# Patient Record
Sex: FEMALE | Race: WHITE | HISPANIC OR LATINO | Employment: PART TIME | URBAN - METROPOLITAN AREA
[De-identification: names, ages, dates, MRNs, and addresses within clinical notes are randomized per-mention and may not be internally consistent; named-entity substitution may affect disease eponyms.]

---

## 2017-01-19 ENCOUNTER — ALLSCRIPTS OFFICE VISIT (OUTPATIENT)
Dept: OTHER | Facility: OTHER | Age: 42
End: 2017-01-19

## 2017-04-05 ENCOUNTER — ALLSCRIPTS OFFICE VISIT (OUTPATIENT)
Dept: OTHER | Facility: OTHER | Age: 42
End: 2017-04-05

## 2017-04-05 ENCOUNTER — APPOINTMENT (OUTPATIENT)
Dept: LAB | Facility: HOSPITAL | Age: 42
End: 2017-04-05
Payer: COMMERCIAL

## 2017-04-05 DIAGNOSIS — E66.01 MORBID (SEVERE) OBESITY DUE TO EXCESS CALORIES (HCC): ICD-10-CM

## 2017-04-05 LAB
25(OH)D3 SERPL-MCNC: 10.2 NG/ML (ref 30–100)
ALBUMIN SERPL BCP-MCNC: 3.6 G/DL (ref 3.5–5)
ALP SERPL-CCNC: 125 U/L (ref 46–116)
ALT SERPL W P-5'-P-CCNC: 26 U/L (ref 12–78)
ANION GAP SERPL CALCULATED.3IONS-SCNC: 11 MMOL/L (ref 4–13)
AST SERPL W P-5'-P-CCNC: 16 U/L (ref 5–45)
BASOPHILS # BLD AUTO: 0.05 THOUSANDS/ΜL (ref 0–0.1)
BASOPHILS NFR BLD AUTO: 1 % (ref 0–1)
BILIRUB SERPL-MCNC: 0.31 MG/DL (ref 0.2–1)
BUN SERPL-MCNC: 15 MG/DL (ref 5–25)
CALCIUM SERPL-MCNC: 8.7 MG/DL (ref 8.3–10.1)
CHLORIDE SERPL-SCNC: 105 MMOL/L (ref 100–108)
CHOLEST SERPL-MCNC: 164 MG/DL (ref 50–200)
CO2 SERPL-SCNC: 26 MMOL/L (ref 21–32)
CREAT SERPL-MCNC: 0.71 MG/DL (ref 0.6–1.3)
CREAT UR-MCNC: 184 MG/DL
EOSINOPHIL # BLD AUTO: 0.16 THOUSAND/ΜL (ref 0–0.61)
EOSINOPHIL NFR BLD AUTO: 2 % (ref 0–6)
ERYTHROCYTE [DISTWIDTH] IN BLOOD BY AUTOMATED COUNT: 13.9 % (ref 11.6–15.1)
GFR SERPL CREATININE-BSD FRML MDRD: >60 ML/MIN/1.73SQ M
GLUCOSE P FAST SERPL-MCNC: 102 MG/DL (ref 65–99)
HCT VFR BLD AUTO: 42 % (ref 34.8–46.1)
HDLC SERPL-MCNC: 51 MG/DL (ref 40–60)
HGB BLD-MCNC: 13.5 G/DL (ref 11.5–15.4)
LDLC SERPL CALC-MCNC: 101 MG/DL (ref 0–100)
LYMPHOCYTES # BLD AUTO: 3.13 THOUSANDS/ΜL (ref 0.6–4.47)
LYMPHOCYTES NFR BLD AUTO: 34 % (ref 14–44)
MAGNESIUM SERPL-MCNC: 2 MG/DL (ref 1.6–2.6)
MCH RBC QN AUTO: 26.7 PG (ref 26.8–34.3)
MCHC RBC AUTO-ENTMCNC: 32.1 G/DL (ref 31.4–37.4)
MCV RBC AUTO: 83 FL (ref 82–98)
MICROALBUMIN UR-MCNC: 7.7 MG/L (ref 0–20)
MICROALBUMIN/CREAT 24H UR: 4 MG/G CREATININE (ref 0–30)
MONOCYTES # BLD AUTO: 0.38 THOUSAND/ΜL (ref 0.17–1.22)
MONOCYTES NFR BLD AUTO: 4 % (ref 4–12)
NEUTROPHILS # BLD AUTO: 5.37 THOUSANDS/ΜL (ref 1.85–7.62)
NEUTS SEG NFR BLD AUTO: 59 % (ref 43–75)
NRBC BLD AUTO-RTO: 0 /100 WBCS
PLATELET # BLD AUTO: 331 THOUSANDS/UL (ref 149–390)
PMV BLD AUTO: 10.3 FL (ref 8.9–12.7)
POTASSIUM SERPL-SCNC: 4.2 MMOL/L (ref 3.5–5.3)
PROT SERPL-MCNC: 7.4 G/DL (ref 6.4–8.2)
RBC # BLD AUTO: 5.05 MILLION/UL (ref 3.81–5.12)
SODIUM SERPL-SCNC: 142 MMOL/L (ref 136–145)
T4 FREE SERPL-MCNC: 1.03 NG/DL (ref 0.76–1.46)
TRIGL SERPL-MCNC: 60 MG/DL
TSH SERPL DL<=0.05 MIU/L-ACNC: 1.02 UIU/ML (ref 0.36–3.74)
WBC # BLD AUTO: 9.09 THOUSAND/UL (ref 4.31–10.16)

## 2017-04-05 PROCEDURE — 82570 ASSAY OF URINE CREATININE: CPT

## 2017-04-05 PROCEDURE — 82043 UR ALBUMIN QUANTITATIVE: CPT

## 2017-04-05 PROCEDURE — 82306 VITAMIN D 25 HYDROXY: CPT

## 2017-04-05 PROCEDURE — 85025 COMPLETE CBC W/AUTO DIFF WBC: CPT

## 2017-04-05 PROCEDURE — 83735 ASSAY OF MAGNESIUM: CPT

## 2017-04-05 PROCEDURE — 84439 ASSAY OF FREE THYROXINE: CPT

## 2017-04-05 PROCEDURE — 80061 LIPID PANEL: CPT

## 2017-04-05 PROCEDURE — 83036 HEMOGLOBIN GLYCOSYLATED A1C: CPT

## 2017-04-05 PROCEDURE — 84443 ASSAY THYROID STIM HORMONE: CPT

## 2017-04-05 PROCEDURE — 80053 COMPREHEN METABOLIC PANEL: CPT

## 2017-04-05 PROCEDURE — 36415 COLL VENOUS BLD VENIPUNCTURE: CPT

## 2017-04-06 LAB
EST. AVERAGE GLUCOSE BLD GHB EST-MCNC: 123 MG/DL
HBA1C MFR BLD: 5.9 % (ref 4.2–6.3)

## 2017-04-13 ENCOUNTER — GENERIC CONVERSION - ENCOUNTER (OUTPATIENT)
Dept: OTHER | Facility: OTHER | Age: 42
End: 2017-04-13

## 2017-04-19 ENCOUNTER — GENERIC CONVERSION - ENCOUNTER (OUTPATIENT)
Dept: OTHER | Facility: OTHER | Age: 42
End: 2017-04-19

## 2017-05-10 ENCOUNTER — TRANSCRIBE ORDERS (OUTPATIENT)
Dept: SLEEP CENTER | Facility: CLINIC | Age: 42
End: 2017-05-10

## 2017-05-10 ENCOUNTER — ALLSCRIPTS OFFICE VISIT (OUTPATIENT)
Dept: OTHER | Facility: OTHER | Age: 42
End: 2017-05-10

## 2017-05-10 DIAGNOSIS — G47.33 OSA (OBSTRUCTIVE SLEEP APNEA): Primary | ICD-10-CM

## 2017-05-31 ENCOUNTER — ALLSCRIPTS OFFICE VISIT (OUTPATIENT)
Dept: OTHER | Facility: OTHER | Age: 42
End: 2017-05-31

## 2017-05-31 DIAGNOSIS — R07.9 CHEST PAIN: ICD-10-CM

## 2017-06-08 ENCOUNTER — HOSPITAL ENCOUNTER (OUTPATIENT)
Dept: NON INVASIVE DIAGNOSTICS | Facility: CLINIC | Age: 42
Discharge: HOME | End: 2017-06-08
Payer: COMMERCIAL

## 2017-06-08 ENCOUNTER — HOSPITAL ENCOUNTER (OUTPATIENT)
Dept: SLEEP CENTER | Facility: CLINIC | Age: 42
Discharge: HOME | End: 2017-06-08
Payer: COMMERCIAL

## 2017-06-08 ENCOUNTER — GENERIC CONVERSION - ENCOUNTER (OUTPATIENT)
Dept: OTHER | Facility: OTHER | Age: 42
End: 2017-06-08

## 2017-06-08 DIAGNOSIS — R07.9 CHEST PAIN: ICD-10-CM

## 2017-06-08 DIAGNOSIS — G47.33 OSA (OBSTRUCTIVE SLEEP APNEA): ICD-10-CM

## 2017-06-08 LAB
CHEST PAIN STATEMENT: NORMAL
MAX DIASTOLIC BP: 90 MMHG
MAX HEART RATE: 157 BPM
MAX PREDICTED HEART RATE: 179 BPM
MAX. SYSTOLIC BP: 160 MMHG
PROTOCOL NAME: NORMAL
REASON FOR TERMINATION: NORMAL
TARGET HR FORMULA: NORMAL
TEST INDICATION: NORMAL
TIME IN EXERCISE PHASE: 384 S

## 2017-06-08 PROCEDURE — 93017 CV STRESS TEST TRACING ONLY: CPT

## 2017-07-06 ENCOUNTER — ALLSCRIPTS OFFICE VISIT (OUTPATIENT)
Dept: OTHER | Facility: OTHER | Age: 42
End: 2017-07-06

## 2017-08-03 ENCOUNTER — ALLSCRIPTS OFFICE VISIT (OUTPATIENT)
Dept: OTHER | Facility: OTHER | Age: 42
End: 2017-08-03

## 2017-08-03 DIAGNOSIS — M62.838 OTHER MUSCLE SPASM: ICD-10-CM

## 2017-08-03 DIAGNOSIS — M54.2 CERVICALGIA: ICD-10-CM

## 2017-08-03 DIAGNOSIS — M54.12 RADICULOPATHY OF CERVICAL REGION: ICD-10-CM

## 2017-08-04 ENCOUNTER — ALLSCRIPTS OFFICE VISIT (OUTPATIENT)
Dept: OTHER | Facility: OTHER | Age: 42
End: 2017-08-04

## 2017-08-10 ENCOUNTER — GENERIC CONVERSION - ENCOUNTER (OUTPATIENT)
Dept: OTHER | Facility: OTHER | Age: 42
End: 2017-08-10

## 2017-08-10 ENCOUNTER — APPOINTMENT (OUTPATIENT)
Dept: PHYSICAL THERAPY | Facility: REHABILITATION | Age: 42
End: 2017-08-10
Payer: COMMERCIAL

## 2017-08-10 DIAGNOSIS — M54.12 RADICULOPATHY OF CERVICAL REGION: ICD-10-CM

## 2017-08-10 DIAGNOSIS — M54.2 CERVICALGIA: ICD-10-CM

## 2017-08-10 DIAGNOSIS — M62.838 OTHER MUSCLE SPASM: ICD-10-CM

## 2017-08-10 PROCEDURE — G8991 OTHER PT/OT GOAL STATUS: HCPCS | Performed by: PHYSICAL THERAPIST

## 2017-08-10 PROCEDURE — 97162 PT EVAL MOD COMPLEX 30 MIN: CPT

## 2017-08-10 PROCEDURE — 97140 MANUAL THERAPY 1/> REGIONS: CPT

## 2017-08-10 PROCEDURE — G8990 OTHER PT/OT CURRENT STATUS: HCPCS | Performed by: PHYSICAL THERAPIST

## 2017-08-10 PROCEDURE — 97110 THERAPEUTIC EXERCISES: CPT

## 2017-08-15 ENCOUNTER — APPOINTMENT (OUTPATIENT)
Dept: PHYSICAL THERAPY | Facility: REHABILITATION | Age: 42
End: 2017-08-15
Payer: COMMERCIAL

## 2017-08-18 ENCOUNTER — APPOINTMENT (OUTPATIENT)
Dept: PHYSICAL THERAPY | Facility: REHABILITATION | Age: 42
End: 2017-08-18
Payer: COMMERCIAL

## 2017-08-28 ENCOUNTER — APPOINTMENT (OUTPATIENT)
Dept: PHYSICAL THERAPY | Facility: REHABILITATION | Age: 42
End: 2017-08-28
Payer: COMMERCIAL

## 2017-08-30 ENCOUNTER — APPOINTMENT (OUTPATIENT)
Dept: PHYSICAL THERAPY | Facility: REHABILITATION | Age: 42
End: 2017-08-30
Payer: COMMERCIAL

## 2017-09-18 ENCOUNTER — ALLSCRIPTS OFFICE VISIT (OUTPATIENT)
Dept: OTHER | Facility: OTHER | Age: 42
End: 2017-09-18

## 2017-10-12 ENCOUNTER — ALLSCRIPTS OFFICE VISIT (OUTPATIENT)
Dept: OTHER | Facility: OTHER | Age: 42
End: 2017-10-12

## 2017-10-26 NOTE — PROGRESS NOTES
Assessment  1  Chronic migraine without aura (346 70) (G43 449)    Plan  Chronic migraine without aura    · Trokendi XR 25 MG Oral Capsule Extended Release 24 Hour; Take 1 cap HS WITH  100mg cap X 1 week, if no improvement increast to 2 caps HS WITH 100mg capsule   Rx By: Dylan Collins; Dispense: 30 Days ; #:60 Capsule Extended Release 24 Hour; Refill: 0;For: Chronic migraine without aura; SARINA = N; Verified Transmission to [address] [place] [address]; Msg to Pharmacy: Brand name medically necessary; Last Updated By: System [facility]; 09/18/2017 4:13:22 PM   · Ketorolac Tromethamine 30 MG/ML Injection Solution   Rx By: Dylan Collins; For: Chronic migraine without aura; Dose of 2 ML; Intramuscular; SARINA = N; Administered by: Jose Jarvis: 09/18/2017 3:40:00 PM; Last Updated By: Jose Jarvis; 09/18/2017 3:41:39 PM  Chronic migraine without aura, Common migraine without aura    · Trokendi  MG Oral Capsule Extended Release 24 Hour; 1 QHS   Rx By: Dylan Collins; Dispense: 30 Days ; #:30 Capsule Extended Release 24 Hour; Refill: 3;For: Chronic migraine without aura, Common migraine without aura; SARINA = N; Verified Transmission to [address] [place] [address]; Msg to Pharmacy: brand name medically necessary  thank you ; Last Updated By: System, [employer]; 09/18/2017 4:13:22 PM    Discussion/Summary  Discussion Summary:   Chronic migraine: Yashira Espino presents for follow-up evaluation with regard to her chronic migraine  She reports that she is doing a little bit better on the Trokendi in that she has fewer side effects and it is also helping the headaches to a degree but she still gets 3-4 significant migraines per week  She does find the oral Toradol to be somewhat helpful but has not yet used it along with the Maxalt  She finds the Compazine to be less helpful when taken alone  She had a significant migraine in the office today (8/10)   She received in intramuscular injection of Toradol 60 mg and is somewhat improved at 6/10 currently  migraine prevention we will increase the Trokendi XR to 125 mg at bedtime for 1 week, if she notes no significant improvement and no side effects he should increase to 150 mg do think she would also benefit from a combination migraine supplement which may include 1 or more of the following gradients (magnesium, riboflavin/vitamin B2, coenzyme Q10, feverfew, jeana)  She can use a standard supplement dose according to bottle instructions  abort a migraine I would suggest that she use either Maxalt plus Compazine, or Toradol plus Compazine for a typical migraine, for a relatively severe migraine she should use all 3 together  would like her to begin keeping track of her migraines using application on her phonedid receive a bill for her prior Botox injections, I am uncertain as to why insurance did not cover that in its entirety, but we will follow up with the billing department  will plan for her to return to the office in 3 months time but we would be happy to see her sooner if the need should arise  She is currently scheduled for her next Botox injection on October 10th  We will leave this appointment in place for the time being pending further guidance from the billing department, but if it appears that she will have recurrent bills in the amount that she was build for her 1st set of injections that would be cost prohibitive and we would not plan to continue her injections at that point  The    Counseling Documentation With Imm: total time of encounter was 20 minutes-- and-- 15 minutes was spent counseling  Chief Complaint  Chief Complaint Free Text Note Form: Patient present for follow-up appointment regarding migraine      History of Present Illness  HPI: The patient presents for follow-up with regard to her chronic migraines  the time of her last visit she was transitioned to Trokendi which is working better  Fewer SE's  Still getting 3-4/week  Presented today with migraine 8/10 --> 6/10   Overall the migraines are typical for her  She reports that oral Toradol is somewhat effective but less so than the intramuscular form  She has tried Compazine in isolation and not found that to be helpful  She has not tried any Toradol with Maxalt or add Compazine to either medication  Toradol in the oral form helped a little  Has the Maxalt but has not been using it  Compazine is not as helpful alone  Occasional ibuprofen as needed for body pain  reports that there is no change in her migraine character  Review of Systems  Neurological ROS:   Constitutional: fatigue  HEENT: sinus problems,-- feeling congested-- and-- tinnitus  Cardiovascular:  no chest pain or pressure, no palpitations present, the heart rate was not rapid or irregular, no swelling in the arms or legs, no poor circulation  Respiratory:  no unusual or persistant cough, no shortness of breath with or without exertion  Gastrointestinal: nausea-- and-- diarrhea  Musculoskeletal: arthralgias,-- head/neck/back pain-- and-- pain while walking  Integumentary  no masses, no rash, no skin lesions, no livedo reticularis  Psychiatric: anxiety-- and-- depression  Endocrine  no unusual weight loss or gain, no excessive urination, no excessive thirst, no hair loss or gain, no hot or cold intolerance, no menstrual period change or irregularity, no loss of sexual ability or drive, no erection difficulty, no nipple discharge  Hematologic/Lymphatic:  no unusual bleeding, no tendency for easy bruising, no clotting skin or lumps  Neurological General: headache,-- lightheadedness,-- trouble falling asleep-- and-- waking up at night  Neurological Mental Status:  no confusion, no mood swings, no alteration or loss of consciousness, no difficulty expressing/understanding speech, no memory problems  Neurological Cranial Nerves: vertigo or dizziness     Neurological Motor findings include:  no tremor, no twitching, no cramping(pre/post exercise), no atrophy  Neurological Coordination: balance difficulties  Neurological Sensory:  no numbness, no pain, no tingling, does not fall when eyes closed or taking a shower  Neurological Gait:  no difficulty walking, not falling to one side, no sensation of being pushed, has not had falls  ROS Reviewed:   ROS reviewed  Active Problems  1  Abdominal pain, epigastric (789 06) (R10 13)   2  Acute sinusitis (461 9) (J01 90)   3  Allergic rhinitis (477 9) (J30 9)   4  Apnea, sleep (780 57) (G47 30)   5  Arthralgia of multiple joints (719 49) (M25 50)   6  Back pain, sacroiliac (724 6) (M53 3)   7  Cervical cancer screening (V76 2) (Z12 4)   8  Cervical radiculopathy at C6 (723 4) (M54 12)   9  Cervicalgia (723 1) (M54 2)   10  Chest pain (786 50) (R07 9)   11  Chronic migraine without aura (346 70) (G43 709)   12  Colon, diverticulosis (562 10) (K57 30)   13  Common migraine without aura (346 10) (G43 009)   14  DDD (degenerative disc disease), cervical (722 4) (M50 30)   15  Depression screening (V79 0) (Z13 89)   16  Depression with anxiety (300 4) (F41 8)   17  Fibromyalgia (729 1) (M79 7)   18  Headache (784 0) (R51)   19  Headache, chronic daily (784 0) (R51)   20  Heart burn (787 1) (R12)   21  Hiatal hernia (553 3) (K44 9)   22  Increased frequency of headaches (784 0) (R51)   23  Intermittent chest pain (786 50) (R07 9)   24  Low back strain (847 2) (S39 012A)   25  Lumbago (724 2) (M54 5)   26  Lumbar radicular pain (724 4) (M54 16)   27  Need for influenza vaccination (V04 81) (Z23)   28  Obesity, morbid, BMI 40 0-49 9 (278 01) (E66 01)   29  Pap smear for cervical cancer screening (V76 2) (Z12 4)   30  Post-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)   31  Screening for depression (V79 0) (Z13 89)   32  Tinnitus (388 30) (H93 19)   33  Trapezius muscle spasm (728 85) (M62 838)   34  Vitamin D deficiency (268 9) (E55 9)    Past Medical History  1  History of Asthma (983 90) (J45 909)   2   History of Claustrophobia (300 29) (F40 240)   3  History of Cough (786 2) (R05)   4  History of Depression (311) (F32 9)   5  History of acute conjunctivitis (V12 49) (Z86 69)   6  History of influenza (V12 09) (Z87 09)   7  History of upper respiratory infection (V12 09) (Z87 09)   8  History of Lung nodule (793 11) (R91 1)   9  History of Sore throat (462) (J02 9)  Active Problems And Past Medical History Reviewed: The active problems and past medical history were reviewed and updated today  Surgical History  1  History of Back Surgery   2  History of Gallbladder Surgery   3  History of Neck Surgery   4  History of Neuroplasty Decompression Median Nerve At Carpal Tunnel  Surgical History Reviewed: The surgical history was reviewed and updated today  Family History  Mother    1  Family history of Cancer   2  Family history of Diabetes   3  Denied: Family history of Drug abuse   4  Family history of fibromyalgia (V17 89) (Z82 69)   5  Family history of systemic lupus erythematosus (V19 4) (Z82 69)   6  Family history of High blood pressure   7  No family history of mental disorder   8  Family history of Thyroid disease  Father    5  Denied: Family history of Drug abuse   10  No family history of mental disorder  Family History    11  Family history of Arthritis (716 90) (M19 90)   12  Family history of Cancer   13  Family history of systemic lupus erythematosus (V19 4) (Z82 69)  Family History Reviewed: The family history was reviewed and updated today  Social History   · Daily caffeinated cola consumption   · Never A Smoker   · No drug use   · No preference on [employer]ism beliefs   · Occasional alcohol use   · Social history reviewed, unchanged   · Tea  Social History Reviewed: The social history was reviewed and updated today  Current Meds   1  Botox 100 UNIT Injection Solution Reconstituted; INJECT 200  UNIT Other; Therapy: 02KSX4405 to (Evaluate:30Lws3610); Last Rx:[id number] Ordered   2  Cholestyramine 4 GM/DOSE Oral Powder; TAKE 1 SCOOP TWICE DAILY; Therapy: 38XGM5080 to [phone])  Requested for: 13JSM4313; Last   AZ:67PER8200 Ordered   3  Cyclobenzaprine HCl - 10 MG Oral Tablet; TAKE 1 TABLET 3 TIMES DAILY AS NEEDED; Therapy: 56GWZ7202 to (Evaluate:20Jun2017)  Requested for: 02DLS1595; Last   Rx:02Dfu8104 Ordered   4  Fluticasone Propionate 50 MCG/ACT Nasal Suspension; USE 2 SPRAYS EACH   NOSTRIL ONCE DAILY; Therapy: 09ZVX5151 to (Last Rx:05Jun2017)  Requested for: 10EQJ4910 Ordered   5  Ibuprofen 600 MG Oral Tablet; TAKE 1 TABLET 3 TIMES DAILY WITH FOOD AS NEEDED; Therapy: 39ISW9283 to (Evaluate:02Sep2017)  Requested for: 20Den2277; Last   Rx:90Hck0846 Ordered   6  Ketorolac Tromethamine 10 MG Oral Tablet; take 1-2 tablets as needed for headache, no   more than 2 tablets in 24hr and no more than 3 tablets in 1 week; Therapy: 78Ujp5065 to (Evaluate:54Ivl4585)  Requested for: 33Dde8414; Last   Rx:11Aug2017 Ordered   7  Ondansetron HCl - 4 MG Oral Tablet; TAKE 1 TABLET EVERY 8 HOURS AS NEEDED   FOR NAUSEA; Therapy: 46LMY5132 to (Evaluate:15May2017)  Requested for: 05Apr2017; Last   Rx:05Apr2017 Ordered   8  PARoxetine HCl - 10 MG Oral Tablet; Take 1/2 tablet once daily for one week, then   increase to one tablet once daily; Therapy: 27EHP2122 to [phone])  Requested for: 86LTE3482; Last   Rx:05Jun2017 Ordered   9  Prochlorperazine Maleate 10 MG Oral Tablet; TAKE 1 TABLET EVERY 8 HOURS AS   NEEDED nausea or migraine; Therapy: 58BKU7780 to (Evaluate:01Vcy2607)  Requested for: 59Bvc8002; Last   Rx:44Gld4707 Ordered   10  Rizatriptan Benzoate 10 MG Oral Tablet; TAKE 1 TABLET AT ONSET OF HEADACHE  MAY    REPEAT EVERY 2 HOURS AS NEEDED  MAXIMUM 3 TABLETS IN 24 HOURS; Therapy: 22VOX0901 to (Nick Huitron)  Requested for: 320 2035; Last    Rx:05Apr2017 Ordered   11  Trokendi  MG Oral Capsule Extended Release 24 Hour; 1 QHS;     Therapy: 76ASM1960 to (Brien Dent)  Requested for: 73Ebc7222; Last    Rx:79Nsp9960 Ordered   12  Vitamin D (Ergocalciferol) 58813 UNIT Oral Capsule; TAKE 1 CAPSULE WEEKLY; Therapy: 13Apr2017 to (Evaluate:80Dwg4204)  Requested for: 13Apr2017; Last    Rx:90Hmn8270 Ordered  Medication List Reviewed: The medication list was reviewed and updated today  Allergies  1  Codeine Sulfate TABS    Vitals  Signs   Recorded: 99Sek9892 03:40PM   Respiration: 16  Systolic: 088  Diastolic: 78  Height: 5 ft 3 in  Weight: 234 lb 5 oz  BMI Calculated: 41 51  BSA Calculated: 2 07    Physical Exam  Michela Ramsay was in mild-to-moderate distress with clear photophobia  There were no obvious focal cranial neuropathies  Her gait was narrow based and stable with normal castillo          Future Appointments    Date/Time Provider Specialty Site   12/28/2017 02:30 PM Merari Dixon MD Neurology [address]   10/12/2017 10:00 AM Jama Mckee [facility] Gastroenterology Adult [address]   10/10/2017 12:30 PM Gregg Roberts [facility] Neurology [address]   10/16/2017 03:00 PM Luly Kohler, DO Pain Management [address]     Signatures   Electronically signed by : Yohana Kelly MD; Sep 18 2017  5:59PM EST                       (Author)

## 2017-10-29 NOTE — PROGRESS NOTES
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)   2  GERD without esophagitis (530 81) (K21 9)    Plan  Abdominal pain, epigastric, Heart burn    · Omeprazole 20 MG Oral Capsule Delayed Release; TAKE 1 CAPSULE Daily   Rx By: Nena Leija; Dispense: 90 Days ; #:90 Capsule Delayed Release; Refill: 0;Abdominal pain, epigastric, Heart burn; SARINA = N; Verified Transmission to [facility]; Last Updated By: System, [employer]; 10/12/2017 10:26:34 AM  GERD without esophagitis    · Follow-up visit in 1 year Evaluation and Treatment  Follow-up  Status: Hold For -Scheduling  Requested for: 12Oct2017   Ordered; For: GERD without esophagitis; Ordered By: Nena Leija Performed:  Due: 87VBM0585  Post-cholecystectomy syndrome    · Cholestyramine 4 GM/DOSE Oral Powder; TAKE 1 SCOOP TWICE DAILY   Rx By: Nena Leija; Dispense: 30 Days ; #:1 X 378 GM Can; Refill: 5;Post-cholecystectomy syndrome; SARINA = N; Verified Transmission to [address] [place] [address]; Last Updated By: System, SureScript[employer]; 10/12/2017 10:26:33 AM    Discussion/Summary  Discussion Summary:   1  Post cholecystectomy syndrome -she notes good control of her symptoms when she takes the cholestyramine, which she has been taking for approximately 2 years  She does not take this every day because of the side effect of constipation  I recommend attempting half scoop cholestyramine daily instead of a full scoop to see if this lessens her constipation  She denies any new or worsening symptoms, she is requesting a refill of her cholestyramine today  Follow-up in 1 year or as needed  GERD -she reports longstanding reflux symptoms, she denies any recent change or worsening of her symptoms and denies any alarm symptoms such as difficulty swallowing, hematochezia, melena, change in appetite or unintended weight loss  She had an upper endoscopy about 2 years ago which showed gastritis   She was recommended to take omeprazole 20 mg daily however the time she did not have insurance and could not afford this  She is requesting a prescription for this today as she continues to have reflux symptoms  She notes that she has made lifestyle changes including changing her diet to avoid spicy and fatty foods and not lying down for 3 hours after eating  Follow up in 1 year or as needed  Counseling Documentation With Imm: The patient was counseled regarding diagnostic results,-- instructions for management,-- risk factor reductions,-- prognosis,-- patient and family education,-- impressions,-- risks and benefits of treatment options,-- importance of compliance with treatment  Goals and Barriers: The patient has the current Goals: To reduce diarrhea at work  The patent has the current Barriers: Side effect from medication - constipation  Patient's Capacity to Self-Care: Patient is able to Self-Care  Patient Education: Educational resources provided: Antireflux precautions  Medication SE Review and Pt Understands Tx: Possible side effects of new medications were reviewed with the patient/guardian today  The treatment plan was reviewed with the patient/guardian  The patient/guardian understands and agrees with the treatment plan   Self Referrals:   Self Referrals: Yes      Chief Complaint  Chief Complaint Free Text Note Form: Patient is here for abdominal pain, constipation and diarrhea  History of Present Illness  HPI: 80-year-old female with medical history of post cholecystectomy syndrome presents to the office for routine follow-up  She reports that her symptoms are generally under good control if she takes the cholestyramine  She does not take this every day as she does have constipation frequently after taking it  Her symptoms generally consist of diarrhea after eating  She also notes that she has acid reflux, she had an upper endoscopy about 2 years ago done by Dr Noel Doherty which showed some gastritis   She was recommended to take omeprazole however at that time she did not have insurance and could not afford this  She is requesting a prescription today as well as refills of the cholestyramine  She denies any alarm symptoms such as hematochezia, melena, unintended weight loss, recent change in appetite, or difficulty swallowing  History Reviewed: The history was obtained today from the patient and I agree with the documented history  Review of Systems  Complete-Female GI Adult:  Constitutional: No fever, no chills, feels well, no tiredness, no recent weight gain or weight loss  Eyes: No complaints of eye pain, no red eyes, no eyesight problems, no discharge, no dry eyes, no itching of eyes  ENT: no complaints of earache, no loss of hearing, no nose bleeds, no nasal discharge, no sore throat, no hoarseness  Cardiovascular: No complaints of slow heart rate, no fast heart rate, no chest pain, no palpitations, no leg claudication, no lower extremity edema  Respiratory: No complaints of shortness of breath, no wheezing, no cough, no SOB on exertion, no orthopnea, no PND  Gastrointestinal: abdominal pain,-- constipation-- and-- diarrhea  Genitourinary: No complaints of dysuria, no incontinence, no pelvic pain, no dysmenorrhea, no vaginal discharge or bleeding  Musculoskeletal: No complaints of arthralgias, no myalgias, no joint swelling or stiffness, no limb pain or swelling  Integumentary: No complaints of skin rash or lesions, no itching, no skin wounds, no breast pain or lump  Neurological: No complaints of headache, no confusion, no convulsions, no numbness, no dizziness or fainting, no tingling, no limb weakness, no difficulty walking  Psychiatric: Not suicidal, no sleep disturbance, no anxiety or depression, no change in personality, no emotional problems  Endocrine: No complaints of proptosis, no hot flashes, no muscle weakness, no deepening of the voice, no feelings of weakness    Hematologic/Lymphatic: No complaints of swollen glands, no swollen glands in the neck, does not bleed easily, does not bruise easily  ROS Reviewed:   ROS reviewed  Active Problems  1  Abdominal pain, epigastric (789 06) (R10 13)   2  Acute sinusitis (461 9) (J01 90)   3  Allergic rhinitis (477 9) (J30 9)   4  Apnea, sleep (780 57) (G47 30)   5  Arthralgia of multiple joints (719 49) (M25 50)   6  Back pain, sacroiliac (724 6) (M53 3)   7  Cervical cancer screening (V76 2) (Z12 4)   8  Cervical radiculopathy at C6 (723 4) (M54 12)   9  Cervicalgia (723 1) (M54 2)   10  Chest pain (786 50) (R07 9)   11  Chronic migraine without aura (346 70) (G43 709)   12  Colon, diverticulosis (562 10) (K57 30)   13  Common migraine without aura (346 10) (G43 009)   14  DDD (degenerative disc disease), cervical (722 4) (M50 30)   15  Depression screening (V79 0) (Z13 89)   16  Depression with anxiety (300 4) (F41 8)   17  Fibromyalgia (729 1) (M79 7)   18  Headache (784 0) (R51)   19  Headache, chronic daily (784 0) (R51)   20  Heart burn (787 1) (R12)   21  Hiatal hernia (553 3) (K44 9)   22  Increased frequency of headaches (784 0) (R51)   23  Intermittent chest pain (786 50) (R07 9)   24  Low back strain (847 2) (S39 012A)   25  Lumbago (724 2) (M54 5)   26  Lumbar radicular pain (724 4) (M54 16)   27  Need for influenza vaccination (V04 81) (Z23)   28  Obesity, morbid, BMI 40 0-49 9 (278 01) (E66 01)   29  Pap smear for cervical cancer screening (V76 2) (Z12 4)   30  Post-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)   31  Screening for depression (V79 0) (Z13 89)   32  Tinnitus (388 30) (H93 19)   33  Trapezius muscle spasm (728 85) (M62 838)   34  Vitamin D deficiency (268 9) (E55 9)    Past Medical History  1  History of Asthma (493 90) (J45 909)   2  History of Claustrophobia (300 29) (F40 240)   3  History of Cough (786 2) (R05)   4  History of Depression (311) (F32 9)   5  History of acute conjunctivitis (V12 49) (Z86 69)   6  History of influenza (V12 09) (Z87 09)   7   History of upper respiratory infection (V12 09) (Z87 09)   8  History of Lung nodule (793 11) (R91 1)   9  History of Sore throat (462) (J02 9)  Active Problems And Past Medical History Reviewed: The active problems and past medical history were reviewed and updated today  Surgical History  1  History of Back Surgery   2  History of Gallbladder Surgery   3  History of Neck Surgery   4  History of Neuroplasty Decompression Median Nerve At Carpal Tunnel  Surgical History Reviewed: The surgical history was reviewed and updated today  Family History  Mother    1  Family history of Cancer   2  Family history of Diabetes   3  Denied: Family history of Drug abuse   4  Family history of fibromyalgia (V17 89) (Z82 69)   5  Family history of systemic lupus erythematosus (V19 4) (Z82 69)   6  Family history of High blood pressure   7  No family history of mental disorder   8  Family history of Thyroid disease  Father    5  Denied: Family history of Drug abuse   10  No family history of mental disorder  Family History    11  Family history of Arthritis (716 90) (M19 90)   12  Family history of Cancer   13  Family history of systemic lupus erythematosus (V19 4) (Z82 69)  Family History Reviewed: The family history was reviewed and updated today  Social History     · Daily caffeinated cola consumption   · Never A Smoker   · No drug use   · No preference on [employer] beliefs   · Occasional alcohol use   · Social history reviewed, unchanged   · Tea  Social History Reviewed: The social history was reviewed and updated today  Current Meds   1  Botox 100 UNIT Injection Solution Reconstituted; INJECT 200  UNIT Other; Therapy: 91IDG1260 to (Evaluate:98Ncz6017); Last Rx:18Qld6042 Ordered   2  Cholestyramine 4 GM/DOSE Oral Powder; TAKE 1 SCOOP TWICE DAILY; Therapy: 14EBG4142 to (96 848561)  Requested for: 24OQK3356; Last AY:83OKW1706 Ordered   3   Cyclobenzaprine HCl - 10 MG Oral Tablet; TAKE 1 TABLET 3 TIMES DAILY AS NEEDED; Therapy: 97JIT5683 to (Evaluate:20Jun2017)  Requested for: 44JDH1784; Last Rx:73Ebe8283 Ordered   4  Fluticasone Propionate 50 MCG/ACT Nasal Suspension; USE 2 SPRAYS EACH NOSTRIL ONCE DAILY; Therapy: 16CJY4908 to (Last Rx:05Jun2017)  Requested for: 76RUT4616 Ordered   5  Ibuprofen 600 MG Oral Tablet; TAKE 1 TABLET 3 TIMES DAILY WITH FOOD AS NEEDED; Therapy: 21VPQ6961 to (Evaluate:02Sep2017)  Requested for: 17Lji3342; Last Rx:23Kmi3829 Ordered   6  Ketorolac Tromethamine 10 MG Oral Tablet; take 1-2 tablets as needed for headache, no more than 2 tablets in 24hr and no more than 3 tablets in 1 week; Therapy: 00Gof3867 to (Evaluate:10Sep2017)  Requested for: 11Aug2017; Last Rx:53Aua1670 Ordered   7  Ondansetron HCl - 4 MG Oral Tablet; TAKE 1 TABLET EVERY 8 HOURS AS NEEDED FOR NAUSEA; Therapy: 81VGD3165 to (Evaluate:15May2017)  Requested for: 05Apr2017; Last Rx:05Apr2017 Ordered   8  PARoxetine HCl - 10 MG Oral Tablet; Take 1/2 tablet once daily for one week, then increase to one tablet once daily; Therapy: 38IDC3702 to (Angely Soto)  Requested for: 37GDK0702; Last Rx:05Jun2017 Ordered   9  Prochlorperazine Maleate 10 MG Oral Tablet; TAKE 1 TABLET EVERY 8 HOURS AS NEEDED nausea or migraine; Therapy: 76EBZ6038 to (Evaluate:80Fof8142)  Requested for: 04Aug2017; Last Rx:59Jcs4161 Ordered   10  Rizatriptan Benzoate 10 MG Oral Tablet; TAKE 1 TABLET AT ONSET OF HEADACHE  MAY  REPEAT EVERY 2 HOURS AS NEEDED  MAXIMUM 3 TABLETS IN 24 HOURS; Therapy: 21ZQM8209 to (Jasson Radford)  Requested for: 320 2035; Last  Rx:85Wng4713 Ordered   11  Trokendi  MG Oral Capsule Extended Release 24 Hour; 1 QHS; Therapy: 22PIW3773 to (Evaluate:16Jan2018)  Requested for: 83Mrt0967; Last  Rx:60Qwj3370 Ordered   12  Trokendi XR 25 MG Oral Capsule Extended Release 24 Hour; Take 1 cap HS WITH  100mg cap X 1 week, if no improvement increast to 2 caps HS WITH 100mg capsule;   Therapy: 18Sep2017 to (Evaluate:18Oct2017) Requested for: 19Zle9005; Last  Rx:59Ipx1831 Ordered   13  Vitamin D (Ergocalciferol) 30969 UNIT Oral Capsule; TAKE 1 CAPSULE WEEKLY; Therapy: 02Hcg9522 to (Evaluate:94Ckg0244)  Requested for: 90Oyw3071; Last  Rx:17Jcl6741 Ordered  Medication List Reviewed: The medication list was reviewed and updated today  Allergies  1  Codeine Sulfate TABS    Vitals  Vital Signs    Recorded: 48OYK0312 10:04AM   Temperature 97 9 F, Tympanic   Heart Rate 79   Respiration 18   Systolic 292, LUE, Sitting   Diastolic 74, LUE, Sitting   Height 5 ft 3 in   Weight 233 lb    BMI Calculated 41 27   BSA Calculated 2 06       Physical Exam   Constitutional  General appearance: No acute distress, well appearing and well nourished  Eyes  Pupils and irises: Equal, round and reactive to light  Ears, Nose, Mouth, and Throat  Oropharynx: Normal with no erythema, edema, exudate or lesions  Pulmonary  Respiratory effort: No increased work of breathing or signs of respiratory distress  Auscultation of lungs: Clear to auscultation  Cardiovascular  Auscultation of heart: Normal rate and rhythm, normal S1 and S2, without murmurs  Abdomen  Abdomen: Non-tender, no masses  Liver and spleen: No hepatomegaly or splenomegaly  Lymphatic  Palpation of lymph nodes in neck: No lymphadenopathy  Musculoskeletal  Gait and station: Normal    Skin  Skin and subcutaneous tissue: Normal without rashes or lesions     Neurologic  Cranial nerves: Cranial nerves 2-12 intact  -- (grossly intact)  Psychiatric  Orientation to person, place, and time: Normal    Mood and affect: Normal          Future Appointments    Date/Time Provider Specialty Site   12/28/2017 02:30 PM Carmenza Jones MD Neurology [address] [facility]   10/24/2017 01:00 PM Coni Alvarez [facility] Neurology [address]   10/16/2017 03:00 PM Jessy Mahajan, DO Pain Management [address]       Signatures   Electronically signed by : Carlito Daniel ROMAINE, [facility]; Oct 12 2017 10:41AM EST                       (Author)    Electronically signed by : Agustin Cabrera DO; Oct 12 2017  3:23PM EST                       (Author)    Electronically signed by : Agustin Cabrera DO; Oct 12 2017  3:23PM EST                       (Author)

## 2017-11-16 ENCOUNTER — TRANSCRIBE ORDERS (OUTPATIENT)
Dept: ADMINISTRATIVE | Facility: HOSPITAL | Age: 42
End: 2017-11-16

## 2017-11-16 ENCOUNTER — HOSPITAL ENCOUNTER (OUTPATIENT)
Dept: RADIOLOGY | Facility: HOSPITAL | Age: 42
Discharge: HOME | End: 2017-11-16
Payer: COMMERCIAL

## 2017-11-16 DIAGNOSIS — M79.672 LEFT FOOT PAIN: ICD-10-CM

## 2017-11-16 DIAGNOSIS — M25.572 LEFT ANKLE PAIN, UNSPECIFIED CHRONICITY: Primary | ICD-10-CM

## 2017-11-16 DIAGNOSIS — M25.572 LEFT ANKLE PAIN, UNSPECIFIED CHRONICITY: ICD-10-CM

## 2017-11-16 PROCEDURE — 73630 X-RAY EXAM OF FOOT: CPT

## 2017-11-16 PROCEDURE — 73610 X-RAY EXAM OF ANKLE: CPT

## 2017-11-27 ENCOUNTER — ALLSCRIPTS OFFICE VISIT (OUTPATIENT)
Dept: OTHER | Facility: OTHER | Age: 42
End: 2017-11-27

## 2017-11-27 DIAGNOSIS — M54.12 RADICULOPATHY OF CERVICAL REGION: ICD-10-CM

## 2017-11-28 NOTE — CONSULTS
Assessment  Assessed    1  Cervical radiculopathy (723 4) (M54 12)   2  Cervicalgia (723 1) (M54 2)   3  Fibromyalgia (729 1) (M79 7)    Plan  Cervical radiculopathy    · Gabapentin 100 MG Oral Capsule; TAKE 1 CAPSULE BEDTIME MAY INCREASE TO3 CAPSULES AT BEDTIME AS TOLERATED   Rx By: Melani Kumari; Dispense: 30 Days ; #:90 Capsule; Refill: 1;Cervical radiculopathy; SARINA = N; Verified Transmission to [address] [place] [address]; Last Updated By: System, [employer]; 11/27/2017 11:11:21 AM   · *1 - [facility]  Consult: Evaluate and treatplease evaluate if the patient is a candidate for Phil based therapy  Status: Active Requested for: 27Nov2017   Ordered;Cervical radiculopathy; Ordered By: Melani Kumari Performed:  Due: 25ZNG9708  Care Summary provided  : Yes   51-year-old female with a history of previous C4-5 ACDF presenting for initial consultation regarding neck pain that radiates into the posterolateral aspect of her left upper extremity down to the elbow and into the left scapular and chest region for the past 6 months without any trauma or inciting event  This seems to be secondary to cervical radiculopathy as the patient does have a positive Spurling's to the left, however her most recent MRI did not show any significant pathology including central or foraminal stenosis  The patient was prescribed physical therapy, however she only went to 1 visit and she did not find much relief with this  The patient was last seen at an outside pain specialist for the symptoms which were similar and had received a cervical epidural steroid injection, however there was unfortunately a dural puncture leading to a PDPH and subsequent epidural blood patch  1  I will prescribe Phil based physical therapy for the patient's radicular symptoms  2  I will initiate gabapentin 100 mg q h s  and titrate up to 300 mg q h s     The patient was apprised of the most common side effects of gabapentin including dizziness, drowsiness, mental fogginess, and peripheral edema  The patient was provided a titration schedule at today's visit  3  if the patient fails conservative therapy we will consider an MRI of the patient's cervical spine 4  patient may continue with ibuprofen and she should not exceed more than 800 mg q 8 hours p r n   5  I will follow up the patient in 2 months   Discussion/Summary  The patient has the current Goals: Reduced pain and improved function  The patent has the current Barriers: Obesity and fibromyalgia  Patient is able to Self-Care  Possible side effects of new medications were reviewed with the patient/guardian today  The treatment plan was reviewed with the patient/guardian  The patient/guardian understands and agrees with the treatment plan   The patient was counseled regarding diagnostic results,-- instructions for management,-- risk factor reductions,-- prognosis,-- patient and family education,-- impressions,-- risks and benefits of treatment options-- and-- importance of compliance with treatment  total time of encounter was 30 minutes  Self Referrals: No      Chief Complaint  Chief Complaints    1  Neck Pain  Neck and left arm pain      History of Present Illness  44-year-old female with a history of previous C4-5 ACDF presenting for initial consultation regarding neck pain that radiates into the posterolateral aspect of her left upper extremity down to the elbow and into the left scapular and chest region for the past 6 months without any trauma or inciting event  The patient was prescribed physical therapy, however she only went to 1 visit and she did not find much relief with this  she denies any right upper extremity symptoms, numbness, paresthesias, or subjective weakness of the left upper upper extremity  She denies any bladder or bowel incontinence or balance issues  The last MRI of her lower cervical spine was in 2015 which was essentially normal with stable hardware at C4-5   The patient was last seen at an outside pain specialist for the symptoms which were similar and had received a cervical epidural steroid injection, however there was unfortunately a dural puncture leading to a PDPH and subsequent epidural blood patch  She does occasionally take cyclobenzaprine 10 mg p r n , however this does cause drowsiness  She also takes ibuprofen 600 mg q 8 hours p r n  with minimal to mild relief  She does have a history of migraines for which she follows with Neurology  patient rates her pain as 7 8/10 on the pain is constant  The pain is worse at night  The pain is described as shooting, sharp, dull, aching, and throbbing  The pain is increased with lying down, bending at her neck, exercise, and coughing /sneezing  I have personally reviewed and/or updated the patient's past medical history, past surgical history, family history, social history, allergies, and vital signs today  than as stated above, the patient denies any interval changes in medications, medical condition, mental condition, symptoms, or allergies since the last office visit  Referring physician is  Dr Trevor Lund  Primary Care physician is  Same [facility] presents with complaints of constant episodes of left posterior and left lateral neck pain, described as sharp, dull, aching and throbbing, radiating to the left trapezius and left arm  On a scale of 1 to 10, the patient rates the pain as 7  Review of Systems   Constitutional: no fever,-- no recent weight gain-- and-- no recent weight loss  Eyes: no double vision-- and-- no blurry vision  Cardiovascular: chest pain, but-- no palpitations-- and-- no lower extremity edema  Respiratory: no complaints of shortness of breath-- and-- no wheezing  Musculoskeletal: muscle weakness-- and-- joint stiffness, but-- no difficulty walking,-- no joint swelling,-- no limb swelling,-- no pain in extremity-- and-- no decreased range of motion    Neurological: dizziness, but-- no difficulty swallowing,-- no memory loss,-- no loss of consciousness-- and-- no seizures  Gastrointestinal: nausea-- and-- diarrhea, but-- no vomiting-- and-- no constipation  Genitourinary: no difficulty initiating urine stream,-- no genital pain-- and-- no frequent urination  Integumentary: no complaints of skin rash  Psychiatric: depression  Endocrine: no excessive thirst,-- no adrenal disease,-- no hypothyroidism-- and-- no hyperthyroidism  Hematologic/Lymphatic: no tendency for easy bruising-- and-- no tendency for easy bleeding  ROS reviewed  Active Problems  Problems    1  Abdominal pain, epigastric (789 06) (R10 13)   2  Acute sinusitis (461 9) (J01 90)   3  Allergic rhinitis (477 9) (J30 9)   4  Apnea, sleep (780 57) (G47 30)   5  Arthralgia of multiple joints (719 49) (M25 50)   6  Back pain, sacroiliac (724 6) (M53 3)   7  Cervical cancer screening (V76 2) (Z12 4)   8  Cervical radiculopathy at C6 (723 4) (M54 12)   9  Cervicalgia (723 1) (M54 2)   10  Chest pain (786 50) (R07 9)   11  Chronic migraine without aura (346 70) (G43 709)   12  Colon, diverticulosis (562 10) (K57 30)   13  Common migraine without aura (346 10) (G43 009)   14  DDD (degenerative disc disease), cervical (722 4) (M50 30)   15  Depression screening (V79 0) (Z13 89)   16  Depression with anxiety (300 4) (F41 8)   17  Fibromyalgia (729 1) (M79 7)   18  GERD without esophagitis (530 81) (K21 9)   19  Headache (784 0) (R51)   20  Headache, chronic daily (784 0) (R51)   21  Heart burn (787 1) (R12)   22  Hiatal hernia (553 3) (K44 9)   23  Increased frequency of headaches (784 0) (R51)   24  Intermittent chest pain (786 50) (R07 9)   25  Low back strain (847 2) (S39 012A)   26  Lumbago (724 2) (M54 5)   27  Lumbar radicular pain (724 4) (M54 16)   28  Need for influenza vaccination (V04 81) (Z23)   29  Obesity, morbid, BMI 40 0-49 9 (278 01) (E66 01)   30  Pap smear for cervical cancer screening (V76 2) (Z12 4)   31  Post-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)   32  Screening for depression (V79 0) (Z13 89)   33  Tinnitus (388 30) (H93 19)   34  Trapezius muscle spasm (728 85) (M62 838)   35  Vitamin D deficiency (268 9) (E55 9)    Past Medical History  Problems    1  History of Anxiety (300 00) (F41 9)   2  History of Asthma (493 90) (J45 909)   3  Chest pain (786 50) (R07 9)   4  History of Claustrophobia (300 29) (F40 240)   5  History of Cough (786 2) (R05)   6  History of Depression (311) (F32 9)   7  Fibromyalgia (729 1) (M79 7)   8  GERD without esophagitis (530 81) (K21 9)   9  History of acute conjunctivitis (V12 49) (Z86 69)   10  History of influenza (V12 09) (Z87 09)   11  History of upper respiratory infection (V12 09) (Z87 09)   12  History of Lung nodule (793 11) (R91 1)   13  History of Sore throat (462) (J02 9)    Surgical History  Problems    1  History of Back Surgery   2  History of Gallbladder Surgery   3  History of Neck Surgery   4  History of Neuroplasty Decompression Median Nerve At Carpal Tunnel    Family History  Mother    1  Family history of Cancer   2  Family history of Diabetes   3  Denied: Family history of Drug abuse   4  Family history of fibromyalgia (V17 89) (Z82 69)   5  Family history of malignant neoplasm of ovary (V16 41) (Z80 41)   6  Family history of systemic lupus erythematosus (V19 4) (Z82 69)   7  Family history of High blood pressure   8  No family history of mental disorder   9  Family history of Thyroid disease  Father    8  Denied: Family history of Drug abuse   11  No family history of mental disorder  Unknown    12  Family history of cardiac disorder (V17 49) (Z82 49)   13  Family history of neuropathy (V17 2) (Z82 0)  Family History    15  Family history of Arthritis (716 90) (M19 90)   15  Family history of Cancer   16   Family history of systemic lupus erythematosus (V19 4) (Z82 69)    Social History  Problems    · Daily caffeinated cola consumption   · Never A Smoker   · No drug use   · No preference on Spiritism beliefs   · Occasional alcohol use   · Social history reviewed, unchanged   · Tea    Current Meds   1  Botox 100 UNIT Injection Solution Reconstituted; INJECT 200  UNIT Other; Therapy: 57IAP7995 to (Evaluate:73Mcc7302); Last Rx:06Ocf3399 Ordered   2  Cholestyramine 4 GM/DOSE Oral Powder; TAKE 1 SCOOP TWICE DAILY; Therapy: 40KBO8992 to (Evaluate:69Rwv9611)  Requested for: 49IZY9382; Last Rx:12Oct2017 Ordered   3  Cyclobenzaprine HCl - 10 MG Oral Tablet; TAKE 1 TABLET 3 TIMES DAILY AS NEEDED; Therapy: 76RYB0382 to (Evaluate:20Jun2017)  Requested for: 04EDH0513; Last Rx:97Cgr7619 Ordered   4  Fluticasone Propionate 50 MCG/ACT Nasal Suspension; USE 2 SPRAYS EACH NOSTRIL ONCE DAILY; Therapy: 53OLW7000 to (Last Rx:05Jun2017)  Requested for: 69GRV0770 Ordered   5  Ibuprofen 600 MG Oral Tablet; TAKE 1 TABLET 3 TIMES DAILY WITH FOOD AS NEEDED; Therapy: 01REK4713 to (Evaluate:34Dqu3710)  Requested for: 20Kwh1539; Last Rx:21Yui8644 Ordered   6  Ketorolac Tromethamine 10 MG Oral Tablet; take 1-2 tablets as needed for headache, no more than 2 tablets in 24hr and no more than 3 tablets in 1 week; Therapy: 84Pan9983 to (Evaluate:97Dnu5221)  Requested for: 45Vtx3478; Last Rx:67Ufo6285 Ordered   7  Omeprazole 20 MG Oral Capsule Delayed Release; TAKE 1 CAPSULE Daily; Therapy: 45ACC3093 to (Evaluate:10Jan2018)  Requested for: 12Oct2017; Last Rx:12Oct2017 Ordered   8  Ondansetron HCl - 4 MG Oral Tablet; TAKE 1 TABLET EVERY 8 HOURS AS NEEDED FOR NAUSEA; Therapy: 92IUA8508 to (Evaluate:24Ddg4919)  Requested for: 05Apr2017; Last Rx:05Apr2017 Ordered   9  PARoxetine HCl - 10 MG Oral Tablet; Take 1/2 tablet once daily for one week, then increase to one tablet once daily; Therapy: 51CUD9110 to (03 17 74 30 53)  Requested for: 29QPM0069; Last Rx:05Jun2017 Ordered   10  Prochlorperazine Maleate 10 MG Oral Tablet; TAKE 1 TABLET EVERY 8 HOURS AS  NEEDED nausea or migraine;   Therapy: 20KWW5699 to (Evaluate:12Vht7173)  Requested for: 28Hon2910; Last  Rx:03Xik7192 Ordered   11  Rizatriptan Benzoate 10 MG Oral Tablet Disintegrating; DISSOLVE 1 TABLET AT ONSET  OF MIGRAINE MAY REPAT 2 HOURS LATER (MAX OF 3 IN 24 HOURS); Therapy: 37MNS1326 to (Evaluate:46Ocd2597)  Requested for: 83VHH0943; Last  Rx:97Yyj2508 Ordered   12  Trokendi  MG Oral Capsule Extended Release 24 Hour; 1 QHS; Therapy: 69VSA0323 to (Evaluate:16Jan2018)  Requested for: 54Vti9401; Last  Rx:78Xua1870 Ordered   13  Trokendi XR 25 MG Oral Capsule Extended Release 24 Hour; Take 1 cap HS WITH  100mg cap X 1 week, if no improvement increast to 2 caps HS WITH 100mg capsule; Therapy: 60Hqn5241 to (Evaluate:18Xmj0727)  Requested for: 81Mln7405; Last  Rx:67Cej1980 Ordered   14  Vitamin D (Ergocalciferol) 89646 UNIT Oral Capsule; TAKE 1 CAPSULE WEEKLY; Therapy: 11Lee2437 to (Evaluate:14Yhb6367)  Requested for: 38Yka9154; Last  Rx:23Fcw5250 Ordered    Allergies  Medication    1  Codeine Sulfate TABS    Vitals  Vital Signs    Recorded: 70RGB9714 10:14AM   Temperature 98 8 F   Heart Rate 95   Systolic 867   Diastolic 87   Height 5 ft 3 in   Weight 233 lb    BMI Calculated 41 27   BSA Calculated 2 06   Pain Scale 7       Physical Exam   Constitutional  General appearance: Well developed, well nourished, alert, in no distress, non-toxic and no overt pain behavior  Eyes  Sclera: anicteric  HEENT  Hearing grossly intact  Neck  Neck: Supple, symmetric, trachea midline, no masses  Pulmonary  Respiratory effort: Even and unlabored  Abdomen  Abdomen: Soft, non-tender, non-distended  Skin  Skin and subcutaneous tissue: Normal without rashes or lesions, well hydrated  Psychiatric  Mood and affect: Mood and affect appropriate     Neurologic  the muscle tone was normal  Musculoskeletal  Gait and station: Normal    Cervical Spine examination demonstrates Cervical Spine:  Appearance: Normal   Tenderness: right paraspinal tenderness,-- left paraspinal tenderness,-- right trapezius muscle-- and-- left trapezius muscle  Palpatory findings include bilateral muscle spasms   Cervical Sensory Exam:  intact to light touch and pinprick in the upper extremities  ROM: Full   hand strength was normal bilaterally  wrist strength was normal bilaterally  elbow strength was normal bilaterally  shoulder strength was normal bilaterally  Evaluation of Muscle Stretch Reflexes on the right side demonstrates negative Ward's Test right upper extremity-- and-- negative right ankle clonus--   muscle stretch reflexes equal and symmetric in the right upper and lower limbs  Evaluation of Muscle Stretch Reflexes on the left side demonstrates negative left ankle clonus, but-- muscle stretch reflexes equal and symmetric in the left upper and lower limbs-- and-- negative Ward's Test left upper extremity  Special Tests: positive Spurling's Maneuver to the left, but-- negative Spurling's Maneuver to the right  Results/Data    Results    I personally reviewed the films/images in the office today  MRI:  MRI Cervical spine dated 3/6/2015  Normal alignment of the cervical spine  No compressionNo subluxation  No scoliosis  SIGNAL- There is been interval anterior cervical discectomy andat F9-H7, metallic hardware and associated artifact slightlylocal evaluation  AND VISUALIZED UPPER THORACIC CORD- Normal signal within thecord  AND PARASPINAL SOFT TISSUES- Prevertebral and paraspinaltissues are unremarkable  POSTERIOR FOSSA- The visualized posterior fossano abnormal signal   DISC SPACES-  Normal   Normal   Normal   Normal   Normal   Normal   THORACIC DISC SPACES- Normal   IMAGING- Normal     is no focal disk herniation, central canal or neural foraminalanterior cervical discectomy and fusion C4-C5          Future Appointments    Date/Time Provider Specialty Site   12/28/2017 02:30 PM Drew Hanson MD Neurology [facility] [address] [facility]   01/22/2018 01:00 PM Sara Crane DO Pain Management [address] [facility] [address]       Signatures   Electronically signed by : Job Lambert DO; Nov 27 2017  5:22PM EST                       (Author)

## 2017-12-11 ENCOUNTER — APPOINTMENT (OUTPATIENT)
Dept: PHYSICAL THERAPY | Facility: REHABILITATION | Age: 42
End: 2017-12-11
Payer: COMMERCIAL

## 2017-12-11 DIAGNOSIS — M54.12 RADICULOPATHY OF CERVICAL REGION: ICD-10-CM

## 2017-12-11 PROCEDURE — 97162 PT EVAL MOD COMPLEX 30 MIN: CPT

## 2017-12-11 PROCEDURE — G8990 OTHER PT/OT CURRENT STATUS: HCPCS

## 2017-12-11 PROCEDURE — 97140 MANUAL THERAPY 1/> REGIONS: CPT

## 2017-12-11 PROCEDURE — G8991 OTHER PT/OT GOAL STATUS: HCPCS

## 2017-12-12 ENCOUNTER — GENERIC CONVERSION - ENCOUNTER (OUTPATIENT)
Dept: PAIN MEDICINE | Facility: CLINIC | Age: 42
End: 2017-12-12

## 2017-12-14 ENCOUNTER — APPOINTMENT (OUTPATIENT)
Dept: PHYSICAL THERAPY | Facility: REHABILITATION | Age: 42
End: 2017-12-14
Payer: COMMERCIAL

## 2017-12-14 PROCEDURE — 97110 THERAPEUTIC EXERCISES: CPT

## 2017-12-14 PROCEDURE — 97140 MANUAL THERAPY 1/> REGIONS: CPT

## 2017-12-14 PROCEDURE — 97010 HOT OR COLD PACKS THERAPY: CPT

## 2017-12-22 ENCOUNTER — APPOINTMENT (OUTPATIENT)
Dept: PHYSICAL THERAPY | Facility: REHABILITATION | Age: 42
End: 2017-12-22
Payer: COMMERCIAL

## 2017-12-26 ENCOUNTER — APPOINTMENT (OUTPATIENT)
Dept: PHYSICAL THERAPY | Facility: REHABILITATION | Age: 42
End: 2017-12-26
Payer: COMMERCIAL

## 2017-12-26 PROCEDURE — 97140 MANUAL THERAPY 1/> REGIONS: CPT

## 2017-12-26 PROCEDURE — 97010 HOT OR COLD PACKS THERAPY: CPT

## 2017-12-26 PROCEDURE — 97110 THERAPEUTIC EXERCISES: CPT

## 2017-12-28 ENCOUNTER — APPOINTMENT (OUTPATIENT)
Dept: PHYSICAL THERAPY | Facility: REHABILITATION | Age: 42
End: 2017-12-28
Payer: COMMERCIAL

## 2017-12-28 ENCOUNTER — ALLSCRIPTS OFFICE VISIT (OUTPATIENT)
Dept: OTHER | Facility: OTHER | Age: 42
End: 2017-12-28

## 2017-12-28 PROCEDURE — 97010 HOT OR COLD PACKS THERAPY: CPT

## 2017-12-28 PROCEDURE — 97110 THERAPEUTIC EXERCISES: CPT

## 2017-12-28 PROCEDURE — 97140 MANUAL THERAPY 1/> REGIONS: CPT

## 2017-12-29 NOTE — PROGRESS NOTES
Assessment   1  Chronic migraine without aura (346 70) (G43 009)    Plan   Chronic migraine without aura    · Venlafaxine HCl ER 75 MG Oral Tablet Extended Release 24 Hour; 1/2 tab AM X 2    weeks, if no better and no SE's increase to 1 tab AM X 2 week, then as directed to goal    of 2 tabs AM   Rx By: Bolivar Anderson; Dispense: 30 Days ; #:60 Tablet Extended Release 24 Hour; Refill: 3;For: Chronic migraine without aura; SARINA = N; Verified Transmission to [address] [place] [address]; Last Updated By: [employer]; 12/28/2017 3:22:28 PM   · Prochlorperazine Maleate 10 MG Oral Tablet; TAKE 1 TABLET EVERY 8 HOURS    AS NEEDED nausea or migraine   Rx By: Bolivar Anderson; Dispense: 30 Days ; #:20 Tablet; Refill: 3;For: Chronic migraine without aura; SARINA = N; Verified Transmission to [address] [place] [address]; Last Updated By: [employer]; 12/28/2017 3:22:27 PM  Common migraine without aura    · Rizatriptan Benzoate 10 MG Oral Tablet Disintegrating; DISSOLVE 1 TABLET AT    ONSET OF MIGRAINE MAY REPAT 2 HOURS LATER (MAX OF 3 IN 24 HOURS)   Rx By: Bolivar Anderson; Dispense: 30 Days ; #:12 Tablet Disintegrating; Refill: 5;For: Common migraine without aura; SARINA = N; Verified Transmission to [address] [place] [address]; Last Updated By: System, [employer]; 12/28/2017 3:22:28 PM    Discussion/Summary   Discussion Summary:    Migraine: The patient presents for follow-up with regard to her chronic migraines  Unfortunately the co-pay for her Botox therapy was prohibitive at 500 dollars, but we will work with the billing department to determine if this is a long-term co-pay or if this was a 1 time change  In the meantime she continues to experience approximately 3 or more migraines per week  She has had to miss work as result   She was started recently on some gabapentin for pain in the left anterior chest wall but she has had at least 1 day where she felt considerably confused on the combination of gabapentin and Trokendi    -Considering that she feels the medication has been helpful and has not described any side effects we will plan to continue to Trokendi at her current dose of 150 mg at bedtime  I do think that she would clearly benefit from a migraine prevention multivitamin  She should look for vitamin that include 1 or more of the following ingredients ( magnesium oxide 400 mg up to twice daily, riboflavin / vitamin B2 up to 400 mg total daily dose, feverfew, coenzyme Q10, jeana, butterbur (PA free formula ) )  She should use this vitamin according to package instructions  she reports that she has not taken Paxil for quite some time in terms of treating her mood symptoms  It would be reasonable in this setting to consider venlafaxine extended release as this may help with her mood symptoms as well as her headaches  We will begin at 1/2 tablet daily in the morning for 2 weeks  If she notes no benefit and no side effects we will increase to 1 tablet in the morning for 2 weeks, and then similarly up to a maximum initial dose of 2 tablets daily in the morning  to abort a migraine in progress she will continue to use her combination of Maxalt and Toradol, or Maxalt and Compazine, or all 3 together depending on what she needs to do for work that day and how significant the migraine is  she did begin tracking her migraines using application on her phone but unfortunately had to reset her phone yesterday, which caused her to lose the data  I would strongly encourage her to continue tracking her migraines using her phone  will plan for her to return to the office in 3 months time but she should call us in no more than 4 weeks to report on her progress  Chief Complaint   Chief Complaint Free Text Note Form: Patient present for follow-up appointment regarding chronic migraine      History of Present Illness   HPI: Luciano jennings presents for follow-up evaluation with regard to her diagnosis of chronic migraine   Unfortunately she had a significant over [place] charge during her initial administration of Botox and was not able to continue the injections  She is still interested in this as a treatment option  She reports that she continues to have solidly 3 migraine days per week  She does feel that she is somewhat better on the higher dose of extended release Topamax, however her breakthrough symptoms are still significant and require her to miss work at times  She felt that the combination of Maxalt and Toradol which she tried in the interval since her last visit to the office was somewhat helpful  She also reports that she was started on gabapentin for left anterior chest wall pain and that she was doing well on the combination until a few mornings ago when she woke up feeling quite confused and disoriented  She did begin to keep track of her migraines using application on her phone but unfortunately had to reset her phone yesterday and as result lost all of her data  Review of Systems   Neurological ROS:      Constitutional: fatigue  HEENT:  no sinus problems, not feeling congested, no blurred vision, no dryness of the eyes, no eye pain, no hearing loss, no tinnitus, no mouth sores, no sore throat, no hoarseness, no dysphagia, no masses, no bleeding  Cardiovascular:  no chest pain or pressure, no palpitations present, the heart rate was not rapid or irregular, no swelling in the arms or legs, no poor circulation  Respiratory:  no unusual or persistant cough, no shortness of breath with or without exertion  Gastrointestinal: nausea  Genitourinary:  no incontinence, no feelings of urinary urgency, no increase in frequency, no urinary hesitancy, no dysuria, no hematuria  Musculoskeletal: myalgias-- and-- head/neck/back pain  Integumentary  no masses, no rash, no skin lesions, no livedo reticularis  Psychiatric: anxiety-- and-- depression  Endocrine   no unusual weight loss or gain, no excessive urination, no excessive thirst, no hair loss or gain, no hot or cold intolerance, no menstrual period change or irregularity, no loss of sexual ability or drive, no erection difficulty, no nipple discharge  Hematologic/Lymphatic:  no unusual bleeding, no tendency for easy bruising, no clotting skin or lumps  Neurological General: headache,-- increased sleepiness,-- trouble falling asleep-- and-- waking up at night  Neurological Mental Status: confusion  Neurological Cranial Nerves: slurred speech  Neurological Motor findings include:  no tremor, no twitching, no cramping(pre/post exercise), no atrophy  Neurological Coordination:  no unsteadiness, no vertigo or dizziness, no clumsiness, no problems reaching for objects  Neurological Sensory: numbness  Neurological Gait:  no difficulty walking, not falling to one side, no sensation of being pushed, has not had falls  ROS Reviewed:    ROS reviewed  Active Problems   1  Abdominal pain, epigastric (789 06) (R10 13)   2  Acute sinusitis (461 9) (J01 90)   3  Allergic rhinitis (477 9) (J30 9)   4  Apnea, sleep (780 57) (G47 30)   5  Arthralgia of multiple joints (719 49) (M25 50)   6  Back pain, sacroiliac (724 6) (M53 3)   7  Cervical cancer screening (V76 2) (Z12 4)   8  Cervical radiculopathy (723 4) (M54 12)   9  Cervical radiculopathy at C6 (723 4) (M54 12)   10  Cervicalgia (723 1) (M54 2)   11  Chest pain (786 50) (R07 9)   12  Chronic migraine without aura (346 70) (G43 709)   13  Colon, diverticulosis (562 10) (K57 30)   14  Common migraine without aura (346 10) (G43 009)   15  DDD (degenerative disc disease), cervical (722 4) (M50 30)   16  Depression screening (V79 0) (Z13 89)   17  Depression with anxiety (300 4) (F41 8)   18  Fibromyalgia (729 1) (M79 7)   19  GERD without esophagitis (530 81) (K21 9)   20  Headache (784 0) (R51)   21  Headache, chronic daily (784 0) (R51)   22  Heart burn (787 1) (R12)   23   Hiatal hernia (553 3) (K44 9)   24  Increased frequency of headaches (784 0) (R51)   25  Intermittent chest pain (786 50) (R07 9)   26  Low back strain (847 2) (S39 012A)   27  Lumbago (724 2) (M54 5)   28  Lumbar radicular pain (724 4) (M54 16)   29  Need for influenza vaccination (V04 81) (Z23)   30  Obesity, morbid, BMI 40 0-49 9 (278 01) (E66 01)   31  Pap smear for cervical cancer screening (V76 2) (Z12 4)   32  Post-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)   33  Screening for depression (V79 0) (Z13 89)   34  Tinnitus (388 30) (H93 19)   35  Trapezius muscle spasm (728 85) (M62 838)   36  Vitamin D deficiency (268 9) (E55 9)    Past Medical History   1  History of Anxiety (300 00) (F41 9)   2  History of Asthma (493 90) (J45 909)   3  Chest pain (786 50) (R07 9)   4  History of Claustrophobia (300 29) (F40 240)   5  History of Cough (786 2) (R05)   6  History of Depression (311) (F32 9)   7  Fibromyalgia (729 1) (M79 7)   8  GERD without esophagitis (530 81) (K21 9)   9  History of acute conjunctivitis (V12 49) (Z86 69)   10  History of influenza (V12 09) (Z87 09)   11  History of upper respiratory infection (V12 09) (Z87 09)   12  History of Lung nodule (793 11) (R91 1)   13  History of Sore throat (462) (J02 9)  Active Problems And Past Medical History Reviewed: The active problems and past medical history were reviewed and updated today  Surgical History   1  History of Back Surgery   2  History of Gallbladder Surgery   3  History of Neck Surgery   4  History of Neuroplasty Decompression Median Nerve At Carpal Tunnel  Surgical History Reviewed: The surgical history was reviewed and updated today  Family History   Mother    1  Family history of Cancer   2  Family history of Diabetes   3  Denied: Family history of Drug abuse   4  Family history of fibromyalgia (V17 89) (Z82 69)   5  Family history of malignant neoplasm of ovary (V16 41) (Z80 41)   6   Family history of systemic lupus erythematosus (V19 4) (Z82 69)   7  Family history of High blood pressure   8  No family history of mental disorder   9  Family history of Thyroid disease  Father    8  Denied: Family history of Drug abuse   11  No family history of mental disorder  Unknown    12  Family history of cardiac disorder (V17 49) (Z82 49)   13  Family history of neuropathy (V17 2) (Z82 0)  Family History    15  Family history of Arthritis (716 90) (M19 90)   15  Family history of Cancer   16  Family history of systemic lupus erythematosus (V19 4) (Z82 69)  Family History Reviewed: The family history was reviewed and updated today  Social History    · Daily caffeinated cola consumption   · Never A Smoker   · No drug use   · No preference on Orthodoxy beliefs   · Occasional alcohol use   · Social history reviewed, unchanged   · Tea  Social History Reviewed: The social history was reviewed and updated today  Current Meds    1  Botox 100 UNIT Injection Solution Reconstituted; INJECT 200  UNIT Other; Therapy: 54RKN2340 to (Evaluate:08May2018); Last Rx:13May2017 Ordered   2  Cholestyramine 4 GM/DOSE Oral Powder; TAKE 1 SCOOP TWICE DAILY; Therapy: 52JXJ2765 to (Evaluate:58Lrt2932)  Requested for: 81KBR6027; Last     Rx:12Oct2017 Ordered   3  Cyclobenzaprine HCl - 10 MG Oral Tablet; TAKE 1 TABLET 3 TIMES DAILY AS NEEDED; Therapy: 59XET6083 to (Evaluate:20Jun2017)  Requested for: 59XST0064; Last     Rx:31May2017 Ordered   4  Fluticasone Propionate 50 MCG/ACT Nasal Suspension; USE 2 SPRAYS EACH     NOSTRIL ONCE DAILY; Therapy: 45HBH0396 to (Last Rx:05Jun2017)  Requested for: 23HWR7146 Ordered   5  Gabapentin 100 MG Oral Capsule; TAKE 1 CAPSULE BEDTIME MAY INCREASE TO 3     CAPSULES AT BEDTIME AS TOLERATED; Therapy: 66VXW0248 to )  Requested for: 83ZJX3982; Last     Rx:27Nov2017 Ordered   6  Ibuprofen 600 MG Oral Tablet; TAKE 1 TABLET 3 TIMES DAILY WITH FOOD AS NEEDED;      Therapy: 53ZCY5183 to (Marci Bar) Requested for: 93Pbf6561; Last     Rx:91Lag9400 Ordered   7  Ketorolac Tromethamine 10 MG Oral Tablet; take 1-2 tablets as needed for headache, no     more than 2 tablets in 24hr and no more than 3 tablets in 1 week; Therapy: 71Lzs6962 to (Evaluate:01Koz7650)  Requested for: 51Yvk7102; Last     Rx:61Paz2120 Ordered   8  Omeprazole 20 MG Oral Capsule Delayed Release; TAKE 1 CAPSULE Daily; Therapy: 74RCC7047 to (Evaluate:10Jan2018)  Requested for: 12Oct2017; Last     Rx:66Vzy0131 Ordered   9  Ondansetron HCl - 4 MG Oral Tablet; TAKE 1 TABLET EVERY 8 HOURS AS NEEDED     FOR NAUSEA; Therapy: 66JJT5364 to (Evaluate:98Rog3678)  Requested for: 05Apr2017; Last     Rx:80Biz9753 Ordered   10  Prochlorperazine Maleate 10 MG Oral Tablet; TAKE 1 TABLET EVERY 8 HOURS AS      NEEDED nausea or migraine; Therapy: 66SIS4283 to (Evaluate:20Czf5248)  Requested for: 80Zdu2359; Last      Rx:01Urm7774 Ordered   11  Rizatriptan Benzoate 10 MG Oral Tablet Disintegrating; DISSOLVE 1 TABLET AT ONSET      OF MIGRAINE MAY REPAT 2 HOURS LATER (MAX OF 3 IN 24 HOURS); Therapy: 36PVE4760 to (Evaluate:16May2018)  Requested for: 24CVO5709; Last      Rx:17Nov2017 Ordered   12  Trokendi  MG Oral Capsule Extended Release 24 Hour; 1 QHS; Therapy: 48AAI5736 to (Evaluate:16Jan2018)  Requested for: 63Caf6986; Last      Rx:24Jpo1385 Ordered   13  Trokendi XR 25 MG Oral Capsule Extended Release 24 Hour; Take 1 cap HS WITH      100mg cap X 1 week, if no improvement increast to 2 caps HS WITH 100mg capsule; Therapy: 21Qxf4678 to (Evaluate:48Fyx3891)  Requested for: 10Dhn0120; Last      Rx:28Kgd4308 Ordered   14  Vitamin D (Ergocalciferol) 83040 UNIT Oral Capsule; TAKE 1 CAPSULE WEEKLY; Therapy: 19Vlo8409 to (Evaluate:28Pue7266)  Requested for: 63Xal1896; Last      Rx:20Igm5163 Ordered  Medication List Reviewed: The medication list was reviewed and updated today  Allergies   1   Codeine Sulfate TABS    Vitals Signs   Recorded: 90Kyj6033 02:34PM   Heart Rate: 80  Respiration: 18  Systolic: 807  Diastolic: 60  Height: 5 ft 3 in  Weight: 235 lb 9 oz  BMI Calculated: 41 73  BSA Calculated: 2 07  O2 Saturation: 99    Physical Exam   At the time of my evaluation the patient was awake alert with intact and appropriate language in mental status function  There were no obvious cranial neuropathies or lateralizing weakness           Future Appointments      Date/Time Provider Specialty Site   01/22/2018 01:00 PM Mary Mckeon, DO Pain Management [address]     Signatures    Electronically signed by : Neema Buenrostro MD; Dec 28 2017  3:26PM EST                       (Author)

## 2018-01-02 ENCOUNTER — APPOINTMENT (OUTPATIENT)
Dept: PHYSICAL THERAPY | Facility: REHABILITATION | Age: 43
End: 2018-01-02
Payer: COMMERCIAL

## 2018-01-04 ENCOUNTER — APPOINTMENT (OUTPATIENT)
Dept: PHYSICAL THERAPY | Facility: REHABILITATION | Age: 43
End: 2018-01-04
Payer: COMMERCIAL

## 2018-01-04 PROCEDURE — 97110 THERAPEUTIC EXERCISES: CPT

## 2018-01-04 PROCEDURE — 97010 HOT OR COLD PACKS THERAPY: CPT

## 2018-01-04 PROCEDURE — 97140 MANUAL THERAPY 1/> REGIONS: CPT

## 2018-01-09 ENCOUNTER — APPOINTMENT (OUTPATIENT)
Dept: PHYSICAL THERAPY | Facility: REHABILITATION | Age: 43
End: 2018-01-09
Payer: COMMERCIAL

## 2018-01-09 PROCEDURE — 97140 MANUAL THERAPY 1/> REGIONS: CPT

## 2018-01-09 PROCEDURE — 97110 THERAPEUTIC EXERCISES: CPT

## 2018-01-09 PROCEDURE — 97010 HOT OR COLD PACKS THERAPY: CPT

## 2018-01-11 ENCOUNTER — APPOINTMENT (OUTPATIENT)
Dept: PHYSICAL THERAPY | Facility: REHABILITATION | Age: 43
End: 2018-01-11
Payer: COMMERCIAL

## 2018-01-11 PROCEDURE — 97110 THERAPEUTIC EXERCISES: CPT

## 2018-01-11 PROCEDURE — 97010 HOT OR COLD PACKS THERAPY: CPT

## 2018-01-11 PROCEDURE — 97140 MANUAL THERAPY 1/> REGIONS: CPT

## 2018-01-12 VITALS
OXYGEN SATURATION: 99 % | HEART RATE: 88 BPM | RESPIRATION RATE: 18 BRPM | BODY MASS INDEX: 42.91 KG/M2 | HEIGHT: 63 IN | DIASTOLIC BLOOD PRESSURE: 76 MMHG | WEIGHT: 242.19 LBS | SYSTOLIC BLOOD PRESSURE: 108 MMHG | TEMPERATURE: 97.4 F

## 2018-01-12 VITALS
RESPIRATION RATE: 18 BRPM | DIASTOLIC BLOOD PRESSURE: 74 MMHG | BODY MASS INDEX: 41.29 KG/M2 | SYSTOLIC BLOOD PRESSURE: 119 MMHG | HEART RATE: 79 BPM | WEIGHT: 233 LBS | TEMPERATURE: 97.9 F | HEIGHT: 63 IN

## 2018-01-12 NOTE — MISCELLANEOUS
Message  Return to work or school:   Tiom Lacey is under my professional care   She was seen in my office on 1/26/16   She is able to return to work on  1/28/16            Future Appointments    Signatures   Electronically signed by : Warren Brown, [facility]; Jan 26 2016  9:23AM EST                       (Author)

## 2018-01-12 NOTE — RESULT NOTES
Discussion/Summary   Stress test was negative  It was reported that shortness of breath was consistent with deconditioning or being out of shape  Recommend slowly starting exercise with walking and increasing amount several times per week  Verified Results  STRESS TEST ONLY, EXERCISE 88JWL1913 09:59AM Narendra Living Order Number: [id number]    - Patient Instructions: To schedule this appointment, please contact Central Scheduling at [phone]  Test Name Result Flag Reference   STRESS TEST ONLY, EXERCISE (Report)     [facility] [address]   [address], [address] [facility]   [phone]     Stress Electrocardiography during Exercise     Name: Rickie Gee   MR #: [id number]   Account #: [de-identified]   Study date: 2017   : 1975   Age: 39 years   Gender: Female   Height: 63 in   Weight: 224 lb   BSA: 2 03 m squared     Allergies: CODEINE     Diagnosis: R06 9 - Unspecified abnormalities of breathing     Primary Physician: Bry William   Referring Physician: CATE William   Group: [facility]   Cardiology Fellow: Ten Miller MD   Technician: Chelsea Olivas   Technician: Tracy Caren   Interpreting Physician: Aislinn Ritter MD     CLINICAL QUESTION: Detection of coronary artery disease  HISTORY: The patient is a 39year old  female  Chest pain status: chest pain  Coronary artery disease risk factors: family history of premature coronary artery disease and diabetes mellitus  Co-morbidity: obesity  REST ECG: Normal sinus rhythm  PROCEDURE: Treadmill exercise testing was performed, using the Stew protocol  Stress electrocardiographic evaluation was performed       STEW PROTOCOL:   HR bpm SBP mmHg DBP mmHg Symptoms   Baseline 74 120 80 chest discomfort   Stage 1 133 120 80 chest discomfort, fatigue   Stage 2 155 160 90 same as above, dizziness   Recovery 1 127 140 70 same as above   Recovery 2 105 120 78 none   Pre 02 Sat 99%and Peak 02 Sat  Peak  No medications or fluids given  STRESS SUMMARY: Duration of exercise was 6 min and 24 sec  The patient exercised to protocol stage 2  Maximal work rate was 7 METs  Functional capacity was slightly decreased (20% to 30%)  Maximal heart rate during stress was 157 bpm ( 88   % of maximal predicted heart rate)  The heart rate response to stress was exaggerated consistent with physical deconditioning  There was normal resting blood pressure with an appropriate response to stress  The rate-pressure product for   the peak heart rate and blood pressure was 05641  The patient experienced chest pain during stress; pain resolved spontaneously  The stress test was terminated due to chest discomfort, dyspnea, and fatigue  The stress ECG was negative for   ischemia  Arrhythmia during stress: isolated premature ventricular beats  SUMMARY:   - Stress results: Duration of exercise was 6 min and 24 sec  Functional capacity was slightly decreased (20% to 30%)  Target heart rate was achieved  The patient experienced chest pain during stress; pain resolved spontaneously  - ECG conclusions: The stress ECG was negative for ischemia  IMPRESSIONS: Normal study after maximal exercise  Patient did complain of chest pain at the end of exercise but she says its slightly different from the chest pain she had in the past  Stress ECG showed no evidence of ischemia       Prepared and signed by     Maria G Cazares MD   Signed 06/08/2017 12:43:52       Signatures   Electronically signed by : CATE Guy; Jun 8 2017  8:55PM EST                       (Author)

## 2018-01-13 VITALS
WEIGHT: 235 LBS | OXYGEN SATURATION: 99 % | BODY MASS INDEX: 41.64 KG/M2 | RESPIRATION RATE: 20 BRPM | DIASTOLIC BLOOD PRESSURE: 70 MMHG | HEART RATE: 88 BPM | TEMPERATURE: 98.1 F | SYSTOLIC BLOOD PRESSURE: 110 MMHG | HEIGHT: 63 IN

## 2018-01-13 VITALS
DIASTOLIC BLOOD PRESSURE: 78 MMHG | HEIGHT: 63 IN | SYSTOLIC BLOOD PRESSURE: 118 MMHG | RESPIRATION RATE: 16 BRPM | WEIGHT: 234.31 LBS | BODY MASS INDEX: 41.52 KG/M2

## 2018-01-14 VITALS
WEIGHT: 233 LBS | BODY MASS INDEX: 41.29 KG/M2 | TEMPERATURE: 98.8 F | HEART RATE: 95 BPM | HEIGHT: 63 IN | SYSTOLIC BLOOD PRESSURE: 126 MMHG | DIASTOLIC BLOOD PRESSURE: 87 MMHG

## 2018-01-14 VITALS
WEIGHT: 240.38 LBS | SYSTOLIC BLOOD PRESSURE: 114 MMHG | OXYGEN SATURATION: 99 % | HEART RATE: 80 BPM | TEMPERATURE: 97 F | HEIGHT: 63 IN | DIASTOLIC BLOOD PRESSURE: 78 MMHG | RESPIRATION RATE: 18 BRPM | BODY MASS INDEX: 42.59 KG/M2

## 2018-01-14 VITALS
HEIGHT: 63 IN | DIASTOLIC BLOOD PRESSURE: 76 MMHG | OXYGEN SATURATION: 99 % | BODY MASS INDEX: 42.4 KG/M2 | TEMPERATURE: 97.5 F | RESPIRATION RATE: 18 BRPM | HEART RATE: 89 BPM | SYSTOLIC BLOOD PRESSURE: 110 MMHG | WEIGHT: 239.31 LBS

## 2018-01-14 VITALS
DIASTOLIC BLOOD PRESSURE: 56 MMHG | RESPIRATION RATE: 16 BRPM | SYSTOLIC BLOOD PRESSURE: 124 MMHG | TEMPERATURE: 98.3 F | HEART RATE: 70 BPM

## 2018-01-15 VITALS
DIASTOLIC BLOOD PRESSURE: 70 MMHG | HEIGHT: 63 IN | HEART RATE: 76 BPM | WEIGHT: 240.5 LBS | RESPIRATION RATE: 18 BRPM | BODY MASS INDEX: 42.61 KG/M2 | SYSTOLIC BLOOD PRESSURE: 110 MMHG

## 2018-01-15 VITALS
HEIGHT: 63 IN | RESPIRATION RATE: 16 BRPM | OXYGEN SATURATION: 98 % | SYSTOLIC BLOOD PRESSURE: 102 MMHG | DIASTOLIC BLOOD PRESSURE: 62 MMHG | HEART RATE: 82 BPM | WEIGHT: 238.5 LBS | BODY MASS INDEX: 42.26 KG/M2

## 2018-01-16 ENCOUNTER — TRANSCRIBE ORDERS (OUTPATIENT)
Dept: LAB | Facility: HOSPITAL | Age: 43
End: 2018-01-16

## 2018-01-16 ENCOUNTER — APPOINTMENT (OUTPATIENT)
Dept: LAB | Facility: HOSPITAL | Age: 43
End: 2018-01-16
Payer: COMMERCIAL

## 2018-01-16 ENCOUNTER — APPOINTMENT (OUTPATIENT)
Dept: PHYSICAL THERAPY | Facility: REHABILITATION | Age: 43
End: 2018-01-16
Payer: COMMERCIAL

## 2018-01-16 DIAGNOSIS — Z79.899 NEED FOR PROPHYLACTIC CHEMOTHERAPY: ICD-10-CM

## 2018-01-16 DIAGNOSIS — M72.2 PLANTAR FASCIAL FIBROMATOSIS: ICD-10-CM

## 2018-01-16 DIAGNOSIS — M79.7 SCAPULOHUMERAL FIBROSITIS: ICD-10-CM

## 2018-01-16 DIAGNOSIS — E55.9 AVITAMINOSIS D: ICD-10-CM

## 2018-01-16 DIAGNOSIS — M47.812 CERVICAL SPONDYLOSIS WITHOUT MYELOPATHY: ICD-10-CM

## 2018-01-16 DIAGNOSIS — E55.9 VITAMIN D DEFICIENCY DISEASE: ICD-10-CM

## 2018-01-16 DIAGNOSIS — G89.4 CHRONIC PAIN SYNDROME: ICD-10-CM

## 2018-01-16 DIAGNOSIS — G43.909 MIGRAINE WITHOUT STATUS MIGRAINOSUS, NOT INTRACTABLE, UNSPECIFIED MIGRAINE TYPE: ICD-10-CM

## 2018-01-16 DIAGNOSIS — G89.4 CHRONIC PAIN SYNDROME: Primary | ICD-10-CM

## 2018-01-16 LAB
25(OH)D3 SERPL-MCNC: 13.3 NG/ML (ref 30–100)
ALBUMIN SERPL BCP-MCNC: 3.8 G/DL (ref 3.5–5)
ALP SERPL-CCNC: 126 U/L (ref 46–116)
ALT SERPL W P-5'-P-CCNC: 23 U/L (ref 12–78)
ANION GAP SERPL CALCULATED.3IONS-SCNC: 6 MMOL/L (ref 4–13)
AST SERPL W P-5'-P-CCNC: 15 U/L (ref 5–45)
BACTERIA UR QL AUTO: ABNORMAL /HPF
BASOPHILS # BLD AUTO: 0.05 THOUSANDS/ΜL (ref 0–0.1)
BASOPHILS NFR BLD AUTO: 1 % (ref 0–1)
BILIRUB SERPL-MCNC: 0.39 MG/DL (ref 0.2–1)
BILIRUB UR QL STRIP: NEGATIVE
BUN SERPL-MCNC: 13 MG/DL (ref 5–25)
CALCIUM SERPL-MCNC: 8.7 MG/DL (ref 8.3–10.1)
CAOX CRY URNS QL MICRO: ABNORMAL /HPF
CHLORIDE SERPL-SCNC: 110 MMOL/L (ref 100–108)
CK SERPL-CCNC: 65 U/L (ref 26–192)
CLARITY UR: ABNORMAL
CO2 SERPL-SCNC: 25 MMOL/L (ref 21–32)
COLOR UR: YELLOW
CREAT SERPL-MCNC: 0.59 MG/DL (ref 0.6–1.3)
EOSINOPHIL # BLD AUTO: 0.11 THOUSAND/ΜL (ref 0–0.61)
EOSINOPHIL NFR BLD AUTO: 1 % (ref 0–6)
ERYTHROCYTE [DISTWIDTH] IN BLOOD BY AUTOMATED COUNT: 14.1 % (ref 11.6–15.1)
ERYTHROCYTE [SEDIMENTATION RATE] IN BLOOD: 7 MM/HOUR (ref 0–20)
GFR SERPL CREATININE-BSD FRML MDRD: 113 ML/MIN/1.73SQ M
GLUCOSE SERPL-MCNC: 84 MG/DL (ref 65–140)
GLUCOSE UR STRIP-MCNC: NEGATIVE MG/DL
HCT VFR BLD AUTO: 40.9 % (ref 34.8–46.1)
HGB BLD-MCNC: 13.2 G/DL (ref 11.5–15.4)
HGB UR QL STRIP.AUTO: NEGATIVE
KETONES UR STRIP-MCNC: NEGATIVE MG/DL
LEUKOCYTE ESTERASE UR QL STRIP: ABNORMAL
LYMPHOCYTES # BLD AUTO: 3.21 THOUSANDS/ΜL (ref 0.6–4.47)
LYMPHOCYTES NFR BLD AUTO: 40 % (ref 14–44)
MCH RBC QN AUTO: 26.9 PG (ref 26.8–34.3)
MCHC RBC AUTO-ENTMCNC: 32.3 G/DL (ref 31.4–37.4)
MCV RBC AUTO: 84 FL (ref 82–98)
MONOCYTES # BLD AUTO: 0.35 THOUSAND/ΜL (ref 0.17–1.22)
MONOCYTES NFR BLD AUTO: 4 % (ref 4–12)
NEUTROPHILS # BLD AUTO: 4.26 THOUSANDS/ΜL (ref 1.85–7.62)
NEUTS SEG NFR BLD AUTO: 54 % (ref 43–75)
NITRITE UR QL STRIP: NEGATIVE
NON-SQ EPI CELLS URNS QL MICRO: ABNORMAL /HPF
NRBC BLD AUTO-RTO: 0 /100 WBCS
PH UR STRIP.AUTO: 6 [PH] (ref 4.5–8)
PLATELET # BLD AUTO: 279 THOUSANDS/UL (ref 149–390)
PMV BLD AUTO: 10.7 FL (ref 8.9–12.7)
POTASSIUM SERPL-SCNC: 3.4 MMOL/L (ref 3.5–5.3)
PROT SERPL-MCNC: 7.6 G/DL (ref 6.4–8.2)
PROT UR STRIP-MCNC: NEGATIVE MG/DL
RBC # BLD AUTO: 4.9 MILLION/UL (ref 3.81–5.12)
RBC #/AREA URNS AUTO: ABNORMAL /HPF
SODIUM SERPL-SCNC: 141 MMOL/L (ref 136–145)
SP GR UR STRIP.AUTO: 1.02 (ref 1–1.03)
TSH SERPL DL<=0.05 MIU/L-ACNC: 1.13 UIU/ML (ref 0.36–3.74)
UROBILINOGEN UR QL STRIP.AUTO: 1 E.U./DL
WBC # BLD AUTO: 8 THOUSAND/UL (ref 4.31–10.16)
WBC #/AREA URNS AUTO: ABNORMAL /HPF

## 2018-01-16 PROCEDURE — 85652 RBC SED RATE AUTOMATED: CPT

## 2018-01-16 PROCEDURE — 86200 CCP ANTIBODY: CPT

## 2018-01-16 PROCEDURE — 82550 ASSAY OF CK (CPK): CPT

## 2018-01-16 PROCEDURE — 84443 ASSAY THYROID STIM HORMONE: CPT

## 2018-01-16 PROCEDURE — 82306 VITAMIN D 25 HYDROXY: CPT

## 2018-01-16 PROCEDURE — 36415 COLL VENOUS BLD VENIPUNCTURE: CPT

## 2018-01-16 PROCEDURE — 85025 COMPLETE CBC W/AUTO DIFF WBC: CPT

## 2018-01-16 PROCEDURE — 86038 ANTINUCLEAR ANTIBODIES: CPT

## 2018-01-16 PROCEDURE — 80053 COMPREHEN METABOLIC PANEL: CPT

## 2018-01-16 PROCEDURE — 86430 RHEUMATOID FACTOR TEST QUAL: CPT

## 2018-01-16 PROCEDURE — 86618 LYME DISEASE ANTIBODY: CPT

## 2018-01-16 PROCEDURE — 81001 URINALYSIS AUTO W/SCOPE: CPT | Performed by: INTERNAL MEDICINE

## 2018-01-16 NOTE — RESULT NOTES
Discussion/Summary     Lab results show prediabetes with an elevated fasting blood sugar  Work on improving diet and exercise to prevent worsening and need for type 2 diabetes treatment  Vitamin D level is very low  Will change to once weekly vitamin D 38682 unit supplement, which has been prescribed to the [address] [place] [address]  Verified Results  (1) CBC/PLT/DIFF 05Apr2017 02:27PM Maria Ines Olivier Order Number: [id number]     Test Name Result Flag Reference   WBC COUNT 9 09 Thousand/uL  4 31-10 16   RBC COUNT 5 05 Million/uL  3 81-5 12   HEMOGLOBIN 13 5 g/dL  11 5-15 4   HEMATOCRIT 42 0 %  34 8-46  1   MCV 83 fL  82-98   MCH 26 7 pg L 26 8-34 3   MCHC 32 1 g/dL  31 4-37 4   RDW 13 9 %  11 6-15 1   MPV 10 3 fL  8 9-12 7   PLATELET COUNT 159 Thousands/uL  149-390   nRBC AUTOMATED 0 /100 WBCs     NEUTROPHILS RELATIVE PERCENT 59 %  43-75   LYMPHOCYTES RELATIVE PERCENT 34 %  14-44   MONOCYTES RELATIVE PERCENT 4 %  4-12   EOSINOPHILS RELATIVE PERCENT 2 %  0-6   BASOPHILS RELATIVE PERCENT 1 %  0-1   NEUTROPHILS ABSOLUTE COUNT 5 37 Thousands/? ??L  1 85-7 62   LYMPHOCYTES ABSOLUTE COUNT 3 13 Thousands/? ??L  0 60-4 47   MONOCYTES ABSOLUTE COUNT 0 38 Thousand/? ??L  0 17-1 22   EOSINOPHILS ABSOLUTE COUNT 0 16 Thousand/? ??L  0 00-0 61   BASOPHILS ABSOLUTE COUNT 0 05 Thousands/? ??L  0 00-0 10   - Patient Instructions: This bloodwork is non-fasting  Please drink two glasses of water morning of bloodwork  - Patient Instructions: This bloodwork is non-fasting  Please drink two glasses of water morning of bloodwork       (1) COMPREHENSIVE METABOLIC PANEL [id number] 99:36RC Maria Ines Olivier Order Number: [id number]     Test Name Result Flag Reference   SODIUM 142 mmol/L  136-145   POTASSIUM 4 2 mmol/L  3 5-5 3   CHLORIDE 105 mmol/L  100-108   CARBON DIOXIDE 26 mmol/L  21-32   ANION GAP (CALC) 11 mmol/L  4-13   BLOOD UREA NITROGEN 15 mg/dL  5-25   CREATININE 0 71 mg/dL  0 60-1 30   Standardized to [facility] reference method   CALCIUM 8 7 mg/dL  8 3-10 1   BILI, TOTAL 0 31 mg/dL  0 20-1 00   ALK PHOSPHATAS 125 U/L H    ALT (SGPT) 26 U/L  12-78   AST(SGOT) 16 U/L  5-45   ALBUMIN 3 6 g/dL  3 5-5 0   TOTAL PROTEIN 7 4 g/dL  6 4-8 2   eGFR Non-African American      >60 0 ml/min/1 73sq m   - Patient Instructions: This is a fasting blood test  Water, black tea or black coffee only after 9:00pm the night before test Drink 2 glasses of water the morning of test - Patient Instructions: This bloodwork is non-fasting  Please drink two glasses of   water morning of bloodwork  National Kidney Disease Education Program recommendations are as follows:  GFR calculation is accurate only with a steady state creatinine  Chronic Kidney disease less than 60 ml/min/1 73 sq  meters  Kidney failure less than 15 ml/min/1 73 sq  meters  GLUCOSE FASTING 102 mg/dL H 65-99     (1) HEMOGLOBIN A1C 05Apr2017 02:27PM Patrecia Bence Order Number: [id number]     Test Name Result Flag Reference   HEMOGLOBIN A1C 5 9 %  4 2-6 3   EST  AVG  GLUCOSE 123 mg/dl       (1) LIPID PANEL FASTING W DIRECT LDL REFLEX 05Apr2017 02:27PM Sandra TAMEZ Order Number: [id number]     Test Name Result Flag Reference   CHOLESTEROL 164 mg/dL     LDL CHOLESTEROL CALCULATED 101 mg/dL H 0-100   - Patient Instructions: This is a fasting blood test  Water, black tea or black coffee only after 9:00pm the night before test   Drink 2 glasses of water the morning of test     - Patient Instructions: This is a fasting blood test  Water, black tea or black coffee only after 9:00pm the night before test Drink 2 glasses of water the morning of test - Patient Instructions: This bloodwork is non-fasting  Please drink two glasses of   water morning of bloodwork    Triglyceride:         Normal              <150 mg/dl       Borderline High    150-199 mg/dl       High               200-499 mg/dl       Very High          >499 mg/dl  Cholesterol: Desirable        <200 mg/dl      Borderline High  200-239 mg/dl      High             >239 mg/dl  HDL Cholesterol:        High    >59 mg/dL      Low     <41 mg/dL  LDL Cholesterol:        Optimal          <100 mg/dl        Near Optimal     100-129 mg/dl        Above Optimal          Borderline High   130-159 mg/dl          High              160-189 mg/dl          Very High        >189 mg/dl  LDL CALCULATED:    This screening LDL is a calculated result  It does not have the accuracy of the Direct Measured LDL in the monitoring of patients with hyperlipidemia and/or statin therapy  Direct Measure LDL (IXE571) must be ordered separately in these patients  TRIGLYCERIDES 60 mg/dL  <=150   Specimen collection should occur prior to N-Acetylcysteine or Metamizole administration due to the potential for falsely depressed results  HDL,DIRECT 51 mg/dL  40-60   Specimen collection should occur prior to Metamizole administration due to the potential for falsely depressed results  (1) MICROALBUMIN CREATININE RATIO, RANDOM URINE 05Apr2017 02:27PM Sheysusannahbraulio Burton Order Number: [id number]     Test Name Result Flag Reference   MICROALBUMIN/ CREAT R 4 mg/g creatinine  0-30   MICROALBUMIN,URINE 7 7 mg/L  0 0-20 0   CREATININE URINE 184 0 mg/dL       (1) T4, FREE 05Apr2017 02:27PM Maguire Bruins   TW Order Number: [id number]     Test Name Result Flag Reference   T4,FREE 1 03 ng/dL  0 76-1 46     (1) TSH 03HWV1723 02:27PM Maguire Bruins   TW Order Number: [id number]     Test Name Result Flag Reference   TSH 1 022 uIU/mL  0 358-3 740   - Patient Instructions: This bloodwork is non-fasting  Please drink two glasses of water morning of bloodwork  - Patient Instructions: This is a fasting blood test  Water, black tea or black coffee only after 9:00pm the night before test Drink 2 glasses of water the morning of test - Patient Instructions: This bloodwork is non-fasting  Please drink two glasses of water morning of bloodwork  Patients undergoing fluorescein dye angiography may retain small amounts of fluorescein in the body for 48-72 hours post procedure  Samples containing fluorescein can produce falsely depressed TSH values  If the patient had this procedure,a specimen should be resubmitted post fluorescein clearance  The recommended reference ranges for TSH during pregnancy are as follows:  First trimester 0 1 to 2 5 uIU/mL  Second trimester  0 2 to 3 0 uIU/mL  Third trimester 0 3 to 3 0 uIU/m     (1) VITAMIN D 25-HYDROXY 05Apr2017 02:27PM Fransisco Delarosa    Order Number: [id number]     Test Name Result Flag Reference   VIT D 25-HYDROX 10 2 ng/mL L 30 0-100 0   This assay is a certified procedure of the CDC Vitamin D Standardization Certification Program (VDSCP)     Deficiency <20ng/ml   Insufficiency 20-30ng/ml   Sufficient  ng/ml     *Patients undergoing fluorescein dye angiography may retain small amounts of fluorescein in the body for 48-72 hours post procedure  Samples containing fluorescein can produce falsely elevated Vitamin D values  If the patient had this procedure, a specimen should be resubmitted post fluorescein clearance  (1) MAGNESIUM 05Apr2017 02:27PM [place] Breath Order Number: [id number]     Test Name Result Flag Reference   MAGNESIUM 2 0 mg/dL  1 6-2 6   - Patient Instructions: This is a fasting blood test  Water, black tea or black coffee only after 9:00pm the night before test Drink 2 glasses of water the morning of test - Patient Instructions: This bloodwork is non-fasting  Please drink two glasses of   water morning of bloodwork  Plan  Vitamin D deficiency    · Vitamin D3 2000 UNIT Oral Tablet   · Vitamin D (Ergocalciferol) 88942 UNIT Oral Capsule; TAKE 1 CAPSULE WEEKLY    Signatures   Electronically signed by : CATE Peck;  Apr 13 2017  7:14PM EST                       (Author)

## 2018-01-17 LAB
B BURGDOR IGG SER IA-ACNC: 0.57
B BURGDOR IGM SER IA-ACNC: 0.35
RHEUMATOID FACT SER QL LA: NEGATIVE
RYE IGE QN: NEGATIVE

## 2018-01-18 ENCOUNTER — APPOINTMENT (OUTPATIENT)
Dept: PHYSICAL THERAPY | Facility: REHABILITATION | Age: 43
End: 2018-01-18
Payer: COMMERCIAL

## 2018-01-18 ENCOUNTER — GENERIC CONVERSION - ENCOUNTER (OUTPATIENT)
Dept: OTHER | Facility: OTHER | Age: 43
End: 2018-01-18

## 2018-01-18 PROCEDURE — 97010 HOT OR COLD PACKS THERAPY: CPT

## 2018-01-18 PROCEDURE — 97110 THERAPEUTIC EXERCISES: CPT

## 2018-01-18 PROCEDURE — 97140 MANUAL THERAPY 1/> REGIONS: CPT

## 2018-01-18 PROCEDURE — G8991 OTHER PT/OT GOAL STATUS: HCPCS

## 2018-01-18 PROCEDURE — G8990 OTHER PT/OT CURRENT STATUS: HCPCS

## 2018-01-18 NOTE — MISCELLANEOUS
Provider Comments  Provider Comments:   No show      Signatures   Electronically signed by : CATE Cameron; Jul 20 2016  5:17PM EST                       (Author)    Electronically signed by : VANNESSA Riley ; Jul 21 2016  4:07PM EST

## 2018-01-18 NOTE — RESULT NOTES
Verified Results  (1) SED RATE 11Apr2016 07:27AM Rashid Olson Order Number: [id number]     Test Name Result Flag Reference   SED RATE 6 mm/hour  0-20     (1) TSH WITH FT4 REFLEX 11Apr2016 07:27AM Nacho Denton    Order Number: [id number]    Patients undergoing fluorescein dye angiography may retain small amounts of fluorescein in the body for 48-72 hours post procedure  Samples containing fluorescein can produce falsely depressed TSH values  If the patient had this procedure,a specimen should be resubmitted post fluorescein clearance  The recommended reference ranges for TSH during pregnancy are as follows:  First trimester 0 1 to 2 5 uIU/mL  Second trimester  0 2 to 3 0 uIU/mL  Third trimester 0 3 to 3 0 uIU/m     Test Name Result Flag Reference   TSH 1 670 uIU/mL  0 358-3 740     (1) LIPID PANEL FASTING W DIRECT LDL REFLEX 11Apr2016 07:27AM Nacho Jewkristian    Order Number: [id number]      Triglyceride:         Normal              <150 mg/dl       Borderline High    150-199 mg/dl       High               200-499 mg/dl       Very High          >499 mg/dl  Cholesterol:         Desirable        <200 mg/dl      Borderline High  200-239 mg/dl      High             >239 mg/dl  HDL Cholesterol:        High    >59 mg/dL      Low     <41 mg/dL  LDL Cholesterol:        Optimal          <100 mg/dl         Near Optimal     100-129 mg/dl        Above Optimal          Borderline High   130-159 mg/dl          High              160-189 mg/dl          Very High        >189 mg/dl  LDL CALCULATED:    This screening LDL is a calculated result  It does not have the accuracy of the Direct Measured LDL in the monitoring of patients with hyperlipidemia and/or statin therapy  Direct Measure LDL ([id number]) must be ordered separately in these patients       Test Name Result Flag Reference   CHOLESTEROL 162 mg/dL     LDL CHOLESTEROL CALCULATED 83 mg/dL  0-100   TRIGLYCERIDES 90 mg/dL  <=150   Specimen collection should occur prior to N-Acetylcysteine or Metamizole administration due to the potential for falsely depressed results  HDL,DIRECT 61 mg/dL H 40-60     (1) MAGNESIUM [id number] 07:27AM Madhu Cariasberg    Order Number: [id number]     Test Name Result Flag Reference   MAGNESIUM 2 2 mg/dL  1 6-2 6     (1) C-REACTIVE PROTEIN 11Apr2016 07:27AM Darrell Hawthorne Order Number: [id number]     Test Name Result Flag Reference   C-REACT PROTEIN <3 0 mg/L  <3 0     (1) VITAMIN D 25-HYDROXY 11Apr2016 07:27AM Madhu Cariasberg    Order Number: [id number]     Order Number: [id number]     Test Name Result Flag Reference   VIT D 25-HYDROX 13 8 ng/mL L 30 0-100 0       Plan  Vitamin D deficiency    · Vitamin D3 2000 UNIT Oral Tablet; Take 2 tablets daily for 8 weeks, then take one  tablet daily    Discussion/Summary   Vitamin D level is low  Needs to start vitamin D3 supplement which has been sent to the pharmacy for her  Signatures   Electronically signed by : Ambar Pierre, [address] ;  Apr 12 2016  8:26AM EST                       (Author)

## 2018-01-19 LAB — CCP IGA+IGG SERPL IA-ACNC: 8 UNITS (ref 0–19)

## 2018-01-22 ENCOUNTER — GENERIC CONVERSION - ENCOUNTER (OUTPATIENT)
Dept: OTHER | Facility: OTHER | Age: 43
End: 2018-01-22

## 2018-01-23 VITALS
DIASTOLIC BLOOD PRESSURE: 60 MMHG | OXYGEN SATURATION: 99 % | WEIGHT: 235.56 LBS | SYSTOLIC BLOOD PRESSURE: 102 MMHG | HEIGHT: 63 IN | RESPIRATION RATE: 18 BRPM | HEART RATE: 80 BPM | BODY MASS INDEX: 41.74 KG/M2

## 2018-01-24 NOTE — MISCELLANEOUS
Signatures   Electronically signed by : Sukumar Pratt, ; Jan 22 2018  1:21PM EST                       (Author)

## 2018-01-29 ENCOUNTER — APPOINTMENT (OUTPATIENT)
Dept: LAB | Facility: CLINIC | Age: 43
End: 2018-01-29
Payer: COMMERCIAL

## 2018-01-29 DIAGNOSIS — E87.5 HYPERPOTASSEMIA: Primary | ICD-10-CM

## 2018-01-29 LAB — POTASSIUM SERPL-SCNC: 3.6 MMOL/L (ref 3.5–5.3)

## 2018-01-29 PROCEDURE — 36415 COLL VENOUS BLD VENIPUNCTURE: CPT

## 2018-01-29 PROCEDURE — 84132 ASSAY OF SERUM POTASSIUM: CPT

## 2018-01-30 ENCOUNTER — APPOINTMENT (OUTPATIENT)
Dept: PHYSICAL THERAPY | Facility: REHABILITATION | Age: 43
End: 2018-01-30
Payer: COMMERCIAL

## 2018-01-30 DIAGNOSIS — G43.709 CHRONIC MIGRAINE WITHOUT AURA WITHOUT STATUS MIGRAINOSUS, NOT INTRACTABLE: Primary | ICD-10-CM

## 2018-01-30 RX ORDER — KETOROLAC TROMETHAMINE 10 MG/1
TABLET, FILM COATED ORAL
Qty: 10 TABLET | Refills: 0 | Status: SHIPPED | OUTPATIENT
Start: 2018-01-30 | End: 2018-04-23 | Stop reason: SDUPTHER

## 2018-02-06 ENCOUNTER — OFFICE VISIT (OUTPATIENT)
Dept: NEUROLOGY | Facility: CLINIC | Age: 43
End: 2018-02-06
Payer: COMMERCIAL

## 2018-02-06 VITALS
HEIGHT: 62 IN | SYSTOLIC BLOOD PRESSURE: 122 MMHG | WEIGHT: 238 LBS | DIASTOLIC BLOOD PRESSURE: 68 MMHG | BODY MASS INDEX: 43.79 KG/M2 | HEART RATE: 73 BPM

## 2018-02-06 DIAGNOSIS — H55.81 SACCADIC EYE MOVEMENTS: ICD-10-CM

## 2018-02-06 DIAGNOSIS — G44.85 STABBING HEADACHE: ICD-10-CM

## 2018-02-06 DIAGNOSIS — G43.709 CHRONIC MIGRAINE WITHOUT AURA WITHOUT STATUS MIGRAINOSUS, NOT INTRACTABLE: Primary | ICD-10-CM

## 2018-02-06 DIAGNOSIS — M54.2 CERVICALGIA: ICD-10-CM

## 2018-02-06 PROCEDURE — 99215 OFFICE O/P EST HI 40 MIN: CPT | Performed by: PHYSICIAN ASSISTANT

## 2018-02-06 PROCEDURE — 96372 THER/PROPH/DIAG INJ SC/IM: CPT | Performed by: PHYSICIAN ASSISTANT

## 2018-02-06 RX ORDER — IBUPROFEN 600 MG/1
1 TABLET ORAL EVERY 8 HOURS
COMMUNITY
Start: 2016-01-26 | End: 2018-05-09 | Stop reason: ALTCHOICE

## 2018-02-06 RX ORDER — ERGOCALCIFEROL 1.25 MG/1
CAPSULE ORAL
COMMUNITY
Start: 2018-01-17 | End: 2019-09-19

## 2018-02-06 RX ORDER — KETOROLAC TROMETHAMINE 30 MG/ML
60 INJECTION, SOLUTION INTRAMUSCULAR; INTRAVENOUS ONCE
Status: COMPLETED | OUTPATIENT
Start: 2018-02-06 | End: 2018-02-06

## 2018-02-06 RX ORDER — CYCLOBENZAPRINE HCL 10 MG
1 TABLET ORAL 3 TIMES DAILY PRN
COMMUNITY
Start: 2017-05-31 | End: 2020-09-14 | Stop reason: ALTCHOICE

## 2018-02-06 RX ORDER — VITAMIN B COMPLEX
100 TABLET ORAL DAILY
Qty: 30 CAPSULE | Refills: 2 | Status: SHIPPED | OUTPATIENT
Start: 2018-02-06 | End: 2018-05-09 | Stop reason: ALTCHOICE

## 2018-02-06 RX ORDER — RIZATRIPTAN BENZOATE 10 MG/1
TABLET, ORALLY DISINTEGRATING ORAL
COMMUNITY
Start: 2017-11-17 | End: 2018-05-09 | Stop reason: SDUPTHER

## 2018-02-06 RX ORDER — VERAPAMIL HYDROCHLORIDE 40 MG/1
40 TABLET ORAL 2 TIMES DAILY
Qty: 60 TABLET | Refills: 2 | Status: SHIPPED | OUTPATIENT
Start: 2018-02-06 | End: 2018-05-09 | Stop reason: DRUGHIGH

## 2018-02-06 RX ORDER — FLUTICASONE PROPIONATE 50 MCG
2 SPRAY, SUSPENSION (ML) NASAL DAILY PRN
COMMUNITY
Start: 2017-04-05 | End: 2022-08-01 | Stop reason: SDUPTHER

## 2018-02-06 RX ORDER — GABAPENTIN 100 MG/1
1 CAPSULE ORAL
COMMUNITY
Start: 2017-11-27 | End: 2018-05-09 | Stop reason: ALTCHOICE

## 2018-02-06 RX ORDER — ONDANSETRON 4 MG/1
1 TABLET, FILM COATED ORAL EVERY 8 HOURS PRN
COMMUNITY
Start: 2017-04-05

## 2018-02-06 RX ORDER — OMEPRAZOLE 20 MG/1
1 CAPSULE, DELAYED RELEASE ORAL DAILY
COMMUNITY
Start: 2015-02-18 | End: 2018-07-11 | Stop reason: SDUPTHER

## 2018-02-06 RX ORDER — VENLAFAXINE HYDROCHLORIDE 37.5 MG/1
TABLET, EXTENDED RELEASE ORAL
COMMUNITY
Start: 2017-12-28 | End: 2018-05-09 | Stop reason: ALTCHOICE

## 2018-02-06 RX ORDER — CHOLESTYRAMINE 4 G/9G
POWDER, FOR SUSPENSION ORAL 2 TIMES DAILY
COMMUNITY
Start: 2016-02-04 | End: 2021-07-01 | Stop reason: SDUPTHER

## 2018-02-06 RX ADMIN — KETOROLAC TROMETHAMINE 60 MG: 30 INJECTION, SOLUTION INTRAMUSCULAR; INTRAVENOUS at 14:00

## 2018-02-06 NOTE — LETTER
February 6, 2018     Patient: Tato Montgomery   YOB: 1975   Date of Visit: 2/6/2018       To Whom it May Concern:    Tato Montgomery is under my professional care  She was seen in my office on 2/6/2018  She may return to work on 2/14/18 , pending her improvement in her neurological condition  If you have any questions or concerns, please don't hesitate to call           Sincerely,          Aisha Starks PA-C        CC: No Recipients

## 2018-02-06 NOTE — PROGRESS NOTES
Patient ID: Tato Montgomery is a 43 y o  female  Assessment/Plan:    No problem-specific Assessment & Plan notes found for this encounter  Diagnoses and all orders for this visit:    Chronic migraine without aura without status migrainosus, not intractable  -     prochlorperazine edisylate (COMPAZINE) injection 10 mg; Inject 2 mL (10 mg total) into the shoulder, thigh, or buttocks once   -     ketorolac (TORADOL) 60 mg/2 mL IM injection 60 mg; Inject 2 mL (60 mg total) into the shoulder, thigh, or buttocks once   -     verapamil (CALAN) 40 mg tablet; Take 1 tablet (40 mg total) by mouth 2 (two) times a day  -     MRI brain without contrast; Future  -     Coenzyme Q10 (COQ10) 100 MG CAPS; Take 1 capsule (100 mg total) by mouth daily    Cervicalgia  -     MRI brain without contrast; Future  -     Coenzyme Q10 (COQ10) 100 MG CAPS; Take 1 capsule (100 mg total) by mouth daily    Stabbing headache  -     verapamil (CALAN) 40 mg tablet; Take 1 tablet (40 mg total) by mouth 2 (two) times a day  -     MRI brain without contrast; Future  -     Coenzyme Q10 (COQ10) 100 MG CAPS; Take 1 capsule (100 mg total) by mouth daily    Saccadic eye movements  -     verapamil (CALAN) 40 mg tablet; Take 1 tablet (40 mg total) by mouth 2 (two) times a day  -     MRI brain without contrast; Future  -     Coenzyme Q10 (COQ10) 100 MG CAPS; Take 1 capsule (100 mg total) by mouth daily    Other orders  -     cyclobenzaprine (FLEXERIL) 10 mg tablet; Take 1 tablet by mouth 3 (three) times a day as needed  -     ergocalciferol (VITAMIN D2) 50,000 units;   -     gabapentin (NEURONTIN) 100 mg capsule; Take 1 capsule by mouth  -     fluticasone (FLONASE) 50 mcg/act nasal spray; into each nostril  -     cholestyramine (QUESTRAN) 4 GM/DOSE powder; Take by mouth Twice daily  -     ibuprofen (MOTRIN) 600 mg tablet; Take 1 tablet by mouth every 8 (eight) hours  -     omeprazole (PriLOSEC) 20 mg delayed release capsule;  Take 1 capsule by mouth daily  -     ondansetron (ZOFRAN) 4 mg tablet; Take 1 tablet by mouth every 8 (eight) hours as needed  -     rizatriptan (MAXALT-MLT) 10 MG disintegrating tablet; Take by mouth  -     Topiramate ER (TROKENDI XR) 100 MG CP24; Take by mouth  -     Topiramate ER (TROKENDI XR) 25 MG CP24; Take by mouth  -     venlafaxine 37 5 mg 24 hr tablet; Take by mouth  -     cholecalciferol (VITAMIN D3) 1,000 units tablet; Take 1 capsule by mouth once a week       Try Verapamil for migraine prevention  In addition Co Q10 100 mg daily for ha prevention  At onset continue the Maxalt + Toradol, repeat maxalt in 2 hours if needed  If verapamil not helpful, will increase Trokendi to 200 mg qd  Will attempt Botox reimbursement program (she will check her eligibility online)  Brain MRI wo contrast   Continue vit D supplement  Subjective:    FANTASMA Partida is a 24-year-old female with a history of previous C4-5 ACDF presenting for neurological f/u for migraine headaches  Currently on short term disability  Since last seen she reports slight improvement in her migraine headaches with Trokendi XR, and denies side effects  She typically uses Maxalt and Toradol at the onset of headache, +/- Compazine  Failed Effexor with sedation and nausea/ stomach pains  Still doing PT for fibromyalgia and neck pain  Aura- flashes of light- sporadic, blurry vision, vertigo- lasting several minutes    Migraines are persistent despite the trokendi  She has been keeping a migraine journal in an kelsey on her phone and unable to come up with a clear pattern, although some triggers which are stress, neck pain and possibly weather changes  With a migraine she gets scalp tenderness, a stabbing sensation in right> left eye, sometimes tearing or twitching from the right eye, associated confusion/ disorientation, nausea, no vomiting, light and sound sensitivity and vertigo  She has had a migraine for 11 days straight today   Maxalt minimized it, but did not resolve it this time  She cannot tell me what set it off  She has about 15 days or more at a month the migraine, each lasting for greater hours  Thus far she has tried and failed Topamax, Trokendi XR, venlafaxine and gabapentin  Sleep is not good  Goes to bed at midnight latest; usually gets out of bed at 9/10 am, starts work at 1- 9:30 pm     The last MRI of her cervical spine was in 2015 which was essentially normal with stable hardware at C4-5  The patient was last seen at an outside pain specialist for the symptoms which were similar and had received a cervical epidural steroid injection, however there was unfortunately a dural puncture leading to a PDPH and subsequent epidural blood patch  She does occasionally take cyclobenzaprine 10 mg p r n , however this does cause drowsiness  She also takes ibuprofen 600 mg q 8 hours p r n  with minimal to mild relief  Ophtho eval in the recent past is unremarkable per pt hx   ---    The following portions of the patient's history were reviewed and updated as appropriate:   She  has a past medical history of Anxiety; GI problem; Joint pain; Migraine; and Muscle pain  She  does not have any pertinent problems on file  She  has a past surgical history that includes Carpal tunnel release (Bilateral); Ulnar nerve repair (Bilateral); Spinal fusion; and Cholecystectomy  Her family history is not on file  She  reports that she has never smoked  She has never used smokeless tobacco  She reports that she drinks alcohol  She reports that she does not use drugs    Current Outpatient Prescriptions   Medication Sig Dispense Refill    cholecalciferol (VITAMIN D3) 1,000 units tablet Take 1 capsule by mouth once a week      cholestyramine (QUESTRAN) 4 GM/DOSE powder Take by mouth Twice daily      cyclobenzaprine (FLEXERIL) 10 mg tablet Take 1 tablet by mouth 3 (three) times a day as needed      ergocalciferol (VITAMIN D2) 50,000 units       fluticasone (FLONASE) 50 mcg/act nasal spray into each nostril      gabapentin (NEURONTIN) 100 mg capsule Take 1 capsule by mouth      ibuprofen (MOTRIN) 600 mg tablet Take 1 tablet by mouth every 8 (eight) hours      ketorolac (TORADOL) 10 mg tablet TAKE 1 TO TWO tablets AS NEEDED for headache, no more than TWO tablets in 24 hours AND no more than THREE tabs in 1 week 10 tablet 0    metoclopramide (REGLAN) 10 mg tablet Take 1 tablet by mouth every 6 (six) hours as needed (nausea, migraine) 30 tablet 0    omeprazole (PriLOSEC) 20 mg delayed release capsule Take 1 capsule by mouth daily      ondansetron (ZOFRAN) 4 mg tablet Take 1 tablet by mouth every 8 (eight) hours as needed      rizatriptan (MAXALT-MLT) 10 MG disintegrating tablet Take by mouth      Topiramate ER (TROKENDI XR) 100 MG CP24 Take by mouth      Topiramate ER (TROKENDI XR) 25 MG CP24 Take by mouth      Coenzyme Q10 (COQ10) 100 MG CAPS Take 1 capsule (100 mg total) by mouth daily 30 capsule 2    venlafaxine 37 5 mg 24 hr tablet Take by mouth      verapamil (CALAN) 40 mg tablet Take 1 tablet (40 mg total) by mouth 2 (two) times a day 60 tablet 2     No current facility-administered medications for this visit  Current Outpatient Prescriptions on File Prior to Visit   Medication Sig    ketorolac (TORADOL) 10 mg tablet TAKE 1 TO TWO tablets AS NEEDED for headache, no more than TWO tablets in 24 hours AND no more than THREE tabs in 1 week    metoclopramide (REGLAN) 10 mg tablet Take 1 tablet by mouth every 6 (six) hours as needed (nausea, migraine)     No current facility-administered medications on file prior to visit  She is allergic to codeine and penicillins            Objective:    Blood pressure 122/68, pulse 73, height 5' 2" (1 575 m), weight 108 kg (238 lb)  Physical Exam   Constitutional: She appears well-developed and well-nourished  Appearing tired   Nursing note and vitals reviewed      Neurological Exam  On neurologic exam, the patient is alert and oriented to time and place  She is slightly photophobic today  Speech is fluent and articulate, and the patient follows commands appropriately  Judgment and affect appear normal  Pupils are equally round and reactive to light  EOMs are intact but there is slight b/l saccadic breakdown of normal pursuit  Optic discs are sharp and flat bilaterally  Face is symmetric, and tongue, uvula, and palate are midline  Facial sensation is normal and symmetric, in all 3 divisions of the trigeminal nerve  Hearing is intact  Motor examination reveals intact strength, tone, and bulk throughout  Reflexes are intact and symmetric throughout  Toes are down going b/l  Sensation is intact to light touch in all 4 extremities  Normal gait is steady  ROS:    Review of Systems   Constitutional: Negative  HENT: Negative  Eyes: Negative  Respiratory: Negative  Cardiovascular: Negative  Gastrointestinal: Negative  Endocrine: Negative  Genitourinary: Negative  Musculoskeletal: Negative  Skin: Negative  Allergic/Immunologic: Negative  Neurological: Positive for headaches  Hematological: Negative  Psychiatric/Behavioral: Negative  Review of systems, Past medical history, Surgical history, Family history, Social history and Medication history were reviewed and otherwise unremarkable from a neurological perspective

## 2018-02-06 NOTE — PATIENT INSTRUCTIONS
Try Verapamil for migraine prevention  At onset continue the Maxalt + Toradol, repeat maxalt in 2 hours if needed  If verapamil not helpful, will increase Trokendi to 200 mg qd  Will attempt Botox reimbursement program   Brain MRI wo contrast   Continue vit D supplement

## 2018-02-12 ENCOUNTER — TELEPHONE (OUTPATIENT)
Dept: NEUROLOGY | Facility: CLINIC | Age: 43
End: 2018-02-12

## 2018-02-12 DIAGNOSIS — G43.709 CHRONIC MIGRAINE WITHOUT AURA WITHOUT STATUS MIGRAINOSUS, NOT INTRACTABLE: Primary | ICD-10-CM

## 2018-02-12 NOTE — TELEPHONE ENCOUNTER
pt called and states that she is still having confusion and dizziness with migraine  she is supposed to go back to work on the 14th   she does not feel that she can go back to work on the 14   she does not feel that she is 100% yet  please advise  this migraine has been going on since the 1/29  it has colmed down since but is still ongoing  currently 5/10  trokendi xr 200mg hs   compazine and toradaol prn  states that compazine and toradol is effective when she has a headache spike, but it makes her very sleepy so then she won't be able to take at work  lately she has noticed that any type of lighting seems to trigger a migraine      [phone] or home [phone]

## 2018-02-12 NOTE — TELEPHONE ENCOUNTER
Tell her to check on the botox reimbursement program so that she can afford botox again as this helped her  Also is verapamil working? It takes a few days to kick in  Can try a medrol dose pack to break cycle if she would like

## 2018-02-13 RX ORDER — METHYLPREDNISOLONE 4 MG/1
TABLET ORAL
Qty: 21 TABLET | Refills: 0 | Status: SHIPPED | OUTPATIENT
Start: 2018-02-13 | End: 2018-05-09 | Stop reason: ALTCHOICE

## 2018-02-13 NOTE — TELEPHONE ENCOUNTER
Will send medrol dose pack  I would like her to try to go back to work if she can  Can try to sign whatever forms she has  Is stefco correct? Sorry I sent to wrong pharmacy  Thanks

## 2018-02-13 NOTE — TELEPHONE ENCOUNTER
Pt called back and states "I still do not feel 100% but I will go back to work tomorrow if I have to"  Pt questioning if you'd like her to return to work and states she will need a new letter for employer  Pt also states Kaden Ayanna will be faxing [employer] paperwork and asking we work on this ASAP  Pt was informed of 2 wk turn around  Please advise if you plan on keeping her out of work  Pt has not started verapamil as pt states it was sent to incorrect pharmacy and she just picked it up  Pt agreeable to medrol pack and request rx sent to [address] [place] [address]

## 2018-02-20 NOTE — TELEPHONE ENCOUNTER
Pt has already been in communication via email w/ [facility]  Pt states she came into office and was given letter by [facility] keeping her out work until 2/26  Pt will be checkng w/ [employer] company as we have not received forms and she is unsure if they are faxing to correct #  Will keep task open until forms are received

## 2018-02-24 ENCOUNTER — HOSPITAL ENCOUNTER (OUTPATIENT)
Dept: RADIOLOGY | Facility: HOSPITAL | Age: 43
Discharge: HOME | End: 2018-02-24
Payer: COMMERCIAL

## 2018-02-24 DIAGNOSIS — M54.2 CERVICALGIA: ICD-10-CM

## 2018-02-24 DIAGNOSIS — G43.709 CHRONIC MIGRAINE WITHOUT AURA WITHOUT STATUS MIGRAINOSUS, NOT INTRACTABLE: ICD-10-CM

## 2018-02-24 DIAGNOSIS — G44.85 STABBING HEADACHE: ICD-10-CM

## 2018-02-24 DIAGNOSIS — H55.81 SACCADIC EYE MOVEMENTS: ICD-10-CM

## 2018-02-24 PROCEDURE — 70551 MRI BRAIN STEM W/O DYE: CPT

## 2018-02-26 ENCOUNTER — TELEPHONE (OUTPATIENT)
Dept: NEUROLOGY | Facility: CLINIC | Age: 43
End: 2018-02-26

## 2018-02-26 NOTE — LETTER
February 26, 2018    Patient: Zeina Casarez   YOB: 1975   Date of Patient Call: 2/26/2018       To Whom It May Concern: It is my medical opinion that Zeina Casarez may return to full duty immediately with no restrictions on 2/26/18  If you have any questions or concerns, please don't hesitate to call           Sincerely,          Angelic Berry PA-C      CC: CATE Garnica

## 2018-02-26 NOTE — TELEPHONE ENCOUNTER
Pt  Called to request a return to work note that states she is clear to return to work on 2-26-18  She does start work at [employer] today  Please call patient at [phone] When ready for   Thank you

## 2018-02-27 NOTE — TELEPHONE ENCOUNTER
It has been 1 week and forms still not received  Called and lmom for pt to call the office back to advise forms still not received  If she still needs these completed she should f/u with her employer to get forms sent to our office or perhaps she can get a copy and bring to our office herself  Will close task at this time and await her call back

## 2018-03-15 NOTE — TELEPHONE ENCOUNTER
Pt called in, I made her aware that we still do not have forms  She plans to bring them into the office herself

## 2018-03-28 ENCOUNTER — TELEPHONE (OUTPATIENT)
Dept: NEUROLOGY | Facility: CLINIC | Age: 43
End: 2018-03-28

## 2018-03-28 NOTE — TELEPHONE ENCOUNTER
Pt called states the Alma Vasques from [facility] mailed a short term disability form to [facility] neurology office on 3/22/18  She states that, she doesn't know if it was sent to [place] or [place] office  She states that Levi Sacramento from [facility] spoke to a nurse name Jagruti Griggs who instructed her to mail the form  I told her that we do not have a nurse name merlin that works in our office  She provided me with toll free # ([phone])  I called shiva @ [phone], spoke to [facility] to instructed me to have pt call them directly as she couldn't give me additional info  Pt was made aware

## 2018-03-29 NOTE — TELEPHONE ENCOUNTER
Per RN Yan Guevara, pt called today and confirmed that form was sent to [place] office  No form found at this time  Called and Left a message on pt's answering machine for a call back

## 2018-03-30 NOTE — TELEPHONE ENCOUNTER
Letter from [employer] requesting additional documentation to process short term disability claim received on 3/29/18  Documents faxed as requested to [address] [place] [address]  (fax# [phone]) on 3/30/18  Pt was made aware      [address] [place] [address] [phone]  MCE#[phone]    [facility] from [place] [facility] [place] [facility] ([phone])

## 2018-04-05 NOTE — TELEPHONE ENCOUNTER
[employer] forms completed, placed in Dr Idalia Rousseau clinical team folder for sig  Office note attached

## 2018-04-23 DIAGNOSIS — G43.709 CHRONIC MIGRAINE WITHOUT AURA WITHOUT STATUS MIGRAINOSUS, NOT INTRACTABLE: ICD-10-CM

## 2018-04-24 RX ORDER — KETOROLAC TROMETHAMINE 10 MG/1
TABLET, FILM COATED ORAL
Qty: 10 TABLET | Refills: 0 | Status: SHIPPED | OUTPATIENT
Start: 2018-04-24 | End: 2018-07-25 | Stop reason: SDUPTHER

## 2018-05-09 ENCOUNTER — OFFICE VISIT (OUTPATIENT)
Dept: NEUROLOGY | Facility: CLINIC | Age: 43
End: 2018-05-09
Payer: COMMERCIAL

## 2018-05-09 VITALS
DIASTOLIC BLOOD PRESSURE: 64 MMHG | HEART RATE: 79 BPM | WEIGHT: 234 LBS | SYSTOLIC BLOOD PRESSURE: 102 MMHG | HEIGHT: 62 IN | BODY MASS INDEX: 43.06 KG/M2

## 2018-05-09 DIAGNOSIS — G43.709 CHRONIC MIGRAINE WITHOUT AURA WITHOUT STATUS MIGRAINOSUS, NOT INTRACTABLE: Primary | ICD-10-CM

## 2018-05-09 DIAGNOSIS — R42 VERTIGO: ICD-10-CM

## 2018-05-09 PROCEDURE — 99214 OFFICE O/P EST MOD 30 MIN: CPT | Performed by: PHYSICIAN ASSISTANT

## 2018-05-09 RX ORDER — VERAPAMIL HYDROCHLORIDE 100 MG/1
100 CAPSULE, EXTENDED RELEASE ORAL
Qty: 30 CAPSULE | Refills: 2 | Status: SHIPPED | OUTPATIENT
Start: 2018-05-09 | End: 2019-01-09 | Stop reason: ALTCHOICE

## 2018-05-09 RX ORDER — RIZATRIPTAN BENZOATE 10 MG/1
TABLET, ORALLY DISINTEGRATING ORAL
Qty: 9 TABLET | Refills: 2 | Status: SHIPPED | OUTPATIENT
Start: 2018-05-09 | End: 2019-08-06 | Stop reason: SDUPTHER

## 2018-05-09 RX ORDER — PROCHLORPERAZINE MALEATE 10 MG
TABLET ORAL
Qty: 30 TABLET | Refills: 0 | Status: SHIPPED | OUTPATIENT
Start: 2018-05-09 | End: 2019-08-06 | Stop reason: SDUPTHER

## 2018-05-09 RX ORDER — PROCHLORPERAZINE MALEATE 10 MG
TABLET ORAL
COMMUNITY
Start: 2018-05-07 | End: 2018-05-09 | Stop reason: SDUPTHER

## 2018-05-09 NOTE — PROGRESS NOTES
Patient ID: Rashad Marte is a 43 y o  female  Assessment/Plan:    No problem-specific Assessment & Plan notes found for this encounter  Diagnoses and all orders for this visit:    Chronic migraine without aura without status migrainosus, not intractable  -     rizatriptan (MAXALT-MLT) 10 MG disintegrating tablet; 1 tab at migraine onset, repeat after 2 hours if needed  -     prochlorperazine (COMPAZINE) 10 mg tablet; 1 tab q6h prn migraine onset  -     Hepatic function panel; Future  -     verapamil (VERELAN PM) 100 MG 24 hr capsule; Take 1 capsule (100 mg total) by mouth daily at bedtime  She actually stopped Trokendi XR on her own because she was unsure if it was helping  Increase verapamil to 100 mg ER qhs, and discussed common s/e such as constipation, dizziness  She was encouraged to call right away with any s/e  Will likely not increase the dose further considering BP today is 102/64  If verapamil ineffective, will restart Trokendi XR  At the onset of a migraine she should continue cocktail:  Maxalt plus Compazine, and if that fails had Toradol  We discussed limiting the abortive medications to no more than 1-2 per week to avoid medication overuse headache  She had 1 round of Botox in the past and felt that it helped, but is currently involved high co-pay  I did discuss with her the opportunity for patient reimbursement program through [employer]  Vertigo  -     Ambulatory referral to Physical Therapy; Future  -     verapamil (VERELAN PM) 100 MG 24 hr capsule; Take 1 capsule (100 mg total) by mouth daily at bedtime  It seems the vertigo is directly related to her migraines  She continues to be getting vertigo despite medications  Brain MRI without any abnormalities to explain the dizziness  She did not yet try vestibular therapy, so this was ordered today  If this is ineffective may recommend ENT referral or consult with attending as to what to do next      Other orders  - Discontinue: prochlorperazine (COMPAZINE) 10 mg tablet;          Subjective:    HPI     Chelsea Maki is a 59-year-old female with a history of previous C4-5 ACDF and fibromyalgia presenting for neurological f/u for migraine headaches  She is currently working  She asked me to fill out FMLA forms today which I did  Since last seen she reports having more vertigo, usually associated with a migraine headache  Since starting Trokendi XR she has found benefit, and the verapamil improved this slightly  She typically uses Maxalt and Compazine at the onset of headache, +/- Toradol  Failed Effexor with sedation and nausea/ stomach pains  Denies any side effects to these medications  She is scheduled to see a rheumatologist in July for fibromyalgia  Aura- flashes of light- sporadic, blurry vision, vertigo- lasting several minutes    She has been keeping a migraine journal in an kelsey on her phone and unable to come up with a clear pattern, although some triggers which are stress, neck pain and possibly weather changes  With a migraine she gets scalp tenderness, a stabbing sensation in right> left eye, sometimes tearing or twitching from the right eye, associated confusion/ disorientation, nausea, no vomiting, light and sound sensitivity and vertigo  She has about 15 days or more at a month the migraine, each lasting greater than 4 hours  Thus far she has tried and failed verapamil, Topamax, Trokendi XR, venlafaxine and gabapentin  She has also tried Lyrica and Cymbalta which caused side effects  She had 1 round of Botox in the past and felt that it helped, but is currently involved high co-pay  I did discuss with her the opportunity for patient reimbursement program through [employer]      The last MRI of her cervical spine was in 2015 which was essentially normal with stable hardware at C4-5   The patient was last seen at an outside pain specialist for the symptoms which were similar and had received a cervical epidural steroid injection, however there was unfortunately a dural puncture leading to a PDPH and subsequent epidural blood patch  She does occasionally take cyclobenzaprine 10 mg p r n , however this does cause drowsiness      Ophtho eval in the recent past is unremarkable per pt hx  Denies headaches worsen with postural changes such as lying down or standing up  Denies headaches worsen with coughing or Valsalva maneuvers  Brain MRI without contrast 2/24/2018 is unremarkable  ---    The following portions of the patient's history were reviewed and updated as appropriate:   She  has a past medical history of Anxiety; GI problem; Joint pain; Migraine; and Muscle pain  She   Patient Active Problem List    Diagnosis Date Noted    Vertigo 05/09/2018    Cervicalgia 02/06/2018    Chronic migraine without aura without status migrainosus, not intractable 02/06/2018    Saccadic eye movements 02/06/2018     She  has a past surgical history that includes Carpal tunnel release (Bilateral); Ulnar nerve repair (Bilateral); Spinal fusion; and Cholecystectomy  Her family history is not on file  She  reports that she has never smoked  She has never used smokeless tobacco  She reports that she drinks alcohol  She reports that she does not use drugs    Current Outpatient Prescriptions   Medication Sig Dispense Refill    cholecalciferol (VITAMIN D3) 1,000 units tablet Take 1 capsule by mouth once a week      cholestyramine (QUESTRAN) 4 GM/DOSE powder Take by mouth Twice daily      cyclobenzaprine (FLEXERIL) 10 mg tablet Take 1 tablet by mouth 3 (three) times a day as needed      ergocalciferol (VITAMIN D2) 50,000 units       fluticasone (FLONASE) 50 mcg/act nasal spray into each nostril      ketorolac (TORADOL) 10 mg tablet TAKE 1 TO TWO TABLETS AS NEEDED FOR HEADACHE, NO MORE THAN TWO TABLETS IN 24 HOURS AND NO MORE THAN THREE TABS IN 1 WEEK 10 tablet 0    meclizine (ANTIVERT) 12 5 MG tablet Take 1 tablet (12 5 mg total) by mouth 3 (three) times a day as needed for dizziness or nausea 30 tablet 0    omeprazole (PriLOSEC) 20 mg delayed release capsule Take 1 capsule by mouth daily      ondansetron (ZOFRAN) 4 mg tablet Take 1 tablet by mouth every 8 (eight) hours as needed      prochlorperazine (COMPAZINE) 10 mg tablet 1 tab q6h prn migraine onset  30 tablet 0    rizatriptan (MAXALT-MLT) 10 MG disintegrating tablet 1 tab at migraine onset, repeat after 2 hours if needed 9 tablet 2    Topiramate ER (TROKENDI XR) 100 MG CP24 Take by mouth      Topiramate ER (TROKENDI XR) 25 MG CP24 Take by mouth      verapamil (VERELAN PM) 100 MG 24 hr capsule Take 1 capsule (100 mg total) by mouth daily at bedtime 30 capsule 2     No current facility-administered medications for this visit  Current Outpatient Prescriptions on File Prior to Visit   Medication Sig    cholecalciferol (VITAMIN D3) 1,000 units tablet Take 1 capsule by mouth once a week    cholestyramine (QUESTRAN) 4 GM/DOSE powder Take by mouth Twice daily    cyclobenzaprine (FLEXERIL) 10 mg tablet Take 1 tablet by mouth 3 (three) times a day as needed    ergocalciferol (VITAMIN D2) 50,000 units     fluticasone (FLONASE) 50 mcg/act nasal spray into each nostril    ketorolac (TORADOL) 10 mg tablet TAKE 1 TO TWO TABLETS AS NEEDED FOR HEADACHE, NO MORE THAN TWO TABLETS IN 24 HOURS AND NO MORE THAN THREE TABS IN 1 WEEK    meclizine (ANTIVERT) 12 5 MG tablet Take 1 tablet (12 5 mg total) by mouth 3 (three) times a day as needed for dizziness or nausea    omeprazole (PriLOSEC) 20 mg delayed release capsule Take 1 capsule by mouth daily    ondansetron (ZOFRAN) 4 mg tablet Take 1 tablet by mouth every 8 (eight) hours as needed    Topiramate ER (TROKENDI XR) 100 MG CP24 Take by mouth    Topiramate ER (TROKENDI XR) 25 MG CP24 Take by mouth     No current facility-administered medications on file prior to visit        She is allergic to codeine and penicillins            Objective:    Blood pressure 102/64, pulse 79, height 5' 2" (1 575 m), weight 106 kg (234 lb)  Physical Exam   Constitutional: She is oriented to person, place, and time  She appears well-developed and well-nourished  HENT:   Head: Normocephalic and atraumatic  Eyes:   Fundus exam normal bilaterally  EOMs intact without nystagmus  Neck: Normal range of motion  Neck supple  Musculoskeletal: Normal range of motion  5/5 t/o  No PD  Neurological: She is alert and oriented to person, place, and time  No cranial nerve deficit  Coordination normal    Psychiatric: She has a normal mood and affect  Her behavior is normal    Nursing note and vitals reviewed  Neurological Exam      ROS:    Review of Systems   Constitutional: Negative  Negative for appetite change and fever  HENT: Negative  Negative for hearing loss, tinnitus, trouble swallowing and voice change  Eyes: Negative  Negative for photophobia and pain  Respiratory: Negative  Negative for shortness of breath  Cardiovascular: Negative  Negative for palpitations  Gastrointestinal: Negative  Negative for nausea and vomiting  Endocrine: Negative  Negative for cold intolerance and heat intolerance  Genitourinary: Negative  Negative for dysuria, frequency and urgency  Musculoskeletal: Negative  Negative for myalgias and neck pain  Skin: Negative  Negative for rash  Neurological: Positive for headaches  Negative for dizziness, tremors, seizures, syncope, facial asymmetry, speech difficulty, weakness, light-headedness and numbness  Hematological: Negative  Does not bruise/bleed easily  Psychiatric/Behavioral: Negative  Negative for confusion, hallucinations and sleep disturbance  Review of systems, Past medical history, Surgical history, Family history, Social history and Medication history were reviewed and otherwise unremarkable from a neurological perspective

## 2018-05-09 NOTE — PATIENT INSTRUCTIONS
Try new verapamil, after 1 week call for trokendi if verapamil is ineffective    Mag- 250 mg daily with food, B2- 100 mg daily  Probiotics  Exercise regular- 3-5 times weekly

## 2018-06-02 PROBLEM — K21.9 GERD WITHOUT ESOPHAGITIS: Status: ACTIVE | Noted: 2017-10-12

## 2018-06-07 ENCOUNTER — OFFICE VISIT (OUTPATIENT)
Dept: FAMILY MEDICINE CLINIC | Facility: CLINIC | Age: 43
End: 2018-06-07
Payer: COMMERCIAL

## 2018-06-07 VITALS
BODY MASS INDEX: 45.45 KG/M2 | OXYGEN SATURATION: 98 % | WEIGHT: 247 LBS | DIASTOLIC BLOOD PRESSURE: 68 MMHG | SYSTOLIC BLOOD PRESSURE: 116 MMHG | RESPIRATION RATE: 18 BRPM | HEIGHT: 62 IN | TEMPERATURE: 98.2 F | HEART RATE: 83 BPM

## 2018-06-07 DIAGNOSIS — M79.89 FOOT SWELLING: ICD-10-CM

## 2018-06-07 DIAGNOSIS — M25.571 RIGHT ANKLE PAIN, UNSPECIFIED CHRONICITY: Primary | ICD-10-CM

## 2018-06-07 PROCEDURE — 3008F BODY MASS INDEX DOCD: CPT | Performed by: PHYSICIAN ASSISTANT

## 2018-06-07 PROCEDURE — 99214 OFFICE O/P EST MOD 30 MIN: CPT | Performed by: PHYSICIAN ASSISTANT

## 2018-06-07 NOTE — PATIENT INSTRUCTIONS
Recommend reducing salt intake daily  Elevate legs as needed  Proceed with x-rays of the right ankle and bilateral feet  Phone number given for Podiatry with Dr Kera Reyna

## 2018-06-07 NOTE — PROGRESS NOTES
Assessment/Plan:    Patient Instructions   Recommend reducing salt intake daily  Elevate legs as needed  Proceed with x-rays of the right ankle and bilateral feet  Phone number given for Podiatry with Dr Jacqueline Talley  [employer] software was used to dictate this note  It may contain errors with dictating incorrect words/spelling  Please contact provider directly for any questions  Diagnoses and all orders for this visit:    Right ankle pain, unspecified chronicity  -     XR ankle 3+ vw right; Future  -     Ambulatory referral to Podiatry; Future    Foot swelling  -     XR foot 3+ vw left; Future  -     XR foot 3+ vw right; Future  -     Ambulatory referral to Podiatry; Future          Subjective:      Patient ID: Freedom Pisano is a 43 y o  female  Patient states she has been having bilateral foot swelling for quite a while  She states the left side has been longer  She states she did injure her left ankle in the past but overall that seems to have healed  She has noticed some right ankle discomfort and some swelling  She has never had x-rays of her feet or ankles  She denies any specific treatment she states she does have a tendency to eat a lot of high salt products  She denies any chest pain, shortness of breath  She does have irritable bowel syndrome and does have intermittent abdominal pain  She denies any varicosities of her lower extremities  She does not have as much swelling now as she does by the end of the day  She elevates her legs as needed to reduce the foot swelling  She states the only medication she takes right now on a regular basis is verapamil for migraine headaches  Trokendi as needed  The following portions of the patient's history were reviewed and updated as appropriate:   She  has a past medical history of Anxiety; Asthma; Claustrophobia; Depression; Fibromyalgia; GERD without esophagitis; GI problem; Joint pain; Lung nodule; Migraine; and Muscle pain    She   Patient Active Problem List    Diagnosis Date Noted    Right ankle pain 06/07/2018    Foot swelling 06/07/2018    Vertigo 05/09/2018    Cervicalgia 02/06/2018    Chronic migraine without aura without status migrainosus, not intractable 02/06/2018    Saccadic eye movements 02/06/2018    GERD without esophagitis 10/12/2017    Vitamin D deficiency 04/12/2016    Arthralgia of multiple joints 04/06/2016    Obesity, morbid, BMI 40 0-49 9 ([facility] [address] ) 04/06/2016    Allergic rhinitis 11/21/2013    Colon, diverticulosis 11/21/2013     She  has a past surgical history that includes Carpal tunnel release (Bilateral); Ulnar nerve repair (Bilateral); Spinal fusion; Cholecystectomy; Back surgery; Gallbladder surgery; and Neck surgery  Her family history includes Arthritis in her family; Cancer in her family and mother; Diabetes in her mother; Fibromyalgia in her mother; Heart disease in her family; Hypertension in her mother; Lupus in her family; Neuropathy in her family; Ovarian cancer in her mother; Thyroid disease in her mother  She  reports that she has never smoked  She has never used smokeless tobacco  She reports that she drinks alcohol  She reports that she does not use drugs    Current Outpatient Prescriptions   Medication Sig Dispense Refill    cholecalciferol (VITAMIN D3) 1,000 units tablet Take 1 capsule by mouth once a week      cholestyramine (QUESTRAN) 4 GM/DOSE powder Take by mouth Twice daily      cyclobenzaprine (FLEXERIL) 10 mg tablet Take 1 tablet by mouth 3 (three) times a day as needed      ergocalciferol (VITAMIN D2) 50,000 units       fluticasone (FLONASE) 50 mcg/act nasal spray into each nostril      ketorolac (TORADOL) 10 mg tablet TAKE 1 TO TWO TABLETS AS NEEDED FOR HEADACHE, NO MORE THAN TWO TABLETS IN 24 HOURS AND NO MORE THAN THREE TABS IN 1 WEEK 10 tablet 0    meclizine (ANTIVERT) 12 5 MG tablet Take 1 tablet (12 5 mg total) by mouth 3 (three) times a day as needed for dizziness or nausea 30 tablet 0    omeprazole (PriLOSEC) 20 mg delayed release capsule Take 1 capsule by mouth daily      ondansetron (ZOFRAN) 4 mg tablet Take 1 tablet by mouth every 8 (eight) hours as needed      prochlorperazine (COMPAZINE) 10 mg tablet 1 tab q6h prn migraine onset  30 tablet 0    rizatriptan (MAXALT-MLT) 10 MG disintegrating tablet 1 tab at migraine onset, repeat after 2 hours if needed 9 tablet 2    Topiramate ER (TROKENDI XR) 100 MG CP24 Take by mouth      Topiramate ER (TROKENDI XR) 25 MG CP24 Take by mouth      verapamil (VERELAN PM) 100 MG 24 hr capsule Take 1 capsule (100 mg total) by mouth daily at bedtime 30 capsule 2     No current facility-administered medications for this visit  Current Outpatient Prescriptions on File Prior to Visit   Medication Sig    cholecalciferol (VITAMIN D3) 1,000 units tablet Take 1 capsule by mouth once a week    cholestyramine (QUESTRAN) 4 GM/DOSE powder Take by mouth Twice daily    cyclobenzaprine (FLEXERIL) 10 mg tablet Take 1 tablet by mouth 3 (three) times a day as needed    ergocalciferol (VITAMIN D2) 50,000 units     fluticasone (FLONASE) 50 mcg/act nasal spray into each nostril    ketorolac (TORADOL) 10 mg tablet TAKE 1 TO TWO TABLETS AS NEEDED FOR HEADACHE, NO MORE THAN TWO TABLETS IN 24 HOURS AND NO MORE THAN THREE TABS IN 1 WEEK    meclizine (ANTIVERT) 12 5 MG tablet Take 1 tablet (12 5 mg total) by mouth 3 (three) times a day as needed for dizziness or nausea    omeprazole (PriLOSEC) 20 mg delayed release capsule Take 1 capsule by mouth daily    ondansetron (ZOFRAN) 4 mg tablet Take 1 tablet by mouth every 8 (eight) hours as needed    prochlorperazine (COMPAZINE) 10 mg tablet 1 tab q6h prn migraine onset      rizatriptan (MAXALT-MLT) 10 MG disintegrating tablet 1 tab at migraine onset, repeat after 2 hours if needed    Topiramate ER (TROKENDI XR) 100 MG CP24 Take by mouth    Topiramate ER (TROKENDI XR) 25 MG CP24 Take by mouth    verapamil (VERELAN PM) 100 MG 24 hr capsule Take 1 capsule (100 mg total) by mouth daily at bedtime     No current facility-administered medications on file prior to visit  She is allergic to codeine and penicillins       Review of Systems   Constitutional:        She has noticed some weight gain  Respiratory: Negative for shortness of breath  Cardiovascular: Negative for chest pain  Musculoskeletal:        As stated in HPI         Objective:      /68 (Cuff Size: Large)   Pulse 83   Temp 98 2 °F (36 8 °C)   Resp 18   Ht 5' 2" (1 575 m)   Wt 112 kg (247 lb)   SpO2 98%   BMI 45 18 kg/m²          Physical Exam   Constitutional: She appears well-developed and well-nourished  No distress  Cardiovascular: Normal rate, regular rhythm and normal heart sounds  No murmur heard  Pulmonary/Chest: Effort normal and breath sounds normal  No respiratory distress  She has no wheezes  She has no rales  Musculoskeletal: She exhibits no edema  Right ankle:  She does have some mild swelling laterally compared to left side  There is some mild tenderness  She does have good range of motion of both ankles  Bilateral feet:  No obvious swelling at this time  No tenderness with palpation    No edema of the lower extremities

## 2018-06-11 ENCOUNTER — TELEPHONE (OUTPATIENT)
Dept: FAMILY MEDICINE CLINIC | Facility: CLINIC | Age: 43
End: 2018-06-11

## 2018-06-11 NOTE — TELEPHONE ENCOUNTER
She needs to discuss this further with Podiatry  She does not have enough information with a specific diagnosis to complete the form

## 2018-07-06 ENCOUNTER — APPOINTMENT (OUTPATIENT)
Dept: LAB | Facility: HOSPITAL | Age: 43
End: 2018-07-06
Payer: COMMERCIAL

## 2018-07-09 ENCOUNTER — OFFICE VISIT (OUTPATIENT)
Dept: NEUROLOGY | Facility: CLINIC | Age: 43
End: 2018-07-09
Payer: COMMERCIAL

## 2018-07-09 ENCOUNTER — TELEPHONE (OUTPATIENT)
Dept: NEUROLOGY | Facility: CLINIC | Age: 43
End: 2018-07-09

## 2018-07-09 VITALS
WEIGHT: 240 LBS | DIASTOLIC BLOOD PRESSURE: 57 MMHG | HEART RATE: 76 BPM | BODY MASS INDEX: 43.9 KG/M2 | SYSTOLIC BLOOD PRESSURE: 110 MMHG

## 2018-07-09 DIAGNOSIS — R42 VERTIGO: ICD-10-CM

## 2018-07-09 DIAGNOSIS — G43.709 CHRONIC MIGRAINE WITHOUT AURA WITHOUT STATUS MIGRAINOSUS, NOT INTRACTABLE: Primary | ICD-10-CM

## 2018-07-09 PROCEDURE — 99214 OFFICE O/P EST MOD 30 MIN: CPT | Performed by: PHYSICIAN ASSISTANT

## 2018-07-09 RX ORDER — TOPIRAMATE 50 MG/1
CAPSULE, EXTENDED RELEASE ORAL
Qty: 30 CAPSULE | Refills: 0 | Status: SHIPPED | OUTPATIENT
Start: 2018-07-09 | End: 2018-08-06 | Stop reason: ALTCHOICE

## 2018-07-09 RX ORDER — TOPIRAMATE 25 MG/1
CAPSULE, EXTENDED RELEASE ORAL
Qty: 30 CAPSULE | Refills: 0 | Status: SHIPPED | OUTPATIENT
Start: 2018-07-09 | End: 2018-08-06 | Stop reason: ALTCHOICE

## 2018-07-09 RX ORDER — METHOCARBAMOL 500 MG/1
TABLET, FILM COATED ORAL
Qty: 120 TABLET | Refills: 0 | Status: SHIPPED | OUTPATIENT
Start: 2018-07-09 | End: 2019-01-09 | Stop reason: ALTCHOICE

## 2018-07-11 DIAGNOSIS — K21.9 GASTROESOPHAGEAL REFLUX DISEASE, ESOPHAGITIS PRESENCE NOT SPECIFIED: Primary | ICD-10-CM

## 2018-07-11 RX ORDER — OMEPRAZOLE 20 MG/1
CAPSULE, DELAYED RELEASE ORAL
Qty: 90 CAPSULE | Refills: 0 | Status: SHIPPED | OUTPATIENT
Start: 2018-07-11 | End: 2018-10-29 | Stop reason: SDUPTHER

## 2018-07-25 DIAGNOSIS — G43.709 CHRONIC MIGRAINE WITHOUT AURA WITHOUT STATUS MIGRAINOSUS, NOT INTRACTABLE: ICD-10-CM

## 2018-07-27 RX ORDER — KETOROLAC TROMETHAMINE 10 MG/1
TABLET, FILM COATED ORAL
Qty: 10 TABLET | Refills: 0 | Status: SHIPPED | OUTPATIENT
Start: 2018-07-27 | End: 2018-09-23 | Stop reason: SDUPTHER

## 2018-08-03 ENCOUNTER — TELEPHONE (OUTPATIENT)
Dept: NEUROLOGY | Facility: CLINIC | Age: 43
End: 2018-08-03

## 2018-08-03 DIAGNOSIS — G43.709 CHRONIC MIGRAINE WITHOUT AURA WITHOUT STATUS MIGRAINOSUS, NOT INTRACTABLE: Primary | ICD-10-CM

## 2018-08-03 NOTE — TELEPHONE ENCOUNTER
Received a call from the patient stating that she found out today that a couple of days got denied on her FMLA  She states when she called them today they told her that her FMLA is exhausted and she will not be able to renew it until November  Patient states her migraines are worse and she is not having good days  Patient is wondering if there is anything you can do to get her FMLA renewed before November      Please advise    Patient phone: [phone]      Thanks

## 2018-08-03 NOTE — TELEPHONE ENCOUNTER
[facility]  Phone call  I was unable to get [facility]  On the phone

## 2018-08-06 RX ORDER — LAMOTRIGINE 25 MG/1
TABLET ORAL
Qty: 120 TABLET | Refills: 2 | Status: SHIPPED | OUTPATIENT
Start: 2018-08-06 | End: 2018-10-29 | Stop reason: SDUPTHER

## 2018-08-06 NOTE — TELEPHONE ENCOUNTER
Patient call again and wanted to know if [facility] can extend her FMLA because she said that it doesn't renew until November and she is still feeling really really really bad, and would like for [facility] to call her back    Patient's phone number is [phone]

## 2018-08-06 NOTE — TELEPHONE ENCOUNTER
Spoke with pt  At her last visit, I already increased the amount of days for time off/FMLA to 2- 8 hr days per week  She will call her  and [employer] and try to figure out specifically what they are asking for  Trokendi XR is ineffective per her history  We will try Lamictal   I reviewed the common side effects   She is currently taking 25 mg Trokendi XR, so I told her to stop this at the same time she starts Lamictal

## 2018-08-09 ENCOUNTER — TELEPHONE (OUTPATIENT)
Dept: NEUROLOGY | Facility: CLINIC | Age: 43
End: 2018-08-09

## 2018-08-09 NOTE — TELEPHONE ENCOUNTER
lmom for pt to  paperwork, gave option of today until 4:30PM and notified her the papers will be left at the  when she is available

## 2018-08-09 NOTE — TELEPHONE ENCOUNTER
Patient called and wanted to give you the email as per your discussion with her  [email or web address]  [email or web address]  ATTN: Jo Mahmood

## 2018-08-16 NOTE — TELEPHONE ENCOUNTER
Called and spoke with the patient- she advised me that her daughter will be coming to  the paperwork within the next couple of minutes  Patient's daughter picked up paperwork on 8/16/18

## 2018-08-16 NOTE — TELEPHONE ENCOUNTER
Please have her  and/or ask her if there is a fax to send to because I do not know how to email to the address in this message

## 2018-08-28 ENCOUNTER — TELEPHONE (OUTPATIENT)
Dept: NEUROLOGY | Facility: CLINIC | Age: 43
End: 2018-08-28

## 2018-08-28 NOTE — LETTER
September 4, 2018     Patient: Delfino Opitz   YOB: 1975   Date of Visit: 8/28/2018       To Whom It May Concern: It is my medical opinion that Delfino Opitz was unable to work on Monday, 8/27/18 and Tuesday 8/28/18  If you have any questions or concerns, please don't hesitate to call           Sincerely,        Itzel Ling PA-C    CC: No Recipients

## 2018-08-28 NOTE — TELEPHONE ENCOUNTER
Pt called back to stating HR needs signed letter on letterhead with all accommodations: "days allowed off per week and anything else that would benefit me "    Fax  [phone] Attn: Livan Goddard

## 2018-08-28 NOTE — TELEPHONE ENCOUNTER
FYI  Pt called requesting letter/documentation/statement from provider to support her request for accomodations/time off  Pt very vague  States HR told her they need more info  Per 8/3 and 8/9 encounters, additional paperwork was sent to HR  Pt will be contacting [address] [place] [address] at  for clarification  HR may be calling our office to clarify what is needed from our office

## 2018-08-30 NOTE — TELEPHONE ENCOUNTER
Patient called again regarding work accommodation letter, she is requesting this letter be faxed asa, her work is giving her a hard time

## 2018-08-30 NOTE — TELEPHONE ENCOUNTER
How many days off does she need this week? Can write a letter on a case by case basis, ie  If she has a bad week, but not for routine days off  Thanks

## 2018-08-31 NOTE — TELEPHONE ENCOUNTER
Pt made aware and verbalized understanding that we will not be writing letter for anticipated # days off per wk, etc  Pt requesting a letter to cover her absence from work Mon 8/27 and Tues 8/28

## 2018-08-31 NOTE — TELEPHONE ENCOUNTER
No problem  I will personally write and send to her or or have her  Monday  If she calls back tonight we can tell her this

## 2018-09-04 NOTE — TELEPHONE ENCOUNTER
Rsoa Singh- can you please let the patient know this letter is ready for  or we can mail it to her  Thanks

## 2018-09-04 NOTE — TELEPHONE ENCOUNTER
Called pt and informed her that the letter is ready and can be picked up or mailed to her  She stated that she will pick the letter up at the  at [facility]

## 2018-09-10 ENCOUNTER — TELEPHONE (OUTPATIENT)
Dept: NEUROLOGY | Facility: CLINIC | Age: 43
End: 2018-09-10

## 2018-09-10 NOTE — TELEPHONE ENCOUNTER
See 8/28 encounter    Pt called stating employer requesting another letter with pt's diagnosis only-no other info needed  Must be on letterhead and signed  Can be faxed to [phone] Attn: Charli Brooks

## 2018-09-10 NOTE — LETTER
September 10, 2018     Patient: Sherry Rivera   YOB: 1975   Date of Visit: 9/10/2018       To Whom It May Concern: It is my medical opinion that Sherry Rivera has a diagnosis of:  1  Chronic migraine without aura without status migrainosus  2  Vertigo  3  Vertiginous migraine      If you have any questions or concerns, please don't hesitate to call           Sincerely,        Aj Oh PA-C    CC: No Recipients

## 2018-09-23 DIAGNOSIS — G43.709 CHRONIC MIGRAINE WITHOUT AURA WITHOUT STATUS MIGRAINOSUS, NOT INTRACTABLE: ICD-10-CM

## 2018-09-24 RX ORDER — KETOROLAC TROMETHAMINE 10 MG/1
TABLET, FILM COATED ORAL
Qty: 10 TABLET | Refills: 0 | Status: SHIPPED | OUTPATIENT
Start: 2018-09-24 | End: 2018-12-20 | Stop reason: SDUPTHER

## 2018-10-29 DIAGNOSIS — K21.9 GASTROESOPHAGEAL REFLUX DISEASE, ESOPHAGITIS PRESENCE NOT SPECIFIED: ICD-10-CM

## 2018-10-29 DIAGNOSIS — G43.709 CHRONIC MIGRAINE WITHOUT AURA WITHOUT STATUS MIGRAINOSUS, NOT INTRACTABLE: ICD-10-CM

## 2018-10-29 RX ORDER — OMEPRAZOLE 20 MG/1
CAPSULE, DELAYED RELEASE ORAL
Qty: 30 CAPSULE | Refills: 0 | Status: ON HOLD | OUTPATIENT
Start: 2018-10-29 | End: 2019-04-03

## 2018-10-29 RX ORDER — LAMOTRIGINE 25 MG/1
100 TABLET ORAL
Qty: 120 TABLET | Refills: 2 | Status: SHIPPED | OUTPATIENT
Start: 2018-10-29 | End: 2019-01-21 | Stop reason: ALTCHOICE

## 2018-10-29 NOTE — TELEPHONE ENCOUNTER
Can you please call her for a follow up? Requesting refills and has not been seen in a year  Sent one month supply

## 2018-11-12 ENCOUNTER — TELEPHONE (OUTPATIENT)
Dept: NEUROLOGY | Facility: CLINIC | Age: 43
End: 2018-11-12

## 2018-11-12 NOTE — TELEPHONE ENCOUNTER
FMLA forms completed and emailed to you  Once signed please scan into pt's chart and send encounter back to clinical team as we have charge sheet

## 2018-11-19 ENCOUNTER — TELEPHONE (OUTPATIENT)
Dept: NEUROLOGY | Facility: CLINIC | Age: 43
End: 2018-11-19

## 2018-11-19 NOTE — TELEPHONE ENCOUNTER
Spoke to patient to coordinate form  as well as payment due at   Patient states she has to work today and has a  tomorrow so will try to come in 18 to pay and  forms  Patient also requested the forms be faxed  Was advised that the forms can be faxed upon receiving payment  Patient expressed understanding  Forms are at the  for

## 2018-11-21 ENCOUNTER — TELEPHONE (OUTPATIENT)
Dept: NEUROLOGY | Facility: CLINIC | Age: 43
End: 2018-11-21

## 2018-11-27 NOTE — TELEPHONE ENCOUNTER
Pt called to clarify dates on FMLA form question #2  It was circled as monthly instead of weekly  Pt states that she had discussed it w/ you during her office visit in July that it should be 2x weekly  If ok to change it to weekly, clinical team can update the form                   ID# [id number]  [phone]

## 2018-11-28 NOTE — TELEPHONE ENCOUNTER
Faxed [phone]  Faxes not going through    I did call Margarito Carlos @[phone], I was unable to speak w/anyone, I was on hold for a significant amount of time and transferred, attempted to get alternate phone #

## 2018-11-30 ENCOUNTER — TELEPHONE (OUTPATIENT)
Dept: NEUROLOGY | Facility: CLINIC | Age: 43
End: 2018-11-30

## 2018-11-30 NOTE — TELEPHONE ENCOUNTER
Patient states she received an email from [facility], and it was not updated  Informed her we did update it and re faxed it on 11/28  She will look into this, and make sure they have received the updated form that was faxed by Elana

## 2018-11-30 NOTE — TELEPHONE ENCOUNTER
Patient states she is having FMLA resend a new form to us, she states the form is filled out all wrong  She states she needs at least 2 or 3 days per week off (not month), and 8 hours per day (not 3 hours), and she wants to have off 16 hours per week

## 2018-12-20 DIAGNOSIS — G43.709 CHRONIC MIGRAINE WITHOUT AURA WITHOUT STATUS MIGRAINOSUS, NOT INTRACTABLE: ICD-10-CM

## 2018-12-21 RX ORDER — KETOROLAC TROMETHAMINE 10 MG/1
TABLET, FILM COATED ORAL
Qty: 10 TABLET | Refills: 0 | Status: SHIPPED | OUTPATIENT
Start: 2018-12-21 | End: 2019-03-25 | Stop reason: SDUPTHER

## 2019-01-08 RX ORDER — RIZATRIPTAN BENZOATE 10 MG/1
TABLET, ORALLY DISINTEGRATING ORAL
COMMUNITY
Start: 2016-08-24 | End: 2019-04-02 | Stop reason: SDUPTHER

## 2019-01-09 ENCOUNTER — OFFICE VISIT (OUTPATIENT)
Dept: NEUROLOGY | Facility: CLINIC | Age: 44
End: 2019-01-09
Payer: COMMERCIAL

## 2019-01-09 VITALS
HEART RATE: 94 BPM | BODY MASS INDEX: 44.16 KG/M2 | RESPIRATION RATE: 14 BRPM | HEIGHT: 62 IN | SYSTOLIC BLOOD PRESSURE: 120 MMHG | WEIGHT: 240 LBS | DIASTOLIC BLOOD PRESSURE: 80 MMHG

## 2019-01-09 DIAGNOSIS — R40.4 TRANSIENT ALTERATION OF AWARENESS: ICD-10-CM

## 2019-01-09 DIAGNOSIS — F07.81 POST CONCUSSIVE SYNDROME: ICD-10-CM

## 2019-01-09 DIAGNOSIS — R42 VERTIGO: Primary | ICD-10-CM

## 2019-01-09 DIAGNOSIS — G43.711 INTRACTABLE CHRONIC MIGRAINE WITHOUT AURA AND WITH STATUS MIGRAINOSUS: ICD-10-CM

## 2019-01-09 DIAGNOSIS — M54.2 CERVICALGIA: ICD-10-CM

## 2019-01-09 PROCEDURE — 96372 THER/PROPH/DIAG INJ SC/IM: CPT | Performed by: PSYCHIATRY & NEUROLOGY

## 2019-01-09 PROCEDURE — 99215 OFFICE O/P EST HI 40 MIN: CPT | Performed by: PSYCHIATRY & NEUROLOGY

## 2019-01-09 RX ORDER — LAMOTRIGINE 25 MG/1
TABLET ORAL
Qty: 120 TABLET | Refills: 0 | Status: ON HOLD | OUTPATIENT
Start: 2019-01-09 | End: 2019-04-03

## 2019-01-09 RX ORDER — KETOROLAC TROMETHAMINE 30 MG/ML
60 INJECTION, SOLUTION INTRAMUSCULAR; INTRAVENOUS ONCE
Status: COMPLETED | OUTPATIENT
Start: 2019-01-09 | End: 2019-01-09

## 2019-01-09 RX ORDER — OLANZAPINE 2.5 MG/1
TABLET ORAL
Qty: 10 TABLET | Refills: 0 | Status: SHIPPED | OUTPATIENT
Start: 2019-01-09 | End: 2019-03-25 | Stop reason: SDUPTHER

## 2019-01-09 RX ADMIN — KETOROLAC TROMETHAMINE 60 MG: 30 INJECTION, SOLUTION INTRAMUSCULAR; INTRAVENOUS at 14:54
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
## 2019-01-09 NOTE — LETTER
January 9, 2019     Patient: Verenice Moeller   YOB: 1975   Date of Visit: 1/9/2019       To Whom it May Concern:    Verenice Moeller is under my professional care  She was seen in my office on 1/9/2019  She may return to work on 1/10/2018 and recommend staying off work due to drowsiness from Toradol injection given in our office today  If you have any questions or concerns, please don't hesitate to call           Sincerely,          Radha Castano DO        CC: No Recipients

## 2019-01-09 NOTE — PATIENT INSTRUCTIONS
Neurology    Stay hydrated  Do not skip meals  Call us after you receive Emgality which needs to be refrigerated and call us- we will schedule you a nursing visit to teach you how to do the injection  Bring the [facility] with you  The first time it is two injections, then once monthly injections    If no improvement with emgality, zyprexa, increased lamictal in 1-2 months or severe migraines despite all these, call us we can try do inpatient admit to help break your headache cycle

## 2019-01-10 ENCOUNTER — TELEPHONE (OUTPATIENT)
Dept: NEUROLOGY | Facility: CLINIC | Age: 44
End: 2019-01-10

## 2019-01-10 ENCOUNTER — OFFICE VISIT (OUTPATIENT)
Dept: INTERNAL MEDICINE CLINIC | Facility: CLINIC | Age: 44
End: 2019-01-10

## 2019-01-10 VITALS
HEART RATE: 100 BPM | HEIGHT: 63 IN | WEIGHT: 259.7 LBS | DIASTOLIC BLOOD PRESSURE: 80 MMHG | BODY MASS INDEX: 46.02 KG/M2 | SYSTOLIC BLOOD PRESSURE: 110 MMHG | TEMPERATURE: 98.1 F

## 2019-01-10 DIAGNOSIS — F32.A DEPRESSION, UNSPECIFIED DEPRESSION TYPE: ICD-10-CM

## 2019-01-10 DIAGNOSIS — K58.2 IRRITABLE BOWEL SYNDROME WITH BOTH CONSTIPATION AND DIARRHEA: Primary | ICD-10-CM

## 2019-01-10 DIAGNOSIS — M25.50 ARTHRALGIA OF MULTIPLE JOINTS: ICD-10-CM

## 2019-01-10 DIAGNOSIS — G43.709 CHRONIC MIGRAINE WITHOUT AURA WITHOUT STATUS MIGRAINOSUS, NOT INTRACTABLE: ICD-10-CM

## 2019-01-10 DIAGNOSIS — K21.9 GERD WITHOUT ESOPHAGITIS: ICD-10-CM

## 2019-01-10 DIAGNOSIS — Z23 NEED FOR INFLUENZA VACCINATION: ICD-10-CM

## 2019-01-10 DIAGNOSIS — Z87.898 HISTORY OF ANGIOEDEMA: ICD-10-CM

## 2019-01-10 DIAGNOSIS — E78.5 BORDERLINE HYPERLIPIDEMIA: ICD-10-CM

## 2019-01-10 PROBLEM — T78.3XXA ANGIOEDEMA: Status: ACTIVE | Noted: 2019-01-10

## 2019-01-10 PROBLEM — M79.7 FIBROMYALGIA: Status: ACTIVE | Noted: 2019-01-10

## 2019-01-10 PROCEDURE — 90471 IMMUNIZATION ADMIN: CPT | Performed by: PHYSICIAN ASSISTANT

## 2019-01-10 PROCEDURE — 99204 OFFICE O/P NEW MOD 45 MIN: CPT | Performed by: PHYSICIAN ASSISTANT

## 2019-01-10 PROCEDURE — 90686 IIV4 VACC NO PRSV 0.5 ML IM: CPT | Performed by: PHYSICIAN ASSISTANT

## 2019-01-10 RX ORDER — METHOCARBAMOL 500 MG/1
500 TABLET, FILM COATED ORAL AS NEEDED
COMMUNITY
End: 2019-04-02 | Stop reason: SDUPTHER

## 2019-01-10 RX ORDER — DICYCLOMINE HCL 20 MG
20 TABLET ORAL EVERY 6 HOURS
Qty: 120 TABLET | Refills: 0 | Status: ON HOLD | OUTPATIENT
Start: 2019-01-10 | End: 2019-04-04

## 2019-01-10 NOTE — TELEPHONE ENCOUNTER
Patient states she discussed with Dr Feliciano Montelongo at office visit yesterday, the possibility of getting outpatient headache infusions, because her headaches just won't go away  She would like to proceed with this if this is an option for her

## 2019-01-10 NOTE — PROGRESS NOTES
Diagnosis:  Chronic Migraine with out aura     INFUSION ORDER FORM  1  Admit patient to [facility]  2  Check Vital signs (blood sugar if diabetic)  If systolic BP over 468 (blood sugar over 200) notify physician  3  Start IV NS or D5W at [facility] rate  4  BENADRYL 50MG IV/PO in am prior to starting Infusion  5  No robaxin  6  PEPCID 20mg in IV; give 30 minutes prior to SOLUMEDROL  7  SOLUMEDROL 1000mg in 250ml/NS IV infuse over 30 minutes X one dose  8   ZOFRAN 4mg IV (20 min prior to starting DHE)  9  DHE 0 5 mg in 50ml/NS IV infuse over 30 min   10  DEPACON 1000mg IV over 20 minutes  11  MAG SULFATE 2000mg in 100ml NS IV over 2 hours X one dose  NOTIFY PHYSICIAN IF PATIENT COMPLAINS OF CHEST PAIN, LEG CRAMPS, SHORTNESS OF BREATH OR PERSISTENT NAUSE AND VOMITING  NOTE: May repeat Zofran and 1mg of DHE as above after 4 hours if headache persists  PRN MEDICATIONS:  ZOFRAN 4 mg IV infuse over 15 minutes 4 hours after 1st dose  BENADRYL 25mg in 50 ml/NS IV infuse over 20 minutes for restlessness every 6 hours prn  PEPCID 20 MG IN 50 ml/NS IV infuse over 20 minutes every 3 hours prn  TYELNOL 650mg PO/VA every 6 hours as needed for fever or headache  TORADOL 30mg in 50ml/NS IV infuse over 20 minutes  (Give if patient is experience moderate to severe headache, if no relief in 20min may give STADOL  STADOL 1mg in 50ml/NS infuse over 20minutes every 6 hours prn (for moderate to severe headache)  ATIVAN 1mg IVP over 2-5 minutes prn anxiety every 6 hours prn        Will schedule at earliest convenience of patient and infusion center

## 2019-01-10 NOTE — TELEPHONE ENCOUNTER
Okay to schedule for infusion at [place] or [place] per her convenience, we will then fax over orders to the infusion center    Thanks

## 2019-01-10 NOTE — PATIENT INSTRUCTIONS
Please continue follow-up in appointments in medications as indicated with your neurologist as well as your rheumatologist   We discussed today you are agreeable to a trial of a medication to help you with your abdominal pain and spasms to allow you to eat regularly and more comfortably  Please get the labs completed as discussed you did have a slightly elevated cholesterol on previous tests  Please schedule with our  for assistance in finding a mental health therapist   As we reviewed it does not necessarily mean that you need medications but therapy in understanding the connection between depression and your chronic conditions will help you deal better  Irritable Bowel Syndrome   AMBULATORY CARE:   Irritable bowel syndrome (IBS)  is a condition that prevents food from moving through your intestines normally  The food may move through too slowly or too quickly  This causes bloating, increased gas, constipation, or diarrhea  Signs and symptoms of IBS may come and go  Symptoms can occur a few times a week to once a month  IBS can go away for years and suddenly return  Your symptoms may worsen after you eat a big meal or if you do not eat enough healthy foods  Common symptoms include the following:   · Abdominal pain that disappears after you have a bowel movement    · Abdominal cramps that are worse after you eat    · Gas    · Bloated abdomen    · Diarrhea, constipation, or both     · Feeling like you need to have a bowel movement after you just had one    · Mucus in your bowel movement    · Feeling that you have not completely emptied your bowels after a bowel movement  Seek care immediately if:   · You have severe abdominal pain  · Your bowel movements are dark or have blood in them  Contact your healthcare provider if:   · You have a fever  · You have pain in your rectum  · Your abdominal pain does not go away, even after treatment      · You have questions or concerns about your condition or care  Treatment for IBS  may include medicine to decrease diarrhea or soften your bowel movements  You may also need medicine to treat constipation or decrease abdominal pain and muscle spasms  Manage your symptoms:   · Eat a variety of healthy foods  Healthy foods include fruits, vegetables, whole-grain breads, low-fat dairy products, beans, lean meats, and fish  You may need to avoid certain foods to decrease your symptoms  · Drink liquids as directed  Ask how much liquid to drink each day and which liquids are best for you  For most people, good liquids to drink are water, juice, and milk  · Exercise regularly  Ask about the best exercise plan for you  Exercise can decrease your blood pressure and improve your health  · Manage stress  Stress may slow healing and cause illness  Learn new ways to relax, such as deep breathing  · Keep a record  of everything you eat and drink, and your symptoms, for 3 weeks  Bring this record with you to your follow-up visits  Follow up with your healthcare provider as directed:  Write down your questions so you remember to ask them during your visits  © 2017 [facility] Information is for End User's use only and may not be sold, redistributed or otherwise used for commercial purposes  All illustrations and images included in CareNotes® are the copyrighted property of A D A M , Inc  or Derick Rios  The above information is an  only  It is not intended as medical advice for individual conditions or treatments  Talk to your doctor, nurse or pharmacist before following any medical regimen to see if it is safe and effective for you  Fibromyalgia   WHAT YOU NEED TO KNOW:   What is fibromyalgia? Fibromyalgia is a long-term condition that causes pain and tender points throughout your body  Fibromyalgia can start at any age and is more common in women than in men  What causes fibromyalgia?   Healthcare providers do not know exactly what causes fibromyalgia  Problems with chemicals that send pain messages to and from the brain are thought to cause fibromyalgia  It may also be caused or triggered by any of the following:  · Hormone changes    · Physical injury    · Intense emotional trauma from sexual, physical, or emotional abuse  What are the signs and symptoms of fibromyalgia? The most common symptom of fibromyalgia is widespread pain for at least 3 months  You may also have tender spots  Tender spots are specific areas or points on both sides of your body that are painful when pressed  You may have tender spots in your neck, upper chest, shoulders, or shoulder blades  Other common areas are the elbows, lower back, sides of the thighs, and knees  You may also have any of the following:  · Fatigue and difficulty sleeping    · Diarrhea, constipation, pain, or bloating    · Headaches, memory problems, difficulty concentrating, or anxiety    · Numbness, muscle stiffness, or swelling of the hands and feet    · Pounding, racing heartbeats or chest pain  How is fibromyalgia diagnosed? Your healthcare provider will examine you and ask about your symptoms and other health conditions  He will do a manual tender point exam and press on specific sites or points in your body  Increased pain in most of these spots means a positive tender point exam  There are no specific lab tests to diagnose fibromyalgia  Blood and urine tests, a spinal tap, or sleep studies may be done to rule out other causes of pain  How is fibromyalgia treated? Fibromyalgia can be treated but not cured  The following can help you manage your pain and other symptoms:  · Acetaminophen and ibuprofen: These medicines decrease pain  They are available without a doctor's order  Ask your healthcare provider which medicine is right for you  Ask how much to take and how often to take it  Follow directions   These medicines can cause stomach bleeding if not taken correctly  Ibuprofen can cause kidney damage  Acetaminophen can cause liver damage  · Pain medicine: You may be given a prescription medicine to decrease pain  Do not wait until the pain is severe before you take this medicine  · Muscle relaxers  help decrease pain and muscle spasms  · Antidepressants: These help decrease depression, pain, and fatigue  · Antiseizure medicine: This is used to reduce fibromyalgia pain  What are the risks of fibromyalgia? If untreated, your symptoms may get worse  Pain may make it difficult to do daily activities  Your risk for fatigue, headaches, and depression may increase  How can I manage my symptoms? · Keep a pain diary:  Record your symptoms and what activity caused them  This may also help you track pain cycles and show a pattern to your symptoms  · Exercise:  Ask your healthcare provider about the best exercise plan for you  Exercise and other strength-training activities may decrease pain and sleep problems  · Set good sleep habits:  Do not nap during the day  Go to bed at the same time each night  Make sure your bedroom is dark, quiet, and comfortable  Do not stay in bed if you cannot sleep  Get up and do something relaxing until you are sleepy  Do not drink caffeine or alcohol right before you go to bed  These can make it difficult for you to sleep  Limit other liquids to help decrease your need to urinate in the night  Where can I find support and more information? · National Chronic Fatigue Syndrome and Fibromyalgia Association  [address] [facility] , [address] [place] [address] Street  Phone: [phone]  Web Address: [employer] ee  org  When should I contact my healthcare provider? · You pain increases, even after you take your pain medicine  · You have difficulty sleeping  · You have questions or concerns about your condition or care  When should I seek immediate care or call 911?    · You are depressed and feel you cannot cope with your condition  CARE AGREEMENT:   You have the right to help plan your care  Learn about your health condition and how it may be treated  Discuss treatment options with your caregivers to decide what care you want to receive  You always have the right to refuse treatment  The above information is an  only  It is not intended as medical advice for individual conditions or treatments  Talk to your doctor, nurse or pharmacist before following any medical regimen to see if it is safe and effective for you  © 2017 [address] [facility] [employer] Information is for End User's use only and may not be sold, redistributed or otherwise used for commercial purposes  All illustrations and images included in CareNotes® are the copyrighted property of A D A M , Inc  or Derick Rios  Depression, Ambulatory Care   GENERAL INFORMATION:   Depression  is a medical condition that causes feelings of sadness or hopelessness that do not go away  Depression may cause you to lose interest in things you used to enjoy  These feelings may interfere with your daily life  Common symptoms include the following:   · Appetite changes, or weight gain or loss    · Trouble going to sleep or staying asleep, or sleeping too much    · Fatigue or lack of energy    · Feeling restless, irritable, or withdrawn    · Feeling worthless, hopeless, discouraged, or very guilty    · Trouble concentrating, remembering things, doing daily tasks, or making decisions    · Thoughts about hurting or killing yourself  Seek immediate care for the following symptoms:   · You think about harming yourself or someone else  Treatment for depression  may include medicine to improve or balance your mood  Therapy may also be used to treat your depression  A therapist will help you learn to cope with your thoughts and feelings  Therapy can be done alone or in a group  It may also be done with family members or a significant other     Manage depression:   · Get regular physical activity  Try to exercise for 30 minutes, 3 to 5 days a week  Work with your healthcare provider to develop an exercise plan that you enjoy  · Get enough sleep  Create a routine to help you relax before bed  Try to go to bed and wake up at the same time every day  Sleep is important for emotional health  · Eat a variety of healthy foods  Healthy foods include fruits, vegetables, whole-grain breads, low-fat dairy products, beans, lean meats, and fish  A healthy meal plan is low in fat, salt, and added sugar  · Avoid or limit alcohol  Ask your healthcare provider how much alcohol is safe for you to drink  A drink of alcohol is 12 ounces of beer, 5 ounces of wine, or 1½ ounces of liquor  Follow up with your healthcare provider as directed: You will need to return so your healthcare provider can monitor your progress  Write down your questions so you remember to ask them during your visits  CARE AGREEMENT:   You have the right to help plan your care  Learn about your health condition and how it may be treated  Discuss treatment options with your caregivers to decide what care you want to receive  You always have the right to refuse treatment  The above information is an  only  It is not intended as medical advice for individual conditions or treatments  Talk to your doctor, nurse or pharmacist before following any medical regimen to see if it is safe and effective for you  © 2014 [address] is for End User's use only and may not be sold, redistributed or otherwise used for commercial purposes  All illustrations and images included in CareNotes® are the copyrighted property of [employer]  or Derick Rios

## 2019-01-10 NOTE — PROGRESS NOTES
Assessment/Plan:    No problem-specific Assessment & Plan notes found for this encounter  Diagnoses and all orders for this visit:    Irritable bowel syndrome with both constipation and diarrhea  -     Ambulatory referral to Gastroenterology; Future  -     dicyclomine (BENTYL) 20 mg tablet; Take 1 tablet (20 mg total) by mouth every 6 (six) hours for 30 days    GERD without esophagitis  -     Ambulatory referral to Gastroenterology; Future    Chronic migraine without aura without status migrainosus, not intractable  -     TSH, 3rd generation with Free T4 reflex    Arthralgia of multiple joints    Depression, unspecified depression type  -     TSH, 3rd generation with Free T4 reflex  -     Ambulatory referral to social work care management program; Future    Need for influenza vaccination  -     Cancel: SYRINGE/SINGLE-DOSE VIAL: influenza vaccine, 0159-1600, quadrivalent, 0 5 mL, preservative-free, for patients 3+ yr (FLUZONE)  -     SYRINGE/SINGLE-DOSE VIAL: influenza vaccine, 8456-6856, quadrivalent, 0 5 mL, preservative-free, for patients 3+ yr (FLUZONE)    Borderline hyperlipidemia  -     Comprehensive metabolic panel  -     Lipid Panel with Direct LDL reflex    History of angioedema  -     C4 complement; Future    Other orders  -     rizatriptan (MAXALT-MLT) 10 MG disintegrating tablet; 1 tablet alanna 2 hrs for headaches as needed  -     methocarbamol (ROBAXIN) 500 mg tablet; Take 500 mg by mouth 4 (four) times a day          Subjective:      Patient ID: Melani Ervin is a 37 y o  female  Pt here as new patient  Patient has a rather long and complicated past medical history    Currently followed by Neurologist for chronic migraines and still not well controlled so is being evaluated for Botox as did have a positive response with that in past   Patient has been followed by [facility] and record review shows patient has been trialed on a significant number of medications unfortunately she has not reached good control of her migraines  States went to [facility] 3 months ago for episode of angioedema , pt states she was told this diagnosis states did have 1 episode of same about 3 weeks earlier  Patient reports the swelling occurred to her lips  Patient reports she did not have any difficulty swallowing or breathing  Unfortunately at this time patient does not really remember what medications she might of been taking at that time  Did review patient does take naproxen and sometimes ibuprofen p r n  For fibromyalgia pain  Patient however reports she has taken both of these in the past 3 months and has not had a repeat episode of the swelling  No known family history of same  States was diagnosed with IBS at age 25 states had been having problems since age 24 s/p gallbladder removal and was put on cholestyramine but when takes 3X a day was getting constipated  States usually diarrhea but sometimes constipated  Patient reports she has significantly changed her eating patterns and will often skip meals secondary to not wanting to have to have an urgent bowel movement while she is working  Patient reports this has led her to eating larger quantities of food later in the evening before bedtime  Patient states she is aware this has contributed to her weight which she has also struggled with for some time  Also on meds for stomach acid last EGD was 3-4 years ago told bacteria and hiatal hernia  Also followed by Rheumatologist for fibromyalgia    Last GYN 4 years ago  Has IUD merina    We also discussed patient's positive depression screening  We discussed that patient may have some underlying depression secondary to her chronic medical conditions and chronic pain  We reviewed that she is not just a list of diagnoses but the whole person and having chronic pain can lead to depression  We also reviewed that chronic depression untreated can also cause worsening of her pain    We reviewed the link between depression and triggers for migraines as well as irritable bowel syndrome  Also fibromyalgia  Patient reports she never realized there could be a correlation with all of these things together and was amenable to a referral for  to assist with mental health  May have been a previous PCP but on her medication was Zyprexa and Tegretol as mood stabilizers however patient repeat ports she was never followed by a mental health provider nor diagnosed with depression or bipolar  The following portions of the patient's history were reviewed and updated as appropriate: allergies, current medications, past family history, past medical history, past social history, past surgical history and problem list     Review of Systems   Constitutional: Negative for chills and fever  Respiratory: Negative  Negative for cough, chest tightness, shortness of breath and wheezing  Cardiovascular: Negative  Negative for chest pain, palpitations and leg swelling  Gastrointestinal: Positive for abdominal pain, constipation, diarrhea and nausea (With migraines  )  Negative for vomiting  Endocrine: Negative for cold intolerance, heat intolerance, polydipsia, polyphagia and polyuria  Genitourinary: Negative  Negative for menstrual problem  Patient reports she has not had a  Since having the [place] IUD    Musculoskeletal: Positive for arthralgias and myalgias  Patient does report on and off diffuse myalgias and arthralgias  As noted in HPI patient is followed by a rheumatologist    Skin: Negative for rash  Neurological: Positive for dizziness, light-headedness and headaches  Negative for syncope, facial asymmetry and speech difficulty  Psychiatric/Behavioral: Positive for decreased concentration, dysphoric mood and sleep disturbance  Negative for self-injury and suicidal ideas  The patient is not nervous/anxious            Objective:      /80 (BP Location: Left arm, Patient Position: Sitting, Cuff Size: Large)   Pulse 100   Temp 98 1 °F (36 7 °C) (Oral)   Ht 5' 2 75" (1 594 m)   Wt 118 kg (259 lb 11 2 oz)   BMI 46 37 kg/m²          Physical Exam   Constitutional: She is oriented to person, place, and time  She appears well-developed and well-nourished  HENT:   Head: Normocephalic and atraumatic  Right Ear: External ear normal    Left Ear: External ear normal    Neck: Neck supple  No thyromegaly present  Cardiovascular: Regular rhythm and normal heart sounds  Tachycardia present  Pulmonary/Chest: Effort normal and breath sounds normal  She has no wheezes  Abdominal: Soft  Bowel sounds are normal  There is generalized tenderness  Patient has very mild diffuse discomfort with palpation  Neurological: She is alert and oriented to person, place, and time  Skin: No rash noted  Psychiatric: She has a normal mood and affect   Her behavior is normal  Judgment and thought content normal

## 2019-01-10 NOTE — TELEPHONE ENCOUNTER
Called and spoke with the patient- she states she is off on thursdays and fridays- any time is okay  Patient works in the afternoon she starts at 12:30   Will call tomorrow to set up the appointment

## 2019-01-11 NOTE — TELEPHONE ENCOUNTER
Pt aware/agreeable  Pt requesting us to update FMLA  3   Incapacity- 2days/episode & 5days/month    Please advise  I have forms on my desk

## 2019-01-11 NOTE — TELEPHONE ENCOUNTER
Patient called in stating that she will be getting her Emgality injection today- patient states she is unsure if she should start this medication right away or if she should hold off  Patient also questioning if she can take this injection with her other medications  I called the [facility]- they are unable to get the patient in until February  [facility] soonest availability was 1/21/19, 1/22/19 and 1/23/19 at 8 am at the [facility]  I did schedule the patient for those appointments- will need to make sure she is okay to wait until then  Called and spoke with the patient- she is okay with doing the above listed dates and times at the [facility]  Patient will make sure she can get off of work those three days  She will call back if she has any problems

## 2019-01-11 NOTE — TELEPHONE ENCOUNTER
Thanks Reed Seymour! Those dates are okay  Clinical team: As discussed with patient last visit okay to try emgality right away- the first time a reminder would be one injection in each thigh for a total of two injections and then once monthly injection    Also does not really interact with any medication and 25% of patients have benefit as soon as a week so okay to try right away    Thanks

## 2019-01-14 ENCOUNTER — TELEPHONE (OUTPATIENT)
Dept: NEUROLOGY | Facility: CLINIC | Age: 44
End: 2019-01-14

## 2019-01-14 NOTE — TELEPHONE ENCOUNTER
Patient states she just applied for STD and should be receiving forms from [employer]  She is aware of the fees involved and that fees are required to be paid before the forms will be complete, and also that we require a 2 week turnround  Patient verbalized understanding

## 2019-01-14 NOTE — TELEPHONE ENCOUNTER
Yes she should give herself the second injection if she did not give receive any product from the initial second injection due to "messing it up"    Thanks

## 2019-01-14 NOTE — TELEPHONE ENCOUNTER
Patient made aware   She reports she would need an rx sent in for a replacement syringe as she does not have anymore,

## 2019-01-14 NOTE — TELEPHONE ENCOUNTER
Yes, 5days/month   Pt feels that she used to get more days off for migraines  I did explain that FMLA may not always read the same over and over  Our hopes are that the pt will improve w/medications, treatment, etc & that they may need less time  I did discuss 5days/month w/pt    Please advise

## 2019-01-14 NOTE — TELEPHONE ENCOUNTER
Patient states that she only received one injection of emgality because she "messed up" the other one  She wants to know if she needs to give herself another injection

## 2019-01-16 NOTE — TELEPHONE ENCOUNTER
Berkley Ku,    The FMLA forms are scanned into [employer]  Do you mind printing and updating and having [address] initial and fax  Please scan in updated copy into chart  Since this is a correction, no charge

## 2019-01-16 NOTE — TELEPHONE ENCOUNTER
Noted  I have no problem sending another script for monthly injections  I called and left her a voice mail she will be calling the nursing line and letting you guys know if she actually gave her self two injections despite "messing one up" as I sent three pens to her pharmacy       Thanks

## 2019-01-16 NOTE — TELEPHONE ENCOUNTER
Patient states she only received 2 pens when she received her emgality  Called [employer], they confirmed patient only received 2 pens for insurance reasonn  The patient only received a total of one injection  Pharm requested we send a new rx for one pen and put in the rx that the patient only received one injection due to an injection error, and they they will contact the insurance to get an override

## 2019-01-18 ENCOUNTER — PATIENT OUTREACH (OUTPATIENT)
Dept: INTERNAL MEDICINE CLINIC | Facility: CLINIC | Age: 44
End: 2019-01-18

## 2019-01-18 DIAGNOSIS — F32.A DEPRESSION, UNSPECIFIED DEPRESSION TYPE: Primary | ICD-10-CM

## 2019-01-18 RX ORDER — SODIUM CHLORIDE 9 MG/ML
20 INJECTION, SOLUTION INTRAVENOUS CONTINUOUS
Status: DISCONTINUED | OUTPATIENT
Start: 2019-01-21 | End: 2019-01-24 | Stop reason: HOSPADM

## 2019-01-18 RX ORDER — DIPHENHYDRAMINE HCL 25 MG
50 TABLET ORAL ONCE
Status: COMPLETED | OUTPATIENT
Start: 2019-01-21 | End: 2019-01-21

## 2019-01-18 NOTE — TELEPHONE ENCOUNTER
Forms have been updated and signed  lmom awaiting a call back- when pt calls back please let her know her forms are all ready, if they need to be faxed please obtain a fax number- if pt would like to  the forms please let her know she can  the forms on Monday         Thanks

## 2019-01-18 NOTE — PROGRESS NOTES
YANI has spoken with pt this date via the phone  Pt has had some symptoms of depression  No S/I or H/I noted  Pt receptive to [address] referral   YANI has provided serveral resources and has placed a referral for [facility] as per pt request   Pt to f/u with YANI as needed

## 2019-01-21 ENCOUNTER — TELEPHONE (OUTPATIENT)
Dept: NEUROLOGY | Facility: CLINIC | Age: 44
End: 2019-01-21

## 2019-01-21 ENCOUNTER — HOSPITAL ENCOUNTER (OUTPATIENT)
Dept: INFUSION CENTER | Facility: CLINIC | Age: 44
Discharge: HOME | End: 2019-01-21
Payer: COMMERCIAL

## 2019-01-21 VITALS
SYSTOLIC BLOOD PRESSURE: 102 MMHG | HEART RATE: 88 BPM | TEMPERATURE: 98.5 F | RESPIRATION RATE: 16 BRPM | DIASTOLIC BLOOD PRESSURE: 60 MMHG

## 2019-01-21 PROCEDURE — 96375 TX/PRO/DX INJ NEW DRUG ADDON: CPT

## 2019-01-21 PROCEDURE — 96366 THER/PROPH/DIAG IV INF ADDON: CPT

## 2019-01-21 PROCEDURE — 96367 TX/PROPH/DG ADDL SEQ IV INF: CPT

## 2019-01-21 PROCEDURE — 96365 THER/PROPH/DIAG IV INF INIT: CPT

## 2019-01-21 RX ADMIN — DIPHENHYDRAMINE HCL 50 MG: 25 TABLET ORAL at 11:07

## 2019-01-21 RX ADMIN — ONDANSETRON 4 MG: 2 INJECTION INTRAMUSCULAR; INTRAVENOUS at 12:27

## 2019-01-21 RX ADMIN — DIHYDROERGOTAMINE MESYLATE: 1 INJECTION, SOLUTION INTRAMUSCULAR; INTRAVENOUS; SUBCUTANEOUS at 13:08

## 2019-01-21 RX ADMIN — SODIUM CHLORIDE 20 ML/HR: 0.9 INJECTION, SOLUTION INTRAVENOUS at 10:50

## 2019-01-21 RX ADMIN — MAGNESIUM SULFATE HEPTAHYDRATE: 500 INJECTION, SOLUTION INTRAMUSCULAR; INTRAVENOUS at 14:23

## 2019-01-21 RX ADMIN — FAMOTIDINE 20 MG: 10 INJECTION INTRAVENOUS at 11:10

## 2019-01-21 RX ADMIN — SODIUM CHLORIDE 1000 MG: 0.9 INJECTION, SOLUTION INTRAVENOUS at 11:42

## 2019-01-21 RX ADMIN — VALPROATE SODIUM 1000 MG: 100 INJECTION, SOLUTION INTRAVENOUS at 13:55

## 2019-01-21 NOTE — PROGRESS NOTES
Patient arrived on unit for Day #1 of 3 for headache infusion  Patient stated she thought infusion was 1 hour each day  Patient also asked if infusion can be inpatient as it had been offered to her when she saw Dr Becca Rae  Called neurology and spoke with Denise HUMPHREY who made Dr Becca Rae aware of above  As per Dr Drew Edgar, patient scheduled to have headache infusion as ordered today  Also, at this time, [facility] stated that they may not get approval from insurance company for inpatient treatment  As per Dr Becca Rae, patient can opt not to return on Day 2 and 3 as previously scheduled if she feels better  Patient is to f/u with physician's office if any concerns/issues after today's treatment  Patient made aware of above  Patient agreed to stay for treatment today  Upon questioning, patient denied taking any triptans in the last 24 hours  Headache pain is at a "5" in frontal area of head and patient stated she hs some nausea   Treatment initiated as ordered

## 2019-01-21 NOTE — PROGRESS NOTES
Patient tolerated infusion  Patient stated at the end that headache pain was at a "2"  No nausea  Encouraged patient to notify Dr Christiano Freedman office if any issues or concerns after leaving or if she decides not to return for Day 2 and 3 of infusion  Patient verbalized understanding  Left unit with children  Daughter driving home   AVS given to patient

## 2019-01-21 NOTE — TELEPHONE ENCOUNTER
Call received from [facility] infusion requesting infusion orders  I contacted Piper Nava who states she did fax but got a notice that it did not go through  She will refax at this time

## 2019-01-21 NOTE — TELEPHONE ENCOUNTER
Call from cancer center, pt there for HA infusion [phone]  Orders state 4hours, pt requesting we get clarification prior to starting inf, she states she was told per Dr Perfecto Olguin that inf would only be 1hour  Also pt took lamictal last night, Marbella Padilla asking if that is ok w/DHE    I did speak w/Amita ALVAREZ  Orders clarified per Dr NICOLE Khalil ok    Ezequiel Fischer requesting inf orders for next day  States these are typically sent daily  Orders in [facility] are only day 1    Pt is scheduled next 2days  Fax [phone]

## 2019-01-21 NOTE — TELEPHONE ENCOUNTER
Infusion orders did not go through to the [facility] infusion center- memory was busy   I just re-faxed  Infusion orders at 2:00 pm

## 2019-01-21 NOTE — TELEPHONE ENCOUNTER
Callback from Britt duran @infusion center [phone] #2  Pt now requesting to go inpatient for infusions as previously discussed  Pt does not want to have to drive back & forth each day & be there all day    I did discuss w/Dr RIVERA, pt is recomended to complete day 1 of outpatient infusion, if she is feeling better she does not need to continue  Pt is to increase lamictal as instructed to 200mg as needed    Britt duran aware, pt agreeable

## 2019-01-21 NOTE — TELEPHONE ENCOUNTER
Received this via my chart patient messages:      Snow Ramachandran To  Neurology Yudith  1/18/2019  4:25 PM   I'm writing to provide you with Maribeth's fax number which is [phone]      Thank you!

## 2019-01-21 NOTE — PROGRESS NOTES
After DHE patient stated, headache pain increased back to a "5"  Infusion continued as ordered  Prior to start of Mag Sulfate, patient stated headache had decreased to a "4"  Will continue to monitor   Patient sleeping on and off

## 2019-01-22 NOTE — TELEPHONE ENCOUNTER
JUAN Doherty from infusion center to report that pt did not come to today's HA infusion and is still scheduled for tomorrow  No call to infusion center per Fidel Doherty  Pt did call to our office at 11:08am today and spoke with Seth regarding forms only  No mention of HA infusion

## 2019-01-22 NOTE — TELEPHONE ENCOUNTER
Received call from clinical team to drop a charge for paperwork  Dropped charge and received payment for forms  Will mail patient a receipt for the payment

## 2019-01-22 NOTE — TELEPHONE ENCOUNTER
Infusion orders for day 3 infusion were signed by Dr Gloria Sharif and faxed to the [facility] infusion center on 1/22/19 at 11:49 am

## 2019-01-22 NOTE — TELEPHONE ENCOUNTER
pt called back to make sure that we got [employer] message with fax number for cigvalery  [phone]  Forms received, printed and placed in clinical bin  I then transferred pt to [place] office to make payment

## 2019-01-23 ENCOUNTER — TELEPHONE (OUTPATIENT)
Dept: NEUROLOGY | Facility: CLINIC | Age: 44
End: 2019-01-23

## 2019-01-23 ENCOUNTER — HOSPITAL ENCOUNTER (OUTPATIENT)
Dept: INFUSION CENTER | Facility: CLINIC | Age: 44
Discharge: HOME | End: 2019-01-23
Payer: COMMERCIAL

## 2019-01-23 VITALS
SYSTOLIC BLOOD PRESSURE: 136 MMHG | HEART RATE: 76 BPM | RESPIRATION RATE: 18 BRPM | DIASTOLIC BLOOD PRESSURE: 78 MMHG | TEMPERATURE: 98.8 F

## 2019-01-23 PROCEDURE — 96366 THER/PROPH/DIAG IV INF ADDON: CPT

## 2019-01-23 PROCEDURE — 96365 THER/PROPH/DIAG IV INF INIT: CPT

## 2019-01-23 PROCEDURE — 96367 TX/PROPH/DG ADDL SEQ IV INF: CPT

## 2019-01-23 RX ORDER — SODIUM CHLORIDE 9 MG/ML
20 INJECTION, SOLUTION INTRAVENOUS CONTINUOUS
Status: DISCONTINUED | OUTPATIENT
Start: 2019-01-23 | End: 2019-01-26 | Stop reason: HOSPADM

## 2019-01-23 RX ORDER — DIPHENHYDRAMINE HCL 25 MG
50 TABLET ORAL ONCE
Status: COMPLETED | OUTPATIENT
Start: 2019-01-23 | End: 2019-01-23

## 2019-01-23 RX ORDER — MAGNESIUM SULFATE HEPTAHYDRATE 40 MG/ML
4 INJECTION, SOLUTION INTRAVENOUS ONCE
Status: COMPLETED | OUTPATIENT
Start: 2019-01-23 | End: 2019-01-23

## 2019-01-23 RX ADMIN — DIHYDROERGOTAMINE MESYLATE: 1 INJECTION, SOLUTION INTRAMUSCULAR; INTRAVENOUS; SUBCUTANEOUS at 10:19

## 2019-01-23 RX ADMIN — MAGNESIUM SULFATE HEPTAHYDRATE 4 G: 40 INJECTION, SOLUTION INTRAVENOUS at 10:58

## 2019-01-23 RX ADMIN — SODIUM CHLORIDE 20 ML/HR: 0.9 INJECTION, SOLUTION INTRAVENOUS at 08:31

## 2019-01-23 RX ADMIN — SODIUM CHLORIDE 1000 MG: 0.9 INJECTION, SOLUTION INTRAVENOUS at 09:00

## 2019-01-23 RX ADMIN — FAMOTIDINE 20 MG: 10 INJECTION INTRAVENOUS at 08:33

## 2019-01-23 RX ADMIN — DIPHENHYDRAMINE HCL 50 MG: 25 TABLET, FILM COATED ORAL at 08:32

## 2019-01-23 RX ADMIN — ONDANSETRON 4 MG: 2 INJECTION INTRAMUSCULAR; INTRAVENOUS at 09:49

## 2019-01-23 NOTE — PLAN OF CARE
Problem: Potential for Falls  Goal: Patient will remain free of falls  INTERVENTIONS:  - Assess patient frequently for physical needs  -  Identify cognitive and physical deficits and behaviors that affect risk of falls    -  [place] fall precautions as indicated by assessment   - Educate patient/family on patient safety including physical limitations  - Instruct patient to call for assistance with activity based on assessment  - Modify environment to reduce risk of injury  - Consider OT/PT consult to assist with strengthening/mobility   Outcome: Progressing

## 2019-01-23 NOTE — PROGRESS NOTES
Pt states at end of treatment her headache is a 3/10  Pt states she is feeling better than this morning  Pt states she will not be returning tomorrow for treatment as she has another appt elsewhere  Pt states she will call her neurologists office to find out if she needs another day of treatment and then will notify infusion center for possible appt

## 2019-01-23 NOTE — TELEPHONE ENCOUNTER
Pt calls to schedule another migraine infusion, she missed yesterday's infusion, did go Monday & today    Felt horrible last night but today feeling a little better  Pt unable to go tomorrow, has an EEG    advised pt to see how she feels over the next few weeks and keep us posted  Please advise

## 2019-01-23 NOTE — PROGRESS NOTES
Pt states she did not come in yesterday for headache infusion because she felt very nauseous  Today she states her headache is a 10/10 which started last night and has persisted into today  Pt currently resting comfortably in bed

## 2019-01-24 ENCOUNTER — HOSPITAL ENCOUNTER (OUTPATIENT)
Dept: NEUROLOGY | Facility: AMBULATORY SURGERY CENTER | Age: 44
Discharge: HOME | End: 2019-01-24
Payer: COMMERCIAL

## 2019-01-24 ENCOUNTER — TELEPHONE (OUTPATIENT)
Dept: PSYCHIATRY | Facility: CLINIC | Age: 44
End: 2019-01-24

## 2019-01-24 DIAGNOSIS — R42 VERTIGO: ICD-10-CM

## 2019-01-24 DIAGNOSIS — R40.4 TRANSIENT ALTERATION OF AWARENESS: ICD-10-CM

## 2019-01-24 PROCEDURE — 95816 EEG AWAKE AND DROWSY: CPT

## 2019-01-24 PROCEDURE — 95813 EEG EXTND MNTR 61-119 MIN: CPT | Performed by: PSYCHIATRY & NEUROLOGY

## 2019-01-24 NOTE — TELEPHONE ENCOUNTER
Behavorial Health Outpatient Intake Questions    Referred by: [facility]    Check with provider before scheduling    Are there any developmental disabilities? No    Does the patient have hearing impairment? No    Does the patient have ICM or CTT? No    Taking injectable psychiatric medications? NoIf yes, patient can not be seen here  Has the patient ever seen or currently see a psychiatrist? No If yes who/when? Has the patient ever seen or currently see a therapist? No If yes who/when? How many visits did the pt have for previous psychiatric treatment?  History    Has the patient served in the [place] [address]? No    If yes, have you had combat services? No    Was the patient activated into federal active duty as a member of the national guard or reserve? No    Minor Child    Who has custody of the child? Is there a custody agreement? If there is a custody agreement remind parent that they must bring a copy to the first appt or they will not be seen  Behavorial Health Outpatient Intake History     Presenting Problem (in patient's words) DEPRESSION,ANXIETY    Substance Abuse:No concerns of substance abuse are reported  Has the patient been seen here previously, either inpatient or outpatient? No outpatient    If seen as outpatient, what provider(s) did the patient see? N/A    A member of the patient's family has been in therapy here with NO    ACCEPTED as a patient Yes Appointment Date: 130/19 @ 10:00AM  [address]?  No    Primary Care Physician: Fish Schwartz DO    PCP telephone number: [phone]    SUB: DONNIE  INS: Andrés  CROSS/BLUE SHIELD  EMPLOYER: [employer]  ID: [id number]    GRP: [id number]  TEL:  [phone]

## 2019-01-24 NOTE — TELEPHONE ENCOUNTER
If she has not tried zyprexa- olanzapine I gave her last visit at night time would re commend doing so to see if that can help further with headache    Thanks

## 2019-01-24 NOTE — TELEPHONE ENCOUNTER
Pt did try the olanzapine x1, was not sure how to take it  Will try again, I did advise re sig    Pt c/o CP, started 1/22 in between infusions  Constant all day, located @center of chest, mainly when she breaths in or coughs  Has radiated to L/shoulder when she takes a deep breath  A little SOB, Hx of asthma  Not dizzy  Described as dull achiness only w/inspiration  Feels like it will go away just wanted to let us know    Please advise

## 2019-01-24 NOTE — TELEPHONE ENCOUNTER
Noted, she will not be doing DHE infusions going forward  Also advise taking protonix or prilosec OTC daily to see if this helps for at least 1-2 weeks as she may have had stomach irritation with some of these medications      If symptoms persist despite this, would let PCP know  Thanks

## 2019-01-25 DIAGNOSIS — G43.719 INTRACTABLE CHRONIC MIGRAINE WITHOUT AURA AND WITHOUT STATUS MIGRAINOSUS: Primary | ICD-10-CM

## 2019-01-28 NOTE — TELEPHONE ENCOUNTER
Forms signed & faxed to Sobia Luther at [phone] on 1/28/19  Called and spoke with the patient- she is aware her forms were faxed

## 2019-01-29 ENCOUNTER — TELEPHONE (OUTPATIENT)
Dept: NEUROLOGY | Facility: CLINIC | Age: 44
End: 2019-01-29

## 2019-01-29 NOTE — TELEPHONE ENCOUNTER
Received fax from [employer]D  I did fax office notes, infusion notes, & EEG to [phone]  Pt aware

## 2019-01-29 NOTE — LETTER
2/8/19    To Whom it May Concern    Bessie Kent [birth year] is under my professional care    Marleemirtha Ruperto may return to work without restrictions    If there are any questions please do not hesitate to call me      Thank You        Nikki Byers DO

## 2019-01-31 NOTE — TELEPHONE ENCOUNTER
Nery from [employer] called stating they received STD paper work titled Disability Management [address] Request Form signed 1/28  [place] states pt claims she cannot return to work until seen in our office in Feb  Per pt, our paperwork is incorrect as she should have continuous leave until 2/22 appt  No mention in office note nor encounters of pt being taken out of work until 2/22 appt  We did also complete FMLA paperwork dated 1/18 (under media 1/22) giving pt 5 days off per mo/8 hours per day  Nery states that employees can request intermittent FMLA separate from STD (if pt is kept out from work 7 days or greater)  States STD form will need to be updated if we intent to keep pt out  I spoke with [address] Britt Galvin to verify however she does not recall ever having signed paperwork taking pt out until next neuro appt  I advised Nery that we would clarify w/ Dr Andrea Sosa on Monday before contacting pt  [facility] will not be calling pt until they hear from us  Nery direct # [phone] Tues-Fri   Ok to leave detailed message

## 2019-02-06 NOTE — TELEPHONE ENCOUNTER
Pt called in stated that she thought she was on leave, now she will not get paid for the time that she was out  States that she would not have opened a claim if you did not agree to this, states that she doesn't expect you to remember every patient but that you did agree to this

## 2019-02-06 NOTE — TELEPHONE ENCOUNTER
Nery aware, shiva   She will contact pt w/update    Nery requesting continuous leave be addressed @next office visit 2/22 for clarification and new forms would need completion if this is requested    Carolesa Obrien [phone]

## 2019-02-07 NOTE — TELEPHONE ENCOUNTER
Patient called again, she is requesting a work letter  She states that since the STD was denied that she now needs to go back to work and work is requiring a letter that she is able to return to work without restrictions  Patient stated she would be able to  the letter tomorrow at the [facility] office

## 2019-02-08 NOTE — TELEPHONE ENCOUNTER
I did speak w/Dr RIVERA re work note, agreeable to note, pt will  @[address] today  Letter completed, placed @Dr RIVERA's desk for sign   Please have Shanika Bravo place @ for     Pt states migraines are just as severe but less frequent  Pt again states that she feels Dr RIVERA was agreeable to STD  I did review FMLA, pt has a total of 1week off of work per month, more than what is typical for migraines  Pt does have f/u w/Dr RIVERA 2/22, pt will be re-evaluated @that time    Pt aware of above

## 2019-03-05 ENCOUNTER — APPOINTMENT (OUTPATIENT)
Dept: RADIOLOGY | Age: 44
End: 2019-03-05
Attending: FAMILY MEDICINE
Payer: COMMERCIAL

## 2019-03-05 ENCOUNTER — OFFICE VISIT (OUTPATIENT)
Dept: URGENT CARE | Age: 44
End: 2019-03-05
Payer: COMMERCIAL

## 2019-03-05 VITALS
HEIGHT: 62 IN | WEIGHT: 263 LBS | OXYGEN SATURATION: 98 % | SYSTOLIC BLOOD PRESSURE: 116 MMHG | TEMPERATURE: 98 F | BODY MASS INDEX: 48.4 KG/M2 | DIASTOLIC BLOOD PRESSURE: 66 MMHG | HEART RATE: 90 BPM | RESPIRATION RATE: 18 BRPM

## 2019-03-05 DIAGNOSIS — S89.92XA LEFT KNEE INJURY, INITIAL ENCOUNTER: ICD-10-CM

## 2019-03-05 DIAGNOSIS — S99.922A INJURY OF LEFT FOOT, INITIAL ENCOUNTER: ICD-10-CM

## 2019-03-05 DIAGNOSIS — M25.561 ACUTE PAIN OF RIGHT KNEE: ICD-10-CM

## 2019-03-05 DIAGNOSIS — S80.212A ABRASION OF LEFT KNEE, INITIAL ENCOUNTER: ICD-10-CM

## 2019-03-05 DIAGNOSIS — S89.91XA RIGHT KNEE INJURY, INITIAL ENCOUNTER: Primary | ICD-10-CM

## 2019-03-05 PROCEDURE — 73564 X-RAY EXAM KNEE 4 OR MORE: CPT

## 2019-03-05 PROCEDURE — 73630 X-RAY EXAM OF FOOT: CPT

## 2019-03-05 PROCEDURE — 99213 OFFICE O/P EST LOW 20 MIN: CPT | Performed by: FAMILY MEDICINE

## 2019-03-05 NOTE — PROGRESS NOTES
[facility]        NAME: Meeta Anderson is a 37 y o  female  : 1975    MRN: [id number]  DATE: 2019  TIME: 2:10 PM    Assessment and Plan   Right knee injury, initial encounter [S89 [id number]]  1  Right knee injury, initial encounter  Compression bandages    Crutches   2  Acute pain of right knee  XR knee 4+ vw right injury   3  Injury of left foot, initial encounter  XR foot 3+ vw left    Crutches   4  Left knee injury, initial encounter  Crutches    CANCELED: XR knee 4+ vw left injury   5  Abrasion of left knee, initial encounter           Patient Instructions     Patient Instructions   Rest, limit activity  Ice to injured areas 2 to 3 times a day for 15-20 minutes  Antibiotic ointment to abrasions  Tylenol, or ibuprofen (Advil/Motrin), or try Aleve (please take with food) as needed  Comfortable footwear  Use Ace wrap while awake  Use crutches or cane to assist ambulation  Recheck/follow-up with family physician/Orthopedics  Hugo Au  [phone]    Please go to the hospital emergency department if needed  Follow up with PCP in 3-5 days  Proceed to  ER if symptoms worsen      Chief Complaint     Chief Complaint   Patient presents with    Knee Pain     b/l knee pain , fell this AM on ice , fell on knees , and back , denies LOC    Toe Injury     left big toe injury         History of Present Illness       Patient states she fell on the ice earlier today injuring her left great toe, left knee, right knee, and lower back areas; pain of the right knee and left great toe areas; abrasions present anterior aspect of the left knee; patient states she has a cane in her car      Review of Systems   Review of Systems   Musculoskeletal:        Pain of the right knee, and the left great toe areas; generalized discomfort of the lower back   Skin:        Abrasions present anterior aspect of the left knee         Current Medications       Current Outpatient Medications:     cholecalciferol (VITAMIN D3) 1,000 units tablet, Take 1 capsule by mouth once a week 3000 units daily , Disp: , Rfl:     cholestyramine (QUESTRAN) 4 GM/DOSE powder, Take by mouth Twice daily, Disp: , Rfl:     CHOLESTYRAMINE PO, , Disp: , Rfl:     cyclobenzaprine (FLEXERIL) 10 mg tablet, Take 1 tablet by mouth 3 (three) times a day as needed, Disp: , Rfl:     dicyclomine (BENTYL) 20 mg tablet, Take 1 tablet (20 mg total) by mouth every 6 (six) hours for 30 days (Patient taking differently: Take 20 mg by mouth as needed  ), Disp: 120 tablet, Rfl: 0    ergocalciferol (VITAMIN D2) 50,000 units, , Disp: , Rfl:     fluticasone (FLONASE) 50 mcg/act nasal spray, into each nostril, Disp: , Rfl:     gabapentin (NEURONTIN) 300 mg capsule, Take 300 mg by mouth daily at bedtime  , Disp: , Rfl:     Galcanezumab-gnlm (EMGALITY) 120 MG/ML SOAJ, Two injections first time SC- inject in each thigh or in stomach  Afterward, one injection monthly (Patient not taking: Reported on 1/10/2019 ), Disp: 3 pen, Rfl: 0    Galcanezumab-gnlm (EMGALITY) 120 MG/ML SOAJ, Inject one pen SC into the thigh or stomach once monthly (patient error with one injection), Disp: 1 pen, Rfl: 0    ketorolac (TORADOL) 10 mg tablet, 1 tab q6 hours prn migraine   No more than 2-3 per week , Disp: 10 tablet, Rfl: 0    lamoTRIgine (LaMICtal) 25 mg tablet, Take 1 tab nightly x 1 wk, then 2 tabs nightly x 1 wk, then 3 tabs nightly x 1 wk, then 4 tabs nightly (along with her current 100 mg tab), Disp: 120 tablet, Rfl: 0    meclizine (ANTIVERT) 12 5 MG tablet, Take 1 tablet (12 5 mg total) by mouth 3 (three) times a day as needed for dizziness or nausea, Disp: 30 tablet, Rfl: 0    methocarbamol (ROBAXIN) 500 mg tablet, Take 500 mg by mouth as needed  , Disp: , Rfl:     OLANZapine (ZyPREXA) 2 5 mg tablet, Take 1-2 tabs as needed nightly for severe migraine/insomnia (Patient not taking: Reported on 1/10/2019 ), Disp: 10 tablet, Rfl: 0    omeprazole (PriLOSEC) 20 mg delayed release capsule, TAKE ONE CAPSULE BY MOUTH DAILY, Disp: 30 capsule, Rfl: 0    ondansetron (ZOFRAN) 4 mg tablet, Take 1 tablet by mouth every 8 (eight) hours as needed, Disp: , Rfl:     predniSONE 20 mg tablet, , Disp: , Rfl:     prochlorperazine (COMPAZINE) 10 mg tablet, 1 tab q6h prn migraine onset , Disp: 30 tablet, Rfl: 0    rizatriptan (MAXALT-MLT) 10 MG disintegrating tablet, 1 tab at migraine onset, repeat after 2 hours if needed, Disp: 9 tablet, Rfl: 2    rizatriptan (MAXALT-MLT) 10 MG disintegrating tablet, 1 tablet alanna 2 hrs for headaches as needed, Disp: , Rfl:     Current Allergies     Allergies as of 03/05/2019 - Reviewed 03/05/2019   Allergen Reaction Noted    Hydrocodone-acetaminophen Anaphylaxis 02/17/2013    Codeine GI Intolerance 10/05/2016    Other  08/24/2016    Oxycodone-acetaminophen GI Intolerance 02/17/2013    Penicillins GI Intolerance 07/03/2013            The following portions of the patient's history were reviewed and updated as appropriate: allergies, current medications, past family history, past medical history, past social history, past surgical history and problem list      Past Medical History:   Diagnosis Date    Anxiety     Asthma     Claustrophobia     Depression     Fibromyalgia     last assessed 11/27/17    GERD without esophagitis     last assessed 10/12/17    GI problem     Joint pain     Lung nodule     last assessed 10/9/15    Migraine     Muscle pain        Past Surgical History:   Procedure Laterality Date    BACK SURGERY      CARPAL TUNNEL RELEASE Bilateral     CHOLECYSTECTOMY      GALLBLADDER SURGERY      NECK SURGERY      SPINAL FUSION      C4 and C5    ULNAR NERVE REPAIR Bilateral        Family History   Problem Relation Age of Onset    Cancer Mother     Diabetes Mother     Fibromyalgia Mother     Ovarian cancer Mother     Hypertension Mother     Thyroid disease Mother     Arthritis Family     Cancer Family     Heart disease Family         cardiac disorder    Neuropathy Family     Lupus Family         systemic erythematosus         Medications have been verified  Objective   /66   Pulse 90   Temp 98 °F (36 7 °C) (Temporal)   Resp 18   Ht 5' 2" (1 575 m)   Wt 119 kg (263 lb)   SpO2 98%   BMI 48 10 kg/m²        Physical Exam     Physical Exam   Constitutional: She is oriented to person, place, and time  She appears well-developed and well-nourished  Musculoskeletal:   Pain and swelling of the right knee; patient able to flex and extend right knee; pain of the left great toe; generalized discomfort of the lower back area   Neurological: She is alert and oriented to person, place, and time  Skin:   Abrasions present anterior aspect of the left knee   Psychiatric: She has a normal mood and affect  Her behavior is normal    Nursing note and vitals reviewed        Right knee x-rays - no fracture noted    Left foot x-rays - no fracture noted    Ace wrap applied to the right knee by nurse    Crutches and crutch training given by nurse

## 2019-03-11 ENCOUNTER — OFFICE VISIT (OUTPATIENT)
Dept: OBGYN CLINIC | Facility: OTHER | Age: 44
End: 2019-03-11
Payer: COMMERCIAL

## 2019-03-11 VITALS
BODY MASS INDEX: 48.28 KG/M2 | HEART RATE: 81 BPM | WEIGHT: 262.35 LBS | HEIGHT: 62 IN | SYSTOLIC BLOOD PRESSURE: 120 MMHG | DIASTOLIC BLOOD PRESSURE: 78 MMHG

## 2019-03-11 DIAGNOSIS — S80.01XA CONTUSION OF RIGHT KNEE, INITIAL ENCOUNTER: Primary | ICD-10-CM

## 2019-03-11 DIAGNOSIS — S80.02XA CONTUSION OF LEFT KNEE, INITIAL ENCOUNTER: ICD-10-CM

## 2019-03-11 PROCEDURE — 99213 OFFICE O/P EST LOW 20 MIN: CPT | Performed by: ORTHOPAEDIC SURGERY

## 2019-03-11 NOTE — PROGRESS NOTES
Assessment/Plan:    Diagnoses and all orders for this visit:    Contusion of right knee, initial encounter    Contusion of left knee, initial encounter      · X-rays reviewed and did not show any acute osseous abnormality  · Josh Cazares experienced a right and left knee contusion  · At this time, we recommended continued conservative management with rest, ice, NSAIDs PRN  · She may participate in activity as tolerated  · Recommended follow up in 3 weeks for re-evaluation  · Josh Cazares acknowledged understanding and agreement with the plan    Chief Complaint:  Right Knee Pain    Subjective:     HPI    Josh Cazares is a 37year old female that comes to the office for evaluation of right knee pain  She initially experienced an injury on 3/5/19 when she slipped on ice and injured her right knee  Mechanism involved landing on the left knee first, then secondarily the right knee  It was associated with swelling  She went to an urgent care center same day for evaluation  X-rays were obtained and did not show any acute osseous abnormality  Reports two prior "knee" fractures  Today, she reports that she has had 50% improvement in her pain concerns since her injury  Currently, the pain is located on the postero-lateral aspect, intermittent, sharp in quality, 6/10 intensity, non-radiating  Pain is aggravated with bending her knee and walking  Alleviated with rest  She has been taking Ibuprofen, which helps  Also reports significantly improved swelling  She reports history of numbness and tingling due to fibromyalgia and no worsening numbness or tingling or subjective weakness  Review of Systems   Constitutional: Negative for chills and fever  HENT: Negative for congestion, rhinorrhea and sore throat  Eyes: Negative for visual disturbance  Respiratory: Negative for shortness of breath  Cardiovascular: Negative for chest pain  Gastrointestinal: Negative for abdominal pain     Musculoskeletal: Positive for arthralgias (As per HPI)    Skin: Negative for rash  Objective:    Vitals:    03/11/19 0945   BP: 120/78   Pulse: 81       Physical Exam   Constitutional: She is oriented to person, place, and time  She appears well-developed and well-nourished  No distress  HENT:   Head: Normocephalic and atraumatic  Right Ear: External ear normal    Left Ear: External ear normal    Nose: Nose normal    Eyes: EOM are normal  No scleral icterus  Neck: Neck supple  Pulmonary/Chest: Effort normal  No respiratory distress  Abdominal: Soft  Neurological: She is alert and oriented to person, place, and time  Skin: Skin is warm  She is not diaphoretic  Psychiatric: She has a normal mood and affect  Right Knee Exam     Tenderness   The patient is experiencing tenderness in the lateral joint line  Range of Motion   Extension: normal   Flexion: normal     Tests   Shweta:  Medial - negative Lateral - negative  Varus: negative Valgus: negative  Lachman:  Anterior - negative      Drawer:  Anterior - negative    Posterior - negative    Other   Erythema: absent  Sensation: normal  Pulse: present  Swelling: mild      Left Knee Exam     Range of Motion   Extension: normal   Flexion: normal     Tests   Shweta:  Medial - negative Lateral - negative  Varus: negative Valgus: negative  Lachman:  Anterior - negative      Drawer:  Anterior - negative     Posterior - negative    Other   Scars: present (supreficial abrasions present on anterior aspect of knee, healing well, no drainage or pus)  Sensation: normal  Pulse: present  Swelling: mild          I have personally reviewed pertinent films in PACS      XR of the right knee from 3/5/19 shows no acute osseous abnormality

## 2019-03-18 ENCOUNTER — PATIENT MESSAGE (OUTPATIENT)
Dept: NEUROLOGY | Facility: CLINIC | Age: 44
End: 2019-03-18

## 2019-03-25 DIAGNOSIS — G43.711 INTRACTABLE CHRONIC MIGRAINE WITHOUT AURA AND WITH STATUS MIGRAINOSUS: ICD-10-CM

## 2019-03-25 DIAGNOSIS — G43.709 CHRONIC MIGRAINE WITHOUT AURA WITHOUT STATUS MIGRAINOSUS, NOT INTRACTABLE: ICD-10-CM

## 2019-03-25 DIAGNOSIS — F07.81 POST CONCUSSIVE SYNDROME: ICD-10-CM

## 2019-03-25 RX ORDER — KETOROLAC TROMETHAMINE 10 MG/1
TABLET, FILM COATED ORAL
Qty: 10 TABLET | Refills: 0 | Status: ON HOLD | OUTPATIENT
Start: 2019-03-25 | End: 2019-04-03

## 2019-03-25 NOTE — TELEPHONE ENCOUNTER
[facility] pharmacy called and states that pt is requesting zyprexa refill  Rx entered  Pls sign off

## 2019-03-26 ENCOUNTER — TELEPHONE (OUTPATIENT)
Dept: NEUROLOGY | Facility: CLINIC | Age: 44
End: 2019-03-26

## 2019-03-26 DIAGNOSIS — G43.711 INTRACTABLE CHRONIC MIGRAINE WITHOUT AURA AND WITH STATUS MIGRAINOSUS: Primary | ICD-10-CM

## 2019-03-26 RX ORDER — METHYLPREDNISOLONE 4 MG/1
TABLET ORAL
Qty: 1 EACH | Refills: 0 | Status: ON HOLD | OUTPATIENT
Start: 2019-03-26 | End: 2019-04-03

## 2019-03-28 RX ORDER — OLANZAPINE 2.5 MG/1
TABLET ORAL
Qty: 10 TABLET | Refills: 0 | Status: ON HOLD | OUTPATIENT
Start: 2019-03-28 | End: 2019-04-03

## 2019-03-28 NOTE — TELEPHONE ENCOUNTER
16 hr per week as a lot of time  I know she does not get paid for this, but why not enroll in short-term disability if she needs that much time? Should I discuss this with her? I haven't heard from her so assume headaches are better    Courtney Sky  : 1948  [place]  [place] [address] [facility] [address] [place] [address]  [phone]   [phone]        OUTPATIENT PHYSICAL THERAPY:Daily Note 3/28/2019   ICD-10: Treatment Diagnosis: Cervicalgia [M54.2]; Headache [R51]  Precautions/Allergies:   Patient has no known allergies. MD Orders: Evaluate and Treat MEDICAL/REFERRING DIAGNOSIS:  Neck pain [M54.2]   DATE OF ONSET: 2018  REFERRING PHYSICIAN: Rogelio Basurto MD  RETURN PHYSICIAN APPOINTMENT: TBD     INITIAL ASSESSMENT:  Mr. Lay Florence presents with signs of R sided neck pain. Mobility restrictions are present in the upper cervical spine bilaterally with pain present during right sided cervical rotation. Pt. Demonstrates moderate myofascial pain additionally in the R splenius capitis, R sternocleidomastoid, and R scalene musculature. Pt. Will benefit from skilled PT including manual therapy, range of motion, and mobility exercises to address impairments identified. 18 Progress Report: Pt. Attends 10 physical therapy visits. Pt. Demonstrates improved cervical ROM in all directions. Despite improved cervical ROM, pain remains in the R posterior cervical region and R Sternocleidomastoid with cervical rotations movements bilaterally. Upper cervical joint mobility has improved but patient remains positive with the upper cervical flexion rotation test.  Pt. Will benefit from continued manual therapy and deep neck flexor strengthening to address remaining pain. 19 Progress Report: Improvements in cervical rotation and extension remain, but patient continues to experience pain during cervical rotation. PT suspects mild cervical dystonia symptoms resulting in antagonistic muscle contraction with cervical rotation.   Joint limitations remain in the upper cervical spine resulting in compensated lateral flexion during R sided cervical rotation. Pt. Adilene Punchlauren to benefit from physical therapy but may benefit from further alternative treatment additionally. 3/26/19 Progress Report: Re-assessment today is performed during an acute flare up of symptoms following a trip to [place]. Pt. Was making good progress toward pain free cervical ROM and advancement of cervical and scapular strengthening exercises. Due to flare up, subjective pain levels and functional outcome measures are worse at this time. Pt. Overall has improved since starting PT but again is encouraged to f/u with referring M.D. For further alternative treatment. Pt. Will benefit from continued PT to address pain and ADL deficits. PROBLEM LIST (Impacting functional limitations):  1. Decreased Strength  2. Increased Pain  3. Decreased Activity Tolerance  4. Decreased Flexibility/Joint Mobility  5. Decreased [place] with Home Exercise Program INTERVENTIONS PLANNED:  1. Home Exercise Program (HEP)  2. Manual Therapy  3. Range of Motion (ROM)  4. Therapeutic Exercise/Strengthening   TREATMENT PLAN:  Effective Dates: 02/07/2019 TO 04/09/2019  (60 days). Frequency/Duration: 2 times a week for 60 Day(s)  GOALS: (Goals have been discussed and agreed upon with patient.)  Discharge Goals: Time Frame: 8 weeks  1. Pt. Will demonstrate 60 degrees of cervical rotation bilaterally to improve driving safety. MET  2. Pt. Will demonstrate <3/10 R sided neck pain with 8 hours of sleep to improve pain. NOT MET  3. Pt. Will score < 12% on the NDI to demonstrate improved pain and function. NOT MET  Rehabilitation Potential For Stated Goals: Good              The information in this section was collected on 11/9/18 (except where otherwise noted). HISTORY:   History of Present Injury/Illness (Reason for Referral):  Pt. Attends PT c/o R sided neck pain with gradual insidious onset about a year ago.   The pain is located along the R side of the neck and aggravated with turning the head, sleeping at night, and performing daily activities. Pt. Denies injury or numbness tingling, difficulty speaking, tinnitus, or difficulty swallowing. Pt. Notes only treatment so far was massage that helped for a few hours. Pt. Would like to address pain and return to previous level of function. Past Medical History/Comorbidities:   Mr. Karthik England  has a past medical history of Bradycardia, HLD (hyperlipidemia), Osteoarthritis, and Personal history of colonic polyps (2014). He also has no past medical history of Aneurysm ([address] Coagulation disorder ([address] Difficult intubation, Heart failure ([address] Ill-defined condition, Malignant hyperthermia due to anesthesia, Morbid obesity ([address] Nausea & vomiting, Pseudocholinesterase deficiency, Psychiatric disorder, Unspecified adverse effect of anesthesia, or Unspecified sleep apnea. Mr. Karthik England  has a past surgical history that includes hx gi (2007); hx colonoscopy (9/10/14); and pr abdomen surgery proc unlisted. Social History/Living Environment:     Lives with family in 2 story home  Prior Level of Function/Work/Activity:  Functioned independently without limitations. Ambulatory/Rehab Services H2 Model Falls Risk Assessment    Risk Factors:       (1)  Gender [Male] Ability to Rise from Chair:       (0)  Ability to rise in a single movement    Falls Prevention Plan:       No modifications necessary   Total: (5 or greater = High Risk): 1    ©2010 [facility] of [employer]. All Rights Reserved. [facility] States Patent #6,[id number]. Federal Law prohibits the replication, distribution or use without written permission from [facility] of [address] [place] [address]     Current Medications:       Current Outpatient Medications:     rosuvastatin (CRESTOR) 10 mg tablet, TAKE 1 TABLET BY MOUTH DAILY, Disp: 90 Tab, Rfl: 1    multivitamin (ONE A DAY) tablet, Take 1 tablet by mouth daily. , Disp: , Rfl:     ibuprofen (MOTRIN) 200 mg tablet, Take 200 mg by mouth every six (6) hours as needed for Pain. Hold till after surgery   Indications: PAIN, Disp: , Rfl:     cyanocobalamin (VITAMIN B12) 500 mcg tablet, Take 500 mcg by mouth daily. , Disp: , Rfl:    Date Last Reviewed:  3/28/2019   Number of Personal Factors/Comorbidities that affect the Plan of Care: 0: LOW COMPLEXITY   EXAMINATION:   Observation:       Palpation:  Upper Trapezius:-- Levator Scapulae:-- Posterior Cervical: tender on R side   Sternocleidomastoid: Tender on R side Scalenes: Tender on R side Other:       Flexibility: Limited suboccipitals, SCM, scalenes bilaterally      Range of Motion: Cervical in degrees  Flexion: 50                                       Extesnion: 41                       Right                     Left   Rotation 61 pain 62    Side Bend 20 16       Strength: Manual Muscle Test: upper quarter screen 5/5 MMT bilaterally. Cervical Flexion Endurance Test: --    Special Testing:  Spurlings Right: Left:   Upper limb Tension Test (ULTT1) -- --   Cervical Distraction -- --   Cervical flexion rotation test (+) (+)   Other:       Neuro: reflexes 2+ bilateral UE   Body Structures Involved:  1. Joints  2. Muscles Body Functions Affected:  1. Neuromusculoskeletal  2. Movement Related Activities and Participation Affected:  1. Mobility  2. Self Care   Number of elements (examined above) that affect the Plan of Care: 1-2: LOW COMPLEXITY   CLINICAL PRESENTATION:   Presentation: Stable and uncomplicated: LOW COMPLEXITY   CLINICAL DECISION MAKING:   Outcome Measure: Tool Used: Neck Disability Index (NDI)  Score:  Initial: 11/50  Most Recent: 23/50 (Date: 03/26/19 )   Interpretation of Score: The Neck Disability Index is a revised form of the Oswestry Low Back Pain Index and is designed to measure the activities of daily living in person's with neck pain. Each section is scored on a 0-5 scale, 5 representing the greatest disability. The scores of each section are added together for a total score of 50. Medical Necessity:   · Patient is expected to demonstrate progress in strength and range of motion to increase independence with sleeping, driving, and ADL's. Reason for Services/Other Comments:  · Patient will benefit from skilled PT to address impairments identified through evaluation and return to previous ADL and recreational capacity. Use of outcome tool(s) and clinical judgement create a POC that gives a: Clear prediction of patient's progress: LOW COMPLEXITY            TREATMENT:   (In addition to Assessment/Re-Assessment sessions the following treatments were rendered)  Pre-treatment Symptoms/Complaints:  Pt. Reports R sided neck pain remains elevated this week. Pt. Reports ROM continues to improve with movement. Pain: Initial:   Pain Intensity 1: 5 /10 Post Session:  5/10     Therapeutic Exercise: (25 Minutes):  Exercises per grid below to improve mobility and strength. Required moderate verbal and manual cues to promote proper body alignment and promote proper body posture. Progressed range as indicated. Date:  3/28/2019   Activity/Exercise Parameters   Cervical rotation 2 minutes   Chin tucks (central and in slight rotation bilaterally) --   Upper trapezius stretch/scalene 3 minutes   Rows (green band) --   Foam roll (thoracic extension, chest stretch) 5 minutes   Isometric cervical extensions (green band) --   Bird dog 3 x 10   Cervical rotation SNAGS --   Curl ups with chin tuck on swiss ball 3 x 10   T's on swiss ball with chin tuck 3 x 10   Chin tuck head lift 3 x 10   Cervical Proprioception (laser) --   Modified push up with chin tuck --   Thoracic rotation with cervical rotation in sidelying --   PNF D2 in half kneeling --   Chest Press on Therapy ball with chin tuck (10#) 3 x 10       Manual Therapy (    Soft Tissue Mobilization Duration  Duration: 20 Minutes): Manual techniques to facilitate improved motion and decreased pain.  (Used abbreviations: MET - muscle energy technique; PNF - proprioceptive neuromuscular facilitation; NMR - neuromuscular re-education; a/p - anterior to posterior; p/a - posterior to anterior)   · Soft tissue mobilization: R splenius capitis, sternocleidomastoid, scalenes  · Joint mobilization: C1/2 in prone unilateral p/a on the R side. Grade III-IV; C0/C1 flexion grade III  · Joint mobilization: Grade V C1/2 rotation in supine to the R side. (NOT PERFORMED)  · Manual cervical traction  · Joint mobilization: grade V C7 manipulation in prone (NOT PERFORMED)  · Soft tissue mobilization: instrument assisted: B cervical multifidi C2-5, R splenius capitis. MedBridge Portal  Treatment/Session Assessment:    · Response to Treatment: R splenius capitis, scalenes, and SCM remain tender to palpation today with multiple myofascial trigger points. Pt. Is advised to f/u with referring MD for alternative treatment to address continued pain. Pt. Does benefit from manual therapy and progressive cervical strengthening, but pain persists. · Compliance with Program/Exercises: Compliant all of the time. · Recommendations/Intent for next treatment session: \"Next visit will focus on manual therapy and strengthening exercises. \".   Total Treatment Duration: 45 minutes total time  PT Patient Time In/Time Out  Time In: 1530  Time Out: [address]

## 2019-04-02 ENCOUNTER — OFFICE VISIT (OUTPATIENT)
Dept: NEUROLOGY | Facility: CLINIC | Age: 44
End: 2019-04-02
Payer: COMMERCIAL

## 2019-04-02 ENCOUNTER — HOSPITAL ENCOUNTER (INPATIENT)
Facility: HOSPITAL | Age: 44
LOS: 2 days | Discharge: HOME | DRG: 103 | End: 2019-04-04
Attending: HOSPITALIST | Admitting: INTERNAL MEDICINE
Payer: COMMERCIAL

## 2019-04-02 VITALS
BODY MASS INDEX: 47.59 KG/M2 | SYSTOLIC BLOOD PRESSURE: 120 MMHG | HEART RATE: 80 BPM | DIASTOLIC BLOOD PRESSURE: 66 MMHG | HEIGHT: 62 IN | WEIGHT: 258.6 LBS

## 2019-04-02 DIAGNOSIS — F32.A DEPRESSION, UNSPECIFIED DEPRESSION TYPE: ICD-10-CM

## 2019-04-02 DIAGNOSIS — K58.2 IRRITABLE BOWEL SYNDROME WITH BOTH CONSTIPATION AND DIARRHEA: ICD-10-CM

## 2019-04-02 DIAGNOSIS — M54.2 CERVICALGIA: ICD-10-CM

## 2019-04-02 DIAGNOSIS — G89.28 OTHER CHRONIC POSTPROCEDURAL PAIN: ICD-10-CM

## 2019-04-02 DIAGNOSIS — G47.30 SLEEP-DISORDERED BREATHING: ICD-10-CM

## 2019-04-02 DIAGNOSIS — G47.61 PLMD (PERIODIC LIMB MOVEMENT DISORDER): ICD-10-CM

## 2019-04-02 DIAGNOSIS — G43.709 CHRONIC MIGRAINE WITHOUT AURA WITHOUT STATUS MIGRAINOSUS, NOT INTRACTABLE: Primary | ICD-10-CM

## 2019-04-02 DIAGNOSIS — R42 VERTIGO: ICD-10-CM

## 2019-04-02 DIAGNOSIS — G43.711 INTRACTABLE CHRONIC MIGRAINE WITHOUT AURA AND WITH STATUS MIGRAINOSUS: Primary | ICD-10-CM

## 2019-04-02 LAB
ALBUMIN SERPL BCP-MCNC: 3.4 G/DL (ref 3.5–5)
ALP SERPL-CCNC: 142 U/L (ref 46–116)
ALT SERPL W P-5'-P-CCNC: 33 U/L (ref 12–78)
ANION GAP SERPL CALCULATED.3IONS-SCNC: 6 MMOL/L (ref 4–13)
AST SERPL W P-5'-P-CCNC: 27 U/L (ref 5–45)
BASOPHILS # BLD AUTO: 0.04 THOUSANDS/ΜL (ref 0–0.1)
BASOPHILS NFR BLD AUTO: 1 % (ref 0–1)
BILIRUB SERPL-MCNC: 0.47 MG/DL (ref 0.2–1)
BUN SERPL-MCNC: 13 MG/DL (ref 5–25)
CALCIUM SERPL-MCNC: 8.2 MG/DL (ref 8.3–10.1)
CHLORIDE SERPL-SCNC: 107 MMOL/L (ref 100–108)
CO2 SERPL-SCNC: 20 MMOL/L (ref 21–32)
CREAT SERPL-MCNC: 0.79 MG/DL (ref 0.6–1.3)
EOSINOPHIL # BLD AUTO: 0.18 THOUSAND/ΜL (ref 0–0.61)
EOSINOPHIL NFR BLD AUTO: 2 % (ref 0–6)
ERYTHROCYTE [DISTWIDTH] IN BLOOD BY AUTOMATED COUNT: 14.4 % (ref 11.6–15.1)
GFR SERPL CREATININE-BSD FRML MDRD: 92 ML/MIN/1.73SQ M
GLUCOSE SERPL-MCNC: 115 MG/DL (ref 65–140)
HCT VFR BLD AUTO: 45.9 % (ref 34.8–46.1)
HGB BLD-MCNC: 14.3 G/DL (ref 11.5–15.4)
IMM GRANULOCYTES # BLD AUTO: 0.04 THOUSAND/UL (ref 0–0.2)
IMM GRANULOCYTES NFR BLD AUTO: 1 % (ref 0–2)
LYMPHOCYTES # BLD AUTO: 3.19 THOUSANDS/ΜL (ref 0.6–4.47)
LYMPHOCYTES NFR BLD AUTO: 36 % (ref 14–44)
MCH RBC QN AUTO: 25.9 PG (ref 26.8–34.3)
MCHC RBC AUTO-ENTMCNC: 31.2 G/DL (ref 31.4–37.4)
MCV RBC AUTO: 83 FL (ref 82–98)
MONOCYTES # BLD AUTO: 0.56 THOUSAND/ΜL (ref 0.17–1.22)
MONOCYTES NFR BLD AUTO: 6 % (ref 4–12)
NEUTROPHILS # BLD AUTO: 4.87 THOUSANDS/ΜL (ref 1.85–7.62)
NEUTS SEG NFR BLD AUTO: 54 % (ref 43–75)
NRBC BLD AUTO-RTO: 0 /100 WBCS
PLATELET # BLD AUTO: 287 THOUSANDS/UL (ref 149–390)
PMV BLD AUTO: 9.9 FL (ref 8.9–12.7)
POTASSIUM SERPL-SCNC: 3.7 MMOL/L (ref 3.5–5.3)
PROT SERPL-MCNC: 6.9 G/DL (ref 6.4–8.2)
RBC # BLD AUTO: 5.53 MILLION/UL (ref 3.81–5.12)
SODIUM SERPL-SCNC: 133 MMOL/L (ref 136–145)
WBC # BLD AUTO: 8.88 THOUSAND/UL (ref 4.31–10.16)

## 2019-04-02 PROCEDURE — 93005 ELECTROCARDIOGRAM TRACING: CPT

## 2019-04-02 PROCEDURE — 1111F DSCHRG MED/CURRENT MED MERGE: CPT | Performed by: PSYCHIATRY & NEUROLOGY

## 2019-04-02 PROCEDURE — C9113 INJ PANTOPRAZOLE SODIUM, VIA: HCPCS | Performed by: INTERNAL MEDICINE

## 2019-04-02 PROCEDURE — 80053 COMPREHEN METABOLIC PANEL: CPT | Performed by: INTERNAL MEDICINE

## 2019-04-02 PROCEDURE — 99215 OFFICE O/P EST HI 40 MIN: CPT | Performed by: PSYCHIATRY & NEUROLOGY

## 2019-04-02 PROCEDURE — 85025 COMPLETE CBC W/AUTO DIFF WBC: CPT | Performed by: INTERNAL MEDICINE

## 2019-04-02 PROCEDURE — 96372 THER/PROPH/DIAG INJ SC/IM: CPT | Performed by: PSYCHIATRY & NEUROLOGY

## 2019-04-02 RX ORDER — METHOCARBAMOL 500 MG/1
TABLET, FILM COATED ORAL
Qty: 30 TABLET | Refills: 1 | Status: SHIPPED | OUTPATIENT
Start: 2019-04-02 | End: 2019-09-19

## 2019-04-02 RX ORDER — METOCLOPRAMIDE HYDROCHLORIDE 5 MG/ML
10 INJECTION INTRAMUSCULAR; INTRAVENOUS EVERY 8 HOURS SCHEDULED
Status: COMPLETED | OUTPATIENT
Start: 2019-04-02 | End: 2019-04-03

## 2019-04-02 RX ORDER — LAMOTRIGINE 100 MG/1
100 TABLET ORAL
Status: DISCONTINUED | OUTPATIENT
Start: 2019-04-02 | End: 2019-04-04 | Stop reason: HOSPADM

## 2019-04-02 RX ORDER — KETOROLAC TROMETHAMINE 30 MG/ML
60 INJECTION, SOLUTION INTRAMUSCULAR; INTRAVENOUS ONCE
Status: DISCONTINUED | OUTPATIENT
Start: 2019-04-02 | End: 2019-04-02 | Stop reason: HOSPADM

## 2019-04-02 RX ORDER — DIPHENHYDRAMINE HYDROCHLORIDE 50 MG/ML
25 INJECTION INTRAMUSCULAR; INTRAVENOUS EVERY 8 HOURS PRN
Status: ACTIVE | OUTPATIENT
Start: 2019-04-02 | End: 2019-04-03

## 2019-04-02 RX ORDER — PANTOPRAZOLE SODIUM 40 MG/1
40 INJECTION, POWDER, FOR SOLUTION INTRAVENOUS
Status: DISCONTINUED | OUTPATIENT
Start: 2019-04-02 | End: 2019-04-04

## 2019-04-02 RX ORDER — KETOROLAC TROMETHAMINE 30 MG/ML
15 INJECTION, SOLUTION INTRAMUSCULAR; INTRAVENOUS EVERY 6 HOURS SCHEDULED
Status: COMPLETED | OUTPATIENT
Start: 2019-04-03 | End: 2019-04-03

## 2019-04-02 RX ORDER — KETOROLAC TROMETHAMINE 30 MG/ML
15 INJECTION, SOLUTION INTRAMUSCULAR; INTRAVENOUS EVERY 12 HOURS SCHEDULED
Status: DISCONTINUED | OUTPATIENT
Start: 2019-04-02 | End: 2019-04-02

## 2019-04-02 RX ORDER — MECLIZINE HYDROCHLORIDE 25 MG/1
25 TABLET ORAL EVERY 8 HOURS SCHEDULED
Status: DISCONTINUED | OUTPATIENT
Start: 2019-04-02 | End: 2019-04-04 | Stop reason: HOSPADM

## 2019-04-02 RX ORDER — LAMOTRIGINE 25 MG/1
25 TABLET ORAL EVERY MORNING
Status: DISCONTINUED | OUTPATIENT
Start: 2019-04-03 | End: 2019-04-04 | Stop reason: HOSPADM

## 2019-04-02 RX ORDER — KETOROLAC TROMETHAMINE 30 MG/ML
60 INJECTION, SOLUTION INTRAMUSCULAR; INTRAVENOUS ONCE
Status: DISCONTINUED | OUTPATIENT
Start: 2019-04-02 | End: 2019-04-02

## 2019-04-02 RX ORDER — SODIUM CHLORIDE, SODIUM GLUCONATE, SODIUM ACETATE, POTASSIUM CHLORIDE, MAGNESIUM CHLORIDE, SODIUM PHOSPHATE, DIBASIC, AND POTASSIUM PHOSPHATE .53; .5; .37; .037; .03; .012; .00082 G/100ML; G/100ML; G/100ML; G/100ML; G/100ML; G/100ML; G/100ML
125 INJECTION, SOLUTION INTRAVENOUS CONTINUOUS
Status: DISCONTINUED | OUTPATIENT
Start: 2019-04-02 | End: 2019-04-03

## 2019-04-02 RX ORDER — MAGNESIUM SULFATE HEPTAHYDRATE 40 MG/ML
2 INJECTION, SOLUTION INTRAVENOUS ONCE
Status: COMPLETED | OUTPATIENT
Start: 2019-04-02 | End: 2019-04-03

## 2019-04-02 RX ORDER — METHOCARBAMOL 500 MG/1
500 TABLET, FILM COATED ORAL EVERY 8 HOURS SCHEDULED
Status: DISCONTINUED | OUTPATIENT
Start: 2019-04-02 | End: 2019-04-04 | Stop reason: HOSPADM

## 2019-04-02 RX ORDER — KETOROLAC TROMETHAMINE 30 MG/ML
15 INJECTION, SOLUTION INTRAMUSCULAR; INTRAVENOUS EVERY 6 HOURS PRN
Status: DISCONTINUED | OUTPATIENT
Start: 2019-04-02 | End: 2019-04-02

## 2019-04-02 RX ADMIN — PANTOPRAZOLE SODIUM 40 MG: 40 INJECTION, POWDER, FOR SOLUTION INTRAVENOUS at 22:31

## 2019-04-02 RX ADMIN — METHOCARBAMOL 500 MG: 500 TABLET, FILM COATED ORAL at 22:25

## 2019-04-02 RX ADMIN — KETOROLAC TROMETHAMINE 60 MG: 30 INJECTION, SOLUTION INTRAMUSCULAR; INTRAVENOUS at 17:30

## 2019-04-02 RX ADMIN — MECLIZINE HYDROCHLORIDE 25 MG: 25 TABLET ORAL at 22:25

## 2019-04-02 RX ADMIN — LAMOTRIGINE 100 MG: 100 TABLET ORAL at 22:25

## 2019-04-02 RX ADMIN — METOCLOPRAMIDE 10 MG: 5 INJECTION, SOLUTION INTRAMUSCULAR; INTRAVENOUS at 22:31

## 2019-04-02 RX ADMIN — MAGNESIUM SULFATE HEPTAHYDRATE 2 G: 40 INJECTION, SOLUTION INTRAVENOUS at 22:26

## 2019-04-03 LAB
25(OH)D3 SERPL-MCNC: 34.7 NG/ML (ref 30–100)
AMMONIA PLAS-SCNC: 43 UMOL/L (ref 11–35)
ANION GAP SERPL CALCULATED.3IONS-SCNC: 7 MMOL/L (ref 4–13)
ATRIAL RATE: 75 BPM
ATRIAL RATE: 77 BPM
ATRIAL RATE: 77 BPM
BUN SERPL-MCNC: 13 MG/DL (ref 5–25)
C3 SERPL-MCNC: 120 MG/DL (ref 90–180)
C4 SERPL-MCNC: 28 MG/DL (ref 10–40)
CALCIUM SERPL-MCNC: 7.2 MG/DL (ref 8.3–10.1)
CHLORIDE SERPL-SCNC: 108 MMOL/L (ref 100–108)
CHOLEST SERPL-MCNC: 122 MG/DL (ref 50–200)
CO2 SERPL-SCNC: 22 MMOL/L (ref 21–32)
CREAT SERPL-MCNC: 0.69 MG/DL (ref 0.6–1.3)
EST. AVERAGE GLUCOSE BLD GHB EST-MCNC: 117 MG/DL
FOLATE SERPL-MCNC: 12.1 NG/ML (ref 3.1–17.5)
GFR SERPL CREATININE-BSD FRML MDRD: 107 ML/MIN/1.73SQ M
GGT SERPL-CCNC: 47 U/L (ref 5–85)
GLUCOSE SERPL-MCNC: 91 MG/DL (ref 65–140)
HBA1C MFR BLD: 5.7 % (ref 4.2–6.3)
HDLC SERPL-MCNC: 34 MG/DL (ref 40–60)
LDLC SERPL CALC-MCNC: 72 MG/DL (ref 0–100)
MAGNESIUM SERPL-MCNC: 2.9 MG/DL (ref 1.6–2.6)
P AXIS: 28 DEGREES
P AXIS: 29 DEGREES
P AXIS: 29 DEGREES
POTASSIUM SERPL-SCNC: 3.8 MMOL/L (ref 3.5–5.3)
PR INTERVAL: 160 MS
PR INTERVAL: 160 MS
PR INTERVAL: 174 MS
QRS AXIS: 52 DEGREES
QRS AXIS: 52 DEGREES
QRS AXIS: 60 DEGREES
QRSD INTERVAL: 82 MS
QRSD INTERVAL: 82 MS
QRSD INTERVAL: 84 MS
QT INTERVAL: 378 MS
QT INTERVAL: 378 MS
QT INTERVAL: 398 MS
QTC INTERVAL: 427 MS
QTC INTERVAL: 427 MS
QTC INTERVAL: 444 MS
SODIUM SERPL-SCNC: 137 MMOL/L (ref 136–145)
T WAVE AXIS: 12 DEGREES
T WAVE AXIS: 7 DEGREES
T WAVE AXIS: 7 DEGREES
TRIGL SERPL-MCNC: 79 MG/DL
TSH SERPL DL<=0.05 MIU/L-ACNC: 2.57 UIU/ML (ref 0.36–3.74)
VENTRICULAR RATE: 75 BPM
VENTRICULAR RATE: 77 BPM
VENTRICULAR RATE: 77 BPM
VIT B12 SERPL-MCNC: 136 PG/ML (ref 100–900)

## 2019-04-03 PROCEDURE — 86160 COMPLEMENT ANTIGEN: CPT | Performed by: INTERNAL MEDICINE

## 2019-04-03 PROCEDURE — 83735 ASSAY OF MAGNESIUM: CPT | Performed by: INTERNAL MEDICINE

## 2019-04-03 PROCEDURE — 93010 ELECTROCARDIOGRAM REPORT: CPT | Performed by: INTERNAL MEDICINE

## 2019-04-03 PROCEDURE — 93005 ELECTROCARDIOGRAM TRACING: CPT

## 2019-04-03 PROCEDURE — 82746 ASSAY OF FOLIC ACID SERUM: CPT | Performed by: INTERNAL MEDICINE

## 2019-04-03 PROCEDURE — 80048 BASIC METABOLIC PNL TOTAL CA: CPT | Performed by: INTERNAL MEDICINE

## 2019-04-03 PROCEDURE — 82306 VITAMIN D 25 HYDROXY: CPT | Performed by: INTERNAL MEDICINE

## 2019-04-03 PROCEDURE — 99254 IP/OBS CNSLTJ NEW/EST MOD 60: CPT | Performed by: PSYCHIATRY & NEUROLOGY

## 2019-04-03 PROCEDURE — 80061 LIPID PANEL: CPT | Performed by: INTERNAL MEDICINE

## 2019-04-03 PROCEDURE — G8979 MOBILITY GOAL STATUS: HCPCS

## 2019-04-03 PROCEDURE — 82140 ASSAY OF AMMONIA: CPT | Performed by: INTERNAL MEDICINE

## 2019-04-03 PROCEDURE — 84443 ASSAY THYROID STIM HORMONE: CPT | Performed by: INTERNAL MEDICINE

## 2019-04-03 PROCEDURE — G8980 MOBILITY D/C STATUS: HCPCS

## 2019-04-03 PROCEDURE — 83036 HEMOGLOBIN GLYCOSYLATED A1C: CPT | Performed by: INTERNAL MEDICINE

## 2019-04-03 PROCEDURE — 82977 ASSAY OF GGT: CPT | Performed by: INTERNAL MEDICINE

## 2019-04-03 PROCEDURE — 97162 PT EVAL MOD COMPLEX 30 MIN: CPT

## 2019-04-03 PROCEDURE — 82607 VITAMIN B-12: CPT | Performed by: INTERNAL MEDICINE

## 2019-04-03 PROCEDURE — C9113 INJ PANTOPRAZOLE SODIUM, VIA: HCPCS | Performed by: INTERNAL MEDICINE

## 2019-04-03 PROCEDURE — G8978 MOBILITY CURRENT STATUS: HCPCS

## 2019-04-03 RX ORDER — DICYCLOMINE HCL 20 MG
20 TABLET ORAL 3 TIMES DAILY PRN
Status: DISCONTINUED | OUTPATIENT
Start: 2019-04-03 | End: 2019-04-04 | Stop reason: HOSPADM

## 2019-04-03 RX ORDER — HEPARIN SODIUM 5000 [USP'U]/ML
5000 INJECTION, SOLUTION INTRAVENOUS; SUBCUTANEOUS EVERY 8 HOURS SCHEDULED
Status: DISCONTINUED | OUTPATIENT
Start: 2019-04-04 | End: 2019-04-04 | Stop reason: HOSPADM

## 2019-04-03 RX ORDER — MAGNESIUM SULFATE HEPTAHYDRATE 40 MG/ML
2 INJECTION, SOLUTION INTRAVENOUS DAILY
Status: DISCONTINUED | OUTPATIENT
Start: 2019-04-03 | End: 2019-04-04 | Stop reason: HOSPADM

## 2019-04-03 RX ADMIN — MECLIZINE HYDROCHLORIDE 25 MG: 25 TABLET ORAL at 05:58

## 2019-04-03 RX ADMIN — LAMOTRIGINE 100 MG: 100 TABLET ORAL at 21:10

## 2019-04-03 RX ADMIN — VALPROATE SODIUM 500 MG: 100 INJECTION, SOLUTION INTRAVENOUS at 21:11

## 2019-04-03 RX ADMIN — VALPROATE SODIUM 500 MG: 100 INJECTION, SOLUTION INTRAVENOUS at 14:16

## 2019-04-03 RX ADMIN — KETOROLAC TROMETHAMINE 15 MG: 30 INJECTION, SOLUTION INTRAMUSCULAR at 17:33

## 2019-04-03 RX ADMIN — METHOCARBAMOL 500 MG: 500 TABLET, FILM COATED ORAL at 21:10

## 2019-04-03 RX ADMIN — METHOCARBAMOL 500 MG: 500 TABLET, FILM COATED ORAL at 14:17

## 2019-04-03 RX ADMIN — METOCLOPRAMIDE 10 MG: 5 INJECTION, SOLUTION INTRAMUSCULAR; INTRAVENOUS at 05:57

## 2019-04-03 RX ADMIN — KETOROLAC TROMETHAMINE 15 MG: 30 INJECTION, SOLUTION INTRAMUSCULAR at 05:58

## 2019-04-03 RX ADMIN — MECLIZINE HYDROCHLORIDE 25 MG: 25 TABLET ORAL at 14:17

## 2019-04-03 RX ADMIN — MAGNESIUM SULFATE HEPTAHYDRATE 2 G: 40 INJECTION, SOLUTION INTRAVENOUS at 16:00

## 2019-04-03 RX ADMIN — PANTOPRAZOLE SODIUM 40 MG: 40 INJECTION, POWDER, FOR SOLUTION INTRAVENOUS at 08:48

## 2019-04-03 RX ADMIN — SODIUM CHLORIDE, SODIUM GLUCONATE, SODIUM ACETATE, POTASSIUM CHLORIDE, MAGNESIUM CHLORIDE, SODIUM PHOSPHATE, DIBASIC, AND POTASSIUM PHOSPHATE 125 ML/HR: .53; .5; .37; .037; .03; .012; .00082 INJECTION, SOLUTION INTRAVENOUS at 01:56

## 2019-04-03 RX ADMIN — VALPROATE SODIUM 500 MG: 100 INJECTION, SOLUTION INTRAVENOUS at 00:57

## 2019-04-03 RX ADMIN — MECLIZINE HYDROCHLORIDE 25 MG: 25 TABLET ORAL at 21:10

## 2019-04-03 RX ADMIN — VALPROATE SODIUM 500 MG: 100 INJECTION, SOLUTION INTRAVENOUS at 05:57

## 2019-04-03 RX ADMIN — METOCLOPRAMIDE 10 MG: 5 INJECTION, SOLUTION INTRAMUSCULAR; INTRAVENOUS at 14:17

## 2019-04-03 RX ADMIN — METHOCARBAMOL 500 MG: 500 TABLET, FILM COATED ORAL at 05:58

## 2019-04-03 RX ADMIN — KETOROLAC TROMETHAMINE 15 MG: 30 INJECTION, SOLUTION INTRAMUSCULAR at 00:40

## 2019-04-03 RX ADMIN — LAMOTRIGINE 25 MG: 25 TABLET ORAL at 08:48

## 2019-04-03 RX ADMIN — KETOROLAC TROMETHAMINE 15 MG: 30 INJECTION, SOLUTION INTRAMUSCULAR at 11:56

## 2019-04-04 ENCOUNTER — TRANSITIONAL CARE MANAGEMENT (OUTPATIENT)
Dept: INTERNAL MEDICINE CLINIC | Facility: CLINIC | Age: 44
End: 2019-04-04

## 2019-04-04 ENCOUNTER — TELEPHONE (OUTPATIENT)
Dept: NEUROLOGY | Facility: CLINIC | Age: 44
End: 2019-04-04

## 2019-04-04 VITALS
WEIGHT: 258.4 LBS | TEMPERATURE: 97.8 F | HEIGHT: 62 IN | DIASTOLIC BLOOD PRESSURE: 61 MMHG | HEART RATE: 85 BPM | RESPIRATION RATE: 18 BRPM | OXYGEN SATURATION: 96 % | BODY MASS INDEX: 47.55 KG/M2 | SYSTOLIC BLOOD PRESSURE: 116 MMHG

## 2019-04-04 LAB
ANION GAP SERPL CALCULATED.3IONS-SCNC: 6 MMOL/L (ref 4–13)
ATRIAL RATE: 78 BPM
BUN SERPL-MCNC: 11 MG/DL (ref 5–25)
CALCIUM SERPL-MCNC: 7.7 MG/DL (ref 8.3–10.1)
CHLORIDE SERPL-SCNC: 111 MMOL/L (ref 100–108)
CO2 SERPL-SCNC: 23 MMOL/L (ref 21–32)
CREAT SERPL-MCNC: 0.71 MG/DL (ref 0.6–1.3)
ERYTHROCYTE [DISTWIDTH] IN BLOOD BY AUTOMATED COUNT: 14.7 % (ref 11.6–15.1)
GFR SERPL CREATININE-BSD FRML MDRD: 105 ML/MIN/1.73SQ M
GLUCOSE SERPL-MCNC: 86 MG/DL (ref 65–140)
HCT VFR BLD AUTO: 40.4 % (ref 34.8–46.1)
HGB BLD-MCNC: 12.4 G/DL (ref 11.5–15.4)
MCH RBC QN AUTO: 26.1 PG (ref 26.8–34.3)
MCHC RBC AUTO-ENTMCNC: 30.7 G/DL (ref 31.4–37.4)
MCV RBC AUTO: 85 FL (ref 82–98)
P AXIS: 32 DEGREES
PLATELET # BLD AUTO: 250 THOUSANDS/UL (ref 149–390)
PMV BLD AUTO: 10.1 FL (ref 8.9–12.7)
POTASSIUM SERPL-SCNC: 4 MMOL/L (ref 3.5–5.3)
PR INTERVAL: 180 MS
QRS AXIS: 64 DEGREES
QRSD INTERVAL: 80 MS
QT INTERVAL: 386 MS
QTC INTERVAL: 440 MS
RBC # BLD AUTO: 4.76 MILLION/UL (ref 3.81–5.12)
SODIUM SERPL-SCNC: 140 MMOL/L (ref 136–145)
T WAVE AXIS: 26 DEGREES
VENTRICULAR RATE: 78 BPM
WBC # BLD AUTO: 8.52 THOUSAND/UL (ref 4.31–10.16)

## 2019-04-04 PROCEDURE — 80048 BASIC METABOLIC PNL TOTAL CA: CPT | Performed by: STUDENT IN AN ORGANIZED HEALTH CARE EDUCATION/TRAINING PROGRAM

## 2019-04-04 PROCEDURE — 93005 ELECTROCARDIOGRAM TRACING: CPT

## 2019-04-04 PROCEDURE — 85027 COMPLETE CBC AUTOMATED: CPT | Performed by: STUDENT IN AN ORGANIZED HEALTH CARE EDUCATION/TRAINING PROGRAM

## 2019-04-04 PROCEDURE — 93010 ELECTROCARDIOGRAM REPORT: CPT | Performed by: INTERNAL MEDICINE

## 2019-04-04 RX ORDER — LAMOTRIGINE 100 MG/1
100 TABLET ORAL
Qty: 14 TABLET | Refills: 0 | Status: SHIPPED | OUTPATIENT
Start: 2019-04-04 | End: 2019-04-23 | Stop reason: SDUPTHER

## 2019-04-04 RX ORDER — LAMOTRIGINE 25 MG/1
25 TABLET ORAL EVERY MORNING
Qty: 14 TABLET | Refills: 0 | Status: SHIPPED | OUTPATIENT
Start: 2019-04-04 | End: 2019-04-23 | Stop reason: SDUPTHER

## 2019-04-04 RX ORDER — PANTOPRAZOLE SODIUM 40 MG/1
40 TABLET, DELAYED RELEASE ORAL
Status: DISCONTINUED | OUTPATIENT
Start: 2019-04-04 | End: 2019-04-04 | Stop reason: HOSPADM

## 2019-04-04 RX ORDER — DICYCLOMINE HCL 20 MG
20 TABLET ORAL 3 TIMES DAILY PRN
Qty: 60 TABLET | Refills: 0 | Status: SHIPPED | OUTPATIENT
Start: 2019-04-04 | End: 2021-07-01 | Stop reason: SDUPTHER

## 2019-04-04 RX ORDER — KETOROLAC TROMETHAMINE 10 MG/1
10 TABLET, FILM COATED ORAL EVERY 6 HOURS PRN
COMMUNITY
End: 2019-04-23 | Stop reason: SDUPTHER

## 2019-04-04 RX ADMIN — MECLIZINE HYDROCHLORIDE 25 MG: 25 TABLET ORAL at 05:52

## 2019-04-04 RX ADMIN — PANTOPRAZOLE SODIUM 40 MG: 40 TABLET, DELAYED RELEASE ORAL at 09:23

## 2019-04-04 RX ADMIN — VALPROATE SODIUM 500 MG: 100 INJECTION, SOLUTION INTRAVENOUS at 05:51

## 2019-04-04 RX ADMIN — METHOCARBAMOL 500 MG: 500 TABLET, FILM COATED ORAL at 13:37

## 2019-04-04 RX ADMIN — METHOCARBAMOL 500 MG: 500 TABLET, FILM COATED ORAL at 05:52

## 2019-04-04 RX ADMIN — LAMOTRIGINE 25 MG: 25 TABLET ORAL at 09:23

## 2019-04-04 RX ADMIN — MAGNESIUM SULFATE HEPTAHYDRATE 2 G: 40 INJECTION, SOLUTION INTRAVENOUS at 09:24

## 2019-04-09 ENCOUNTER — OFFICE VISIT (OUTPATIENT)
Dept: INTERNAL MEDICINE CLINIC | Facility: CLINIC | Age: 44
End: 2019-04-09

## 2019-04-09 VITALS
SYSTOLIC BLOOD PRESSURE: 100 MMHG | BODY MASS INDEX: 47.71 KG/M2 | WEIGHT: 259.26 LBS | DIASTOLIC BLOOD PRESSURE: 64 MMHG | HEIGHT: 62 IN | HEART RATE: 100 BPM | TEMPERATURE: 98.4 F

## 2019-04-09 DIAGNOSIS — Z23 ENCOUNTER FOR IMMUNIZATION: ICD-10-CM

## 2019-04-09 DIAGNOSIS — G43.709 CHRONIC MIGRAINE WITHOUT AURA WITHOUT STATUS MIGRAINOSUS, NOT INTRACTABLE: Primary | ICD-10-CM

## 2019-04-09 PROCEDURE — 90471 IMMUNIZATION ADMIN: CPT | Performed by: INTERNAL MEDICINE

## 2019-04-09 PROCEDURE — 99496 TRANSJ CARE MGMT HIGH F2F 7D: CPT | Performed by: INTERNAL MEDICINE

## 2019-04-09 PROCEDURE — 90715 TDAP VACCINE 7 YRS/> IM: CPT | Performed by: INTERNAL MEDICINE

## 2019-04-23 ENCOUNTER — OFFICE VISIT (OUTPATIENT)
Dept: NEUROLOGY | Facility: CLINIC | Age: 44
End: 2019-04-23
Payer: COMMERCIAL

## 2019-04-23 VITALS
DIASTOLIC BLOOD PRESSURE: 82 MMHG | WEIGHT: 259 LBS | SYSTOLIC BLOOD PRESSURE: 120 MMHG | HEART RATE: 86 BPM | BODY MASS INDEX: 47.37 KG/M2

## 2019-04-23 DIAGNOSIS — G43.709 CHRONIC MIGRAINE WITHOUT AURA WITHOUT STATUS MIGRAINOSUS, NOT INTRACTABLE: Primary | ICD-10-CM

## 2019-04-23 DIAGNOSIS — G43.711 INTRACTABLE CHRONIC MIGRAINE WITHOUT AURA AND WITH STATUS MIGRAINOSUS: ICD-10-CM

## 2019-04-23 PROCEDURE — 99215 OFFICE O/P EST HI 40 MIN: CPT | Performed by: PHYSICIAN ASSISTANT

## 2019-04-23 RX ORDER — KETOROLAC TROMETHAMINE 10 MG/1
10 TABLET, FILM COATED ORAL EVERY 6 HOURS PRN
Qty: 10 TABLET | Refills: 2 | Status: SHIPPED | OUTPATIENT
Start: 2019-04-23 | End: 2019-08-06 | Stop reason: SDUPTHER

## 2019-04-23 RX ORDER — LAMOTRIGINE 25 MG/1
25 TABLET ORAL EVERY MORNING
Qty: 90 TABLET | Refills: 1 | Status: SHIPPED | OUTPATIENT
Start: 2019-04-23 | End: 2019-09-03

## 2019-04-23 RX ORDER — DEXAMETHASONE 2 MG/1
TABLET ORAL
Qty: 5 TABLET | Refills: 0 | Status: SHIPPED | OUTPATIENT
Start: 2019-04-23 | End: 2020-01-15 | Stop reason: SDUPTHER

## 2019-04-23 RX ORDER — LAMOTRIGINE 100 MG/1
100 TABLET ORAL
Qty: 90 TABLET | Refills: 1 | Status: SHIPPED | OUTPATIENT
Start: 2019-04-23 | End: 2019-09-03

## 2019-04-23 RX ORDER — OLANZAPINE 10 MG/1
10 TABLET ORAL
COMMUNITY
End: 2019-09-03 | Stop reason: SDUPTHER

## 2019-04-23 RX ORDER — OMEPRAZOLE 10 MG/1
10 CAPSULE, DELAYED RELEASE ORAL DAILY
COMMUNITY
End: 2019-05-09 | Stop reason: DRUGHIGH

## 2019-04-29 ENCOUNTER — TELEPHONE (OUTPATIENT)
Dept: NEUROLOGY | Facility: CLINIC | Age: 44
End: 2019-04-29

## 2019-04-29 DIAGNOSIS — G43.709 CHRONIC MIGRAINE WITHOUT AURA WITHOUT STATUS MIGRAINOSUS, NOT INTRACTABLE: Primary | ICD-10-CM

## 2019-05-07 ENCOUNTER — TELEPHONE (OUTPATIENT)
Dept: SLEEP CENTER | Facility: CLINIC | Age: 44
End: 2019-05-07

## 2019-05-09 ENCOUNTER — TELEPHONE (OUTPATIENT)
Dept: NEUROLOGY | Facility: CLINIC | Age: 44
End: 2019-05-09

## 2019-05-09 ENCOUNTER — OFFICE VISIT (OUTPATIENT)
Dept: GASTROENTEROLOGY | Facility: CLINIC | Age: 44
End: 2019-05-09
Payer: COMMERCIAL

## 2019-05-09 VITALS
HEART RATE: 83 BPM | TEMPERATURE: 98 F | SYSTOLIC BLOOD PRESSURE: 121 MMHG | DIASTOLIC BLOOD PRESSURE: 84 MMHG | WEIGHT: 263.7 LBS | HEIGHT: 62 IN | BODY MASS INDEX: 48.53 KG/M2

## 2019-05-09 DIAGNOSIS — K91.5 POST-CHOLECYSTECTOMY SYNDROME: ICD-10-CM

## 2019-05-09 DIAGNOSIS — K21.9 GERD WITHOUT ESOPHAGITIS: Primary | ICD-10-CM

## 2019-05-09 DIAGNOSIS — K58.2 IRRITABLE BOWEL SYNDROME WITH BOTH CONSTIPATION AND DIARRHEA: ICD-10-CM

## 2019-05-09 PROCEDURE — 99214 OFFICE O/P EST MOD 30 MIN: CPT | Performed by: PHYSICIAN ASSISTANT

## 2019-05-09 RX ORDER — CHOLESTYRAMINE
POWDER (GRAM) MISCELLANEOUS
COMMUNITY
End: 2019-09-19 | Stop reason: SDUPTHER

## 2019-05-09 RX ORDER — OMEPRAZOLE 40 MG/1
40 CAPSULE, DELAYED RELEASE ORAL DAILY
Qty: 30 CAPSULE | Refills: 2 | Status: SHIPPED | OUTPATIENT
Start: 2019-05-09 | End: 2019-09-23 | Stop reason: SDUPTHER

## 2019-05-21 ENCOUNTER — TELEPHONE (OUTPATIENT)
Dept: NEUROLOGY | Facility: CLINIC | Age: 44
End: 2019-05-21

## 2019-06-11 ENCOUNTER — OFFICE VISIT (OUTPATIENT)
Dept: NEUROLOGY | Facility: CLINIC | Age: 44
End: 2019-06-11
Payer: COMMERCIAL

## 2019-06-11 ENCOUNTER — TELEPHONE (OUTPATIENT)
Dept: NEUROLOGY | Facility: CLINIC | Age: 44
End: 2019-06-11

## 2019-06-11 VITALS
DIASTOLIC BLOOD PRESSURE: 82 MMHG | SYSTOLIC BLOOD PRESSURE: 128 MMHG | BODY MASS INDEX: 47.19 KG/M2 | HEART RATE: 90 BPM | WEIGHT: 258 LBS

## 2019-06-11 DIAGNOSIS — M79.7 FIBROMYALGIA: ICD-10-CM

## 2019-06-11 DIAGNOSIS — G43.711 INTRACTABLE CHRONIC MIGRAINE WITHOUT AURA AND WITH STATUS MIGRAINOSUS: ICD-10-CM

## 2019-06-11 DIAGNOSIS — G43.709 CHRONIC MIGRAINE WITHOUT AURA WITHOUT STATUS MIGRAINOSUS, NOT INTRACTABLE: Primary | ICD-10-CM

## 2019-06-11 PROCEDURE — 99214 OFFICE O/P EST MOD 30 MIN: CPT | Performed by: PHYSICIAN ASSISTANT

## 2019-07-05 ENCOUNTER — TELEPHONE (OUTPATIENT)
Dept: NEUROLOGY | Facility: CLINIC | Age: 44
End: 2019-07-05

## 2019-07-05 DIAGNOSIS — G43.709 CHRONIC MIGRAINE WITHOUT AURA WITHOUT STATUS MIGRAINOSUS, NOT INTRACTABLE: Primary | ICD-10-CM

## 2019-07-05 NOTE — TELEPHONE ENCOUNTER
I thought she gets the entire year with the copay card for aimovig? Ok to switch to ajovy if we have to    Thanks

## 2019-07-05 NOTE — TELEPHONE ENCOUNTER
Please see 6/11 encounter  Pt has been taking Aimovig 70 mg since med was denied  Pt using co-pay card but has now maxed out her allowance  Pt has noticed some improvement in HA frequency  Now having 3 per wk  Pt was going to complete [employer] application however questioning if she should just move on to [address] per insurance  Please advise  Pt [phone]  Ok to leave detailed message

## 2019-07-15 ENCOUNTER — TELEPHONE (OUTPATIENT)
Dept: INTERNAL MEDICINE CLINIC | Facility: CLINIC | Age: 44
End: 2019-07-15

## 2019-07-15 NOTE — TELEPHONE ENCOUNTER
MOLLY from [facility] [place] law offices was sent to [facility] SURGICAL SPECIAL[facility] 7/15/19

## 2019-07-19 NOTE — TELEPHONE ENCOUNTER
I do not think this was ever sent to me  I found it because the patient emailed me  I just signed off on Ajovy and sent her a [employer] message letting her know

## 2019-08-06 ENCOUNTER — OFFICE VISIT (OUTPATIENT)
Dept: NEUROLOGY | Facility: CLINIC | Age: 44
End: 2019-08-06
Payer: COMMERCIAL

## 2019-08-06 VITALS
DIASTOLIC BLOOD PRESSURE: 76 MMHG | HEART RATE: 76 BPM | SYSTOLIC BLOOD PRESSURE: 128 MMHG | WEIGHT: 250 LBS | BODY MASS INDEX: 45.73 KG/M2

## 2019-08-06 DIAGNOSIS — G43.709 CHRONIC MIGRAINE WITHOUT AURA WITHOUT STATUS MIGRAINOSUS, NOT INTRACTABLE: Primary | ICD-10-CM

## 2019-08-06 PROCEDURE — 99214 OFFICE O/P EST MOD 30 MIN: CPT | Performed by: PHYSICIAN ASSISTANT

## 2019-08-06 RX ORDER — KETOROLAC TROMETHAMINE 10 MG/1
10 TABLET, FILM COATED ORAL EVERY 6 HOURS PRN
Qty: 10 TABLET | Refills: 2 | Status: SHIPPED | OUTPATIENT
Start: 2019-08-06 | End: 2020-08-05 | Stop reason: SDUPTHER

## 2019-08-06 RX ORDER — TOPIRAMATE 25 MG/1
TABLET ORAL
Qty: 120 TABLET | Refills: 2 | Status: SHIPPED | OUTPATIENT
Start: 2019-08-06 | End: 2019-12-11 | Stop reason: ALTCHOICE

## 2019-08-06 RX ORDER — PROCHLORPERAZINE MALEATE 10 MG
TABLET ORAL
Qty: 30 TABLET | Refills: 0 | Status: SHIPPED | OUTPATIENT
Start: 2019-08-06 | End: 2020-09-14

## 2019-08-06 RX ORDER — RIZATRIPTAN BENZOATE 10 MG/1
TABLET, ORALLY DISINTEGRATING ORAL
Qty: 9 TABLET | Refills: 2 | Status: SHIPPED | OUTPATIENT
Start: 2019-08-06 | End: 2020-06-01
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
## 2019-08-07 ENCOUNTER — TELEPHONE (OUTPATIENT)
Dept: NEUROLOGY | Facility: CLINIC | Age: 44
End: 2019-08-07

## 2019-08-07 NOTE — TELEPHONE ENCOUNTER
Received call from Sofia Caceres at [employer] stating pt has been out of work since 3/30/19 questioning when she can return to work and what her restrictions are? If she can not return to work, why can't she? I also see that we received paperwork from [employer], I asked that if we were calling her with this information if she still needed the forms filled out, she stated the forms would not be needed then  I did fax over her last office visit to [employer] as requested  Please advise about her work status?     P [phone]

## 2019-08-07 NOTE — TELEPHONE ENCOUNTER
Disability forms were faxed- [facility]- did Denver Colander get these? There is one more month of disability

## 2019-08-08 NOTE — TELEPHONE ENCOUNTER
kareem please call and check if Javier Tavares got these forms (her number is listed below) for [address] thnx

## 2019-08-08 NOTE — TELEPHONE ENCOUNTER
Stephan Vazquez - I spoke to Douglas  She confirmed that she received pt's forms that I faxed yesterday morning, had no further questions at that this time

## 2019-08-28 ENCOUNTER — SOCIAL WORK (OUTPATIENT)
Dept: BEHAVIORAL/MENTAL HEALTH CLINIC | Facility: CLINIC | Age: 44
End: 2019-08-28
Payer: COMMERCIAL

## 2019-08-28 DIAGNOSIS — F43.10 POST TRAUMATIC STRESS DISORDER (PTSD): Primary | ICD-10-CM

## 2019-08-28 PROCEDURE — 90791 PSYCH DIAGNOSTIC EVALUATION: CPT | Performed by: SOCIAL WORKER

## 2019-08-28 NOTE — PSYCH
Ghazala Bar  1975       Date of Initial Treatment Plan: 8/28/19   Date of Current Treatment Plan: 08/28/19    Treatment Plan Number 1     Strengths/Personal Resources for Self Care: loving others, funny, nurturing  Diagnosis:   No diagnosis found  Area of Needs: self-confidence       Long Term Goal 1: AImprove overall health    Target Date: 11/30/19  Completion Date:          Short Term Objectives for Goal 1: AI will be able to identify how my life has changed and 3 ways to initiate change currently  and BI will be able to initiate at least 3 new changes in my life, in order to improve my overall health  Long Term Goal 2: Reduce symptoms    Target Date: 11/30/19  Completion Date:      Short Term Objectives for Goal 2: AI will be able to learn to communiate my needs to others at least once per day  and BI will demonstrate a 50% reduction in anxiety through the introduction of 5 new coping strategies  GOAL 1: Modality: Individual 2x per month   Completion Date ongoing    GOAL 2: Modality: Individual 2x per month   Completion Date ongoing     [address]: Diagnosis and Treatment Plan explained to Bessie Nino relates understanding diagnosis and is agreeable to Treatment Plan         Client Comments : Please share your thoughts, feelings, need and/or experiences regarding your treatment plan: na
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
## 2019-09-03 ENCOUNTER — TELEPHONE (OUTPATIENT)
Dept: NEUROLOGY | Facility: CLINIC | Age: 44
End: 2019-09-03

## 2019-09-03 ENCOUNTER — OFFICE VISIT (OUTPATIENT)
Dept: NEUROLOGY | Facility: CLINIC | Age: 44
End: 2019-09-03
Payer: COMMERCIAL

## 2019-09-03 VITALS
DIASTOLIC BLOOD PRESSURE: 82 MMHG | HEART RATE: 75 BPM | SYSTOLIC BLOOD PRESSURE: 119 MMHG | BODY MASS INDEX: 45.73 KG/M2 | WEIGHT: 250 LBS

## 2019-09-03 DIAGNOSIS — G43.711 INTRACTABLE CHRONIC MIGRAINE WITHOUT AURA AND WITH STATUS MIGRAINOSUS: ICD-10-CM

## 2019-09-03 DIAGNOSIS — G43.709 CHRONIC MIGRAINE WITHOUT AURA WITHOUT STATUS MIGRAINOSUS, NOT INTRACTABLE: Primary | ICD-10-CM

## 2019-09-03 DIAGNOSIS — E53.8 B12 DEFICIENCY: ICD-10-CM

## 2019-09-03 DIAGNOSIS — G47.30 SLEEP-DISORDERED BREATHING: ICD-10-CM

## 2019-09-03 PROCEDURE — 99215 OFFICE O/P EST HI 40 MIN: CPT | Performed by: PHYSICIAN ASSISTANT

## 2019-09-03 PROCEDURE — 96372 THER/PROPH/DIAG INJ SC/IM: CPT | Performed by: PHYSICIAN ASSISTANT

## 2019-09-03 RX ORDER — PROCHLORPERAZINE EDISYLATE 5 MG/ML
10 INJECTION INTRAMUSCULAR; INTRAVENOUS ONCE
Status: COMPLETED | OUTPATIENT
Start: 2019-09-03 | End: 2019-09-03

## 2019-09-03 RX ORDER — KETOROLAC TROMETHAMINE 30 MG/ML
60 INJECTION, SOLUTION INTRAMUSCULAR; INTRAVENOUS ONCE
Status: COMPLETED | OUTPATIENT
Start: 2019-09-03 | End: 2019-09-03

## 2019-09-03 RX ORDER — OLANZAPINE 2.5 MG/1
2.5 TABLET ORAL
Qty: 30 TABLET | Refills: 0 | Status: SHIPPED | OUTPATIENT
Start: 2019-09-03 | End: 2020-02-06 | Stop reason: SDUPTHER

## 2019-09-03 RX ADMIN — KETOROLAC TROMETHAMINE 60 MG: 30 INJECTION, SOLUTION INTRAMUSCULAR; INTRAVENOUS at 12:50

## 2019-09-03 RX ADMIN — PROCHLORPERAZINE EDISYLATE 10 MG: 5 INJECTION INTRAMUSCULAR; INTRAVENOUS at 12:50
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
## 2019-09-03 NOTE — PATIENT INSTRUCTIONS
Topamax continue 100 mg at night for 2 weeks, then can increase to 150 mg at night if needed  Start olanzapine 2 5 mg at bed x 5 days, increase to 5 mg at bed if needed  Call/ email me if needed  Hold lamictal  Aimovig PA, ? botox  One more shot of Ajovy, if no difference in headaches please stop this    Will ask Dr Michael Hart about migraine infusion

## 2019-09-03 NOTE — TELEPHONE ENCOUNTER
Patient presents to office visit today with a secondary fax number to [employer]  Patient states "they never received the form from you [Neurology]"   Printed completed form that was previously scanned into patient chart and faxed on 9/3/19 at [facility] [facility]

## 2019-09-05 ENCOUNTER — TELEPHONE (OUTPATIENT)
Dept: NEUROLOGY | Facility: CLINIC | Age: 44
End: 2019-09-05

## 2019-09-12 ENCOUNTER — TELEPHONE (OUTPATIENT)
Dept: NEUROLOGY | Facility: CLINIC | Age: 44
End: 2019-09-12

## 2019-09-12 DIAGNOSIS — E53.8 VITAMIN B12 DEFICIENCY: Primary | ICD-10-CM

## 2019-09-12 RX ORDER — SYRINGE W-NEEDLE,DISPOSAB,3 ML 25GX5/8"
SYRINGE, EMPTY DISPOSABLE MISCELLANEOUS
Qty: 7 EACH | Refills: 0 | Status: SHIPPED | OUTPATIENT
Start: 2019-09-12 | End: 2019-12-23 | Stop reason: SDUPTHER

## 2019-09-12 NOTE — TELEPHONE ENCOUNTER
pt called and states that you put on forms that she was returning to work on oct 7 but she thought it was dec 3 when her next appt is    per encounter from 8/7 where you stated one more month of disability  please advise on return to work date

## 2019-09-12 NOTE — TELEPHONE ENCOUNTER
Patient asking if [employer] forms have been faxed  Made her aware this was sent on 9/5  She will check with [employer] and call back if anything else is needed  Patient is also states she was told to start vitamin B12 injections at home  She is okay to inject herself  She is asking for this to be sent to [address] [place] [address]  [phone]  Okay to leave detailed message

## 2019-09-13 NOTE — TELEPHONE ENCOUNTER
Patient is aware and will call or bring in forms closer to 10/7/19  She is fine with doing everything on a month to month basis

## 2019-09-13 NOTE — TELEPHONE ENCOUNTER
[facility] pharmacy calling regarding B12 injections  They will dispense 1mL vials with syringes instead of the kits  She states there will be no difference in what the patient is receiving       [address]- just an [place]

## 2019-09-16 ENCOUNTER — TELEPHONE (OUTPATIENT)
Dept: NEUROLOGY | Facility: CLINIC | Age: 44
End: 2019-09-16

## 2019-09-16 PROBLEM — G47.30 SLEEP-DISORDERED BREATHING: Status: ACTIVE | Noted: 2019-09-16

## 2019-09-16 PROBLEM — E53.8 B12 DEFICIENCY: Status: ACTIVE | Noted: 2019-09-16

## 2019-09-16 NOTE — TELEPHONE ENCOUNTER
----- Message from Sofia Moore PA-C sent at 9/16/2019  7:45 AM EDT -----  Robert Moreno- the pt states botox was too costly  Does she have a high deductible? Otherwise I wanted to restart botox  Thanks

## 2019-09-16 NOTE — TELEPHONE ENCOUNTER
Patient would have to call her insurance and find out what's her benefits like: co-insurance, max out of pocket and deductible for the year and co-pay  Also in some cases the pharmacy is able to assist patients with applying for co-pay assistant programs    I will call the pt with this information as well    Thank you!     Nini

## 2019-09-16 NOTE — TELEPHONE ENCOUNTER
Called and spoke to pt - she will call her insurance and the pharmacy to figure out her options as far as cost and payment options  Pt will call back if botox cost will be manageable for her    Thank you!     Nini

## 2019-09-17 NOTE — TELEPHONE ENCOUNTER
Pt called and states that Wanda Thoankita is requesting her most recent office notes (9/3/19) be faxed to [address] at  "[phone]"  States that in order for Maribeth to process her claim, most recent office notes is needed  Dr Elizabeth Arango, office notes 9/3/19-cosign needed       Amita: once signed, pls fax office notes 9/3/19 to [address] at [phone] ext [phone] attn Rory Nuno

## 2019-09-18 ENCOUNTER — SOCIAL WORK (OUTPATIENT)
Dept: BEHAVIORAL/MENTAL HEALTH CLINIC | Facility: CLINIC | Age: 44
End: 2019-09-18
Payer: COMMERCIAL

## 2019-09-18 DIAGNOSIS — F43.10 POST TRAUMATIC STRESS DISORDER (PTSD): Primary | ICD-10-CM

## 2019-09-18 PROCEDURE — 90834 PSYTX W PT 45 MINUTES: CPT | Performed by: SOCIAL WORKER

## 2019-09-18 NOTE — PSYCH
Psychotherapy Provided: Individual Psychotherapy 40 minutes     Length of time in session: 2:10 pm to 2:50 pm, follow up in 2 week    Goals addressed in session: Goal 1 and Goal 2     Pain:      mild    6    Current suicide risk : Low     Therapist met with the client  Client and therapist discussed the clients feelings related to her current housing situation  Client shared that she was struggling with the amount of people within her home and how to combine finances in order to pay for the bills and the rent  Client shared difficulty with the rent and how it is paid  Client was able to engage in open dialogue related to her depression and how her cousin, who recently moved into the home, has been able to assist with developing coping skills and engaging in communicating activities  Client will continue to establish therapeutic rapport during the next few sessions  Behavioral Health Treatment Plan [facility]: Diagnosis and Treatment Plan explained to Chaitanya Cabrera relates understanding diagnosis and is agreeable to Treatment Plan   Yes

## 2019-09-19 ENCOUNTER — ANNUAL EXAM (OUTPATIENT)
Dept: OBGYN CLINIC | Facility: CLINIC | Age: 44
End: 2019-09-19
Payer: COMMERCIAL

## 2019-09-19 VITALS
DIASTOLIC BLOOD PRESSURE: 70 MMHG | HEIGHT: 63 IN | WEIGHT: 263 LBS | BODY MASS INDEX: 46.6 KG/M2 | SYSTOLIC BLOOD PRESSURE: 116 MMHG

## 2019-09-19 DIAGNOSIS — Z01.419 WOMEN'S ANNUAL ROUTINE GYNECOLOGICAL EXAMINATION: Primary | ICD-10-CM

## 2019-09-19 DIAGNOSIS — Z11.3 SCREENING EXAMINATION FOR STD (SEXUALLY TRANSMITTED DISEASE): ICD-10-CM

## 2019-09-19 DIAGNOSIS — Z12.39 ENCOUNTER FOR SCREENING BREAST EXAMINATION: ICD-10-CM

## 2019-09-19 DIAGNOSIS — E66.01 OBESITY, MORBID, BMI 40.0-49.9 (HCC): ICD-10-CM

## 2019-09-19 PROBLEM — N81.9 GENITAL PROLAPSE: Status: ACTIVE | Noted: 2019-09-19

## 2019-09-19 PROCEDURE — S0610 ANNUAL GYNECOLOGICAL EXAMINA: HCPCS | Performed by: NURSE PRACTITIONER

## 2019-09-19 PROCEDURE — G0145 SCR C/V CYTO,THINLAYER,RESCR: HCPCS | Performed by: NURSE PRACTITIONER

## 2019-09-19 PROCEDURE — 87491 CHLMYD TRACH DNA AMP PROBE: CPT | Performed by: NURSE PRACTITIONER

## 2019-09-19 PROCEDURE — 87624 HPV HI-RISK TYP POOLED RSLT: CPT | Performed by: NURSE PRACTITIONER

## 2019-09-19 PROCEDURE — 87591 N.GONORRHOEAE DNA AMP PROB: CPT | Performed by: NURSE PRACTITIONER

## 2019-09-19 RX ORDER — CYANOCOBALAMIN 1000 UG/ML
INJECTION INTRAMUSCULAR; SUBCUTANEOUS
COMMUNITY
Start: 2019-09-13 | End: 2019-09-19 | Stop reason: SDUPTHER

## 2019-09-19 RX ORDER — PEN NEEDLE, DIABETIC 29 G X1/2"
NEEDLE, DISPOSABLE MISCELLANEOUS
COMMUNITY
Start: 2019-09-13 | End: 2019-12-24 | Stop reason: SDUPTHER

## 2019-09-19 NOTE — TELEPHONE ENCOUNTER
Patient states she will not get paid until last office note is received  Dr Ivy Jones- Please co sign note if appropriate  Mylinerika Close- please fax when signed  Thank you!

## 2019-09-19 NOTE — PROGRESS NOTES
Subjective    HPI:     Natalia Parker is a 40 y o  female  She is a  2 Para 2, with 2 prior vaginal deliveries  Her menstrual cycles are absent  Her current method of contraception includes Mirena IUD, which due for replacement next month  She denies issues with intimacy  She denies GUand Gyn complaints  She has IBS fluctuates between constipation and diarrhea  She denies depression/anxiety  Started therapy, has had 2 sessions  Medical, surgical and family history reviewed  Her dental care is not done - 2 years ago  She is concerned about her weight  States this is the heaviest she has been  Gynecologic History    No LMP recorded  Patient has had an implant  Last Pap: years ago  No history of abnormal pap smears per patient  Last mammogram: has not had baseline      Obstetric History    OB History    Para Term  AB Living   2 2       2   SAB TAB Ectopic Multiple Live Births           2      # Outcome Date GA Lbr Oleg/2nd Weight Sex Delivery Anes PTL Lv   2 Para            1 Para                The following portions of the patient's history were reviewed and updated as appropriate: allergies, current medications, past family history, past medical history, past social history, past surgical history and problem list     Review of Systems    Pertinent items are noted in HPI  Objective    Physical Exam   Constitutional: Vital signs are normal  She appears well-developed and well-nourished  Genitourinary: Vagina normal and uterus normal  Pelvic exam was performed with patient supine  There is no rash, tenderness, lesion or Bartholin's cyst on the right labia  There is no rash, tenderness, lesion or Bartholin's cyst on the left labia  Right adnexum does not display mass, does not display tenderness and does not display fullness  Left adnexum does not display mass, does not display tenderness and does not display fullness  Cervix is parous  Cervix exhibits visible IUD strings  Cervix does not exhibit motion tenderness, lesion, discharge, friability, polyp or nabothian cyst      Uterus is anteverted  HENT:   Head: Normocephalic and atraumatic  Neck: Neck supple  No thyromegaly present  Cardiovascular: Normal rate, regular rhythm, S1 normal, S2 normal and normal heart sounds  Pulmonary/Chest: Effort normal and breath sounds normal  Right breast exhibits no inverted nipple, no mass, no nipple discharge, no skin change and no tenderness  Left breast exhibits no inverted nipple, no mass, no nipple discharge, no skin change and no tenderness  Abdominal: Soft  Bowel sounds are normal  She exhibits no distension and no mass  There is no tenderness  There is no guarding  Lymphadenopathy:     She has no cervical adenopathy  She has no axillary adenopathy  Neurological: She is alert  Skin: Skin is warm  Psychiatric: She has a normal mood and affect  Nursing note and vitals reviewed  Assessment and Plan    Faustino Hood was seen today for gynecologic exam     Diagnoses and all orders for this visit:    Women's annual routine gynecological examination  -     Liquid-based pap, screening    Encounter for screening breast examination  -     Mammo screening bilateral w 3d & cad; Future    Obesity, morbid, BMI 40 0-49 9 ([facility] [address] )  -     Ambulatory referral to Weight Management; Future    Screening examination for STD (sexually transmitted disease)  -     Chlamydia/GC amplified DNA by PCR      Patient informed of a Stable GYN exam  A pap smear was performed  I have discussed the importance of exercise and healthy diet as well as adequate intake of calcium and vitamin D  A referral for weight management provided, she will make arrangements for a consult regarding her options for weight loss assistance  The current ASCCP guidelines were reviewed   The low risk patient will receive pap smear screening every 3 years until the age of 34 and then every 3 to 5 years with HPV co-testing from the ages of 33-67  I emphasized the importance of an annual pelvic and breast exam      The need for a baseline mammogram was discussed  Patient will make arrangements  I discussed the importance of Dental health as it relates to heart health  All questions have been answered to her satisfaction  Contraception: IUD  Mammogram ordered    Follow up in: 1 month for IUD removal and insertion

## 2019-09-20 NOTE — TELEPHONE ENCOUNTER
Note signed and faxed to [phone]  Tried to contact Lucianne Kocher however "the extension is invalid"  Called patient to make her aware that her note was faxed  Patient states she will reach out to Lucianne Kocher to confirm that she received our fax

## 2019-09-23 DIAGNOSIS — K21.9 GERD WITHOUT ESOPHAGITIS: ICD-10-CM

## 2019-09-23 LAB
C TRACH DNA SPEC QL NAA+PROBE: NEGATIVE
HPV HR 12 DNA CVX QL NAA+PROBE: POSITIVE
HPV16 DNA CVX QL NAA+PROBE: NEGATIVE
HPV18 DNA CVX QL NAA+PROBE: NEGATIVE
N GONORRHOEA DNA SPEC QL NAA+PROBE: NEGATIVE

## 2019-09-23 RX ORDER — OMEPRAZOLE 40 MG/1
CAPSULE, DELAYED RELEASE ORAL
Qty: 30 CAPSULE | Refills: 4 | Status: SHIPPED | OUTPATIENT
Start: 2019-09-23 | End: 2020-07-29

## 2019-09-24 LAB
LAB AP GYN PRIMARY INTERPRETATION: NORMAL
Lab: NORMAL
PATH INTERP SPEC-IMP: NORMAL

## 2019-09-25 ENCOUNTER — PATIENT MESSAGE (OUTPATIENT)
Dept: NEUROLOGY | Facility: CLINIC | Age: 44
End: 2019-09-25

## 2019-09-25 ENCOUNTER — TELEPHONE (OUTPATIENT)
Dept: OBGYN CLINIC | Facility: CLINIC | Age: 44
End: 2019-09-25

## 2019-09-25 NOTE — TELEPHONE ENCOUNTER
Patient informed of pap smear results which show +HPV  HPV reviewed with patient and the need for further evaluation with a colposcopy discussed  She was also informed that her GC/Chlamydia was negative  Patient transferred to front office staff for scheduling  All questions and concerns addressed and patient verbalized understanding

## 2019-09-27 ENCOUNTER — TELEPHONE (OUTPATIENT)
Dept: NEUROLOGY | Facility: CLINIC | Age: 44
End: 2019-09-27

## 2019-09-27 NOTE — TELEPHONE ENCOUNTER
Patient questioning if [employer] form received and completed  M K  Do you have the form? It's not in her chart  Was the patient charged? Mata Khoury [phone]    Case # [id number]  : Dorys Pena [id number]  Fax: [phone]    Patient requesting this be completed as soon as possible, she's not getting paid

## 2019-09-30 NOTE — TELEPHONE ENCOUNTER
Late entry: This with the patient on Friday evening, 9/27/2019  I called in Depakote taper to her pharmacy to break current cycle  We spoke regarding outpatient infusion which Dr Avinash Knox is willing to sign off on  She states that referral to [facility] would require first payment of "$4-600" and she cannot afford that

## 2019-09-30 NOTE — TELEPHONE ENCOUNTER
JUAN-Spoke with Provider's KIM Levi  Regarding Patients [employer] forms that are scanned in there will be no charge [address] does these monthly for the patient  I also spoke with patient to discard my message the is no fee

## 2019-09-30 NOTE — TELEPHONE ENCOUNTER
STD form received and being scanned into chart  M K  Do you have a copy of this form? Per Carlos Manuel Mckeon, the patient is being contacted for payment, are you filling this out monthly for the patient?

## 2019-10-01 ENCOUNTER — TELEPHONE (OUTPATIENT)
Dept: NEUROLOGY | Facility: CLINIC | Age: 44
End: 2019-10-01

## 2019-10-01 NOTE — TELEPHONE ENCOUNTER
Patient called to check on status of headache infusion  Please have MA reach out to patient  Patient also asking if you are agreeable to headache infusion can you extend STD  Patient was supposed to return to work October 7th  If proceeding with headache infusion, can this be extended?

## 2019-10-03 NOTE — TELEPHONE ENCOUNTER
Patient called to check if forms were completed for STD  Informed patient forms were completed and faxed 9/30/19  Patient requesting forms be re-faxed as well as a copy mailed to home  Printed, faxed and mailed as requested

## 2019-10-04 NOTE — TELEPHONE ENCOUNTER
Patient stated they didn't receive the first page of the STD forms  I reviewed the completed forms and the first page at the bottom is blank (page 5)  Please complete and fax to [facility]

## 2019-10-04 NOTE — TELEPHONE ENCOUNTER
Orders need to be placed in [place]  I will forward you the tip sheet via email on how to do so  Unfortunately, the nurses cannot enter for you unless you can tell us exactly what you would like to administer (med name, dose, push vs  IVPB, over how long) for each day of the infusion  Then we can forward to you to sign and then you will need to forward for a co-signature

## 2019-10-04 NOTE — TELEPHONE ENCOUNTER
Can clinical team assist with headache infusion orders? Do I place orders in chart or beacon? Thanks

## 2019-10-04 NOTE — TELEPHONE ENCOUNTER
Dr Aniya Thorne- to confirm, are you okay with outpatient headache infusion? Kareem Prudent- do you know how to save a chair at the infusion center for this? Thanks

## 2019-10-08 ENCOUNTER — PREP FOR PROCEDURE (OUTPATIENT)
Dept: NEUROLOGY | Facility: CLINIC | Age: 44
End: 2019-10-08

## 2019-10-08 RX ORDER — BUTORPHANOL TARTRATE 1 MG/ML
1 INJECTION, SOLUTION INTRAMUSCULAR; INTRAVENOUS ONCE AS NEEDED
Status: CANCELLED | OUTPATIENT
Start: 2019-11-04

## 2019-10-08 RX ORDER — SODIUM CHLORIDE 9 MG/ML
20 INJECTION, SOLUTION INTRAVENOUS ONCE
Status: CANCELLED | OUTPATIENT
Start: 2019-11-04

## 2019-10-08 RX ORDER — ACETAMINOPHEN 325 MG/1
650 TABLET ORAL ONCE AS NEEDED
Status: CANCELLED | OUTPATIENT
Start: 2019-11-04

## 2019-10-08 RX ORDER — LORAZEPAM 2 MG/ML
1 INJECTION INTRAMUSCULAR ONCE AS NEEDED
Status: CANCELLED | OUTPATIENT
Start: 2019-11-04

## 2019-10-08 RX ORDER — KETOROLAC TROMETHAMINE 30 MG/ML
30 INJECTION, SOLUTION INTRAMUSCULAR; INTRAVENOUS ONCE AS NEEDED
Status: CANCELLED | OUTPATIENT
Start: 2019-11-04

## 2019-10-08 RX ORDER — MAGNESIUM SULFATE HEPTAHYDRATE 40 MG/ML
2 INJECTION, SOLUTION INTRAVENOUS ONCE
Status: CANCELLED | OUTPATIENT
Start: 2019-11-04

## 2019-10-08 RX ORDER — METHOCARBAMOL 500 MG/1
1000 TABLET, FILM COATED ORAL ONCE
Status: CANCELLED | OUTPATIENT
Start: 2019-11-04

## 2019-10-08 RX ORDER — DIPHENHYDRAMINE HCL 25 MG
50 TABLET ORAL ONCE
Status: CANCELLED | OUTPATIENT
Start: 2019-11-04

## 2019-10-08 NOTE — TELEPHONE ENCOUNTER
Spoke to Lyn from [facility], I was able to schedule the patient for first available on 11/4/19-11/8/19 @ 7:30 am  They stated the order must be updated in beacon starting 11/4/19 instead of Oct  Patient made aware of the time and location  Will work on the prior [address] [place] [address]

## 2019-10-08 NOTE — TELEPHONE ENCOUNTER
I placed orders  Dr Aniya Thorne- please review and sign  I placed therapy plan orders under last neurology appt in September so may need to resign that note as well  Thank you

## 2019-10-09 ENCOUNTER — TELEPHONE (OUTPATIENT)
Dept: NEUROLOGY | Facility: CLINIC | Age: 44
End: 2019-10-09

## 2019-10-09 ENCOUNTER — PROCEDURE VISIT (OUTPATIENT)
Dept: OBGYN CLINIC | Facility: CLINIC | Age: 44
End: 2019-10-09
Payer: COMMERCIAL

## 2019-10-09 VITALS
BODY MASS INDEX: 51.5 KG/M2 | WEIGHT: 262.3 LBS | SYSTOLIC BLOOD PRESSURE: 116 MMHG | HEIGHT: 60 IN | DIASTOLIC BLOOD PRESSURE: 72 MMHG

## 2019-10-09 DIAGNOSIS — R87.610 ATYPICAL SQUAMOUS CELLS OF UNDETERMINED SIGNIFICANCE ON CYTOLOGIC SMEAR OF CERVIX (ASC-US): Primary | ICD-10-CM

## 2019-10-09 PROCEDURE — 88305 TISSUE EXAM BY PATHOLOGIST: CPT | Performed by: PATHOLOGY

## 2019-10-09 PROCEDURE — 57454 BX/CURETT OF CERVIX W/SCOPE: CPT | Performed by: OBSTETRICS & GYNECOLOGY

## 2019-10-09 RX ORDER — FREMANEZUMAB-VFRM 225 MG/1.5ML
INJECTION SUBCUTANEOUS
COMMUNITY
Start: 2019-09-23 | End: 2020-01-23

## 2019-10-09 RX ORDER — DIVALPROEX SODIUM 250 MG/1
TABLET, DELAYED RELEASE ORAL AS NEEDED
COMMUNITY
Start: 2019-09-27 | End: 2020-11-04 | Stop reason: SDUPTHER

## 2019-10-09 RX ORDER — PREGABALIN 75 MG/1
CAPSULE ORAL
COMMUNITY
Start: 2019-09-25 | End: 2020-05-28

## 2019-10-09 NOTE — TELEPHONE ENCOUNTER
Patient stated her car insurance has a form that needs to be completed and she won't have to continue making payments on her car insurance premium since if she's been out of work for at least 6 months  She is questioning if VANNESSA BEARD agreeable and what would VANNESSA BEARD charge her to complete the form? It's 2 pages  Okay to leave detailed message

## 2019-10-09 NOTE — PROGRESS NOTES
Colposcopy  Date/Time: 10/9/2019 12:26 PM  Performed by: Blanca Banuelos MD  Authorized by: Blanca Banuelos MD     Consent:     Consent obtained:  Verbal and written    Consent given by:  Patient    Patient questions answered: yes      Patient agrees, verbalizes understanding, and wants to proceed: yes      Educational handouts given: no      Instructions and paperwork completed: yes    Pre-procedure:     Pre-procedure timeout performed: yes      Prepped with: acetic acid    Indication:     Indication:  ASC-US  Procedure:     Procedure: Colposcopy w/ cervical biopsy and ECC      [place] speculum was placed in the vagina: yes      Under colposcopic examination the transition zone was seen in entirety: yes      Intracervical block was performed: no      Endocervix was curetted using a Kevorkian curette: yes      Cervical biopsy performed with a cervical biopsy punch: yes      Tampon inserted: no      Monsel's solution was applied: yes      Biopsy(s): yes      Location:  9,12,2    Specimen to pathology: yes    Post-procedure:     Findings: White epithelium      Impression: Low grade cervical dysplasia    Comments: This is a 42-year-old female with a history of positive HPV on was recent Pap smear  Consent was given for colposcopy  Three biopsies were performed along with an ECC positions where the 9:00 a m  12:00 p m  And 2 o'clock position  Bleeding was controlled Monsel  Impression DOMINIQUE 1  Patient not procedure well return to office in 7-10 days for discussion of pathology results  She refrain from sexual activity 12 discharge stops

## 2019-10-09 NOTE — PATIENT INSTRUCTIONS
Colposcopy performed for positive HPV on Pap smear  The patient tolerated well  Return to office in 7-10 days for discussion of pathology    The IUD string was seen and not removed

## 2019-10-09 NOTE — LETTER
October 11, 2019     Patient: Ezequiel Ballard   YOB: 1975   Date of Visit: 10/9/2019       To Whom It May Concern: It is my medical opinion that Ezequiel Ballard can return to work after headache infusion which is scheduled from 11/4/2019 - 11/8/2019  If you have any questions or concerns, please don't hesitate to call  Sincerely,        Chad Ward PA-C    CC: No Recipients

## 2019-10-10 NOTE — TELEPHONE ENCOUNTER
Patient returned call and stated she doesn't need the car insurance form completed now because her employer is forcing her to return to work to or she will be terminated  She stated if VANNESSA BEARD  doesn't believe she is able to return to work, she can state that in the work letter but she needs to give her employer a letter regardless by the end of the week  She would like to   letter at the [place] office if agreeable

## 2019-10-10 NOTE — TELEPHONE ENCOUNTER
Called patient to ask for a copy of the form as betty Clement would like to see this form prior to agreeing to sign  Left VM on machine for a return call

## 2019-10-11 NOTE — TELEPHONE ENCOUNTER
Patient stated she can't get to the office today to  the work note  Requested it be faxed    Faxed work note to: [phone]  Attn: Gordo Edwards

## 2019-10-16 ENCOUNTER — OFFICE VISIT (OUTPATIENT)
Dept: OBGYN CLINIC | Facility: CLINIC | Age: 44
End: 2019-10-16
Payer: COMMERCIAL

## 2019-10-16 VITALS
DIASTOLIC BLOOD PRESSURE: 76 MMHG | HEIGHT: 62 IN | WEIGHT: 262.4 LBS | BODY MASS INDEX: 48.29 KG/M2 | SYSTOLIC BLOOD PRESSURE: 116 MMHG

## 2019-10-16 DIAGNOSIS — R87.610 ATYPICAL SQUAMOUS CELLS OF UNDETERMINED SIGNIFICANCE ON CYTOLOGIC SMEAR OF CERVIX (ASC-US): Primary | ICD-10-CM

## 2019-10-16 PROBLEM — R87.619 ABNORMAL PAP SMEAR OF CERVIX: Status: ACTIVE | Noted: 2019-10-16

## 2019-10-16 PROCEDURE — 99213 OFFICE O/P EST LOW 20 MIN: CPT | Performed by: OBSTETRICS & GYNECOLOGY

## 2019-10-16 NOTE — PROGRESS NOTES
This is a 42-year-old female whose Pap smear came back positive for HPV  Biopsies were taken were benign there is no evidence of dysplasia  She was reassured  She should continue yearly Pap smears for the next 2 years

## 2019-10-16 NOTE — PATIENT INSTRUCTIONS
The patient was informed benign biopsies of the cervix no further therapy repeat Pap smears on a yearly basis

## 2019-10-17 ENCOUNTER — TELEPHONE (OUTPATIENT)
Dept: NEUROLOGY | Facility: CLINIC | Age: 44
End: 2019-10-17

## 2019-10-17 NOTE — TELEPHONE ENCOUNTER
Patient calling regarding MyChart message sent yesterday  She is asking for another letter stating she has been under 's care since March 30th and will continue under our care through the dates of her infusions (11/4-11/8)  This would need to be faxed to Nirmala Maciel at [phone]  Okay to generate? Patient also asking what the charge would be for a 4 page form  Made her aware a 4 page form is $40, however, staff will need to see this form before determining the charge  She verbalizes understanding, she will bring this form to [facility] office  She reports the form is to have her auto insurance covered until she goes back to work  Awaiting form      [phone]  Okay to leave detailed message

## 2019-10-17 NOTE — LETTER
October 18, 2019     Patient: Preston Younger   YOB: 1975   Date of Visit:        To Whom It May Concern:    Ms Preston Younger has been under my care since 3/30/2019 and she will continue seeing our office through her infusion dates scheduled 11/4/2019 - 11/8/19  Sincerely,        Omar Ward PA-C    CC: No Recipients

## 2019-10-18 ENCOUNTER — TELEPHONE (OUTPATIENT)
Dept: NEUROLOGY | Facility: CLINIC | Age: 44
End: 2019-10-18

## 2019-10-18 NOTE — TELEPHONE ENCOUNTER
Patient was in and dropped off [address] form to be completed  Reviewed with [employer] and she said that we can complete it  Dropped charge for $40 and patient paid  Scanned paperwork form in  Patient asked to have form faxed to Soraida Escobar at [facility] [phone] and to place a call to patient when they are done too   Placed in bin for clinical

## 2019-10-21 ENCOUNTER — PROCEDURE VISIT (OUTPATIENT)
Dept: OBGYN CLINIC | Facility: CLINIC | Age: 44
End: 2019-10-21
Payer: COMMERCIAL

## 2019-10-21 VITALS — DIASTOLIC BLOOD PRESSURE: 66 MMHG | SYSTOLIC BLOOD PRESSURE: 112 MMHG | BODY MASS INDEX: 48.07 KG/M2 | WEIGHT: 262.8 LBS

## 2019-10-21 DIAGNOSIS — Z30.433 ENCOUNTER FOR REMOVAL AND REINSERTION OF IUD: Primary | ICD-10-CM

## 2019-10-21 DIAGNOSIS — L02.211 ABSCESS OF SKIN OF ABDOMEN: ICD-10-CM

## 2019-10-21 PROCEDURE — 58301 REMOVE INTRAUTERINE DEVICE: CPT | Performed by: NURSE PRACTITIONER

## 2019-10-21 PROCEDURE — 58300 INSERT INTRAUTERINE DEVICE: CPT | Performed by: NURSE PRACTITIONER

## 2019-10-21 RX ORDER — SULFAMETHOXAZOLE AND TRIMETHOPRIM 800; 160 MG/1; MG/1
1 TABLET ORAL EVERY 12 HOURS SCHEDULED
Qty: 14 TABLET | Refills: 0 | Status: SHIPPED | OUTPATIENT
Start: 2019-10-21 | End: 2019-10-28

## 2019-10-21 NOTE — TELEPHONE ENCOUNTER
[address] I see in your note it is mentioned pt is on SS disability  Are you agreeable to fill out these forms, if, so please advise if she is totally disabled? If no, when can she return to work?  If yes, is pt suitable for a rehab program?

## 2019-10-21 NOTE — PROGRESS NOTES
Merrill Viveros 68-year-old  here for removal and insertion of Mirena IUD, since it has been 5 years since insertion of her last IUD  Iud insertions  Date/Time: 10/21/2019 10:40 AM  Performed by: CATE Espinoza  Authorized by: CATE Espinoza     Consent:     Consent obtained:  Verbal and written    Consent given by:  Patient    Procedure risks and benefits discussed: yes      Patient questions answered: yes      Patient agrees, verbalizes understanding, and wants to proceed: yes      Educational handouts given: yes      Instructions and paperwork completed: yes    Procedure:     Pelvic exam performed: yes      Negative GC/chlamydia test: low risk  Cervix cleaned and prepped: yes      Speculum placed in vagina: yes      Tenaculum applied to cervix: yes      Uterus sounded: yes      Uterus sound depth (cm):  9    IUD inserted with no complications: yes      IUD type:  Mirena    Strings trimmed: yes    Post-procedure:     Patient tolerated procedure well: yes      Patient will follow up after next period: yes    Comments:      IUD inserted without difficulty  Transabdominal US shows proper placement and no evidence of perforation  Patient given instruction booklet  She is to return in 5 weeks for follow up  Iud removal  Date/Time: 10/21/2019 10:35 AM  Performed by: CATE Espinoza  Authorized by: CATE Espinoza     Consent:     Consent obtained:  Verbal and written    Consent given by:  Patient    Procedure risks and benefits discussed: yes      Patient questions answered: yes      Patient agrees, verbalizes understanding, and wants to proceed: yes      Educational handouts given: yes      Instructions and paperwork completed: yes    Procedure:     Removed with no complications: yes      Other reason for removal:  Due for removal   Comments:      IUD grasped with forceps, removed and shown to patient         Physical Exam   Constitutional: She appears well-developed and well-nourished  Neck: Neck supple  Skin:        Nursing note and vitals reviewed  Aurora Sexton was seen today for procedure  Diagnoses and all orders for this visit:    Encounter for removal and reinsertion of IUD  -     Iud insertions  -     levonorgestrel (MIRENA) IUD 20 mcg/day  -     Iud removal    Abscess of skin of abdomen  -     sulfamethoxazole-trimethoprim (BACTRIM DS) 800-160 mg per tablet; Take 1 tablet by mouth every 12 (twelve) hours for 7 days      On exam patient was noted to have an abscess on her abdomen  Will treat with abx  She is to keep watch of the site and inform if worsens  She will return in 5 weeks for IUD check

## 2019-10-22 NOTE — TELEPHONE ENCOUNTER
Just to clarify, are you agreeable to us completing form to reflect this plan and estimated RTW date? Form is in media tab under 10/18/19 date if you'd like to view it

## 2019-10-22 NOTE — TELEPHONE ENCOUNTER
No rehab  Plan is to RTW if headaches improve after headache infusion in November  I think she is currently on ST dis

## 2019-10-22 NOTE — TELEPHONE ENCOUNTER
Called patient insurance regarding prior auth and spoke with [employer]   They stated no auth needed Ref # [id number]

## 2019-10-25 NOTE — TELEPHONE ENCOUNTER
Completed and emailed to [facility] and [employer]  Please advise on section 1 C and sections 7  [employer], once completed and signed by [facility] please reach out to pt as she will have to sign page 1, the authorization page prior to us faxing  Thanks!

## 2019-10-25 NOTE — TELEPHONE ENCOUNTER
Called patient  Left detailed message making patient aware that we've received and completed her [facility] form and that her signature is required on page one  Advised that she can go to any our office locations to sign   Form scanned into chart

## 2019-10-30 NOTE — TELEPHONE ENCOUNTER
Left detailed message for pt again stating that we would need first page signed before we can send to [facility]

## 2019-10-30 NOTE — TELEPHONE ENCOUNTER
Pt came to office and signed front page  Scanned into chart and faxed to Javon Elizabeth at [phone] as requested

## 2019-11-04 ENCOUNTER — HOSPITAL ENCOUNTER (OUTPATIENT)
Dept: INFUSION CENTER | Facility: HOSPITAL | Age: 44
Discharge: HOME | End: 2019-11-04
Attending: PSYCHIATRY & NEUROLOGY
Payer: COMMERCIAL

## 2019-11-04 VITALS
TEMPERATURE: 97.2 F | HEART RATE: 72 BPM | RESPIRATION RATE: 18 BRPM | SYSTOLIC BLOOD PRESSURE: 120 MMHG | DIASTOLIC BLOOD PRESSURE: 87 MMHG

## 2019-11-04 DIAGNOSIS — G43.711 INTRACTABLE CHRONIC MIGRAINE WITHOUT AURA AND WITH STATUS MIGRAINOSUS: Primary | ICD-10-CM

## 2019-11-04 PROCEDURE — 96367 TX/PROPH/DG ADDL SEQ IV INF: CPT

## 2019-11-04 PROCEDURE — 96366 THER/PROPH/DIAG IV INF ADDON: CPT

## 2019-11-04 PROCEDURE — 96365 THER/PROPH/DIAG IV INF INIT: CPT

## 2019-11-04 RX ORDER — MAGNESIUM SULFATE HEPTAHYDRATE 40 MG/ML
2 INJECTION, SOLUTION INTRAVENOUS ONCE
Status: CANCELLED | OUTPATIENT
Start: 2019-11-05

## 2019-11-04 RX ORDER — MAGNESIUM SULFATE HEPTAHYDRATE 40 MG/ML
2 INJECTION, SOLUTION INTRAVENOUS ONCE
Status: COMPLETED | OUTPATIENT
Start: 2019-11-04 | End: 2019-11-04

## 2019-11-04 RX ORDER — DIPHENHYDRAMINE HCL 25 MG
50 TABLET ORAL ONCE
Status: COMPLETED | OUTPATIENT
Start: 2019-11-04 | End: 2019-11-04

## 2019-11-04 RX ORDER — METHOCARBAMOL 500 MG/1
1000 TABLET, FILM COATED ORAL ONCE
Status: COMPLETED | OUTPATIENT
Start: 2019-11-04 | End: 2019-11-04

## 2019-11-04 RX ORDER — DIPHENHYDRAMINE HCL 25 MG
50 TABLET ORAL ONCE
Status: CANCELLED | OUTPATIENT
Start: 2019-11-05

## 2019-11-04 RX ORDER — KETOROLAC TROMETHAMINE 30 MG/ML
30 INJECTION, SOLUTION INTRAMUSCULAR; INTRAVENOUS ONCE AS NEEDED
Status: CANCELLED | OUTPATIENT
Start: 2019-11-05

## 2019-11-04 RX ORDER — BUTORPHANOL TARTRATE 1 MG/ML
1 INJECTION, SOLUTION INTRAMUSCULAR; INTRAVENOUS ONCE AS NEEDED
Status: CANCELLED | OUTPATIENT
Start: 2019-11-05

## 2019-11-04 RX ORDER — ACETAMINOPHEN 325 MG/1
650 TABLET ORAL ONCE AS NEEDED
Status: CANCELLED | OUTPATIENT
Start: 2019-11-05

## 2019-11-04 RX ORDER — SODIUM CHLORIDE 9 MG/ML
20 INJECTION, SOLUTION INTRAVENOUS ONCE
Status: CANCELLED | OUTPATIENT
Start: 2019-11-05

## 2019-11-04 RX ORDER — METHOCARBAMOL 500 MG/1
1000 TABLET, FILM COATED ORAL ONCE
Status: CANCELLED | OUTPATIENT
Start: 2019-11-05

## 2019-11-04 RX ORDER — LORAZEPAM 2 MG/ML
1 INJECTION INTRAMUSCULAR ONCE AS NEEDED
Status: CANCELLED | OUTPATIENT
Start: 2019-11-05

## 2019-11-04 RX ORDER — SODIUM CHLORIDE 9 MG/ML
20 INJECTION, SOLUTION INTRAVENOUS ONCE
Status: COMPLETED | OUTPATIENT
Start: 2019-11-04 | End: 2019-11-04

## 2019-11-04 RX ADMIN — MAGNESIUM SULFATE HEPTAHYDRATE 2 G: 40 INJECTION, SOLUTION INTRAVENOUS at 10:52

## 2019-11-04 RX ADMIN — SODIUM CHLORIDE 20 ML/HR: 0.9 INJECTION, SOLUTION INTRAVENOUS at 08:03

## 2019-11-04 RX ADMIN — VALPROATE SODIUM 1000 MG: 100 INJECTION, SOLUTION INTRAVENOUS at 10:21

## 2019-11-04 RX ADMIN — SODIUM CHLORIDE 1000 MG: 0.9 INJECTION, SOLUTION INTRAVENOUS at 09:01

## 2019-11-04 RX ADMIN — ONDANSETRON 4 MG: 2 INJECTION INTRAMUSCULAR; INTRAVENOUS at 08:37

## 2019-11-04 RX ADMIN — DIPHENHYDRAMINE HCL 50 MG: 25 TABLET, COATED ORAL at 08:15

## 2019-11-04 RX ADMIN — FAMOTIDINE 20 MG: 10 INJECTION INTRAVENOUS at 08:17

## 2019-11-04 RX ADMIN — DIHYDROERGOTAMINE MESYLATE 1 MG: 1 INJECTION, SOLUTION INTRAMUSCULAR; INTRAVENOUS; SUBCUTANEOUS at 09:40

## 2019-11-04 RX ADMIN — METHOCARBAMOL TABLETS 1000 MG: 500 TABLET, COATED ORAL at 08:15

## 2019-11-04 NOTE — PROGRESS NOTES
Pt  Received Day #1 headache protocol today without incident, she did feel alittle nausea @ one point however wanted to hold off on the prn meds   Stated at the end of treatment that it had passed and she felt better  Pt  Left IV site in and coming back tomorrow

## 2019-11-04 NOTE — PROGRESS NOTES
Pt  Here for headache protocol   Checklist completed, pt  States her headache this am is 3, however last night it was a 10

## 2019-11-04 NOTE — PLAN OF CARE
Problem: Potential for Falls  Goal: Patient will remain free of falls  Description  INTERVENTIONS:  - Assess patient frequently for physical needs  -  Identify cognitive and physical deficits and behaviors that affect risk of falls    -  [place] fall precautions as indicated by assessment   - Educate patient/family on patient safety including physical limitations  - Instruct patient to call for assistance with activity based on assessment  - Modify environment to reduce risk of injury  - Consider OT/PT consult to assist with strengthening/mobility  Outcome: Progressing

## 2019-11-05 ENCOUNTER — HOSPITAL ENCOUNTER (OUTPATIENT)
Dept: INFUSION CENTER | Facility: HOSPITAL | Age: 44
Discharge: HOME | End: 2019-11-05
Attending: PSYCHIATRY & NEUROLOGY
Payer: COMMERCIAL

## 2019-11-05 VITALS
HEART RATE: 77 BPM | SYSTOLIC BLOOD PRESSURE: 135 MMHG | DIASTOLIC BLOOD PRESSURE: 64 MMHG | RESPIRATION RATE: 16 BRPM | TEMPERATURE: 98.3 F

## 2019-11-05 DIAGNOSIS — G43.711 INTRACTABLE CHRONIC MIGRAINE WITHOUT AURA AND WITH STATUS MIGRAINOSUS: Primary | ICD-10-CM

## 2019-11-05 PROCEDURE — 96367 TX/PROPH/DG ADDL SEQ IV INF: CPT

## 2019-11-05 PROCEDURE — 96375 TX/PRO/DX INJ NEW DRUG ADDON: CPT

## 2019-11-05 PROCEDURE — 96365 THER/PROPH/DIAG IV INF INIT: CPT

## 2019-11-05 PROCEDURE — 96366 THER/PROPH/DIAG IV INF ADDON: CPT

## 2019-11-05 RX ORDER — METHOCARBAMOL 500 MG/1
1000 TABLET, FILM COATED ORAL ONCE
Status: COMPLETED | OUTPATIENT
Start: 2019-11-05 | End: 2019-11-05

## 2019-11-05 RX ORDER — DIPHENHYDRAMINE HCL 25 MG
50 TABLET ORAL ONCE
Status: CANCELLED | OUTPATIENT
Start: 2019-11-06

## 2019-11-05 RX ORDER — DIPHENHYDRAMINE HCL 25 MG
50 TABLET ORAL ONCE
Status: COMPLETED | OUTPATIENT
Start: 2019-11-05 | End: 2019-11-05

## 2019-11-05 RX ORDER — METHOCARBAMOL 500 MG/1
1000 TABLET, FILM COATED ORAL ONCE
Status: CANCELLED | OUTPATIENT
Start: 2019-11-06

## 2019-11-05 RX ORDER — MAGNESIUM SULFATE HEPTAHYDRATE 40 MG/ML
2 INJECTION, SOLUTION INTRAVENOUS ONCE
Status: CANCELLED | OUTPATIENT
Start: 2019-11-06

## 2019-11-05 RX ORDER — BUTORPHANOL TARTRATE 1 MG/ML
1 INJECTION, SOLUTION INTRAMUSCULAR; INTRAVENOUS ONCE AS NEEDED
Status: CANCELLED | OUTPATIENT
Start: 2019-11-06

## 2019-11-05 RX ORDER — ACETAMINOPHEN 325 MG/1
650 TABLET ORAL ONCE AS NEEDED
Status: CANCELLED | OUTPATIENT
Start: 2019-11-06

## 2019-11-05 RX ORDER — SODIUM CHLORIDE 9 MG/ML
20 INJECTION, SOLUTION INTRAVENOUS ONCE
Status: COMPLETED | OUTPATIENT
Start: 2019-11-05 | End: 2019-11-05

## 2019-11-05 RX ORDER — LORAZEPAM 2 MG/ML
1 INJECTION INTRAMUSCULAR ONCE AS NEEDED
Status: CANCELLED | OUTPATIENT
Start: 2019-11-06

## 2019-11-05 RX ORDER — MAGNESIUM SULFATE HEPTAHYDRATE 40 MG/ML
2 INJECTION, SOLUTION INTRAVENOUS ONCE
Status: COMPLETED | OUTPATIENT
Start: 2019-11-05 | End: 2019-11-05

## 2019-11-05 RX ORDER — KETOROLAC TROMETHAMINE 30 MG/ML
30 INJECTION, SOLUTION INTRAMUSCULAR; INTRAVENOUS ONCE AS NEEDED
Status: CANCELLED | OUTPATIENT
Start: 2019-11-06

## 2019-11-05 RX ORDER — KETOROLAC TROMETHAMINE 30 MG/ML
30 INJECTION, SOLUTION INTRAMUSCULAR; INTRAVENOUS ONCE AS NEEDED
Status: DISCONTINUED | OUTPATIENT
Start: 2019-11-05 | End: 2019-11-06 | Stop reason: HOSPADM

## 2019-11-05 RX ORDER — SODIUM CHLORIDE 9 MG/ML
20 INJECTION, SOLUTION INTRAVENOUS ONCE
Status: CANCELLED | OUTPATIENT
Start: 2019-11-06

## 2019-11-05 RX ADMIN — METHOCARBAMOL TABLETS 1000 MG: 500 TABLET, COATED ORAL at 09:00

## 2019-11-05 RX ADMIN — KETOROLAC TROMETHAMINE 30 MG: 30 INJECTION, SOLUTION INTRAMUSCULAR at 11:28

## 2019-11-05 RX ADMIN — VALPROATE SODIUM 500 MG: 100 INJECTION, SOLUTION INTRAVENOUS at 12:17

## 2019-11-05 RX ADMIN — DIHYDROERGOTAMINE MESYLATE 1 MG: 1 INJECTION, SOLUTION INTRAMUSCULAR; INTRAVENOUS; SUBCUTANEOUS at 11:08

## 2019-11-05 RX ADMIN — SODIUM CHLORIDE 500 MG: 0.9 INJECTION, SOLUTION INTRAVENOUS at 10:27

## 2019-11-05 RX ADMIN — FAMOTIDINE 20 MG: 10 INJECTION INTRAVENOUS at 09:26

## 2019-11-05 RX ADMIN — SODIUM CHLORIDE 20 ML/HR: 0.9 INJECTION, SOLUTION INTRAVENOUS at 08:25

## 2019-11-05 RX ADMIN — DIPHENHYDRAMINE HCL 50 MG: 25 TABLET, COATED ORAL at 09:00

## 2019-11-05 RX ADMIN — MAGNESIUM SULFATE HEPTAHYDRATE 2 G: 40 INJECTION, SOLUTION INTRAVENOUS at 12:47

## 2019-11-05 RX ADMIN — ONDANSETRON 4 MG: 2 INJECTION INTRAMUSCULAR; INTRAVENOUS at 09:50

## 2019-11-05 NOTE — PLAN OF CARE
Problem: Potential for Falls  Goal: Patient will remain free of falls  Description  INTERVENTIONS:  - Assess patient frequently for physical needs  -  Identify cognitive and physical deficits and behaviors that affect risk of falls    -  [employer] fall precautions as indicated by assessment   - Educate patient/family on patient safety including physical limitations  - Instruct patient to call for assistance with activity based on assessment  - Modify environment to reduce risk of injury  - Consider OT/PT consult to assist with strengthening/mobility  Outcome: Progressing

## 2019-11-05 NOTE — PROGRESS NOTES
Infusion center visit complete  Pt received prn Tordal x1 at 1130 for pain score 4    Pt declined AVS

## 2019-11-06 ENCOUNTER — TELEPHONE (OUTPATIENT)
Dept: NEUROLOGY | Facility: CLINIC | Age: 44
End: 2019-11-06

## 2019-11-06 ENCOUNTER — HOSPITAL ENCOUNTER (OUTPATIENT)
Dept: INFUSION CENTER | Facility: HOSPITAL | Age: 44
Discharge: HOME | End: 2019-11-06
Attending: PSYCHIATRY & NEUROLOGY
Payer: COMMERCIAL

## 2019-11-06 VITALS
RESPIRATION RATE: 18 BRPM | TEMPERATURE: 97.8 F | DIASTOLIC BLOOD PRESSURE: 70 MMHG | SYSTOLIC BLOOD PRESSURE: 128 MMHG | HEART RATE: 64 BPM

## 2019-11-06 DIAGNOSIS — G43.711 INTRACTABLE CHRONIC MIGRAINE WITHOUT AURA AND WITH STATUS MIGRAINOSUS: Primary | ICD-10-CM

## 2019-11-06 PROCEDURE — 96375 TX/PRO/DX INJ NEW DRUG ADDON: CPT

## 2019-11-06 PROCEDURE — 96367 TX/PROPH/DG ADDL SEQ IV INF: CPT

## 2019-11-06 PROCEDURE — 96365 THER/PROPH/DIAG IV INF INIT: CPT

## 2019-11-06 RX ORDER — BUTORPHANOL TARTRATE 1 MG/ML
1 INJECTION, SOLUTION INTRAMUSCULAR; INTRAVENOUS ONCE AS NEEDED
Status: CANCELLED | OUTPATIENT
Start: 2019-11-07

## 2019-11-06 RX ORDER — KETOROLAC TROMETHAMINE 30 MG/ML
30 INJECTION, SOLUTION INTRAMUSCULAR; INTRAVENOUS ONCE AS NEEDED
Status: CANCELLED | OUTPATIENT
Start: 2019-11-07

## 2019-11-06 RX ORDER — SODIUM CHLORIDE 9 MG/ML
20 INJECTION, SOLUTION INTRAVENOUS ONCE
Status: COMPLETED | OUTPATIENT
Start: 2019-11-06 | End: 2019-11-06

## 2019-11-06 RX ORDER — DIPHENHYDRAMINE HCL 25 MG
50 TABLET ORAL ONCE
Status: CANCELLED | OUTPATIENT
Start: 2019-11-07

## 2019-11-06 RX ORDER — METHOCARBAMOL 500 MG/1
1000 TABLET, FILM COATED ORAL ONCE
Status: COMPLETED | OUTPATIENT
Start: 2019-11-06 | End: 2019-11-06

## 2019-11-06 RX ORDER — METHOCARBAMOL 500 MG/1
1000 TABLET, FILM COATED ORAL ONCE
Status: CANCELLED | OUTPATIENT
Start: 2019-11-07

## 2019-11-06 RX ORDER — MAGNESIUM SULFATE HEPTAHYDRATE 40 MG/ML
2 INJECTION, SOLUTION INTRAVENOUS ONCE
Status: CANCELLED | OUTPATIENT
Start: 2019-11-07

## 2019-11-06 RX ORDER — SODIUM CHLORIDE 9 MG/ML
20 INJECTION, SOLUTION INTRAVENOUS ONCE
Status: CANCELLED | OUTPATIENT
Start: 2019-11-07

## 2019-11-06 RX ORDER — LORAZEPAM 2 MG/ML
1 INJECTION INTRAMUSCULAR ONCE AS NEEDED
Status: CANCELLED | OUTPATIENT
Start: 2019-11-07

## 2019-11-06 RX ORDER — MAGNESIUM SULFATE HEPTAHYDRATE 40 MG/ML
2 INJECTION, SOLUTION INTRAVENOUS ONCE
Status: DISCONTINUED | OUTPATIENT
Start: 2019-11-06 | End: 2019-11-09 | Stop reason: HOSPADM

## 2019-11-06 RX ORDER — DIPHENHYDRAMINE HCL 25 MG
50 TABLET ORAL ONCE
Status: COMPLETED | OUTPATIENT
Start: 2019-11-06 | End: 2019-11-06

## 2019-11-06 RX ORDER — LORAZEPAM 2 MG/ML
1 INJECTION INTRAMUSCULAR ONCE AS NEEDED
Status: DISCONTINUED | OUTPATIENT
Start: 2019-11-06 | End: 2019-11-09 | Stop reason: HOSPADM

## 2019-11-06 RX ORDER — ACETAMINOPHEN 325 MG/1
650 TABLET ORAL ONCE AS NEEDED
Status: CANCELLED | OUTPATIENT
Start: 2019-11-07

## 2019-11-06 RX ADMIN — FAMOTIDINE 20 MG: 10 INJECTION, SOLUTION INTRAVENOUS at 08:35

## 2019-11-06 RX ADMIN — DIPHENHYDRAMINE HCL 50 MG: 25 TABLET, COATED ORAL at 08:34

## 2019-11-06 RX ADMIN — LORAZEPAM 1 MG: 2 INJECTION INTRAMUSCULAR; INTRAVENOUS at 11:25

## 2019-11-06 RX ADMIN — VALPROATE SODIUM 500 MG: 100 INJECTION, SOLUTION INTRAVENOUS at 11:05

## 2019-11-06 RX ADMIN — SODIUM CHLORIDE 500 MG: 0.9 INJECTION, SOLUTION INTRAVENOUS at 09:27

## 2019-11-06 RX ADMIN — DIHYDROERGOTAMINE MESYLATE 1 MG: 1 INJECTION, SOLUTION INTRAMUSCULAR; INTRAVENOUS; SUBCUTANEOUS at 10:05

## 2019-11-06 RX ADMIN — METHOCARBAMOL TABLETS 1000 MG: 500 TABLET, COATED ORAL at 08:34

## 2019-11-06 RX ADMIN — SODIUM CHLORIDE 20 ML/HR: 0.9 INJECTION, SOLUTION INTRAVENOUS at 08:30

## 2019-11-06 RX ADMIN — ONDANSETRON 4 MG: 2 INJECTION INTRAMUSCULAR; INTRAVENOUS at 09:00

## 2019-11-06 NOTE — TELEPHONE ENCOUNTER
Received call from infusion center, stated pt is getting her headache infusion now, the nurse called over and stated she has been c/o chest pain since she received the mag yesterday  4/10 vitals WNL  Questioning recommendations  Per [address]- give ativan now, do not give the mag  She stated she can go down and see the patient as well  Nurse made aware  Infusion center asking for the therapy plan to be updated to hold the mag

## 2019-11-06 NOTE — PLAN OF CARE
Problem: Potential for Falls  Goal: Patient will remain free of falls  Description  INTERVENTIONS:  - Assess patient frequently for physical needs  -  Identify cognitive and physical deficits and behaviors that affect risk of falls    -  [place] fall precautions as indicated by assessment   - Educate patient/family on patient safety including physical limitations  - Instruct patient to call for assistance with activity based on assessment  - Modify environment to reduce risk of injury  - Consider OT/PT consult to assist with strengthening/mobility  Outcome: Progressing

## 2019-11-06 NOTE — TELEPHONE ENCOUNTER
So I thought she was in [facility]  I cannot go there bc she is in [place] and I am in [place]  Please have them hold mag for the rest of the week  Thanks

## 2019-11-06 NOTE — PROGRESS NOTES
Pt no co chest pain 4/10  States it began yesterday during her magnesium infusion  Pt is just now mentioning this for the first time  She reports the pain is sternal and is worse with a deep breath  Vss  Tc to neuro office spoke with Jaimie Gunter given for chest pain and will hold magnesium

## 2019-11-07 ENCOUNTER — TELEPHONE (OUTPATIENT)
Dept: NEUROLOGY | Facility: CLINIC | Age: 44
End: 2019-11-07

## 2019-11-07 ENCOUNTER — HOSPITAL ENCOUNTER (OUTPATIENT)
Dept: INFUSION CENTER | Facility: HOSPITAL | Age: 44
Discharge: HOME | End: 2019-11-07
Attending: PSYCHIATRY & NEUROLOGY
Payer: COMMERCIAL

## 2019-11-07 VITALS
RESPIRATION RATE: 18 BRPM | SYSTOLIC BLOOD PRESSURE: 140 MMHG | DIASTOLIC BLOOD PRESSURE: 80 MMHG | HEART RATE: 56 BPM | TEMPERATURE: 98.3 F

## 2019-11-07 DIAGNOSIS — G43.711 INTRACTABLE CHRONIC MIGRAINE WITHOUT AURA AND WITH STATUS MIGRAINOSUS: Primary | ICD-10-CM

## 2019-11-07 DIAGNOSIS — K21.9 GERD WITHOUT ESOPHAGITIS: ICD-10-CM

## 2019-11-07 PROCEDURE — 96365 THER/PROPH/DIAG IV INF INIT: CPT

## 2019-11-07 PROCEDURE — 96375 TX/PRO/DX INJ NEW DRUG ADDON: CPT

## 2019-11-07 PROCEDURE — 96367 TX/PROPH/DG ADDL SEQ IV INF: CPT

## 2019-11-07 RX ORDER — SODIUM CHLORIDE 9 MG/ML
20 INJECTION, SOLUTION INTRAVENOUS ONCE
Status: COMPLETED | OUTPATIENT
Start: 2019-11-07 | End: 2019-11-07

## 2019-11-07 RX ORDER — METHOCARBAMOL 500 MG/1
1000 TABLET, FILM COATED ORAL ONCE
Status: CANCELLED | OUTPATIENT
Start: 2019-11-08

## 2019-11-07 RX ORDER — DIPHENHYDRAMINE HCL 25 MG
50 TABLET ORAL ONCE
Status: CANCELLED | OUTPATIENT
Start: 2019-11-08

## 2019-11-07 RX ORDER — KETOROLAC TROMETHAMINE 30 MG/ML
30 INJECTION, SOLUTION INTRAMUSCULAR; INTRAVENOUS ONCE AS NEEDED
Status: CANCELLED | OUTPATIENT
Start: 2019-11-08

## 2019-11-07 RX ORDER — ACETAMINOPHEN 325 MG/1
650 TABLET ORAL ONCE AS NEEDED
Status: CANCELLED | OUTPATIENT
Start: 2019-11-08

## 2019-11-07 RX ORDER — SODIUM CHLORIDE 9 MG/ML
20 INJECTION, SOLUTION INTRAVENOUS ONCE
Status: CANCELLED | OUTPATIENT
Start: 2019-11-08

## 2019-11-07 RX ORDER — DIPHENHYDRAMINE HCL 25 MG
50 TABLET ORAL ONCE
Status: COMPLETED | OUTPATIENT
Start: 2019-11-07 | End: 2019-11-07

## 2019-11-07 RX ORDER — LORAZEPAM 2 MG/ML
1 INJECTION INTRAMUSCULAR ONCE AS NEEDED
Status: CANCELLED | OUTPATIENT
Start: 2019-11-08

## 2019-11-07 RX ORDER — METHOCARBAMOL 500 MG/1
1000 TABLET, FILM COATED ORAL ONCE
Status: COMPLETED | OUTPATIENT
Start: 2019-11-07 | End: 2019-11-07

## 2019-11-07 RX ORDER — LORAZEPAM 2 MG/ML
1 INJECTION INTRAMUSCULAR ONCE AS NEEDED
Status: DISCONTINUED | OUTPATIENT
Start: 2019-11-07 | End: 2019-11-10 | Stop reason: HOSPADM

## 2019-11-07 RX ORDER — MAGNESIUM SULFATE HEPTAHYDRATE 40 MG/ML
2 INJECTION, SOLUTION INTRAVENOUS ONCE
Status: CANCELLED | OUTPATIENT
Start: 2019-11-08

## 2019-11-07 RX ORDER — BUTORPHANOL TARTRATE 1 MG/ML
1 INJECTION, SOLUTION INTRAMUSCULAR; INTRAVENOUS ONCE AS NEEDED
Status: CANCELLED | OUTPATIENT
Start: 2019-11-08

## 2019-11-07 RX ADMIN — SODIUM CHLORIDE 20 ML/HR: 0.9 INJECTION, SOLUTION INTRAVENOUS at 08:22

## 2019-11-07 RX ADMIN — VALPROATE SODIUM 250 MG: 100 INJECTION, SOLUTION INTRAVENOUS at 11:21

## 2019-11-07 RX ADMIN — LORAZEPAM 1 MG: 2 INJECTION INTRAMUSCULAR; INTRAVENOUS at 12:23

## 2019-11-07 RX ADMIN — ONDANSETRON 4 MG: 2 INJECTION INTRAMUSCULAR; INTRAVENOUS at 08:49

## 2019-11-07 RX ADMIN — DIHYDROERGOTAMINE MESYLATE 1 MG: 1 INJECTION, SOLUTION INTRAMUSCULAR; INTRAVENOUS; SUBCUTANEOUS at 10:16

## 2019-11-07 RX ADMIN — SODIUM CHLORIDE 250 MG: 0.9 INJECTION, SOLUTION INTRAVENOUS at 09:26

## 2019-11-07 RX ADMIN — DIPHENHYDRAMINE HCL 50 MG: 25 TABLET, COATED ORAL at 08:22

## 2019-11-07 RX ADMIN — METHOCARBAMOL TABLETS 1000 MG: 500 TABLET, COATED ORAL at 08:22

## 2019-11-07 RX ADMIN — FAMOTIDINE 20 MG: 10 INJECTION INTRAVENOUS at 08:22

## 2019-11-07 NOTE — PLAN OF CARE
Problem: Potential for Falls  Goal: Patient will remain free of falls  Description  INTERVENTIONS:  - Assess patient frequently for physical needs  -  Identify cognitive and physical deficits and behaviors that affect risk of falls    -  [place] fall precautions as indicated by assessment   - Educate patient/family on patient safety including physical limitations  - Instruct patient to call for assistance with activity based on assessment  - Modify environment to reduce risk of injury  - Consider OT/PT consult to assist with strengthening/mobility  Outcome: Progressing

## 2019-11-07 NOTE — TELEPHONE ENCOUNTER
Patient called to request extension on RTW  Patient was supposed to return to work on Saturday - last infusion was Friday  She is requesting a letter to return to work on Monday November 11, 2019  Are you agreeable to this? She is requesting we contact employer if agreeable  Kash Rizo - [phone]      To Whom It May Concern:    Ulysses Mares is under my professional care for her neurological condition  She received infusions this past week - her last infusion being Friday November 8, 2019  Please allow her to return to work on Monday November 11, 2019  If you have any further questions or concerns, please feel free to reach out to our office at [phone]

## 2019-11-07 NOTE — LETTER
November 12, 2019     Patient: Sergio Coulter   YOB: 1975           To Whom it May Concern:    Sergio Coulter is under my professional care  She may return to work on 11/16/2019  If you have any questions or concerns, please don't hesitate to call           Sincerely,          Latonia Dodge PA-C

## 2019-11-07 NOTE — TELEPHONE ENCOUNTER
[facility]  She stated they don't know if the DHE is causing patient's sxs  They stated they don't feel comfortable sending the patient home with CP with deep breaths  I attempted to contact VANNESSA BEARD and the MA, however just kept getting voicemail  They want to speak to VANNESSA BEARD before sending her home    [facility] ext [phone]

## 2019-11-07 NOTE — PROGRESS NOTES
Infusion visit complete  Mag held today as ordered for chest pain yesterday  Pt continues to have chest pain of 3 with deep inspiration after PRN ativan administration  Called office and spoke to [facility] AUTHORITY, TERRY at [phone] re: concerns about discharging pt  New vitals obtained per [facility] request and reported to her  Per [facility], she did not feel that symptoms/VS warranted ER visit but was going to reach out to PCP, Dr Ronit Daly  No further communication from [facility], therefore, spoke to pt and daughter at length about going to ER if symptoms worsen  Pt stated she may go to ER for evaluation and peace of mind after leaving infusion center, relayed information to [facility]  Discharged from Infusion center at 1400  Aware of next apt   Declined Avs

## 2019-11-07 NOTE — PROGRESS NOTES
Spoke to MILAGRO Villar in Neurology office to advise of pt c/o chest pain  She said she would reach out to the prescribing doctor to address our concerns   Awaiting response

## 2019-11-07 NOTE — TELEPHONE ENCOUNTER
Infusion center RN Carolann Demarco states pt still c/o chest pain when she breathes in  Was last given Mag on Tuesday  Pt not in distess  Per today's progress note:    "Pt here for headache protocol, upon assessing for pain, patient stated that she is still having pain in her chest and is having trouble taking a deep breath  Pt states that the pain is not worse than yesterday but has not improved  Per notes, we are not giving Magnesium today  Call to be placed to office"    Per RN, pt has not seen PCP to r/o anything else  Please advise clinical team on how you would like to proceed       [facility] center [phone]

## 2019-11-07 NOTE — PROGRESS NOTES
Pt here for headache protocol, upon assessing for pain, patient stated that she is still having pain in her chest and is having trouble taking a deep breath  Pt states that the pain is not worse than yesterday but has not improved  Per notes, we are not giving Magnesium today    Call to be placed to office

## 2019-11-07 NOTE — LETTER
November 12, 2019     Patient: Leobardo Chandler   YOB: 1975           To Whom it May Concern:    Leobardo Chandler is under my professional care  She is under my profession care and has been unable to work since 9/4/2019-11/16/2019  If you have any questions or concerns, please don't hesitate to call           Sincerely,          Mirella Messina PA-C

## 2019-11-08 ENCOUNTER — HOSPITAL ENCOUNTER (OUTPATIENT)
Dept: INFUSION CENTER | Facility: HOSPITAL | Age: 44
Discharge: HOME | End: 2019-11-08
Attending: PSYCHIATRY & NEUROLOGY
Payer: COMMERCIAL

## 2019-11-08 VITALS
SYSTOLIC BLOOD PRESSURE: 167 MMHG | TEMPERATURE: 97.1 F | OXYGEN SATURATION: 94 % | RESPIRATION RATE: 18 BRPM | HEART RATE: 46 BPM | DIASTOLIC BLOOD PRESSURE: 81 MMHG

## 2019-11-08 DIAGNOSIS — G43.711 INTRACTABLE CHRONIC MIGRAINE WITHOUT AURA AND WITH STATUS MIGRAINOSUS: Primary | ICD-10-CM

## 2019-11-08 PROCEDURE — 96367 TX/PROPH/DG ADDL SEQ IV INF: CPT

## 2019-11-08 PROCEDURE — 96365 THER/PROPH/DIAG IV INF INIT: CPT

## 2019-11-08 PROCEDURE — 96375 TX/PRO/DX INJ NEW DRUG ADDON: CPT

## 2019-11-08 RX ORDER — METHOCARBAMOL 500 MG/1
1000 TABLET, FILM COATED ORAL ONCE
Status: CANCELLED | OUTPATIENT
Start: 2019-11-09

## 2019-11-08 RX ORDER — LORAZEPAM 2 MG/ML
1 INJECTION INTRAMUSCULAR ONCE AS NEEDED
Status: CANCELLED | OUTPATIENT
Start: 2019-11-09

## 2019-11-08 RX ORDER — BUTORPHANOL TARTRATE 1 MG/ML
1 INJECTION, SOLUTION INTRAMUSCULAR; INTRAVENOUS ONCE AS NEEDED
Status: CANCELLED | OUTPATIENT
Start: 2019-11-09

## 2019-11-08 RX ORDER — MAGNESIUM SULFATE HEPTAHYDRATE 40 MG/ML
2 INJECTION, SOLUTION INTRAVENOUS ONCE
Status: CANCELLED | OUTPATIENT
Start: 2019-11-09

## 2019-11-08 RX ORDER — SODIUM CHLORIDE 9 MG/ML
20 INJECTION, SOLUTION INTRAVENOUS ONCE
Status: CANCELLED | OUTPATIENT
Start: 2019-11-09

## 2019-11-08 RX ORDER — METHOCARBAMOL 500 MG/1
1000 TABLET, FILM COATED ORAL ONCE
Status: COMPLETED | OUTPATIENT
Start: 2019-11-08 | End: 2019-11-08

## 2019-11-08 RX ORDER — ACETAMINOPHEN 325 MG/1
650 TABLET ORAL ONCE AS NEEDED
Status: CANCELLED | OUTPATIENT
Start: 2019-11-09

## 2019-11-08 RX ORDER — KETOROLAC TROMETHAMINE 30 MG/ML
30 INJECTION, SOLUTION INTRAMUSCULAR; INTRAVENOUS ONCE AS NEEDED
Status: CANCELLED | OUTPATIENT
Start: 2019-11-09

## 2019-11-08 RX ORDER — DIPHENHYDRAMINE HCL 25 MG
50 TABLET ORAL ONCE
Status: COMPLETED | OUTPATIENT
Start: 2019-11-08 | End: 2019-11-08

## 2019-11-08 RX ORDER — SODIUM CHLORIDE 9 MG/ML
20 INJECTION, SOLUTION INTRAVENOUS ONCE
Status: COMPLETED | OUTPATIENT
Start: 2019-11-08 | End: 2019-11-08

## 2019-11-08 RX ORDER — DIPHENHYDRAMINE HCL 25 MG
50 TABLET ORAL ONCE
Status: CANCELLED | OUTPATIENT
Start: 2019-11-09

## 2019-11-08 RX ADMIN — SODIUM CHLORIDE 20 ML/HR: 0.9 INJECTION, SOLUTION INTRAVENOUS at 08:54

## 2019-11-08 RX ADMIN — SODIUM CHLORIDE 250 MG: 0.9 INJECTION, SOLUTION INTRAVENOUS at 09:49

## 2019-11-08 RX ADMIN — DIHYDROERGOTAMINE MESYLATE 1 MG: 1 INJECTION, SOLUTION INTRAMUSCULAR; INTRAVENOUS; SUBCUTANEOUS at 10:31

## 2019-11-08 RX ADMIN — METHOCARBAMOL TABLETS 1000 MG: 500 TABLET, COATED ORAL at 08:18

## 2019-11-08 RX ADMIN — FAMOTIDINE 20 MG: 10 INJECTION, SOLUTION INTRAVENOUS at 08:21

## 2019-11-08 RX ADMIN — VALPROATE SODIUM 250 MG: 100 INJECTION, SOLUTION INTRAVENOUS at 11:27

## 2019-11-08 RX ADMIN — DIPHENHYDRAMINE HCL 50 MG: 25 TABLET, COATED ORAL at 08:18

## 2019-11-08 RX ADMIN — ONDANSETRON 4 MG: 2 INJECTION INTRAMUSCULAR; INTRAVENOUS at 08:54

## 2019-11-08 NOTE — ADDENDUM NOTE
Encounter addended by: Steven Gar RN on: 11/8/2019 11:29 AM   Actions taken: MAR administration accepted

## 2019-11-08 NOTE — PLAN OF CARE
Problem: Potential for Falls  Goal: Patient will remain free of falls  Description  INTERVENTIONS:  - Assess patient frequently for physical needs  -  Identify cognitive and physical deficits and behaviors that affect risk of falls    -  [place] fall precautions as indicated by assessment   - Educate patient/family on patient safety including physical limitations  - Instruct patient to call for assistance with activity based on assessment  - Modify environment to reduce risk of injury  - Consider OT/PT consult to assist with strengthening/mobility  Outcome: Progressing

## 2019-11-08 NOTE — ADDENDUM NOTE
Encounter addended by: Lore Quintero RN on: 11/8/2019 1:21 PM   Actions taken: LDA properties accepted, Flowsheet accepted, Sign clinical note, Check Out activity completed

## 2019-11-08 NOTE — ADDENDUM NOTE
Encounter addended by: Gopal Kinsey RN on: 11/8/2019 1:06 PM   Actions taken: MAR administration accepted, LDA properties accepted, Flowsheet accepted

## 2019-11-08 NOTE — ADDENDUM NOTE
Encounter addended by: Marisa Rodríguez RN on: 11/8/2019 9:35 AM   Actions taken: Sign clinical note

## 2019-11-08 NOTE — TELEPHONE ENCOUNTER
Letter generated  Contacted Lesa Colunga and informed her patient may return to work on Nov 11     Faxed the letter to her @ [phone]

## 2019-11-08 NOTE — TELEPHONE ENCOUNTER
[facility] called asking for clarification if they are to hold the magnesium again today  Please clarify and update order

## 2019-11-08 NOTE — PROGRESS NOTES
Called office  Spoke to [place] asked for clarification as to the Mg order for treatment today  [place] to check  With  Beltran Bravo  Awaiting response and orders

## 2019-11-08 NOTE — PROGRESS NOTES
Spoke to [facility] from Neuro office  Confirmed that Mg will be held today as per VANNESSA Ward via [facility]   Updated orders pending

## 2019-11-08 NOTE — ADDENDUM NOTE
Encounter addended by: Tsering Rojo RN on: 11/8/2019 10:33 AM   Actions taken: MAR administration accepted

## 2019-11-08 NOTE — PROGRESS NOTES
Pt here today for Headache protocal   Reports fatigue, but chest pain mild today  Pt reports she did  not feel need to go to ER after apt yesterday due to rest helping the pain  Reports Headache pain level 3

## 2019-11-08 NOTE — PROGRESS NOTES
Pt tolerated treatment today without incident  No PRN medications or Magnesium given  AVS not given as computer system down    Pt does not have future appts

## 2019-11-11 ENCOUNTER — PATIENT MESSAGE (OUTPATIENT)
Dept: NEUROLOGY | Facility: CLINIC | Age: 44
End: 2019-11-11

## 2019-11-11 DIAGNOSIS — G43.711 INTRACTABLE CHRONIC MIGRAINE WITHOUT AURA AND WITH STATUS MIGRAINOSUS: Primary | ICD-10-CM

## 2019-11-11 NOTE — TELEPHONE ENCOUNTER
Patient calling back about below  She reports she spoke with HR and they would require 2 notes if [address] is agreeable to her staying out until 11/16  They are asking for note stating the date she can return to work as well as a separate note stating the patient has been under our care and unable to work from 9/4 until 11/16  Please advise on below and if it is okay to generate these letters  Thank you!

## 2019-11-11 NOTE — TELEPHONE ENCOUNTER
Pt called in to see if we had sent her HR a note that she was able to go back to work today? Per review, note was faxed to [facility] department) that pt was able to return back to work 11/11/19  Pt states she was unaware that we sent the note and thought we would contact her first  She did not go to work today  Please see patient message from this morning  She states that even if she was able to go back to work today, she would not have been able to d/t the side effects she is having from her headache infusions  Pt c/o cold sweats, stomach cramping,  diarrhea (which is getting better with pepto)  She is able to keep down water and ginger ale now, ate some pancakes that stayed down today  Asking if this is normal for her to experience? As she almost went to the Er this weekend  Also asking if you could write a note for her to be out of work today, Tuesday and Wednesday and she can return on 11/16/19? Please advise  Call back number is [phone] okay to leave a detailed message

## 2019-11-12 RX ORDER — SUCRALFATE 1 G/1
TABLET ORAL
Qty: 20 TABLET | Refills: 0 | Status: SHIPPED | OUTPATIENT
Start: 2019-11-12 | End: 2020-05-28

## 2019-11-12 NOTE — TELEPHONE ENCOUNTER
Will send carafate  Can also try Pepcid or omeprazole otc  Ok to rtw this Saturday 11/16/19 but I cannot provide any more days off after that

## 2019-11-12 NOTE — TELEPHONE ENCOUNTER
pt called regarding call from yesterday  made her aware that we were still awaiting a response  she is also asking   how long do side effects last from headache infusion (DHE)  states that her insides feel like they are burning, tried pepto and still does not feel any better    please advise

## 2019-11-15 NOTE — TELEPHONE ENCOUNTER
Patient questioned if the work note was faxed to Nahomi Rivera  Informed her per John Espino, the note was faxed on 11/12  Advised she call [employer]ah[employer] Rivera to confirm it was received

## 2019-12-03 ENCOUNTER — TELEPHONE (OUTPATIENT)
Dept: NEUROLOGY | Facility: CLINIC | Age: 44
End: 2019-12-03

## 2019-12-03 NOTE — LETTER
December 5, 2019       Patient: Leobardo Chandler   YOB: 1975       To whom it may concern,    Leobardo Chandler is currently under my care for care of her migraine headaches  Jenny's condition is chronic in nature, and associated with symptoms of nausea, vomiting, sensitivity to light and sound, vertigo, and neck pain  Please allow the patient the following accommodations to be available to her during work: use of ice pack as needed during migraine event, ability to carry abortive medications for use, avoid heavy lifting if possible to avoid further neck pain, and removal of fluorescent lighting in workspace if possible to avoid triggering of migraines  Please feel free to call the office if you have any questions/concerns at [phone]  Sincerely,    Cole Alicea PA-C

## 2019-12-03 NOTE — TELEPHONE ENCOUNTER
Pt called in to state that she needs a form filled out for work done ASAP  Pt will come in tomorrow and drop of at [place] office  Made aware of fee and turn around time  She states the form is not FMLA but something she needs done for work

## 2019-12-04 ENCOUNTER — TELEPHONE (OUTPATIENT)
Dept: NEUROLOGY | Facility: CLINIC | Age: 44
End: 2019-12-04

## 2019-12-04 NOTE — TELEPHONE ENCOUNTER
Patient came in to drop off form to be filled out and signed by Shantelle Brito, before filling out please reach out to patient at [phone]  Patient works from 12-8:30pm and will be off Thursday and Friday  Payment received, form scanned to chart

## 2019-12-05 NOTE — TELEPHONE ENCOUNTER
Letter created and sent to [facility] via [address]  Printed and placed up front for patient with her forms for patient   Patient called and notified

## 2019-12-05 NOTE — TELEPHONE ENCOUNTER
MK-Patient is requesting form to be filled out, however the form doesn't require our signature  Called patient back and she is asking that we draft a letter based on information we have already provided to [facility] in previous forms that patient can    1  Use ice packs as needed for headaches  2 be able to carry her abortive medications for use as needed  3  Avoid heavy lifting to prevent further neck pain  4  Have fluorescent light removed from desk area to prevent triggering of migraine headaches  (she states that she already has this removed, but would like it on her proposed accommodations in case she is moved to another desk space)  If you are agreeable, I can draft a letter for patient to       TY

## 2019-12-11 ENCOUNTER — TELEPHONE (OUTPATIENT)
Dept: NEUROLOGY | Facility: CLINIC | Age: 44
End: 2019-12-11

## 2019-12-11 RX ORDER — ZONISAMIDE 25 MG/1
CAPSULE ORAL
Qty: 120 CAPSULE | Refills: 2 | Status: SHIPPED | OUTPATIENT
Start: 2019-12-11 | End: 2020-04-10 | Stop reason: DRUGHIGH

## 2019-12-11 NOTE — TELEPHONE ENCOUNTER
Patient requesting the zonisamide be sent to pharmacy  Requesting a call back once it's been sent  While on the phone with patient, it appears VANNESSA BEARD  Sent rx  Called patient back and informed her rx was sent, reviewed dosing instructions with her

## 2019-12-23 DIAGNOSIS — E53.8 VITAMIN B12 DEFICIENCY: ICD-10-CM

## 2019-12-24 RX ORDER — PEN NEEDLE, DIABETIC 29 G X1/2"
NEEDLE, DISPOSABLE MISCELLANEOUS
Qty: 3 EACH | Refills: 0 | Status: SHIPPED | OUTPATIENT
Start: 2019-12-24 | End: 2020-09-10 | Stop reason: ALTCHOICE

## 2020-01-15 DIAGNOSIS — G43.709 CHRONIC MIGRAINE WITHOUT AURA WITHOUT STATUS MIGRAINOSUS, NOT INTRACTABLE: ICD-10-CM

## 2020-01-15 RX ORDER — DEXAMETHASONE 2 MG/1
TABLET ORAL
Qty: 5 TABLET | Refills: 0 | Status: SHIPPED | OUTPATIENT
Start: 2020-01-15 | End: 2020-05-28

## 2020-01-15 NOTE — TELEPHONE ENCOUNTER
Pt called stated that she has had a bad migraine for over 1 week now, she has taken her toradol and compazine and it has not helped  States that a steroid usually helps calm it down a little bit  States that she has not been to work this week  She is taking the zonisamide 100 mg at bedtime and she has not seen too much of a change  She has a f/u appt with you on 1 23  Pt asking for decadron refill  Clinical team if no further recommendations and decadron called in, we do not need to call pt back

## 2020-01-21 ENCOUNTER — TELEPHONE (OUTPATIENT)
Dept: NEUROLOGY | Facility: CLINIC | Age: 45
End: 2020-01-21

## 2020-01-23 ENCOUNTER — OFFICE VISIT (OUTPATIENT)
Dept: NEUROLOGY | Facility: CLINIC | Age: 45
End: 2020-01-23
Payer: COMMERCIAL

## 2020-01-23 VITALS
WEIGHT: 253 LBS | SYSTOLIC BLOOD PRESSURE: 132 MMHG | DIASTOLIC BLOOD PRESSURE: 82 MMHG | BODY MASS INDEX: 46.27 KG/M2 | HEART RATE: 85 BPM

## 2020-01-23 DIAGNOSIS — G47.30 SLEEP APNEA IN ADULT: ICD-10-CM

## 2020-01-23 DIAGNOSIS — E53.8 B12 DEFICIENCY: ICD-10-CM

## 2020-01-23 DIAGNOSIS — G43.709 CHRONIC MIGRAINE WITHOUT AURA WITHOUT STATUS MIGRAINOSUS, NOT INTRACTABLE: Primary | ICD-10-CM

## 2020-01-23 DIAGNOSIS — M79.7 FIBROMYALGIA: ICD-10-CM

## 2020-01-23 DIAGNOSIS — E55.9 VITAMIN D DEFICIENCY: ICD-10-CM

## 2020-01-23 DIAGNOSIS — E53.8 VITAMIN B12 DEFICIENCY: ICD-10-CM

## 2020-01-23 PROBLEM — G47.33 OBSTRUCTIVE SLEEP APNEA (ADULT) (PEDIATRIC): Status: ACTIVE | Noted: 2020-01-23

## 2020-01-23 PROBLEM — G47.33 OBSTRUCTIVE SLEEP APNEA (ADULT) (PEDIATRIC): Status: RESOLVED | Noted: 2020-01-23 | Resolved: 2020-01-23

## 2020-01-23 PROCEDURE — 96372 THER/PROPH/DIAG INJ SC/IM: CPT | Performed by: PHYSICIAN ASSISTANT

## 2020-01-23 PROCEDURE — 99215 OFFICE O/P EST HI 40 MIN: CPT | Performed by: PHYSICIAN ASSISTANT

## 2020-01-23 RX ORDER — ZONISAMIDE 100 MG/1
CAPSULE ORAL
Qty: 90 CAPSULE | Refills: 1 | Status: SHIPPED | OUTPATIENT
Start: 2020-01-23 | End: 2020-04-10 | Stop reason: SDUPTHER

## 2020-01-23 RX ORDER — FREMANEZUMAB-VFRM 225 MG/1.5ML
INJECTION SUBCUTANEOUS
Qty: 1.5 ML | Refills: 5 | Status: SHIPPED | OUTPATIENT
Start: 2020-01-23 | End: 2020-02-26

## 2020-01-23 RX ORDER — KETOROLAC TROMETHAMINE 30 MG/ML
60 INJECTION, SOLUTION INTRAMUSCULAR; INTRAVENOUS ONCE
Status: COMPLETED | OUTPATIENT
Start: 2020-01-23 | End: 2020-01-23

## 2020-01-23 RX ADMIN — KETOROLAC TROMETHAMINE 60 MG: 30 INJECTION, SOLUTION INTRAMUSCULAR; INTRAVENOUS at 10:09

## 2020-01-23 NOTE — PATIENT INSTRUCTIONS
Continue the 100 mg zonegran, add 25 mg qhs x 1 week, then 50 mg qhs x 1 week, then 75 mg qhs x 1 week, then 100 mg qhs = total goal dose of 200 mg qhs  Numbness/ tingling?- increase dietary intake of potassium  Weight loss  ? memory loss  Stay hydrated    Vit D- 2000 units per day
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
## 2020-01-23 NOTE — LETTER
January 23, 2020     Patient: Garret Chambers   YOB: 1975   Date of Visit: 1/23/2020       To Whom it May Concern:    Garret Chambers is under my professional care  She was seen in my office on 1/23/2020  Please excuse the patient from work today  If you have any questions or concerns, please don't hesitate to call           Sincerely,          Mine Madison PA-C        CC: No Recipients

## 2020-02-04 ENCOUNTER — PATIENT MESSAGE (OUTPATIENT)
Dept: NEUROLOGY | Facility: CLINIC | Age: 45
End: 2020-02-04

## 2020-02-05 NOTE — TELEPHONE ENCOUNTER
Patient calling back, she scheduled appt for Friday 2/7 at 12:30 in [place] for a Toradol injection  She states she cannot come any sooner due to her job  Confirms she has [employer], no changes to insurance  She would also like to sign [employer] safety net form when she comes to the office  Dr Magdalene Sotomayor, please place order for toradol injection  Thanks! Left message on [facility] making her aware  Message sent to [facility] [place] as well

## 2020-02-05 NOTE — TELEPHONE ENCOUNTER
Patient calling in stating she is agreeable to Toradol injection  She has olanzapine 2 5mg already at home  She does not have any Tramadol  She would like to be scheduled for a Toradol shot in [facility] office today  She will also need a work excuse for today  Sudha Weathers, please assist with scheduling injection         [phone]  ok to leave a voicemail

## 2020-02-06 DIAGNOSIS — G43.919 REFRACTORY MIGRAINE: Primary | ICD-10-CM

## 2020-02-06 DIAGNOSIS — G43.709 CHRONIC MIGRAINE WITHOUT AURA WITHOUT STATUS MIGRAINOSUS, NOT INTRACTABLE: ICD-10-CM

## 2020-02-06 RX ORDER — TRAMADOL HYDROCHLORIDE 50 MG/1
TABLET ORAL
Qty: 10 TABLET | Refills: 1 | Status: SHIPPED | OUTPATIENT
Start: 2020-02-06 | End: 2020-11-19

## 2020-02-06 RX ORDER — OLANZAPINE 2.5 MG/1
TABLET ORAL
Qty: 5 TABLET | Refills: 0 | Status: SHIPPED | OUTPATIENT
Start: 2020-02-06 | End: 2020-09-14

## 2020-02-07 ENCOUNTER — TELEPHONE (OUTPATIENT)
Dept: NEUROLOGY | Facility: CLINIC | Age: 45
End: 2020-02-07

## 2020-02-07 ENCOUNTER — CLINICAL SUPPORT (OUTPATIENT)
Dept: NEUROLOGY | Facility: CLINIC | Age: 45
End: 2020-02-07
Payer: COMMERCIAL

## 2020-02-07 DIAGNOSIS — G43.709 CHRONIC MIGRAINE WITHOUT AURA WITHOUT STATUS MIGRAINOSUS, NOT INTRACTABLE: Primary | ICD-10-CM

## 2020-02-07 PROCEDURE — 96372 THER/PROPH/DIAG INJ SC/IM: CPT | Performed by: PSYCHIATRY & NEUROLOGY

## 2020-02-07 RX ORDER — KETOROLAC TROMETHAMINE 30 MG/ML
60 INJECTION, SOLUTION INTRAMUSCULAR; INTRAVENOUS ONCE
Status: COMPLETED | OUTPATIENT
Start: 2020-02-07 | End: 2020-02-07

## 2020-02-07 RX ADMIN — KETOROLAC TROMETHAMINE 60 MG: 30 INJECTION, SOLUTION INTRAMUSCULAR; INTRAVENOUS at 12:52

## 2020-02-07 NOTE — PROGRESS NOTES
Patient arrived in the [place] location today to receive a Toradol Injection  Patient understood she would be receiving a Toradol injection  Patient received injection in Right butt cheek  Patient tolerated injection well  Patient thanked us for seeing her today       Toradol 60 mg  Right butt cheek   NDC: 54704-494-77  LOT: [id number]  EXP: 06/20

## 2020-02-07 NOTE — TELEPHONE ENCOUNTER
PT came in [place] office on 2/7/2020 to sign [employer] Safety Net Form  Faxed completed form to [phone]  Fax Report attached  Scanned document into chart

## 2020-02-07 NOTE — TELEPHONE ENCOUNTER
Left detailed message for patient making her aware  Patient is scheduled for a toradol injection today at 12:30pm in the [place] office  Please enter order for toradol injection  Thanks!

## 2020-02-26 DIAGNOSIS — G43.709 CHRONIC MIGRAINE WITHOUT AURA WITHOUT STATUS MIGRAINOSUS, NOT INTRACTABLE: ICD-10-CM

## 2020-02-26 RX ORDER — FREMANEZUMAB-VFRM 225 MG/1.5ML
INJECTION SUBCUTANEOUS
Qty: 1.5 ML | Refills: 4 | Status: SHIPPED | OUTPATIENT
Start: 2020-02-26 | End: 2020-09-10 | Stop reason: ALTCHOICE

## 2020-02-27 ENCOUNTER — DOCUMENTATION (OUTPATIENT)
Dept: BEHAVIORAL/MENTAL HEALTH CLINIC | Facility: CLINIC | Age: 45
End: 2020-02-27

## 2020-02-27 DIAGNOSIS — F43.10 POST TRAUMATIC STRESS DISORDER (PTSD): Primary | ICD-10-CM

## 2020-02-27 NOTE — PROGRESS NOTES
Assessment/Plan: Individual Therapy     Diagnoses and all orders for this visit:    Post traumatic stress disorder (PTSD)          Subjective: Sushma Plata identified difficulty with anxiety and trauma     Patient ID: Sahra Zacarias is a 40 y o  female  Outpatient Discharge Summary:   Admission Date: 8/28/19  Sushma Plata was referred by self  Discharge Date: 2/27/20    Discharge Diagnosis:    1  Post traumatic stress disorder (PTSD)         Treating Physician: Ivan Stern Complications: attendance  Presenting Problem: Anxiety related to trauma history  Course of treatment includes:    individual therapy   Treatment Progress: poor  Criteria for Discharge: two or more unexcused absences for services  Aftercare recommendations include Return to therapy as it remains necessary for stability  Comply with all physician recommendations  Discharge Medications include:  Current Outpatient Medications:     AJOVY 225 MG/1 5ML SOSY, INJECT 225 MG UNDER THE SKIN EVERY 30 DAYS, Disp: 1 5 mL, Rfl: 4    BD INSULIN SYRINGE U/F 31G X 5/16" 1 ML MISC, USE FOR INJECTIONS AS DIRECTED, Disp: 3 each, Rfl: 0    cholecalciferol (VITAMIN D3) 1,000 units tablet, Take 1 capsule by mouth once a week 3000 units daily , Disp: , Rfl:     cholestyramine (QUESTRAN) 4 GM/DOSE powder, Take by mouth Twice daily, Disp: , Rfl:     Cyanocobalamin (B-12) 1000 MCG/ML KIT, 1 IM injection every month, Disp: 3 mL, Rfl: 3    cyclobenzaprine (FLEXERIL) 10 mg tablet, Take 1 tablet by mouth 3 (three) times a day as needed, Disp: , Rfl:     dexamethasone (DECADRON) 2 mg tablet, 1 tab qam with food prn migraine  , Disp: 5 tablet, Rfl: 0    dicyclomine (BENTYL) 20 mg tablet, Take 1 tablet (20 mg total) by mouth 3 (three) times a day as needed (migraine/cramps) for up to 30 days, Disp: 60 tablet, Rfl: 0    divalproex sodium (DEPAKOTE) 250 mg EC tablet, , Disp: , Rfl:     fluticasone (FLONASE) 50 mcg/act nasal spray, 2 sprays into each nostril daily as needed , Disp: , Rfl:     ketorolac (TORADOL) 10 mg tablet, Take 1 tablet (10 mg total) by mouth every 6 (six) hours as needed (migraine), Disp: 10 tablet, Rfl: 2    meclizine (ANTIVERT) 12 5 MG tablet, Take 1 tablet (12 5 mg total) by mouth 3 (three) times a day as needed for dizziness or nausea, Disp: 30 tablet, Rfl: 0    OLANZapine (ZyPREXA) 2 5 mg tablet, Take half tab as needed at night time for severe migraine/secondary insomnia  Caution sedation  Do not combine with tramadol, Disp: 5 tablet, Rfl: 0    omeprazole (PriLOSEC) 40 MG capsule, TAKE ONE CAPSULE BY MOUTH DAILY, Disp: 30 capsule, Rfl: 4    ondansetron (ZOFRAN) 4 mg tablet, Take 1 tablet by mouth every 8 (eight) hours as needed, Disp: , Rfl:     pregabalin (LYRICA) 75 mg capsule, , Disp: , Rfl:     prochlorperazine (COMPAZINE) 10 mg tablet, 1 tab q6h prn migraine onset , Disp: 30 tablet, Rfl: 0    rizatriptan (MAXALT-MLT) 10 MG disintegrating tablet, 1 tab at migraine onset, repeat after 2 hours if needed, Disp: 9 tablet, Rfl: 2    sucralfate (CARAFATE) 1 g tablet, 1 tab BID prn stomach pain , Disp: 20 tablet, Rfl: 0    traMADol (ULTRAM) 50 mg tablet, Take 1 tab at onset of moderate to severe headache, can repeat once in 8 hours   Max 2 days a week, Disp: 10 tablet, Rfl: 1    zonisamide (ZONEGRAN) 100 mg capsule, 1 tab qhs , Disp: 90 capsule, Rfl: 1    zonisamide (ZONEGRAN) 25 mg capsule, One tab q h s  x1 week, then 2 tabs q h s  x1 week, then 3 tabs q h s  x1 week, then 4 tabs q h s , Disp: 120 capsule, Rfl: 2    Prognosis: poor

## 2020-03-03 ENCOUNTER — OFFICE VISIT (OUTPATIENT)
Dept: NEUROLOGY | Facility: CLINIC | Age: 45
End: 2020-03-03
Payer: COMMERCIAL

## 2020-03-03 VITALS
HEART RATE: 79 BPM | SYSTOLIC BLOOD PRESSURE: 128 MMHG | BODY MASS INDEX: 44.3 KG/M2 | WEIGHT: 250 LBS | DIASTOLIC BLOOD PRESSURE: 84 MMHG | HEIGHT: 63 IN

## 2020-03-03 DIAGNOSIS — G43.709 CHRONIC MIGRAINE WITHOUT AURA WITHOUT STATUS MIGRAINOSUS, NOT INTRACTABLE: ICD-10-CM

## 2020-03-03 DIAGNOSIS — G43.919 REFRACTORY MIGRAINE: ICD-10-CM

## 2020-03-03 DIAGNOSIS — E53.8 B12 DEFICIENCY: ICD-10-CM

## 2020-03-03 DIAGNOSIS — G43.711 INTRACTABLE CHRONIC MIGRAINE WITHOUT AURA AND WITH STATUS MIGRAINOSUS: Primary | ICD-10-CM

## 2020-03-03 PROCEDURE — 3008F BODY MASS INDEX DOCD: CPT

## 2020-03-03 PROCEDURE — 99214 OFFICE O/P EST MOD 30 MIN: CPT

## 2020-03-03 PROCEDURE — 96372 THER/PROPH/DIAG INJ SC/IM: CPT

## 2020-03-03 PROCEDURE — 1036F TOBACCO NON-USER: CPT

## 2020-03-03 RX ORDER — KETOROLAC TROMETHAMINE 30 MG/ML
30 INJECTION, SOLUTION INTRAMUSCULAR; INTRAVENOUS ONCE
Status: COMPLETED | OUTPATIENT
Start: 2020-03-03 | End: 2020-03-03

## 2020-03-03 RX ORDER — KETOROLAC TROMETHAMINE 30 MG/ML
30 INJECTION, SOLUTION INTRAMUSCULAR; INTRAVENOUS ONCE
Status: DISCONTINUED | OUTPATIENT
Start: 2020-03-03 | End: 2020-03-03

## 2020-03-03 RX ADMIN — KETOROLAC TROMETHAMINE 30 MG: 30 INJECTION, SOLUTION INTRAMUSCULAR; INTRAVENOUS at 12:58

## 2020-03-03 NOTE — PROGRESS NOTES
Patient ID: Isai Lomax is a 40 y o  female  Assessment/Plan:    No problem-specific Assessment & Plan notes found for this encounter  Diagnoses and all orders for this visit:    Intractable chronic migraine without aura and with status migrainosus  -   Abortive:  Ubrogepant (Ubrelvy) 50 MG TABS; Take 1 tab at migraine onset, can repeat in 2 hours if needed, max 4 tabs in 24 hours  Max 2 days a week  - Continue with zonisamide nightly- no s/e, somewhat reduced migraine severity  - Aimovig- will go to pharmacy will recently received copay card from their assistance program which she applied to  - Maxat+/- toradol +/- compazine  - Okay to take olanzapine nightly half of 2 5 mg tab- higher doses give her prolongd daytime sedation  - D/w patient if using Erlene Else take this only at onset of migraine and if no abortive benefit in 1-2 hours then take her typical migraine abortive medications  - she asked me about CBD, this was recommended for her refer refractory fibromyalgia pain also by her rheumatologist for her  I am okay with the patient trying CBD as long as she goes through [facility] [place]  Discussed with the patient the [facility] was at work does not provide CBD and this may or may not help with headaches  Potential adverse effects include dizziness, drowsiness, cognitive slowing  She was advised to not take this daytime prior to going to work or driving or operating heavy machinery or climbing heights, as still considered illegal while driving  - discussed with patient again seeing eye doctor soon    - discussed with patient not skipping meals    Refractory migraine  -     ketorolac (TORADOL) injection 30 mg  -     ketorolac (TORADOL) injection 30 mg    B12 deficiency  -     Vitamin B12; Future    Chronic migraine without aura without status migrainosus, not intractable      follow up with [facility] scheduled appointment      Subjective:    FANTASMA Alex with a history of previous C4-5 ACDF and fibromyalgia, chronic diffuse pain presenting for neurological f/u for chronic refractory migraine headaches  She tells me she is having more severe migraines Friday Saturday and Sunday for unclear reasons  States she does not do anything different on these days  Tells me stays hydrated, stopped soda  States these are the days after she is done with her work  She denies sleeping anymore  She does have chronic insomnia that has been refractory to multiple medications  She denies any different diet on these days  She does state that she sometimes goes 10 hours or longer without eating because of her IBS and diarrhea  States recently started probiotics and is hoping this helps  We discussed that with refractory migraines she does not want to go 6-8 hours a without eating  She is currently on a 3 day migraine stretch: States did not take anything yesterday or today as she did not want s/e to interfere with work  She states she has not yet tried to olanzapine by itself for migraine abortive treatment/insomnia but does state that she is able to tolerate half a tab of 2 5 mg nightly with no significant sedation lasting into the next day  Higher doses did causes  States based on how quickly she can catch her migraine Maxalt plus or minus Toradol plus or minus Compazine can help  States sometimes this is a hit or miss  B12 deficiency-- on supplementation- repeat blood work pending  States migraine infusions with no benefit and had horrible GI side effects but no chest pain    Repeat eye exam pending  She denies any new changes in the nature of her head migraine  States she is having some weight loss with the zonisamide, which she is happy about  Denies other s/e    Of Note,  the only treatment that has thus far significantly helped reduce her migraine frequency by 75% has been Aimov    She did apply to the patient assistance program   She just received an e-mail with the co-pay card for 5 dollars a month  Discussed with the patient falling this as soon as able for better migraine control        Prior relevant hx:  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  She tried Decadron this month, and this did help calm down the migraine      She had an infusion since last seen but this made her very sick; she states that something in the infusion caused her to have nausea and vomiting for several days- did not help headaches     Since last seen the patients Aimovig was denied by her insurance and now we are trying Ananth Ayala first injection was on 7/24/2019 and she noticed an improvement in her migraines for a few days, and then the migraines worsened      Amirah Richard was very effective   Eliminated 75% or more of her migraines   Unfortunately was not covered by her insurance as noted above and we placed a the request for a safety net patient assistance program through [address] [place] [address] Road are still awaiting their decision to provide drug      Failed botox, OP infusions- GI s/e flu like symptoms and multiple other preventative medications, see prior notes    The following portions of the patient's history were reviewed and updated as appropriate: allergies, current medications, past family history, past medical history, past social history, past surgical history and problem list and ROS  Objective:    Blood pressure 128/84, pulse 79, height 5' 2 5" (1 588 m), weight 113 kg (250 lb)  Physical Exam   Constitutional: She is oriented to person, place, and time  She appears well-developed and well-nourished  HENT:   Head: Normocephalic and atraumatic  Eyes: Pupils are equal, round, and reactive to light  Neck: Normal range of motion  Cardiovascular: Normal rate and regular rhythm  Pulmonary/Chest: Effort normal    Abdominal: Soft  Musculoskeletal: Normal range of motion  She exhibits tenderness     Neurological: She is alert and oriented to person, place, and time  She has normal strength  Reflex Scores:       Tricep reflexes are 1+ on the right side and 1+ on the left side  Bicep reflexes are 1+ on the right side and 1+ on the left side  Patellar reflexes are 1+ on the right side and 1+ on the left side  Nursing note and vitals reviewed  Neurological Exam  Mental Status  Alert  Oriented to person, place, time and situation  no dysarthria present  Language is fluent with no aphasia  Attention and concentration are normal     Cranial Nerves  CN II: Visual fields full to confrontation  Right funduscopic exam: disc intact  Left funduscopic exam: disc intact  CN III, IV, VI: Extraocular movements intact bilaterally  Pupils equal round and reactive to light bilaterally  CN V: Facial sensation is normal   CN VII: Full and symmetric facial movement  CN VIII: Hearing is normal   CN IX, X: Palate elevates symmetrically  Normal gag reflex  CN XI: Shoulder shrug strength is normal   CN XII: Tongue midline without atrophy or fasciculations  Motor   Normal muscle tone  Strength is 5/5 throughout all four extremities  Sensory  Light touch is normal in upper and lower extremities  Vibration is normal in upper and lower extremities  No right-sided hemispatial neglect  No left-sided hemispatial neglect  Reflexes                                           Right                      Left  Biceps                                 1+                         1+  Triceps                                1+                         1+  Patellar                                1+                         1+    Coordination  Right: Finger-to-nose normal   Left: Finger-to-nose normal     Gait  Casual gait is normal including stance, stride, and arm swing  Romberg is absent  ROS:    Review of Systems   Constitutional: Negative  Negative for appetite change and fever  HENT: Positive for ear pain (ringing in ears)   Negative for hearing loss, tinnitus, trouble swallowing and voice change  Eyes: Negative  Negative for photophobia and pain  Respiratory: Positive for apnea (no c pap)  Negative for shortness of breath  Cardiovascular: Negative  Negative for palpitations  Gastrointestinal: Negative  Negative for nausea and vomiting  Endocrine: Negative  Negative for cold intolerance and heat intolerance  Genitourinary: Negative  Negative for dysuria, frequency and urgency  Musculoskeletal: Negative  Negative for myalgias and neck pain  Skin: Negative  Negative for rash  Neurological: Negative  Negative for dizziness, tremors, seizures, syncope, facial asymmetry, speech difficulty, weakness, light-headedness, numbness and headaches (3days with headache)  Hematological: Negative  Does not bruise/bleed easily  Psychiatric/Behavioral: Negative  Negative for confusion, hallucinations and sleep disturbance (not much)

## 2020-03-03 NOTE — PATIENT INSTRUCTIONS
Take olanzapine half a tab nightly as needed for migraine/insomnia-- Thursday and Friday night especially to help prevent migraines  Ubrelvy at migraine onset- this is the new medication, the first time you take it do not combine it with any other medication   After two hours if migraine persistent you can take your other rescue medication  Continue with your zonisamide  Talk to your pharmacy about aimovig

## 2020-03-03 NOTE — PROGRESS NOTES
Procedures  Patient received Toradol inj in the Right gluteus yumiko and patient tolerated the injection well

## 2020-03-13 ENCOUNTER — TELEPHONE (OUTPATIENT)
Dept: NEUROLOGY | Facility: CLINIC | Age: 45
End: 2020-03-13

## 2020-03-13 NOTE — TELEPHONE ENCOUNTER
Patient stated the Angie Days was supposed to be sent to pharmacy and it was never sent  The rx was entered and it printed  Questioned patient if she had the paper rx  Patient stated she completely forgot that she did have the rx  She will take this to the pharmacy  Advised patient to contact our office if she would need any further assistance

## 2020-03-19 NOTE — TELEPHONE ENCOUNTER
Approved from 3/18/20-3/18/21  Pharm made aware    States that pt already pick ed this up the other day using coupon card

## 2020-03-23 DIAGNOSIS — G43.711 INTRACTABLE CHRONIC MIGRAINE WITHOUT AURA AND WITH STATUS MIGRAINOSUS: Primary | ICD-10-CM

## 2020-03-23 NOTE — TELEPHONE ENCOUNTER
Okay  She can then start aimovig 30 days after ajovy injection should be on either or not both    Thanks

## 2020-03-23 NOTE — TELEPHONE ENCOUNTER
Pt called and states that aimovig was suppose to be sent to [facility]  She is calling to f/u  States that pharmacy just filled her ajovy  She will be using this until she gets aimovig  Aimovig rx entered   Pls sign off if agreeable          [phone] ok to leave detailed message

## 2020-03-24 NOTE — TELEPHONE ENCOUNTER
Called and advised pt of the below    Aware that rx was sent to [address] [place] [address] as request  She verbalized clear understanding of all instructions

## 2020-04-10 DIAGNOSIS — G43.711 INTRACTABLE CHRONIC MIGRAINE WITHOUT AURA AND WITH STATUS MIGRAINOSUS: ICD-10-CM

## 2020-04-10 DIAGNOSIS — G43.709 CHRONIC MIGRAINE WITHOUT AURA WITHOUT STATUS MIGRAINOSUS, NOT INTRACTABLE: ICD-10-CM

## 2020-04-10 RX ORDER — ZONISAMIDE 100 MG/1
200 CAPSULE ORAL
Qty: 60 CAPSULE | Refills: 5 | Status: SHIPPED | OUTPATIENT
Start: 2020-04-10 | End: 2020-06-30

## 2020-05-24 ENCOUNTER — HOSPITAL ENCOUNTER (EMERGENCY)
Facility: HOSPITAL | Age: 45
Discharge: HOME | End: 2020-05-24
Attending: EMERGENCY MEDICINE | Admitting: EMERGENCY MEDICINE
Payer: COMMERCIAL

## 2020-05-24 ENCOUNTER — APPOINTMENT (EMERGENCY)
Dept: CT IMAGING | Facility: HOSPITAL | Age: 45
End: 2020-05-24
Payer: COMMERCIAL

## 2020-05-24 VITALS
HEART RATE: 65 BPM | HEIGHT: 62 IN | OXYGEN SATURATION: 98 % | SYSTOLIC BLOOD PRESSURE: 101 MMHG | RESPIRATION RATE: 16 BRPM | TEMPERATURE: 98.3 F | DIASTOLIC BLOOD PRESSURE: 62 MMHG | BODY MASS INDEX: 45.73 KG/M2

## 2020-05-24 DIAGNOSIS — R10.9 LEFT FLANK PAIN: Primary | ICD-10-CM

## 2020-05-24 LAB
ALBUMIN SERPL BCP-MCNC: 3.9 G/DL (ref 3.5–5)
ALP SERPL-CCNC: 143 U/L (ref 46–116)
ALT SERPL W P-5'-P-CCNC: 36 U/L (ref 12–78)
ANION GAP SERPL CALCULATED.3IONS-SCNC: 11 MMOL/L (ref 4–13)
AST SERPL W P-5'-P-CCNC: 21 U/L (ref 5–45)
BACTERIA UR QL AUTO: ABNORMAL /HPF
BASOPHILS # BLD AUTO: 0.05 THOUSANDS/ΜL (ref 0–0.1)
BASOPHILS NFR BLD AUTO: 0 % (ref 0–1)
BILIRUB SERPL-MCNC: 0.34 MG/DL (ref 0.2–1)
BILIRUB UR QL STRIP: NEGATIVE
BUN SERPL-MCNC: 13 MG/DL (ref 5–25)
CALCIUM SERPL-MCNC: 9 MG/DL (ref 8.3–10.1)
CHLORIDE SERPL-SCNC: 105 MMOL/L (ref 100–108)
CLARITY UR: CLEAR
CO2 SERPL-SCNC: 23 MMOL/L (ref 21–32)
COLOR UR: YELLOW
CREAT SERPL-MCNC: 0.89 MG/DL (ref 0.6–1.3)
EOSINOPHIL # BLD AUTO: 0.03 THOUSAND/ΜL (ref 0–0.61)
EOSINOPHIL NFR BLD AUTO: 0 % (ref 0–6)
ERYTHROCYTE [DISTWIDTH] IN BLOOD BY AUTOMATED COUNT: 14.2 % (ref 11.6–15.1)
EXT PREG TEST URINE: NEGATIVE
EXT. CONTROL ED NAV: NEGATIVE
GFR SERPL CREATININE-BSD FRML MDRD: 79 ML/MIN/1.73SQ M
GLUCOSE SERPL-MCNC: 118 MG/DL (ref 65–140)
GLUCOSE UR STRIP-MCNC: NEGATIVE MG/DL
HCT VFR BLD AUTO: 44.5 % (ref 34.8–46.1)
HGB BLD-MCNC: 14 G/DL (ref 11.5–15.4)
HGB UR QL STRIP.AUTO: ABNORMAL
IMM GRANULOCYTES # BLD AUTO: 0.04 THOUSAND/UL (ref 0–0.2)
IMM GRANULOCYTES NFR BLD AUTO: 0 % (ref 0–2)
KETONES UR STRIP-MCNC: NEGATIVE MG/DL
LEUKOCYTE ESTERASE UR QL STRIP: NEGATIVE
LYMPHOCYTES # BLD AUTO: 1.68 THOUSANDS/ΜL (ref 0.6–4.47)
LYMPHOCYTES NFR BLD AUTO: 14 % (ref 14–44)
MCH RBC QN AUTO: 26.8 PG (ref 26.8–34.3)
MCHC RBC AUTO-ENTMCNC: 31.5 G/DL (ref 31.4–37.4)
MCV RBC AUTO: 85 FL (ref 82–98)
MONOCYTES # BLD AUTO: 0.39 THOUSAND/ΜL (ref 0.17–1.22)
MONOCYTES NFR BLD AUTO: 3 % (ref 4–12)
NEUTROPHILS # BLD AUTO: 9.99 THOUSANDS/ΜL (ref 1.85–7.62)
NEUTS SEG NFR BLD AUTO: 83 % (ref 43–75)
NITRITE UR QL STRIP: NEGATIVE
NON-SQ EPI CELLS URNS QL MICRO: ABNORMAL /HPF
NRBC BLD AUTO-RTO: 0 /100 WBCS
PH UR STRIP.AUTO: 7 [PH] (ref 4.5–8)
PLATELET # BLD AUTO: 314 THOUSANDS/UL (ref 149–390)
PMV BLD AUTO: 10.2 FL (ref 8.9–12.7)
POTASSIUM SERPL-SCNC: 3.9 MMOL/L (ref 3.5–5.3)
PROT SERPL-MCNC: 7.6 G/DL (ref 6.4–8.2)
PROT UR STRIP-MCNC: NEGATIVE MG/DL
RBC # BLD AUTO: 5.23 MILLION/UL (ref 3.81–5.12)
RBC #/AREA URNS AUTO: ABNORMAL /HPF
SODIUM SERPL-SCNC: 139 MMOL/L (ref 136–145)
SP GR UR STRIP.AUTO: 1.02 (ref 1–1.03)
UROBILINOGEN UR QL STRIP.AUTO: 1 E.U./DL
WBC # BLD AUTO: 12.18 THOUSAND/UL (ref 4.31–10.16)
WBC #/AREA URNS AUTO: ABNORMAL /HPF

## 2020-05-24 PROCEDURE — 36415 COLL VENOUS BLD VENIPUNCTURE: CPT | Performed by: EMERGENCY MEDICINE

## 2020-05-24 PROCEDURE — 85025 COMPLETE CBC W/AUTO DIFF WBC: CPT | Performed by: EMERGENCY MEDICINE

## 2020-05-24 PROCEDURE — 99284 EMERGENCY DEPT VISIT MOD MDM: CPT

## 2020-05-24 PROCEDURE — 81025 URINE PREGNANCY TEST: CPT | Performed by: EMERGENCY MEDICINE

## 2020-05-24 PROCEDURE — 80053 COMPREHEN METABOLIC PANEL: CPT | Performed by: EMERGENCY MEDICINE

## 2020-05-24 PROCEDURE — 81001 URINALYSIS AUTO W/SCOPE: CPT

## 2020-05-24 PROCEDURE — 99284 EMERGENCY DEPT VISIT MOD MDM: CPT | Performed by: EMERGENCY MEDICINE

## 2020-05-24 PROCEDURE — 74176 CT ABD & PELVIS W/O CONTRAST: CPT

## 2020-05-24 RX ORDER — CEPHALEXIN 500 MG/1
500 CAPSULE ORAL EVERY 12 HOURS SCHEDULED
Qty: 14 CAPSULE | Refills: 0 | Status: SHIPPED | OUTPATIENT
Start: 2020-05-24 | End: 2020-05-31

## 2020-05-24 RX ORDER — CEPHALEXIN 500 MG/1
500 CAPSULE ORAL EVERY 12 HOURS SCHEDULED
Qty: 14 CAPSULE | Refills: 0 | Status: SHIPPED | OUTPATIENT
Start: 2020-05-24 | End: 2020-05-24 | Stop reason: SDUPTHER

## 2020-05-24 RX ORDER — CEPHALEXIN 250 MG/1
500 CAPSULE ORAL ONCE
Status: COMPLETED | OUTPATIENT
Start: 2020-05-24 | End: 2020-05-24

## 2020-05-24 RX ADMIN — CEPHALEXIN 500 MG: 250 CAPSULE ORAL at 19:20

## 2020-05-28 ENCOUNTER — OFFICE VISIT (OUTPATIENT)
Dept: INTERNAL MEDICINE CLINIC | Facility: CLINIC | Age: 45
End: 2020-05-28

## 2020-05-28 ENCOUNTER — TELEPHONE (OUTPATIENT)
Dept: INTERNAL MEDICINE CLINIC | Facility: CLINIC | Age: 45
End: 2020-05-28

## 2020-05-28 VITALS
WEIGHT: 250.44 LBS | TEMPERATURE: 97.6 F | DIASTOLIC BLOOD PRESSURE: 70 MMHG | SYSTOLIC BLOOD PRESSURE: 118 MMHG | BODY MASS INDEX: 45.81 KG/M2 | HEART RATE: 91 BPM

## 2020-05-28 DIAGNOSIS — Z12.31 BREAST CANCER SCREENING BY MAMMOGRAM: ICD-10-CM

## 2020-05-28 DIAGNOSIS — Z23 NEED FOR PNEUMOCOCCAL VACCINATION: ICD-10-CM

## 2020-05-28 DIAGNOSIS — R10.9 LEFT FLANK PAIN: ICD-10-CM

## 2020-05-28 DIAGNOSIS — J45.20 MILD INTERMITTENT ASTHMA, UNSPECIFIED WHETHER COMPLICATED: Primary | ICD-10-CM

## 2020-05-28 PROCEDURE — 90471 IMMUNIZATION ADMIN: CPT

## 2020-05-28 PROCEDURE — 99213 OFFICE O/P EST LOW 20 MIN: CPT | Performed by: INTERNAL MEDICINE

## 2020-05-28 PROCEDURE — 1036F TOBACCO NON-USER: CPT | Performed by: INTERNAL MEDICINE

## 2020-05-28 PROCEDURE — 90732 PPSV23 VACC 2 YRS+ SUBQ/IM: CPT

## 2020-05-28 RX ORDER — ALBUTEROL SULFATE 90 UG/1
2 AEROSOL, METERED RESPIRATORY (INHALATION) EVERY 6 HOURS PRN
Qty: 1 INHALER | Refills: 5 | Status: SHIPPED | OUTPATIENT
Start: 2020-05-28

## 2020-05-29 ENCOUNTER — TELEPHONE (OUTPATIENT)
Dept: NEUROLOGY | Facility: CLINIC | Age: 45
End: 2020-05-29

## 2020-06-01 ENCOUNTER — OFFICE VISIT (OUTPATIENT)
Dept: NEUROLOGY | Facility: CLINIC | Age: 45
End: 2020-06-01
Payer: COMMERCIAL

## 2020-06-01 VITALS
WEIGHT: 250 LBS | TEMPERATURE: 98.6 F | SYSTOLIC BLOOD PRESSURE: 113 MMHG | HEART RATE: 77 BPM | BODY MASS INDEX: 46.01 KG/M2 | HEIGHT: 62 IN | DIASTOLIC BLOOD PRESSURE: 65 MMHG

## 2020-06-01 DIAGNOSIS — G47.30 SLEEP APNEA IN ADULT: ICD-10-CM

## 2020-06-01 DIAGNOSIS — G43.719 INTRACTABLE CHRONIC MIGRAINE WITHOUT AURA AND WITHOUT STATUS MIGRAINOSUS: Primary | ICD-10-CM

## 2020-06-01 PROCEDURE — 1036F TOBACCO NON-USER: CPT | Performed by: PHYSICIAN ASSISTANT

## 2020-06-01 PROCEDURE — 3008F BODY MASS INDEX DOCD: CPT | Performed by: PHYSICIAN ASSISTANT

## 2020-06-01 PROCEDURE — 99214 OFFICE O/P EST MOD 30 MIN: CPT | Performed by: PHYSICIAN ASSISTANT

## 2020-06-02 ENCOUNTER — TELEMEDICINE (OUTPATIENT)
Dept: SLEEP CENTER | Facility: HOSPITAL | Age: 45
End: 2020-06-02
Payer: COMMERCIAL

## 2020-06-02 DIAGNOSIS — F32.A ANXIETY AND DEPRESSION: ICD-10-CM

## 2020-06-02 DIAGNOSIS — J45.20 MILD INTERMITTENT ASTHMA WITHOUT COMPLICATION: ICD-10-CM

## 2020-06-02 DIAGNOSIS — M79.7 FIBROMYALGIA: ICD-10-CM

## 2020-06-02 DIAGNOSIS — G47.33 OBSTRUCTIVE SLEEP APNEA: Primary | ICD-10-CM

## 2020-06-02 DIAGNOSIS — F41.9 ANXIETY AND DEPRESSION: ICD-10-CM

## 2020-06-02 DIAGNOSIS — Z79.899 MEDICAL MARIJUANA USE: ICD-10-CM

## 2020-06-02 DIAGNOSIS — G47.09 OTHER INSOMNIA: ICD-10-CM

## 2020-06-02 DIAGNOSIS — R51.9 HEADACHE UPON AWAKENING: ICD-10-CM

## 2020-06-02 DIAGNOSIS — G43.719 INTRACTABLE CHRONIC MIGRAINE WITHOUT AURA AND WITHOUT STATUS MIGRAINOSUS: ICD-10-CM

## 2020-06-02 DIAGNOSIS — J30.9 ALLERGIC RHINITIS, UNSPECIFIED SEASONALITY, UNSPECIFIED TRIGGER: ICD-10-CM

## 2020-06-02 DIAGNOSIS — K21.9 GERD WITHOUT ESOPHAGITIS: ICD-10-CM

## 2020-06-02 PROCEDURE — 99243 OFF/OP CNSLTJ NEW/EST LOW 30: CPT | Performed by: INTERNAL MEDICINE

## 2020-06-05 ENCOUNTER — TELEPHONE (OUTPATIENT)
Dept: SLEEP CENTER | Facility: CLINIC | Age: 45
End: 2020-06-05

## 2020-06-25 DIAGNOSIS — G43.709 CHRONIC MIGRAINE WITHOUT AURA WITHOUT STATUS MIGRAINOSUS, NOT INTRACTABLE: ICD-10-CM

## 2020-06-25 DIAGNOSIS — G43.711 INTRACTABLE CHRONIC MIGRAINE WITHOUT AURA AND WITH STATUS MIGRAINOSUS: ICD-10-CM

## 2020-06-25 NOTE — PLAN OF CARE
Continue Toujeo 60 units at bedtime    Contineu novolog 5 with meals  Plus ss     Stop the glipizide    Stop Tradjenta---if you find you are needing a lot more sliding scale novolog--we might restart tradjenta rather than keep increasing insulin.    Problem: Knowledge Deficit  Goal: Patient/family/caregiver demonstrates understanding of disease process, treatment plan, medications, and discharge instructions  Complete learning assessment and assess knowledge base    Interventions:  - Provide teaching at level of understanding  - Provide teaching via preferred learning methods  Outcome: Progressing

## 2020-06-30 RX ORDER — ZONISAMIDE 100 MG/1
CAPSULE ORAL
Qty: 60 CAPSULE | Refills: 4 | Status: SHIPPED | OUTPATIENT
Start: 2020-06-30 | End: 2020-12-28 | Stop reason: SINTOL

## 2020-06-30 RX ORDER — ERENUMAB-AOOE 140 MG/ML
INJECTION, SOLUTION SUBCUTANEOUS
Qty: 1 ML | Refills: 10 | Status: SHIPPED | OUTPATIENT
Start: 2020-06-30 | End: 2021-07-16

## 2020-07-28 ENCOUNTER — HOSPITAL ENCOUNTER (EMERGENCY)
Facility: HOSPITAL | Age: 45
Discharge: HOME | End: 2020-07-29
Attending: EMERGENCY MEDICINE | Admitting: EMERGENCY MEDICINE
Payer: COMMERCIAL

## 2020-07-28 ENCOUNTER — APPOINTMENT (EMERGENCY)
Dept: RADIOLOGY | Facility: HOSPITAL | Age: 45
End: 2020-07-28
Payer: COMMERCIAL

## 2020-07-28 DIAGNOSIS — M25.512 LEFT SHOULDER PAIN: Primary | ICD-10-CM

## 2020-07-28 DIAGNOSIS — S43.409A SHOULDER SPRAIN: ICD-10-CM

## 2020-07-28 DIAGNOSIS — S46.812A TRAPEZIUS STRAIN, LEFT, INITIAL ENCOUNTER: ICD-10-CM

## 2020-07-28 PROCEDURE — 99283 EMERGENCY DEPT VISIT LOW MDM: CPT

## 2020-07-28 PROCEDURE — 99284 EMERGENCY DEPT VISIT MOD MDM: CPT | Performed by: EMERGENCY MEDICINE

## 2020-07-28 PROCEDURE — 73030 X-RAY EXAM OF SHOULDER: CPT

## 2020-07-28 RX ORDER — OXYCODONE HYDROCHLORIDE AND ACETAMINOPHEN 5; 325 MG/1; MG/1
1 TABLET ORAL EVERY 4 HOURS PRN
Qty: 15 TABLET | Refills: 0 | Status: SHIPPED | OUTPATIENT
Start: 2020-07-28 | End: 2020-08-07

## 2020-07-28 RX ORDER — IBUPROFEN 600 MG/1
600 TABLET ORAL ONCE
Status: COMPLETED | OUTPATIENT
Start: 2020-07-29 | End: 2020-07-29

## 2020-07-29 VITALS
TEMPERATURE: 98 F | HEART RATE: 74 BPM | RESPIRATION RATE: 18 BRPM | SYSTOLIC BLOOD PRESSURE: 140 MMHG | BODY MASS INDEX: 45.6 KG/M2 | OXYGEN SATURATION: 98 % | DIASTOLIC BLOOD PRESSURE: 70 MMHG | HEIGHT: 62 IN | WEIGHT: 247.8 LBS

## 2020-07-29 DIAGNOSIS — K21.9 GERD WITHOUT ESOPHAGITIS: ICD-10-CM

## 2020-07-29 RX ORDER — OMEPRAZOLE 40 MG/1
CAPSULE, DELAYED RELEASE ORAL
Qty: 30 CAPSULE | Refills: 3 | Status: SHIPPED | OUTPATIENT
Start: 2020-07-29 | End: 2021-05-19

## 2020-07-29 RX ADMIN — IBUPROFEN 600 MG: 600 TABLET, FILM COATED ORAL at 00:04

## 2020-07-29 NOTE — ED PROVIDER NOTES
History  Chief Complaint   Patient presents with    Shoulder Pain     Pt reports L shoulder injury approx 1 month ago  Pain worsening, radiating from L neck into LUE  States she thinks she must have pulled something       Patient presents to the emergency department for evaluation of left shoulder and trapezius discomfort  She states 1 month ago she thinks she hyperextended her shoulder opening a door  She thought the pain would go away and it is not  She denies head or neck pain  Denies numbness tingling or weakness in the extremity  Abduction increases the discomfort  She has no other orthopedic complaints at this time  She denies headache or visual complaints  Denies chest pain or sob  Denies leg or calf pain or history of DVT or pulmonary embolism  Prior to Admission Medications   Prescriptions Last Dose Informant Patient Reported? Taking? AIMOVIG 140 MG/ML SOAJ   No No   Sig: INJECT 1 PEN MONTHLY   AJOVY 225 MG/1 5ML SOSY   No No   Sig: INJECT 225 MG UNDER THE SKIN EVERY 30 DAYS   Patient not taking: Reported on 2020   BD INSULIN SYRINGE U/F 31G X " 1 ML MISC   No No   Sig: USE FOR INJECTIONS AS DIRECTED   Cyanocobalamin (B-12) 1000 MCG/ML KIT   No No   Si IM injection every month   NON FORMULARY   Yes No   Sig: Medical marijuana   OLANZapine (ZyPREXA) 2 5 mg tablet   No No   Sig: Take half tab as needed at night time for severe migraine/secondary insomnia  Caution sedation  Do not combine with tramadol   Rimegepant Sulfate (NURTEC) 75 MG TBDP   No No   Si tab at migraine onset  No more than one dose per 24 hours  Hold triptan  Hold Ubrelvy     albuterol (PROVENTIL HFA,VENTOLIN HFA) 90 mcg/act inhaler   No No   Sig: Inhale 2 puffs every 6 (six) hours as needed for wheezing or shortness of breath   cholecalciferol (VITAMIN D3) 1,000 units tablet  Self Yes No   Sig: Take 1 capsule by mouth once a week 3000 units daily    cholestyramine (QUESTRAN) 4 GM/DOSE powder  Self Yes No Sig: Take by mouth Twice daily   cyclobenzaprine (FLEXERIL) 10 mg tablet  Self Yes No   Sig: Take 1 tablet by mouth 3 (three) times a day as needed   dicyclomine (BENTYL) 20 mg tablet  Self No No   Sig: Take 1 tablet (20 mg total) by mouth 3 (three) times a day as needed (migraine/cramps) for up to 30 days   divalproex sodium (DEPAKOTE) 250 mg EC tablet   Yes No   Sig: as needed    fluticasone (FLONASE) 50 mcg/act nasal spray  Self Yes No   Si sprays into each nostril daily as needed    ketorolac (TORADOL) 10 mg tablet   No No   Sig: Take 1 tablet (10 mg total) by mouth every 6 (six) hours as needed (migraine)   meclizine (ANTIVERT) 12 5 MG tablet  Self No No   Sig: Take 1 tablet (12 5 mg total) by mouth 3 (three) times a day as needed for dizziness or nausea   omeprazole (PriLOSEC) 40 MG capsule   No No   Sig: TAKE ONE CAPSULE BY MOUTH DAILY   ondansetron (ZOFRAN) 4 mg tablet  Self Yes No   Sig: Take 1 tablet by mouth every 8 (eight) hours as needed   prochlorperazine (COMPAZINE) 10 mg tablet   No No   Si tab q6h prn migraine onset  traMADol (ULTRAM) 50 mg tablet   No No   Sig: Take 1 tab at onset of moderate to severe headache, can repeat once in 8 hours   Max 2 days a week   zonisamide (ZONEGRAN) 100 mg capsule   No No   Sig: TAKE TWO CAPSULES (200 MG TOTAL) BY MOUTH DAILY AT BEDTIME      Facility-Administered Medications: None       Past Medical History:   Diagnosis Date    Anxiety     Asthma     Claustrophobia     Cluster headache     CTS (carpal tunnel syndrome)     Depression     Fibromyalgia     last assessed 17    Fibromyalgia, primary     GERD without esophagitis     last assessed 10/12/17    GI problem     Headache, tension-type     Joint pain     Lung nodule     last assessed 10/9/15    Migraine     Muscle pain     Peripheral neuropathy     Sleep apnea        Past Surgical History:   Procedure Laterality Date    BACK SURGERY      CARPAL TUNNEL RELEASE Bilateral     CHOLECYSTECTOMY      GALLBLADDER SURGERY      NECK SURGERY      SPINAL FUSION      C4 and C5    ULNAR NERVE REPAIR Bilateral     UPPER GASTROINTESTINAL ENDOSCOPY         Family History   Problem Relation Age of Onset    Diabetes Mother    [facility] Fibromyalgia Mother     Ovarian cancer Mother     Hypertension Mother     Thyroid disease Mother    [facility] Migraines Mother     Neuropathy Mother     Arthritis Family     Cancer Family     Heart disease Family         cardiac disorder    Neuropathy Family     Lupus Family         systemic erythematosus    Asthma Brother     Asthma Brother     Breast cancer Maternal Aunt      I have reviewed and agree with the history as documented  E-Cigarette/Vaping     E-Cigarette/Vaping Substances     Social History     Tobacco Use    Smoking status: Never Smoker    Smokeless tobacco: Never Used   Substance Use Topics    Alcohol use: Yes     Alcohol/week: 2 0 standard drinks     Types: 1 Glasses of wine, 1 Cans of beer per week     Binge frequency: Monthly     Comment: occasional    Drug use: Yes     Types: Marijuana     Comment: medicinal       Review of Systems   Constitutional: Negative  Negative for activity change, appetite change, chills, diaphoresis, fatigue and fever  HENT: Negative  Negative for congestion, sore throat, trouble swallowing and voice change  Eyes: Negative  Negative for photophobia and visual disturbance  Respiratory: Negative  Negative for cough, chest tightness, shortness of breath, wheezing and stridor  Cardiovascular: Negative  Negative for chest pain, palpitations and leg swelling  Gastrointestinal: Negative  Negative for abdominal distention, abdominal pain, blood in stool, constipation, diarrhea, nausea and vomiting  Endocrine: Negative  Genitourinary: Negative  Negative for difficulty urinating, dysuria, frequency, hematuria, pelvic pain and urgency  Musculoskeletal: Positive for arthralgias and myalgias   Negative for neck pain and neck stiffness  Skin: Negative  Negative for rash and wound  Allergic/Immunologic: Negative  Neurological: Negative  Negative for dizziness, tremors, seizures, syncope, facial asymmetry, speech difficulty, weakness, light-headedness, numbness and headaches  Hematological: Negative  Does not bruise/bleed easily  Psychiatric/Behavioral: Negative  Negative for confusion  Physical Exam  Physical Exam   Constitutional: She is oriented to person, place, and time  She appears well-developed and well-nourished  No distress  Nontoxic appearance  No respiratory distress  Patient looks comfortable sitting upright on the stretcher  Neck: Normal range of motion  Neck supple  Musculoskeletal: She exhibits tenderness  She exhibits no edema or deformity  Grossly normal appearing shoulder area  Nontender clavicle and AC joint  Tender over the rotator cuff and trapezius to palpation  Limited abduction secondary to pain  No cervical spine tenderness  Normal elbow wrist and hand exam   Normal neurovascular status of the extremity  Neurological: She is alert and oriented to person, place, and time  She displays normal reflexes  No cranial nerve deficit or sensory deficit  She exhibits normal muscle tone  Coordination normal    Skin: Skin is warm and dry  Capillary refill takes less than 2 seconds  No rash noted  She is not diaphoretic  Psychiatric: She has a normal mood and affect  Her behavior is normal  Judgment and thought content normal    Nursing note and vitals reviewed        Vital Signs  ED Triage Vitals [07/28/20 2251]   Temperature Pulse Respirations Blood Pressure SpO2   98 °F (36 7 °C) 79 17 143/71 98 %      Temp Source Heart Rate Source Patient Position - Orthostatic VS BP Location FiO2 (%)   Oral Monitor Sitting Right arm --      Pain Score       8           Vitals:    07/28/20 2251 07/29/20 0000   BP: 143/71 140/70   Pulse: 79 74   Patient Position - Orthostatic VS: Sitting Sitting         Visual Acuity      ED Medications  Medications   ibuprofen (MOTRIN) tablet 600 mg (600 mg Oral Given 7/29/20 0004)       Diagnostic Studies  Results Reviewed     None                 XR shoulder 2+ views LEFT   ED Interpretation by Zhen Hall MD (07/28 2345)   No acute disease  No fracture dislocation  Procedures  Procedures         ED Course  ED Course as of Jul 29 0005   Tue Jul 28, 2020   2351 Patient is stable for discharge  Suspect shoulder and trapezius strain  Motrin given  Will apply sling  Will refer to orthopedics  Called in short course of Percocet  US AUDIT      Most Recent Value   Initial Alcohol Screen: US AUDIT-C    1  How often do you have a drink containing alcohol?  0 Filed at: 07/28/2020 2250   2  How many drinks containing alcohol do you have on a typical day you are drinking? 0 Filed at: 07/28/2020 2250   3a  Male UNDER 65: How often do you have five or more drinks on one occasion? 0 Filed at: 07/28/2020 2250   3b  FEMALE Any Age, or MALE 65+: How often do you have 4 or more drinks on one occassion? 0 Filed at: 07/28/2020 2250   Audit-C Score  0 Filed at: 07/28/2020 2250                  TAVARES/DAST-10      Most Recent Value   How many times in the past year have you    Used an illegal drug or used a prescription medication for non-medical reasons?   Never Filed at: 07/28/2020 2250                                MDM      Disposition  Final diagnoses:   Left shoulder pain   Shoulder sprain   Trapezius strain, left, initial encounter     Time reflects when diagnosis was documented in both MDM as applicable and the Disposition within this note     Time User Action Codes Description Comment    7/28/2020 11:52 PM Mary Lux Add [M25 512] Left shoulder pain     7/28/2020 11:52 PM Mary Lux Add [V23 258S] Shoulder sprain     7/28/2020 11:52 PM Bailey Rivas Add [M32 189R] Trapezius strain, left, initial encounter       ED Disposition     ED Disposition Condition Date/Time Comment    Discharge Stable Tue Jul 28, 2020 11:52 PM Swapnil [place] discharge to home/self care  Follow-up Information     Follow up With Specialties Details Why [address], DO Orthopedic Surgery Schedule an appointment as soon as possible for a visit  Make follow-up appointment with orthopedist [address]  [phone]            Patient's Medications   Discharge Prescriptions    OXYCODONE-ACETAMINOPHEN (PERCOCET) 5-325 MG PER TABLET    Take 1 tablet by mouth every 4 (four) hours as needed for moderate pain for up to 10 daysMax Daily Amount: 6 tablets       Start Date: 7/28/2020 End Date: 8/7/2020       Order Dose: 1 tablet       Quantity: 15 tablet    Refills: 0    OXYCODONE-ACETAMINOPHEN (PERCOCET) 5-325 MG PER TABLET    Take 1 tablet by mouth every 4 (four) hours as needed for moderate pain for up to 10 daysMax Daily Amount: 6 tablets       Start Date: 7/28/2020 End Date: 8/7/2020       Order Dose: 1 tablet       Quantity: 15 tablet    Refills: 0     No discharge procedures on file      PDMP Review     None          ED Provider  Electronically Signed by           Karina Arzate MD  07/29/20 0005

## 2020-07-30 ENCOUNTER — OFFICE VISIT (OUTPATIENT)
Dept: OBGYN CLINIC | Facility: OTHER | Age: 45
End: 2020-07-30
Payer: COMMERCIAL

## 2020-07-30 VITALS
SYSTOLIC BLOOD PRESSURE: 120 MMHG | HEART RATE: 80 BPM | HEIGHT: 62 IN | WEIGHT: 246 LBS | DIASTOLIC BLOOD PRESSURE: 79 MMHG | BODY MASS INDEX: 45.27 KG/M2

## 2020-07-30 DIAGNOSIS — M54.12 RADICULOPATHY, CERVICAL REGION: Primary | ICD-10-CM

## 2020-07-30 DIAGNOSIS — M62.838 TRAPEZIUS MUSCLE SPASM: ICD-10-CM

## 2020-07-30 DIAGNOSIS — M75.42 IMPINGEMENT SYNDROME OF LEFT SHOULDER: ICD-10-CM

## 2020-07-30 PROCEDURE — 99243 OFF/OP CNSLTJ NEW/EST LOW 30: CPT | Performed by: ORTHOPAEDIC SURGERY

## 2020-07-30 PROCEDURE — 3008F BODY MASS INDEX DOCD: CPT | Performed by: ORTHOPAEDIC SURGERY

## 2020-07-30 PROCEDURE — 3008F BODY MASS INDEX DOCD: CPT | Performed by: INTERNAL MEDICINE

## 2020-07-30 PROCEDURE — 1036F TOBACCO NON-USER: CPT | Performed by: ORTHOPAEDIC SURGERY
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
## 2020-07-31 ENCOUNTER — TELEPHONE (OUTPATIENT)
Dept: PHYSICAL THERAPY | Facility: OTHER | Age: 45
End: 2020-07-31

## 2020-07-31 NOTE — TELEPHONE ENCOUNTER
Voice mail/message left for patient to return call to [place] [id number] program including our hours of business and phone number  Reminded CB will come from a non- number as the nurses are working remotely/off-site      Referral deferred for f/u per protocol

## 2020-08-04 ENCOUNTER — TELEPHONE (OUTPATIENT)
Dept: NEUROLOGY | Facility: CLINIC | Age: 45
End: 2020-08-04

## 2020-08-05 DIAGNOSIS — G43.719 INTRACTABLE CHRONIC MIGRAINE WITHOUT AURA AND WITHOUT STATUS MIGRAINOSUS: ICD-10-CM

## 2020-08-05 DIAGNOSIS — G43.709 CHRONIC MIGRAINE WITHOUT AURA WITHOUT STATUS MIGRAINOSUS, NOT INTRACTABLE: ICD-10-CM

## 2020-08-05 RX ORDER — KETOROLAC TROMETHAMINE 10 MG/1
10 TABLET, FILM COATED ORAL EVERY 6 HOURS PRN
Qty: 10 TABLET | Refills: 2 | Status: SHIPPED | OUTPATIENT
Start: 2020-08-05 | End: 2020-09-14 | Stop reason: SDUPTHER

## 2020-08-05 NOTE — TELEPHONE ENCOUNTER
Patient calling in  She did send [employer] message on 7/20 requesting refills of below medications  Nurtec, Torodal, & Maxalt    I have pended below, please sign off if agreeable  I do not see maxalt in medication list, please send   Thx!

## 2020-08-07 ENCOUNTER — TELEPHONE (OUTPATIENT)
Dept: PHYSICAL THERAPY | Facility: OTHER | Age: 45
End: 2020-08-07

## 2020-08-07 NOTE — TELEPHONE ENCOUNTER
Voice mail/message left for patient to return call to [place] [id number] program including our hours of business and phone number  Reminded CB will come from a non-[facility] number as the nurses are working remotely/off-site      Referral closed per protocol    Note: Ortho recommended physical therapy

## 2020-08-10 NOTE — TELEPHONE ENCOUNTER
Called louie khan at [phone] and spoke to jesus  She does not see my PA on file  She states, no PA required  She gets a paid claim for 8/17 and last paid claim was 7/23 with a    $40 copay  Called [employer] and spoke to mekhi  With out a denial letter, pt does not qualify for program because med is covered      Pt made aware
Marco A Cameron from [facility] [address] [facility] called asking for [employer] PA to be faxed to them  i only see a PA completed june 2019 which was denied  she states that since that PA denial is over a year old that they would need a new PA completed  they will need approval/denial letter faxed to them once we receive a determination      fax [phone]    PA completed on [facility]
No determination received yet
No determination yet
[TextBox_4] : Orlando is here for an initial consultation.  He is an autistic and nonverbal child who  presented to the ED on 2/20/20 for a swollen and painful left testicle. Mom states while changing the diaper on 2/19/20 she noted redness and swelling of the scrotum. Associated with increased crying with diaper changes and decreased activity.  A scrotal ultrasound was performed and demonstrated findings consistent with left epididymoorchitis.  Small left hydrocele with no complex elements seen.  He was discharged with a course of Keflex. He voids regularly without straining but not yet toilet trained.  No UTI or unexplained fevers

## 2020-08-14 ENCOUNTER — TELEPHONE (OUTPATIENT)
Dept: PHYSICAL THERAPY | Facility: OTHER | Age: 45
End: 2020-08-14

## 2020-08-14 NOTE — TELEPHONE ENCOUNTER
Called patient per  left today  Voice message left for patient to call back  Phone number and hours of business provided  Patient informed that we are working remotely and that calls from us will be showing up as non [facility] phone numbers

## 2020-08-31 ENCOUNTER — NURSE TRIAGE (OUTPATIENT)
Dept: PHYSICAL THERAPY | Facility: OTHER | Age: 45
End: 2020-08-31

## 2020-08-31 ENCOUNTER — TELEPHONE (OUTPATIENT)
Dept: PHYSICAL THERAPY | Facility: OTHER | Age: 45
End: 2020-08-31

## 2020-08-31 DIAGNOSIS — M54.2 ACUTE NECK PAIN: Primary | ICD-10-CM

## 2020-08-31 NOTE — TELEPHONE ENCOUNTER
Additional Information   Negative: Is this related to a work injury?  Negative: Is this related to an MVA?  Negative: Are you currently recieving homecare services?  Negative: Has the patient had unexplained weight loss?  Negative: Does the patient have a fever?  Negative: Is the patient experiencing blood in sputum?  Negative: Is the patient experiencing urine retention?  Negative: Has the patient experienced major trauma? (fall from height, high speed collision, direct blow to spine) and is also experiencing nausea, light-headedness, or loss of consciousness?  Negative: Is the patient experiencing acute drop foot or paralysis?  Negative: Is this a chronic condition? Background - Initial Assessment  Clinical complaint: posterior neck pain that radiates to left shoulder and down arm  Date of onset: 6/22/20 after holding door open/ may have aggravated  Frequency of pain: connstant  Quality of pain: dull ache, shooting, sharp, burning, depends on movement    Protocols used: [facility] COMPREHENSIVE SPINE PROGRAM PROTOCOL    Nurse reached out as pt LM for CSP earlier today  Nurse reviewed program and evaluation process with advanced spine physical therapist  Patient stated she has not had "good results" with PT in the past  Nurse confirmed she has established care with [facility] for her neck, shoulder, and arm pain  Patient agreed to triage for evaluation to assist in moving forward  Referral for preferred [place] site entered and contact information given as well  Nurse also informed her that she will be receiving a call from the behavioral office d/t score  Agreed and understood  Nurse wished her well and referral is closed

## 2020-09-01 ENCOUNTER — EVALUATION (OUTPATIENT)
Dept: PHYSICAL THERAPY | Facility: REHABILITATION | Age: 45
End: 2020-09-01
Payer: COMMERCIAL

## 2020-09-01 DIAGNOSIS — M54.2 ACUTE NECK PAIN: ICD-10-CM

## 2020-09-01 PROCEDURE — 97162 PT EVAL MOD COMPLEX 30 MIN: CPT | Performed by: PHYSICAL THERAPIST

## 2020-09-01 PROCEDURE — 97110 THERAPEUTIC EXERCISES: CPT | Performed by: PHYSICAL THERAPIST

## 2020-09-01 PROCEDURE — 97140 MANUAL THERAPY 1/> REGIONS: CPT | Performed by: PHYSICAL THERAPIST

## 2020-09-01 NOTE — PROGRESS NOTES
PT Evaluation     Today's date: 2020  Patient name: Blanquita Diggs  : 1975  MRN: [id number]  Referring provider: Toni Gunter PT  Dx:   Encounter Diagnosis     ICD-10-CM    1  Acute neck pain  M54 2 Ambulatory referral to PT spine       Start Time: 830  Stop Time: 925  Total time in clinic (min): 55 minutes     Assessment  Impairments: abnormal muscle tone, abnormal or restricted ROM, activity intolerance, impaired physical strength, lacks appropriate home exercise program, pain with function and poor posture     Symptom irritability: highBarriers to therapy: 38 y/o right hand dominant female with c/o's left shoulder and left-sided neck pain  On 20, she was holding a door open with her left arm 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  She then started to turn her body towards the right  She felt an immediate pain in her left anterior shoulder and chest region  Recently, due to persistent sx's, she went to the ER due to left-sided chest/shoulder pain  She was then seen by an ortho MD and referred to PT     Understanding of Dx/Px/POC: good   Prognosis: good    Goals  ST - I with HEP  2 - sit with good posture ~ 50% of the time while at work  LT - FOTO > 59  2 - left shoulder ROM = WFL  3 - (-) ANTT in left UE with OH reaching  4 - hook bra without limitation  5 - open a door with her left UE without limitation    Plan  Patient would benefit from: skilled physical therapy  Planned therapy interventions: activity modification, manual therapy, neuromuscular re-education, patient education, postural training, strengthening, stretching, home exercise program, therapeutic activities, therapeutic exercise and therapeutic training  Frequency: 2x week  Duration in weeks: 6  Treatment plan discussed with: patient        Subjective Evaluation    History of Present Illness  Date of onset: 2020  Mechanism of injury: Holding a door open with her left arm and turning her body to the right          Not a recurrent problem   Quality of life: fair    Pain  Current pain ratin  At best pain ratin  At worst pain rating: 10  Quality: tight, sharp, dull ache, burning, pulling and discomfort  Relieving factors: change in position, rest and medications  Aggravating factors: overhead activity and lifting    Social Support  Lives in: multiple-level home  Lives with: young children (brother)    Hand dominance: right      Diagnostic Tests  X-ray: normal  Treatments  No previous or current treatments  Patient Goals  Patient goals for therapy: decreased pain, increased motion, increased strength, independence with ADLs/IADLs, return to sport/leisure activities and return to work          Objective     Concurrent Complaints  Positive for headaches and visual change  Negative for dizziness, faints, nausea/motion sickness, trouble swallowing, difficulty breathing and shortness of breath    Additional Special Questions  Daily migraines (same frequency as prior to the injury, but the intensity is worse)    Palpation   Left   Hypertonic in the upper trapezius  Tenderness of the upper trapezius  Right   Hypertonic in the upper trapezius  Tenderness of the upper trapezius  Neurological Testing     Sensation   Cervical/Thoracic   Left   Intact: light touch    Right   Intact: light touch    Joint Play     Comments: Mid-thoracic mobility = hypomobile    General Comments:      Cervical/Thoracic Comments  FOTO (neck) = 51    Supine ULTT:    Median:  Left = (+) - pain and limited ROM    Neuro Exam:     Headaches   Patient reports headaches: Yes         Flowsheet Rows      Most Recent Value   PT/OT G-Codes   Current Score  51   Projected Score  59             Precautions: fibromyalgia, migraines      Manuals             Supine left shoulder PROM LMR            Supine L median nerve glides No sx's - LMR                                      Neuro Re-Ed  Ther Ex 09/01            HEP LMR            Seated - median nerve glides 2x15 -no sx's            Seated scap retractions 5"x5                                                                             Ther Activity                                       Gait Training                                       Modalities

## 2020-09-02 DIAGNOSIS — G43.719 INTRACTABLE CHRONIC MIGRAINE WITHOUT AURA AND WITHOUT STATUS MIGRAINOSUS: Primary | ICD-10-CM

## 2020-09-02 NOTE — TELEPHONE ENCOUNTER
Patient calling to say that she has had a migraine for a couple weeks and would like to come in for a toradol injection  Please advise  Patient would prefer to come into [place] office  When did migraine start? 1 month  Location/Description: throbbing pain, sharp pain, squeezing pain, bilateral in the frontal area  Pain scale: 8  Associated symptoms:nausea, photophobia  Precipitating factors: no particular thing    What medications have you tried for this migraine headache? prescription medications: Nurtec, Ubrelvy, Toradol, and zonisamide         Current migraine medications are confirmed as: Toradol, Nurtec, Aimovig, zonisamide, and Ubrelvy    Medications tried in the past?   Patient has tried decadron, zyprexa, depakote  Said that they help over time but they do help her sleep  Does say that the toradol does help ease the pain  [phone] Grand to leave a detailed message

## 2020-09-03 ENCOUNTER — CLINICAL SUPPORT (OUTPATIENT)
Dept: NEUROLOGY | Facility: CLINIC | Age: 45
End: 2020-09-03
Payer: COMMERCIAL

## 2020-09-03 DIAGNOSIS — G43.719 INTRACTABLE CHRONIC MIGRAINE WITHOUT AURA AND WITHOUT STATUS MIGRAINOSUS: Primary | ICD-10-CM

## 2020-09-03 DIAGNOSIS — G43.709 CHRONIC MIGRAINE WITHOUT AURA WITHOUT STATUS MIGRAINOSUS, NOT INTRACTABLE: ICD-10-CM

## 2020-09-03 PROCEDURE — 96372 THER/PROPH/DIAG INJ SC/IM: CPT | Performed by: PSYCHIATRY & NEUROLOGY

## 2020-09-03 RX ORDER — KETOROLAC TROMETHAMINE 30 MG/ML
60 INJECTION, SOLUTION INTRAMUSCULAR; INTRAVENOUS ONCE
Status: COMPLETED | OUTPATIENT
Start: 2020-09-03 | End: 2020-09-03

## 2020-09-03 RX ADMIN — KETOROLAC TROMETHAMINE 60 MG: 30 INJECTION, SOLUTION INTRAMUSCULAR; INTRAVENOUS at 12:16

## 2020-09-03 NOTE — TELEPHONE ENCOUNTER
Called and patient is going over to the [facility] office for 11:45  No COVID sx  Notified the Terezar at the [facility] office that patient is coming

## 2020-09-04 RX ORDER — INDOMETHACIN 25 MG/1
CAPSULE ORAL
Qty: 15 CAPSULE | Refills: 0 | Status: SHIPPED | OUTPATIENT
Start: 2020-09-04 | End: 2020-09-10

## 2020-09-04 NOTE — TELEPHONE ENCOUNTER
Did nikolas or Lorelei Valeds can covered? I can send again  Indomethacin next best option  Will send

## 2020-09-04 NOTE — TELEPHONE ENCOUNTER
Patient states she had toradol shot yesterday  Pain is still rated at 8-9/10  She states she had relief for only a few hours but came back  She states she has been only to work 1 day d/t migraines for the past month  Pain is at front of forehead, b/l eyes  Pain feels strong and can hardly concentrate  Patient states she hayley be agreeable to a medication and if [address] is agreeable, have med go to [address] [place] [address]  She notes she has also thought about going to ED, but is going to wait to see how she feels      [phone]   Ok to leave a detailed message  [phone] - Ever Martin (daughter)  Danielle Simmons to leave a detailed message

## 2020-09-04 NOTE — TELEPHONE ENCOUNTER
I called and spoke to [facility] [place] Brianna Knott  She has tried both Nurtec and Ubrelvy but doesn't take pain away all the way   She will try indocin

## 2020-09-05 ENCOUNTER — HOSPITAL ENCOUNTER (EMERGENCY)
Facility: HOSPITAL | Age: 45
Discharge: HOME | End: 2020-09-05
Attending: EMERGENCY MEDICINE
Payer: COMMERCIAL

## 2020-09-05 VITALS
OXYGEN SATURATION: 99 % | DIASTOLIC BLOOD PRESSURE: 65 MMHG | SYSTOLIC BLOOD PRESSURE: 118 MMHG | WEIGHT: 247 LBS | RESPIRATION RATE: 16 BRPM | BODY MASS INDEX: 45.18 KG/M2 | TEMPERATURE: 98.3 F | HEART RATE: 72 BPM

## 2020-09-05 DIAGNOSIS — G43.909 MIGRAINE WITHOUT STATUS MIGRAINOSUS, NOT INTRACTABLE, UNSPECIFIED MIGRAINE TYPE: Primary | ICD-10-CM

## 2020-09-05 PROCEDURE — 99283 EMERGENCY DEPT VISIT LOW MDM: CPT

## 2020-09-05 PROCEDURE — 99284 EMERGENCY DEPT VISIT MOD MDM: CPT | Performed by: PHYSICIAN ASSISTANT

## 2020-09-05 PROCEDURE — 96374 THER/PROPH/DIAG INJ IV PUSH: CPT

## 2020-09-05 PROCEDURE — 96361 HYDRATE IV INFUSION ADD-ON: CPT

## 2020-09-05 PROCEDURE — 96375 TX/PRO/DX INJ NEW DRUG ADDON: CPT

## 2020-09-05 RX ORDER — KETOROLAC TROMETHAMINE 30 MG/ML
30 INJECTION, SOLUTION INTRAMUSCULAR; INTRAVENOUS ONCE
Status: COMPLETED | OUTPATIENT
Start: 2020-09-05 | End: 2020-09-05

## 2020-09-05 RX ORDER — MAGNESIUM SULFATE HEPTAHYDRATE 40 MG/ML
2 INJECTION, SOLUTION INTRAVENOUS ONCE
Status: DISCONTINUED | OUTPATIENT
Start: 2020-09-05 | End: 2020-09-05 | Stop reason: HOSPADM

## 2020-09-05 RX ORDER — METOCLOPRAMIDE HYDROCHLORIDE 5 MG/ML
10 INJECTION INTRAMUSCULAR; INTRAVENOUS ONCE
Status: COMPLETED | OUTPATIENT
Start: 2020-09-05 | End: 2020-09-05

## 2020-09-05 RX ORDER — DIPHENHYDRAMINE HYDROCHLORIDE 50 MG/ML
25 INJECTION INTRAMUSCULAR; INTRAVENOUS ONCE
Status: COMPLETED | OUTPATIENT
Start: 2020-09-05 | End: 2020-09-05

## 2020-09-05 RX ADMIN — DIPHENHYDRAMINE HYDROCHLORIDE 25 MG: 50 INJECTION, SOLUTION INTRAMUSCULAR; INTRAVENOUS at 15:53

## 2020-09-05 RX ADMIN — KETOROLAC TROMETHAMINE 30 MG: 30 INJECTION, SOLUTION INTRAMUSCULAR at 15:46

## 2020-09-05 RX ADMIN — SODIUM CHLORIDE 1000 ML: 0.9 INJECTION, SOLUTION INTRAVENOUS at 15:56

## 2020-09-05 RX ADMIN — METOCLOPRAMIDE HYDROCHLORIDE 10 MG: 5 INJECTION INTRAMUSCULAR; INTRAVENOUS at 15:55

## 2020-09-05 NOTE — ED PROVIDER NOTES
History  Chief Complaint   Patient presents with    Headache - Recurrent or Known Dx Migraines     per pt "she has a Hx of migraines and has been having some maigraine headache which started a couple of days with some sensitivity to light, N but no vomiting "     79-year-old female presents to the emergency department with complaints of a migraine  States she has had this present migraine over the past 4 weeks  Recently had a medication change by her neurologist does not feel that the medication is working at  She states that initially the headache started in the front of her head behind her eyes and now travels toward back of the head  Also with associated sensitivity to light and sound as well as nausea and vomiting  She denies any new head injuries  No fevers        History provided by:  Patient   used: No    Headache - Recurrent or Known Dx Migraines   Pain location:  Frontal  Quality:  Unable to specify  Onset quality:  Gradual  Duration:  4 weeks  Timing:  Constant  Progression:  Waxing and waning  Chronicity:  New  Similar to prior headaches: yes    Context: not activity, not exposure to bright light, not caffeine, not coughing, not defecating, not eating, not stress, not exposure to cold air, not intercourse, not loud noise and not straining    Relieved by:  Nothing  Ineffective treatments:  Prescription medications  Associated symptoms: nausea and photophobia    Associated symptoms: no abdominal pain, no back pain, no blurred vision, no congestion, no cough, no diarrhea, no dizziness, no drainage, no ear pain, no eye pain, no facial pain, no fatigue, no fever, no focal weakness, no hearing loss, no loss of balance, no myalgias, no near-syncope, no neck pain, no neck stiffness, no numbness, no paresthesias, no seizures, no sinus pressure, no sore throat, no swollen glands, no syncope, no tingling, no URI, no visual change, no vomiting and no weakness        Prior to Admission Medications   Prescriptions Last Dose Informant Patient Reported? Taking? AIMOVIG 140 MG/ML SOAJ   No No   Sig: INJECT 1 PEN MONTHLY   AJOVY 225 MG/1 5ML SOSY   No No   Sig: INJECT 225 MG UNDER THE SKIN EVERY 30 DAYS   BD INSULIN SYRINGE U/F 31G X 16" 1 ML MISC   No No   Sig: USE FOR INJECTIONS AS DIRECTED   Cyanocobalamin (B-12) 1000 MCG/ML KIT   No No   Si IM injection every month   NON FORMULARY   Yes No   Sig: Medical marijuana   OLANZapine (ZyPREXA) 2 5 mg tablet   No No   Sig: Take half tab as needed at night time for severe migraine/secondary insomnia  Caution sedation  Do not combine with tramadol   Rimegepant Sulfate (NURTEC) 75 MG TBDP   No No   Si tab at migraine onset  No more than one dose per 24 hours  Hold triptan  Hold Ubrelvy  albuterol (PROVENTIL HFA,VENTOLIN HFA) 90 mcg/act inhaler   No No   Sig: Inhale 2 puffs every 6 (six) hours as needed for wheezing or shortness of breath   cholecalciferol (VITAMIN D3) 1,000 units tablet  Self Yes No   Sig: Take 1 capsule by mouth once a week 3000 units daily    cholestyramine (QUESTRAN) 4 GM/DOSE powder  Self Yes No   Sig: Take by mouth Twice daily   cyclobenzaprine (FLEXERIL) 10 mg tablet  Self Yes No   Sig: Take 1 tablet by mouth 3 (three) times a day as needed   dicyclomine (BENTYL) 20 mg tablet  Self No No   Sig: Take 1 tablet (20 mg total) by mouth 3 (three) times a day as needed (migraine/cramps) for up to 30 days   divalproex sodium (DEPAKOTE) 250 mg EC tablet   Yes No   Sig: as needed    fluticasone (FLONASE) 50 mcg/act nasal spray  Self Yes No   Si sprays into each nostril daily as needed    indomethacin (INDOCIN) 25 mg capsule   No No   Si-2 tabs q 8 hours p r n  Migraine with FOOD/ MEAL   Hold toradol    ketorolac (TORADOL) 10 mg tablet   No No   Sig: Take 1 tablet (10 mg total) by mouth every 6 (six) hours as needed (migraine)   meclizine (ANTIVERT) 12 5 MG tablet  Self No No   Sig: Take 1 tablet (12 5 mg total) by mouth 3 (three) times a day as needed for dizziness or nausea   omeprazole (PriLOSEC) 40 MG capsule   No No   Sig: TAKE ONE CAPSULE BY MOUTH DAILY   ondansetron (ZOFRAN) 4 mg tablet  Self Yes No   Sig: Take 1 tablet by mouth every 8 (eight) hours as needed   prochlorperazine (COMPAZINE) 10 mg tablet   No No   Si tab q6h prn migraine onset  traMADol (ULTRAM) 50 mg tablet   No No   Sig: Take 1 tab at onset of moderate to severe headache, can repeat once in 8 hours  Max 2 days a week   zonisamide (ZONEGRAN) 100 mg capsule   No No   Sig: TAKE TWO CAPSULES (200 MG TOTAL) BY MOUTH DAILY AT BEDTIME      Facility-Administered Medications: None       Past Medical History:   Diagnosis Date    Anxiety     Asthma     Claustrophobia     Cluster headache     CTS (carpal tunnel syndrome)     Depression     Fibromyalgia     last assessed 17    Fibromyalgia, primary     GERD without esophagitis     last assessed 10/12/17    GI problem     Headache, tension-type     Joint pain     Lung nodule     last assessed 10/9/15    Migraine     Muscle pain     Peripheral neuropathy     Sleep apnea        Past Surgical History:   Procedure Laterality Date    BACK SURGERY      CARPAL TUNNEL RELEASE Bilateral     CHOLECYSTECTOMY      GALLBLADDER SURGERY      NECK SURGERY      SPINAL FUSION      C4 and C5    ULNAR NERVE REPAIR Bilateral     UPPER GASTROINTESTINAL ENDOSCOPY         Family History   Problem Relation Age of Onset    Diabetes Mother     Fibromyalgia Mother     Ovarian cancer Mother     Hypertension Mother     Thyroid disease Mother     Migraines Mother     Neuropathy Mother     Arthritis Family     Cancer Family     Heart disease Family         cardiac disorder    Neuropathy Family     Lupus Family         systemic erythematosus    Asthma Brother     Asthma Brother     Breast cancer Maternal Aunt      I have reviewed and agree with the history as documented      E-Cigarette/Vaping E-Cigarette/Vaping Substances     Social History     Tobacco Use    Smoking status: Never Smoker    Smokeless tobacco: Never Used   Substance Use Topics    Alcohol use: Yes     Alcohol/week: 2 0 standard drinks     Types: 1 Glasses of wine, 1 Cans of beer per week     Binge frequency: Monthly     Comment: occasional    Drug use: Yes     Types: Marijuana     Comment: medicinal       Review of Systems   Constitutional: Negative for activity change, appetite change, chills, fatigue and fever  HENT: Negative for congestion, dental problem, drooling, ear discharge, ear pain, hearing loss, mouth sores, nosebleeds, postnasal drip, rhinorrhea, sinus pressure, sore throat and trouble swallowing  Eyes: Positive for photophobia  Negative for blurred vision, pain, discharge and itching  Respiratory: Negative for cough, chest tightness, shortness of breath and wheezing  Cardiovascular: Negative for chest pain, palpitations, syncope and near-syncope  Gastrointestinal: Positive for nausea  Negative for abdominal pain, blood in stool, constipation, diarrhea and vomiting  Endocrine: Negative for cold intolerance and heat intolerance  Genitourinary: Negative for difficulty urinating, dysuria, flank pain, frequency and urgency  Musculoskeletal: Negative for back pain, myalgias, neck pain and neck stiffness  Skin: Negative for rash and wound  Allergic/Immunologic: Negative for food allergies and immunocompromised state  Neurological: Positive for headaches  Negative for dizziness, focal weakness, seizures, syncope, weakness, numbness, paresthesias and loss of balance  Psychiatric/Behavioral: Negative for agitation, behavioral problems and confusion  Physical Exam  Physical Exam  Vitals signs and nursing note reviewed  Constitutional:       General: She is not in acute distress  Appearance: She is not diaphoretic  HENT:      Head: Normocephalic and atraumatic        Right Ear: External ear normal       Left Ear: External ear normal       Mouth/Throat:      Pharynx: No oropharyngeal exudate  Eyes:      General: Lids are normal       Extraocular Movements: Extraocular movements intact  Conjunctiva/sclera: Conjunctivae normal       Pupils: Pupils are equal, round, and reactive to light  Neck:      Vascular: No JVD  Trachea: No tracheal deviation  Cardiovascular:      Rate and Rhythm: Normal rate and regular rhythm  Heart sounds: Normal heart sounds  No murmur  No friction rub  No gallop  Pulmonary:      Effort: Pulmonary effort is normal  No respiratory distress  Breath sounds: Normal breath sounds  No wheezing or rales  Chest:      Chest wall: No tenderness  Musculoskeletal: Normal range of motion  General: No tenderness or deformity  Lymphadenopathy:      Cervical: No cervical adenopathy  Skin:     General: Skin is warm and dry  Findings: No erythema or rash  Neurological:      Mental Status: She is alert and oriented to person, place, and time     Psychiatric:         Mood and Affect: Mood normal          Behavior: Behavior normal          Vital Signs  ED Triage Vitals   Temperature Pulse Respirations Blood Pressure SpO2   09/05/20 1519 09/05/20 1519 09/05/20 1519 09/05/20 1615 09/05/20 1519   98 3 °F (36 8 °C) 68 20 127/69 100 %      Temp Source Heart Rate Source Patient Position - Orthostatic VS BP Location FiO2 (%)   09/05/20 1519 09/05/20 1519 09/05/20 1519 09/05/20 1519 --   Oral Monitor Sitting Left arm       Pain Score       09/05/20 1519       8           Vitals:    09/05/20 1519 09/05/20 1615 09/05/20 1630   BP:  127/69 118/65   Pulse: 68 64 72   Patient Position - Orthostatic VS: Sitting           Visual Acuity  Visual Acuity      Most Recent Value   L Pupil Size (mm)  2   R Pupil Size (mm)  2          ED Medications  Medications   magnesium sulfate 2 g/50 mL IVPB (premix) 2 g (2 g Intravenous Not Given 9/5/20 1557)   diphenhydrAMINE (BENADRYL) injection 25 mg (25 mg Intravenous Given 9/5/20 1553)   metoclopramide (REGLAN) injection 10 mg (10 mg Intravenous Given 9/5/20 1555)   ketorolac (TORADOL) injection 30 mg (30 mg Intravenous Given 9/5/20 1546)   sodium chloride 0 9 % bolus 1,000 mL (0 mL Intravenous Stopped 9/5/20 1719)       Diagnostic Studies  Results Reviewed     None                 No orders to display              Procedures  Procedures         ED Course                   TAVARES/DAST-10      Most Recent Value   How many times in the past year have you    Used an illegal drug or used a prescription medication for non-medical reasons? Never Filed at: 09/05/2020 1535                                [facility]  Number of Diagnoses or Management Options  Diagnosis management comments: Differential diagnosis includes but not limited to:  Migraine headache          Disposition  Final diagnoses:   Migraine without status migrainosus, not intractable, unspecified migraine type     Time reflects when diagnosis was documented in both MDM as applicable and the Disposition within this note     Time User Action Codes Description Comment    9/5/2020  5:12 PM Min Scanlon Add [G43 909] Migraine without status migrainosus, not intractable, unspecified migraine type       ED Disposition     ED Disposition Condition Date/Time Comment    Discharge Stable Sat Sep 5, 2020  5:12 PM Verenice Moeller discharge to home/self care              Follow-up Information    None         Discharge Medication List as of 9/5/2020  5:12 PM      CONTINUE these medications which have NOT CHANGED    Details   AIMOVIG 140 MG/ML SOAJ INJECT 1 PEN MONTHLY, Normal      AJOVY 225 MG/1 5ML SOSY INJECT 225 MG UNDER THE SKIN EVERY 30 DAYS, Normal      albuterol (PROVENTIL HFA,VENTOLIN HFA) 90 mcg/act inhaler Inhale 2 puffs every 6 (six) hours as needed for wheezing or shortness of breath, Starting Thu 5/28/2020, Normal      BD INSULIN SYRINGE U/F 31G X 5/16" 1 ML MISC USE FOR INJECTIONS AS DIRECTED, Normal      cholecalciferol (VITAMIN D3) 1,000 units tablet Take 1 capsule by mouth once a week 3000 units daily , Starting Thu 4/13/2017, Historical Med      cholestyramine (QUESTRAN) 4 GM/DOSE powder Take by mouth Twice daily, Starting Thu 2/4/2016, Historical Med      Cyanocobalamin (B-12) 1000 MCG/ML KIT 1 IM injection every month, Normal      cyclobenzaprine (FLEXERIL) 10 mg tablet Take 1 tablet by mouth 3 (three) times a day as needed, Starting Wed 5/31/2017, Historical Med      dicyclomine (BENTYL) 20 mg tablet Take 1 tablet (20 mg total) by mouth 3 (three) times a day as needed (migraine/cramps) for up to 30 days, Starting Thu 4/4/2019, Until Thu 9/19/2019, Print      divalproex sodium (DEPAKOTE) 250 mg EC tablet as needed , Starting Fri 9/27/2019, Historical Med      fluticasone (FLONASE) 50 mcg/act nasal spray 2 sprays into each nostril daily as needed , Starting Wed 4/5/2017, Historical Med      indomethacin (INDOCIN) 25 mg capsule 1-2 tabs q 8 hours p r n  Migraine with FOOD/ MEAL  Hold toradol , Normal      ketorolac (TORADOL) 10 mg tablet Take 1 tablet (10 mg total) by mouth every 6 (six) hours as needed (migraine), Starting Wed 8/5/2020, Normal      meclizine (ANTIVERT) 12 5 MG tablet Take 1 tablet (12 5 mg total) by mouth 3 (three) times a day as needed for dizziness or nausea, Starting Thu 2/22/2018, Normal      NON FORMULARY Medical marijuana, Historical Med      OLANZapine (ZyPREXA) 2 5 mg tablet Take half tab as needed at night time for severe migraine/secondary insomnia  Caution sedation   Do not combine with tramadol, Normal      omeprazole (PriLOSEC) 40 MG capsule TAKE ONE CAPSULE BY MOUTH DAILY, Normal      ondansetron (ZOFRAN) 4 mg tablet Take 1 tablet by mouth every 8 (eight) hours as needed, Starting Wed 4/5/2017, Historical Med      prochlorperazine (COMPAZINE) 10 mg tablet 1 tab q6h prn migraine onset , Normal      Rimegepant Sulfate (NURTEC) 75 MG TBDP 1 tab at migraine onset  No more than one dose per 24 hours  Hold triptan  Hold Lm Pandya , Normal      traMADol (ULTRAM) 50 mg tablet Take 1 tab at onset of moderate to severe headache, can repeat once in 8 hours  Max 2 days a week, Normal      zonisamide (ZONEGRAN) 100 mg capsule TAKE TWO CAPSULES (200 MG TOTAL) BY MOUTH DAILY AT BEDTIME, Normal           No discharge procedures on file      PDMP Review     None          ED Provider  Electronically Signed by           Marta Cisneros PA-C  09/05/20 8756

## 2020-09-08 ENCOUNTER — PATIENT MESSAGE (OUTPATIENT)
Dept: NEUROLOGY | Facility: CLINIC | Age: 45
End: 2020-09-08

## 2020-09-08 NOTE — TELEPHONE ENCOUNTER
Patient calling to say that she did try the indocin and the medication is able to bring the pain down temporarily  States that she went to the ED on 9/5 and received the migraine cocktail  Said they were able to get the pain down to a 5 but unable to break the migraine  Said that she was able to break the migraine yesterday but had it return this morning  Pain is still a 8/10 and located mainly in the front  Patient has tried decadron, depakote, and olanzapine  Please advise    Patient also requesting a refill of ubrelvy  Patient previously on 50 mg  Loaded 100 mg tabs  Please review and send if agreeable      [phone] [place] to leave a detailed message

## 2020-09-09 DIAGNOSIS — G43.719 INTRACTABLE CHRONIC MIGRAINE WITHOUT AURA AND WITHOUT STATUS MIGRAINOSUS: ICD-10-CM

## 2020-09-09 NOTE — TELEPHONE ENCOUNTER
Would say to take indocin 50 mg TID with FOOD/ MEAL x 3 days straight to break cycle  Please call if pt experiences GI s/e  Thanks

## 2020-09-10 ENCOUNTER — APPOINTMENT (OUTPATIENT)
Dept: PHYSICAL THERAPY | Facility: REHABILITATION | Age: 45
End: 2020-09-10
Payer: COMMERCIAL

## 2020-09-10 ENCOUNTER — ANNUAL EXAM (OUTPATIENT)
Dept: OBGYN CLINIC | Facility: CLINIC | Age: 45
End: 2020-09-10
Payer: COMMERCIAL

## 2020-09-10 VITALS
DIASTOLIC BLOOD PRESSURE: 88 MMHG | SYSTOLIC BLOOD PRESSURE: 136 MMHG | WEIGHT: 247 LBS | HEIGHT: 62 IN | TEMPERATURE: 98.4 F | BODY MASS INDEX: 45.45 KG/M2

## 2020-09-10 DIAGNOSIS — N81.2 SECOND DEGREE UTERINE PROLAPSE: ICD-10-CM

## 2020-09-10 DIAGNOSIS — Z01.419 WOMEN'S ANNUAL ROUTINE GYNECOLOGICAL EXAMINATION: Primary | ICD-10-CM

## 2020-09-10 PROCEDURE — G0145 SCR C/V CYTO,THINLAYER,RESCR: HCPCS | Performed by: NURSE PRACTITIONER

## 2020-09-10 PROCEDURE — S0612 ANNUAL GYNECOLOGICAL EXAMINA: HCPCS | Performed by: NURSE PRACTITIONER

## 2020-09-10 PROCEDURE — 87624 HPV HI-RISK TYP POOLED RSLT: CPT | Performed by: NURSE PRACTITIONER

## 2020-09-10 RX ORDER — INDOMETHACIN 25 MG/1
CAPSULE ORAL
Qty: 15 CAPSULE | Refills: 0 | Status: SHIPPED | OUTPATIENT
Start: 2020-09-10 | End: 2020-09-14 | Stop reason: SINTOL

## 2020-09-10 NOTE — PROGRESS NOTES
Subjective    HPI:     Melani Ervin is a 39 y o  female  She is a [place] 2 Para 2, with  x 2  Her menstrual cycles are absent  Her current method of contraception includes Mirena  She denies issues with intimacy  She denies /GI and Gyn complaints  She does have a history of IBS which fluctuates between diarrhea and constipation  She feels safe at home  She states her depression/anxiety is stable  Medical, surgical and family history reviewed  Her dental care is not done - years ago  She not eating a healthy diet and not exercises regularly  She is not happy with her weight  Gynecologic History    No LMP recorded  Patient has had an implant  Last Pap: 19  Results were: negative cells with pos HPV  Last mammogram: has not baseline      Obstetric History    OB History    Para Term  AB Living   2 2       2   SAB TAB Ectopic Multiple Live Births           2      # Outcome Date GA Lbr Oleg/2nd Weight Sex Delivery Anes PTL Lv   2 Para            1 Para                The following portions of the patient's history were reviewed and updated as appropriate: allergies, current medications, past family history, past medical history, past social history, past surgical history and problem list     Review of Systems    Pertinent items are noted in HPI  Objective    Physical Exam  Constitutional:       Appearance: She is well-developed  Genitourinary:      Pelvic exam was performed with patient supine  Vulva, inguinal canal, urethra, bladder, vagina, uterus, right adnexa and left adnexa normal       No posterior fourchette tenderness, injury, rash or lesion present  Cervix is not parous  No cervical motion tenderness, discharge, friability, lesion, polyp or nabothian cyst       IUD strings visualized  Uterus is anteverted  No right or left adnexal mass present  Right adnexa not tender or full  Left adnexa not tender or full        Genitourinary Comments: Stage 2 uterine prolapse noted on exam   HENT:      Head: Normocephalic and atraumatic  Neck:      Musculoskeletal: Neck supple  Thyroid: No thyromegaly  Cardiovascular:      Rate and Rhythm: Normal rate and regular rhythm  Heart sounds: Normal heart sounds, S1 normal and S2 normal    Pulmonary:      Effort: Pulmonary effort is normal       Breath sounds: Normal breath sounds  Chest:      Breasts: Breasts are symmetrical          Right: Normal  No inverted nipple, mass, nipple discharge, skin change or tenderness  Left: Normal  No inverted nipple, mass, nipple discharge, skin change or tenderness  Abdominal:      General: Bowel sounds are normal  There is no distension  Palpations: Abdomen is soft  There is no mass  Tenderness: There is no abdominal tenderness  There is no guarding  Lymphadenopathy:      Cervical: No cervical adenopathy  Upper Body:      Right upper body: No supraclavicular or axillary adenopathy  Left upper body: No supraclavicular or axillary adenopathy  Neurological:      Mental Status: She is alert  Skin:     General: Skin is warm and dry  Findings: No rash  Psychiatric:         Attention and Perception: Attention and perception normal          Mood and Affect: Mood and affect normal          Speech: Speech normal          Behavior: Behavior is cooperative  Thought Content: Thought content normal          Cognition and Memory: Cognition and memory normal          Judgment: Judgment normal    Vitals signs and nursing note reviewed  Assessment and Plan    Mounika Carmona was seen today for gynecologic exam     Diagnoses and all orders for this visit:    Women's annual routine gynecological examination  -     Liquid-based pap, screening    Second degree uterine prolapse      Patient informed of a Stable GYN exam  A pap smear was performed       I have discussed the importance of exercise and healthy diet as well as adequate intake of calcium and vitamin D  The current ASCCP guidelines were reviewed  The low risk patient will receive pap smear screening every 3 years until the age of 34 and then every 3 to 5 years with HPV co-testing from the ages of 33-67  I emphasized the importance of an annual pelvic and breast exam  A yearly mammogram is recommended for breast cancer screening starting at age 36  She has not had her baseline mammogram  Patient has been provided with multiple scripts for mammogram screening  She was encouraged to schedule her baseline screening  Importance of routine dental cleanings reviewed  All questions have been answered to her satisfaction  Mammogram ordered  Follow up in: 1 year

## 2020-09-11 ENCOUNTER — TELEPHONE (OUTPATIENT)
Dept: NEUROLOGY | Facility: CLINIC | Age: 45
End: 2020-09-11

## 2020-09-11 NOTE — TELEPHONE ENCOUNTER
Regarding: Non-Urgent Medical Question  ----- Message from PARISA LARSON [facility] sent at 9/11/2020  2:37 PM EDT -----       ----- Message from Juanita Be to Pavel Pacheco PA-C sent at 9/8/2020  1:44 PM -----   I'm attaching my FMLA paperwork in hopes that it can be filled out and submitted asap  If you can do it some time this week that would be great, if not I understand as you are very busy  It's just these migraines are coming on extremely strong and I'm still having to call out 2-3 times a week and I need this to help me w/my time  If there is a fee for the paperwork please let me know  I'm in desperate need at this point  I do thank you for all you have done and all you continue to do  I'll see you soon!   Josh Cazares  [phone]

## 2020-09-11 NOTE — TELEPHONE ENCOUNTER
FMLA completed however, pt has to sign a release of health information so that we are authorized to send her medical information back to [facility]  Made pt aware she will sign this Monday at appt  I have emailed completed form to [facility] and sheela (schedued to run [facility] on Monday)  [facility] please sign Monday  Jeri Camejo, please have pt sign the release paper and then fax to [facility] and scan to chart

## 2020-09-11 NOTE — TELEPHONE ENCOUNTER
I printed and placed in clinical folder  [address] - agreeable to filling out for patient?  We will then collect payment for [facility]

## 2020-09-11 NOTE — TELEPHONE ENCOUNTER
Received transferred call from Clinical to process charges and collect payment      $15 charge dropped and patient paid for FMLA forms ($15)  Echo on EF 60%. No diastolic HF. Echo finding of asymmetric septal wall hypertrophy. Cardiology consulted and dx HCOM but low suspicion for causing resp failure and also low likelihood of arrhythmogenic.   - Patient with 2 sinus pauses overnight yesterday and cardiology was consulted and they believe that was due to patient's central apnea and no further intervention needed at this time.  - C/w Metoprolol 12.5 BID  - Considering patient's heart murmur and heave, there's suspicion for pulmonary hypertension. Echo ordered, f/u results. Echo on EF 60%. No diastolic HF. Echo finding of asymmetric septal wall hypertrophy. Cardiology consulted and dx HCOM but low suspicion for causing resp failure and also low likelihood of arrhythmogenicity.   - Patient with 2 sinus pauses overnight yesterday and cardiology was consulted and they believe that was due to patient's central apnea and no further intervention needed at this time.  - C/w Metoprolol 12.5 BID  - Considering patient's heart murmur and heave, there's suspicion for pulmonary hypertension. Echo ordered, f/u results.

## 2020-09-11 NOTE — TELEPHONE ENCOUNTER
Pt called in asking form to be done ASAP  Transferred pt to Sonam Juares and she collected payment  Pt is asking for 2 days of intermittent leave per week  Are you agreeable to this amount? She asks if this can't be done today she would like it done in the office when she sees [address] on Monday 9/14/2020

## 2020-09-12 LAB
HPV HR 12 DNA CVX QL NAA+PROBE: NEGATIVE
HPV16 DNA CVX QL NAA+PROBE: NEGATIVE
HPV18 DNA CVX QL NAA+PROBE: NEGATIVE

## 2020-09-14 ENCOUNTER — OFFICE VISIT (OUTPATIENT)
Dept: NEUROLOGY | Facility: CLINIC | Age: 45
End: 2020-09-14
Payer: COMMERCIAL

## 2020-09-14 ENCOUNTER — TELEPHONE (OUTPATIENT)
Dept: NEUROLOGY | Facility: CLINIC | Age: 45
End: 2020-09-14

## 2020-09-14 VITALS
WEIGHT: 242.8 LBS | SYSTOLIC BLOOD PRESSURE: 120 MMHG | HEIGHT: 63 IN | TEMPERATURE: 97.3 F | BODY MASS INDEX: 43.02 KG/M2 | DIASTOLIC BLOOD PRESSURE: 72 MMHG | HEART RATE: 88 BPM

## 2020-09-14 DIAGNOSIS — G47.30 SLEEP APNEA IN ADULT: ICD-10-CM

## 2020-09-14 DIAGNOSIS — G43.709 CHRONIC MIGRAINE WITHOUT AURA WITHOUT STATUS MIGRAINOSUS, NOT INTRACTABLE: Primary | ICD-10-CM

## 2020-09-14 DIAGNOSIS — M54.2 CERVICALGIA: ICD-10-CM

## 2020-09-14 DIAGNOSIS — G43.719 INTRACTABLE CHRONIC MIGRAINE WITHOUT AURA AND WITHOUT STATUS MIGRAINOSUS: ICD-10-CM

## 2020-09-14 PROCEDURE — 96372 THER/PROPH/DIAG INJ SC/IM: CPT

## 2020-09-14 PROCEDURE — 99214 OFFICE O/P EST MOD 30 MIN: CPT

## 2020-09-14 PROCEDURE — 1036F TOBACCO NON-USER: CPT

## 2020-09-14 RX ORDER — BACLOFEN 10 MG/1
TABLET ORAL
Qty: 30 TABLET | Refills: 0 | Status: SHIPPED | OUTPATIENT
Start: 2020-09-14 | End: 2021-09-21 | Stop reason: ALTCHOICE

## 2020-09-14 RX ORDER — METOCLOPRAMIDE 10 MG/1
TABLET ORAL
Qty: 20 TABLET | Refills: 0 | Status: SHIPPED | OUTPATIENT
Start: 2020-09-14

## 2020-09-14 RX ORDER — KETOROLAC TROMETHAMINE 10 MG/1
TABLET, FILM COATED ORAL
Qty: 9 TABLET | Refills: 0 | Status: SHIPPED | OUTPATIENT
Start: 2020-09-14 | End: 2021-10-26 | Stop reason: SDUPTHER

## 2020-09-14 RX ORDER — KETOROLAC TROMETHAMINE 30 MG/ML
60 INJECTION, SOLUTION INTRAMUSCULAR; INTRAVENOUS ONCE
Status: COMPLETED | OUTPATIENT
Start: 2020-09-14 | End: 2020-09-14

## 2020-09-14 RX ADMIN — KETOROLAC TROMETHAMINE 60 MG: 30 INJECTION, SOLUTION INTRAMUSCULAR; INTRAVENOUS at 15:21

## 2020-09-14 NOTE — PATIENT INSTRUCTIONS
Can try Baclofen  Toradol q8 hours x3 days to break headache cycle  Hold indomethacin    Hold magnesium  Can add benadryl 25-50 mg at night x 3 days with the toradol    Hold olanzapine/ zyprex (cannot take with compazine or reglan)

## 2020-09-14 NOTE — PROGRESS NOTES
Patient ID: Tato Montgomery is a 39 y o  female  Assessment/Plan:     Diagnoses and all orders for this visit:    Chronic migraine without aura without status migrainosus, not intractable  -     baclofen 10 mg tablet; 1 tab qhs prn neck pain and headache  -     Chemodenervation; Future  -     ketorolac (TORADOL) 10 mg tablet; 1 tab q8 hours x 3 days to break migraine cycle  -     metoclopramide (REGLAN) 10 mg tablet; 1 tab q6 hours prn migraine (with toradol and benadryl)  -     ketorolac (TORADOL) 60 mg/2 mL IM injection 60 mg    Intractable chronic migraine without aura and without status migrainosus  -     metoclopramide (REGLAN) 10 mg tablet; 1 tab q6 hours prn migraine (with toradol and benadryl)    Sleep apnea in adult    Cervicalgia        She reports worsening migraines  Migraines are >15 days per month, lasting >4 hours long  She would be willing to hold aimovig if needed, however Kendall Arteaga does help  Botox reduced migraines more than Aimovig, when she did botox in the past  S/e reviewed  Plan:  Try Baclofen, hold other muscle relaxants  Toradol q8 hours x3 days to break headache cycle  Add reglan to make a cocktail  Can add benadryl 25-50 mg at night x 3 days with the toradol+reglan cocktail  Hold indomethacin  Hold magnesium- s/e  Hold olanzapine/ zyprex (cannot take with compazine or reglan)  The patient should not hesitate to call me prior to her follow up with any questions or concerns  The patient was instructed to urgently call 911 or present to the nearest emergency room with any new or worsening neurological deficits  I have spent 30 minutes with Patient and family (daughter present) today in which greater than 50% of this time was spent in counseling/coordination of care regarding impressions, plan and med s/e          Subjective:    HPI    Ms Jenny Olvera is a pleasant 45 yo female with a history of previous C4-5 ACDF and fibromyalgia, chronic diffuse pain presenting for neurological f/u for chronic refractory migraine headaches       Since last seen the patient reports worsening migraine headaches for the past month  She is unaware of any obvious trigger  She denies focal or lateralizing deficits with the migraines  She called in and we did a Toradol injection in the office but this did not help  I prescribed indomethacin, Musa Sander but these are not helpful  Nurtec was tried in the recent past but did not help  She continues Aimovig injection with partial benefit     She continues zonisamide 200 mg q h s     The dose was not increased in the past due to 1 recent kidney stone  She is drinking as much water as she can  Location: Bifrontal, behind eyes; Crosses over from left or right or R to L; Mid frontal and/or between the eyes  A/w: Nausea, Land S sensitivity  Seem to be stronger lately  Piercing indescription  For the last 3 weeks she states she was only able to go to work one day    States she hurt her left arm/ shoulder 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; states she overextended her arm; now doing PT for this  Robaxin and Flexeril- partial relief of muscle pain; has not tried baclofen    Sleep study- finally scheduled for 9/30/20    Prior documentation:  Since last seen the patient has been taking Aimovig x2 consecutive injections so far  She finds it to reduced migraines by 5-10 percent  She continues to have frequent migraine headaches  She is not currently seeing her chiropractor  She plans to return back to him when possible  Due to the pandemic she did not feel comfortable going to her chiropractor  She made a sleep medicine office appointment but canceled it due to fear over the pandemic      Since last seen she is now using medical marijuana   She started this May 6th  She prefers not to use it during the day because it tends to caused grogginess    She does use it at night and she feels she sleeps better      She continues to have frequent headaches , most likely migrainous, which reside in the frontal region , mostly on the right side and sometimes moved to the left   Sometimes reside around the right eye   She had a very severe headache on Memorial Day   She was trying to have a cookout with friends and family  She could not be with her friends and family due to the headache and had to go to her room and lie down  She forgot to take her medication Toradol, and she also had [place] and [place]  She took it that night and states it helped a little  She admits that she should of taking it earlier  These were only partially beneficial when she took them      She had left flank pain recently , and states she passed a kidney stone at home, then reported to the ED to make sure everything was okay  Her last kidney stone was in 2009 on the left as well      Prior documentation:  She states she has not yet tried to olanzapine by itself for migraine abortive treatment/insomnia but does state that she is able to tolerate half a tab of 2 5 mg nightly with no significant sedation lasting into the next day      States based on how quickly she can catch her migraine Maxalt plus or minus Toradol plus or minus Compazine can help   States sometimes this is a hit or miss      B12 deficiency-- on supplementation- repeat blood work pending  States migraine infusions with no benefit and had horrible GI side effects but no chest pain     Repeat eye exam pending   She denies any new changes in the nature of her head migraine   States she is having some weight loss with the zonisamide, which she is happy about   Denies other s/e     Of Note,  the only treatment that has thus far significantly helped reduce her migraine frequency by 75% has been Aimovig   She did apply to the patient assistance program   She just received an e-mail with the co-pay card for 5 dollars a month   Discussed with the patient falling this as soon as able for better migraine control      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  Ramonita Palacios tried Decadron this month, and this did help calm down the migraine      She had an infusion since last seen but this made her very sick; she states that something in the infusion caused her to have nausea and vomiting for several days- did not help headaches     Tried/ failed Ajovy      She tried Botox in the past and it was very helpful although currently not covered by her insurance and has a high co-pay  The following portions of the patient's history were reviewed and updated as appropriate: She  has a past medical history of Anxiety, Asthma, Claustrophobia, Cluster headache, CTS (carpal tunnel syndrome), Depression, Fibromyalgia, Fibromyalgia, primary, GERD without esophagitis, GI problem, Headache, tension-type, Joint pain, Lung nodule, Migraine, Muscle pain, Peripheral neuropathy, and Sleep apnea    She   Patient Active Problem List    Diagnosis Date Noted    Trapezius muscle spasm 07/30/2020    Radiculopathy, cervical region 07/30/2020    Impingement syndrome of left shoulder 07/30/2020    Intractable chronic migraine without aura and without status migrainosus 06/01/2020    Left flank pain 05/28/2020    BMI 45 0-49 9, adult ([facility] [address] ) 05/28/2020    Mild intermittent asthma 05/28/2020    Breast cancer screening by mammogram 05/28/2020    Sleep apnea in adult 01/23/2020    Vitamin B12 deficiency 01/23/2020    Abnormal Pap smear of cervix 10/16/2019    Genital prolapse 09/19/2019    Sleep-disordered breathing 09/16/2019    B12 deficiency 09/16/2019    Post traumatic stress disorder (PTSD) 08/28/2019    Intractable chronic migraine without aura and with status migrainosus 04/02/2019    Irritable bowel syndrome with both constipation and diarrhea 01/10/2019    Fibromyalgia 01/10/2019    Depressed 01/10/2019    Borderline hyperlipidemia 01/10/2019    History of angioedema 01/10/2019    Right ankle pain 06/07/2018    Foot swelling 06/07/2018  Vertigo 05/09/2018    Cervicalgia 02/06/2018    Chronic migraine without aura without status migrainosus, not intractable 02/06/2018    Saccadic eye movements 02/06/2018    GERD without esophagitis 10/12/2017    Vitamin D deficiency 04/12/2016    Arthralgia of multiple joints 04/06/2016    Obesity, morbid, BMI 40 0-49 9 ([facility] [address] ) 04/06/2016    Allergic rhinitis 11/21/2013    Colon, diverticulosis 11/21/2013     She  has a past surgical history that includes Carpal tunnel release (Bilateral); Ulnar nerve repair (Bilateral); Spinal fusion; Cholecystectomy; Back surgery; Gallbladder surgery; Neck surgery; and Upper gastrointestinal endoscopy  Her family history includes Arthritis in her family; Asthma in her brother and brother; Breast cancer in her maternal aunt; Cancer in her family; Diabetes in her mother; Fibromyalgia in her mother; Heart disease in her family; Hypertension in her mother; Lupus in her family; Migraines in her mother; Neuropathy in her family and mother; Ovarian cancer in her mother; Thyroid disease in her mother  She  reports that she has never smoked  She has never used smokeless tobacco  She reports current alcohol use of about 2 0 standard drinks of alcohol per week  She reports current drug use  Drug: Marijuana    Current Outpatient Medications   Medication Sig Dispense Refill    AIMOVIG 140 MG/ML SOAJ INJECT 1 PEN MONTHLY 1 mL 10    fluticasone (FLONASE) 50 mcg/act nasal spray 2 sprays into each nostril daily as needed       ketorolac (TORADOL) 10 mg tablet 1 tab q8 hours x 3 days to break migraine cycle 9 tablet 0    levonorgestrel (MIRENA) 20 MCG/24HR IUD 1 each by Intrauterine route once      meclizine (ANTIVERT) 12 5 MG tablet Take 1 tablet (12 5 mg total) by mouth 3 (three) times a day as needed for dizziness or nausea 30 tablet 0    NON FORMULARY Medical marijuana      omeprazole (PriLOSEC) 40 MG capsule TAKE ONE CAPSULE BY MOUTH DAILY 30 capsule 3    ondansetron (ZOFRAN) 4 mg tablet Take 1 tablet by mouth every 8 (eight) hours as needed      traMADol (ULTRAM) 50 mg tablet Take 1 tab at onset of moderate to severe headache, can repeat once in 8 hours  Max 2 days a week 10 tablet 1    Ubrogepant (UBRELVY) 100 MG tablet 1 tab at migraine onset  Repeat after 2 hours if needed  Max 2 doses per day  Hold nurtec  10 tablet 0    zonisamide (ZONEGRAN) 100 mg capsule TAKE TWO CAPSULES (200 MG TOTAL) BY MOUTH DAILY AT BEDTIME 60 capsule 4    albuterol (PROVENTIL HFA,VENTOLIN HFA) 90 mcg/act inhaler Inhale 2 puffs every 6 (six) hours as needed for wheezing or shortness of breath 1 Inhaler 5    baclofen 10 mg tablet 1 tab qhs prn neck pain and headache 30 tablet 0    cholecalciferol (VITAMIN D3) 1,000 units tablet Take 1 capsule by mouth once a week 3000 units daily       cholestyramine (QUESTRAN) 4 GM/DOSE powder Take by mouth Twice daily      dicyclomine (BENTYL) 20 mg tablet Take 1 tablet (20 mg total) by mouth 3 (three) times a day as needed (migraine/cramps) for up to 30 days 60 tablet 0    divalproex sodium (DEPAKOTE) 250 mg EC tablet as needed       metoclopramide (REGLAN) 10 mg tablet 1 tab q6 hours prn migraine (with toradol and benadryl) 20 tablet 0     No current facility-administered medications for this visit  She is allergic to hydrocodone-acetaminophen; codeine; oxycodone-acetaminophen; and penicillins            Objective:    Blood pressure 120/72, pulse 88, temperature (!) 97 3 °F (36 3 °C), height 5' 2 5" (1 588 m), weight 110 kg (242 lb 12 8 oz)  Body mass index is 43 7 kg/m²  Physical Exam    Neurological Exam  On neurologic exam, the patient is alert and oriented to time and place  Speech is fluent and articulate, and the patient follows commands appropriately  Judgment and affect appear normal  Pupils are equally round and reactive to light and extraocular muscles are intact without nystagmus   Face is symmetric, and tongue, uvula, and palate are midline  Facial sensation is normal and symmetric, in all 3 divisions of the trigeminal nerve  Hearing is intact  Motor examination reveals intact strength throughout  Normal gait is steady  ROS:    Review of Systems   Constitutional: Negative  Negative for appetite change and fever  HENT: Negative  Negative for hearing loss, tinnitus, trouble swallowing and voice change  Eyes: Positive for photophobia and pain  Respiratory: Negative  Negative for shortness of breath  Cardiovascular: Negative  Negative for palpitations  Gastrointestinal: Positive for nausea  Negative for vomiting  Endocrine: Negative  Negative for cold intolerance  Genitourinary: Negative  Negative for dysuria, frequency and urgency  Musculoskeletal: Positive for myalgias and neck pain  Skin: Negative  Negative for rash  Neurological: Positive for dizziness, speech difficulty (sometimes) and headaches  Negative for tremors, seizures, syncope, facial asymmetry, weakness, light-headedness and numbness  Hematological: Bruises/bleeds easily (Bruises easily)  Psychiatric/Behavioral: Positive for confusion and sleep disturbance  Negative for hallucinations  The following portions of the patient's history were reviewed and updated as appropriate: allergies, current medications/ medication history, past family history, past medical history, past social history, past surgical history and problem list     Review of systems was reviewed and otherwise unremarkable from a neurological perspective

## 2020-09-14 NOTE — TELEPHONE ENCOUNTER
----- Message from Jose Jameson PA-C sent at 9/14/2020  3:53 PM EDT -----  Regarding: botox RESTART  I ordered botox for her migraines  She had this before, therefore I think I will be able to inject this if approved  Please advise  Thanks

## 2020-09-15 NOTE — TELEPHONE ENCOUNTER
I had the patient signed and the release paper and fax to [facility] and scanned into the patient chart, and [address] signed the forms

## 2020-09-16 ENCOUNTER — TELEPHONE (OUTPATIENT)
Dept: NEUROLOGY | Facility: CLINIC | Age: 45
End: 2020-09-16

## 2020-09-16 LAB
LAB AP GYN PRIMARY INTERPRETATION: NORMAL
Lab: NORMAL

## 2020-09-16 NOTE — TELEPHONE ENCOUNTER
Yes agreeable to being out of work for 3-6 months depending on how her improvement is with botox, which will be started  Thanks

## 2020-09-16 NOTE — TELEPHONE ENCOUNTER
Good morning! Please let me know once your noted is completed so I can submit for prior auth  Thank you!     Nini

## 2020-09-16 NOTE — TELEPHONE ENCOUNTER
You will need to be more specific  Out for 3 months? And then you will re-evaluate? Or out for 6 months and then you will return her sooner if she is better?

## 2020-09-16 NOTE — TELEPHONE ENCOUNTER
pt called and states that you agreed to take her out of work until her next appt  Kathia Dillon will be faxing forms to be completed  she states that she forgot that she worked 9/7 so her first day out of work would be 9/8 and that is the date that should be listed on [employer] forms  made her aware of fee and turn around time  [phone] to leave detailed message    I don't see above noted in her office note  Are you taking pt out of work until next appt?   Are you agreeable to completing forms once received

## 2020-09-17 ENCOUNTER — OFFICE VISIT (OUTPATIENT)
Dept: PHYSICAL THERAPY | Facility: REHABILITATION | Age: 45
End: 2020-09-17
Payer: COMMERCIAL

## 2020-09-17 DIAGNOSIS — M54.2 ACUTE NECK PAIN: Primary | ICD-10-CM

## 2020-09-17 PROCEDURE — 97032 APPL MODALITY 1+ESTIM EA 15: CPT | Performed by: PHYSICAL THERAPIST

## 2020-09-17 PROCEDURE — 97140 MANUAL THERAPY 1/> REGIONS: CPT | Performed by: PHYSICAL THERAPIST

## 2020-09-17 NOTE — TELEPHONE ENCOUNTER
Unable to submit prior auth request via [address] [place] [address] [employer] and spoke to BRYAN  - JORGITO enquired if prior Giselle Vu is needed for Botox  and [id number]  She informed me that no Giselle Vu is needed for either code and pt's preferred specialty pharmacy is [facility]  Call ref# I- [id number]

## 2020-09-17 NOTE — TELEPHONE ENCOUNTER
Form received, placed at  to be scanned into chart    Please enter cahrge and contact pt to collect payment, $15 1 page form

## 2020-09-17 NOTE — PROGRESS NOTES
Daily Note     Today's date: 2020  Patient name: Remy Bailey  : 1975  MRN: [id number]  Referring provider: Carlyle Carver PT  Dx:   Encounter Diagnosis     ICD-10-CM    1  Acute neck pain  M54 2        Start Time: 1445  Stop Time: 1540  Total time in clinic (min): 55 minutes    Subjective:   Pt was seen by a rheumatologist earlier today  She will be OOW until 20 due to her migraines  Objective: See treatment diary below  Pt would benefit from a home e-stim unit  Assessment: Tolerated treatment well  Patient would benefit from continued PT    Plan: Continue per plan of care        Precautions: fibromyalgia, migraines      Manuals            Supine left shoulder PROM LMR LMR           Supine L median nerve glides No sx's - LMR LMR           Seated pec stretch  LMR           Gr 1 GH oscillations  LMR                                                                            Neuro Re-Ed                                                                                                        Ther Ex            HEP LMR LMR           Seated - median nerve glides 2x15 -no sx's            Seated scap retractions 5"x5                                                                             Ther Activity                                       Gait Training                                       Modalities             IFC (post)  15'           MHP (pre)  13'

## 2020-09-17 NOTE — TELEPHONE ENCOUNTER
Please schedule new start Botox at least 3 weeks into October as this will be specialty pharmacy  Please let me know once the apt is scheduled so I can attach the referral     Thank you!     Nini

## 2020-09-18 ENCOUNTER — TELEPHONE (OUTPATIENT)
Dept: NEUROLOGY | Facility: CLINIC | Age: 45
End: 2020-09-18

## 2020-09-18 ENCOUNTER — APPOINTMENT (OUTPATIENT)
Dept: PHYSICAL THERAPY | Facility: REHABILITATION | Age: 45
End: 2020-09-18
Payer: COMMERCIAL

## 2020-09-18 NOTE — TELEPHONE ENCOUNTER
General  09/18/2020 12:07 PM  Nini Soria MA  CARE COORDINATION  -    Note     NEW START BOTOX 200 UNITS - NO AUTH NEEDED    [facility]

## 2020-09-18 NOTE — TELEPHONE ENCOUNTER
Called [facility] and spoke to Gamaliel Mon - I informed him that I was calling to provide a new verbal prescription for a new patient  He informed me that pt already has a profile  Pt's demographic information was confirmed and I provided him with pt's [employer]  Pt's profile was updated  I was transferred to the pharmacy where I spoke to [facility] - provided him with verbal order for Botox 200 units vial Qty of 1 with 3 refills

## 2020-09-22 NOTE — TELEPHONE ENCOUNTER
Zoltan Villasenor with [facility] calling in asking about status of records request  I advised that we faxed this on 9/17   She will contact O for status of records

## 2020-09-22 NOTE — TELEPHONE ENCOUNTER
Completed form emailed to Han Graham for Varun Villasenor  Please scan into chart and contact patient  Thanks

## 2020-09-23 NOTE — TELEPHONE ENCOUNTER
Received a call from Pardeep with [facility]- he states he is calling because the patient's Botox order requires prior authorization  I advised him that we called [employer] and they confirmed no authorization is required  I did provide him with that information and the call reference number  He states that's all the information he needed and he would start working on the order

## 2020-09-23 NOTE — TELEPHONE ENCOUNTER
[facility] and spoke to Saleem - she informed me that prior auth on file for pt has   I informed her that this is the first time that the patient is getting Botox  She stated that pt has gotten Botox previously with [facility]  She provided me with ph# [phone] to call and obtain prior auth

## 2020-09-23 NOTE — TELEPHONE ENCOUNTER
Called [facility] at [phone] as instructed per pharmacy rep-  Spoke to Mingo Lucero, she once again confirmed that no Garrel League is needed for either codes  nor [id number]   Call ref# [id number]

## 2020-09-24 ENCOUNTER — APPOINTMENT (OUTPATIENT)
Dept: PHYSICAL THERAPY | Facility: REHABILITATION | Age: 45
End: 2020-09-24
Payer: COMMERCIAL

## 2020-09-25 ENCOUNTER — APPOINTMENT (OUTPATIENT)
Dept: PHYSICAL THERAPY | Facility: REHABILITATION | Age: 45
End: 2020-09-25
Payer: COMMERCIAL

## 2020-09-25 NOTE — TELEPHONE ENCOUNTER
Called [facility] and spoke to Cor[address] - she informed me that pt's Botox order is on hold due to prior balance from 6/2017    Called and spoke to patient - she informed me that she is aware of the Balance with [facility]  Per pt, she is currently working with [facility] on taking care of the balance  Pt stated that she will call me back with an update

## 2020-09-29 NOTE — TELEPHONE ENCOUNTER
Just got the form, one page signed by [facility] and I just faxed them over to [phone]  Form scanned to this encounter

## 2020-09-29 NOTE — TELEPHONE ENCOUNTER
Called and spoke to patient - she informed me that she was not feeling well and has not had a chance to call the pharmacy  Per pt, she will call the pharmacy today

## 2020-09-30 ENCOUNTER — HOSPITAL ENCOUNTER (OUTPATIENT)
Dept: SLEEP CENTER | Facility: CLINIC | Age: 45
Discharge: HOME | End: 2020-09-30
Attending: INTERNAL MEDICINE
Payer: COMMERCIAL

## 2020-09-30 DIAGNOSIS — G43.719 INTRACTABLE CHRONIC MIGRAINE WITHOUT AURA AND WITHOUT STATUS MIGRAINOSUS: ICD-10-CM

## 2020-09-30 DIAGNOSIS — G47.33 OBSTRUCTIVE SLEEP APNEA: ICD-10-CM

## 2020-09-30 DIAGNOSIS — R51.9 HEADACHE UPON AWAKENING: ICD-10-CM

## 2020-09-30 PROCEDURE — 95810 POLYSOM 6/> YRS 4/> PARAM: CPT

## 2020-10-01 PROBLEM — G47.33 OBSTRUCTIVE SLEEP APNEA: Status: ACTIVE | Noted: 2020-01-23

## 2020-10-01 NOTE — TELEPHONE ENCOUNTER
I received a call from Susan Hartman with [facility]  - I enquired about the status of this pt's order as far as her previous balance  She placed me on hold while she reached out to the billing dept, when she came back on the line, she stated that she was not able to reach anyone in the billing dept  She informed her that pt's prior balance has been transferred to a third party company and the patient must take care of the balance with them first

## 2020-10-01 NOTE — TELEPHONE ENCOUNTER
Called and spoke to patient - I instructed her to call [place] to get the information on the third party company that her balance was transferred to so she can start making arrangements for payments  Pt stated that she will call [place] to obtain the information

## 2020-10-06 ENCOUNTER — TELEPHONE (OUTPATIENT)
Dept: SLEEP CENTER | Facility: CLINIC | Age: 45
End: 2020-10-06

## 2020-10-07 ENCOUNTER — TELEPHONE (OUTPATIENT)
Dept: NEUROLOGY | Facility: CLINIC | Age: 45
End: 2020-10-07

## 2020-10-08 NOTE — TELEPHONE ENCOUNTER
Called [facility] and spoke to Loletta Aase - she informed me that the patient has not called the pharmacy as of yet

## 2020-10-13 NOTE — TELEPHONE ENCOUNTER
Called [facility] and spoke to Reno Starks - she informed me that the patient called the pharmacy today and is currently working with the billing dept on her prior balance

## 2020-10-15 NOTE — TELEPHONE ENCOUNTER
Called and spoke to patient - she stated that she will need some time to pay off previous balance  Pt's Botox apt has been rescheduled to 11/27/20  Je Cook

## 2020-10-15 NOTE — TELEPHONE ENCOUNTER
Received a voicemail from the patient returning your call   Please give her a call back at [phone]

## 2020-10-19 ENCOUNTER — TELEPHONE (OUTPATIENT)
Dept: NEUROLOGY | Facility: CLINIC | Age: 45
End: 2020-10-19

## 2020-11-02 NOTE — TELEPHONE ENCOUNTER
Called and spoke to pt - she informed me that she is still trying to find a way to pay her previous balance with the pharmacy  Pt requested to give her a couple of weeks to get everything taken care of

## 2020-11-04 ENCOUNTER — TELEPHONE (OUTPATIENT)
Dept: NEUROLOGY | Facility: CLINIC | Age: 45
End: 2020-11-04

## 2020-11-04 DIAGNOSIS — G43.709 CHRONIC MIGRAINE WITHOUT AURA WITHOUT STATUS MIGRAINOSUS, NOT INTRACTABLE: Primary | ICD-10-CM

## 2020-11-04 RX ORDER — DIVALPROEX SODIUM 250 MG/1
TABLET, DELAYED RELEASE ORAL
Qty: 16 TABLET | Refills: 0 | Status: SHIPPED | OUTPATIENT
Start: 2020-11-04 | End: 2021-03-18 | Stop reason: SDUPTHER

## 2020-11-11 NOTE — TELEPHONE ENCOUNTER
Called and spoke to patient - she informed me that in a couple of days she will know if she will get the money to pay her balance with [facility]    per pt, she will call our office with an update as soon as she has it

## 2020-11-13 ENCOUNTER — TELEPHONE (OUTPATIENT)
Dept: NEUROLOGY | Facility: CLINIC | Age: 45
End: 2020-11-13

## 2020-11-17 ENCOUNTER — PATIENT MESSAGE (OUTPATIENT)
Dept: NEUROLOGY | Facility: CLINIC | Age: 45
End: 2020-11-17

## 2020-11-17 NOTE — TELEPHONE ENCOUNTER
Called patient to see if she was able to make payment towards her balance with [facility]  Patient stated she was denied short term disability and has not had any income since September  Patient wants to cancel appt on 11/27/2020 with Dr Ivan Bridges,    Patient is asking to r/s to Jan 2021 as she will be able to make payment to [facility] once she is back to work which should be soon  Please advise, thank you

## 2020-11-19 ENCOUNTER — TELEPHONE (OUTPATIENT)
Dept: NEUROLOGY | Facility: CLINIC | Age: 45
End: 2020-11-19

## 2020-11-19 DIAGNOSIS — G43.919 REFRACTORY MIGRAINE: ICD-10-CM

## 2020-11-19 RX ORDER — TRAMADOL HYDROCHLORIDE 50 MG/1
TABLET ORAL
Qty: 10 TABLET | Refills: 0 | Status: SHIPPED | OUTPATIENT
Start: 2020-11-19

## 2020-11-19 NOTE — TELEPHONE ENCOUNTER
Will look our for cancellation in the month of Jan 2021,   As of right now Dr Zaira Wang does not have any available

## 2020-11-24 NOTE — TELEPHONE ENCOUNTER
Spoke with patient and offered [facility] appointment patient states will still need some time to save up money for the injections  Patient was recently scheduled for march as [facility] made patient aware appointment for March is now switched to [facility]   Patient will call for further updates if can do injections sooner

## 2020-11-30 ENCOUNTER — OFFICE VISIT (OUTPATIENT)
Dept: SLEEP CENTER | Facility: CLINIC | Age: 45
End: 2020-11-30
Payer: COMMERCIAL

## 2020-11-30 VITALS
BODY MASS INDEX: 43.34 KG/M2 | SYSTOLIC BLOOD PRESSURE: 96 MMHG | DIASTOLIC BLOOD PRESSURE: 66 MMHG | HEIGHT: 63 IN | HEART RATE: 76 BPM | OXYGEN SATURATION: 98 % | WEIGHT: 244.6 LBS

## 2020-11-30 DIAGNOSIS — J45.20 MILD INTERMITTENT ASTHMA WITHOUT COMPLICATION: ICD-10-CM

## 2020-11-30 DIAGNOSIS — G43.719 INTRACTABLE CHRONIC MIGRAINE WITHOUT AURA AND WITHOUT STATUS MIGRAINOSUS: ICD-10-CM

## 2020-11-30 DIAGNOSIS — Z79.899 MEDICAL MARIJUANA USE: ICD-10-CM

## 2020-11-30 DIAGNOSIS — J30.9 ALLERGIC RHINITIS, UNSPECIFIED SEASONALITY, UNSPECIFIED TRIGGER: ICD-10-CM

## 2020-11-30 DIAGNOSIS — F41.9 ANXIETY AND DEPRESSION: ICD-10-CM

## 2020-11-30 DIAGNOSIS — M79.7 FIBROMYALGIA: ICD-10-CM

## 2020-11-30 DIAGNOSIS — K21.9 GERD WITHOUT ESOPHAGITIS: ICD-10-CM

## 2020-11-30 DIAGNOSIS — F32.A ANXIETY AND DEPRESSION: ICD-10-CM

## 2020-11-30 DIAGNOSIS — G47.09 OTHER INSOMNIA: Primary | ICD-10-CM

## 2020-11-30 DIAGNOSIS — G47.30 SLEEP-DISORDERED BREATHING: ICD-10-CM

## 2020-11-30 PROBLEM — G47.33 OBSTRUCTIVE SLEEP APNEA: Status: RESOLVED | Noted: 2020-01-23 | Resolved: 2020-11-30

## 2020-11-30 PROCEDURE — 3008F BODY MASS INDEX DOCD: CPT | Performed by: INTERNAL MEDICINE

## 2020-11-30 PROCEDURE — 99214 OFFICE O/P EST MOD 30 MIN: CPT | Performed by: INTERNAL MEDICINE

## 2020-11-30 PROCEDURE — 1036F TOBACCO NON-USER: CPT | Performed by: INTERNAL MEDICINE

## 2020-12-02 ENCOUNTER — TELEPHONE (OUTPATIENT)
Dept: OBGYN CLINIC | Facility: HOSPITAL | Age: 45
End: 2020-12-02

## 2020-12-02 DIAGNOSIS — M75.42 IMPINGEMENT SYNDROME OF LEFT SHOULDER: Primary | ICD-10-CM

## 2020-12-08 ENCOUNTER — OFFICE VISIT (OUTPATIENT)
Dept: OBGYN CLINIC | Facility: HOSPITAL | Age: 45
End: 2020-12-08
Payer: COMMERCIAL

## 2020-12-08 VITALS
BODY MASS INDEX: 42.7 KG/M2 | SYSTOLIC BLOOD PRESSURE: 116 MMHG | HEIGHT: 63 IN | DIASTOLIC BLOOD PRESSURE: 77 MMHG | WEIGHT: 241 LBS | HEART RATE: 78 BPM

## 2020-12-08 DIAGNOSIS — M62.838 TRAPEZIUS MUSCLE SPASM: ICD-10-CM

## 2020-12-08 DIAGNOSIS — M75.42 IMPINGEMENT SYNDROME OF LEFT SHOULDER: Primary | ICD-10-CM

## 2020-12-08 DIAGNOSIS — M54.12 RADICULOPATHY, CERVICAL REGION: ICD-10-CM

## 2020-12-08 DIAGNOSIS — M24.812 INTERNAL DERANGEMENT OF LEFT SHOULDER: ICD-10-CM

## 2020-12-08 PROCEDURE — 99213 OFFICE O/P EST LOW 20 MIN: CPT | Performed by: ORTHOPAEDIC SURGERY

## 2020-12-08 PROCEDURE — 3008F BODY MASS INDEX DOCD: CPT | Performed by: ORTHOPAEDIC SURGERY

## 2020-12-08 PROCEDURE — 1036F TOBACCO NON-USER: CPT | Performed by: ORTHOPAEDIC SURGERY

## 2020-12-10 ENCOUNTER — EVALUATION (OUTPATIENT)
Dept: PHYSICAL THERAPY | Facility: REHABILITATION | Age: 45
End: 2020-12-10
Payer: COMMERCIAL

## 2020-12-10 DIAGNOSIS — M75.42 IMPINGEMENT SYNDROME OF LEFT SHOULDER: ICD-10-CM

## 2020-12-10 PROCEDURE — 97161 PT EVAL LOW COMPLEX 20 MIN: CPT | Performed by: PHYSICAL THERAPIST

## 2020-12-10 PROCEDURE — 97014 ELECTRIC STIMULATION THERAPY: CPT | Performed by: PHYSICAL THERAPIST

## 2020-12-10 PROCEDURE — G0283 ELEC STIM OTHER THAN WOUND: HCPCS | Performed by: PHYSICAL THERAPIST

## 2020-12-10 PROCEDURE — 97140 MANUAL THERAPY 1/> REGIONS: CPT | Performed by: PHYSICAL THERAPIST

## 2020-12-10 PROCEDURE — 97110 THERAPEUTIC EXERCISES: CPT | Performed by: PHYSICAL THERAPIST

## 2020-12-14 ENCOUNTER — TELEPHONE (OUTPATIENT)
Dept: NEUROLOGY | Facility: CLINIC | Age: 45
End: 2020-12-14

## 2020-12-15 ENCOUNTER — APPOINTMENT (OUTPATIENT)
Dept: PHYSICAL THERAPY | Facility: REHABILITATION | Age: 45
End: 2020-12-15
Payer: COMMERCIAL

## 2020-12-17 ENCOUNTER — APPOINTMENT (OUTPATIENT)
Dept: PHYSICAL THERAPY | Facility: REHABILITATION | Age: 45
End: 2020-12-17
Payer: COMMERCIAL

## 2020-12-22 ENCOUNTER — APPOINTMENT (OUTPATIENT)
Dept: PHYSICAL THERAPY | Facility: REHABILITATION | Age: 45
End: 2020-12-22
Payer: COMMERCIAL

## 2020-12-23 ENCOUNTER — TELEPHONE (OUTPATIENT)
Dept: NEUROLOGY | Facility: CLINIC | Age: 45
End: 2020-12-23

## 2020-12-24 ENCOUNTER — TELEPHONE (OUTPATIENT)
Dept: NEUROLOGY | Facility: CLINIC | Age: 45
End: 2020-12-24

## 2020-12-24 ENCOUNTER — APPOINTMENT (OUTPATIENT)
Dept: PHYSICAL THERAPY | Facility: REHABILITATION | Age: 45
End: 2020-12-24
Payer: COMMERCIAL

## 2020-12-28 ENCOUNTER — TELEMEDICINE (OUTPATIENT)
Dept: NEUROLOGY | Facility: CLINIC | Age: 45
End: 2020-12-28
Payer: COMMERCIAL

## 2020-12-28 DIAGNOSIS — E53.8 VITAMIN B12 DEFICIENCY: ICD-10-CM

## 2020-12-28 DIAGNOSIS — M54.12 RADICULOPATHY, CERVICAL REGION: ICD-10-CM

## 2020-12-28 DIAGNOSIS — N20.0 NEPHROLITHIASIS: ICD-10-CM

## 2020-12-28 DIAGNOSIS — M54.2 CERVICALGIA: ICD-10-CM

## 2020-12-28 DIAGNOSIS — G47.30 SLEEP-DISORDERED BREATHING: ICD-10-CM

## 2020-12-28 DIAGNOSIS — G43.709 CHRONIC MIGRAINE WITHOUT AURA WITHOUT STATUS MIGRAINOSUS, NOT INTRACTABLE: Primary | ICD-10-CM

## 2020-12-28 DIAGNOSIS — E55.9 VITAMIN D DEFICIENCY: ICD-10-CM

## 2020-12-28 PROCEDURE — 99214 OFFICE O/P EST MOD 30 MIN: CPT | Performed by: PHYSICIAN ASSISTANT

## 2020-12-28 PROCEDURE — 1036F TOBACCO NON-USER: CPT | Performed by: PHYSICIAN ASSISTANT

## 2020-12-28 RX ORDER — AMITRIPTYLINE HYDROCHLORIDE 10 MG/1
TABLET, FILM COATED ORAL
Qty: 60 TABLET | Refills: 2 | Status: SHIPPED | OUTPATIENT
Start: 2020-12-28 | End: 2021-09-21 | Stop reason: ALTCHOICE

## 2021-01-21 ENCOUNTER — TELEPHONE (OUTPATIENT)
Dept: OTHER | Facility: OTHER | Age: 46
End: 2021-01-21

## 2021-02-23 ENCOUNTER — TELEPHONE (OUTPATIENT)
Dept: NEUROLOGY | Facility: CLINIC | Age: 46
End: 2021-02-23

## 2021-02-23 NOTE — TELEPHONE ENCOUNTER
Botox authorization submitted to [facility] via [facility]  Will await an approval/denial letter      Pending authorization #: [id number]

## 2021-02-25 ENCOUNTER — TELEPHONE (OUTPATIENT)
Dept: NEUROLOGY | Facility: CLINIC | Age: 46
End: 2021-02-25

## 2021-02-25 NOTE — TELEPHONE ENCOUNTER
Type Date User Summary Attachment   General 02/24/2021 10:33 AM Dario Costa care coordination  -   Note    Botox- no authorization needed   Please use [facility]      Thank you,     Shanae May

## 2021-02-25 NOTE — TELEPHONE ENCOUNTER
Per [employer] no prior authorization is required per the patient's plan  Authorization has been canceled via [address] [place] [address]  A copy of this information has been printed and scanned into the patient's chart for future reference

## 2021-02-26 NOTE — TELEPHONE ENCOUNTER
Called [facility], spoke with Love Griggs, advised I was calling to initiate a refill for patient's botox medication  Love Billing states that current script on file has , she transferred me to a pharmacist to provide a new verbal prescription  Spoke with pharmacist Jarod Cerda, advised I was calling in a new verbal Rx for Botox 200 units QTY 1 with 3 refills for Dx [id number] under [facility]    Patient's profile has been sent to insurance verification, will call for follow up in 2 days

## 2021-03-02 NOTE — TELEPHONE ENCOUNTER
[id number] , spoke with Tristan, advised I was calling to check the status of patient's botox order  Tristan states that patient's profile is currently insurance verification  Will call for a status check in 2 days

## 2021-03-04 NOTE — TELEPHONE ENCOUNTER
Called [facility], spoke with Maryan Burkitt, advised I was calling to check the status of patient's botox order  Maryan Burkitt states that patient's botox order is ready to be scheduled for delivery however patient's consent for shipment is needed for the new year  Attempted to contact patient with [facility] on the line but was unsuccessful, left message  Advised patient that her botox medication is ready to be scheduled for delivery from [address]  [facility]  If patient calls back please advised she needs to call [facility] @ [phone] and provide her consent for shipment    Will attempt to contact patient again tomorrow

## 2021-03-05 NOTE — TELEPHONE ENCOUNTER
Called, spoke with patient advised that her botox medication is ready to be scheduled for delivery from [facility]  Patient states she is aware however she has an outstanding balance with [facility] that she is unable to pay at this time  Patient asked if she could reschedule her botox for a couple of months down the road so she has time to pay off the [facility]  Patient has been rescheduled to 5/20/2021, patient states she will attempt to settle balance with [facility] before May appointment

## 2021-03-08 ENCOUNTER — CONSULT (OUTPATIENT)
Dept: PAIN MEDICINE | Facility: CLINIC | Age: 46
End: 2021-03-08
Payer: COMMERCIAL

## 2021-03-08 VITALS
HEIGHT: 62 IN | TEMPERATURE: 98.1 F | SYSTOLIC BLOOD PRESSURE: 114 MMHG | BODY MASS INDEX: 46.01 KG/M2 | DIASTOLIC BLOOD PRESSURE: 79 MMHG | HEART RATE: 90 BPM | WEIGHT: 250 LBS

## 2021-03-08 DIAGNOSIS — M24.812 INTERNAL DERANGEMENT OF LEFT SHOULDER: ICD-10-CM

## 2021-03-08 DIAGNOSIS — M62.838 TRAPEZIUS MUSCLE SPASM: ICD-10-CM

## 2021-03-08 DIAGNOSIS — M54.12 RADICULOPATHY, CERVICAL REGION: ICD-10-CM

## 2021-03-08 DIAGNOSIS — M54.2 NECK PAIN: Primary | ICD-10-CM

## 2021-03-08 DIAGNOSIS — M75.42 IMPINGEMENT SYNDROME OF LEFT SHOULDER: ICD-10-CM

## 2021-03-08 PROCEDURE — 1036F TOBACCO NON-USER: CPT | Performed by: ANESTHESIOLOGY

## 2021-03-08 PROCEDURE — 3008F BODY MASS INDEX DOCD: CPT | Performed by: ANESTHESIOLOGY

## 2021-03-08 PROCEDURE — 99204 OFFICE O/P NEW MOD 45 MIN: CPT | Performed by: ANESTHESIOLOGY

## 2021-03-08 RX ORDER — TIZANIDINE 2 MG/1
2 TABLET ORAL EVERY 8 HOURS PRN
Qty: 90 TABLET | Refills: 1 | Status: SHIPPED | OUTPATIENT
Start: 2021-03-08

## 2021-03-08 NOTE — PATIENT INSTRUCTIONS
Tizanidine (By mouth)   Tizanidine ([id number])  Treats muscle spasticity  Brand Name(s): Zanaflex, Zanaflex Capsule   There may be other brand names for this medicine  When This Medicine Should Not Be Used: This medicine is not right for everyone  Do not use if you had an allergic reaction to tizanidine  How to Use This Medicine:   Capsule, Tablet  · Take your medicine as directed  Your dose may need to be changed several times to find what works best for you  · You may take this medicine with or without food, but always take it the same way every time  Tizanidine works differently depending on whether you take it on an empty stomach or a full stomach  Talk to your doctor if you have any questions about this  · Missed dose: Take a dose as soon as you remember  If it is almost time for your next dose, wait until then and take a regular dose  Do not take extra medicine to make up for a missed dose  · Store the medicine in a closed container at room temperature, away from heat, moisture, and direct light  Drugs and Foods to Avoid:   Ask your doctor or pharmacist before using any other medicine, including over-the-counter medicines, vitamins, and herbal products  · Do not use this medicine together with ciprofloxacin or fluvoxamine  · Some foods and medicines can affect how tizanidine works  Tell your doctor if you are using any of the following:  ? Acyclovir, baclofen, cimetidine, famotidine, ticlopidine, verapamil, zileuton  ? Birth control pills, blood pressure medicine, medicine for heart rhythm problems (such as amiodarone, mexiletine, propafenone), or medicine to treat an infection (such as levofloxacin, moxifloxacin)  · Do not drink alcohol while you are using this medicine  · Tell your doctor if you use anything else that makes you sleepy  Some examples are allergy medicine, narcotic pain medicine, and alcohol    Warnings While Using This Medicine:   · Tell your doctor if you are pregnant or breastfeeding, or if you have kidney disease or liver disease  · This medicine may cause the following problems:  ? Low blood pressure  ? Liver damage  · This medicine may make you dizzy or drowsy  Do not drive or do anything else that could be dangerous until you know how this medicine affects you  Stand or sit up slowly if you are dizzy  · Do not stop using this medicine suddenly  Your doctor will need to slowly decrease your dose before you stop it completely  · Your doctor will do lab tests at regular visits to check on the effects of this medicine  Keep all appointments  · Keep all medicine out of the reach of children  Never share your medicine with anyone  Possible Side Effects While Using This Medicine:   Call your doctor right away if you notice any of these side effects:  · Allergic reaction: Itching or hives, swelling in your face or hands, swelling or tingling in your mouth or throat, chest tightness, trouble breathing  · Dark urine or pale stools, nausea, vomiting, loss of appetite, stomach pain, yellow skin or eyes  · Lightheadedness, dizziness, or fainting  · Seeing or hearing things that are not really there  · Slow heartbeat  If you notice these less serious side effects, talk with your doctor:   · Dry mouth  · Drowsiness or sleepiness  · Weakness  If you notice other side effects that you think are caused by this medicine, tell your doctor  Call your doctor for medical advice about side effects  You may report side effects to FDA at 0-025-FDA-3197  © Copyright [address] Information is for End User's use only and may not be sold, redistributed or otherwise used for commercial purposes  The above information is an  only  It is not intended as medical advice for individual conditions or treatments  Talk to your doctor, nurse or pharmacist before following any medical regimen to see if it is safe and effective for you
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
## 2021-03-16 ENCOUNTER — TELEPHONE (OUTPATIENT)
Dept: NEUROLOGY | Facility: CLINIC | Age: 46
End: 2021-03-16

## 2021-03-16 DIAGNOSIS — G43.709 CHRONIC MIGRAINE WITHOUT AURA WITHOUT STATUS MIGRAINOSUS, NOT INTRACTABLE: ICD-10-CM

## 2021-03-16 NOTE — TELEPHONE ENCOUNTER
Patient calling in with complaint of migraine for a about 1-1 5 weeks  Describes it as  pressure, feels it is being squeezed along with sharp pains in the eye  Pain currently a 9  Patient feels squeezing all over head and can extend to lower neck  Complains of occasional nausea, has had occasional blurred/double vision, sensitivity to light/sound  Patient states she found Depakote tabs that were previously prescribed, should she try this? Any other recommendations? Has tried steroids in the past and says that it was a little helpful  Patient also states [facility] was working on appeal for her Short Term Disability and wanted to know the outcome of this/ update  Please advise       Tried Medications:   tylenol  toradol  compazine  reglan  baclofen  tizanidine - just made her sleepy    #: [phone], [phone] okay to leave detailed message

## 2021-03-16 NOTE — TELEPHONE ENCOUNTER
Called patient  She states she will start out by doing the Depakote taper that she as at home first  She says the amitriptyline makes her sleepy so she does not want to increase that just yet  Will return call to office if Depakote is not effective

## 2021-03-16 NOTE — TELEPHONE ENCOUNTER
If she thinks amitriptyline helps, can increase to 30 mg  Depakote taper would be fine, or Decadron  The appeal paperwork was worked on in Nov- I thought this was sent in? I can look at this later on

## 2021-03-18 ENCOUNTER — EVALUATION (OUTPATIENT)
Dept: PHYSICAL THERAPY | Facility: REHABILITATION | Age: 46
End: 2021-03-18
Payer: COMMERCIAL

## 2021-03-18 DIAGNOSIS — M75.42 IMPINGEMENT SYNDROME OF LEFT SHOULDER: ICD-10-CM

## 2021-03-18 DIAGNOSIS — M54.2 NECK PAIN: ICD-10-CM

## 2021-03-18 DIAGNOSIS — M54.12 RADICULOPATHY, CERVICAL REGION: Primary | ICD-10-CM

## 2021-03-18 PROCEDURE — 97110 THERAPEUTIC EXERCISES: CPT | Performed by: PHYSICAL THERAPIST

## 2021-03-18 PROCEDURE — 97162 PT EVAL MOD COMPLEX 30 MIN: CPT | Performed by: PHYSICAL THERAPIST

## 2021-03-18 PROCEDURE — 97140 MANUAL THERAPY 1/> REGIONS: CPT | Performed by: PHYSICAL THERAPIST

## 2021-03-18 RX ORDER — DIVALPROEX SODIUM 250 MG/1
TABLET, DELAYED RELEASE ORAL
Qty: 16 TABLET | Refills: 0 | Status: SHIPPED | OUTPATIENT
Start: 2021-03-18 | End: 2022-01-31 | Stop reason: SDUPTHER

## 2021-03-18 NOTE — PROGRESS NOTES
PT Evaluation     Today's date: 3/18/2021  Patient name: Roanna Harada  : 1975  MRN: [id number]  Referring provider: Fredy Boss DO  Dx:   Encounter Diagnosis     ICD-10-CM    1  Radiculopathy, cervical region  M54 12 Ambulatory referral to Physical Therapy   2  Neck pain  M54 2 Ambulatory referral to Physical Therapy   3  Impingement syndrome of left shoulder  M75 42 Ambulatory referral to Physical Therapy                  Assessment  Assessment details: Bere Mcclelland is a 39 y o  female presenting to PT w/ a 10 month history of neck pain, associated headaches, and LUE pain which began when her arm hyperextended after she held a door for somebody and the door went backwards  Signs and symptoms and clinical exam findings most significant for increased neural tension median and radial nerve  Pt would benefit from skilled PT services to address the below listed impairments and functional limitations to encourage return to PLOF  Impairments: abnormal or restricted ROM, activity intolerance, impaired physical strength, lacks appropriate home exercise program and pain with function  Functional limitations: Difficulty turning head, looking upward, lifting/carrying/pushing/pulling objectsUnderstanding of Dx/Px/POC: good   Prognosis: poor    Goals  STG: Cervical spine ROM improved by 25% in all deficient planes in 4 weeks  STG: L shoulder ROM improved by 25% in all deficient planes in 4 weeks  STG: Subjectively reports pain at worst decreased by 2 points in 4 weeks  STG: I w/ HEPs 1 week after prescription  LTG: Showers w/ <2/10 L shoulder pain by d/c  LTG: FOTO >= to goal by d/c  LTG: I w/ comprehensive HEP by d/c       Plan  Patient would benefit from: PT eval and skilled physical therapy  Planned modality interventions: TENS, thermotherapy: hydrocollator packs and cryotherapy  Planned therapy interventions: joint mobilization, manual therapy, massage, strengthening, stretching, therapeutic activities, therapeutic exercise, therapeutic training, flexibility, functional ROM exercises, graded activity, graded exercise, graded motor, home exercise program, patient education and neuromuscular re-education  Frequency: 1-2x/week  Duration in visits: 16  Duration in weeks: 12  Plan of Care beginning date: 3/18/2021  Plan of Care expiration date: 6/10/2021  Treatment plan discussed with: patient        Subjective Evaluation    History of Present Illness  Mechanism of injury: Reports 10 month history of L sided arm and shoulder pain which began after her arm extended when she was holding a door for somebody  States that she noticed immediate shoulder and arm shooting type pains  Since this initial incident she states that she has had constant shoulder and arm pain which is made worse with movement  States that she noticed that the pain has reduced a little bit but when she feels the pain is the same  Denies numbness or tingling, nausea, visual changes, or hearing changes  Her goals are to be able to use her arm for everyday tasks such as showering and cleaning without less pain     Pain  Current pain rating: 3  At best pain ratin  At worst pain ratin  Quality: throbbing, tight, radiating, sharp, dull ache and cramping  Aggravating factors: lifting    Patient Goals  Patient goals for therapy: decreased pain, increased motion, increased strength and return to sport/leisure activities          Objective     Neurological Testing     Sensation     Shoulder   Left Shoulder   Intact: light touch    Right Shoulder   Intact: light touch    Active Range of Motion   Left Shoulder   Flexion: 130 degrees with pain  Abduction: 100 degrees with pain  External rotation 0°: 50 degrees with pain  External rotation BTH: C5 with pain  Internal rotation 0°: [facility]  Internal rotation BTB: L4 with pain    Right Shoulder   Normal active range of motion    Strength/Myotome Testing     Left Shoulder     Planes of Motion   Flexion: 4 Abduction: 4   External rotation at 0°: 4   Internal rotation at 0°: 5     Right Shoulder   Normal muscle strength    Tests     Left Shoulder   Negative drop arm, empty can and full can       Additional Tests Details  (+) L ULTT Radial and median, (-) ulnar       Flowsheet Rows      Most Recent Value   PT/OT G-Codes   Current Score  52   Projected Score  56             Precautions: Chronic pain syndrome, depression      Manuals 3/18            Central glides C4/5 MM 2x10                                                   Neuro Re-Ed                                                                                                        Ther Ex             Cervical snags ext/rot HEP                                                                                                       Ther Activity                                       Gait Training                                       Modalities

## 2021-03-25 ENCOUNTER — OFFICE VISIT (OUTPATIENT)
Dept: PHYSICAL THERAPY | Facility: REHABILITATION | Age: 46
End: 2021-03-25
Payer: COMMERCIAL

## 2021-03-25 DIAGNOSIS — M54.12 RADICULOPATHY, CERVICAL REGION: Primary | ICD-10-CM

## 2021-03-25 DIAGNOSIS — M54.2 NECK PAIN: ICD-10-CM

## 2021-03-25 DIAGNOSIS — M75.42 IMPINGEMENT SYNDROME OF LEFT SHOULDER: ICD-10-CM

## 2021-03-25 PROCEDURE — 97140 MANUAL THERAPY 1/> REGIONS: CPT | Performed by: PHYSICAL THERAPIST

## 2021-03-25 PROCEDURE — 97110 THERAPEUTIC EXERCISES: CPT | Performed by: PHYSICAL THERAPIST

## 2021-03-25 NOTE — PROGRESS NOTES
Daily Note     Today's date: 3/25/2021  Patient name: Zeynep Herbert  : 1975  MRN: [id number]  Referring provider: Mallorie Brown DO  Dx:   Encounter Diagnosis     ICD-10-CM    1  Radiculopathy, cervical region  M54 12    2  Neck pain  M54 2    3  Impingement syndrome of left shoulder  M75 42                   Subjective: States that she has her usual symptoms  Objective: See treatment diary below    Assessment: Completed exercise with correct technique and without reports of pain  Demonstrated moderate fatigue after exercise  Pt would benefit from continued PT services to reduce pain and increase level of function  Plan: Continue per plan of care        Precautions: Chronic pain syndrome, depression      Manuals 3/18 3/25           Central glides C4/5 MM 2x10 MM 3x12           Median glides  @45, wrist flex w/ elbow ext                                      Neuro Re-Ed                                                                                                        Ther Ex             Cervical snags ext/rot HEP Reviewed           Median glides  At side, wrist ext w/ elbow flex 3x6 - HEP                                                                                         Ther Activity                                       Gait Training                                       Modalities

## 2021-03-29 ENCOUNTER — APPOINTMENT (OUTPATIENT)
Dept: PHYSICAL THERAPY | Facility: REHABILITATION | Age: 46
End: 2021-03-29
Payer: COMMERCIAL

## 2021-03-30 ENCOUNTER — PATIENT MESSAGE (OUTPATIENT)
Dept: NEUROLOGY | Facility: CLINIC | Age: 46
End: 2021-03-30

## 2021-03-30 DIAGNOSIS — Z23 ENCOUNTER FOR IMMUNIZATION: ICD-10-CM

## 2021-04-01 ENCOUNTER — TELEPHONE (OUTPATIENT)
Dept: NEUROLOGY | Facility: CLINIC | Age: 46
End: 2021-04-01

## 2021-04-01 ENCOUNTER — OFFICE VISIT (OUTPATIENT)
Dept: PHYSICAL THERAPY | Facility: REHABILITATION | Age: 46
End: 2021-04-01
Payer: COMMERCIAL

## 2021-04-01 DIAGNOSIS — M54.2 NECK PAIN: ICD-10-CM

## 2021-04-01 DIAGNOSIS — M54.12 RADICULOPATHY, CERVICAL REGION: Primary | ICD-10-CM

## 2021-04-01 DIAGNOSIS — M75.42 IMPINGEMENT SYNDROME OF LEFT SHOULDER: ICD-10-CM

## 2021-04-01 PROCEDURE — 97140 MANUAL THERAPY 1/> REGIONS: CPT | Performed by: PHYSICAL THERAPIST

## 2021-04-01 PROCEDURE — 97110 THERAPEUTIC EXERCISES: CPT | Performed by: PHYSICAL THERAPIST

## 2021-04-01 NOTE — TELEPHONE ENCOUNTER
Regarding: Prescription Question  ----- Message from Rosalba Florez sent at 3/31/2021  8:24 AM EDT -----       ----- Message from Alexandria Guerra to Sunil Goins PA-C sent at 3/30/2021 11:36 PM -----   Would like to know if I can use VICI Patches (oh mi-graine)  It has Riboflavin (vitamin b2) 200mg  Vitamin B6 (pyridoxine hci) 25mg  Folate as (L-methylfolate) 800mg  Vitamin B12 400 MCG   MAGNESIUM BISGLYCINATE CHELATE 250MG  VITAMIN D as cholecalciferol  2000 IU    You can apply it for 8hrs  I tried to send you a picture, but the site wouldn't allow it

## 2021-04-01 NOTE — PROGRESS NOTES
Daily Note     Today's date: 2021  Patient name: Tiara Purcell  : 1975  MRN: [id number]  Referring provider: Edna Gooden DO  Dx:   Encounter Diagnosis     ICD-10-CM    1  Radiculopathy, cervical region  M54 12    2  Neck pain  M54 2    3  Impingement syndrome of left shoulder  M75 42                   Subjective: States usual symptoms remain  Objective: See treatment diary below    Assessment: Subjectively reports relief of symptoms after manual techniques  She would benefit from continued PT services to reduce pain and increase level of function  Plan: Continue per plan of care        Precautions: Chronic pain syndrome, depression      Manuals 3/18 3/25 4/1          Central glides C4/5 MM 2x10 MM 3x12 MM 3x30          Median glides  @45, wrist flex w/ elbow ext  MM 3x10          GHJ distraction   MM                       Neuro Re-Ed                                                                                                        Ther Ex             Cervical snags ext/rot HEP Reviewed           Median glides  At side, wrist ext w/ elbow flex 3x6 - HEP Into abd w/ wrist ext 2x8 - HEP                                                                                        Ther Activity                                       Gait Training                                       Modalities

## 2021-04-02 NOTE — TELEPHONE ENCOUNTER
I think I have had pts use similar products in the past  I have no problem with it, no interaction with other meds  Give it a few weeks and if not helpful stop  Thanks

## 2021-04-05 ENCOUNTER — APPOINTMENT (OUTPATIENT)
Dept: PHYSICAL THERAPY | Facility: REHABILITATION | Age: 46
End: 2021-04-05
Payer: COMMERCIAL

## 2021-04-07 ENCOUNTER — IMMUNIZATIONS (OUTPATIENT)
Dept: FAMILY MEDICINE CLINIC | Facility: HOSPITAL | Age: 46
End: 2021-04-07

## 2021-04-07 DIAGNOSIS — Z23 ENCOUNTER FOR IMMUNIZATION: Primary | ICD-10-CM

## 2021-04-07 PROCEDURE — 0001A SARS-COV-2 / COVID-19 MRNA VACCINE (PFIZER-BIONTECH) 30 MCG: CPT

## 2021-04-07 PROCEDURE — 91300 SARS-COV-2 / COVID-19 MRNA VACCINE (PFIZER-BIONTECH) 30 MCG: CPT

## 2021-04-08 ENCOUNTER — OFFICE VISIT (OUTPATIENT)
Dept: PHYSICAL THERAPY | Facility: REHABILITATION | Age: 46
End: 2021-04-08
Payer: COMMERCIAL

## 2021-04-08 DIAGNOSIS — M54.12 RADICULOPATHY, CERVICAL REGION: Primary | ICD-10-CM

## 2021-04-08 DIAGNOSIS — M75.42 IMPINGEMENT SYNDROME OF LEFT SHOULDER: ICD-10-CM

## 2021-04-08 DIAGNOSIS — M54.2 NECK PAIN: ICD-10-CM

## 2021-04-08 PROCEDURE — 97112 NEUROMUSCULAR REEDUCATION: CPT | Performed by: PHYSICAL THERAPIST

## 2021-04-08 PROCEDURE — 97140 MANUAL THERAPY 1/> REGIONS: CPT | Performed by: PHYSICAL THERAPIST

## 2021-04-08 PROCEDURE — 97110 THERAPEUTIC EXERCISES: CPT | Performed by: PHYSICAL THERAPIST

## 2021-04-08 NOTE — PROGRESS NOTES
Daily Note     Today's date: 2021  Patient name: Valentino Senna  : 1975  MRN: [id number]  Referring provider: Nina Sinclair DO  Dx:   Encounter Diagnosis     ICD-10-CM    1  Radiculopathy, cervical region  M54 12    2  Neck pain  M54 2    3  Impingement syndrome of left shoulder  M75 42                   Subjective: States that she does notice more motion in her shoulder  Objective: See treatment diary below    Assessment: Improved quality of movement and ROM this session  Plan: Continue per plan of care        Precautions: Chronic pain syndrome, depression      Manuals 3/18 3/25 4/1 4/8         Central glides C4/5 MM 2x10 MM 3x12 MM 3x30 MM 2x30         Median glides  @45, wrist flex w/ elbow ext  MM 3x10 MM 3x10         GHJ distraction   MM MM                      Neuro Re-Ed             TB row    GTB 3x9x3"                                                                                       Ther Ex             Cervical snags ext/rot HEP Reviewed           Median glides  At side, wrist ext w/ elbow flex 3x6 - HEP Into abd w/ wrist ext 2x8 - HEP          Wall slide flex    10x - HEP                                                                          Ther Activity                                       Gait Training                                       Modalities

## 2021-04-09 ENCOUNTER — PATIENT MESSAGE (OUTPATIENT)
Dept: NEUROLOGY | Facility: CLINIC | Age: 46
End: 2021-04-09

## 2021-04-09 DIAGNOSIS — G43.709 CHRONIC MIGRAINE WITHOUT AURA WITHOUT STATUS MIGRAINOSUS, NOT INTRACTABLE: Primary | ICD-10-CM

## 2021-04-12 ENCOUNTER — OFFICE VISIT (OUTPATIENT)
Dept: PHYSICAL THERAPY | Facility: REHABILITATION | Age: 46
End: 2021-04-12
Payer: COMMERCIAL

## 2021-04-12 DIAGNOSIS — M75.42 IMPINGEMENT SYNDROME OF LEFT SHOULDER: ICD-10-CM

## 2021-04-12 DIAGNOSIS — M54.12 RADICULOPATHY, CERVICAL REGION: Primary | ICD-10-CM

## 2021-04-12 DIAGNOSIS — M54.2 NECK PAIN: ICD-10-CM

## 2021-04-12 PROCEDURE — 97140 MANUAL THERAPY 1/> REGIONS: CPT | Performed by: PHYSICAL THERAPIST

## 2021-04-12 PROCEDURE — 97112 NEUROMUSCULAR REEDUCATION: CPT | Performed by: PHYSICAL THERAPIST

## 2021-04-12 NOTE — PROGRESS NOTES
Daily Note     Today's date: 2021  Patient name: Arnoldo Berry  : 1975  MRN: [id number]  Referring provider: Pino Beltran DO  Dx:   Encounter Diagnosis     ICD-10-CM    1  Radiculopathy, cervical region  M54 12    2  Neck pain  M54 2    3  Impingement syndrome of left shoulder  M75 42                   Subjective: States that she went to the gym a few days ago and is sore from that  Objective: See treatment diary below    Assessment: Improved quality of movement and ROM this session  Strengthening progressed according to tolerance  Plan: Continue per plan of care        Precautions: Chronic pain syndrome, depression      Manuals 3/18 3/25 4/1 4/8 4/12        Central glides C4/5 MM 2x10 MM 3x12 MM 3x30 MM 2x30 MM 2x25        Median glides  @45, wrist flex w/ elbow ext  MM 3x10 MM 3x10 MM 2x10        GHJ distraction   MM MM                      Neuro Re-Ed             TB row    GTB 3x9x3"         Judith row walkback     16# x20        XS pull down     7# 3x7        Inverse KB walk     3# 3 laps                                               Ther Ex             Cervical snags ext/rot HEP Reviewed           Median glides  At side, wrist ext w/ elbow flex 3x6 - HEP Into abd w/ wrist ext 2x8 - HEP          Wall slide flex    10x - HEP                                                                          Ther Activity                                       Gait Training                                       Modalities

## 2021-04-15 ENCOUNTER — OFFICE VISIT (OUTPATIENT)
Dept: PHYSICAL THERAPY | Facility: REHABILITATION | Age: 46
End: 2021-04-15
Payer: COMMERCIAL

## 2021-04-15 DIAGNOSIS — M54.12 RADICULOPATHY, CERVICAL REGION: Primary | ICD-10-CM

## 2021-04-15 DIAGNOSIS — M54.2 NECK PAIN: ICD-10-CM

## 2021-04-15 DIAGNOSIS — M75.42 IMPINGEMENT SYNDROME OF LEFT SHOULDER: ICD-10-CM

## 2021-04-15 PROCEDURE — 97112 NEUROMUSCULAR REEDUCATION: CPT | Performed by: PHYSICAL THERAPIST

## 2021-04-15 PROCEDURE — 97110 THERAPEUTIC EXERCISES: CPT | Performed by: PHYSICAL THERAPIST

## 2021-04-15 PROCEDURE — 97140 MANUAL THERAPY 1/> REGIONS: CPT | Performed by: PHYSICAL THERAPIST

## 2021-04-15 NOTE — PROGRESS NOTES
Daily Note     Today's date: 4/15/2021  Patient name: Shirley Cherry  : 1975  MRN: [id number]  Referring provider: Destin Mendez DO  Dx:   Encounter Diagnosis     ICD-10-CM    1  Radiculopathy, cervical region  M54 12    2  Neck pain  M54 2    3  Impingement syndrome of left shoulder  M75 42                   Subjective: Reports continued improvement in shoulder function, it was tight trying to get her arm behind the squat bar at the gym  Objective: See treatment diary below    Assessment: Improved shoulder ROM this session, tolerates increased activity this session  Plan: Continue per plan of care        Precautions: Chronic pain syndrome, depression      Manuals 4/15 3/25 4/1 4/8 4/12        Central glides C4/5 MM 20x MM 3x12 MM 3x30 MM 2x30 MM 2x25        Median glides MM 10x @45, wrist flex w/ elbow ext  MM 3x10 MM 3x10 MM 2x10        GHJ distraction MM and ER stretching  MM MM                      Neuro Re-Ed             TB row    GTB 3x9x3"         [place] row walkback 17# x20    16# x20        XS pull down 7# 3x9    7# 3x7        Inverse KB walk 3# 3 laps     3# 3 laps                                  Instruction on front squat 5'            Ther Ex             Cervical snags ext/rot  Reviewed           Median glides  At side, wrist ext w/ elbow flex 3x6 - HEP Into abd w/ wrist ext 2x8 - HEP          Wall slide flex    10x - HEP         Wall ER stretch 2x6x3" - HEP                                                                Ther Activity                                       Gait Training                                       Modalities

## 2021-04-16 ENCOUNTER — CLINICAL SUPPORT (OUTPATIENT)
Dept: NEUROLOGY | Facility: CLINIC | Age: 46
End: 2021-04-16
Payer: COMMERCIAL

## 2021-04-16 ENCOUNTER — TELEPHONE (OUTPATIENT)
Dept: NEUROLOGY | Facility: CLINIC | Age: 46
End: 2021-04-16

## 2021-04-16 DIAGNOSIS — G43.709 CHRONIC MIGRAINE WITHOUT AURA WITHOUT STATUS MIGRAINOSUS, NOT INTRACTABLE: Primary | ICD-10-CM

## 2021-04-16 PROCEDURE — 96372 THER/PROPH/DIAG INJ SC/IM: CPT | Performed by: PSYCHIATRY & NEUROLOGY

## 2021-04-16 RX ORDER — KETOROLAC TROMETHAMINE 30 MG/ML
60 INJECTION, SOLUTION INTRAMUSCULAR; INTRAVENOUS ONCE
Status: CANCELLED | OUTPATIENT
Start: 2021-04-16 | End: 2021-04-16

## 2021-04-16 RX ORDER — KETOROLAC TROMETHAMINE 30 MG/ML
60 INJECTION, SOLUTION INTRAMUSCULAR; INTRAVENOUS ONCE
Status: COMPLETED | OUTPATIENT
Start: 2021-04-16 | End: 2021-04-16

## 2021-04-16 RX ADMIN — KETOROLAC TROMETHAMINE 60 MG: 30 INJECTION, SOLUTION INTRAMUSCULAR; INTRAVENOUS at 13:34

## 2021-04-16 NOTE — TELEPHONE ENCOUNTER
Patient has migraine that is worse today than other days  She has daily migraines  Patient is asking if she can come in for a toradol shot  She would like to come to [facility]  Please advise if you would be agreeable to this  Patient has used depakote 500mg, toradol 10mg at 1am today without relief  Message sent to [facility] with request  John Rey to place appointment for patient

## 2021-04-19 ENCOUNTER — OFFICE VISIT (OUTPATIENT)
Dept: PHYSICAL THERAPY | Facility: REHABILITATION | Age: 46
End: 2021-04-19
Payer: COMMERCIAL

## 2021-04-19 ENCOUNTER — OFFICE VISIT (OUTPATIENT)
Dept: PAIN MEDICINE | Facility: CLINIC | Age: 46
End: 2021-04-19
Payer: COMMERCIAL

## 2021-04-19 VITALS
WEIGHT: 250 LBS | SYSTOLIC BLOOD PRESSURE: 130 MMHG | DIASTOLIC BLOOD PRESSURE: 77 MMHG | HEART RATE: 72 BPM | HEIGHT: 62 IN | TEMPERATURE: 98.5 F | BODY MASS INDEX: 46.01 KG/M2

## 2021-04-19 DIAGNOSIS — M54.12 CERVICAL RADICULOPATHY: Primary | ICD-10-CM

## 2021-04-19 DIAGNOSIS — M75.42 IMPINGEMENT SYNDROME OF LEFT SHOULDER: ICD-10-CM

## 2021-04-19 DIAGNOSIS — M54.2 NECK PAIN: ICD-10-CM

## 2021-04-19 DIAGNOSIS — M54.12 RADICULOPATHY, CERVICAL REGION: Primary | ICD-10-CM

## 2021-04-19 PROCEDURE — 99214 OFFICE O/P EST MOD 30 MIN: CPT | Performed by: NURSE PRACTITIONER

## 2021-04-19 PROCEDURE — 1036F TOBACCO NON-USER: CPT | Performed by: NURSE PRACTITIONER

## 2021-04-19 PROCEDURE — 97112 NEUROMUSCULAR REEDUCATION: CPT

## 2021-04-19 PROCEDURE — 3008F BODY MASS INDEX DOCD: CPT | Performed by: NURSE PRACTITIONER

## 2021-04-19 PROCEDURE — 97140 MANUAL THERAPY 1/> REGIONS: CPT

## 2021-04-19 NOTE — PROGRESS NOTES
Daily Note     Today's date: 2021  Patient name: Gaby Walton  : 1975  MRN: [id number]  Referring provider: Stephani Son DO  Dx:   Encounter Diagnosis     ICD-10-CM    1  Radiculopathy, cervical region  M54 12    2  Neck pain  M54 2    3  Impingement syndrome of left shoulder  M75 42        Start Time:   Stop Time:   Total time in clinic (min): 39 minutes    Subjective: Presents to her OP PT session with reports of improvements in C/S and L shoulder symptoms; no new c/o fatigue, discomfort, or pain today  Objective: See treatment diary below      Assessment: Continues to tolerate progressions in intervention intensities with no exacerbations in C/S and L shoulder symptoms  Noted improved overhead and ER PROM with manual interventions today  Most fatigued with inverted KB carry  Would continue to benefit from skilled PT care to address ongoing C/S and L shoulder deficits to maximize mobility and quality of life  Plan: Continue per plan of care  Progress treatment as tolerated         Precautions: Chronic pain syndrome, depression      Manuals 4/15 3/25 4/1 4/8 4/12 4/19       Central glides C4/5 MM 20x MM 3x12 MM 3x30 MM 2x30 MM 2x25 NV       Median glides MM 10x @45, wrist flex w/ elbow ext  MM 3x10 MM 3x10 MM 2x10 GF 2x10       GHJ distraction MM and ER stretching  MM MM  GF       PROM      GF  Overhead w/open palm       Neuro Re-Ed             TB row    GTB 3x9x3"         [place] row walkback 17# x20    16# x20 16#  x20       XS pull down 7# 3x9    7# 3x7 7#  3x10       Inverse KB walk 3# 3 laps     3# 3 laps 3# 3 laps       Judith Row      20# x20       Judith Shld Ext      12# x20       KB Suitcase Carry      15/5# x2 laps ea       Instruction on front squat 5'            Ther Ex             Cervical snags ext/rot  Reviewed           Median glides  At side, wrist ext w/ elbow flex 3x6 - HEP Into abd w/ wrist ext 2x8 - HEP          Wall slide flex    10x - HEP         Wall ER stretch 2x6x3" - HEP                                                                Ther Activity                                       Gait Training                                       Modalities

## 2021-04-19 NOTE — PROGRESS NOTES
Assessment:  1  Cervical radiculopathy        Plan:  1  I will order an MRI of the cervical spine with and without contrast and call her with the results  2  May consider an EMG of the LUE if MRI is unremarkable  3  The patient may continue amitriptyline and tramadol as prescribed by Neurology  4  The patient may continue tizanidine 2 mg p r n  myofascial pain  She does not require refilled today   5  The patient will continue with physical therapy as scheduled  6  The patient will follow-up in 4 weeks or sooner if needed     M*Modal software was used to dictate this note  It may contain errors with dictating incorrect words or incorrect spelling  Please contact the provider directly with any questions  History of Present Illness: The patient is a 39 y o  female a history of a C4-5 ACDF and chronic headaches last seen on 3/8/21 who presents for a follow up office visit in regards to chronic neck pain that radiates into the anterolateral aspect of the left upper extremity to the elbow with associated subjective weakness  She denies right upper extremity symptoms, bowel or bladder incontinence or balance issues  X-ray of the left shoulder was unremarkable  She has been participating in physical therapy with some transient improvement of her pain  She has tried Tylenol, and NSAIDs with minimally  She takes tramadol sparingly for headaches which she has not taken in quite some time  She also takes amitriptyline for headache control  She was prescribed tizanidine 2 mg last office visit, however states she forgot to take the medication  The patient rates her pain a 4/10 on the numeric pain rating scale  She states her pain is intermittent in nature and bothersome at night  She characterizes the pain as dull aching, sharp, throbbing and shooting      I have personally reviewed and/or updated the patient's past medical history, past surgical history, family history, social history, current medications, allergies, and vital signs today  Review of Systems:    Review of Systems   Respiratory: Negative for shortness of breath  Cardiovascular: Negative for chest pain  Gastrointestinal: Negative for constipation, diarrhea, nausea and vomiting  Musculoskeletal: Negative for arthralgias, gait problem, joint swelling and myalgias  Skin: Negative for rash  Neurological: Negative for dizziness, seizures and weakness  All other systems reviewed and are negative  Past Medical History:   Diagnosis Date    Anxiety     Asthma     Claustrophobia     Cluster headache     CTS (carpal tunnel syndrome)     Depression     Fibromyalgia     last assessed 11/27/17    Fibromyalgia, primary     GERD without esophagitis     last assessed 10/12/17    GI problem     Headache, tension-type     Joint pain     Lung nodule     last assessed 10/9/15    Migraine     Muscle pain     Peripheral neuropathy     Sleep apnea        Past Surgical History:   Procedure Laterality Date    BACK SURGERY      CARPAL TUNNEL RELEASE Bilateral     CHOLECYSTECTOMY      GALLBLADDER SURGERY      NECK SURGERY      SPINAL FUSION      C4 and C5    ULNAR NERVE REPAIR Bilateral     UPPER GASTROINTESTINAL ENDOSCOPY         Family History   Problem Relation Age of Onset    Diabetes Mother    Angel Santos Fibromyalgia Mother     Ovarian cancer Mother     Hypertension Mother     Thyroid disease Mother     Migraines Mother     Neuropathy Mother     Arthritis Family     Heart disease Family         cardiac disorder    Neuropathy Family     Lupus Family         systemic erythematosus    Asthma Brother     Throat cancer Maternal Grandmother     Asthma Brother     Breast cancer Maternal Aunt     Colon cancer Cousin     Lupus Cousin        Social History     Occupational History    Not on file   Tobacco Use    Smoking status: Never Smoker    Smokeless tobacco: Never Used   Substance and Sexual Activity    Alcohol use:  Yes Alcohol/week: 2 0 standard drinks     Types: 1 Glasses of wine, 1 Cans of beer per week     Binge frequency: Monthly     Comment: occasional    Drug use: Yes     Types: Marijuana     Comment: medicinal    Sexual activity: Yes     Partners: Male     Birth control/protection: I U D  Current Outpatient Medications:     AIMOVIG 140 MG/ML SOAJ, INJECT 1 PEN MONTHLY, Disp: 1 mL, Rfl: 10    albuterol (PROVENTIL HFA,VENTOLIN HFA) 90 mcg/act inhaler, Inhale 2 puffs every 6 (six) hours as needed for wheezing or shortness of breath, Disp: 1 Inhaler, Rfl: 5    amitriptyline (ELAVIL) 10 mg tablet, 1 tab qhs x 1 week, then Increase to 20 mg qhs if tolerated  , Disp: 60 tablet, Rfl: 2    baclofen 10 mg tablet, 1 tab qhs prn neck pain and headache, Disp: 30 tablet, Rfl: 0    cholecalciferol (VITAMIN D3) 1,000 units tablet, Take 1 capsule by mouth once a week 3000 units daily , Disp: , Rfl:     cholestyramine (QUESTRAN) 4 GM/DOSE powder, Take by mouth Twice daily, Disp: , Rfl:     dicyclomine (BENTYL) 20 mg tablet, Take 1 tablet (20 mg total) by mouth 3 (three) times a day as needed (migraine/cramps) for up to 30 days, Disp: 60 tablet, Rfl: 0    divalproex sodium (DEPAKOTE) 250 mg EC tablet, 2 tabs q12 hours x 2 days, then stop  Repeat if needed  , Disp: 16 tablet, Rfl: 0    fluticasone (FLONASE) 50 mcg/act nasal spray, 2 sprays into each nostril daily as needed , Disp: , Rfl:     ketorolac (TORADOL) 10 mg tablet, 1 tab q8 hours x 3 days to break migraine cycle, Disp: 9 tablet, Rfl: 0    levonorgestrel (MIRENA) 20 MCG/24HR IUD, 1 each by Intrauterine route once, Disp: , Rfl:     meclizine (ANTIVERT) 12 5 MG tablet, Take 1 tablet (12 5 mg total) by mouth 3 (three) times a day as needed for dizziness or nausea, Disp: 30 tablet, Rfl: 0    metoclopramide (REGLAN) 10 mg tablet, 1 tab q6 hours prn migraine (with toradol and benadryl), Disp: 20 tablet, Rfl: 0    NON FORMULARY, Medical marijuana, Disp: , Rfl:    omeprazole (PriLOSEC) 40 MG capsule, TAKE ONE CAPSULE BY MOUTH DAILY, Disp: 30 capsule, Rfl: 3    ondansetron (ZOFRAN) 4 mg tablet, Take 1 tablet by mouth every 8 (eight) hours as needed, Disp: , Rfl:     tiZANidine (ZANAFLEX) 2 mg tablet, Take 1 tablet (2 mg total) by mouth every 8 (eight) hours as needed for muscle spasms, Disp: 90 tablet, Rfl: 1    traMADol (ULTRAM) 50 mg tablet, take one tablet at onset of moderate to severe headach, can repeat once in 8 hours  MAX 2 DAYS PER WEEK, Disp: 10 tablet, Rfl: 0    Ubrogepant (UBRELVY) 100 MG tablet, 1 tab at migraine onset  Repeat after 2 hours if needed  Max 2 doses per day  Hold nurtec , Disp: 10 tablet, Rfl: 0    Allergies   Allergen Reactions    Hydrocodone-Acetaminophen Anaphylaxis     gi upset -pt okay if takes  zofran    Codeine GI Intolerance    Oxycodone-Acetaminophen GI Intolerance    Penicillins GI Intolerance       Physical Exam:    /77   Pulse 72   Temp 98 5 °F (36 9 °C)   Ht 5' 2" (1 575 m)   Wt 113 kg (250 lb)   BMI 45 73 kg/m²     Constitutional:normal, well developed, well nourished, alert, in no distress and non-toxic and no overt pain behavior  Eyes:anicteric  HEENT:grossly intact  Neck:supple, symmetric, trachea midline and no masses   Pulmonary:even and unlabored  Cardiovascular:No edema or pitting edema present  Skin:Normal without rashes or lesions and well hydrated  Psychiatric:Mood and affect appropriate  Neurologic:Cranial Nerves II-XII grossly intact  Musculoskeletal:normal  Gait  Negative Spurling's bilaterally    Negative Ward's bilaterally      Imaging  MRI cervical spine w wo contrast    (Results Pending)         Orders Placed This Encounter   Procedures    MRI cervical spine w wo contrast

## 2021-04-22 ENCOUNTER — APPOINTMENT (OUTPATIENT)
Dept: PHYSICAL THERAPY | Facility: REHABILITATION | Age: 46
End: 2021-04-22
Payer: COMMERCIAL

## 2021-04-22 NOTE — PROGRESS NOTES
Daily Note     Today's date: 2021  Patient name: Earnest Gomez  : 1975  MRN: [id number]  Referring provider: Jessica Hampton DO  Dx:   Encounter Diagnosis     ICD-10-CM    1  Radiculopathy, cervical region  M54 12    2  Neck pain  M54 2    3  Impingement syndrome of left shoulder  M75 42                   Subjective: ***      Objective: See treatment diary below      Assessment: Tolerated treatment {Tolerated treatment :2713307303}   Patient {assessment:2012207046}      Plan: {PLAN:5269731348}     Precautions: Chronic pain syndrome, depression      Manuals 4/15 3/25 4/1 4/8 4/12 4/19       Central glides C4/5 MM 20x MM 3x12 MM 3x30 MM 2x30 MM 2x25 NV       Median glides MM 10x @45, wrist flex w/ elbow ext  MM 3x10 MM 3x10 MM 2x10 GF 2x10       GHJ distraction MM and ER stretching  MM MM  GF       PROM      GF  Overhead w/open palm       Neuro Re-Ed             TB row    GTB 3x9x3"         [place] row walkback 17# x20    16# x20 16#  x20       XS pull down 7# 3x9    7# 3x7 7#  3x10       Inverse KB walk 3# 3 laps     3# 3 laps 3# 3 laps       [place] Row      20# x20       [place] Shld Ext      12# x20       KB Suitcase Carry      15/5# x2 laps ea       Instruction on front squat 5'            Ther Ex             Cervical snags ext/rot  Reviewed           Median glides  At side, wrist ext w/ elbow flex 3x6 - HEP Into abd w/ wrist ext 2x8 - HEP          Wall slide flex    10x - HEP         Wall ER stretch 2x6x3" - HEP                                                                Ther Activity                                       Gait Training                                       Modalities

## 2021-04-26 ENCOUNTER — OFFICE VISIT (OUTPATIENT)
Dept: PHYSICAL THERAPY | Facility: REHABILITATION | Age: 46
End: 2021-04-26
Payer: COMMERCIAL

## 2021-04-26 DIAGNOSIS — M54.2 NECK PAIN: ICD-10-CM

## 2021-04-26 DIAGNOSIS — M75.42 IMPINGEMENT SYNDROME OF LEFT SHOULDER: ICD-10-CM

## 2021-04-26 DIAGNOSIS — M54.12 RADICULOPATHY, CERVICAL REGION: Primary | ICD-10-CM

## 2021-04-26 PROCEDURE — 97112 NEUROMUSCULAR REEDUCATION: CPT | Performed by: PHYSICAL THERAPIST

## 2021-04-26 PROCEDURE — 97530 THERAPEUTIC ACTIVITIES: CPT | Performed by: PHYSICAL THERAPIST

## 2021-04-26 PROCEDURE — 97140 MANUAL THERAPY 1/> REGIONS: CPT | Performed by: PHYSICAL THERAPIST

## 2021-04-26 NOTE — PROGRESS NOTES
Daily Note     Today's date: 2021  Patient name: Remy Bailey  : 1975  MRN: [id number]  Referring provider: Allen Hciks DO  Dx:   Encounter Diagnosis     ICD-10-CM    1  Radiculopathy, cervical region  M54 12    2  Neck pain  M54 2    3  Impingement syndrome of left shoulder  M75 42                   Subjective: Reports improvement in shoulder function  Objective: See treatment diary below    Assessment: Completed exercise with correct technique and without reports of pain  Demonstrated moderate fatigue after exercise  Pt would benefit from continued PT services to reduce pain and increase level of function  Plan: Continue per plan of care  Progress treatment as tolerated         Precautions: Chronic pain syndrome, depression      Manuals 4/26 3/25 4/1 4/8 4/12 4/19       Central glides C4/5 MM MM 3x12 MM 3x30 MM 2x30 MM 2x25 NV       Median glides MM @45, wrist flex w/ elbow ext  MM 3x10 MM 3x10 MM 2x10 GF 2x10       GHJ distraction MM  MM MM  GF       PROM      GF  Overhead w/open palm       Neuro Re-Ed             TB row    GTB 3x9x3"         [place] row walkback 16# 2x12    16# x20 16#  x20       XS pull down 7# 3x10    7# 3x7 7#  3x10       Inverse KB walk 4# 3 laps    3# 3 laps 3# 3 laps       [place] Row      20# x20       Judith Shld Ext 14# 2x11     12# x20       KB Suitcase Carry 20# 3 laps L     15/5# x2 laps ea       Instruction on front squat             Ther Ex             Cervical snags ext/rot  Reviewed           Median glides  At side, wrist ext w/ elbow flex 3x6 - HEP Into abd w/ wrist ext 2x8 - HEP          Wall slide flex    10x - HEP         Wall ER stretch             Lateral raise 3# 2x6                                                   Ther Activity                                       Gait Training                                       Modalities

## 2021-04-29 ENCOUNTER — APPOINTMENT (OUTPATIENT)
Dept: PHYSICAL THERAPY | Facility: REHABILITATION | Age: 46
End: 2021-04-29
Payer: COMMERCIAL

## 2021-04-30 ENCOUNTER — OFFICE VISIT (OUTPATIENT)
Dept: PHYSICAL THERAPY | Facility: REHABILITATION | Age: 46
End: 2021-04-30
Payer: COMMERCIAL

## 2021-04-30 DIAGNOSIS — M75.42 IMPINGEMENT SYNDROME OF LEFT SHOULDER: ICD-10-CM

## 2021-04-30 DIAGNOSIS — M54.12 RADICULOPATHY, CERVICAL REGION: Primary | ICD-10-CM

## 2021-04-30 DIAGNOSIS — M54.2 NECK PAIN: ICD-10-CM

## 2021-04-30 PROCEDURE — 97112 NEUROMUSCULAR REEDUCATION: CPT | Performed by: PHYSICAL THERAPIST

## 2021-04-30 PROCEDURE — 97110 THERAPEUTIC EXERCISES: CPT | Performed by: PHYSICAL THERAPIST

## 2021-04-30 PROCEDURE — 97140 MANUAL THERAPY 1/> REGIONS: CPT | Performed by: PHYSICAL THERAPIST

## 2021-04-30 NOTE — PROGRESS NOTES
Daily Note     Today's date: 2021  Patient name: Izabel Justice  : 1975  MRN: [id number]  Referring provider: Mychal Clark DO  Dx:   Encounter Diagnosis     ICD-10-CM    1  Radiculopathy, cervical region  M54 12    2  Neck pain  M54 2    3  Impingement syndrome of left shoulder  M75 42                   Subjective: Reports improvement in shoulder function  Objective: See treatment diary below    Assessment: Improved performance w/ strengthening exercises this session  Improved R shoulder motion, soreness towards end range  She would benefit from continued PT services to reduce pain and increase level of function  Plan: Continue per plan of care  Progress treatment as tolerated         Precautions: Chronic pain syndrome, depression      Manuals        Central glides C4/5 MM  MM 3x30 MM 2x30 MM 2x25 NV       Median glides MM  MM 3x10 MM 3x10 MM 2x10 GF 2x10       GHJ distraction MM MM MM MM  GF       PROM      GF  Overhead w/open palm       Neuro Re-Ed             TB row    GTB 3x9x3"         [place] row walkback 16# 2x12 17# 2x12   16# x20 16#  x20       XS pull down 7# 3x10 10# 3x7   7# 3x7 7#  3x10       Inverse KB walk 4# 3 laps 3# 2x3 laps   3# 3 laps 3# 3 laps       [place] Row      20# x20       Judith Shld Ext 14# 2x11     12# x20       KB Suitcase Carry 20# 3 laps L 20# 3 laps L    15/5# x2 laps ea       Instruction on front squat             Ther Ex             Cervical snags ext/rot             Median glides   Into abd w/ wrist ext 2x8 - HEP          Wall slide flex    10x - HEP         Wall ER stretch             Lateral raise 3# 2x6 3# 3x6                                                  Ther Activity                                       Gait Training                                       Modalities

## 2021-05-01 ENCOUNTER — IMMUNIZATIONS (OUTPATIENT)
Dept: FAMILY MEDICINE CLINIC | Facility: HOSPITAL | Age: 46
End: 2021-05-01

## 2021-05-01 DIAGNOSIS — Z23 ENCOUNTER FOR IMMUNIZATION: Primary | ICD-10-CM

## 2021-05-01 PROCEDURE — 91300 SARS-COV-2 / COVID-19 MRNA VACCINE (PFIZER-BIONTECH) 30 MCG: CPT

## 2021-05-01 PROCEDURE — 0002A SARS-COV-2 / COVID-19 MRNA VACCINE (PFIZER-BIONTECH) 30 MCG: CPT

## 2021-05-04 ENCOUNTER — TELEPHONE (OUTPATIENT)
Dept: NEUROLOGY | Facility: CLINIC | Age: 46
End: 2021-05-04

## 2021-05-06 ENCOUNTER — OFFICE VISIT (OUTPATIENT)
Dept: PHYSICAL THERAPY | Facility: REHABILITATION | Age: 46
End: 2021-05-06
Payer: COMMERCIAL

## 2021-05-06 DIAGNOSIS — M54.12 RADICULOPATHY, CERVICAL REGION: Primary | ICD-10-CM

## 2021-05-06 DIAGNOSIS — M75.42 IMPINGEMENT SYNDROME OF LEFT SHOULDER: ICD-10-CM

## 2021-05-06 DIAGNOSIS — M54.2 NECK PAIN: ICD-10-CM

## 2021-05-06 PROCEDURE — 97140 MANUAL THERAPY 1/> REGIONS: CPT | Performed by: PHYSICAL THERAPIST

## 2021-05-06 PROCEDURE — 97530 THERAPEUTIC ACTIVITIES: CPT | Performed by: PHYSICAL THERAPIST

## 2021-05-06 PROCEDURE — 97112 NEUROMUSCULAR REEDUCATION: CPT | Performed by: PHYSICAL THERAPIST

## 2021-05-06 NOTE — PROGRESS NOTES
Daily Note     Today's date: 2021  Patient name: Jessica Garsia  : 1975  MRN: [id number]  Referring provider: Hiral Mohamud DO  Dx:   Encounter Diagnosis     ICD-10-CM    1  Radiculopathy, cervical region  M54 12    2  Neck pain  M54 2    3  Impingement syndrome of left shoulder  M75 42                   Subjective: Reports slightly increased shoulder pain today  Objective: See treatment diary below    Assessment: Strengthening progressed this session despite increase in shoulder soreness  Maintains ROM despite soreness  She would benefit from continued PT services to reduce pain and increase level of function  Plan: Continue per plan of care  Progress treatment as tolerated         Precautions: Chronic pain syndrome, depression      Manuals        Central glides C4/5 MM   MM 2x30 MM 2x25 NV       Median glides MM   MM 3x10 MM 2x10 GF 2x10       GHJ distraction MM MM  MM  GF       PROM      GF  Overhead w/open palm       Neuro Re-Ed             TB row    GTB 3x9x3"         [place] row walkback 16# 2x12 17# 2x12 19# 2x12  16# x20 16#  x20       XS pull down 7# 3x10 10# 3x7 10# 3x7  7# 3x7 7#  3x10       Inverse KB walk 4# 3 laps 3# 2x3 laps 3# 2x3 laps  3# 3 laps 3# 3 laps       Judith Row      20# x20       [place] Shld Ext 14# 2x11     12# x20       KB Suitcase Carry 20# 3 laps L 20# 3 laps L 20# 3 laps L   15/5# x2 laps ea       Instruction on front squat             Ther Ex             Cervical snags ext/rot             Median glides             Wall slide flex    10x - HEP         Wall ER stretch             Lateral raise 3# 2x6 3# 3x6 3# 3x6                                                 Ther Activity                                       Gait Training                                       Modalities

## 2021-05-13 ENCOUNTER — TRANSCRIBE ORDERS (OUTPATIENT)
Dept: ADMINISTRATIVE | Facility: HOSPITAL | Age: 46
End: 2021-05-13

## 2021-05-13 ENCOUNTER — HOSPITAL ENCOUNTER (OUTPATIENT)
Dept: RADIOLOGY | Facility: HOSPITAL | Age: 46
Discharge: HOME | End: 2021-05-13
Payer: COMMERCIAL

## 2021-05-13 ENCOUNTER — APPOINTMENT (OUTPATIENT)
Dept: PHYSICAL THERAPY | Facility: REHABILITATION | Age: 46
End: 2021-05-13
Payer: COMMERCIAL

## 2021-05-13 DIAGNOSIS — M54.12 CERVICAL RADICULOPATHY: ICD-10-CM

## 2021-05-13 PROCEDURE — G1004 CDSM NDSC: HCPCS

## 2021-05-13 PROCEDURE — 72156 MRI NECK SPINE W/O & W/DYE: CPT

## 2021-05-13 PROCEDURE — A9585 GADOBUTROL INJECTION: HCPCS | Performed by: NURSE PRACTITIONER

## 2021-05-13 RX ADMIN — GADOBUTROL 11 ML: 604.72 INJECTION INTRAVENOUS at 18:56

## 2021-05-13 NOTE — TELEPHONE ENCOUNTER
Received call back from patient, she states she needs to cancel her 5/20 [place] [facility] as she is currently in the process of switching insurances  Cancelled appointment as requested, advised patient once she receives new insurance information to call our office with new information so we can apply for authorization if needed  Patient verbalized understanding, will call back with new insurance info once active

## 2021-05-19 DIAGNOSIS — G43.719 INTRACTABLE CHRONIC MIGRAINE WITHOUT AURA AND WITHOUT STATUS MIGRAINOSUS: ICD-10-CM

## 2021-05-19 DIAGNOSIS — K21.9 GERD WITHOUT ESOPHAGITIS: ICD-10-CM

## 2021-05-19 DIAGNOSIS — G43.709 CHRONIC MIGRAINE WITHOUT AURA WITHOUT STATUS MIGRAINOSUS, NOT INTRACTABLE: ICD-10-CM

## 2021-05-19 RX ORDER — OMEPRAZOLE 40 MG/1
CAPSULE, DELAYED RELEASE ORAL
Qty: 30 CAPSULE | Refills: 2 | Status: SHIPPED | OUTPATIENT
Start: 2021-05-19 | End: 2021-05-31

## 2021-05-20 ENCOUNTER — OFFICE VISIT (OUTPATIENT)
Dept: PHYSICAL THERAPY | Facility: REHABILITATION | Age: 46
End: 2021-05-20
Payer: COMMERCIAL

## 2021-05-20 DIAGNOSIS — M54.2 NECK PAIN: ICD-10-CM

## 2021-05-20 DIAGNOSIS — M75.42 IMPINGEMENT SYNDROME OF LEFT SHOULDER: ICD-10-CM

## 2021-05-20 DIAGNOSIS — M54.12 RADICULOPATHY, CERVICAL REGION: Primary | ICD-10-CM

## 2021-05-20 PROCEDURE — 97140 MANUAL THERAPY 1/> REGIONS: CPT | Performed by: PHYSICAL THERAPIST

## 2021-05-20 PROCEDURE — 97112 NEUROMUSCULAR REEDUCATION: CPT | Performed by: PHYSICAL THERAPIST

## 2021-05-24 ENCOUNTER — TELEPHONE (OUTPATIENT)
Dept: PAIN MEDICINE | Facility: MEDICAL CENTER | Age: 46
End: 2021-05-24

## 2021-05-24 NOTE — TELEPHONE ENCOUNTER
Pt called stating that she has a procedure done this morning and is not able to drive to appt    Pt rescheduled

## 2021-05-25 RX ORDER — PROCHLORPERAZINE MALEATE 10 MG
TABLET ORAL
Qty: 30 TABLET | Refills: 0 | OUTPATIENT
Start: 2021-05-25

## 2021-05-25 RX ORDER — RIMEGEPANT SULFATE 75 MG/75MG
TABLET, ORALLY DISINTEGRATING ORAL
Qty: 10 TABLET | Refills: 0 | OUTPATIENT
Start: 2021-05-25

## 2021-05-25 NOTE — TELEPHONE ENCOUNTER
Patient returning call to office  She confirmed she has not taken Nurtec in a few months  She has been using the [facility]  She is okay without refilling at this time

## 2021-05-27 ENCOUNTER — OFFICE VISIT (OUTPATIENT)
Dept: PHYSICAL THERAPY | Facility: REHABILITATION | Age: 46
End: 2021-05-27
Payer: COMMERCIAL

## 2021-05-27 DIAGNOSIS — M75.42 IMPINGEMENT SYNDROME OF LEFT SHOULDER: ICD-10-CM

## 2021-05-27 DIAGNOSIS — M54.12 RADICULOPATHY, CERVICAL REGION: Primary | ICD-10-CM

## 2021-05-27 DIAGNOSIS — M54.2 NECK PAIN: ICD-10-CM

## 2021-05-27 PROCEDURE — 97140 MANUAL THERAPY 1/> REGIONS: CPT | Performed by: PHYSICAL THERAPIST

## 2021-05-27 PROCEDURE — 97110 THERAPEUTIC EXERCISES: CPT | Performed by: PHYSICAL THERAPIST

## 2021-05-27 NOTE — PROGRESS NOTES
PT Discharge    Today's date: 2021  Patient name: Amarjit Reyna  : 1975  MRN: [id number]  Referring provider: Verna Borja DO  Dx:   Encounter Diagnosis     ICD-10-CM    1  Radiculopathy, cervical region  M54 12    2  Neck pain  M54 2    3  Impingement syndrome of left shoulder  M75 42                   Subjective: Reports overall improvements in symptoms  Current pain: -3/10    Objective: See treatment diary below    Assessment: D/c this session due to meeting all goals set at IE      STG: Cervical spine ROM improved by 25% in all deficient planes in 4 weeks  - MET  STG: L shoulder ROM improved by 25% in all deficient planes in 4 weeks  - MET  STG: Subjectively reports pain at worst decreased by 2 points in 4 weeks  - MET  STG: I w/ HEPs 1 week after prescription  - MET  LTG: Showers w/ <10 L shoulder pain by d/c  - MET  LTG: FOTO >= to goal by d/c  - MET  LTG: I w/ comprehensive HEP by d/c  - MET     Plan: D/c this visit        Precautions: Chronic pain syndrome, depression      Manuals        Central glides C4/5 MM     NV       Median glides MM     GF 2x10       GHJ distraction MM MM  MM MM GF       PROM      GF  Overhead w/open palm       Neuro Re-Ed             TB row             [place] row walkback 16# 2x12 17# 2x12 19# 2x12 19# 2x12    16#  x20       XS pull down 7# 3x10 10# 3x7 10# 3x7 10# 3x8  7#  3x10       Inverse KB walk 4# 3 laps 3# 2x3 laps 3# 2x3 laps 3# 2x3 laps     3# 3 laps       [place] Row      20# x20       Judith Shld Ext 14# 2x11     12# x20       KB Suitcase Carry 20# 3 laps L 20# 3 laps L 20# 3 laps L 20# 3 laps L  15/5# x2 laps ea                    Ther Ex             Cervical snags ext/rot             Median glides             Wall slide flex             Wall ER stretch             Lateral raise 3# 2x6 3# 3x6 3# 3x6          HEP education     27' Rows, TB ER, TB scaption                                  Ther Activity Gait Training                                       Modalities

## 2021-05-31 DIAGNOSIS — K21.9 GERD WITHOUT ESOPHAGITIS: ICD-10-CM

## 2021-05-31 RX ORDER — OMEPRAZOLE 40 MG/1
CAPSULE, DELAYED RELEASE ORAL
Qty: 30 CAPSULE | Refills: 2 | Status: SHIPPED | OUTPATIENT
Start: 2021-05-31 | End: 2021-07-01

## 2021-06-01 ENCOUNTER — OFFICE VISIT (OUTPATIENT)
Dept: PAIN MEDICINE | Facility: CLINIC | Age: 46
End: 2021-06-01
Payer: COMMERCIAL

## 2021-06-01 VITALS
WEIGHT: 256 LBS | DIASTOLIC BLOOD PRESSURE: 79 MMHG | SYSTOLIC BLOOD PRESSURE: 130 MMHG | HEART RATE: 83 BPM | HEIGHT: 62 IN | BODY MASS INDEX: 47.11 KG/M2 | TEMPERATURE: 98.5 F

## 2021-06-01 DIAGNOSIS — M75.42 IMPINGEMENT SYNDROME OF LEFT SHOULDER: Primary | ICD-10-CM

## 2021-06-01 PROCEDURE — 99213 OFFICE O/P EST LOW 20 MIN: CPT | Performed by: NURSE PRACTITIONER

## 2021-06-01 PROCEDURE — 3008F BODY MASS INDEX DOCD: CPT | Performed by: NURSE PRACTITIONER

## 2021-06-01 NOTE — PROGRESS NOTES
Assessment:  1  Impingement syndrome of left shoulder        Plan:  1  Patient will continue with physical therapy as she is finding some improvement with this  2  The patient may continue tizanidine 2 mg q 8 hours p r n  myofascial pain as prescribed  She does not require refills today  3  The patient will continue to follow with Neurology for migraines as scheduled  She may continue amitriptyline, Toradol, and tramadol as prescribed by them  4  Patient was encouraged to follow-up with orthopedics if she feels like her pain continues to be severe enough to warrant further treatment options  5  The patient will follow up on a p r n  basis at this time as requested     M*Modal software was used to dictate this note  It may contain errors with dictating incorrect words or incorrect spelling  Please contact the provider directly with any questions  History of Present Illness: The patient is a 39 y o  female with a history of C4-5 ACDF and chronic headaches last seen on 4/19/21 who presents for a follow up office visit in regards to chronic left shoulder pain will radiate toward the left biceps  The patient denies any right-sided symptoms, bowel or bladder incontinence or balance issues  X-ray of the left shoulder is unremarkable  MRI of the cervical spine reveals questionable mild right foraminal stenosis at C3-4, otherwise unremarkable  The patient is participating in physical therapy with some relief  She has been evaluated by Orthopedics in the past who according to the patient felt like there may be some rotator cuff pathology  She has not followed up with them since  She does continue to also follow with Neurology for migraines  She takes Toradol p r n , tramadol p r n , and amitriptyline for migraine management  She does find 50% relief with tizanidine 2 mg q 8 hours p r n  myofascial pain  the patient rates her pain a 5/10 on the numeric pain rating scale    She states her pain is intermittent in nature bothersome at night  She characterizes the pain as dull aching, throbbing and shooting    I have personally reviewed and/or updated the patient's past medical history, past surgical history, family history, social history, current medications, allergies, and vital signs today  Review of Systems:    Review of Systems   Respiratory: Negative for shortness of breath  Cardiovascular: Negative for chest pain  Gastrointestinal: Negative for constipation, diarrhea, nausea and vomiting  Musculoskeletal: Negative for arthralgias, joint swelling and myalgias  Skin: Negative for rash  Neurological: Negative for dizziness, seizures and weakness  All other systems reviewed and are negative          Past Medical History:   Diagnosis Date    Anxiety     Asthma     Claustrophobia     Cluster headache     CTS (carpal tunnel syndrome)     Depression     Fibromyalgia     last assessed 11/27/17    Fibromyalgia, primary     GERD without esophagitis     last assessed 10/12/17    GI problem     Headache, tension-type     Joint pain     Lung nodule     last assessed 10/9/15    Migraine     Muscle pain     Peripheral neuropathy     Sleep apnea        Past Surgical History:   Procedure Laterality Date    BACK SURGERY      CARPAL TUNNEL RELEASE Bilateral     CHOLECYSTECTOMY      GALLBLADDER SURGERY      NECK SURGERY      SPINAL FUSION      C4 and C5    ULNAR NERVE REPAIR Bilateral     UPPER GASTROINTESTINAL ENDOSCOPY         Family History   Problem Relation Age of Onset    Diabetes Mother    Foina Berkowitz Fibromyalgia Mother     Ovarian cancer Mother     Hypertension Mother     Thyroid disease Mother     Migraines Mother     Neuropathy Mother     Arthritis Family     Heart disease Family         cardiac disorder    Neuropathy Family     Lupus Family         systemic erythematosus    Asthma Brother     Throat cancer Maternal Grandmother     Asthma Brother     Breast cancer Maternal Aunt     Colon cancer Cousin     Lupus Cousin        Social History     Occupational History    Not on file   Tobacco Use    Smoking status: Never Smoker    Smokeless tobacco: Never Used   Substance and Sexual Activity    Alcohol use: Yes     Alcohol/week: 2 0 standard drinks     Types: 1 Glasses of wine, 1 Cans of beer per week     Binge frequency: Monthly     Comment: occasional    Drug use: Yes     Types: Marijuana     Comment: medicinal    Sexual activity: Yes     Partners: Male     Birth control/protection: I U D  Current Outpatient Medications:     AIMOVIG 140 MG/ML SOAJ, INJECT 1 PEN MONTHLY, Disp: 1 mL, Rfl: 10    albuterol (PROVENTIL HFA,VENTOLIN HFA) 90 mcg/act inhaler, Inhale 2 puffs every 6 (six) hours as needed for wheezing or shortness of breath, Disp: 1 Inhaler, Rfl: 5    amitriptyline (ELAVIL) 10 mg tablet, 1 tab qhs x 1 week, then Increase to 20 mg qhs if tolerated  , Disp: 60 tablet, Rfl: 2    baclofen 10 mg tablet, 1 tab qhs prn neck pain and headache, Disp: 30 tablet, Rfl: 0    cholestyramine (QUESTRAN) 4 GM/DOSE powder, Take by mouth Twice daily, Disp: , Rfl:     dicyclomine (BENTYL) 20 mg tablet, Take 1 tablet (20 mg total) by mouth 3 (three) times a day as needed (migraine/cramps) for up to 30 days, Disp: 60 tablet, Rfl: 0    divalproex sodium (DEPAKOTE) 250 mg EC tablet, 2 tabs q12 hours x 2 days, then stop  Repeat if needed  , Disp: 16 tablet, Rfl: 0    fluticasone (FLONASE) 50 mcg/act nasal spray, 2 sprays into each nostril daily as needed , Disp: , Rfl:     ketorolac (TORADOL) 10 mg tablet, 1 tab q8 hours x 3 days to break migraine cycle, Disp: 9 tablet, Rfl: 0    Lasmiditan Succinate (REYVOW) 50 MG tablet, Take 1 tablet (50 mg) one time as needed for migraine  Do not use more than one dose per day, or more than four doses per month  Caution sedation  , Disp: 4 tablet, Rfl: 1    levonorgestrel (MIRENA) 20 MCG/24HR IUD, 1 each by Intrauterine route once, Disp: , Rfl:     meclizine (ANTIVERT) 12 5 MG tablet, Take 1 tablet (12 5 mg total) by mouth 3 (three) times a day as needed for dizziness or nausea, Disp: 30 tablet, Rfl: 0    metoclopramide (REGLAN) 10 mg tablet, 1 tab q6 hours prn migraine (with toradol and benadryl), Disp: 20 tablet, Rfl: 0    NON FORMULARY, Medical marijuana, Disp: , Rfl:     omeprazole (PriLOSEC) 40 MG capsule, TAKE ONE CAPSULE BY MOUTH DAILY, Disp: 30 capsule, Rfl: 2    ondansetron (ZOFRAN) 4 mg tablet, Take 1 tablet by mouth every 8 (eight) hours as needed, Disp: , Rfl:     tiZANidine (ZANAFLEX) 2 mg tablet, Take 1 tablet (2 mg total) by mouth every 8 (eight) hours as needed for muscle spasms, Disp: 90 tablet, Rfl: 1    traMADol (ULTRAM) 50 mg tablet, take one tablet at onset of moderate to severe headach, can repeat once in 8 hours  MAX 2 DAYS PER WEEK, Disp: 10 tablet, Rfl: 0    Ubrogepant (UBRELVY) 100 MG tablet, 1 tab at migraine onset  Repeat after 2 hours if needed  Max 2 doses per day  Hold nurtec , Disp: 10 tablet, Rfl: 0    cholecalciferol (VITAMIN D3) 1,000 units tablet, Take 1 capsule by mouth once a week 3000 units daily , Disp: , Rfl:     Allergies   Allergen Reactions    Hydrocodone-Acetaminophen Anaphylaxis     gi upset -pt okay if takes  zofran    Codeine GI Intolerance    Oxycodone-Acetaminophen GI Intolerance    Penicillins GI Intolerance       Physical Exam:    /79   Pulse 83   Temp 98 5 °F (36 9 °C)   Ht 5' 2" (1 575 m)   Wt 116 kg (256 lb)   BMI 46 82 kg/m²     Constitutional:normal, well developed, well nourished, alert, in no distress and non-toxic and no overt pain behavior    Eyes:anicteric  HEENT:grossly intact  Neck:supple, symmetric, trachea midline and no masses   Pulmonary:even and unlabored  Cardiovascular:No edema or pitting edema present  Skin:Normal without rashes or lesions and well hydrated  Psychiatric:Mood and affect appropriate  Neurologic:Cranial Nerves II-XII grossly intact  Musculoskeletal:normal gait  Full range of motion all planes of the cervical spine  Bilateral upper extremity strength 5/5 in all muscle groups  Sensation intact to light touch in C5 through T1 dermatomes bilaterally  Negative Spurling's bilaterally  Negative Ward's reflex bilaterally  Positive Neer, Medel and empty can test on the left and negative on the right      Imaging  No orders to display    imaging reviewed      No orders of the defined types were placed in this encounter

## 2021-07-01 ENCOUNTER — TELEMEDICINE (OUTPATIENT)
Dept: GASTROENTEROLOGY | Facility: CLINIC | Age: 46
End: 2021-07-01
Payer: COMMERCIAL

## 2021-07-01 DIAGNOSIS — K21.9 GERD WITHOUT ESOPHAGITIS: ICD-10-CM

## 2021-07-01 DIAGNOSIS — Z90.49 STATUS POST CHOLECYSTECTOMY: ICD-10-CM

## 2021-07-01 DIAGNOSIS — K58.2 IRRITABLE BOWEL SYNDROME WITH BOTH CONSTIPATION AND DIARRHEA: ICD-10-CM

## 2021-07-01 DIAGNOSIS — K58.0 IRRITABLE BOWEL SYNDROME WITH DIARRHEA: Primary | ICD-10-CM

## 2021-07-01 PROCEDURE — 99214 OFFICE O/P EST MOD 30 MIN: CPT | Performed by: PHYSICIAN ASSISTANT

## 2021-07-01 RX ORDER — POLYETHYLENE GLYCOL 3350 17 G/17G
17 POWDER, FOR SOLUTION ORAL DAILY PRN
Qty: 289 G | Refills: 0 | Status: SHIPPED | OUTPATIENT
Start: 2021-07-01

## 2021-07-01 RX ORDER — CHOLESTYRAMINE 4 G/9G
4 POWDER, FOR SUSPENSION ORAL 2 TIMES DAILY WITH MEALS
Qty: 378 G | Refills: 2 | Status: SHIPPED | OUTPATIENT
Start: 2021-07-01

## 2021-07-01 RX ORDER — DICYCLOMINE HCL 20 MG
20 TABLET ORAL 3 TIMES DAILY PRN
Qty: 60 TABLET | Refills: 2 | Status: SHIPPED | OUTPATIENT
Start: 2021-07-01 | End: 2022-06-18 | Stop reason: SDUPTHER

## 2021-07-01 RX ORDER — OMEPRAZOLE 40 MG/1
40 CAPSULE, DELAYED RELEASE ORAL 2 TIMES DAILY
Qty: 60 CAPSULE | Refills: 3 | Status: SHIPPED | OUTPATIENT
Start: 2021-07-01 | End: 2022-06-18

## 2021-07-01 NOTE — PROGRESS NOTES
Virtual Brief Visit    Assessment/Plan:    1  GERD without esophagitis  She is currently on omeprazole 40 mg once daily and she still has breakthrough acid reflux approximately 3-4 times per week  She notes that she will have severe substernal chest burning  She denies any dysphagia, odynophagia, or melena  - increase omeprazole 40mg once daily to twice daily  - diet and lifestyle modification     2  Irritable bowel syndrome with both constipation and diarrhea  3  Post-cholecystectomy syndrome  The patient continues with with alternating bowel habits  Has diarrhea only after eating, no matter what she eats  Was previously on cholestyramine but stopped this  She believes it was sometimes effective  Now she reports constipation > diarrhea but when she has diarrhea it is if affecting the quality of her life    - discussed a trial of xifaxan, patient agreeable   - after zifaxan if she is still having diarrhea resume cholestyramine  - use as needed miralax  - continue bentyl as needed    FU 3-4 months       Problem List Items Addressed This Visit        Digestive    GERD without esophagitis    Relevant Medications    omeprazole (PriLOSEC) 40 MG capsule    dicyclomine (BENTYL) 20 mg tablet    Irritable bowel syndrome with both constipation and diarrhea    Relevant Medications    dicyclomine (BENTYL) 20 mg tablet    polyethylene glycol (GLYCOLAX) 17 GM/SCOOP powder      Other Visit Diagnoses     Irritable bowel syndrome with diarrhea    -  Primary    Relevant Medications    rifaximin (XIFAXAN) 550 mg tablet    Status post cholecystectomy        Relevant Medications    cholestyramine (QUESTRAN) 4 GM/DOSE powder            Reason for visit is   Chief Complaint   Patient presents with    Virtual Brief Visit        Encounter provider Vida Fernandez PA-C    Provider located at [address] [place] [id number] [phone]    Recent Visits  No visits were found meeting these conditions  Showing recent visits within past 7 days and meeting all other requirements  Today's Visits  Date Type Provider Dept   07/01/21 Telemedicine 310 Juventino Winslow PA-C [facility]   Showing today's visits and meeting all other requirements  Future Appointments  No visits were found meeting these conditions  Showing future appointments within next 150 days and meeting all other requirements       After connecting through telephone and patient was informed that this is not a secure, HIPAA-compliant platform  She agrees to proceed  , the patient was identified by name and date of birth  Rashad Marte was informed that this is a telemedicine visit and that the visit is being conducted through telephone and patient was informed that this is not a secure, HIPAA-compliant platform  She agrees to proceed     My office door was closed  No one else was in the room  She acknowledged consent and understanding of privacy and security of the platform  The patient has agreed to participate and understands she can discontinue the visit at any time  Patient is aware this is a billable service  Subjective    Rashad Marte is a 55 y o  female  Here for follow-up evaluation of acid reflux and IBS mixed  The patient states that she is currently on omeprazole 40 mg once daily and she still has breakthrough acid reflux approximately 3-4 times per week  She notes that she will have severe substernal chest burning  She denies any dysphagia, odynophagia, or melena  She does continue to have alternating bowel habits  She states that she previously took cholestyramine for her diarrhea however she has discontinued this  She notices the diarrhea only occurs with eating and does not matter which food she eats  Recently she admits to the constipation being more prevalent in the diarrhea  She does continue on Bentyl as needed for abdominal pain        Past Medical History:   Diagnosis Date    Anxiety     Asthma     Claustrophobia     Cluster headache     CTS (carpal tunnel syndrome)     Depression     Fibromyalgia     last assessed 11/27/17    Fibromyalgia, primary     GERD without esophagitis     last assessed 10/12/17    GI problem     Headache, tension-type     Joint pain     Lung nodule     last assessed 10/9/15    Migraine     Muscle pain     Peripheral neuropathy     Sleep apnea        Past Surgical History:   Procedure Laterality Date    BACK SURGERY      CARPAL TUNNEL RELEASE Bilateral     CHOLECYSTECTOMY      GALLBLADDER SURGERY      NECK SURGERY      SPINAL FUSION      C4 and C5    ULNAR NERVE REPAIR Bilateral     UPPER GASTROINTESTINAL ENDOSCOPY         Current Outpatient Medications   Medication Sig Dispense Refill    AIMOVIG 140 MG/ML SOAJ INJECT 1 PEN MONTHLY 1 mL 10    albuterol (PROVENTIL HFA,VENTOLIN HFA) 90 mcg/act inhaler Inhale 2 puffs every 6 (six) hours as needed for wheezing or shortness of breath 1 Inhaler 5    amitriptyline (ELAVIL) 10 mg tablet 1 tab qhs x 1 week, then Increase to 20 mg qhs if tolerated  60 tablet 2    baclofen 10 mg tablet 1 tab qhs prn neck pain and headache 30 tablet 0    cholecalciferol (VITAMIN D3) 1,000 units tablet Take 1 capsule by mouth once a week 3000 units daily       cholestyramine (QUESTRAN) 4 GM/DOSE powder Take 1 packet (4 g total) by mouth 2 (two) times a day with meals 378 g 2    dicyclomine (BENTYL) 20 mg tablet Take 1 tablet (20 mg total) by mouth 3 (three) times a day as needed (migraine/cramps) 60 tablet 2    divalproex sodium (DEPAKOTE) 250 mg EC tablet 2 tabs q12 hours x 2 days, then stop  Repeat if needed   16 tablet 0    fluticasone (FLONASE) 50 mcg/act nasal spray 2 sprays into each nostril daily as needed       ketorolac (TORADOL) 10 mg tablet 1 tab q8 hours x 3 days to break migraine cycle 9 tablet 0    Lasmiditan Succinate (REYVOW) 50 MG tablet Take 1 tablet (50 mg) one time as needed for migraine  Do not use more than one dose per day, or more than four doses per month  Caution sedation  4 tablet 1    levonorgestrel (MIRENA) 20 MCG/24HR IUD 1 each by Intrauterine route once      meclizine (ANTIVERT) 12 5 MG tablet Take 1 tablet (12 5 mg total) by mouth 3 (three) times a day as needed for dizziness or nausea 30 tablet 0    metoclopramide (REGLAN) 10 mg tablet 1 tab q6 hours prn migraine (with toradol and benadryl) 20 tablet 0    NON FORMULARY Medical marijuana      omeprazole (PriLOSEC) 40 MG capsule Take 1 capsule (40 mg total) by mouth 2 (two) times a day 60 capsule 3    ondansetron (ZOFRAN) 4 mg tablet Take 1 tablet by mouth every 8 (eight) hours as needed      polyethylene glycol (GLYCOLAX) 17 GM/SCOOP powder Take 17 g by mouth daily as needed (constipation) 289 g 0    rifaximin (XIFAXAN) 550 mg tablet Take 1 tablet (550 mg total) by mouth every 8 (eight) hours for 14 days 42 tablet 1    tiZANidine (ZANAFLEX) 2 mg tablet Take 1 tablet (2 mg total) by mouth every 8 (eight) hours as needed for muscle spasms 90 tablet 1    traMADol (ULTRAM) 50 mg tablet take one tablet at onset of moderate to severe headach, can repeat once in 8 hours  MAX 2 DAYS PER WEEK 10 tablet 0    Ubrogepant (UBRELVY) 100 MG tablet 1 tab at migraine onset  Repeat after 2 hours if needed  Max 2 doses per day  Hold nurtec  10 tablet 0     No current facility-administered medications for this visit  Allergies   Allergen Reactions    Hydrocodone-Acetaminophen Anaphylaxis     gi upset -pt okay if takes  zofran    Codeine GI Intolerance    Oxycodone-Acetaminophen GI Intolerance    Penicillins GI Intolerance       Review of Systems   Constitutional: Negative for fever  Gastrointestinal: Positive for abdominal pain, constipation, diarrhea and nausea  Negative for vomiting  Genitourinary: Negative for dysuria, frequency and hematuria  Musculoskeletal: Negative for arthralgias and myalgias  Neurological: Positive for headaches  There were no vitals filed for this visit  I spent 25 minutes with patient today in which greater than 50% of the time was spent in counseling/coordination of care regarding IBS, GERD    VIRTUAL VISIT Victor Hugo Caraballo acknowledges that she has consented to an online visit or consultation  She understands that the online visit is based solely on information provided by her, and that, in the absence of a face-to-face physical evaluation by the physician, the diagnosis she receives is both limited and provisional in terms of accuracy and completeness  This is not intended to replace a full medical face-to-face evaluation by the physician  Emeli Mcintosh understands and accepts these terms

## 2021-07-15 DIAGNOSIS — G43.711 INTRACTABLE CHRONIC MIGRAINE WITHOUT AURA AND WITH STATUS MIGRAINOSUS: ICD-10-CM

## 2021-07-16 RX ORDER — ERENUMAB-AOOE 140 MG/ML
INJECTION, SOLUTION SUBCUTANEOUS
Qty: 1 ML | Refills: 9 | Status: SHIPPED | OUTPATIENT
Start: 2021-07-16 | End: 2021-09-27

## 2021-08-24 ENCOUNTER — HOSPITAL ENCOUNTER (OUTPATIENT)
Dept: RADIOLOGY | Facility: IMAGING CENTER | Age: 46
Discharge: HOME | End: 2021-08-24
Payer: COMMERCIAL

## 2021-08-24 VITALS — WEIGHT: 263 LBS | BODY MASS INDEX: 48.4 KG/M2 | HEIGHT: 62 IN

## 2021-08-24 DIAGNOSIS — Z12.31 ENCOUNTER FOR SCREENING MAMMOGRAM FOR MALIGNANT NEOPLASM OF BREAST: ICD-10-CM

## 2021-08-24 DIAGNOSIS — Z12.31 BREAST CANCER SCREENING BY MAMMOGRAM: ICD-10-CM

## 2021-08-24 DIAGNOSIS — Z12.31 ENCOUNTER FOR SCREENING MAMMOGRAM FOR MALIGNANT NEOPLASM OF BREAST: Primary | ICD-10-CM

## 2021-08-24 PROCEDURE — 77067 SCR MAMMO BI INCL CAD: CPT

## 2021-08-24 PROCEDURE — 77063 BREAST TOMOSYNTHESIS BI: CPT

## 2021-08-25 ENCOUNTER — PATIENT MESSAGE (OUTPATIENT)
Dept: NEUROLOGY | Facility: CLINIC | Age: 46
End: 2021-08-25

## 2021-08-25 DIAGNOSIS — G43.709 CHRONIC MIGRAINE WITHOUT AURA WITHOUT STATUS MIGRAINOSUS, NOT INTRACTABLE: Primary | ICD-10-CM

## 2021-08-26 RX ORDER — FROVATRIPTAN SUCCINATE 2.5 MG/1
2.5 TABLET, FILM COATED ORAL ONCE
Qty: 9 TABLET | Refills: 0 | Status: SHIPPED | OUTPATIENT
Start: 2021-08-26 | End: 2021-09-21 | Stop reason: SDUPTHER

## 2021-08-26 RX ORDER — IBUPROFEN 400 MG/1
TABLET ORAL
Qty: 20 TABLET | Refills: 0 | Status: SHIPPED | OUTPATIENT
Start: 2021-08-26

## 2021-09-03 ENCOUNTER — TELEPHONE (OUTPATIENT)
Dept: NEUROLOGY | Facility: CLINIC | Age: 46
End: 2021-09-03

## 2021-09-03 NOTE — TELEPHONE ENCOUNTER
Left message for patient to schedule sooner appt with [facility] in [place] - availability is 9/7 @ 1:30p - please assist if still available

## 2021-09-08 ENCOUNTER — OFFICE VISIT (OUTPATIENT)
Dept: INTERNAL MEDICINE CLINIC | Facility: CLINIC | Age: 46
End: 2021-09-08

## 2021-09-08 VITALS
TEMPERATURE: 98 F | OXYGEN SATURATION: 98 % | SYSTOLIC BLOOD PRESSURE: 130 MMHG | HEART RATE: 94 BPM | DIASTOLIC BLOOD PRESSURE: 81 MMHG | BODY MASS INDEX: 49.13 KG/M2 | HEIGHT: 62 IN | WEIGHT: 267 LBS

## 2021-09-08 DIAGNOSIS — M79.7 FIBROMYALGIA: ICD-10-CM

## 2021-09-08 DIAGNOSIS — M54.41 CHRONIC BILATERAL LOW BACK PAIN WITH BILATERAL SCIATICA: Primary | ICD-10-CM

## 2021-09-08 DIAGNOSIS — E66.01 OBESITY, MORBID, BMI 40.0-49.9 (HCC): ICD-10-CM

## 2021-09-08 DIAGNOSIS — M54.42 CHRONIC BILATERAL LOW BACK PAIN WITH BILATERAL SCIATICA: Primary | ICD-10-CM

## 2021-09-08 DIAGNOSIS — F41.8 DEPRESSION WITH ANXIETY: ICD-10-CM

## 2021-09-08 DIAGNOSIS — Z98.890 HISTORY OF LUMBAR SURGERY: ICD-10-CM

## 2021-09-08 DIAGNOSIS — G89.29 CHRONIC BILATERAL LOW BACK PAIN WITH BILATERAL SCIATICA: Primary | ICD-10-CM

## 2021-09-08 PROBLEM — Z12.31 BREAST CANCER SCREENING BY MAMMOGRAM: Status: RESOLVED | Noted: 2020-05-28 | Resolved: 2021-09-08

## 2021-09-08 PROBLEM — R10.9 LEFT FLANK PAIN: Status: RESOLVED | Noted: 2020-05-28 | Resolved: 2021-09-08

## 2021-09-08 PROBLEM — M25.571 RIGHT ANKLE PAIN: Status: RESOLVED | Noted: 2018-06-07 | Resolved: 2021-09-08

## 2021-09-08 PROBLEM — E53.8 B12 DEFICIENCY: Status: RESOLVED | Noted: 2019-09-16 | Resolved: 2021-09-08

## 2021-09-08 PROCEDURE — 99214 OFFICE O/P EST MOD 30 MIN: CPT | Performed by: PHYSICIAN ASSISTANT

## 2021-09-08 RX ORDER — DULOXETIN HYDROCHLORIDE 20 MG/1
20 CAPSULE, DELAYED RELEASE ORAL
Qty: 30 CAPSULE | Refills: 3 | Status: SHIPPED | OUTPATIENT
Start: 2021-09-08 | End: 2021-10-29

## 2021-09-08 NOTE — PROGRESS NOTES
Assessment/Plan:      Diagnoses and all orders for this visit:    Chronic bilateral low back pain with bilateral sciatica  -     XR spine lumbar minimum 4 views non injury; Future  -     Ambulatory referral to Pain Management; Future  -     DULoxetine (CYMBALTA) 20 mg capsule; Take 1 capsule (20 mg total) by mouth daily at bedtime    History of lumbar surgery  -     XR spine lumbar minimum 4 views non injury; Future  -     Ambulatory referral to Pain Management; Future    Depression with anxiety  -     DULoxetine (CYMBALTA) 20 mg capsule; Take 1 capsule (20 mg total) by mouth daily at bedtime  -     Ambulatory referral to [facility]; Future    Fibromyalgia  -     DULoxetine (CYMBALTA) 20 mg capsule; Take 1 capsule (20 mg total) by mouth daily at bedtime    Obesity, morbid, BMI 40 0-49 9 ([facility] [address] )  -     Ambulatory referral to Weight Management; Future  -     Ambulatory referral to [facility]; Future      patient is a 51-year-old female presenting today to discuss concerns of worsening depression as well as ongoing issues with chronic low back pain with history of lumbar surgery over 10 years ago as well as ongoing struggle with her morbid obesity  This is a difficult situation where patient is already established with different specialists for her pre-existing conditions including rheumatology for fibromyalgia, pain management for neck and shoulder issues as well as Neurology for poorly controlled migraines  I did review neurology's past office notes and appears as though she has failed multiple different medications to treat her migraines which would also be beneficial in treating her chronic pain, fibromyalgia and her migraines  This list included Effexor as well as Cymbalta, Lyrica and gabapentin as well as Topamax, verapamil and nortriptyline, as well as Flexeril and Robaxin  Most of her complaints seem to be either that they did not work or they caused drowsy side effects      For patient's low back pain I do feel it would be best for her to get an updated lumbar x-ray, as the last 1 I see in there is from 2015 as an MRI  She is agreeable to doing this  Since she has already had lumbar surgery and I am struggling to find appropriate medication management would recommend returning to Pain Management to review this with them and see if she will qualify for injections  She may also benefit from other modalities like physical therapy, especially aqua therapy, considering her fibromyalgia as well as other conservative measures like acupuncture or massage  We will consider discussion in the future based on recommendations from pain management  Regarding patient's depression and anxiety, I do feel lot of this is situational   PHQ is positive at 18 today  I discussed treatment recommendations which I would recommend including medication management in combination with cognitive behavioral therapy  She is open to seeing a therapist and I provided her with a handout and list of local mental health offices were she can establish  For antidepressant choice, I feel patient would most benefit from Cymbalta considering her fibromyalgia, depression and her chronic back pain  Patient states that she has not had Cymbalta in many years and thinks it caused her some drowsiness but is open to trying it again  I advised patient start the lowest dose at 20 mg and take before bed once a day  We will see how patient tolerates this medication and at next follow-up will see if we can increase to 30 mg  When patient establishes with mental health office I will ask for psychiatrist input as well  I did briefly discussed patient's BMI today of 48 83  She recognizes her weight as a major issue and I also feel is contributing to her chronic pain as well as her depression    At this point I strongly believe patient would benefit from weight management referral however I am concerned as her mental health is currently unstable  She recognizes the importance in improving her mental health 1st to allow for better compliance and commitment to her weight loss  I did provide patient with weight management referral if and when she is ready and I did explain to her the most likely will also discuss option for bariatric surgery as well  I did emphasized to patient the importance of eating healthy, small, frequent meals throughout the day that are rich in nutrients with fruits and vegetables as well as lean protein and healthy fats and avoiding large portion meals, excessive carbohydrates and foods that are high in fat  Also encouraged her to avoid any type of sugary drinks and encouraged her to drink more water  I also encouraged her to try different kinds of exercise such as yoga that may be beneficial for her fibromyalgia but also may help her back as well  Patient is agreeable to following up in for short weeks for close follow-up to her back pain, mental health and her weight  She is agreeable to calling the office sooner if there are any concerns regarding treatment  Patient advised to at least have reached out scheduled appointments with specialists as referred as well as getting her x-ray done prior to the visit so we can review the results together  Chief Complaint   Patient presents with    Follow-up     pain in lower back, depression and anxiety is very bad as well lately  Subjective:     Patient ID: Randal Briggs is a 55 y o  female     45y/o female here today for multiple MH concerns and back pain  Pt last seen 5/2020, resident pt  States was treated in past with paxil but stopped it  States depression anxiety sxs are getting worse but past 2-3 days  States has been feeling very sad lately, tearful  Also takes care of her mother who also has [address] and "has a lot going on - struggles with bipolar"  She states she has been putting herself behind to care for her mom   States "puts on a front" but when she goes home gets upset and very tearful  States feels like everyone expects her to be strong  States as 2 children in their 19's, good relationship with them, good support  She recently broke up with her partner 3 months ago  States partner was very controlling, causing her more stress  However, pt states she does go on facebook and appears he is already in another relationship      states trying to loose weight and having difficulty  She has not been exercising because of her chronic pain, fibromyalgia  She has tried detox teas and weight loss medication and not helping  States many years ago was on adipex and acupuncture in [place]  Pt follows with rheumatologist Dr Beatris [place], appt scheduled 9/21  Managing her fibromyalgia  She is concerned about chronic LBP  2012 had SI joint fusion surgery, states cannot sit of stand for long periods, cannot lay for long periods  Standing and walking causing the most pain  States when standing she feels like she will fall, like her body cannot hold herself up anymore  She has been using ibuprofen prn  Pain across entire low back, shoots up into her mid back  states worse on left then right  Pain radiates into her posterior legs to mid-thigh  States just pain, no N/T  States pain over time is more severe and more frequent  She already sees pain management for her shoulder and just completed PT for shoulder  States she plans on scheduling f/u for that w/ PM soon  She does use medical marijuana occasionally, but trying to figure out what works, doesn't find it significantly helpful  She is under treatment for migraines with monthly amovig injections - managed by [facility]  She states she hasnt taken amitriptyline - this mediation makes her sleepy  For rescue medication for her migraines - frova, tramadol, tizanidine, balcofen and toradol  She still gets migraines every day  Review of Systems   Constitutional: Negative  Respiratory: Negative  Cardiovascular: Negative  Musculoskeletal:        As in HPI   Neurological:        As in HPI   Psychiatric/Behavioral:        As in HPI         The following portions of the patient's history were reviewed and updated as appropriate: allergies, current medications, past family history, past medical history, past social history, past surgical history and problem list       Objective:     Physical Exam  Vitals reviewed  Constitutional:       General: She is not in acute distress  Appearance: She is obese  She is not ill-appearing or toxic-appearing  HENT:      Head: Normocephalic and atraumatic  Eyes:      Extraocular Movements: Extraocular movements intact  Conjunctiva/sclera: Conjunctivae normal       Pupils: Pupils are equal, round, and reactive to light  Neck:      Vascular: Normal carotid pulses  No carotid bruit  Trachea: Phonation normal    Cardiovascular:      Rate and Rhythm: Normal rate and regular rhythm  Pulses: Normal pulses  Heart sounds: Normal heart sounds  Comments: General 1+ B/L foot swelling, no pitting  Pulmonary:      Effort: Pulmonary effort is normal       Breath sounds: Normal breath sounds  Musculoskeletal:      Cervical back: Neck supple  No pain with movement  Normal range of motion  Thoracic back: Tenderness (B/L paraspinals) present  No bony tenderness  Decreased range of motion  Lumbar back: Tenderness (B/l paraspinals) present  No deformity or bony tenderness  Decreased range of motion  Right hip: No tenderness or bony tenderness  Normal strength  Left hip: No tenderness or bony tenderness  Normal strength  Right upper leg: No swelling, tenderness or bony tenderness  Left upper leg: No tenderness or bony tenderness  Lymphadenopathy:      Head:      Right side of head: No submandibular or tonsillar adenopathy  Left side of head: No submandibular or tonsillar adenopathy  Neurological:      Mental Status: She is alert and oriented to person, place, and time  Motor: Motor function is intact  Coordination: Coordination is intact  Gait: Gait is intact  Psychiatric:         Mood and Affect: Mood is depressed  Affect is tearful  Speech: Speech normal          Behavior: Behavior is cooperative  Cognition and Memory: Cognition normal          Vitals:    21 1413   BP: 130/81   BP Location: Left arm   Patient Position: Sitting   Cuff Size: Large   Pulse: 94   Temp: 98 °F (36 7 °C)   TempSrc: Temporal   SpO2: 98%   Weight: 121 kg (267 lb)   Height: 5' 2" (1 575 m)     PHQ-9 Depression Screening    PHQ-9:   Frequency of the following problems over the past two weeks:      Little interest or pleasure in doing things: 3 - nearly every day  Feeling down, depressed, or hopeless: 3 - nearly every day  Trouble falling or staying asleep, or sleeping too much: 3 - nearly every day  Feeling tired or having little energy: 3 - nearly every day  Poor appetite or overeatin - several days  Feeling bad about yourself - or that you are a failure or have let yourself or your family down: 2 - more than half the days  Trouble concentrating on things, such as reading the newspaper or watching television: 1 - several days  Moving or speaking so slowly that other people could have noticed  Or the opposite - being so fidgety or restless that you have been moving around a lot more than usual: 1 - several days  Thoughts that you would be better off dead, or of hurting yourself in some way: 1 - several days  PHQ-2 Score: 6  PHQ-9 Score: 18       BMI Counseling: Body mass index is 48 83 kg/m²   The BMI is above normal  Nutrition recommendations include reducing portion sizes, decreasing overall calorie intake, 3-5 servings of fruits/vegetables daily, reducing fast food intake, consuming healthier snacks, decreasing soda and/or juice intake, moderation in carbohydrate intake, increasing intake of lean protein, reducing intake of saturated fat and trans fat and reducing intake of cholesterol  Exercise recommendations include exercising 3-5 times per week

## 2021-09-21 ENCOUNTER — TELEMEDICINE (OUTPATIENT)
Dept: NEUROLOGY | Facility: CLINIC | Age: 46
End: 2021-09-21
Payer: COMMERCIAL

## 2021-09-21 ENCOUNTER — TELEPHONE (OUTPATIENT)
Dept: NEUROLOGY | Facility: CLINIC | Age: 46
End: 2021-09-21

## 2021-09-21 VITALS — BODY MASS INDEX: 46.93 KG/M2 | HEIGHT: 62 IN | WEIGHT: 255 LBS

## 2021-09-21 DIAGNOSIS — G43.709 CHRONIC MIGRAINE WITHOUT AURA WITHOUT STATUS MIGRAINOSUS, NOT INTRACTABLE: Primary | ICD-10-CM

## 2021-09-21 DIAGNOSIS — G47.30 SLEEP-DISORDERED BREATHING: ICD-10-CM

## 2021-09-21 DIAGNOSIS — N20.0 NEPHROLITHIASIS: ICD-10-CM

## 2021-09-21 DIAGNOSIS — G43.719 INTRACTABLE CHRONIC MIGRAINE WITHOUT AURA AND WITHOUT STATUS MIGRAINOSUS: ICD-10-CM

## 2021-09-21 PROCEDURE — 99214 OFFICE O/P EST MOD 30 MIN: CPT | Performed by: PHYSICIAN ASSISTANT

## 2021-09-21 RX ORDER — FROVATRIPTAN SUCCINATE 2.5 MG/1
2.5 TABLET, FILM COATED ORAL ONCE
Qty: 9 TABLET | Refills: 2 | Status: SHIPPED | OUTPATIENT
Start: 2021-09-21 | End: 2021-10-27 | Stop reason: SDUPTHER

## 2021-09-21 RX ORDER — BUTALBITAL, ACETAMINOPHEN AND CAFFEINE 50; 325; 40 MG/1; MG/1; MG/1
TABLET ORAL
Qty: 10 TABLET | Refills: 0 | Status: SHIPPED | OUTPATIENT
Start: 2021-09-21 | End: 2021-11-29

## 2021-09-21 NOTE — TELEPHONE ENCOUNTER
Left voicemail for pt  To call the office back and speak with me so we can go over her chart before her virtual 3pm apt  Today  I left my name and number

## 2021-09-22 ENCOUNTER — TELEPHONE (OUTPATIENT)
Dept: NEUROLOGY | Facility: CLINIC | Age: 46
End: 2021-09-22

## 2021-09-22 NOTE — TELEPHONE ENCOUNTER
----- Message from Turner Judd PA-C sent at 9/21/2021  3:58 PM EDT -----  Regarding: f/u, botox auth  F/u in 3-4 mo for OVL  Botox team- ordered botox for pt  She was approved in 2017/2018 and done by Dr Dory Brown  Pt has new insurance and would like to restart botox  She prefers [facility]  or [facility]  Okay to schedule with Dr Alcides Phoenix or me, unless it has to be with Dr Alcides Phoenix per insurance  Thanks

## 2021-09-24 DIAGNOSIS — G43.711 INTRACTABLE CHRONIC MIGRAINE WITHOUT AURA AND WITH STATUS MIGRAINOSUS: ICD-10-CM

## 2021-09-27 RX ORDER — ERENUMAB-AOOE 140 MG/ML
INJECTION, SOLUTION SUBCUTANEOUS
Qty: 1 ML | Refills: 8 | Status: SHIPPED | OUTPATIENT
Start: 2021-09-27 | End: 2021-12-08 | Stop reason: SDUPTHER

## 2021-10-04 NOTE — TELEPHONE ENCOUNTER
Dr Luma García,    Can you please look over and sign MK's note form 09/21/2021 for this patient so I can do the Authorization for Botox      Thanks in Advance

## 2021-10-05 NOTE — TELEPHONE ENCOUNTER
Johan Monte [address] can you please sign your note so I can attest it as this is needed for botox to be authorized thanks

## 2021-10-06 ENCOUNTER — OFFICE VISIT (OUTPATIENT)
Dept: INTERNAL MEDICINE CLINIC | Facility: CLINIC | Age: 46
End: 2021-10-06

## 2021-10-06 VITALS
SYSTOLIC BLOOD PRESSURE: 137 MMHG | OXYGEN SATURATION: 98 % | WEIGHT: 267 LBS | HEIGHT: 62 IN | HEART RATE: 81 BPM | BODY MASS INDEX: 49.13 KG/M2 | TEMPERATURE: 98.2 F | DIASTOLIC BLOOD PRESSURE: 93 MMHG

## 2021-10-06 DIAGNOSIS — M54.41 CHRONIC BILATERAL LOW BACK PAIN WITH BILATERAL SCIATICA: ICD-10-CM

## 2021-10-06 DIAGNOSIS — E66.01 OBESITY, MORBID, BMI 40.0-49.9 (HCC): ICD-10-CM

## 2021-10-06 DIAGNOSIS — M54.42 CHRONIC BILATERAL LOW BACK PAIN WITH BILATERAL SCIATICA: ICD-10-CM

## 2021-10-06 DIAGNOSIS — G89.29 CHRONIC BILATERAL LOW BACK PAIN WITH BILATERAL SCIATICA: ICD-10-CM

## 2021-10-06 DIAGNOSIS — M79.7 FIBROMYALGIA: ICD-10-CM

## 2021-10-06 DIAGNOSIS — F41.8 DEPRESSION WITH ANXIETY: Primary | ICD-10-CM

## 2021-10-06 DIAGNOSIS — Z23 NEED FOR INFLUENZA VACCINATION: ICD-10-CM

## 2021-10-06 PROCEDURE — 90682 RIV4 VACC RECOMBINANT DNA IM: CPT | Performed by: PHYSICIAN ASSISTANT

## 2021-10-06 PROCEDURE — 90471 IMMUNIZATION ADMIN: CPT | Performed by: PHYSICIAN ASSISTANT

## 2021-10-06 PROCEDURE — 99213 OFFICE O/P EST LOW 20 MIN: CPT | Performed by: PHYSICIAN ASSISTANT

## 2021-10-07 NOTE — TELEPHONE ENCOUNTER
Waiting on Anum Najera to sign her notes, once the patient note is sign, we can start authorization process for Botox

## 2021-10-07 NOTE — TELEPHONE ENCOUNTER
Jose D Mckeon, Can you please sign the above patient note form 9/21/2021, I'm unable to start her authorization for Botox      Thanks  Sommer Logan

## 2021-10-08 PROBLEM — F41.8 DEPRESSION WITH ANXIETY: Status: ACTIVE | Noted: 2019-01-10

## 2021-10-13 ENCOUNTER — LAB (OUTPATIENT)
Dept: LAB | Facility: MEDICAL CENTER | Age: 46
End: 2021-10-13
Payer: COMMERCIAL

## 2021-10-13 ENCOUNTER — CONSULT (OUTPATIENT)
Dept: BARIATRICS | Facility: CLINIC | Age: 46
End: 2021-10-13
Payer: COMMERCIAL

## 2021-10-13 VITALS
HEIGHT: 63 IN | SYSTOLIC BLOOD PRESSURE: 120 MMHG | RESPIRATION RATE: 14 BRPM | DIASTOLIC BLOOD PRESSURE: 76 MMHG | TEMPERATURE: 99 F | BODY MASS INDEX: 46.71 KG/M2 | HEART RATE: 84 BPM | WEIGHT: 263.6 LBS

## 2021-10-13 DIAGNOSIS — G43.709 CHRONIC MIGRAINE WITHOUT AURA WITHOUT STATUS MIGRAINOSUS, NOT INTRACTABLE: ICD-10-CM

## 2021-10-13 DIAGNOSIS — K21.9 GERD WITHOUT ESOPHAGITIS: ICD-10-CM

## 2021-10-13 DIAGNOSIS — E66.01 OBESITY, MORBID, BMI 40.0-49.9 (HCC): Primary | ICD-10-CM

## 2021-10-13 DIAGNOSIS — F41.8 DEPRESSION WITH ANXIETY: ICD-10-CM

## 2021-10-13 DIAGNOSIS — E66.01 OBESITY, MORBID, BMI 40.0-49.9 (HCC): ICD-10-CM

## 2021-10-13 LAB
ALBUMIN SERPL BCP-MCNC: 3.4 G/DL (ref 3.5–5)
ALP SERPL-CCNC: 118 U/L (ref 46–116)
ALT SERPL W P-5'-P-CCNC: 29 U/L (ref 12–78)
ANION GAP SERPL CALCULATED.3IONS-SCNC: 4 MMOL/L (ref 4–13)
AST SERPL W P-5'-P-CCNC: 15 U/L (ref 5–45)
BILIRUB SERPL-MCNC: 0.38 MG/DL (ref 0.2–1)
BUN SERPL-MCNC: 11 MG/DL (ref 5–25)
CALCIUM ALBUM COR SERPL-MCNC: 9.3 MG/DL (ref 8.3–10.1)
CALCIUM SERPL-MCNC: 8.8 MG/DL (ref 8.3–10.1)
CHLORIDE SERPL-SCNC: 107 MMOL/L (ref 100–108)
CHOLEST SERPL-MCNC: 182 MG/DL (ref 50–200)
CO2 SERPL-SCNC: 25 MMOL/L (ref 21–32)
CREAT SERPL-MCNC: 0.74 MG/DL (ref 0.6–1.3)
EST. AVERAGE GLUCOSE BLD GHB EST-MCNC: 111 MG/DL
GFR SERPL CREATININE-BSD FRML MDRD: 97 ML/MIN/1.73SQ M
GLUCOSE P FAST SERPL-MCNC: 101 MG/DL (ref 65–99)
HBA1C MFR BLD: 5.5 %
HDLC SERPL-MCNC: 56 MG/DL
INSULIN SERPL-ACNC: 26.2 MU/L (ref 3–25)
LDLC SERPL CALC-MCNC: 108 MG/DL (ref 0–100)
NONHDLC SERPL-MCNC: 126 MG/DL
POTASSIUM SERPL-SCNC: 3.8 MMOL/L (ref 3.5–5.3)
PROT SERPL-MCNC: 7.4 G/DL (ref 6.4–8.2)
SODIUM SERPL-SCNC: 136 MMOL/L (ref 136–145)
TRIGL SERPL-MCNC: 91 MG/DL
TSH SERPL DL<=0.05 MIU/L-ACNC: 1.92 UIU/ML (ref 0.36–3.74)

## 2021-10-13 PROCEDURE — 83525 ASSAY OF INSULIN: CPT

## 2021-10-13 PROCEDURE — 84443 ASSAY THYROID STIM HORMONE: CPT

## 2021-10-13 PROCEDURE — 99244 OFF/OP CNSLTJ NEW/EST MOD 40: CPT | Performed by: PHYSICIAN ASSISTANT

## 2021-10-13 PROCEDURE — 83036 HEMOGLOBIN GLYCOSYLATED A1C: CPT

## 2021-10-13 PROCEDURE — 80053 COMPREHEN METABOLIC PANEL: CPT

## 2021-10-13 PROCEDURE — 36415 COLL VENOUS BLD VENIPUNCTURE: CPT

## 2021-10-13 PROCEDURE — 80061 LIPID PANEL: CPT

## 2021-10-19 ENCOUNTER — TELEPHONE (OUTPATIENT)
Dept: INTERNAL MEDICINE CLINIC | Facility: CLINIC | Age: 46
End: 2021-10-19

## 2021-10-20 ENCOUNTER — TELEPHONE (OUTPATIENT)
Dept: NEUROLOGY | Facility: CLINIC | Age: 46
End: 2021-10-20

## 2021-10-20 NOTE — TELEPHONE ENCOUNTER
Please call patient and offer her a schedule  Please use our stock for her visit      Thanks  Earle Pearson

## 2021-10-20 NOTE — TELEPHONE ENCOUNTER
Botox: Approve  200 Units  Auth # [id number]  Eff: 10/18/2021- 01/17/2022    Please use our Stock    Thanks  Earle LARSON Chief Complaint   Patient presents with   • Well Child     1YR WCE..[id number]       No current outpatient prescriptions on file.     No current facility-administered medications for this visit.      ALLERGIES:  No Known Allergies  No past medical history on file.  Patient Active Problem List   Diagnosis   • Need for observation and evaluation of  for sepsis   •  infant of 40 completed weeks of gestation   • LGA (large for gestational age) infant   • Hypoglycemia     Wt Readings from Last 2 Encounters:   18 10.4 kg (71 %, Z= 0.56)*   18 9.1 kg (50 %, Z= 0.00)*     * Growth percentiles are based on WHO (Boys, 0-2 years) data.     Ht Readings from Last 2 Encounters:   18 31.5\" (80 cm) (92 %, Z= 1.41)*   18 29.25\" (74.3 cm) (82 %, Z= 0.90)*     * Growth percentiles are based on WHO (Boys, 0-2 years) data.     HC Readings from Last 2 Encounters:   18 48 cm (18.9\") (91 %, Z= 1.34)*   18 46.7 cm (18.39\") (90 %, Z= 1.28)*     * Growth percentiles are based on WHO (Boys, 0-2 years) data.       Visit Vitals  Pulse 138   Temp 99.2 °F (37.3 °C) (Temporal Artery)   Resp 30   Ht 31.5\" (80 cm)   Wt 10.4 kg   HC 48 cm (18.9\")   BMI 16.30 kg/m²       Concerns:  none    Child accompanied by father    Diet:  y  Fruit/Veggie:  y  Amount of juice per day (ounces):  y  Water source:  n  Is the patient using a bottle? y  Is the patient using a sippy cup? y  Bowel/bladder:  nml  Discussed changing to Whole Milk y    Sleep:  y  Bedtime:  8  Morning wake time:  7  Number of times waking at night:  0    Development:   Patient is able to do the following:  y   pull to standing   walk with assistance    feed self with spoon   use pincer grasp   say mama, vic   hold cup to drink   clap hands   points    Social History:  Age of home (in years):  new  Smoke exposure:  NO  :  NO  Household members:  mom and dad    PHYSICAL EXAM:    GENERAL:  Well appearing 12 month old male, nontoxic, no acute  distress.  Alert and interactive.  SKIN:  Warm, normal turgor.  No cyanosis.  No bruises or lesions.  HEAD:  Normocephalic, atraumatic.    EYES:  Conjunctivae appear normal, non-injected, non-icteric.  Red reflex positive bilaterally, symmetric light reflex.  NOSE:  Appears normal, no flaring.  EARS:  Normal pinnae, normal canals, TMs (tympanic membranes) are transparent with good landmarks.  THROAT:  Oropharynx with moist mucous membranes and no lesions. Tonsils 1+ without erythema or exudate.  NECK:  Supple, no lymphadenopathy or masses.  HEART:  Regular rate and rhythm.  Normal S1, S2.  No murmurs, rubs, or gallops.  Pulses 2+ bilaterally.  LUNGS:  Clear to auscultation bilaterally.  No wheezes, rales, rhonchi.  Non-labored breathing.  ABDOMEN:  Soft, non-tender, non-distended.  No organomegaly or masses.  GENITOURINARY:  testes descended bilaterally  MUSCULOSKELETAL:  FROM (Full range of motion), no deformities, strength 5/5 bilaterally.  EXTREMITIES:  Warm, dry, without abnormalities.  NEUROLOGIC:  Alert, active, DTR's (deep tendon reflexes) 2+ bilaterally.    Assessment and Plan:    12 month old male here for a well-child exam.     1. Growth and development:  good  2. Discussed with parent:  · Car seat rear-facing until 2 years or the height/weight limits of the car seat.  · Avoid foods that are a choking hazard.   · Sunscreen use.   · Oral hygiene-start brushing with small amount of fluoride kids toothpaste.  · Multivitamin with Iron -  1/2 Flintstones Vitamin.  3. Vaccinations:  Varicella, Prevnar, hib.  4. Labs:  Sent to Lab [facility] for Lead   5. Healthy habits handout-discussed handout together   Variety of foods   Juice   Sleep   Screen habits- limiting screen time and keeping tv out of bedrooms   Parents as role models       6. Next Appointment:  15 month physical.

## 2021-10-26 ENCOUNTER — PROCEDURE VISIT (OUTPATIENT)
Dept: NEUROLOGY | Facility: CLINIC | Age: 46
End: 2021-10-26
Payer: COMMERCIAL

## 2021-10-26 VITALS — DIASTOLIC BLOOD PRESSURE: 79 MMHG | TEMPERATURE: 97.3 F | SYSTOLIC BLOOD PRESSURE: 132 MMHG

## 2021-10-26 DIAGNOSIS — G43.709 CHRONIC MIGRAINE WITHOUT AURA WITHOUT STATUS MIGRAINOSUS, NOT INTRACTABLE: ICD-10-CM

## 2021-10-26 PROCEDURE — 64615 CHEMODENERV MUSC MIGRAINE: CPT | Performed by: PHYSICIAN ASSISTANT

## 2021-10-26 RX ORDER — KETOROLAC TROMETHAMINE 10 MG/1
TABLET, FILM COATED ORAL
Qty: 9 TABLET | Refills: 2 | Status: SHIPPED | OUTPATIENT
Start: 2021-10-26 | End: 2022-06-18 | Stop reason: SDUPTHER

## 2021-10-27 RX ORDER — FROVATRIPTAN SUCCINATE 2.5 MG/1
2.5 TABLET, FILM COATED ORAL ONCE
Qty: 9 TABLET | Refills: 2 | Status: SHIPPED | OUTPATIENT
Start: 2021-10-27 | End: 2021-11-29

## 2021-10-29 ENCOUNTER — TELEPHONE (OUTPATIENT)
Dept: INTERNAL MEDICINE CLINIC | Facility: CLINIC | Age: 46
End: 2021-10-29

## 2021-10-29 ENCOUNTER — TELEPHONE (OUTPATIENT)
Dept: NEUROLOGY | Facility: CLINIC | Age: 46
End: 2021-10-29

## 2021-10-29 DIAGNOSIS — G43.709 CHRONIC MIGRAINE WITHOUT AURA WITHOUT STATUS MIGRAINOSUS, NOT INTRACTABLE: Primary | ICD-10-CM

## 2021-11-15 ENCOUNTER — TELEPHONE (OUTPATIENT)
Dept: NEUROLOGY | Facility: CLINIC | Age: 46
End: 2021-11-15

## 2021-11-16 ENCOUNTER — OFFICE VISIT (OUTPATIENT)
Dept: BARIATRICS | Facility: CLINIC | Age: 46
End: 2021-11-16

## 2021-11-16 ENCOUNTER — APPOINTMENT (OUTPATIENT)
Dept: LAB | Facility: MEDICAL CENTER | Age: 46
End: 2021-11-16
Attending: SURGERY
Payer: COMMERCIAL

## 2021-11-16 VITALS
HEIGHT: 63 IN | TEMPERATURE: 98.5 F | SYSTOLIC BLOOD PRESSURE: 122 MMHG | BODY MASS INDEX: 46.42 KG/M2 | HEART RATE: 88 BPM | DIASTOLIC BLOOD PRESSURE: 76 MMHG | WEIGHT: 262 LBS

## 2021-11-16 DIAGNOSIS — E66.01 OBESITY, MORBID, BMI 40.0-49.9 (HCC): ICD-10-CM

## 2021-11-16 DIAGNOSIS — Z01.818 PRE-OPERATIVE CLEARANCE: ICD-10-CM

## 2021-11-16 DIAGNOSIS — E66.01 OBESITY, CLASS III, BMI 40-49.9 (MORBID OBESITY) (HCC): ICD-10-CM

## 2021-11-16 DIAGNOSIS — E53.8 VITAMIN B12 DEFICIENCY: ICD-10-CM

## 2021-11-16 DIAGNOSIS — E66.01 OBESITY, CLASS III, BMI 40-49.9 (MORBID OBESITY) (HCC): Primary | ICD-10-CM

## 2021-11-16 DIAGNOSIS — Z01.818 PRE-OPERATIVE CLEARANCE: Primary | ICD-10-CM

## 2021-11-16 LAB
ERYTHROCYTE [DISTWIDTH] IN BLOOD BY AUTOMATED COUNT: 14 % (ref 11.6–15.1)
HCT VFR BLD AUTO: 43.5 % (ref 34.8–46.1)
HGB BLD-MCNC: 13.6 G/DL (ref 11.5–15.4)
MCH RBC QN AUTO: 25.9 PG (ref 26.8–34.3)
MCHC RBC AUTO-ENTMCNC: 31.3 G/DL (ref 31.4–37.4)
MCV RBC AUTO: 83 FL (ref 82–98)
PLATELET # BLD AUTO: 316 THOUSANDS/UL (ref 149–390)
PMV BLD AUTO: 10 FL (ref 8.9–12.7)
RBC # BLD AUTO: 5.25 MILLION/UL (ref 3.81–5.12)
WBC # BLD AUTO: 12.27 THOUSAND/UL (ref 4.31–10.16)

## 2021-11-16 PROCEDURE — 36415 COLL VENOUS BLD VENIPUNCTURE: CPT

## 2021-11-16 PROCEDURE — 85027 COMPLETE CBC AUTOMATED: CPT

## 2021-11-16 PROCEDURE — RECHECK: Performed by: DIETITIAN, REGISTERED

## 2021-11-17 ENCOUNTER — TELEPHONE (OUTPATIENT)
Dept: OTHER | Facility: OTHER | Age: 46
End: 2021-11-17

## 2021-11-18 RX ORDER — ZOLMITRIPTAN 5 MG/1
5 TABLET, ORALLY DISINTEGRATING ORAL ONCE AS NEEDED
Qty: 6 TABLET | Refills: 0 | Status: SHIPPED | OUTPATIENT
Start: 2021-11-18 | End: 2022-01-31 | Stop reason: SDUPTHER

## 2021-11-23 ENCOUNTER — TELEPHONE (OUTPATIENT)
Dept: GASTROENTEROLOGY | Facility: CLINIC | Age: 46
End: 2021-11-23

## 2021-11-26 DIAGNOSIS — F48.9 MENTAL HEALTH PROBLEM: Primary | ICD-10-CM

## 2021-11-29 ENCOUNTER — OFFICE VISIT (OUTPATIENT)
Dept: INTERNAL MEDICINE CLINIC | Facility: CLINIC | Age: 46
End: 2021-11-29

## 2021-11-29 VITALS
HEIGHT: 62 IN | TEMPERATURE: 98.4 F | BODY MASS INDEX: 48.76 KG/M2 | SYSTOLIC BLOOD PRESSURE: 120 MMHG | WEIGHT: 265 LBS | DIASTOLIC BLOOD PRESSURE: 80 MMHG | OXYGEN SATURATION: 98 % | HEART RATE: 86 BPM

## 2021-11-29 DIAGNOSIS — M79.644 CHRONIC PAIN OF RIGHT THUMB: ICD-10-CM

## 2021-11-29 DIAGNOSIS — G89.29 CHRONIC PAIN OF RIGHT THUMB: ICD-10-CM

## 2021-11-29 DIAGNOSIS — E66.01 OBESITY, MORBID, BMI 40.0-49.9 (HCC): ICD-10-CM

## 2021-11-29 DIAGNOSIS — F41.8 DEPRESSION WITH ANXIETY: Primary | ICD-10-CM

## 2021-11-29 DIAGNOSIS — G43.719 INTRACTABLE CHRONIC MIGRAINE WITHOUT AURA AND WITHOUT STATUS MIGRAINOSUS: ICD-10-CM

## 2021-11-29 PROCEDURE — 99213 OFFICE O/P EST LOW 20 MIN: CPT | Performed by: PHYSICIAN ASSISTANT

## 2021-11-30 ENCOUNTER — PATIENT OUTREACH (OUTPATIENT)
Dept: FAMILY MEDICINE CLINIC | Facility: CLINIC | Age: 46
End: 2021-11-30

## 2021-12-01 ENCOUNTER — OFFICE VISIT (OUTPATIENT)
Dept: OBGYN CLINIC | Facility: HOSPITAL | Age: 46
End: 2021-12-01
Payer: COMMERCIAL

## 2021-12-01 ENCOUNTER — HOSPITAL ENCOUNTER (OUTPATIENT)
Dept: RADIOLOGY | Facility: HOSPITAL | Age: 46
Discharge: HOME | End: 2021-12-01
Payer: COMMERCIAL

## 2021-12-01 VITALS
SYSTOLIC BLOOD PRESSURE: 122 MMHG | WEIGHT: 232 LBS | DIASTOLIC BLOOD PRESSURE: 83 MMHG | HEART RATE: 78 BPM | BODY MASS INDEX: 42.69 KG/M2 | HEIGHT: 62 IN

## 2021-12-01 DIAGNOSIS — G89.29 CHRONIC PAIN OF RIGHT THUMB: Primary | ICD-10-CM

## 2021-12-01 DIAGNOSIS — M18.11 OSTEOARTHRITIS OF CARPOMETACARPAL (CMC) JOINT OF RIGHT THUMB, UNSPECIFIED OSTEOARTHRITIS TYPE: ICD-10-CM

## 2021-12-01 DIAGNOSIS — G89.29 CHRONIC PAIN OF RIGHT THUMB: ICD-10-CM

## 2021-12-01 DIAGNOSIS — S63.601A SPRAIN OF RIGHT THUMB, UNSPECIFIED SITE OF DIGIT, INITIAL ENCOUNTER: ICD-10-CM

## 2021-12-01 DIAGNOSIS — M79.644 CHRONIC PAIN OF RIGHT THUMB: ICD-10-CM

## 2021-12-01 DIAGNOSIS — M79.644 CHRONIC PAIN OF RIGHT THUMB: Primary | ICD-10-CM

## 2021-12-01 PROCEDURE — 99243 OFF/OP CNSLTJ NEW/EST LOW 30: CPT | Performed by: PHYSICIAN ASSISTANT

## 2021-12-01 PROCEDURE — 73130 X-RAY EXAM OF HAND: CPT

## 2021-12-08 DIAGNOSIS — G43.711 INTRACTABLE CHRONIC MIGRAINE WITHOUT AURA AND WITH STATUS MIGRAINOSUS: ICD-10-CM

## 2021-12-08 RX ORDER — ERENUMAB-AOOE 140 MG/ML
INJECTION, SOLUTION SUBCUTANEOUS
Qty: 1 ML | Refills: 11 | Status: SHIPPED | OUTPATIENT
Start: 2021-12-08 | End: 2022-07-07 | Stop reason: CLARIF

## 2021-12-08 NOTE — TELEPHONE ENCOUNTER
[facility] calling in for new prescription for Aimovig     Order pended below, please sign if agreeable

## 2022-01-03 ENCOUNTER — TELEPHONE (OUTPATIENT)
Dept: NEUROLOGY | Facility: CLINIC | Age: 47
End: 2022-01-03

## 2022-01-03 NOTE — TELEPHONE ENCOUNTER
Fax authorization request to [facility] pending approval/denail for Botox

## 2022-01-04 NOTE — TELEPHONE ENCOUNTER
Botox: Approve  200 units  Auth# [id number]  Eff: 01/03/2022 until 01/03/2023    Please  [facility]        Call [facility] spoke to Tulio Kaminski the the pharmacist, give a verbal Rx for the 200 units Botox, as per Tulio Kaminski they will move forward with the processing and one of their staff will call us for scheduling of delivery

## 2022-01-05 ENCOUNTER — TELEPHONE (OUTPATIENT)
Dept: OBGYN CLINIC | Facility: OTHER | Age: 47
End: 2022-01-05

## 2022-01-05 ENCOUNTER — TELEPHONE (OUTPATIENT)
Dept: OBGYN CLINIC | Facility: MEDICAL CENTER | Age: 47
End: 2022-01-05

## 2022-01-05 NOTE — TELEPHONE ENCOUNTER
Patient calling back to reschedule at a later time on 1/13/2022, dr will not be in in the morning  Transferred to office

## 2022-01-06 ENCOUNTER — TELEPHONE (OUTPATIENT)
Dept: NEUROLOGY | Facility: CLINIC | Age: 47
End: 2022-01-06

## 2022-01-06 NOTE — TELEPHONE ENCOUNTER
Julia Ramos from [facility] left a message today at 11:36 requesting botox script  Call back # [phone]

## 2022-01-12 NOTE — PROGRESS NOTES
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  IMPRESSION:  Right thumb injur    DDX  Trigger finger  ucl tear      Repeat X-ray next visit: None    Return for F/U after MRI is completed for review  Patient Instructions   Explained the patient that she does have evidence of inflammation at 1st MTP with a possible trigger finger however she also has some tenderness along her ulnar collateral ligament indicative of a possible thumb sprain  As such recommended initiating daily use of her thumb spica brace and to use a bag over her brace even during showering  I have ordered MRI for further evaluation  See below for further notes regarding possible UCL tear  Generic information about our possible diagnosis:  You have a sprain of your thumb which is a tear of the ligaments (stitches that hold bones together at joints)  Your xrays and examination so far show no fracture or laxity of your joint that requires surgical intervention  As such, I recommend a trial of non-operative treatment  Start thumb spica brace for immobilization of your thumb to prevent movement while the ligaments heal  Any extra stress on the thumb or movement can prevent healing of the torn ligaments  You may remove for hygiene only and should wear at bedtime to prevent your thumb bending in awkward positions overnight  After 3 weeks of wearing thumb spica brace, you should be checked for any significant laxity and referred for possible surgery if does not appear to be healing   If your thumb is stable at that time, then you may then remove splint throughout the day to perform range of motion exercises as well as gentle strengthening exercises including pinching and handgrip  At the 6 week elizabeth, you will continue strengthening exercises and avoid any heavy gripping or lifting with the affected hand until you regain full strength and have no pain  Sometimes even if progressing well, patients will have persistent pain or develop instability and may still require surgery even after 6 weeks  (alantodustin Partida 2017)  Athletes and patients typically return to sport 6-8 weeks after injury once have regained full strength and have no pain  ([facility] 3rd 2018)  CHIEF COMPLAINT:  Right hand pain    HPI:  Conchis Whitley is a 55 y o  female  who presents for right hand pain      Visit 1/13/2022 :  Patient presents with injury to her left thumb about 3 months ago while she was picking up a 24 pack can of soda and sprained her thumb  She reports at that time she had sudden pain or restriction in ranges of motion, bruising or swelling  Since then she has had ongoing intermittent pain to the base of her thumb  She was seen by Brendan Bright on 12/01/2021 and placed in a thumb spica brace for possible right thumb CMC arthritis and advised to follow-up with sports medicine  Today, points to volar aspect of her 1st MCP joint as source of significant pain but also describes diffuse pain all the from about the 1st MCP  Constant throbbing stabbing burning and sometimes shooting  She has been wearing her thumb spica brace intermittently during high risk activity  Review of Systems   Constitutional: Negative for chills and fever  HENT: Negative for ear pain and sore throat  Eyes: Negative for pain and visual disturbance  Respiratory: Negative for cough and shortness of breath  Cardiovascular: Negative for chest pain and palpitations  Gastrointestinal: Negative for abdominal pain and vomiting  Genitourinary: Negative for dysuria and hematuria  Musculoskeletal: Negative for arthralgias and back pain     Skin: Negative for color change and rash  Neurological: Negative for seizures and syncope  All other systems reviewed and are negative          Following history reviewed and update:    Past Medical History:   Diagnosis Date    Anxiety     Asthma     Claustrophobia     Cluster headache     CTS (carpal tunnel syndrome)     Depression     Fibromyalgia     last assessed 11/27/17    Fibromyalgia, primary     GERD without esophagitis     last assessed 10/12/17    GI problem     Headache, tension-type     Joint pain     Lung nodule     last assessed 10/9/15    Migraine     Muscle pain     Peripheral neuropathy     Sleep apnea      Past Surgical History:   Procedure Laterality Date    BACK SURGERY      CARPAL TUNNEL RELEASE Bilateral     CHOLECYSTECTOMY      GALLBLADDER SURGERY      NECK SURGERY      SPINAL FUSION      C4 and C5    ULNAR NERVE REPAIR Bilateral     UPPER GASTROINTESTINAL ENDOSCOPY       Social History   Social History     Substance and Sexual Activity   Alcohol Use Yes    Alcohol/week: 2 0 standard drinks    Types: 1 Glasses of wine, 1 Cans of beer per week    Comment: occasional     Social History     Substance and Sexual Activity   Drug Use Yes    Types: Marijuana    Comment: medicinal     Social History     Tobacco Use   Smoking Status Never Smoker   Smokeless Tobacco Never Used     Family History   Problem Relation Age of Onset    Diabetes Mother     Fibromyalgia Mother     Ovarian cancer Mother 35    Hypertension Mother     Thyroid disease Mother     Migraines Mother     Neuropathy Mother     No Known Problems Father     Throat cancer Maternal Grandmother     No Known Problems Maternal Grandfather     No Known Problems Paternal Grandmother     No Known Problems Paternal Grandfather     No Known Problems Daughter     Breast cancer Maternal Aunt     No Known Problems Maternal Aunt     No Known Problems Maternal Aunt     No Known Problems Paternal Aunt     Colon cancer Cousin     Heart disease Cousin     Lupus Cousin     Arthritis Family     Heart disease Family         cardiac disorder    Neuropathy Family     Lupus Family         systemic erythematosus    No Known Problems Half-Sister     No Known Problems Half-Brother     No Known Problems Half-Brother     No Known Problems Half-Brother     Stroke Neg Hx      Allergies   Allergen Reactions    Hydrocodone-Acetaminophen GI Intolerance     gi upset -pt okay if takes  zofran    Codeine GI Intolerance    Oxycodone-Acetaminophen GI Intolerance    Penicillins GI Intolerance          Physical Exam  /82 (BP Location: Right arm, Patient Position: Sitting, Cuff Size: Adult)   Pulse 85   Ht 5' 2" (1 575 m)   Wt 118 kg (260 lb)   BMI 47 55 kg/m²     Constitutional:  see vital signs  Gen: well-developed, normocephalic/atraumatic, well-groomed  Eyes: No inflammation or discharge of conjunctiva or lids; sclera clear   Pharynx: no inflammation, lesion, or mass of lips  Neck: supple, no masses, non-distended  MSK: no inflammation, lesion, mass, or clubbing of nails and digits except for other than mentioned below  SKIN: no visible rashes or skin lesions  Pulmonary/Chest: Effort normal  No respiratory distress     NEURO: cranial nerves grossly intact  PSYCH:  Alert and oriented to person, place, and time; recent and remote memory intact; mood normal, no depression, anxiety, or agitation, judgment and insight good and intact     Ortho Exam  RIGHT THUMB:  Erythema: no  Swelling: no  Increased Warmth: no  Tenderness: volar mcp and ucl  ROM: intact flexion, extension  Malrotation: none; thumb perpendicular to remainig phalanges on adduction  Varus stress: no laxity + pain  Valgus stress: no laxity + pain   Distal Phalanx Strength: 5/5 flexion, extension  Proximal Phalanx Strength: 5/5 flexion, extension  Thumb Abduction: 5/5  Thumb Adduction: 5/5  OK sign: normal  Froment Sign: negative  Finklestein's:  1st CMC Axial Load: negative    Procedures

## 2022-01-13 ENCOUNTER — OFFICE VISIT (OUTPATIENT)
Dept: OBGYN CLINIC | Facility: OTHER | Age: 47
End: 2022-01-13
Payer: COMMERCIAL

## 2022-01-13 VITALS
SYSTOLIC BLOOD PRESSURE: 121 MMHG | BODY MASS INDEX: 47.84 KG/M2 | HEIGHT: 62 IN | DIASTOLIC BLOOD PRESSURE: 82 MMHG | HEART RATE: 85 BPM | WEIGHT: 260 LBS

## 2022-01-13 DIAGNOSIS — S69.91XA INJURY OF RIGHT THUMB, INITIAL ENCOUNTER: Primary | ICD-10-CM

## 2022-01-13 PROCEDURE — 99214 OFFICE O/P EST MOD 30 MIN: CPT | Performed by: FAMILY MEDICINE

## 2022-01-13 NOTE — PATIENT INSTRUCTIONS
Explained the patient that she does have evidence of inflammation at 1st MTP with a possible trigger finger however she also has some tenderness along her ulnar collateral ligament indicative of a possible thumb sprain  As such recommended initiating daily use of her thumb spica brace and to use a bag over her brace even during showering  I have ordered MRI for further evaluation  See below for further notes regarding possible UCL tear  Generic information about our possible diagnosis:  You have a sprain of your thumb which is a tear of the ligaments (stitches that hold bones together at joints)  Your xrays and examination so far show no fracture or laxity of your joint that requires surgical intervention  As such, I recommend a trial of non-operative treatment  Start thumb spica brace for immobilization of your thumb to prevent movement while the ligaments heal  Any extra stress on the thumb or movement can prevent healing of the torn ligaments  You may remove for hygiene only and should wear at bedtime to prevent your thumb bending in awkward positions overnight  After 3 weeks of wearing thumb spica brace, you should be checked for any significant laxity and referred for possible surgery if does not appear to be healing  If your thumb is stable at that time, then you may then remove splint throughout the day to perform range of motion exercises as well as gentle strengthening exercises including pinching and handgrip  At the 6 week elizabeth, you will continue strengthening exercises and avoid any heavy gripping or lifting with the affected hand until you regain full strength and have no pain  Sometimes even if progressing well, patients will have persistent pain or develop instability and may still require surgery even after 6 weeks  (penny Partida 2017)  Athletes and patients typically return to sport 6-8 weeks after injury once have regained full strength and have no pain   ([facility] 3rd 2018)

## 2022-01-21 ENCOUNTER — TELEPHONE (OUTPATIENT)
Dept: NEUROLOGY | Facility: CLINIC | Age: 47
End: 2022-01-21

## 2022-01-25 ENCOUNTER — HOSPITAL ENCOUNTER (OUTPATIENT)
Dept: RADIOLOGY | Age: 47
Discharge: HOME | End: 2022-01-25
Payer: COMMERCIAL

## 2022-01-25 ENCOUNTER — TELEPHONE (OUTPATIENT)
Dept: INTERNAL MEDICINE CLINIC | Facility: CLINIC | Age: 47
End: 2022-01-25

## 2022-01-25 DIAGNOSIS — S69.91XA INJURY OF RIGHT THUMB, INITIAL ENCOUNTER: ICD-10-CM

## 2022-01-25 PROCEDURE — 73218 MRI UPPER EXTREMITY W/O DYE: CPT

## 2022-01-25 PROCEDURE — G1004 CDSM NDSC: HCPCS

## 2022-01-25 NOTE — TELEPHONE ENCOUNTER
----- Message from Rowan Matt, [address] sent at 2022 10:39 AM EST -----  Regardin-B Jake Galvin   Please place an Ambulatory Referral to [facility]) Neurology in [facility] for pt       The appt  Date is : 2022    Dx   [phone]    Thank you

## 2022-01-26 ENCOUNTER — TELEPHONE (OUTPATIENT)
Dept: NEUROLOGY | Facility: CLINIC | Age: 47
End: 2022-01-26

## 2022-01-26 NOTE — TELEPHONE ENCOUNTER
[facility] - Patient calling in  She is scheduled for a Botox appointment today with [facility] but she states the company reached out to her and informed her that they have tried calling our office several times regarding delivery of her Botox  Sent Jonel Logan a Teams message regarding this  It appears Sunny Clark had called their pharmacy and left message with her direct number  She had not heard back from them so delivery was not scheduled  Rescheduled patient's Botox for Monday 1/31 @ 11 AM in [facility]   Sunny Clark is aware of this and will call to set up Botox delivery    Cb#: [phone], okay to leave detailed message

## 2022-01-26 NOTE — TELEPHONE ENCOUNTER
Botox:  200 units is schedule for delivery on 1/27/2022  Via: [employer]     Please inform of delivery

## 2022-01-27 ENCOUNTER — OFFICE VISIT (OUTPATIENT)
Dept: OBGYN CLINIC | Facility: OTHER | Age: 47
End: 2022-01-27
Payer: COMMERCIAL

## 2022-01-27 VITALS
DIASTOLIC BLOOD PRESSURE: 91 MMHG | SYSTOLIC BLOOD PRESSURE: 123 MMHG | HEART RATE: 82 BPM | WEIGHT: 263 LBS | BODY MASS INDEX: 48.4 KG/M2 | HEIGHT: 62 IN

## 2022-01-27 DIAGNOSIS — M75.42 IMPINGEMENT SYNDROME OF LEFT SHOULDER: Primary | ICD-10-CM

## 2022-01-27 DIAGNOSIS — M79.644 PAIN OF RIGHT THUMB: ICD-10-CM

## 2022-01-27 PROCEDURE — 20610 DRAIN/INJ JOINT/BURSA W/O US: CPT | Performed by: FAMILY MEDICINE

## 2022-01-27 PROCEDURE — 99214 OFFICE O/P EST MOD 30 MIN: CPT | Performed by: FAMILY MEDICINE

## 2022-01-27 RX ORDER — ONABOTULINUMTOXINA 200 [USP'U]/1
INJECTION, POWDER, LYOPHILIZED, FOR SOLUTION INTRADERMAL; INTRAMUSCULAR
COMMUNITY
Start: 2022-01-07

## 2022-01-27 RX ADMIN — LIDOCAINE HYDROCHLORIDE 4 ML: 10 INJECTION, SOLUTION INFILTRATION; PERINEURAL at 12:09

## 2022-01-27 RX ADMIN — TRIAMCINOLONE ACETONIDE 40 MG: 40 INJECTION, SUSPENSION INTRA-ARTICULAR; INTRAMUSCULAR at 12:09

## 2022-01-27 NOTE — PATIENT INSTRUCTIONS
Explained the patient that on her right thumb she has arthritis at site of pain at the 1st MCP  She also has evidence of a grade 1 UCL tear on examination as well as MRI  Explained her pain at this site could be due to unhealed UCL tear from her injury 5 months ago with flare of osteoarthritis  We will allow for more healing of the tear with thumb spica brace to wear daily even at bedtime and at 6 weeks from her injury we will consider corticosteroid injection into the joint if still having any arthritis pain  For patient's left shoulder pain explained that much of this could be from her neck  I recommend she follow-up spine and pain management for her neck pain  Today we proceed with trial of palpation guided corticosteroid injection to the subacromial region in case of any rotator cuff pathology she does have some evidence of impingement on her examination  Explained that the she has no relief with shoulder injection and that this will help us to further narrow down our diagnosis to pinched nerve pain or referred pain from the neck  Rotator Cuff Injury Exercises  AMBULATORY CARE:   What you need to know about rotator cuff injury exercises:  Exercises help improve shoulder movement and strength, and decrease pain  Your physical therapist or healthcare provider will tell you when to start doing the exercises  He or she will tell you how often to do them  You will need to start slowly to prevent more injury  You will move through several levels over time as you get stronger and more flexible  Safety guidelines:   · Always warm up before you do the exercises  Walk or ride a stationary bike for 5 to 10 minutes to help you warm up  · Do not put your arm over your head until directed  You will need to wait until your injury has healed  The movement of some exercises could continue until your arm is over your head  You will need to stop the movement where directed  · Stop if you feel pain    You may feel some tight or stiff areas when you start  This should get better as you continue the exercises  You should not feel pain  Pain means you are not ready to do the exercise yet  Stop and call your physical therapist or healthcare provider right away  · Always work both rotator cuffs  Even if your injury is only on 1 side, it is important to do each exercise on both sides  This helps prevent injury and maintain balance in your shoulders and back  · Posture is important  Your physical therapist or healthcare provider will show you the proper posture for each exercise  You will be shown how to pull your shoulders back and down to engage the correct muscles  Remember not to let your shoulders shrug during an exercise unless it is part of the movement  How to do stretching exercises: You will not feel every exercise in your shoulder area  You may feel some of the stretches in your back, side, or upper arm muscles  You need to work muscles in and around your rotator cuffs and down your arms  This helps stabilize your shoulders  Your physical therapist or healthcare provider will tell you how long to hold each stretch  He or she will also tell you how many times to repeat each stretch during an exercise session  You may be told to do only certain exercises, or to do them in a specific order  The following are general directions to help you remember the exercises you are taught:  · Pendulum swings:  Lean forward and rest your arm on a table  Do not round your back or lock your knees during the exercise  Let your other arm hang freely by your side  Gently swing your free arm forward and backward, side to side, and in circles  · Crossover arm stretch:  Relax your shoulders  Hold your upper arm with the opposite hand  Pull your arm across your chest until you feel a stretch  Hold the stretch  Return to the starting position  · Sleeper stretch:  Lie on your side on a firm, flat surface   [facility] the elbow of your top arm 90°  Use your other hand to slowly push your arm down  Stop when you feel a stretch at the back of your shoulder  Hold the stretch  Slowly return to the starting position  · Shoulder movement, up and down: This exercise may also be called shoulder extension  Sit in a chair that has a back but no armrests  Raise your arm like you are reaching for the wall in front of you  Continue to raise the arm until it is over your head, or as high as directed  Bring your arm back down to your side  Bring it back as far as possible behind your body  Return your arm to the starting position  · Shoulder movement, side to side: These movements may be called flexion, internal rotation, and external rotation  Sit in a chair that has a back but no armrests  Raise your arm to the side and then up over your head as far as directed  Return your arm to your side  Bring your arm across the front of your body and reach for the opposite shoulder  Return your arm to the starting position  · Shoulder rolls:  Stand and raise both shoulders toward your ears  Lower them to the starting position  Relax your shoulders  Pull your shoulders back  Then relax them again  Roll your shoulders in a smooth [place]  Then roll your shoulders in a smooth [place] in the other direction  · Wall reach exercise: This may also be called a flexion stretch  Stand facing a wall  Slowly walk your fingers up the wall until you feel a stretch  Hold the stretch  Return to the starting position  · Arm reach exercise:  Lie on your back with your legs straight  Reach your arms like you are trying to touch the ceiling  Reach as high as you can so you feel a stretch in the back of your arms  Hold the stretch  Then lower your arms to your sides  · Elbow bends:  Stand with your arms down to your sides  Keep your palm facing forward  Bend your elbow and try to touch your shoulder with your fingertips  Return your arm to the starting position  · Up the back stretch:  Stand and put both arms behind your back  Put one hand under the other  Move the bottom hand and slowly push the upper hand up toward your head  You should feel a stretch in the front of your arm and shoulder  Be careful not to push too hard  Hold the stretch  Then return to the starting position  · Triceps stretch:  Stand and drop your forearm down your back so your hand is pointing to the ground behind you  Your elbow should be pointing at the ceiling  Take your other hand and place it on your elbow  Gently and slowly push on the elbow until you feel a stretch in the back of your arm  Hold the stretch  Let go of your elbow and relax your arm  You may be shown how to do this stretch with a towel  The towel can be pulled gently with a hand behind your back at waist level  How to do strengthening exercises:  Strengthening exercises may include handheld weights or resistance bands  Your physical therapist or healthcare provider will tell you how much weight or resistance to use  The general guide is to use light weights or low resistance and to do a high number of repetitions  You may be told to do only certain exercises, or to do them in a specific order  The following are general directions to help you remember the exercises you are taught:  · Scapular squeeze:  Stand with your arms at your sides  Squeeze your shoulder blades together and hold this position  Then relax the muscles  Keep your shoulder back during the entire exercise  · Wall pushups: This exercise is similar to a pushup done on the ground  The goal is to use your back and shoulder muscles to bring your upper body toward and away from the wall  Stand facing a wall  Put your hands on the wall  Bend your elbows to bring your upper body toward the wall  Straighten your arms to return to the starting position   Keep your feet close enough to the wall that you do not take a step when you bend your elbows  · Standing row with exercise band:  Wrap the exercise band around a heavy, stable object at waist level  Make loop in the end of the band to create a handle, if needed  Hold the handle or loop and pull the band straight back toward your hip  Keep your shoulder down  Squeeze your shoulder blade  Hold this position  Then slowly return to the starting position  · External rotation with arm abducted 90 degrees:  Wrap the exercise band around a heavy, stable object at waist level  Make loop in the end of the band to create a handle, if needed  Stand and hold the handle or loop  Bend your elbow and raise your arm to shoulder height  Keep your arm in this position  Raise your hand like you are pointing at the ceiling  Slowly return to the starting position  You may also be shown how to do this exercise lying down and with a weight  · Shoulder abduction with weight:  Stand and hold a weight in your hand with your palm facing your body  Slowly raise your arm to the side with your thumb pointing up  Then raise your arm as far as you can without pain  Hold this position  Then return to the starting position  · Shoulder abduction with exercise band:  Wrap the exercise band around a heavy, stable object near your foot  Grab the band  Keep your arm straight  Slowly raise your arm to the side with your thumb pointing up  Then, slowly pull the band as far as you can without pain  Slowly return to the starting position

## 2022-01-27 NOTE — PROGRESS NOTES
1  Impingement syndrome of left shoulder  Large joint arthrocentesis: L subacromial bursa   2  Pain of right thumb       Orders Placed This Encounter   Procedures    Large joint arthrocentesis: L subacromial bursa        IMAGING STUDIES: (I personally reviewed images in PACS and report):          PAST REPORTS:  MRI cervical vertebra 05/13/2021:  No cord signal abnormality  No pathologic enhancement      Stable postoperative changes of anterior cervical discectomy and fusion at C4-C5      Stable shallow central left paramedian protrusion at C3-C4 without spinal stenosis      Right sided endplate osteophytic ridging and facet arthropathy with suggestion of mild right foraminal stenosis        X-ray left shoulder 07/28/2020:  No acute abnormality      ASSESSMENT/PLAN:  Right thumb impingement UCL tear, 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 arthritis  Date of initial injury:  5 months prior  Date of immobilization:  01/13/2022  Follow-up from immobilization:  2 weeks    Left shoulder impingement versus cervical radiculopathy    Repeat X-ray next visit: None    Return in about 4 weeks (around 2/24/2022) for Follow-up with specialist     Patient Instructions     Explained the patient that on her right thumb she has arthritis at site of pain at the 1st MCP  She also has evidence of a grade 1 UCL tear on examination as well as MRI  Explained her pain at this site could be due to unhealed UCL tear from her injury 5 months ago with flare of osteoarthritis  We will allow for more healing of the tear with thumb spica brace to wear daily even at bedtime and at 6 weeks from her injury we will consider corticosteroid injection into the joint if still having any arthritis pain  For patient's left shoulder pain explained that much of this could be from her neck  I recommend she follow-up spine and pain management for her neck pain    Today we proceed with trial of palpation guided corticosteroid injection to the subacromial region in case of any rotator cuff pathology she does have some evidence of impingement on her examination  Explained that the she has no relief with shoulder injection and that this will help us to further narrow down our diagnosis to pinched nerve pain or referred pain from the neck  Rotator Cuff Injury Exercises  AMBULATORY CARE:   What you need to know about rotator cuff injury exercises:  Exercises help improve shoulder movement and strength, and decrease pain  Your physical therapist or healthcare provider will tell you when to start doing the exercises  He or she will tell you how often to do them  You will need to start slowly to prevent more injury  You will move through several levels over time as you get stronger and more flexible  Safety guidelines:   · Always warm up before you do the exercises  Walk or ride a stationary bike for 5 to 10 minutes to help you warm up  · Do not put your arm over your head until directed  You will need to wait until your injury has healed  The movement of some exercises could continue until your arm is over your head  You will need to stop the movement where directed  · Stop if you feel pain  You may feel some tight or stiff areas when you start  This should get better as you continue the exercises  You should not feel pain  Pain means you are not ready to do the exercise yet  Stop and call your physical therapist or healthcare provider right away  · Always work both rotator cuffs  Even if your injury is only on 1 side, it is important to do each exercise on both sides  This helps prevent injury and maintain balance in your shoulders and back  · Posture is important  Your physical therapist or healthcare provider will show you the proper posture for each exercise  You will be shown how to pull your shoulders back and down to engage the correct muscles  Remember not to let your shoulders shrug during an exercise unless it is part of the movement      How to do stretching exercises: You will not feel every exercise in your shoulder area  You may feel some of the stretches in your back, side, or upper arm muscles  You need to work muscles in and around your rotator cuffs and down your arms  This helps stabilize your shoulders  Your physical therapist or healthcare provider will tell you how long to hold each stretch  He or she will also tell you how many times to repeat each stretch during an exercise session  You may be told to do only certain exercises, or to do them in a specific order  The following are general directions to help you remember the exercises you are taught:  · Pendulum swings:  Lean forward and rest your arm on a table  Do not round your back or lock your knees during the exercise  Let your other arm hang freely by your side  Gently swing your free arm forward and backward, side to side, and in circles  · Crossover arm stretch:  Relax your shoulders  Hold your upper arm with the opposite hand  Pull your arm across your chest until you feel a stretch  Hold the stretch  Return to the starting position  · Sleeper stretch:  Lie on your side on a firm, flat surface  Bend the elbow of your top arm 90°  Use your other hand to slowly push your arm down  Stop when you feel a stretch at the back of your shoulder  Hold the stretch  Slowly return to the starting position  · Shoulder movement, up and down: This exercise may also be called shoulder extension  Sit in a chair that has a back but no armrests  Raise your arm like you are reaching for the wall in front of you  Continue to raise the arm until it is over your head, or as high as directed  Bring your arm back down to your side  Bring it back as far as possible behind your body  Return your arm to the starting position  · Shoulder movement, side to side: These movements may be called flexion, internal rotation, and external rotation  Sit in a chair that has a back but no armrests   Raise your arm to the side and then up over your head as far as directed  Return your arm to your side  Bring your arm across the front of your body and reach for the opposite shoulder  Return your arm to the starting position  · Shoulder rolls:  Stand and raise both shoulders toward your ears  Lower them to the starting position  Relax your shoulders  Pull your shoulders back  Then relax them again  Roll your shoulders in a smooth [place]  Then roll your shoulders in a smooth [place] in the other direction  · Wall reach exercise: This may also be called a flexion stretch  Stand facing a wall  Slowly walk your fingers up the wall until you feel a stretch  Hold the stretch  Return to the starting position  · Arm reach exercise:  Lie on your back with your legs straight  Reach your arms like you are trying to touch the ceiling  Reach as high as you can so you feel a stretch in the back of your arms  Hold the stretch  Then lower your arms to your sides  · Elbow bends:  Stand with your arms down to your sides  Keep your palm facing forward  Bend your elbow and try to touch your shoulder with your fingertips  Return your arm to the starting position  · Up the back stretch:  Stand and put both arms behind your back  Put one hand under the other  Move the bottom hand and slowly push the upper hand up toward your head  You should feel a stretch in the front of your arm and shoulder  Be careful not to push too hard  Hold the stretch  Then return to the starting position  · Triceps stretch:  Stand and drop your forearm down your back so your hand is pointing to the ground behind you  Your elbow should be pointing at the ceiling  Take your other hand and place it on your elbow  Gently and slowly push on the elbow until you feel a stretch in the back of your arm  Hold the stretch  Let go of your elbow and relax your arm  You may be shown how to do this stretch with a towel   The towel can be pulled gently with a hand behind your back at waist level  How to do strengthening exercises:  Strengthening exercises may include handheld weights or resistance bands  Your physical therapist or healthcare provider will tell you how much weight or resistance to use  The general guide is to use light weights or low resistance and to do a high number of repetitions  You may be told to do only certain exercises, or to do them in a specific order  The following are general directions to help you remember the exercises you are taught:  · Scapular squeeze:  Stand with your arms at your sides  Squeeze your shoulder blades together and hold this position  Then relax the muscles  Keep your shoulder back during the entire exercise  · Wall pushups: This exercise is similar to a pushup done on the ground  The goal is to use your back and shoulder muscles to bring your upper body toward and away from the wall  Stand facing a wall  Put your hands on the wall  Bend your elbows to bring your upper body toward the wall  Straighten your arms to return to the starting position  Keep your feet close enough to the wall that you do not take a step when you bend your elbows  · Standing row with exercise band:  Wrap the exercise band around a heavy, stable object at waist level  Make loop in the end of the band to create a handle, if needed  Hold the handle or loop and pull the band straight back toward your hip  Keep your shoulder down  Squeeze your shoulder blade  Hold this position  Then slowly return to the starting position  · External rotation with arm abducted 90 degrees:  Wrap the exercise band around a heavy, stable object at waist level  Make loop in the end of the band to create a handle, if needed  Stand and hold the handle or loop  Bend your elbow and raise your arm to shoulder height  Keep your arm in this position  Raise your hand like you are pointing at the ceiling   Slowly return to the starting position  You may also be shown how to do this exercise lying down and with a weight  · Shoulder abduction with weight:  Stand and hold a weight in your hand with your palm facing your body  Slowly raise your arm to the side with your thumb pointing up  Then raise your arm as far as you can without pain  Hold this position  Then return to the starting position  · Shoulder abduction with exercise band:  Wrap the exercise band around a heavy, stable object near your foot  Grab the band  Keep your arm straight  Slowly raise your arm to the side with your thumb pointing up  Then, slowly pull the band as far as you can without pain  Slowly return to the starting position  __________________________________________________________________________    CHIEF COMPLAINT:  Follow-up right thumb injury, and complains of left shoulder pain    HPI:  Dimple Tellez is a 55 y o  female  who presents for       Visit 01/27/2022: Follow-up right thumb injury:  Uncontrolled  No improvement  Patient tells me has been approximately 5 months since her thumb pain began  This all began after picking up a case of soda  Last visit she was placed in a thumb spica brace  Pain comes and goes not related to movement  Burning shooting stabbing pain  No pain above the elbow  For patient's left shoulder she points to lateral shoulder as well as trapezius source of her pain  Denies any pain radiating below the elbow on the hand  She had surgery on her neck  No surgeon the shoulder  She strained her shoulder in 2020 holding a door open for someone and since then has had persistent pain  Patient was evaluated by orthopedic surgeon on 07/30/2020 for her left shoulder  At that time she is found to have left upper trapezius spasm as well as some cervical radiculopathy  Examination did illustrate some findings of impingement syndrome    She was recommended for physical therapy trial as well as referral to spine pain  Patient tells me she did follow-up with fine in pain and was given muscle relaxer  She denies ever having any injections  Last time seeing spine pain approximately 1 year ago  She has been able to follow-up with spine pain due to issues with her insurance  She does see Neurology routinely for migraine headaches  Review of Systems   Constitutional: Negative for chills and fever  Neurological: Negative for weakness and numbness           Following history reviewed and update:    Past Medical History:   Diagnosis Date    Anxiety     Asthma     Claustrophobia     Cluster headache     CTS (carpal tunnel syndrome)     Depression     Fibromyalgia     last assessed 11/27/17    Fibromyalgia, primary     GERD without esophagitis     last assessed 10/12/17    GI problem     Headache, tension-type     Joint pain     Lung nodule     last assessed 10/9/15    Migraine     Muscle pain     Peripheral neuropathy     Sleep apnea      Past Surgical History:   Procedure Laterality Date    BACK SURGERY      CARPAL TUNNEL RELEASE Bilateral     CHOLECYSTECTOMY      GALLBLADDER SURGERY      NECK SURGERY      SPINAL FUSION      C4 and C5    ULNAR NERVE REPAIR Bilateral     UPPER GASTROINTESTINAL ENDOSCOPY       Social History   Social History     Substance and Sexual Activity   Alcohol Use Yes    Alcohol/week: 2 0 standard drinks    Types: 1 Glasses of wine, 1 Cans of beer per week    Comment: occasional     Social History     Substance and Sexual Activity   Drug Use Yes    Types: Marijuana    Comment: medicinal     Social History     Tobacco Use   Smoking Status Never Smoker   Smokeless Tobacco Never Used     Family History   Problem Relation Age of Onset    Diabetes Mother     Fibromyalgia Mother     Ovarian cancer Mother 35    Hypertension Mother     Thyroid disease Mother    Mavis Cousin Migraines Mother     Neuropathy Mother     No Known Problems Father     Throat cancer Maternal Grandmother     No Known Problems Maternal Grandfather     No Known Problems Paternal Grandmother     No Known Problems Paternal Grandfather     No Known Problems Daughter     Breast cancer Maternal Aunt     No Known Problems Maternal Aunt     No Known Problems Maternal Aunt     No Known Problems Paternal Aunt     Colon cancer Cousin     Heart disease Cousin     Lupus Cousin     Arthritis Family     Heart disease Family         cardiac disorder    Neuropathy Family     Lupus Family         systemic erythematosus    No Known Problems Half-Sister     No Known Problems Half-Brother     No Known Problems Half-Brother     No Known Problems Half-Brother     Stroke Neg Hx      Allergies   Allergen Reactions    Hydrocodone-Acetaminophen GI Intolerance     gi upset -pt okay if takes  zofran    Codeine GI Intolerance    Oxycodone-Acetaminophen GI Intolerance    Penicillins GI Intolerance          Physical Exam  /91 (BP Location: Right arm, Patient Position: Sitting, Cuff Size: Adult)   Pulse 82   Ht 5' 2" (1 575 m)   Wt 119 kg (263 lb)   BMI 48 10 kg/m²     Constitutional:  see vital signs  Gen: well-developed, normocephalic/atraumatic, well-groomed  Eyes: No inflammation or discharge of conjunctiva or lids; sclera clear   Pharynx: no inflammation, lesion, or mass of lips  Neck: supple, no masses, non-distended  MSK: no inflammation, lesion, mass, or clubbing of nails and digits except for other than mentioned below  SKIN: no visible rashes or skin lesions  Pulmonary/Chest: Effort normal  No respiratory distress     NEURO: cranial nerves grossly intact  PSYCH:  Alert and oriented to person, place, and time; recent and remote memory intact; mood normal, no depression, anxiety, or agitation, judgment and insight good and intact     Ortho Exam  Cervical  ROM: intact  Midline spinous process tenderness: None  Muscular Tenderness: +  Sensation UE Bilateral:  C5: normal  C6: normal  C7: normal  C8: normal  T1: normal  Strength UE: 5/5 elbow, wrist, fingers bilateral  Reflexes:   Spurlings:          __________________________________________________________________________  Large joint arthrocentesis: L subacromial bursa  Universal Protocol:  Consent: Verbal consent obtained  Risks and benefits: risks, benefits and alternatives were discussed  Consent given by: patient  Time out: Immediately prior to procedure a "time out" was called to verify the correct patient, procedure, equipment, support staff and site/side marked as required    Patient understanding: patient states understanding of the procedure being performed  Site marked: the operative site was marked  Patient identity confirmed: verbally with patient    Supporting Documentation  Indications: pain and diagnostic evaluation   Procedure Details  Location: shoulder - L subacromial bursa  Preparation: Patient was prepped and draped in the usual sterile fashion  Needle size: 22 G  Ultrasound guidance: no  Approach: posterolateral  Medications administered: 4 mL lidocaine 1 %; 40 mg triamcinolone acetonide 40 mg/mL    Patient tolerance: patient tolerated the procedure well with no immediate complications  Dressing:  Sterile dressing applied

## 2022-01-27 NOTE — TELEPHONE ENCOUNTER
Botox number of units: 200  Botox quantity: 11  Arrived at what location: [facility]  Botox at Correct Administering Location: yes  NDC number: 9504-8635-91  Lot number: [id number]  Expiration Date: 10/2024  Appt notes indicate correct medication: yes    Botox received, documented and stored in the fridge  Botox receipt emailed to Botox Coordinators and scanned into pt's chart

## 2022-01-31 ENCOUNTER — PROCEDURE VISIT (OUTPATIENT)
Dept: NEUROLOGY | Facility: CLINIC | Age: 47
End: 2022-01-31
Payer: COMMERCIAL

## 2022-01-31 VITALS
HEART RATE: 71 BPM | HEIGHT: 62 IN | BODY MASS INDEX: 48.4 KG/M2 | SYSTOLIC BLOOD PRESSURE: 133 MMHG | TEMPERATURE: 97 F | WEIGHT: 263 LBS | DIASTOLIC BLOOD PRESSURE: 75 MMHG

## 2022-01-31 DIAGNOSIS — G43.709 CHRONIC MIGRAINE WITHOUT AURA WITHOUT STATUS MIGRAINOSUS, NOT INTRACTABLE: Primary | ICD-10-CM

## 2022-01-31 PROCEDURE — 64615 CHEMODENERV MUSC MIGRAINE: CPT | Performed by: PHYSICIAN ASSISTANT

## 2022-01-31 RX ORDER — DIVALPROEX SODIUM 250 MG/1
TABLET, DELAYED RELEASE ORAL
Qty: 16 TABLET | Refills: 0 | Status: SHIPPED | OUTPATIENT
Start: 2022-01-31

## 2022-01-31 RX ORDER — ZOLMITRIPTAN 5 MG/1
5 TABLET, ORALLY DISINTEGRATING ORAL ONCE AS NEEDED
Qty: 6 TABLET | Refills: 0 | Status: SHIPPED | OUTPATIENT
Start: 2022-01-31 | End: 2022-06-18 | Stop reason: SDUPTHER

## 2022-01-31 RX ORDER — BUTALBITAL, ACETAMINOPHEN AND CAFFEINE 50; 325; 40 MG/1; MG/1; MG/1
TABLET ORAL
Qty: 10 TABLET | Refills: 0 | Status: SHIPPED | OUTPATIENT
Start: 2022-01-31 | End: 2022-06-18 | Stop reason: SDUPTHER

## 2022-01-31 NOTE — PROGRESS NOTES
Universal Protocol   Consent: Verbal consent obtained  Written consent obtained    Risks and benefits: risks, benefits and alternatives were discussed  Consent given by: patient  Patient understanding: patient states understanding of the procedure being performed  Patient consent: the patient's understanding of the procedure matches consent given  Procedure consent: procedure consent matches procedure scheduled        Chemodenervation     Date/Time 1/31/2022 11:34 AM     Performed by  Khushi Dowell PA-C     Authorized by Khushi Dowell PA-C        Pre-procedure details      Prepped With: Alcohol     Procedure details     Position:  Upright   Botox     Botox Type:  Type A    Brand:  Botox    mL's of Botulinum Toxin:  185    Final Concentration per CC:  100 units    Needle Gauge:  30 G 2 5 inch   Procedures     Botox Procedures: chronic headache      Indications: migraines     Injection Location      Head / Face:  L superior trapezius, R superior trapezius, L superior cervical paraspinal, R superior cervical paraspinal, L , R , procerus, L temporalis, R temporalis, R frontalis, L frontalis, R medial occipitalis and L medial occipitalis    L  injection amount:  5 unit(s)    R  injection amount:  5 unit(s)    L lateral frontalis:  5 unit(s)    R lateral frontalis:  5 unit(s)    L medial frontalis:  5 unit(s)    R medial frontalis:  5 unit(s)    L temporalis injection amount:  20 unit(s)    R temporalis injection amount:  20 unit(s)    Procerus injection amount:  5 unit(s)    L medial occipitalis injection amount:  15 unit(s)    R medial occipitalis injection amount:  15 unit(s)    L superior cervical paraspinal injection amount:  10 unit(s)    R superior cervical paraspinal injection amount:  10 unit(s)    L superior trapezius injection amount:  15 unit(s)    R superior trapezius injection amount:  15 unit(s)   Total Units     Total units used:  185    Total units discarded: 15   Post-procedure details      Chemodenervation:  Chronic migraine    Facial Nerve Location[de-identified]  Bilateral facial nerve    Patient tolerance of procedure: Tolerated well, no immediate complications   Comments      Extra units medically necessary:  - 10 R occipitalis  - 10 L occipitalis  - 10 between R and L cervical paraspinals       Blood pressure 133/75, pulse 71, temperature (!) 97 °F (36 1 °C), temperature source Temporal, height 5' 2" (1 575 m), weight 119 kg (263 lb)  Continue botox + Aimovig, this combination working well  Will continue Botox for at least 3 consecutive injections, this is the second  If no significant reduction of migraine headaches after the third consecutive injection, will DC Botox  She will contact me in about 4-6 weeks after today and will try Depakote as a preventative if needed  Continue to use Fioricet or Zomig p r n  Migraine onset, reyvow last resort

## 2022-02-09 RX ORDER — LIDOCAINE HYDROCHLORIDE 10 MG/ML
4 INJECTION, SOLUTION INFILTRATION; PERINEURAL
Status: COMPLETED | OUTPATIENT
Start: 2022-01-27 | End: 2022-01-27

## 2022-02-09 RX ORDER — TRIAMCINOLONE ACETONIDE 40 MG/ML
40 INJECTION, SUSPENSION INTRA-ARTICULAR; INTRAMUSCULAR
Status: COMPLETED | OUTPATIENT
Start: 2022-01-27 | End: 2022-01-27

## 2022-02-22 ENCOUNTER — TELEPHONE (OUTPATIENT)
Dept: OBGYN CLINIC | Facility: OTHER | Age: 47
End: 2022-02-22

## 2022-02-28 NOTE — TELEPHONE ENCOUNTER
Patient has 200 Units of Botox in CV fridge  For some reason, this Botox was not used for her 1/31/22 BINJ apt with [address]  Please hold on ordering Botox for her 5/2 BINJ apt  Thank you!     Nini

## 2022-03-03 ENCOUNTER — OFFICE VISIT (OUTPATIENT)
Dept: OBGYN CLINIC | Facility: OTHER | Age: 47
End: 2022-03-03
Payer: COMMERCIAL

## 2022-03-03 VITALS
DIASTOLIC BLOOD PRESSURE: 78 MMHG | HEART RATE: 74 BPM | WEIGHT: 260 LBS | BODY MASS INDEX: 47.84 KG/M2 | HEIGHT: 62 IN | SYSTOLIC BLOOD PRESSURE: 121 MMHG

## 2022-03-03 DIAGNOSIS — S69.91XA INJURY OF RIGHT THUMB, INITIAL ENCOUNTER: Primary | ICD-10-CM

## 2022-03-03 DIAGNOSIS — S63.601A SPRAIN OF RIGHT THUMB, UNSPECIFIED SITE OF DIGIT, INITIAL ENCOUNTER: ICD-10-CM

## 2022-03-03 DIAGNOSIS — M19.041 OSTEOARTHRITIS OF METACARPOPHALANGEAL (MCP) JOINT OF RIGHT THUMB: ICD-10-CM

## 2022-03-03 PROCEDURE — 99214 OFFICE O/P EST MOD 30 MIN: CPT | Performed by: FAMILY MEDICINE

## 2022-03-03 NOTE — PROGRESS NOTES
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       No orders of the defined types were placed in this encounter  IMAGING STUDIES: (I personally reviewed images in PACS and report):         PAST REPORTS:  MRI right thumb 1/25/22:  1  Grade 1 UCL sprain  2  Moderate to severe 1st MCP chondrosis      ASSESSMENT/PLAN:  Right thumb UCL tear, 1st MCP arthritis  Left shoulder impingement  Date of initial injury:  5 months prior  Date of immobilization:  01/13/2022  Follow-up from immobilization: 7 weeks     Repeat X-ray next visit: None    Return for Follow-up for Ultrasound Guided Injection  Patient Instructions   Start physical therapy for UCL tear after she has completed 7 weeks of her brace  She may cease wearing brace today  Patient also has 1st MCP severe chondrosis and I recommend corticosteroid injection which perform next visit        __________________________________________________________________________    CHIEF COMPLAINT:  Right thumb injury and pain chronic    HPI:  Sirisha Charles is a 55 y o  female  who presents for       Visit 03/03/2022  Patient initially to for 6 months of thumb pain after injury event  At the time she was wearing brace intermittently  Did see me in January of 2020 boot I recommended bracing continuously for 6 weeks were UCL tear which was confirmed as grade 1 on MRI  Also was noted to have 1st MCP arthritis  75% improved  Overall she also does have significant improvement compared to previous            Review of Systems      Following history reviewed and update:    Past Medical History:   Diagnosis Date    Anxiety     Asthma     Claustrophobia     Cluster headache     CTS (carpal tunnel syndrome)     Depression     Fibromyalgia     last assessed 11/27/17    Fibromyalgia, primary     GERD without esophagitis last assessed 10/12/17    GI problem     Headache, tension-type     Joint pain     Lung nodule     last assessed 10/9/15    Migraine     Muscle pain     Peripheral neuropathy     Sleep apnea      Past Surgical History:   Procedure Laterality Date    BACK SURGERY      CARPAL TUNNEL RELEASE Bilateral     CHOLECYSTECTOMY      GALLBLADDER SURGERY      NECK SURGERY      SPINAL FUSION      C4 and C5    ULNAR NERVE REPAIR Bilateral     UPPER GASTROINTESTINAL ENDOSCOPY       Social History   Social History     Substance and Sexual Activity   Alcohol Use Yes    Alcohol/week: 2 0 standard drinks    Types: 1 Glasses of wine, 1 Cans of beer per week    Comment: occasional     Social History     Substance and Sexual Activity   Drug Use Yes    Types: Marijuana    Comment: medicinal     Social History     Tobacco Use   Smoking Status Never Smoker   Smokeless Tobacco Never Used     Family History   Problem Relation Age of Onset    Diabetes Mother     Fibromyalgia Mother     Ovarian cancer Mother 35    Hypertension Mother     Thyroid disease Mother     Migraines Mother     Neuropathy Mother     No Known Problems Father     Throat cancer Maternal Grandmother     No Known Problems Maternal Grandfather     No Known Problems Paternal Grandmother     No Known Problems Paternal Grandfather     No Known Problems Daughter     Breast cancer Maternal Aunt     No Known Problems Maternal Aunt     No Known Problems Maternal Aunt     No Known Problems Paternal Aunt     Colon cancer Cousin     Heart disease Cousin     Lupus Cousin     Arthritis Family     Heart disease Family         cardiac disorder    Neuropathy Family     Lupus Family         systemic erythematosus    No Known Problems Half-Sister     No Known Problems Half-Brother     No Known Problems Half-Brother     No Known Problems Half-Brother     Stroke Neg Hx      Allergies   Allergen Reactions    Hydrocodone-Acetaminophen GI Intolerance gi upset -pt okay if takes  zofran    Codeine GI Intolerance    Oxycodone-Acetaminophen GI Intolerance    Penicillins GI Intolerance          Physical Exam  /78 (BP Location: Right arm, Patient Position: Sitting, Cuff Size: Adult)   Pulse 74   Ht 5' 2" (1 575 m)   Wt 118 kg (260 lb)   BMI 47 55 kg/m²     Constitutional:  see vital signs  Gen: well-developed, normocephalic/atraumatic, well-groomed  Eyes: No inflammation or discharge of conjunctiva or lids; sclera clear   Pharynx: no inflammation, lesion, or mass of lips  Neck: supple, no masses, non-distended  MSK: no inflammation, lesion, mass, or clubbing of nails and digits except for other than mentioned below  SKIN: no visible rashes or skin lesions  Pulmonary/Chest: Effort normal  No respiratory distress     NEURO: cranial nerves grossly intact  PSYCH:  Alert and oriented to person, place, and time; recent and remote memory intact; mood normal, no depression, anxiety, or agitation, judgment and insight good and intact     Ortho Exam  RIGHT THUMB:  Erythema: no  Swelling: no  Increased Warmth: no  Tenderness: none  ROM: intact flexion, extension  Malrotation: none; thumb perpendicular to remainig phalanges on adduction  Varus stress: no laxity or pain  Valgus stress: no laxity or pain   Distal Phalanx Strength: 5/5 flexion, extension  Proximal Phalanx Strength: 5/5 flexion, extension  Thumb Abduction: 5/5  Thumb Adduction: 5/5  OK sign: normal  Froment Sign: negative  Finklestein's:  1st CMC Axial Load:  negative  __________________________________________________________________________  Procedures

## 2022-03-03 NOTE — PATIENT INSTRUCTIONS
Start physical therapy for UCL tear after she has completed 7 weeks of her brace  She may cease wearing brace today  Patient also has 1st MCP severe chondrosis and I recommend corticosteroid injection which perform next visit

## 2022-03-08 ENCOUNTER — EVALUATION (OUTPATIENT)
Dept: PHYSICAL THERAPY | Facility: REHABILITATION | Age: 47
End: 2022-03-08
Payer: COMMERCIAL

## 2022-03-08 DIAGNOSIS — S69.91XA INJURY OF RIGHT THUMB, INITIAL ENCOUNTER: ICD-10-CM

## 2022-03-08 DIAGNOSIS — M19.041 OSTEOARTHRITIS OF METACARPOPHALANGEAL (MCP) JOINT OF RIGHT THUMB: ICD-10-CM

## 2022-03-08 DIAGNOSIS — M79.644 THUMB PAIN, RIGHT: Primary | ICD-10-CM

## 2022-03-08 DIAGNOSIS — S63.601A SPRAIN OF RIGHT THUMB, UNSPECIFIED SITE OF DIGIT, INITIAL ENCOUNTER: ICD-10-CM

## 2022-03-08 PROCEDURE — 97110 THERAPEUTIC EXERCISES: CPT | Performed by: PHYSICAL THERAPIST

## 2022-03-08 PROCEDURE — 97161 PT EVAL LOW COMPLEX 20 MIN: CPT | Performed by: PHYSICAL THERAPIST

## 2022-03-08 NOTE — PROGRESS NOTES
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  M19 041    4  Sprain of right thumb, unspecified site of digit, initial encounter  S63 601A        Start Time: 0700  Stop Time: 745  Total time in clinic (min): 45 minutes    Assessment  Assessment details: Bhuimka Anderson is a 55 y o  female presenting s/p immobilzation secondary to R MCP UCL sprain    Primary impairments include decreased ROM, weakness, and pain  Will benefit from skilled PT interventions for improved IADLs and PLOF  Impairments: abnormal coordination, abnormal muscle firing, abnormal or restricted ROM, abnormal movement, activity intolerance, impaired balance, impaired physical strength, lacks appropriate home exercise program, pain with function, poor posture  and poor body mechanics  Functional limitations: gripping, carrying, pulling, IADLs  Symptom irritability: moderateUnderstanding of Dx/Px/POC: good   Prognosis: good    Goals    Short Term Goals: In 3 weeks, the patient will:  1  Full pain free wrist and thumb ROM  2  Wrist mmt to 5/5  3  Supervision with HEP for self care    Long Term Goals: In 6 weeks, the patient will:  1   on R to >40#s  2  FOTO to greater than predicted value  3  Independent with HEP for selfcare      Plan  Plan details: 1-2/wk for 6 weeks     Patient would benefit from: skilled physical therapy  Planned therapy interventions: joint mobilization, manual therapy, massage, Page taping, neuromuscular re-education, patient education, postural training, self care, strengthening, stretching, therapeutic activities, therapeutic exercise, therapeutic training, graded exercise, graded activity, home exercise program, flexibility, functional ROM exercises, balance and activity modification  Treatment plan discussed with: patient        Subjective  Pain Location: R thumb, hx of UCL sprain  Pain Intensity: at rest 0/10, 4/10   AMBREEN: lifting up large case of soda  DOI: 9/22  Provoked by: pressing, carrying, pulling, opening jars  Eased Positions: rest, splint   Constitutional S/S: denies   Dominant Hand: Right  Goals: return to PLOF    Objective    Strength and ROM evaluated B from a regional biomechanical perspective and values relevant to this episode recorded in table below    ROM: Goniometric measurement revealed the following findings  ROM Right   Wrist Flexion 40*       Thumb Opposition 75%     Strength: MMT revealed the following findings    Joint Motion Right Left   Wrist Flexion 4-*/5 5/5   Ext 5/5 5/5   RD 5/5 5/5   UD 5/5 5/5   Sup 5/5 5/5   Pron 5/5 5/5        Thumb Opposition 4-/5 5/5   Flexion 4-/5 5/5   Abduction 4-/5 5/5   Adduction 4-/5 5/5   1 dorsal interossei 4-/5 5/5    Dyno 30# 40#          Additional Assessments:  Palpation: TTP medial MCP  Joint mobility: WFL    Special Tests:   Thumb PIP Valgus: Positive       Precautions: none    Pertinent Findings:                                                                                                                          Test / Measure  3/8/2022   FOTO 53     30# R   Gross R hand/thumb mmt 4-/5       Manuals 3/8                   Neuro Re-Ed                                Ther Ex    Gripper Green x30   1st Dorsal interosei  opening Otb x20   Wrist 4 way banded 3x10 ea                       Ther Activity            Gait Training            Modalities

## 2022-03-15 ENCOUNTER — OFFICE VISIT (OUTPATIENT)
Dept: PHYSICAL THERAPY | Facility: REHABILITATION | Age: 47
End: 2022-03-15
Payer: COMMERCIAL

## 2022-03-15 DIAGNOSIS — M79.644 THUMB PAIN, RIGHT: Primary | ICD-10-CM

## 2022-03-15 DIAGNOSIS — M19.041 OSTEOARTHRITIS OF METACARPOPHALANGEAL (MCP) JOINT OF RIGHT THUMB: ICD-10-CM

## 2022-03-15 DIAGNOSIS — S63.601A SPRAIN OF RIGHT THUMB, UNSPECIFIED SITE OF DIGIT, INITIAL ENCOUNTER: ICD-10-CM

## 2022-03-15 DIAGNOSIS — S69.91XA INJURY OF RIGHT THUMB, INITIAL ENCOUNTER: ICD-10-CM

## 2022-03-15 PROCEDURE — 97110 THERAPEUTIC EXERCISES: CPT | Performed by: PHYSICAL THERAPIST

## 2022-03-15 NOTE — PROGRESS NOTES
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  M19 041    4  Sprain of right thumb, unspecified site of digit, initial encounter  S63 601A                   Subjective: Feeling like she in improving  Objective: See treatment diary below      Assessment: Tolerated treatment well  Patient would benefit from continued PT 1:1 with Llewellyn Rubinstein, DPT for 30mins of tx  Plan: Continue per plan of care        Precautions: none    Pertinent Findings:                                                                                                                          Test / Measure  3/8/2022   FOTO 53     30# R   Gross R hand/thumb mmt 4-/5       Manuals 3/8 3/15                       Neuro Re-Ed                                        Ther Ex     Gripper Green x30 G 2x30   1st Dorsal interosei  opening Otb x20 otb x30   Wrist 4 way banded 3x10 ea    therabar break  2x30 R   Wrist ext  5# db 3x10   Wrist flexion  5# db 3x10   UBE  3'3        Ther Activity     Pinch farmers carry  3x1' 4# cuff        Gait Training               Modalities

## 2022-03-22 ENCOUNTER — OFFICE VISIT (OUTPATIENT)
Dept: OBGYN CLINIC | Facility: OTHER | Age: 47
End: 2022-03-22
Payer: COMMERCIAL

## 2022-03-22 ENCOUNTER — OFFICE VISIT (OUTPATIENT)
Dept: PHYSICAL THERAPY | Facility: REHABILITATION | Age: 47
End: 2022-03-22
Payer: COMMERCIAL

## 2022-03-22 VITALS — WEIGHT: 257 LBS | BODY MASS INDEX: 47.01 KG/M2

## 2022-03-22 DIAGNOSIS — M19.041 OSTEOARTHRITIS OF METACARPOPHALANGEAL (MCP) JOINT OF RIGHT THUMB: Primary | ICD-10-CM

## 2022-03-22 DIAGNOSIS — S69.91XA INJURY OF RIGHT THUMB, INITIAL ENCOUNTER: ICD-10-CM

## 2022-03-22 DIAGNOSIS — S63.601A SPRAIN OF RIGHT THUMB, UNSPECIFIED SITE OF DIGIT, INITIAL ENCOUNTER: ICD-10-CM

## 2022-03-22 DIAGNOSIS — M79.644 THUMB PAIN, RIGHT: Primary | ICD-10-CM

## 2022-03-22 DIAGNOSIS — M19.041 OSTEOARTHRITIS OF METACARPOPHALANGEAL (MCP) JOINT OF RIGHT THUMB: ICD-10-CM

## 2022-03-22 PROCEDURE — 20604 DRAIN/INJ JOINT/BURSA W/US: CPT | Performed by: FAMILY MEDICINE

## 2022-03-22 PROCEDURE — 99214 OFFICE O/P EST MOD 30 MIN: CPT | Performed by: FAMILY MEDICINE

## 2022-03-22 PROCEDURE — 97110 THERAPEUTIC EXERCISES: CPT

## 2022-03-22 RX ORDER — LIDOCAINE HYDROCHLORIDE 10 MG/ML
1 INJECTION, SOLUTION INFILTRATION; PERINEURAL
Status: COMPLETED | OUTPATIENT
Start: 2022-03-22 | End: 2022-03-22

## 2022-03-22 RX ORDER — LIDOCAINE HYDROCHLORIDE 10 MG/ML
0.5 INJECTION, SOLUTION INFILTRATION; PERINEURAL
Status: COMPLETED | OUTPATIENT
Start: 2022-03-22 | End: 2022-03-22

## 2022-03-22 RX ORDER — TRIAMCINOLONE ACETONIDE 40 MG/ML
20 INJECTION, SUSPENSION INTRA-ARTICULAR; INTRAMUSCULAR
Status: COMPLETED | OUTPATIENT
Start: 2022-03-22 | End: 2022-03-22

## 2022-03-22 RX ADMIN — TRIAMCINOLONE ACETONIDE 20 MG: 40 INJECTION, SUSPENSION INTRA-ARTICULAR; INTRAMUSCULAR at 12:38

## 2022-03-22 RX ADMIN — LIDOCAINE HYDROCHLORIDE 0.5 ML: 10 INJECTION, SOLUTION INFILTRATION; PERINEURAL at 12:38

## 2022-03-22 RX ADMIN — LIDOCAINE HYDROCHLORIDE 1 ML: 10 INJECTION, SOLUTION INFILTRATION; PERINEURAL at 12:38

## 2022-03-22 NOTE — PROGRESS NOTES
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  Small joint arthrocentesis: R thumb MCP     Orders Placed This Encounter   Procedures    Small joint arthrocentesis: R thumb MCP        Imaging Studies (I personally reviewed images in PACS and report):  PAST REPORTS:  MRI right thumb 1/25/22:  1   Grade 1 UCL sprain  2   Moderate to severe 1st MCP chondrosis    IMPRESSION:  Right thumb UCL tear, 1st MCP arthritis  Ultrasound guided first MCP corticosteroid injection completed today  Left shoulder impingement  Date of initial injury:  5 months prior  Date of immobilization:  01/13/2022      Repeat X-ray next visit: None    Return in about 6 weeks (around 5/3/2022) for Recheck  Patient Instructions   Patient with Right thumb UCL tear, 1st MCP arthritis  Ultrasound guided first MCP corticosteroid injection completed today  Patient tolerated procedure well without complications  Pain improved after procedure  Encouraged to continue to follow up with physical therapy at this time  Will follow up in six weeks for reevaluation  She is agreeable with the plan  Questions and concerns addressed  red flags and risks of injection include but are not limited to infection <0 072% as referenced in some sources, nerve or artery penetration, and if steroids are used-skin dimpling <1%, hypo-pigmentation <1%  Recommended no submerging underwater in a tub, pool, ocean, lake, jacuzzi, hot tub, or any other body of water for 1 week until needle wound closes due to risk of infection  May take showers  Clean needle site with soap and water and keep covered at all times with sterile bandage such as a band-aid until fully healed  Educated if any symptoms including fevers, chills, swelling, or worsening symptoms occur then to call office or go to hospital for immediate care if physician unavailable due to possible infection or other complication which is a serious medical problem   Patient expressed understanding and agreed to proceed with procedure  CHIEF COMPLAINT:  Right thumb pain     HPI:  Earnest Gomez is a 55 y o  female  who presents for follow up right thumb pain  Patient last seen 3/3/2022 for UCL sprain and MCP osteoarthritis  Right wrist brace discontinued and patient referred to physical therapy  She has attended physical therapy twice with good relief of symptoms  Today she reports mild to moderate right thumb pain pointing to the MCP  Aggravated with movement and relieved with rest  Presents for right thumb MCP ultrasound guided corticosteroid injection  Review of Systems   Constitutional: Negative for chills and fever  HENT: Negative for ear pain and sore throat  Eyes: Negative for pain and visual disturbance  Respiratory: Negative for cough and shortness of breath  Cardiovascular: Negative for chest pain and palpitations  Gastrointestinal: Negative for abdominal pain and vomiting  Genitourinary: Negative for dysuria and hematuria  Musculoskeletal: Positive for arthralgias  Negative for back pain  Skin: Negative for color change and rash  Neurological: Negative for seizures and syncope  All other systems reviewed and are negative          Following history reviewed and update:    Past Medical History:   Diagnosis Date    Anxiety     Asthma     Claustrophobia     Cluster headache     CTS (carpal tunnel syndrome)     Depression     Fibromyalgia     last assessed 11/27/17    Fibromyalgia, primary     GERD without esophagitis     last assessed 10/12/17    GI problem     Headache, tension-type     Joint pain     Lung nodule     last assessed 10/9/15    Migraine     Muscle pain     Peripheral neuropathy     Sleep apnea      Past Surgical History:   Procedure Laterality Date    BACK SURGERY      CARPAL TUNNEL RELEASE Bilateral     CHOLECYSTECTOMY      GALLBLADDER SURGERY      NECK SURGERY      SPINAL FUSION      C4 and C5    ULNAR NERVE REPAIR Bilateral  UPPER GASTROINTESTINAL ENDOSCOPY       Social History   Social History     Substance and Sexual Activity   Alcohol Use Yes    Alcohol/week: 2 0 standard drinks    Types: 1 Glasses of wine, 1 Cans of beer per week    Comment: occasional     Social History     Substance and Sexual Activity   Drug Use Yes    Types: Marijuana    Comment: medicinal     Social History     Tobacco Use   Smoking Status Never Smoker   Smokeless Tobacco Never Used     Family History   Problem Relation Age of Onset    Diabetes Mother     Fibromyalgia Mother     Ovarian cancer Mother 35    Hypertension Mother     Thyroid disease Mother    [facility] Migraines Mother     Neuropathy Mother     No Known Problems Father     Throat cancer Maternal Grandmother     No Known Problems Maternal Grandfather     No Known Problems Paternal Grandmother     No Known Problems Paternal Grandfather     No Known Problems Daughter     Breast cancer Maternal Aunt     No Known Problems Maternal Aunt     No Known Problems Maternal Aunt     No Known Problems Paternal Aunt     Colon cancer Cousin     Heart disease Cousin     Lupus Cousin     Arthritis Family     Heart disease Family         cardiac disorder    Neuropathy Family     Lupus Family         systemic erythematosus    No Known Problems Half-Sister     No Known Problems Half-Brother     No Known Problems Half-Brother     No Known Problems Half-Brother     Stroke Neg Hx      Allergies   Allergen Reactions    Hydrocodone-Acetaminophen GI Intolerance     gi upset -pt okay if takes  zofran    Codeine GI Intolerance    Oxycodone-Acetaminophen GI Intolerance    Penicillins GI Intolerance          Physical Exam  Wt 117 kg (257 lb)   BMI 47 01 kg/m²     Constitutional:  see vital signs  Gen: well-developed, normocephalic/atraumatic, well-groomed  Eyes: No inflammation or discharge of conjunctiva or lids; sclera clear   Pharynx: no inflammation, lesion, or mass of lips  Neck: supple, no masses, non-distended  MSK: no inflammation, lesion, mass, or clubbing of nails and digits except for other than mentioned below  SKIN: no visible rashes or skin lesions  Pulmonary/Chest: Effort normal  No respiratory distress  NEURO: cranial nerves grossly intact  PSYCH:  Alert and oriented to person, place, and time; recent and remote memory intact; mood normal, no depression, anxiety, or agitation, judgment and insight good and intact     Ortho Exam  Right thumb:   Erythema: no  Swelling: no  Increased Warmth: no  Tenderness: MCP thumb reproduces patients chief complaint of pain   ROM: intact flexion, extension  Malrotation: none; thumb perpendicular to remainig phalanges on adduction  Varus stress: no laxity or pain  Valgus stress: no laxity or pain   Distal Phalanx Strength: 5/5 flexion, extension  Proximal Phalanx Strength: 5/5 flexion, extension  Thumb Abduction: 5/5  Thumb Adduction: 5/5  OK sign: normal  Froment Sign: negative  Finklestein's:  1st CMC Axial Load:  negative    Small joint arthrocentesis: R thumb MCP  Universal Protocol:  Consent: Verbal consent obtained  Risks and benefits: risks, benefits and alternatives were discussed  Consent given by: patient  Time out: Immediately prior to procedure a "time out" was called to verify the correct patient, procedure, equipment, support staff and site/side marked as required  Patient understanding: patient states understanding of the procedure being performed  Patient consent: the patient's understanding of the procedure matches consent given  Site marked: the operative site was marked  Radiology Images displayed and confirmed   If images not available, report reviewed: imaging studies available  Patient identity confirmed: verbally with patient    Supporting Documentation  Indications: pain   Procedure Details  Location: thumb - R thumb MCP  Needle size: 22 G  Ultrasound guidance: yes  Medications administered: 0 5 mL lidocaine 1 %; 1 mL lidocaine 1 %; 20 mg triamcinolone acetonide 40 mg/mL    Patient tolerance: patient tolerated the procedure well with no immediate complications  Dressing:  Sterile dressing applied

## 2022-03-22 NOTE — PATIENT INSTRUCTIONS
Patient with Right thumb UCL tear, 1st MCP arthritis  Ultrasound guided first MCP corticosteroid injection completed today  Patient tolerated procedure well without complications  Pain improved after procedure  Encouraged to continue to follow up with physical therapy at this time  Will follow up in six weeks for reevaluation  She is agreeable with the plan  Questions and concerns addressed  red flags and risks of injection include but are not limited to infection <0 072% as referenced in some sources, nerve or artery penetration, and if steroids are used-skin dimpling <1%, hypo-pigmentation <1%  Recommended no submerging underwater in a tub, pool, ocean, lake, jacuzzi, hot tub, or any other body of water for 1 week until needle wound closes due to risk of infection  May take showers  Clean needle site with soap and water and keep covered at all times with sterile bandage such as a band-aid until fully healed  Educated if any symptoms including fevers, chills, swelling, or worsening symptoms occur then to call office or go to hospital for immediate care if physician unavailable due to possible infection or other complication which is a serious medical problem  Patient expressed understanding and agreed to proceed with procedure

## 2022-03-22 NOTE — PROGRESS NOTES
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  M19 041    4  Sprain of right thumb, unspecified site of digit, initial encounter  S63 601A                   Subjective: Pt reports that she received an injection this morning in her R thumb  Notes that she is having moderate pain 5/10 upon presentation, mostly because of the injection  Objective: See treatment diary below      Assessment: Tolerated treatment well  Patient would benefit from continued PT   Continued with current POC this session performing all exercises to tolerance  She was most challenged with the farmers carries but able to complete  1:1 with [facility], PTA, for entire session  Plan: Continue per plan of care        Precautions: none    Pertinent Findings:                                                                                                                          Test / Measure  3/8/2022   FOTO 53     30# R   Gross R hand/thumb mmt 4-/5       Manuals 3/8 3/15 3/22                           Neuro Re-Ed                                                Ther Ex      Gripper Green x30 G 2x30 G 2x30   1st Dorsal interosei  opening Otb x20 otb x30 otb x30   Wrist 4 way banded 3x10 ea     therabar break  2x30 R 2x30 R   Wrist ext  5# db 3x10 5# db 3x10   Wrist flexion  5# db 3x10 5# db 3x10   UBE  3'3 3'/3'         Ther Activity      Pinch farmers carry  3x1' 4# cuff 3x1' 4# cuff         Gait Training                  Modalities

## 2022-03-29 ENCOUNTER — APPOINTMENT (OUTPATIENT)
Dept: PHYSICAL THERAPY | Facility: REHABILITATION | Age: 47
End: 2022-03-29
Payer: COMMERCIAL

## 2022-03-30 ENCOUNTER — APPOINTMENT (OUTPATIENT)
Dept: PHYSICAL THERAPY | Facility: REHABILITATION | Age: 47
End: 2022-03-30
Payer: COMMERCIAL

## 2022-04-05 ENCOUNTER — OFFICE VISIT (OUTPATIENT)
Dept: PHYSICAL THERAPY | Facility: REHABILITATION | Age: 47
End: 2022-04-05
Payer: COMMERCIAL

## 2022-04-05 DIAGNOSIS — S63.601A SPRAIN OF RIGHT THUMB, UNSPECIFIED SITE OF DIGIT, INITIAL ENCOUNTER: ICD-10-CM

## 2022-04-05 DIAGNOSIS — S69.91XA INJURY OF RIGHT THUMB, INITIAL ENCOUNTER: Primary | ICD-10-CM

## 2022-04-05 DIAGNOSIS — M19.041 OSTEOARTHRITIS OF METACARPOPHALANGEAL (MCP) JOINT OF RIGHT THUMB: ICD-10-CM

## 2022-04-05 DIAGNOSIS — M79.644 THUMB PAIN, RIGHT: ICD-10-CM

## 2022-04-05 PROCEDURE — 97110 THERAPEUTIC EXERCISES: CPT | Performed by: PHYSICAL THERAPIST

## 2022-04-05 NOTE — PROGRESS NOTES
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  M19 041    4  Sprain of right thumb, unspecified site of digit, initial encounter  S63 601A        Start Time: 1300  Stop Time: 1330  Total time in clinic (min): 30 minutes    Subjective: Pt feels ready for d/c       Objective: See treatment diary below      Assessment: Tolerated treatment well  Patient would benefit from continued PT Pt doing very well with HEP  Pain well controlled  All goals met, d/c to HEP         Plan: D/C to HEP     Precautions: none    Pertinent Findings:                                                                                                                          Test / Measure  3/8/2022 4/5   FOTO 53 69     30# R 40# R   Gross R hand/thumb mmt 4-/ 5/5       Manuals 3/8 3/15 3/22 4/5                               Neuro Re-Ed                                                        Ther Ex       Gripper Green x30 G 2x30 G 2x30 2x30 btk   1st Dorsal interosei  opening Otb x20 otb x30 otb x30 x30 gtb   Wrist 4 way banded 3x10 ea      therabar break  2x30 R 2x30 R 2x30 R   Wrist ext  5# db 3x10 5# db 3x10 5# db 3x10   Wrist flexion  5# db 3x10 5# db 3x10 5# db 3x10   UBE  3'3 3'/3' 3'/3'          Ther Activity       Pinch farmers carry  3x1' 4# cuff 3x1' 4# cuff           Gait Training                     Modalities

## 2022-04-21 ENCOUNTER — TELEPHONE (OUTPATIENT)
Dept: NEUROLOGY | Facility: CLINIC | Age: 47
End: 2022-04-21

## 2022-04-21 NOTE — TELEPHONE ENCOUNTER
Pt's Botox was received at , referral still states she is stock  What [facility] did this pt receive Botox from? Expiration Date (Optional): 07-24

## 2022-05-02 ENCOUNTER — PROCEDURE VISIT (OUTPATIENT)
Dept: NEUROLOGY | Facility: CLINIC | Age: 47
End: 2022-05-02
Payer: COMMERCIAL

## 2022-05-02 VITALS
WEIGHT: 257 LBS | HEART RATE: 80 BPM | BODY MASS INDEX: 47.29 KG/M2 | TEMPERATURE: 97.7 F | DIASTOLIC BLOOD PRESSURE: 67 MMHG | HEIGHT: 62 IN | SYSTOLIC BLOOD PRESSURE: 132 MMHG

## 2022-05-02 DIAGNOSIS — G43.709 CHRONIC MIGRAINE WITHOUT AURA WITHOUT STATUS MIGRAINOSUS, NOT INTRACTABLE: Primary | ICD-10-CM

## 2022-05-02 DIAGNOSIS — G47.09 OTHER INSOMNIA: ICD-10-CM

## 2022-05-02 PROCEDURE — 64615 CHEMODENERV MUSC MIGRAINE: CPT | Performed by: PHYSICIAN ASSISTANT

## 2022-05-02 RX ORDER — CHOLECALCIFEROL (VITAMIN D3) 25 MCG
TABLET ORAL
Qty: 60 TABLET | Refills: 2 | Status: SHIPPED | OUTPATIENT
Start: 2022-05-02 | End: 2022-08-01 | Stop reason: SDUPTHER

## 2022-05-02 NOTE — PROGRESS NOTES
Universal Protocol   Consent: Verbal consent obtained  Written consent obtained    Risks and benefits: risks, benefits and alternatives were discussed  Consent given by: patient  Patient understanding: patient states understanding of the procedure being performed  Patient consent: the patient's understanding of the procedure matches consent given  Procedure consent: procedure consent matches procedure scheduled        Chemodenervation     Date/Time 5/2/2022 10:41 AM     Performed by  Lennox Gold, PA-C     Authorized by Lennox Gold, PA-C        Pre-procedure details      Prepped With: Alcohol     Procedure details     Position:  Upright   Botox     Botox Type:  Type A    Brand:  Botox    mL's of Botulinum Toxin:  185    Final Concentration per CC:  100 units    Needle Gauge:  30 G 2 5 inch   Procedures     Botox Procedures: chronic headache      Indications: migraines     Injection Location      Head / Face:  L superior trapezius, R superior trapezius, L superior cervical paraspinal, R superior cervical paraspinal, L , R , procerus, L temporalis, R temporalis, R frontalis, L frontalis, R medial occipitalis and L medial occipitalis    L  injection amount:  5 unit(s)    R  injection amount:  5 unit(s)    L lateral frontalis:  5 unit(s)    R lateral frontalis:  5 unit(s)    L medial frontalis:  5 unit(s)    R medial frontalis:  5 unit(s)    L temporalis injection amount:  20 unit(s)    R temporalis injection amount:  20 unit(s)    Procerus injection amount:  5 unit(s)    L medial occipitalis injection amount:  15 unit(s)    R medial occipitalis injection amount:  15 unit(s)    L superior cervical paraspinal injection amount:  10 unit(s)    R superior cervical paraspinal injection amount:  10 unit(s)    L superior trapezius injection amount:  15 unit(s)    R superior trapezius injection amount:  15 unit(s)   Total Units     Total units used:  185    Total units discarded: 15   Post-procedure details      Chemodenervation:  Chronic migraine    Facial Nerve Location[de-identified]  Bilateral facial nerve    Patient tolerance of procedure: Tolerated well, no immediate complications   Comments      Extra units medically necessary:  - 10 R occipitalis  - 10 L occipitalis  - 10 between R and L cervical paraspinals       Blood pressure 132/67, pulse 80, temperature 97 7 °F (36 5 °C), temperature source Temporal, height 5' 2" (1 575 m), weight 117 kg (257 lb)  The patient is waking up with more headaches frequently, in the middle of the forehead  States she does not sleep well  Prior sleep study with no evidence of apnea  Agreeable to re-trying melatonin, and I prescribed the extended release melatonin to her pharmacy  Start a low dose and titrate slowly as tolerated  She will also continue Aimovig injection Q 30 days along with the Botox Q 90 days  She will let me know after this third injection today if she would like to continue with the Botox  Referral to [facility] headache center again today; in the past the evaluation was too costly  She will call again as now she has new insurance  Continues working with weight management, was on phentermine with some tremors on the higher dose

## 2022-06-02 ENCOUNTER — TELEMEDICINE (OUTPATIENT)
Dept: INTERNAL MEDICINE CLINIC | Facility: CLINIC | Age: 47
End: 2022-06-02

## 2022-06-02 DIAGNOSIS — J45.20 MILD INTERMITTENT ASTHMA, UNSPECIFIED WHETHER COMPLICATED: ICD-10-CM

## 2022-06-02 DIAGNOSIS — F41.8 DEPRESSION WITH ANXIETY: ICD-10-CM

## 2022-06-02 DIAGNOSIS — E66.01 OBESITY, MORBID, BMI 40.0-49.9 (HCC): ICD-10-CM

## 2022-06-02 DIAGNOSIS — U07.1 COVID-19: Primary | ICD-10-CM

## 2022-06-02 PROCEDURE — G2012 BRIEF CHECK IN BY MD/QHP: HCPCS | Performed by: INTERNAL MEDICINE

## 2022-06-02 RX ORDER — BENZONATATE 100 MG/1
100 CAPSULE ORAL 3 TIMES DAILY PRN
Qty: 20 CAPSULE | Refills: 0 | Status: SHIPPED | OUTPATIENT
Start: 2022-06-02

## 2022-06-02 RX ORDER — BEBTELOVIMAB 87.5 MG/ML
175 INJECTION, SOLUTION INTRAVENOUS ONCE
Status: CANCELLED | OUTPATIENT
Start: 2022-06-03

## 2022-06-02 RX ORDER — ACETAMINOPHEN 325 MG/1
650 TABLET ORAL ONCE AS NEEDED
Status: CANCELLED | OUTPATIENT
Start: 2022-06-03

## 2022-06-02 RX ORDER — ONDANSETRON 2 MG/ML
4 INJECTION INTRAMUSCULAR; INTRAVENOUS ONCE AS NEEDED
Status: CANCELLED | OUTPATIENT
Start: 2022-06-03

## 2022-06-02 RX ORDER — SODIUM CHLORIDE 9 MG/ML
20 INJECTION, SOLUTION INTRAVENOUS ONCE
Status: CANCELLED | OUTPATIENT
Start: 2022-06-03

## 2022-06-02 RX ORDER — ALBUTEROL SULFATE 90 UG/1
3 AEROSOL, METERED RESPIRATORY (INHALATION) ONCE AS NEEDED
Status: CANCELLED | OUTPATIENT
Start: 2022-06-03

## 2022-06-02 NOTE — PROGRESS NOTES
COVID-19 Outpatient Progress Note    Assessment/Plan:    Problem List Items Addressed This Visit        Respiratory    Mild intermittent asthma       Other    Obesity, morbid, BMI 40 0-49 9 ([address] )    Depression with anxiety    COVID-19 - Primary     At home positive test 06/01/2022           Relevant Medications    benzonatate (TESSALON PERLES) 100 mg capsule         Disposition:     I recommended COVID-19 PCR testing on or after day 5 since exposure and if negative can end quarantine after 7 days  Patient was instructed to watch for symptoms until 14 days after exposure  If patient were to develop symptoms, they should immediately self isolate and call our office for further guidance  I recommended COVID-19 PCR test 5 days after close contact exposure  Plenty of fluids, Tylenol, rest, Mucinex, Robitussin for supportive care  Advised patient if very short of breath or febrile over 101 to go to emergency department which patient understands and is agreeable to  Be sure to continue masking over nose and mouth while in-house  Continue to Sanitize surfaces and be sure not to share personal items  Will order Tessalon Perles and monoclonal antibody infusion treatment    Patient meets criteria for PAXLOVID and they have been counseled appropriately according to EUA documentation released by the FDA  After discussion, patient agrees to treatment  [address] is an investigational medicine used to treat mild-to-moderate COVID-19 in adults and children (15years of age and older weighing at least 80 pounds (40 kg)) with positive results of direct SARS-CoV-2 viral testing, and who are at high risk for progression to severe COVID-19, including hospitalization or death  PAXLOVID is investigational because it is still being studied  There is limited information about the safety and effectiveness of using PAXLOVID to treat people with mild-to-moderate COVID-19      The FDA has authorized the emergency use of PAXLOVID for the treatment of mild-tomoderate COVID-19 in adults and children (15years of age and older weighing at least 80 pounds (40 kg)) with a positive test for the virus that causes COVID-19, and who are at high risk for progression to severe COVID-19, including hospitalization or death, under an EUA  What should I tell my healthcare provider before I take PAXLOVID? Tell your healthcare provider if you:  - Have any allergies  - Have liver or kidney disease  - Are pregnant or plan to become pregnant  - Are breastfeeding a child  - Have any serious illnesses    Tell your healthcare provider about all the medicines you take, including prescription and over-the-counter medicines, vitamins, and herbal supplements  Some medicines may interact with PAXLOVID and may cause serious side effects  Keep a list of your medicines to show your healthcare provider and pharmacist when you get a new medicine  You can ask your healthcare provider or pharmacist for a list of medicines that interact with PAXLOVID  Do not start taking a new medicine without telling your healthcare provider  Your healthcare provider can tell you if it is safe to take PAXLOVID with other medicines  Tell your healthcare provider if you are taking combined hormonal contraceptive  PAXLOVID may affect how your birth control pills work  Females who are able to become pregnant should use another effective alternative form of contraception or an additional barrier method of contraception  Talk to your healthcare provider if you have any questions about contraceptive methods that might be right for you  How do I take PAXLOVID? PAXLOVID consists of 2 medicines: nirmatrelvir and ritonavir  - Take 2 pink tablets of nirmatrelvir with 1 white tablet of ritonavir by mouth 2 times each day (in the morning and in the evening) for 5 days  For each dose, take all 3 tablets at the same time  - If you have kidney disease, talk to your healthcare provider   You may need a different dose  - Swallow the tablets whole  Do not chew, break, or crush the tablets  - Take PAXLOVID with or without food  - Do not stop taking PAXLOVID without talking to your healthcare provider, even if you feel better  - If you miss a dose of PAXLOVID within 8 hours of the time it is usually taken, take it as soon as you remember  If you miss a dose by more than 8 hours, skip the missed dose and take the next dose at your regular time  Do not take 2 doses of PAXLOVID at the same time  - If you take too much PAXLOVID, call your healthcare provider or go to the nearest hospital emergency room right away  - If you are taking a ritonavir- or cobicistat-containing medicine to treat hepatitis C or Human Immunodeficiency Virus (HIV), you should continue to take your medicine as prescribed by your healthcare provider   - Talk to your healthcare provider if you do not feel better or if you feel worse after 5 days  Who should generally not take PAXLOVID? Do not take PAXLOVID if:  You are allergic to nirmatrelvir, ritonavir, or any of the ingredients in PAXLOVID  You are taking any of the following medicines:  - Alfuzosin  - Pethidine, piroxicam, propoxyphene  - Ranolazine  - Amiodarone, dronedarone, flecainide, propafenone, quinidine  - Colchicine  - Lurasidone, pimozide, clozapine  - Dihydroergotamine, ergotamine, methylergonovine  - Lovastatin, simvastatin  - Sildenafil (Revatio®) for pulmonary arterial hypertension (PAH)  - Triazolam, oral midazolam  - Apalutamide  - Carbamazepine, phenobarbital, phenytoin  - Rifampin  - St  Angel Luiss Wort (hypericum perforatum)    What are the important possible side effects of PAXLOVID? Possible side effects of PAXLOVID are:  - Liver Problems   Tell your healthcare provider right away if you have any of these signs and symptoms of liver problems: loss of appetite, yellowing of your skin and the whites of eyes (jaundice), dark-colored urine, pale colored stools and itchy skin, stomach area (abdominal) pain  - Resistance to HIV Medicines  If you have untreated HIV infection, PAXLOVID may lead to some HIV medicines not working as well in the future  - Other possible side effects include: altered sense of taste, diarrhea, high blood pressure, or muscle aches    These are not all the possible side effects of PAXLOVID  Not many people have taken PAXLOVID  Serious and unexpected side effects may happen  Se Burrell is still being studied, so it is possible that all of the risks are not known at this time  What other treatment choices are there? Like Jose Greene may allow for the emergency use of other medicines to treat people with COVID-19  Go to https://1010data/ for information on the emergency use of other medicines that are authorized by FDA to treat people with COVID-19  Your healthcare provider may talk with you about clinical trials for which you may be eligible  It is your choice to be treated or not to be treated with PAXLOVID  Should you decide not to receive it or for your child not to receive it, it will not change your standard medical care  What if I am pregnant or breastfeeding? There is no experience treating pregnant women or breastfeeding mothers with PAXLOVID  For a mother and unborn baby, the benefit of taking PAXLOVID may be greater than the risk from the treatment  If you are pregnant, discuss your options and specific situation with your healthcare provider  It is recommended that you use effective barrier contraception or do not have sexual activity while taking PAXLOVID  If you are breastfeeding, discuss your options and specific situation with your healthcare provider  How do I report side effects with PAXLOVID? Contact your healthcare provider if you have any side effects that bother you or do not go away      Report side effects to FDA MedWatch at www fda gov/medwatch or call 3-776-PSZ9326 or you can report side effects to [facility]  at the contact information provided below  Website Fax number Telephone number   [employer] [phone]     How should I store [address]? Store PAXLOVID tablets at room temperature between 68°F to 77°F (20°C to 25°C)  Full fact sheet for patients, parents, and caregivers can be found at: http://Availigent/    I have spent 20 minutes directly with the patient  Greater than 50% of this time was spent in counseling/coordination of care regarding: prognosis, risks and benefits of treatment options, instructions for management, patient and family education, importance of treatment compliance, risk factor reductions and impressions  Encounter provider Eloy Jeffries DO    Provider located at [facility] [address]  [address]  [facility] [address] [phone]    Recent Visits  No visits were found meeting these conditions  Showing recent visits within past 7 days and meeting all other requirements  Today's Visits  Date Type Provider Dept   06/02/22 Telemedicine Eloy Jeffries, [address] [facility] today's visits and meeting all other requirements  Future Appointments  No visits were found meeting these conditions  Showing future appointments within next 150 days and meeting all other requirements     This virtual check-in was done via telephone and she agrees to proceed  Patient agrees to participate in a virtual check in via telephone or video visit instead of presenting to the office to address urgent/immediate medical needs  Patient is aware this is a billable service  After connecting through Telephone, the patient was identified by name and date of birth   Bubba Mike was informed that this was a telemedicine visit and that the exam was being conducted confidentially over secure lines  My office door was closed  No one else was in the room  Wing Romero acknowledged consent and understanding of privacy and security of the telemedicine visit  I informed the patient that I have reviewed her record in Epic and presented the opportunity for her to ask any questions regarding the visit today  The patient agreed to participate  It was my intent to perform this visit via video technology but the patient was not able to do a video connection so the visit was completed via audio telephone only  Verification of patient location:  Patient is located in the following state in which I hold an active license: [place]    Subjective:   Wing Romero is a 55 y o  female who is concerned about COVID-19  Patient's symptoms include chills, fatigue, malaise, sore throat, cough, shortness of breath and myalgias   Patient denies fever      - Date of symptom onset: 5/31/2022      COVID-19 vaccination status: Fully vaccinated with booster    Exposure:   Contact with a person who is under investigation (PUI) for or who is positive for COVID-19 within the last 14 days?: Yes    Hospitalized recently for fever and/or lower respiratory symptoms?: No      Currently a healthcare worker that is involved in direct patient care?: No      Works in a special setting where the risk of COVID-19 transmission may be high? (this may include long-term care, correctional and FDC facilities; homeless shelters; assisted-living facilities and group homes ): No      Resident in a special setting where the risk of COVID-19 transmission may be high? (this may include long-term care, correctional and FDC facilities; homeless shelters; assisted-living facilities and group homes ): No      Lab Results   Component Value Date    [address] [facility] Not Detected 01/04/2022     Past Medical History:   Diagnosis Date    Anxiety     Asthma     Claustrophobia     Cluster headache     CTS (carpal tunnel syndrome)     Depression     Fibromyalgia     last assessed 11/27/17    Fibromyalgia, primary     GERD without esophagitis     last assessed 10/12/17    GI problem     Headache, tension-type     Joint pain     Lung nodule     last assessed 10/9/15    Migraine     Muscle pain     Peripheral neuropathy     Sleep apnea      Past Surgical History:   Procedure Laterality Date    BACK SURGERY      CARPAL TUNNEL RELEASE Bilateral     CHOLECYSTECTOMY      GALLBLADDER SURGERY      NECK SURGERY      SPINAL FUSION      C4 and C5    ULNAR NERVE REPAIR Bilateral     UPPER GASTROINTESTINAL ENDOSCOPY       Current Outpatient Medications   Medication Sig Dispense Refill    albuterol (PROVENTIL HFA,VENTOLIN HFA) 90 mcg/act inhaler Inhale 2 puffs every 6 (six) hours as needed for wheezing or shortness of breath 1 Inhaler 5    benzonatate (TESSALON PERLES) 100 mg capsule Take 1 capsule (100 mg total) by mouth 3 (three) times a day as needed for cough 20 capsule 0    Botox 200 units SOLR       buPROPion (Wellbutrin XL) 150 mg 24 hr tablet Take 1 tablet (150 mg total) by mouth every morning 90 tablet 3    butalbital-acetaminophen-caffeine (FIORICET,ESGIC) -40 mg per tablet 1 tab q6 hours prn migraine  No more than 2-3 per week  10 tablet 0    cholecalciferol (VITAMIN D3) 1,000 units tablet Take 1 capsule by mouth once a week 3000 units daily       cholestyramine (QUESTRAN) 4 GM/DOSE powder Take 1 packet (4 g total) by mouth 2 (two) times a day with meals 378 g 2    dicyclomine (BENTYL) 20 mg tablet Take 1 tablet (20 mg total) by mouth 3 (three) times a day as needed (migraine/cramps) 60 tablet 2    divalproex sodium (DEPAKOTE) 250 mg EC tablet 2 tabs q12 hours x 2 days, then stop  Repeat if needed   16 tablet 0    Erenumab-aooe (Aimovig) 140 MG/ML SOAJ One subcutaneous injection Q 30 days 1 mL 11    fluticasone (FLONASE) 50 mcg/act nasal spray 2 sprays into each nostril daily as needed       ibuprofen (MOTRIN) 400 mg tablet 1 tab q8 hours prn migraine  Max 2 per day and 4 per week  Hold toradol  20 tablet 0    ketorolac (TORADOL) 10 mg tablet 1 tab q8 hours prn migraine onset  Max 3 per week  9 tablet 2    Lasmiditan Succinate (Reyvow) 50 MG tablet One tab at migraine onset  Caution sedation  Max 1 tab/24 hours  4 tablet 2    levonorgestrel (MIRENA) 20 MCG/24HR IUD 1 each by Intrauterine route once      meclizine (ANTIVERT) 12 5 MG tablet Take 1 tablet (12 5 mg total) by mouth 3 (three) times a day as needed for dizziness or nausea 30 tablet 0    Melatonin ER 3 MG TBCR 1-2 tabs qhs 60 tablet 2    metoclopramide (REGLAN) 10 mg tablet 1 tab q6 hours prn migraine (with toradol and benadryl) 20 tablet 0    NON FORMULARY Medical marijuana        omeprazole (PriLOSEC) 40 MG capsule Take 1 capsule (40 mg total) by mouth 2 (two) times a day 60 capsule 3    ondansetron (ZOFRAN) 4 mg tablet Take 1 tablet by mouth every 8 (eight) hours as needed      polyethylene glycol (GLYCOLAX) 17 GM/SCOOP powder Take 17 g by mouth daily as needed (constipation) 289 g 0    tiZANidine (ZANAFLEX) 2 mg tablet Take 1 tablet (2 mg total) by mouth every 8 (eight) hours as needed for muscle spasms 90 tablet 1    traMADol (ULTRAM) 50 mg tablet take one tablet at onset of moderate to severe headach, can repeat once in 8 hours  MAX 2 DAYS PER WEEK 10 tablet 0    ZOLMitriptan (ZOMIG-ZMT) 5 MG disintegrating tablet Take 1 tablet (5 mg total) by mouth once as needed for migraine for up to 1 dose 6 tablet 0    Diclofenac Sodium (VOLTAREN) 1 % Apply small amount to shoulder or neck prn (Patient not taking: Reported on 6/2/2022) 30 g 0     No current facility-administered medications for this visit       Allergies   Allergen Reactions    Hydrocodone-Acetaminophen GI Intolerance     gi upset -pt okay if takes  zofran    Codeine GI Intolerance    Oxycodone-Acetaminophen GI Intolerance    Penicillins GI Intolerance       Review of Systems   Constitutional: Positive for chills and fatigue  Negative for fever  HENT: Positive for sore throat  Respiratory: Positive for cough and shortness of breath  Musculoskeletal: Positive for myalgias  Objective:    Vitals:       Physical Exam  HENT:      Nose: Congestion present  Pulmonary:      Effort: Pulmonary effort is normal    Neurological:      Mental Status: She is alert  Psychiatric:         Mood and Affect: Mood normal          Behavior: Behavior normal          VIRTUAL VISIT [address] verbally agrees to participate in [employer]  Pt is aware that [employer] could be limited without vital signs or the ability to perform a full hands-on physical Gerry Bath understands she or the provider may request at any time to terminate the video visit and request the patient to seek care or treatment in person

## 2022-06-03 ENCOUNTER — TELEPHONE (OUTPATIENT)
Dept: INTERNAL MEDICINE CLINIC | Facility: CLINIC | Age: 47
End: 2022-06-03

## 2022-06-03 NOTE — TELEPHONE ENCOUNTER
Patient was seen virtually by Dr Roselia Tobias 6/2/22 who ordered a Covid infusion   Please schedule this appointment and call the appointment to advise

## 2022-06-03 NOTE — TELEPHONE ENCOUNTER
Patient seen virtually 6/2/22 by Dr Chevy Henriquez patient to [facility] [phone] to schedule the Covid infusion      Patient was thankful

## 2022-06-15 DIAGNOSIS — E66.01 OBESITY, MORBID, BMI 40.0-49.9 (HCC): ICD-10-CM

## 2022-06-15 DIAGNOSIS — J45.20 MILD INTERMITTENT ASTHMA, UNSPECIFIED WHETHER COMPLICATED: Primary | ICD-10-CM

## 2022-06-15 DIAGNOSIS — F41.8 DEPRESSION WITH ANXIETY: ICD-10-CM

## 2022-06-15 RX ORDER — BEBTELOVIMAB 87.5 MG/ML
175 INJECTION, SOLUTION INTRAVENOUS ONCE
Status: CANCELLED | OUTPATIENT
Start: 2022-06-15

## 2022-06-15 NOTE — TELEPHONE ENCOUNTER
I spoke with Dr Luli Hammond as this was sent 12 days ago  Patient no longer qualifies for the infusion as it is out of the time frame   No appt needed

## 2022-06-18 DIAGNOSIS — K21.9 GERD WITHOUT ESOPHAGITIS: ICD-10-CM

## 2022-06-18 DIAGNOSIS — G43.709 CHRONIC MIGRAINE WITHOUT AURA WITHOUT STATUS MIGRAINOSUS, NOT INTRACTABLE: ICD-10-CM

## 2022-06-18 RX ORDER — OMEPRAZOLE 40 MG/1
CAPSULE, DELAYED RELEASE ORAL
Qty: 60 CAPSULE | Refills: 2 | Status: SHIPPED | OUTPATIENT
Start: 2022-06-18 | End: 2022-07-07 | Stop reason: CLARIF

## 2022-06-20 ENCOUNTER — TELEPHONE (OUTPATIENT)
Dept: NEUROLOGY | Facility: CLINIC | Age: 47
End: 2022-06-20

## 2022-06-20 RX ORDER — BUTALBITAL, ACETAMINOPHEN AND CAFFEINE 50; 325; 40 MG/1; MG/1; MG/1
TABLET ORAL
Qty: 10 TABLET | Refills: 0 | Status: SHIPPED | OUTPATIENT
Start: 2022-06-20

## 2022-06-20 RX ORDER — LASMIDITAN 50 MG/1
TABLET ORAL
Qty: 4 TABLET | Refills: 0 | Status: SHIPPED | OUTPATIENT
Start: 2022-06-20

## 2022-06-20 RX ORDER — KETOROLAC TROMETHAMINE 10 MG/1
TABLET, FILM COATED ORAL
Qty: 9 TABLET | Refills: 0 | Status: SHIPPED | OUTPATIENT
Start: 2022-06-20 | End: 2022-08-01

## 2022-06-20 RX ORDER — ZOLMITRIPTAN 5 MG/1
5 TABLET, ORALLY DISINTEGRATING ORAL ONCE AS NEEDED
Qty: 6 TABLET | Refills: 0 | Status: SHIPPED | OUTPATIENT
Start: 2022-06-20

## 2022-06-20 NOTE — TELEPHONE ENCOUNTER
Duplicate medication request please refill if agreeable to refills   Medications pended already    *ketorolac (TORADOL) 10 mg tablet Johnson Ward]       *  Lasmiditan Succinate (Reyvow) 50 MG tablet [Linda Ward]        * ZOLMitriptan (ZOMIG-ZMT) 5 MG disintegrating tablet [Linda Ward]         *butalbital-acetaminophen-caffeine (FIORICET,ESGIC) -40 mg per tablet [Linda Ball

## 2022-06-23 ENCOUNTER — TELEPHONE (OUTPATIENT)
Dept: NEUROLOGY | Facility: CLINIC | Age: 47
End: 2022-06-23

## 2022-06-23 NOTE — TELEPHONE ENCOUNTER
Voicemail received from representative at [facility] requesting return call  Called and spoke with Priscila who advised the call was to set up botox delivery  Offered that the patient's appointment is on 8/1  Pharmacy will call  to the time of the appointment

## 2022-06-24 ENCOUNTER — OFFICE VISIT (OUTPATIENT)
Dept: OBGYN CLINIC | Facility: OTHER | Age: 47
End: 2022-06-24
Payer: COMMERCIAL

## 2022-06-24 VITALS
DIASTOLIC BLOOD PRESSURE: 74 MMHG | SYSTOLIC BLOOD PRESSURE: 112 MMHG | HEIGHT: 62 IN | WEIGHT: 248.6 LBS | HEART RATE: 99 BPM | BODY MASS INDEX: 45.75 KG/M2

## 2022-06-24 DIAGNOSIS — M65.311 TRIGGER FINGER OF RIGHT THUMB: Primary | ICD-10-CM

## 2022-06-24 PROCEDURE — 99214 OFFICE O/P EST MOD 30 MIN: CPT | Performed by: FAMILY MEDICINE

## 2022-06-24 PROCEDURE — 20550 NJX 1 TENDON SHEATH/LIGAMENT: CPT | Performed by: FAMILY MEDICINE

## 2022-06-24 RX ORDER — ERGOCALCIFEROL 1.25 MG/1
CAPSULE ORAL
COMMUNITY
Start: 2022-04-15

## 2022-06-24 RX ORDER — TRIAMCINOLONE ACETONIDE 40 MG/ML
40 INJECTION, SUSPENSION INTRA-ARTICULAR; INTRAMUSCULAR
Status: COMPLETED | OUTPATIENT
Start: 2022-06-24 | End: 2022-06-24

## 2022-06-24 RX ORDER — LIDOCAINE HYDROCHLORIDE 10 MG/ML
1 INJECTION, SOLUTION INFILTRATION; PERINEURAL
Status: COMPLETED | OUTPATIENT
Start: 2022-06-24 | End: 2022-06-24

## 2022-06-24 RX ORDER — PHENTERMINE HYDROCHLORIDE 37.5 MG/1
CAPSULE ORAL
COMMUNITY
Start: 2022-03-18

## 2022-06-24 RX ADMIN — TRIAMCINOLONE ACETONIDE 40 MG: 40 INJECTION, SUSPENSION INTRA-ARTICULAR; INTRAMUSCULAR at 11:33

## 2022-06-24 RX ADMIN — LIDOCAINE HYDROCHLORIDE 1 ML: 10 INJECTION, SOLUTION INFILTRATION; PERINEURAL at 11:33

## 2022-06-24 NOTE — TELEPHONE ENCOUNTER
Voicemail received from [facility] pharmacy to arrange delivery for patient's botox  Called [facility]ach and spoke to Hermann and scheduled medication delivery of:    Botox 200 units  Qty: 1   Del[facility] to [facility]   Scheduled for 6/30/2022  Via UPS    Please advise if does not arrive  Of note patient was referred to [facility] patient still be coming in the office for future botox appointments  LindaCate please advise

## 2022-06-24 NOTE — PATIENT INSTRUCTIONS
Trigger finger new onset   Injection given today  Use thumb spica brace at bedtime if becoming stuck at bedtime

## 2022-06-24 NOTE — PROGRESS NOTES
1  Trigger finger of right thumb       Orders Placed This Encounter   Procedures    Hand/upper extremity injection        IMAGING STUDIES: (I personally reviewed images in PACS and report):         PAST REPORTS:        ASSESSMENT/PLAN:  Right Thumb Trigger Finger    Repeat X-ray next visit: None    Return if symptoms worsen or fail to improve  Patient Instructions   Trigger finger new onset   Injection given today  Use thumb spica brace at bedtime if becoming stuck at bedtime        __________________________________________________________________________    HISTORY OF PRESENT ILLNESS:  Triggering right thumb last week stuck in flexion when changing bed sheets  Today no pain             Review of Systems      Following history reviewed and update:    Past Medical History:   Diagnosis Date    Anxiety     Asthma     Claustrophobia     Cluster headache     CTS (carpal tunnel syndrome)     Depression     Fibromyalgia     last assessed 11/27/17    Fibromyalgia, primary     GERD without esophagitis     last assessed 10/12/17    GI problem     Headache, tension-type     Joint pain     Lung nodule     last assessed 10/9/15    Migraine     Muscle pain     Peripheral neuropathy     Sleep apnea      Past Surgical History:   Procedure Laterality Date    BACK SURGERY      CARPAL TUNNEL RELEASE Bilateral     CHOLECYSTECTOMY      GALLBLADDER SURGERY      NECK SURGERY      SPINAL FUSION      C4 and C5    ULNAR NERVE REPAIR Bilateral     UPPER GASTROINTESTINAL ENDOSCOPY       Social History   Social History     Substance and Sexual Activity   Alcohol Use Yes    Alcohol/week: 2 0 standard drinks    Types: 1 Glasses of wine, 1 Cans of beer per week    Comment: occasional     Social History     Substance and Sexual Activity   Drug Use Yes    Types: Marijuana    Comment: medicinal     Social History     Tobacco Use   Smoking Status Never Smoker   Smokeless Tobacco Never Used     Family History   Problem Relation Age of Onset    Diabetes Mother     Fibromyalgia Mother     Ovarian cancer Mother 35    Hypertension Mother     Thyroid disease Mother    [facility] Migraines Mother     Neuropathy Mother     No Known Problems Father     Throat cancer Maternal Grandmother     No Known Problems Maternal Grandfather     No Known Problems Paternal Grandmother     No Known Problems Paternal Grandfather     No Known Problems Daughter     Breast cancer Maternal Aunt     No Known Problems Maternal Aunt     No Known Problems Maternal Aunt     No Known Problems Paternal Aunt     Colon cancer Cousin     Heart disease Cousin     Lupus Cousin     Arthritis Family     Heart disease Family         cardiac disorder    Neuropathy Family     Lupus Family         systemic erythematosus    No Known Problems Half-Sister     No Known Problems Half-Brother     No Known Problems Half-Brother     No Known Problems Half-Brother     Stroke Neg Hx      Allergies   Allergen Reactions    Hydrocodone-Acetaminophen GI Intolerance     gi upset -pt okay if takes  zofran    Codeine GI Intolerance    Oxycodone-Acetaminophen GI Intolerance    Penicillins GI Intolerance          Physical Exam  /74 (BP Location: Left arm, Patient Position: Sitting, Cuff Size: Adult)   Pulse 99   Ht 5' 2" (1 575 m)   Wt 113 kg (248 lb 9 6 oz)   BMI 45 47 kg/m²     Constitutional:  see vital signs  Gen: well-developed, normocephalic/atraumatic, well-groomed  Eyes: No inflammation or discharge of conjunctiva or lids; sclera clear   Pharynx: no inflammation, lesion, or mass of lips  Neck: supple, no masses, non-distended  MSK: no inflammation, lesion, mass, or clubbing of nails and digits except for other than mentioned below  SKIN: no visible rashes or skin lesions  Pulmonary/Chest: Effort normal  No respiratory distress     NEURO: cranial nerves grossly intact  PSYCH:  Alert and oriented to person, place, and time; recent and remote memory intact; mood normal, no depression, anxiety, or agitation, judgment and insight good and intact     Ortho Exam  RIGHT THUMB:  Erythema: no  Swelling: no  Increased Warmth: no  Tenderness: none  ROM: intact flexion, extension  Malrotation: none; thumb perpendicular to remainig phalanges on adduction  Varus stress: no laxity or pain  Valgus stress: no laxity or pain   Distal Phalanx Strength: 5/5 flexion, extension  Proximal Phalanx Strength: 5/5 flexion, extension  Thumb Abduction: 5/5  Thumb Adduction: 5/5  OK sign: normal  Froment Sign: negative    __________________________________________________________________________  Hand/upper extremity injection: R thumb A1  Universal Protocol:  Consent: Verbal consent obtained    Risks and benefits: risks, benefits and alternatives were discussed  Consent given by: patient  Patient understanding: patient states understanding of the procedure being performed  Patient identity confirmed: verbally with patient    Supporting Documentation  Indications: tendon swelling   Procedure Details  Condition:trigger finger Location: thumb - R thumb A1   Preparation: Patient was prepped and draped in the usual sterile fashion  Ultrasound guidance: no  Approach: volar  Medications administered: 1 mL lidocaine 1 %; 40 mg triamcinolone acetonide 40 mg/mL

## 2022-06-30 ENCOUNTER — TELEPHONE (OUTPATIENT)
Dept: NEUROLOGY | Facility: CLINIC | Age: 47
End: 2022-06-30

## 2022-06-30 DIAGNOSIS — G43.709 CHRONIC MIGRAINE WITHOUT AURA WITHOUT STATUS MIGRAINOSUS, NOT INTRACTABLE: Primary | ICD-10-CM

## 2022-06-30 NOTE — TELEPHONE ENCOUNTER
Patient has tried all 3 of the injectables  Could try to see if we could get Vypeti infusions approved with Botox  Or, could see if either oral medication, Emmy Bellis or Nurtec could be approved with her insurance with Botox  Which would she like for us to try? Also, did she reach out to [facility] as [facility] did place a referral for her?

## 2022-06-30 NOTE — TELEPHONE ENCOUNTER
Daphnie Eye from [facility] specialty pharmacy to check status of Aimovig ELSIE ZIMMERMAN  on 22    Called pt and states she still on aimovig  Before aimovig, she gets about >15 migraines per month  Has not seen any improvement  He still about >15 migraines per month  Intensity still the same  She tried emgality and ajovy but not effective  She would like to try alt meds     [phone], ok to leave a detailed message

## 2022-06-30 NOTE — TELEPHONE ENCOUNTER
Botox number of units: 200  Botox quantity: 1  Arrived at what location: CTR   Botox at [facility]: yes  NDC number: 5850-7032-69  Lot number: [id number]  Expiration Date:  01/31/25  Appt notes indicate correct medication: yes

## 2022-07-07 ENCOUNTER — PATIENT MESSAGE (OUTPATIENT)
Dept: NEUROLOGY | Facility: CLINIC | Age: 47
End: 2022-07-07

## 2022-07-07 ENCOUNTER — CONSULT (OUTPATIENT)
Dept: INTERNAL MEDICINE CLINIC | Facility: CLINIC | Age: 47
End: 2022-07-07

## 2022-07-07 ENCOUNTER — TELEPHONE (OUTPATIENT)
Dept: INTERNAL MEDICINE CLINIC | Facility: CLINIC | Age: 47
End: 2022-07-07

## 2022-07-07 VITALS
SYSTOLIC BLOOD PRESSURE: 120 MMHG | HEIGHT: 62 IN | DIASTOLIC BLOOD PRESSURE: 86 MMHG | BODY MASS INDEX: 45.64 KG/M2 | TEMPERATURE: 97.4 F | WEIGHT: 248 LBS | HEART RATE: 97 BPM

## 2022-07-07 DIAGNOSIS — Z01.818 ENCOUNTER FOR PREOPERATIVE ASSESSMENT: Primary | ICD-10-CM

## 2022-07-07 DIAGNOSIS — K21.9 GASTROESOPHAGEAL REFLUX DISEASE, UNSPECIFIED WHETHER ESOPHAGITIS PRESENT: ICD-10-CM

## 2022-07-07 PROCEDURE — 99213 OFFICE O/P EST LOW 20 MIN: CPT | Performed by: INTERNAL MEDICINE

## 2022-07-07 RX ORDER — FAMOTIDINE 20 MG/1
20 TABLET, FILM COATED ORAL
Qty: 90 TABLET | Refills: 0 | Status: SHIPPED | OUTPATIENT
Start: 2022-07-07 | End: 2022-10-05

## 2022-07-07 NOTE — TELEPHONE ENCOUNTER
Folder Color- [id number]     Name of Form- Medical clearance for Gastrectomy    Form to be filled out by- Dr Dickerson    Form to be Faxed- [phone]    Patient made aware of 10 business day policy

## 2022-07-07 NOTE — TELEPHONE ENCOUNTER
Form completed by Dr Iris Deleon and placed in [place] clerical folder along with office notes to be faxed

## 2022-07-07 NOTE — PROGRESS NOTES
[facility] Visit Note  Tiara Purcell 52 y o  female   MRN: [id number]    Assessment and Plan      1  Encounter for preoperative assessment: We have been consulted for preoperative risk assessment and optimization for Tiara Purcell  She is planned to undergo laparoscopic sleeve gastrectomy on 8/8/22  Based on her combined clinical and surgical risk, the patient is at low risk for perioperative complications for a moderate risk procedure  The patient verbalizes understanding of their preoperative risk  The patient is medically optimized and may may proceed to surgery  2  Gastroesophageal reflux disease, unspecified whether esophagitis present:  Patient reports uncontrolled heartburn symptoms on maximum omeprazole dose 40 mg b i d  Reports symptoms daily, most notably at night  I discussed lifestyle modifications with her including avoiding trigger foods, avoiding eating 2-3 hours before bedtime, elevating head of bed during sleep and lying on left side for sleep  Given her nocturnal symptoms, will start Pepcid 20 mg daily at bedtime  Plan to discuss GERD symptoms at 6 month follow-up  Orders placed:  -     famotidine (PEPCID) 20 mg tablet; Take 1 tablet (20 mg total) by mouth daily at bedtime          Follow up in our clinic in 6 month(s) for annual physical     Subjective   HISTORY OF PRESENT ILLNESS:  Tiara Purcell is a 52 y o  female with a past medical history of obesity, prediabetes, migraines, anxiety, depression, mild intermittent asthma who presents to clinic today for preoperative evaluation      Preoperative Risk Evaluation:    Planned procedure: Laparoscopic sleeve gastrectomy   Procedure risk: Intermediate   Planned date: 8/8/22    Preoperative questions:  · Chest pain:  no  · SOB: no  · Chest pain, SOB when climbing 2 flights of stairs:  no  · Syncope: no  · Palpitations: no  · Personal history of anesthesia complications: no  · Family history of anesthesia complications (r/o MH): no    Prior cardiac workup:   · exercise stress test in 2017 - negative   · Last EKG in 2019 - NSR     Exercise capacity:  · TIFFANIE/AHA recommends no preop testing on patients with at least 4 metabolic equivalents regardless of the risk of procedure  · Patient is able to complete  >4 METS    Medications:   · Medication reconciliation completed today  · She is not taking any OTC medications or ASA    HENRIETTA screening:  · Incidence of periperative complications is increased 2-fold to 4-fold in patients with HENRIETTA, most notably respiratory complications  · Sleep study negative in 2020    Lab tests:  · Routine lab tests are not indicated for this patient     EKG:  · Preoperative EKG is not recommended in this low-risk patient without cardiopulmonary symptoms        Review of Systems   Constitutional: Negative for chills, fatigue, fever and unexpected weight change  HENT: Negative for congestion, rhinorrhea, sinus pain and sore throat  Eyes: Negative for visual disturbance  Respiratory: Negative for cough and shortness of breath  Cardiovascular: Negative for chest pain, palpitations and leg swelling  Gastrointestinal: Positive for constipation and diarrhea  Negative for abdominal pain, blood in stool, nausea and vomiting  Reflux symptoms   Endocrine: Negative for cold intolerance, heat intolerance, polydipsia and polyuria  Genitourinary: Negative for difficulty urinating, dysuria, frequency, hematuria and urgency  Musculoskeletal: Positive for arthralgias  Negative for back pain and myalgias  Skin: Negative for rash and wound  Neurological: Negative for dizziness, syncope, weakness, light-headedness and headaches  Psychiatric/Behavioral: Negative for behavioral problems, confusion and sleep disturbance         Objective     Vitals:    07/07/22 1308   BP: 120/86   BP Location: Right arm   Patient Position: Sitting   Cuff Size: Large   Pulse: 97   Temp: (!) 97 4 °F (36 3 °C)   TempSrc: Temporal   Weight: 112 kg (248 lb)   Height: 5' 2" (1 575 m)     Physical Exam:  General: No apparent distress, resting comfortably   Head: Normocephalic, atraumatic  Eyes: Anicteric, no conjunctival erythema  ENT: External ear normal, no nasal discharge  Neck: Trachea midline, no visible lymphadenopathy or goiter  Respiratory: CTA  Non-labored respirations, symmetric thorax expansion  Cardiovascular: RRR  Pulses 2+ bilaterally  Extremities appear well-perfused  Abdomen: Non-distended  Extremities: Moves extremities spontaneously, no peripheral edema  Skin: No visible rashes, wounds, or jaundice  Neuro: A&O x 3, no gross focal deficits, no aphasia    History     Current Outpatient Medications:     albuterol (PROVENTIL HFA,VENTOLIN HFA) 90 mcg/act inhaler, Inhale 2 puffs every 6 (six) hours as needed for wheezing or shortness of breath, Disp: 1 Inhaler, Rfl: 5    benzonatate (TESSALON PERLES) 100 mg capsule, Take 1 capsule (100 mg total) by mouth 3 (three) times a day as needed for cough, Disp: 20 capsule, Rfl: 0    Botox 200 units SOLR, , Disp: , Rfl:     buPROPion (Wellbutrin XL) 150 mg 24 hr tablet, Take 1 tablet (150 mg total) by mouth every morning, Disp: 90 tablet, Rfl: 3    butalbital-acetaminophen-caffeine (FIORICET,ESGIC) -40 mg per tablet, 1 tab q6 hours prn migraine  No more than 2-3 per week , Disp: 10 tablet, Rfl: 0    cholecalciferol (VITAMIN D3) 1,000 units tablet, Take 1 capsule by mouth once a week 3000 units daily , Disp: , Rfl:     dicyclomine (BENTYL) 20 mg tablet, Take 1 tablet (20 mg total) by mouth 3 (three) times a day as needed (migraine/cramps), Disp: 60 tablet, Rfl: 0    divalproex sodium (DEPAKOTE) 250 mg EC tablet, 2 tabs q12 hours x 2 days, then stop  Repeat if needed  , Disp: 16 tablet, Rfl: 0    ergocalciferol (VITAMIN D2) 50,000 units, , Disp: , Rfl:     famotidine (PEPCID) 20 mg tablet, Take 1 tablet (20 mg total) by mouth daily at bedtime, Disp: 90 tablet, Rfl: 0    fluticasone (FLONASE) 50 mcg/act nasal spray, 2 sprays into each nostril daily as needed , Disp: , Rfl:     ibuprofen (MOTRIN) 400 mg tablet, 1 tab q8 hours prn migraine  Max 2 per day and 4 per week  Hold toradol , Disp: 20 tablet, Rfl: 0    ketorolac (TORADOL) 10 mg tablet, 1 tab q8 hours prn migraine onset  Max 3 per week , Disp: 9 tablet, Rfl: 0    Lasmiditan Succinate (Reyvow) 50 MG tablet, One tab at migraine onset  Caution sedation  Max 1 tab/24 hours  , Disp: 4 tablet, Rfl: 0    levonorgestrel (MIRENA) 20 MCG/24HR IUD, 1 each by Intrauterine route once, Disp: , Rfl:     meclizine (ANTIVERT) 12 5 MG tablet, Take 1 tablet (12 5 mg total) by mouth 3 (three) times a day as needed for dizziness or nausea, Disp: 30 tablet, Rfl: 0    Melatonin ER 3 MG TBCR, 1-2 tabs qhs, Disp: 60 tablet, Rfl: 2    metoclopramide (REGLAN) 10 mg tablet, 1 tab q6 hours prn migraine (with toradol and benadryl), Disp: 20 tablet, Rfl: 0    NON FORMULARY, Medical marijuana  , Disp: , Rfl:     omeprazole (PriLOSEC) 40 MG capsule, Take 1 capsule (40 mg total) by mouth 2 (two) times a day, Disp: 60 capsule, Rfl: 0    ondansetron (ZOFRAN) 4 mg tablet, Take 1 tablet by mouth every 8 (eight) hours as needed, Disp: , Rfl:     phentermine 37 5 MG capsule, , Disp: , Rfl:     polyethylene glycol (GLYCOLAX) 17 GM/SCOOP powder, Take 17 g by mouth daily as needed (constipation), Disp: 289 g, Rfl: 0    tiZANidine (ZANAFLEX) 2 mg tablet, Take 1 tablet (2 mg total) by mouth every 8 (eight) hours as needed for muscle spasms, Disp: 90 tablet, Rfl: 1    traMADol (ULTRAM) 50 mg tablet, take one tablet at onset of moderate to severe headach, can repeat once in 8 hours   MAX 2 DAYS PER WEEK, Disp: 10 tablet, Rfl: 0    ZOLMitriptan (ZOMIG-ZMT) 5 MG disintegrating tablet, Take 1 tablet (5 mg total) by mouth once as needed for migraine for up to 1 dose, Disp: 6 tablet, Rfl: 0    cholestyramine (QUESTRAN) 4 GM/DOSE powder, Take 1 packet (4 g total) by mouth 2 (two) times a day with meals, Disp: 378 g, Rfl: 2  Allergies   Allergen Reactions    Hydrocodone-Acetaminophen GI Intolerance     gi upset -pt okay if takes  zofran    Codeine GI Intolerance    Oxycodone-Acetaminophen GI Intolerance    Penicillins GI Intolerance      Past Medical History:   Diagnosis Date    Anxiety     Asthma     Claustrophobia     Cluster headache     CTS (carpal tunnel syndrome)     Depression     Fibromyalgia     last assessed 11/27/17    Fibromyalgia, primary     GERD without esophagitis     last assessed 10/12/17    GI problem     Headache, tension-type     Joint pain     Lung nodule     last assessed 10/9/15    Migraine     Muscle pain     Peripheral neuropathy     Sleep apnea      Past Surgical History:   Procedure Laterality Date    BACK SURGERY      CARPAL TUNNEL RELEASE Bilateral     CHOLECYSTECTOMY      GALLBLADDER SURGERY      NECK SURGERY      SPINAL FUSION      C4 and C5    ULNAR NERVE REPAIR Bilateral     UPPER GASTROINTESTINAL ENDOSCOPY       Social History     Socioeconomic History    Marital status:      Spouse name: Not on file    Number of children: Not on file    Years of education: Not on file    Highest education level: Not on file   Occupational History    Not on file   Tobacco Use    Smoking status: Never Smoker    Smokeless tobacco: Never Used   Vaping Use    Vaping Use: Never used   Substance and Sexual Activity    Alcohol use: Yes     Alcohol/week: 2 0 standard drinks     Types: 1 Glasses of wine, 1 Cans of beer per week     Comment: occasional    Drug use: Yes     Types: Marijuana     Comment: medicinal    Sexual activity: Yes     Partners: Male     Birth control/protection: I U D     Other Topics Concern    Not on file   Social History Narrative    Daily cola, tea    No preference on religous beliefs     Social Determinants of Health     Financial Resource Strain: Medium Risk  Difficulty of Paying Living Expenses: Somewhat hard   Food Insecurity: Food Insecurity Present    Worried About Running Out of Food in the Last Year: Sometimes true    Jos of Food in the Last Year: Sometimes true   Transportation Needs: No Transportation Needs    Lack of Transportation (Medical): No    Lack of Transportation (Non-Medical): No   Physical Activity: Inactive    Days of Exercise per Week: 0 days    Minutes of Exercise per Session: 0 min   Stress: Stress Concern Present    Feeling of Stress : Rather much   Social Connections: Socially Isolated    Frequency of Communication with Friends and Family:  Three times a week    Frequency of Social Gatherings with Friends and Family: More than three times a week    Attends Uatsdin Services: Never    Active Member of Clubs or Organizations: No    Attends Club or Organization Meetings: Never    Marital Status:    Intimate Partner Violence: Not At Risk    Fear of Current or Ex-Partner: No    Emotionally Abused: No    Physically Abused: No    Sexually Abused: No   Housing Stability: Low Risk     Unable to Pay for Housing in the Last Year: No    Number of Places Lived in the Last Year: 1    Unstable Housing in the Last Year: No     Family History   Problem Relation Age of Onset    Diabetes Mother     Fibromyalgia Mother     Ovarian cancer Mother 35    Hypertension Mother     Thyroid disease Mother     Migraines Mother     Neuropathy Mother     No Known Problems Father     Throat cancer Maternal Grandmother     No Known Problems Maternal Grandfather     No Known Problems Paternal Grandmother     No Known Problems Paternal Grandfather     No Known Problems Daughter     Breast cancer Maternal Aunt     No Known Problems Maternal Aunt     No Known Problems Maternal Aunt     No Known Problems Paternal Aunt     Colon cancer Cousin     Heart disease Cousin     Lupus Cousin     Arthritis Family     Heart disease Family cardiac disorder    Neuropathy Family     Lupus Family         systemic erythematosus    No Known Problems Half-Sister     No Known Problems Half-Brother     No Known Problems Half-Brother     No Known Problems Half-Brother     Stroke Neg Hx        Dominique Dickerson DO  [facility] Internal Medicine PGY-2  [address] [facility]  [address] [facility]    PLEASE NOTE:  This encounter was completed utilizing the D[employer] Direct Speech Voice Recognition Software  Grammatical errors, random word insertions, pronoun errors and incomplete sentences are occasional consequences of the system due to software limitations, ambient noise and hardware issues  These may be missed by proof reading prior to affixing electronic signature  Any questions or concerns about the content, text or information contained within the body of this dictation should be directly addressed to the physician for clarification  Please do not hesitate to call me directly if you have any any questions or concerns

## 2022-07-08 NOTE — TELEPHONE ENCOUNTER
Patient prefers to try Jazmine Ender; she said she tried nurtec and ubrelvy as rescue meds and weren't effective  Please send script if in agreement, thank you

## 2022-07-15 ENCOUNTER — TELEPHONE (OUTPATIENT)
Dept: NEUROLOGY | Facility: CLINIC | Age: 47
End: 2022-07-15

## 2022-07-15 NOTE — TELEPHONE ENCOUNTER
Patient left vm that new med needs ELSIE carnes [phone]    I called pharmacy to clarify; qulipta needs PA

## 2022-07-21 NOTE — TELEPHONE ENCOUNTER
Pt called to check status of PA  Advised that I will initiate PA today  Can take up to 72 hrs to get the final determination  [phone], ok to leave detailed message    PA initiated on CMM    (Key: [id number])    Awaiting determination

## 2022-07-27 NOTE — TELEPHONE ENCOUNTER
Called for update on PA  Representative Crystal states it was closed due to missing information of provider's address   Crystal was able to open up another request    Awaiting determination    TAT 24 hours

## 2022-08-01 ENCOUNTER — PROCEDURE VISIT (OUTPATIENT)
Dept: NEUROLOGY | Facility: CLINIC | Age: 47
End: 2022-08-01
Payer: COMMERCIAL

## 2022-08-01 VITALS
WEIGHT: 248 LBS | DIASTOLIC BLOOD PRESSURE: 87 MMHG | HEART RATE: 73 BPM | BODY MASS INDEX: 45.64 KG/M2 | TEMPERATURE: 97.3 F | HEIGHT: 62 IN | SYSTOLIC BLOOD PRESSURE: 138 MMHG

## 2022-08-01 DIAGNOSIS — G43.709 CHRONIC MIGRAINE WITHOUT AURA WITHOUT STATUS MIGRAINOSUS, NOT INTRACTABLE: Primary | ICD-10-CM

## 2022-08-01 DIAGNOSIS — G47.09 OTHER INSOMNIA: ICD-10-CM

## 2022-08-01 PROCEDURE — 64615 CHEMODENERV MUSC MIGRAINE: CPT | Performed by: PHYSICIAN ASSISTANT

## 2022-08-01 RX ORDER — CHOLECALCIFEROL (VITAMIN D3) 25 MCG
TABLET ORAL
Qty: 60 TABLET | Refills: 2 | Status: SHIPPED | OUTPATIENT
Start: 2022-08-01

## 2022-08-01 RX ORDER — FLUTICASONE PROPIONATE 50 MCG
SPRAY, SUSPENSION (ML) NASAL
Qty: 30 ML | Refills: 0 | Status: SHIPPED | OUTPATIENT
Start: 2022-08-01

## 2022-08-01 RX ORDER — KETOROLAC TROMETHAMINE 30 MG/ML
INJECTION, SOLUTION INTRAMUSCULAR; INTRAVENOUS
Qty: 8 ML | Refills: 0 | Status: SHIPPED | OUTPATIENT
Start: 2022-08-01

## 2022-08-01 NOTE — PROGRESS NOTES
Universal Protocol   Consent: Verbal consent obtained  Written consent obtained    Risks and benefits: risks, benefits and alternatives were discussed  Consent given by: patient  Patient understanding: patient states understanding of the procedure being performed  Patient consent: the patient's understanding of the procedure matches consent given  Procedure consent: procedure consent matches procedure scheduled        Chemodenervation     Date/Time 8/1/2022 9:12 AM     Performed by  Ran Riley PA-C     Authorized by Ran Riley PA-C        Pre-procedure details      Prepped With: Alcohol     Procedure details     Position:  Upright   Botox     Botox Type:  Type A    Brand:  Botox    mL's of Botulinum Toxin:  200    Final Concentration per CC:  100 units    Needle Gauge:  30 G 2 5 inch   Procedures     Botox Procedures: chronic headache      Indications: migraines     Injection Location      Head / Face:  L superior trapezius, R superior trapezius, L superior cervical paraspinal, R superior cervical paraspinal, L , R , procerus, L temporalis, R temporalis, R frontalis, L frontalis, R medial occipitalis and L medial occipitalis    L  injection amount:  5 unit(s)    R  injection amount:  5 unit(s)    L lateral frontalis:  5 unit(s)    R lateral frontalis:  5 unit(s)    L medial frontalis:  5 unit(s)    R medial frontalis:  5 unit(s)    L temporalis injection amount:  20 unit(s)    R temporalis injection amount:  20 unit(s)    Procerus injection amount:  5 unit(s)    L medial occipitalis injection amount:  15 unit(s)    R medial occipitalis injection amount:  15 unit(s)    L superior cervical paraspinal injection amount:  10 unit(s)    R superior cervical paraspinal injection amount:  10 unit(s)    L superior trapezius injection amount:  15 unit(s)    R superior trapezius injection amount:  15 unit(s)   Total Units     Total units used:  200    Total units discarded:  0 Post-procedure details      Chemodenervation:  Chronic migraine    Facial Nerve Location[de-identified]  Bilateral facial nerve    Patient tolerance of procedure: Tolerated well, no immediate complications   Comments      Extra units medically necessary:  - 10 between both upper traps  - 15 between both middle traps  - 10 R occipitalis  - 10 left occipitalis       Blood pressure 138/87, pulse 73, temperature (!) 97 3 °F (36 3 °C), temperature source Temporal, height 5' 2" (1 575 m), weight 112 kg (248 lb)  Pt planning sleeve/ bariatric surgery next week  Migraine headaches seem to start in the occipital and neck regions and radiate fwd  She has a lot of tension, and planning to go back to PT for this after her surgery  ?cervicogenic headaches  She will try [place] since recently approved (she did not start it yet)  I sent a refill  Consider Vyepti if Quliptal fails  Hold further migraine infusions with depakote/ DHE, etc, as this caused s/e  Toradol injections provided s/p GB surgery  Melatonin ER seems to be helpful for sleep function

## 2022-08-26 ENCOUNTER — OFFICE VISIT (OUTPATIENT)
Dept: OBGYN CLINIC | Facility: OTHER | Age: 47
End: 2022-08-26
Payer: COMMERCIAL

## 2022-08-26 VITALS
BODY MASS INDEX: 43.61 KG/M2 | HEIGHT: 62 IN | WEIGHT: 237 LBS | DIASTOLIC BLOOD PRESSURE: 82 MMHG | SYSTOLIC BLOOD PRESSURE: 116 MMHG | HEART RATE: 87 BPM

## 2022-08-26 DIAGNOSIS — M65.311 TRIGGER FINGER OF RIGHT THUMB: Primary | ICD-10-CM

## 2022-08-26 PROCEDURE — 99213 OFFICE O/P EST LOW 20 MIN: CPT | Performed by: FAMILY MEDICINE

## 2022-08-26 RX ORDER — SCOLOPAMINE TRANSDERMAL SYSTEM 1 MG/1
1 PATCH, EXTENDED RELEASE TRANSDERMAL
COMMUNITY
Start: 2022-08-11 | End: 2023-08-11

## 2022-08-26 NOTE — PROGRESS NOTES
1  Trigger finger of right thumb  Hand/upper extremity injection     Orders Placed This Encounter   Procedures    Hand/upper extremity injection        IMAGING STUDIES: (I personally reviewed images in PACS and report):         PAST REPORTS:        ASSESSMENT/PLAN:  Right thumb trigger finger    Repeat X-ray next visit: None    Return if symptoms worsen or fail to improve  Patient Instructions   Repeat trial of corticosteroid injection for trigger finger   If no relief with this corticosteroid injection then we will consider referral for evaluation with hand surgeon for release  Did explained the patient that surgery would likely be delayed until 3 months after her most recent corticosteroid injection today  Patient expressed understanding agreed to plan        __________________________________________________________________________    HISTORY OF PRESENT ILLNESS:  Follow-up right thumb trigger finger:  Improved after injection 06/24/2022 but with return of mild symptoms since  Overall 75% plus improved    With volar aspect of the 1st MCP joint as source of clicking and pain with catching of the finger on flexion          Review of Systems      Following history reviewed and update:    Past Medical History:   Diagnosis Date    Anxiety     Asthma     Claustrophobia     Cluster headache     CTS (carpal tunnel syndrome)     Depression     Fibromyalgia     last assessed 11/27/17    Fibromyalgia, primary     GERD without esophagitis     last assessed 10/12/17    GI problem     Headache, tension-type     Joint pain     Lung nodule     last assessed 10/9/15    Migraine     Muscle pain     Peripheral neuropathy     Sleep apnea      Past Surgical History:   Procedure Laterality Date    BACK SURGERY      CARPAL TUNNEL RELEASE Bilateral     CHOLECYSTECTOMY      GALLBLADDER SURGERY      NECK SURGERY      SPINAL FUSION      C4 and C5    ULNAR NERVE REPAIR Bilateral     UPPER GASTROINTESTINAL ENDOSCOPY       Social History   Social History     Substance and Sexual Activity   Alcohol Use Yes    Alcohol/week: 2 0 standard drinks    Types: 1 Glasses of wine, 1 Cans of beer per week    Comment: occasional     Social History     Substance and Sexual Activity   Drug Use Yes    Types: Marijuana    Comment: medicinal     Social History     Tobacco Use   Smoking Status Never Smoker   Smokeless Tobacco Never Used     Family History   Problem Relation Age of Onset    Diabetes Mother     Fibromyalgia Mother     Ovarian cancer Mother 35    Hypertension Mother     Thyroid disease Mother    Mavis Cousin Migraines Mother     Neuropathy Mother     No Known Problems Father     Throat cancer Maternal Grandmother     No Known Problems Maternal Grandfather     No Known Problems Paternal Grandmother     No Known Problems Paternal Grandfather     No Known Problems Daughter     Breast cancer Maternal Aunt     No Known Problems Maternal Aunt     No Known Problems Maternal Aunt     No Known Problems Paternal Aunt     Colon cancer Cousin     Heart disease Cousin     Lupus Cousin     Arthritis Family     Heart disease Family         cardiac disorder    Neuropathy Family     Lupus Family         systemic erythematosus    No Known Problems Half-Sister     No Known Problems Half-Brother     No Known Problems Half-Brother     No Known Problems Half-Brother     Stroke Neg Hx      Allergies   Allergen Reactions    Hydrocodone-Acetaminophen GI Intolerance     gi upset -pt okay if takes  zofran    Hydrocodone-Acetaminophen GI Intolerance     gi upset -pt okay if takes  zofran  gi upset -pt okay if takes  zofran    Codeine GI Intolerance    Oxycodone-Acetaminophen GI Intolerance    Penicillins GI Intolerance          Physical Exam  /82 (BP Location: Right arm, Patient Position: Sitting, Cuff Size: Adult)   Pulse 87   Ht 5' 2" (1 575 m)   Wt 108 kg (237 lb)   BMI 43 35 kg/m²     Constitutional:  see vital signs  Gen: well-developed, normocephalic/atraumatic, well-groomed  Eyes: No inflammation or discharge of conjunctiva or lids; sclera clear   Pharynx: no inflammation, lesion, or mass of lips  Neck: supple, no masses, non-distended  MSK: no inflammation, lesion, mass, or clubbing of nails and digits except for other than mentioned below  SKIN: no visible rashes or skin lesions  Pulmonary/Chest: Effort normal  No respiratory distress  NEURO: cranial nerves grossly intact  PSYCH:  Alert and oriented to person, place, and time; recent and remote memory intact; mood normal, no depression, anxiety, or agitation, judgment and insight good and intact     Ortho Exam  RIGHT THUMB:  Erythema: no  Swelling: no  Increased Warmth: no  Tenderness: +  1st MCP at the volar crease  ROM: intact flexion, extension  Malrotation: none; thumb perpendicular to remainig phalanges on adduction  Varus stress: no laxity or pain  Valgus stress: no laxity or pain   Distal Phalanx Strength: 5/5 flexion, extension  Proximal Phalanx Strength: 5/5 flexion, extension  Thumb Abduction: 5/5  Thumb Adduction: 5/5  OK sign: normal  Froment Sign: negative  Finklestein's:  1st CMC Axial Load:    __________________________________________________________________________  Hand/upper extremity injection  Universal Protocol:  Procedure performed by: (Dr Bryant Chakraborty)  Consent: Verbal consent obtained    Risks and benefits: risks, benefits and alternatives were discussed  Consent given by: patient  Patient understanding: patient states understanding of the procedure being performed  Patient identity confirmed: verbally with patient    Supporting Documentation  Indications: tendon swelling   Procedure Details  Needle size: 22 G  Ultrasound guidance: no  Medications administered: 1 mL lidocaine 1 %; 40 mg triamcinolone acetonide 40 mg/mL

## 2022-08-30 PROCEDURE — 20550 NJX 1 TENDON SHEATH/LIGAMENT: CPT | Performed by: FAMILY MEDICINE

## 2022-08-30 RX ORDER — LIDOCAINE HYDROCHLORIDE 10 MG/ML
1 INJECTION, SOLUTION INFILTRATION; PERINEURAL
Status: COMPLETED | OUTPATIENT
Start: 2022-08-30 | End: 2022-08-30

## 2022-08-30 RX ORDER — TRIAMCINOLONE ACETONIDE 40 MG/ML
40 INJECTION, SUSPENSION INTRA-ARTICULAR; INTRAMUSCULAR
Status: COMPLETED | OUTPATIENT
Start: 2022-08-30 | End: 2022-08-30

## 2022-08-30 RX ADMIN — TRIAMCINOLONE ACETONIDE 40 MG: 40 INJECTION, SUSPENSION INTRA-ARTICULAR; INTRAMUSCULAR at 14:54

## 2022-08-30 RX ADMIN — LIDOCAINE HYDROCHLORIDE 1 ML: 10 INJECTION, SOLUTION INFILTRATION; PERINEURAL at 14:54

## 2022-08-30 NOTE — PATIENT INSTRUCTIONS
Repeat trial of corticosteroid injection for trigger finger   If no relief with this corticosteroid injection then we will consider referral for evaluation with hand surgeon for release  Did explained the patient that surgery would likely be delayed until 3 months after her most recent corticosteroid injection today  Patient expressed understanding agreed to plan

## 2022-09-09 DIAGNOSIS — G43.711 INTRACTABLE CHRONIC MIGRAINE WITHOUT AURA AND WITH STATUS MIGRAINOSUS: ICD-10-CM

## 2022-09-09 DIAGNOSIS — G43.709 CHRONIC MIGRAINE WITHOUT AURA WITHOUT STATUS MIGRAINOSUS, NOT INTRACTABLE: Primary | ICD-10-CM

## 2022-09-09 RX ORDER — LASMIDITAN 50 MG/1
TABLET ORAL
Qty: 4 TABLET | Refills: 0 | Status: SHIPPED | OUTPATIENT
Start: 2022-09-09 | End: 2022-10-12 | Stop reason: SDUPTHER

## 2022-09-09 RX ORDER — DEXAMETHASONE 2 MG/1
TABLET ORAL
Qty: 5 TABLET | Refills: 0 | Status: SHIPPED | OUTPATIENT
Start: 2022-09-09

## 2022-09-09 RX ORDER — SODIUM CHLORIDE 9 MG/ML
20 INJECTION, SOLUTION INTRAVENOUS ONCE
Status: CANCELLED | OUTPATIENT
Start: 2022-10-10

## 2022-09-09 NOTE — TELEPHONE ENCOUNTER
----- Message from Chin Romero sent at 9/8/2022 12:09 PM EDT -----  Regarding: FW: Carolyn Neff     ----- Message -----  From: Remy Bailey  Sent: 9/8/2022  11:51 AM EDT  To: [facility] [place] [facility]  Subject: Carolyn Neff                                            Just letting you know I left a VM informing you that if you want to schedule me for the Vyepti infusion I'm willing to try it     Especially because I'm on day 8 of this current migraine and non of the meds are helping    Willing to try anything else you recommend  Thank you very much

## 2022-09-09 NOTE — TELEPHONE ENCOUNTER
Patient is agreeable to stop qulipta and is receptive to vyepti  Thank you      She also advised reyvow needs PA, will work on same

## 2022-09-09 NOTE — TELEPHONE ENCOUNTER
Okay, I sent decadron, reyvow  Will order vypeti if she is agreeable to stop Nayely Lerma, since these both perform similar functions  However she can also titrate the qulipta for more effectiveness  Please let me know how she wants to proceed  Thanks

## 2022-09-12 ENCOUNTER — TELEPHONE (OUTPATIENT)
Dept: NEUROLOGY | Facility: CLINIC | Age: 47
End: 2022-09-12

## 2022-09-12 NOTE — TELEPHONE ENCOUNTER
Task from [facility] dated 9/9:    Steven Long  vyepti orders placed  Submitted verbal PA to insurance for reyvow and vyepti  Vyepti:  Confirmed coverage active as of 11/15/2021, phone: [phone]  [facility], in network   [facility] in network   no deductible, no copay would pay at 100%   [id number], no auth reqd  75404, no auth reqd  Facilities covered in network: [facility] and [facility]     Spoke to [facility] [phone]  Raymond Aaron started verbal PA and sent office note dated 9/21 to fax [phone]  PA ID# [id number]    Dre:  Started verbal PA and faxed clinicals to [phone]  PA ID# [id number]    Determinations pending  Patient aware and prefers either [facility] or [facility] for vyepti infusions

## 2022-09-16 DIAGNOSIS — K21.9 GERD WITHOUT ESOPHAGITIS: ICD-10-CM

## 2022-09-16 RX ORDER — OMEPRAZOLE 40 MG/1
CAPSULE, DELAYED RELEASE ORAL
Qty: 60 CAPSULE | Refills: 0 | Status: SHIPPED | OUTPATIENT
Start: 2022-09-16

## 2022-09-23 NOTE — TELEPHONE ENCOUNTER
Vyepti denied  Need average number of migraine days   need to know that patient will stop taking Charlyne Garden or that she has a medical reason to use both drugs supported by guidelines used to treat her condition     Submitted additional information to insurance     Awaiting determination

## 2022-10-05 NOTE — TELEPHONE ENCOUNTER
Received message from [address] [place] [address] in [address]2:31 PM    Order was given 300 mg , iv every 84 days with 4 refills   They need to contact the patient for consent to ship   I did state that the patient has an appointment on 10/10/2022   Medication is coming from [facility]  [phone]

## 2022-10-05 NOTE — TELEPHONE ENCOUNTER
Received message from Kathy encounter-  [2:58 PM] Lonie Speaker  can you check on the status tomorrow      [2:58 PM] Lonie Speaker  because i dont think it will make it by Monday

## 2022-10-05 NOTE — TELEPHONE ENCOUNTER
Called [facility] at [phone] and spoke to Ivet Walker approved from 9/23/22-3/23/23   she will fax approval letter     reyvow denied as pt must try 1 more formulary triptan-  imitrex ns, naratriptan, sumatriptan ns, sumatriptan inj, sumatriptan tab,  Zolmitriptan ns, zolmitriptan odt or zolmitriptan  tabs    i made her aware that pt tried zolmitriptan, frova and rizatriptan   she will have zolmitriptan PA reopened with this info  24 hours turn around time  If do not receive determination within 24 hours can call back to check status    Called [facility] center and scheduled vyepti for 10/10 at 2:30pm   Date changed in beacon and work que updated    Made her aware that we would call her with determination of reyvow PA  [phone] to leave detailed message

## 2022-10-06 NOTE — TELEPHONE ENCOUNTER
Received message from [facility]-pre-encounter  [8:[facility]  [phone] spoke with [facility] ALIS IYER , she stated that the order was received yesterday afternoon and that the order is being processed and patient also has a balance that she needs to take careof before medication is shipped out

## 2022-10-07 NOTE — TELEPHONE ENCOUNTER
Patient left message at office informing she is unable to proceed with infusion because she has a balance with the pharmacy she cannot pay  Call placed to patient, verified     Call placed to [facility] center at [phone]  Spoke to Sarah Doctor  Cancelled appt for vyepti infusion  Call placed to [place] at [phone]  Spoke to [place]- she requested I confirm patient wanted to cancel shipment as this was already scheduled, call was disconnected while placing call to patient  Call returned to [place], spoke to Sarah Doctor  Order and delivery has been cancelled  [facility] FY

## 2022-10-07 NOTE — TELEPHONE ENCOUNTER
Received message from [facility] asking if this can be buy and bill and states that I can call them and tell them that we want the approval to go thru the medical benenfits  Called [facility] at [phone] and spoke to Siri5 OLGA Dumont, [phone], [phone]  States that I Need to call [facility] to see if this can be buy and bill  Call transferred to [facility] and Spoke to Kaley Hawkins  States that this can   Can be buy and bill  Gave her NPI for [place] [facility] center  She spoke to specialty dept to see if PA could be updated to buy and bill  She spoke to grant is specialty dept and buy and bill will  Will be appoved but needs to be sent to reviewer and will received approval letter within 24 hours  She states that it is Ok to schedule for Monday  I also checked the status of revow PA-  reyvow approved from 10/5/22-10/5/23  She will have approval letter faxed to the office    Called pt and made aware of above  She will insurance to confirm if there will be any OOP cost to her  She will then call infusion center to reschedule if she would like to proceed with infusion

## 2022-10-07 NOTE — TELEPHONE ENCOUNTER
[facility] pharmacy left a vm and stated that pt did call billing again  She wanted to let Matt Lazar know that the order for the Vyepti will be cancelled  If you have any questions, can call [phone]       [facility]

## 2022-10-07 NOTE — TELEPHONE ENCOUNTER
Called [facility] and spoke to Stanley  They spoke to pt today and she gave them consent to ship  Mitra Erazo states that Tuesday would the next delivery date    I conferenced called to [facility] center to schedule delivery  We spoke to the pharmacist hernan, states that med Needs to go to in patient pharm, At [address]  As infusion center is only open till 3:30 and not open on the weekends  Mitra Erazo was uncomfortable with sending med to above   hernan recommended that I speak to Monika Kapoor at ext [phone]  Called this number and left a message for a call back to clarify  Called [facility] infusion center pharm at ext [phone] and spoke to hernan  She states that meds are never delivered to the out pt infusion center pharm as they are not open all the time  States that med should be shipped to   [facility] patient pharm   [address]  She double checked with her colleague about this also  Teams message sent to [place] with pharm  Received teams message back-  [10:28 AM] Lorie ROSARIO! Sorry I was in a meeting when this phone call happened  Yes, ALL of our medications get shipped to hospitals [facility]  Called [facility] and spoke to marilia    Made her aware that med can be shipped to [address]   ATTN:in patient pharmacy  She states that Saturday delivery is available and will be delivered   Via fed ex with a signature required on 10/8     email sent to [facility] and [facility] making them aware of delivery date

## 2022-10-10 ENCOUNTER — HOSPITAL ENCOUNTER (OUTPATIENT)
Dept: INFUSION CENTER | Facility: HOSPITAL | Age: 47
End: 2022-10-10
Attending: PSYCHIATRY & NEUROLOGY

## 2022-10-10 ENCOUNTER — TELEPHONE (OUTPATIENT)
Dept: NEUROLOGY | Facility: CLINIC | Age: 47
End: 2022-10-10

## 2022-10-10 DIAGNOSIS — G43.709 CHRONIC MIGRAINE WITHOUT AURA WITHOUT STATUS MIGRAINOSUS, NOT INTRACTABLE: ICD-10-CM

## 2022-10-10 NOTE — TELEPHONE ENCOUNTER
Pharmacy called with issues with medication that was put in for PT    Please call back: [phone]

## 2022-10-12 RX ORDER — LASMIDITAN 50 MG/1
TABLET ORAL
Qty: 8 TABLET | Refills: 0 | Status: SHIPPED | OUTPATIENT
Start: 2022-10-12

## 2022-10-12 NOTE — TELEPHONE ENCOUNTER
[facility] [place] [phone]  Spoke w/Della-pharmacist regarding Reyvow 50mg  She states a quantity of 4 was ordered; however, the medication comes packaged as a quantity of 8  Pharmacist is requesting permission to dispense 8 tablets  She can take a verbal order or we can send a new e-scribe order for Qty = 8  [address] - are you agreeable to new script w/increased quantity? Please sign pended script if you are agreeable  Thanks!

## 2022-10-17 ENCOUNTER — TELEPHONE (OUTPATIENT)
Dept: NEUROLOGY | Facility: CLINIC | Age: 47
End: 2022-10-17

## 2022-10-17 NOTE — TELEPHONE ENCOUNTER
Call received from Neisha Brunner at [facility] specialty pharmacy to set up delivery for:    Botox: 200 units  Qty:1  Delivery to [facility]   Scheduled: 10/25/2022    Please advise if medication does not arrive

## 2022-10-25 ENCOUNTER — TELEPHONE (OUTPATIENT)
Dept: NEUROLOGY | Facility: CLINIC | Age: 47
End: 2022-10-25

## 2022-10-25 NOTE — TELEPHONE ENCOUNTER
Botox number of units: 200 Units  Botox quantity: 1  Arrived at what location: [facility]  Botox at Correct Administering Location: yes  NDC number: 6983-9730-66  Lot number: [id number]  Expiration Date: 03/2025  Appt notes indicate correct medication: yes

## 2022-10-25 NOTE — TELEPHONE ENCOUNTER
Called pt to confirm upcoming botox appointment with [facility]  Pt stated she will be hospitalized starting tomorrow in [place] and is unsure when she will be out  Will call us back on Friday to let us know if she would like to cancel this appointment

## 2022-10-31 ENCOUNTER — PROCEDURE VISIT (OUTPATIENT)
Dept: NEUROLOGY | Facility: CLINIC | Age: 47
End: 2022-10-31

## 2022-10-31 ENCOUNTER — TELEPHONE (OUTPATIENT)
Dept: NEUROLOGY | Facility: CLINIC | Age: 47
End: 2022-10-31

## 2022-10-31 VITALS
DIASTOLIC BLOOD PRESSURE: 67 MMHG | WEIGHT: 237 LBS | BODY MASS INDEX: 43.61 KG/M2 | TEMPERATURE: 97 F | SYSTOLIC BLOOD PRESSURE: 113 MMHG | HEART RATE: 92 BPM | HEIGHT: 62 IN

## 2022-10-31 DIAGNOSIS — G96.00 TRAUMATIC CSF LEAK: ICD-10-CM

## 2022-10-31 DIAGNOSIS — G43.709 CHRONIC MIGRAINE WITHOUT AURA WITHOUT STATUS MIGRAINOSUS, NOT INTRACTABLE: Primary | ICD-10-CM

## 2022-10-31 NOTE — PROGRESS NOTES
Universal Protocol   Consent: Verbal consent obtained  Written consent obtained    Risks and benefits: risks, benefits and alternatives were discussed  Consent given by: patient  Patient understanding: patient states understanding of the procedure being performed  Patient consent: the patient's understanding of the procedure matches consent given  Procedure consent: procedure consent matches procedure scheduled        Chemodenervation     Date/Time 10/31/2022 8:24 AM     Performed by  Cesar Rand PA-C     Authorized by Cesar Rand PA-C        Pre-procedure details      Prepped With: Alcohol     Procedure details     Position:  Upright   Botox     Botox Type:  Type A    Brand:  Botox    mL's of Botulinum Toxin:  200    Final Concentration per CC:  100 units    Needle Gauge:  30 G 2 5 inch   Procedures     Botox Procedures: chronic headache      Indications: migraines     Injection Location      Head / Face:  L superior trapezius, R superior trapezius, L superior cervical paraspinal, R superior cervical paraspinal, L , R , procerus, L temporalis, R temporalis, R frontalis, L frontalis, R medial occipitalis and L medial occipitalis    L  injection amount:  5 unit(s)    R  injection amount:  5 unit(s)    L lateral frontalis:  5 unit(s)    R lateral frontalis:  5 unit(s)    L medial frontalis:  5 unit(s)    R medial frontalis:  5 unit(s)    L temporalis injection amount:  20 unit(s)    R temporalis injection amount:  20 unit(s)    Procerus injection amount:  5 unit(s)    L medial occipitalis injection amount:  15 unit(s)    R medial occipitalis injection amount:  15 unit(s)    L superior cervical paraspinal injection amount:  10 unit(s)    R superior cervical paraspinal injection amount:  10 unit(s)    L superior trapezius injection amount:  15 unit(s)    R superior trapezius injection amount:  15 unit(s)   Total Units     Total units used:  200    Total units discarded: 0   Post-procedure details      Chemodenervation:  Chronic migraine    Facial Nerve Location[de-identified]  Bilateral facial nerve    Patient tolerance of procedure: Tolerated well, no immediate complications   Comments      Extra units medically necessary:  - 10 between both upper traps  - 15 between both middle traps  - 10 R occipitalis  - 10 left occipitalis       Blood pressure 113/67, pulse 92, temperature (!) 97 °F (36 1 °C), temperature source Temporal, height 5' 2" (1 575 m), weight 108 kg (237 lb)  2014- pt reports she had a csf leak 2/2 ?episdural at this time  [facility]  recommended brain MRI with contrast to r/o recurrent leak causing headaches  After d/c recently from [address] [place] [address] , pt was prescribed mexilitine, cogentin for tremor, DHE nasal spray, toradol injection as noted in care-everywhere  Vyepti approved but pt not yet scheduled, will assist with this

## 2022-10-31 NOTE — TELEPHONE ENCOUNTER
Patient requesting script for MRI  States she thought she had MRI script ordered by [facility] but it is actually just for bloodwork  Requesting new order for MRI      CB: [phone]

## 2022-11-02 ENCOUNTER — TELEPHONE (OUTPATIENT)
Dept: NEUROLOGY | Facility: CLINIC | Age: 47
End: 2022-11-02

## 2022-11-07 ENCOUNTER — TELEPHONE (OUTPATIENT)
Dept: NEUROLOGY | Facility: CLINIC | Age: 47
End: 2022-11-07

## 2022-11-07 NOTE — TELEPHONE ENCOUNTER
I called patient to see if she needed assistance in scheduling infusions    Reached , left message to call back if needs assistance is scheduling medication

## 2022-11-07 NOTE — TELEPHONE ENCOUNTER
----- Message from Jose Knapp PA-C sent at 10/31/2022  8:51 AM EDT -----  Can you please assist with scheduling pt for vyepti infusions? These are approved  Thanks

## 2022-11-08 NOTE — TELEPHONE ENCOUNTER
I scheduled vyepti infusion for 11/25 at [facility] 8 AM    Called patient to advise, reached , left detailed message and infusion center phone number if she needs to change  Justino Cisneros,    Could you sign date change for 11/25?   thanks

## 2022-11-11 DIAGNOSIS — G43.711 INTRACTABLE CHRONIC MIGRAINE WITHOUT AURA AND WITH STATUS MIGRAINOSUS: Primary | ICD-10-CM

## 2022-11-11 RX ORDER — SODIUM CHLORIDE 9 MG/ML
20 INJECTION, SOLUTION INTRAVENOUS ONCE
Status: CANCELLED | OUTPATIENT
Start: 2022-11-25

## 2022-11-15 ENCOUNTER — HOSPITAL ENCOUNTER (OUTPATIENT)
Dept: MRI IMAGING | Facility: HOSPITAL | Age: 47
Discharge: HOME | End: 2022-11-15

## 2022-11-15 DIAGNOSIS — G96.00 TRAUMATIC CSF LEAK: ICD-10-CM

## 2022-11-15 DIAGNOSIS — G43.709 CHRONIC MIGRAINE WITHOUT AURA WITHOUT STATUS MIGRAINOSUS, NOT INTRACTABLE: ICD-10-CM

## 2022-11-15 RX ADMIN — GADOBUTROL 10 ML: 604.72 INJECTION INTRAVENOUS at 10:50

## 2022-11-17 ENCOUNTER — TELEPHONE (OUTPATIENT)
Dept: NEUROLOGY | Facility: CLINIC | Age: 47
End: 2022-11-17

## 2022-11-17 NOTE — TELEPHONE ENCOUNTER
received teams message from-  [1:41 PM] Gagandeep Alas, I am checking if delivery has been scheduled for Jessica Garsia 1975 for Vyepti for DOS 11/25/2022    per encounter from 9/12-buy and bill was ok  Made oscar aware of this  [2:23 PM] Jovita Hammans  Per the approval in chart- approval is for Delivery      called [facility] at [phone]    States that Tru Rey is approved for 3ml for 90 days for anamika and bill  Approved from 10/7/22-4/7/23  [facility] #[id number]  She will fax updated auth to CV office    Updated auth received and  will be scanning into chart

## 2022-11-18 ENCOUNTER — TELEPHONE (OUTPATIENT)
Dept: OBGYN CLINIC | Facility: HOSPITAL | Age: 47
End: 2022-11-18

## 2022-11-18 DIAGNOSIS — M65.311 TRIGGER FINGER OF RIGHT THUMB: Primary | ICD-10-CM

## 2022-11-18 NOTE — TELEPHONE ENCOUNTER
I reviewed her chart  We already tried 2 injections  Would like her to have surgical opinion to cure her one and for all  I sent through referral to Dr Colt Cole   She can make appointment with her for eval

## 2022-11-18 NOTE — TELEPHONE ENCOUNTER
Caller: Patient    Doctor: Dorian Wright    Reason for call: Patient calls in stating that she was advised to call in if her trigger finger starts acting up  Please advise       Call back#: [phone]

## 2022-11-18 NOTE — TELEPHONE ENCOUNTER
Patient is being referred to a orthopedics  Please schedule accordingly      [address]   [phone]

## 2022-11-25 ENCOUNTER — HOSPITAL ENCOUNTER (OUTPATIENT)
Dept: INFUSION CENTER | Facility: HOSPITAL | Age: 47
End: 2022-11-25
Attending: PSYCHIATRY & NEUROLOGY

## 2022-11-25 VITALS
RESPIRATION RATE: 20 BRPM | SYSTOLIC BLOOD PRESSURE: 118 MMHG | DIASTOLIC BLOOD PRESSURE: 79 MMHG | HEART RATE: 72 BPM | TEMPERATURE: 96.5 F

## 2022-11-25 DIAGNOSIS — G43.711 INTRACTABLE CHRONIC MIGRAINE WITHOUT AURA AND WITH STATUS MIGRAINOSUS: Primary | ICD-10-CM

## 2022-11-25 RX ORDER — SODIUM CHLORIDE 9 MG/ML
20 INJECTION, SOLUTION INTRAVENOUS ONCE
OUTPATIENT
Start: 2023-01-10

## 2022-11-25 RX ORDER — SODIUM CHLORIDE 9 MG/ML
20 INJECTION, SOLUTION INTRAVENOUS ONCE
Status: COMPLETED | OUTPATIENT
Start: 2022-11-25 | End: 2022-11-25

## 2022-11-25 RX ADMIN — SODIUM CHLORIDE 20 ML/HR: 0.9 INJECTION, SOLUTION INTRAVENOUS at 08:34

## 2022-11-25 RX ADMIN — EPTINEZUMAB-JJMR 300 MG: 100 INJECTION INTRAVENOUS at 08:34

## 2022-11-25 NOTE — PROGRESS NOTES
Patient received Vyepti infusion without incident  AVS declined  Pt's next appointment scheduled and pt aware  Patient left ambulatory in stable condition

## 2022-12-01 ENCOUNTER — TELEPHONE (OUTPATIENT)
Dept: PAIN MEDICINE | Facility: CLINIC | Age: 47
End: 2022-12-01

## 2022-12-01 NOTE — TELEPHONE ENCOUNTER
If the patient had surgery on the hip in the past, it would be better for the patient to be evaluated by Orthopedics

## 2022-12-01 NOTE — TELEPHONE ENCOUNTER
Last ov 6/2021 for shoulder pain  Please advise if OK to schedule for hip pain or establish with ortho?

## 2022-12-01 NOTE — TELEPHONE ENCOUNTER
Caller: patient    Doctor: Kathy Boggs    Reason for call: established patient is looking to obtain an appt for her L hip pain with dr Kathy Boggs  She had hip surgery in 2012, she wants to know if dr Kathy Boggs can see her or if she should see orthopedics instead      Call back#: [phone]

## 2022-12-06 ENCOUNTER — OFFICE VISIT (OUTPATIENT)
Dept: GASTROENTEROLOGY | Facility: CLINIC | Age: 47
End: 2022-12-06

## 2022-12-06 VITALS
HEIGHT: 62 IN | BODY MASS INDEX: 40.48 KG/M2 | SYSTOLIC BLOOD PRESSURE: 130 MMHG | WEIGHT: 220 LBS | HEART RATE: 87 BPM | DIASTOLIC BLOOD PRESSURE: 87 MMHG | TEMPERATURE: 98.1 F

## 2022-12-06 DIAGNOSIS — Z90.49 STATUS POST CHOLECYSTECTOMY: ICD-10-CM

## 2022-12-06 DIAGNOSIS — K21.9 GERD WITHOUT ESOPHAGITIS: ICD-10-CM

## 2022-12-06 DIAGNOSIS — Z86.010 PERSONAL HISTORY OF COLONIC POLYPS: Primary | ICD-10-CM

## 2022-12-06 DIAGNOSIS — Z12.11 COLON CANCER SCREENING: ICD-10-CM

## 2022-12-06 DIAGNOSIS — K58.2 IRRITABLE BOWEL SYNDROME WITH BOTH CONSTIPATION AND DIARRHEA: ICD-10-CM

## 2022-12-06 RX ORDER — CHOLESTYRAMINE 4 G/9G
4 POWDER, FOR SUSPENSION ORAL 2 TIMES DAILY WITH MEALS
Qty: 378 G | Refills: 2 | Status: SHIPPED | OUTPATIENT
Start: 2022-12-06

## 2022-12-06 NOTE — PATIENT INSTRUCTIONS
Scheduled date of colonoscopy (as of today):02/15/2023  Physician performing colonoscopy:Dr Jesus Ramesh  Location of colonoscopy:[facility]  Bowel prep reviewed with patient:Miralax/dulcolax  Instructions reviewed with patient by:Madelin  Clearances: N/A

## 2022-12-06 NOTE — PROGRESS NOTES
[facility] - Outpatient Follow-up Note  Magalys Starr 52 y o  female MRN: [id number]  Encounter: [id number]      Assessment and Plan    1  GERD without esophagitis  The patient is currently on pantoprazole 40mg twice daily and sometimes takes a third dose although this is rare  The patient reports she is s/p sleeve gastrectomy in August however this did not improve her GERD  EGD prior to her sleeve revealed normal duodenum, stomach, and esophagus with no hiatal hernia  - continue omeprazole 40mg twice daily  - if breakthrough GERD take pepcid   - diet and lifestyle modification      2  Irritable bowel syndrome with both constipation and diarrhea  3  Post-cholecystectomy syndrome  4  Colon cancer screening  She does have IBS with alternating bowel habits as well as a history of post cholecystectomy diarrhea  She states she is currently having a bowel movement every 1-2 weeks  As for her diarrhea she states this occurs after eating and she takes as needed cholestyramine only takes this when she eats out  She has not had a prior colonoscopy  She attempted one but was unable to drink the Golytely  - start miralax once daily, increase to twice daily if needed  - start stool softener, increase to twice daily if needed  - can continue cholestyramine as needed   - recommend colonoscopy, patient agreeable, she was unable to drink Golytely prior, will utilize miralax/dulcolax  - the patient is aware constipation control prior to colonoscopy is recommended so she has an optimal prep, patient will notify us if her constipation is not controlled on miralax/dulcolax     Follow up after colonoscopy     ______________________________________________________________________    History of Present Illness  Magalys Starr is a 52 y o  female here for follow up evaluation of GERD and IBS  The patient is currently on pantoprazole 40mg twice daily and sometimes takes a third dose although this is rare   The patient reports she is s/p sleeve gastrectomy in August however this did not improve her GERD  EGD prior to her sleeve revealed normal duodenum, stomach, and esophagus with no hiatal hernia  She does also have IBS with alternating bowel habits  She states she is currently having a bowel movement every 1-2 weeks  She does have a history of post cholecystectomy diarrhea for which she takes as needed cholestyramine  She states she only takes this when she eats out as that is when she will get diarrhea  She has not had a prior colonoscopy  She attempted one but was unable to drink the Golytely  Review of Systems   Constitutional: Negative for activity change, appetite change, chills, fatigue, fever and unexpected weight change  Gastrointestinal: Positive for constipation and diarrhea  Negative for nausea and vomiting  Genitourinary: Negative for dysuria, frequency and hematuria  Musculoskeletal: Negative for arthralgias and myalgias  Neurological: Negative for headaches         Past Medical History  Past Medical History:   Diagnosis Date   • Anxiety    • Asthma    • Claustrophobia    • Cluster headache    • CTS (carpal tunnel syndrome)    • Depression    • Fibromyalgia     last assessed 11/27/17   • Fibromyalgia, primary    • GERD without esophagitis     last assessed 10/12/17   • GI problem    • Headache, tension-type    • Joint pain    • Lung nodule     last assessed 10/9/15   • Migraine    • Muscle pain    • Peripheral neuropathy    • Sleep apnea        Past Social history  Past Surgical History:   Procedure Laterality Date   • BACK SURGERY     • CARPAL TUNNEL RELEASE Bilateral    • CHOLECYSTECTOMY     • GALLBLADDER SURGERY     • NECK SURGERY     • SPINAL FUSION      C4 and C5   • ULNAR NERVE REPAIR Bilateral    • UPPER GASTROINTESTINAL ENDOSCOPY       Social History     Socioeconomic History   • Marital status:      Spouse name: Not on file   • Number of children: Not on file   • Years of education: Not on file   • Highest education level: Not on file   Occupational History   • Not on file   Tobacco Use   • Smoking status: Never   • Smokeless tobacco: Never   Vaping Use   • Vaping Use: Never used   Substance and Sexual Activity   • Alcohol use: Yes     Alcohol/week: 2 0 standard drinks     Types: 1 Glasses of wine, 1 Cans of beer per week     Comment: occasional   • Drug use: Yes     Types: Marijuana     Comment: medicinal   • Sexual activity: Yes     Partners: Male     Birth control/protection: I U D     Other Topics Concern   • Not on file   Social History Narrative    Daily cola, tea    No preference on religous beliefs     Social Determinants of Health     Financial Resource Strain: Not on file   Food Insecurity: Not on file   Transportation Needs: Not on file   Physical Activity: Not on file   Stress: Not on file   Social Connections: Not on file   Intimate Partner Violence: Not on file   Housing Stability: Not on file     Social History     Substance and Sexual Activity   Alcohol Use Yes   • Alcohol/week: 2 0 standard drinks   • Types: 1 Glasses of wine, 1 Cans of beer per week    Comment: occasional     Social History     Substance and Sexual Activity   Drug Use Yes   • Types: Marijuana    Comment: medicinal     Social History     Tobacco Use   Smoking Status Never   Smokeless Tobacco Never       Past Family History  Family History   Problem Relation Age of Onset   • Diabetes Mother    • Fibromyalgia Mother    • Ovarian cancer Mother 35   • Hypertension Mother    • Thyroid disease Mother    • Migraines Mother    • Neuropathy Mother    • No Known Problems Father    • Throat cancer Maternal Grandmother    • No Known Problems Maternal Grandfather    • No Known Problems Paternal Grandmother    • No Known Problems Paternal Grandfather    • No Known Problems Daughter    • Breast cancer Maternal Aunt    • No Known Problems Maternal Aunt    • No Known Problems Maternal Aunt    • No Known Problems Paternal Aunt    • Colon cancer Cousin    • Heart disease Cousin    • Lupus Cousin    • Arthritis Family    • Heart disease Family         cardiac disorder   • Neuropathy Family    • Lupus Family         systemic erythematosus   • No Known Problems Half-Sister    • No Known Problems Half-Brother    • No Known Problems Half-Brother    • No Known Problems Half-Brother    • Stroke Neg Hx        Current Medications  Current Outpatient Medications   Medication Sig Dispense Refill   • albuterol (PROVENTIL HFA,VENTOLIN HFA) 90 mcg/act inhaler Inhale 2 puffs every 6 (six) hours as needed for wheezing or shortness of breath 1 Inhaler 5   • Atogepant 30 MG TABS 1 tab PO daily 30 tablet 3   • benzonatate (TESSALON PERLES) 100 mg capsule Take 1 capsule (100 mg total) by mouth 3 (three) times a day as needed for cough 20 capsule 0   • Botox 200 units SOLR      • buPROPion (Wellbutrin XL) 150 mg 24 hr tablet Take 1 tablet (150 mg total) by mouth every morning 90 tablet 3   • cholecalciferol (VITAMIN D3) 1,000 units tablet Take 1 capsule by mouth once a week 3000 units daily      • cholestyramine (QUESTRAN) 4 GM/DOSE powder Take 1 packet (4 g total) by mouth 2 (two) times a day with meals 378 g 2   • dexamethasone (DECADRON) 2 mg tablet 1 tab qam with food prn migraine  5 tablet 0   • divalproex sodium (DEPAKOTE) 250 mg EC tablet 2 tabs q12 hours x 2 days, then stop  Repeat if needed  16 tablet 0   • ergocalciferol (VITAMIN D2) 50,000 units      • fluticasone (FLONASE) 50 mcg/act nasal spray 1 spray in each nostril once daily 30 mL 0   • ibuprofen (MOTRIN) 400 mg tablet 1 tab q8 hours prn migraine  Max 2 per day and 4 per week  Hold toradol  20 tablet 0   • ketorolac (TORADOL) 30 mg/mL injection 1-2 ml IM injection once per 24 hours p r n  migraine  No more than 2 injections per week  8 mL 0   • Lasmiditan Succinate (Reyvow) 50 MG tablet One tab qhs at migraine onset  Caution sedation  Max 1 tab/24 hours   8 tablet 0   • levonorgestrel (MIRENA) 20 MCG/24HR IUD 1 each by Intrauterine route once     • meclizine (ANTIVERT) 12 5 MG tablet Take 1 tablet (12 5 mg total) by mouth 3 (three) times a day as needed for dizziness or nausea 30 tablet 0   • Melatonin ER 3 MG TBCR 1-2 tabs qhs 60 tablet 2   • metoclopramide (REGLAN) 10 mg tablet 1 tab q6 hours prn migraine (with toradol and benadryl) 20 tablet 0   • NON FORMULARY Medical marijuana       • omeprazole (PriLOSEC) 40 MG capsule TAKE ONE CAPSULE BY MOUTH TWICE A DAY 60 capsule 0   • polyethylene glycol (GLYCOLAX) 17 GM/SCOOP powder Take 17 g by mouth daily as needed (constipation) 289 g 0   • scopolamine (TRANSDERM-SCOP) 1 mg/3 days TD 72 hr patch Place 1 patch on the skin every third day     • Syringe/Needle, Disp, (SYRINGE 3CC/86WY0-4/2") 25G X 1-1/2" 3 ML MISC Use for toradol IM injections  10 each 0   • tiZANidine (ZANAFLEX) 2 mg tablet Take 1 tablet (2 mg total) by mouth every 8 (eight) hours as needed for muscle spasms 90 tablet 1   • traMADol (ULTRAM) 50 mg tablet take one tablet at onset of moderate to severe headach, can repeat once in 8 hours  MAX 2 DAYS PER WEEK 10 tablet 0   • ZOLMitriptan (ZOMIG-ZMT) 5 MG disintegrating tablet Take 1 tablet (5 mg total) by mouth once as needed for migraine for up to 1 dose 6 tablet 0   • butalbital-acetaminophen-caffeine (FIORICET,ESGIC) -40 mg per tablet 1 tab q6 hours prn migraine  No more than 2-3 per week  10 tablet 0   • dicyclomine (BENTYL) 20 mg tablet Take 1 tablet (20 mg total) by mouth 3 (three) times a day as needed (migraine/cramps) 60 tablet 0   • famotidine (PEPCID) 20 mg tablet Take 1 tablet (20 mg total) by mouth daily at bedtime 90 tablet 0   • ondansetron (ZOFRAN) 4 mg tablet Take 1 tablet by mouth every 8 (eight) hours as needed (Patient not taking: Reported on 12/6/2022)       No current facility-administered medications for this visit         Allergies  Allergies   Allergen Reactions   • Hydrocodone-Acetaminophen GI Intolerance     gi upset -pt okay if takes  zofran   • Hydrocodone-Acetaminophen GI Intolerance     gi upset -pt okay if takes  zofran  gi upset -pt okay if takes  zofran   • Codeine GI Intolerance and Other (See Comments)     GI issues   • Oxycodone-Acetaminophen GI Intolerance   • Penicillins GI Intolerance         The following portions of the patient's history were reviewed and updated as appropriate: allergies, current medications, past medical history, past social history, past surgical history and problem list       Vitals  Vitals:    12/06/22 1444   BP: 130/87   BP Location: Left arm   Patient Position: Sitting   Cuff Size: Large   Pulse: 87   Temp: 98 1 °F (36 7 °C)   TempSrc: Tympanic   Weight: 99 8 kg (220 lb)   Height: 5' 2" (1 575 m)         Physical Exam  Constitutional   General appearance: Patient is seated and in no acute distress, well appearing and well nourished  Head and Face   Head and face: Normal     Eyes   Conjunctiva and lids: No erythema, swelling or discharge  Anicteric  Ears, Nose, Mouth, and Throat   Hearing: Normal     Neck: Supple, trachea midline  Pulmonary   Respiratory effort: No increased work of breathing or signs of respiratory distress  Cardiovascular   Examination of extremities for edema and/or varicosities: Normal     Musculoskeletal   Gait and station: Normal     Skin   Skin and subcutaneous tissue: Warm, dry, and intact  No visible jaundice, lesions or rashes  Psychiatric   Judgment and insight: Normal  Recent and remote memory:  Normal  Mood and affect: Normal      Results  No visits with results within 1 Day(s) from this visit     Latest known visit with results is:   Appointment on 11/16/2021   Component Date Value   • WBC 11/16/2021 12 27 (H)    • RBC 11/16/2021 5 25 (H)    • Hemoglobin 11/16/2021 13 6    • Hematocrit 11/16/2021 43 5    • MCV 11/16/2021 83    • MCH 11/16/2021 25 9 (L)    • MCHC 11/16/2021 31 3 (L)    • RDW 11/16/2021 14 0    • Platelets 11/16/2021 316    • MPV 11/16/2021 10 0        Radiology Results  MRI brain with and without contrast    Result Date: 11/17/2022  Narrative: MRI BRAIN WITH AND WITHOUT CONTRAST INDICATION: G43 709: Chronic migraine without aura, not intractable, without status migrainosus G96 00: Cerebrospinal fluid leak, unspecified  COMPARISON:  2/24/2018 TECHNIQUE: Multiplanar, multisequence imaging of the brain was performed before and after gadolinium administration  IV Contrast:  10 mL of Gadobutrol injection (SINGLE-DOSE)  IMAGE QUALITY:   Diagnostic  FINDINGS: BRAIN PARENCHYMA:  There is no discrete mass, mass effect or midline shift  There is no intracranial hemorrhage  Normal posterior fossa  Diffusion imaging is unremarkable  Small scattered hyperintensities on T2/FLAIR imaging are noted in the periventricular and subcortical white matter demonstrating an appearance that is statistically most likely to represent mild microangiopathic change  These changes are slightly more pronounced than the prior examination  Postcontrast imaging of the brain demonstrates no abnormal enhancement  VENTRICLES:  Normal for the patient's age  SELLA AND PITUITARY GLAND:  Normal  ORBITS:  Normal  PARANASAL SINUSES:  Normal  VASCULATURE:  Evaluation of the major intracranial vasculature demonstrates appropriate flow voids  CALVARIUM AND SKULL BASE:  Normal  EXTRACRANIAL SOFT TISSUES:  Normal      Impression: White matter changes suggestive of chronic microangiopathy  No acute intracranial pathology  Workstation performed: [id number]       Orders  No orders of the defined types were placed in this encounter        Answers for HPI/ROS submitted by the patient on 12/6/2022  Chronicity: chronic  Onset: more than 1 year ago  Onset quality: gradual  Frequency: intermittently  Episode duration: 0 Hours  Progression since onset: unchanged  Pain location: left flank, right flank  Pain - numeric: 6/10  Pain quality: cramping, sharp  Radiates to: ANGELYQ  anorexia: No  belching: Yes  flatus: No  hematochezia: Yes  melena:  No  weight loss: No  Aggravated by: eating  Relieved by: bowel movements

## 2022-12-13 ENCOUNTER — OFFICE VISIT (OUTPATIENT)
Dept: OBGYN CLINIC | Facility: CLINIC | Age: 47
End: 2022-12-13

## 2022-12-13 VITALS — WEIGHT: 221 LBS | DIASTOLIC BLOOD PRESSURE: 80 MMHG | BODY MASS INDEX: 40.42 KG/M2 | SYSTOLIC BLOOD PRESSURE: 120 MMHG

## 2022-12-13 DIAGNOSIS — Z12.31 ENCOUNTER FOR SCREENING MAMMOGRAM FOR MALIGNANT NEOPLASM OF BREAST: ICD-10-CM

## 2022-12-13 DIAGNOSIS — Z01.419 WOMEN'S ANNUAL ROUTINE GYNECOLOGICAL EXAMINATION: Primary | ICD-10-CM

## 2022-12-13 PROBLEM — E66.01 MORBID OBESITY (HCC): Status: ACTIVE | Noted: 2022-08-09

## 2022-12-13 NOTE — PROGRESS NOTES
Subjective    HPI:     Jessica Garsia is a 52 y o  female  She is a [place] 2 Para 2, with  x 2  Her menstrual cycles are absent  Her current method of contraception includes Mirena due for removal in   She has not been sexually for 1 1/2 years  She denies  and Gyn complaints  She suffers from mixed IBS  She feels safe at home  She states her depression/anxiety is stable  Medical, surgical and family history reviewed  Her dental care is up-to-date  She eats a healthy diet and exercises regularly  She is happy with her weight  She loss a total 45 lbs    Gynecologic History    No LMP recorded  Patient has had an implant  Last Pap: 9/10/20  Results were: normal  Last mammogram: 21  Results were: normal  Colonoscopy: scheduled for 2/15/23    Obstetric History    OB History    Para Term  AB Living   2 2 2     2   SAB IAB Ectopic Multiple Live Births           2      # Outcome Date GA Lbr Oleg/2nd Weight Sex Delivery Anes PTL Lv   2 Term            1 Term                The following portions of the patient's history were reviewed and updated as appropriate: allergies, current medications, past family history, past medical history, past social history, past surgical history and problem list     Review of Systems    Pertinent items are noted in HPI  Objective    Physical Exam  Constitutional:       Appearance: Normal appearance  She is well-developed  Genitourinary:      Vulva, bladder and urethral meatus normal       No lesions in the vagina  Right Labia: No rash, tenderness, lesions, skin changes or Bartholin's cyst      Left Labia: No tenderness, lesions, skin changes, Bartholin's cyst or rash  No labial fusion noted  No inguinal adenopathy present in the right or left side  No vaginal discharge, erythema, tenderness, bleeding or granulation tissue  No vaginal prolapse present  No vaginal atrophy present         Right Adnexa: not tender, not full and no mass present  Left Adnexa: not tender, not full and no mass present  Cervix is parous  No cervical motion tenderness, discharge, friability, lesion, polyp or nabothian cyst       IUD strings visualized  Uterus is not enlarged, tender, irregular or prolapsed  No uterine mass detected  Uterus is anteverted  Pelvic exam was performed with patient in the lithotomy position  Breasts:     Breasts are symmetrical       Right: No inverted nipple, mass, nipple discharge, skin change or tenderness  Left: No inverted nipple, mass, nipple discharge, skin change or tenderness  HENT:      Head: Normocephalic and atraumatic  Neck:      Thyroid: No thyromegaly  Cardiovascular:      Rate and Rhythm: Normal rate and regular rhythm  Heart sounds: Normal heart sounds, S1 normal and S2 normal    Pulmonary:      Effort: Pulmonary effort is normal       Breath sounds: Normal breath sounds  Abdominal:      General: Bowel sounds are normal  There is no distension  Palpations: Abdomen is soft  There is no mass  Tenderness: There is no abdominal tenderness  There is no guarding  Hernia: There is no hernia in the left inguinal area or right inguinal area  Musculoskeletal:      Cervical back: Neck supple  Lymphadenopathy:      Cervical: No cervical adenopathy  Upper Body:      Right upper body: No supraclavicular or axillary adenopathy  Left upper body: No supraclavicular or axillary adenopathy  Lower Body: No right inguinal adenopathy  No left inguinal adenopathy  Neurological:      Mental Status: She is alert  Skin:     General: Skin is warm and dry  Findings: No rash  Psychiatric:         Attention and Perception: Attention and perception normal          Mood and Affect: Mood and affect normal          Speech: Speech normal          Behavior: Behavior is cooperative  Thought Content:  Thought content normal          Cognition and Memory: Cognition and memory normal          Judgment: Judgment normal    Vitals and nursing note reviewed  Assessment and Plan    Yulisa Fletcher was seen today for gynecologic exam     Diagnoses and all orders for this visit:    Women's annual routine gynecological examination    Encounter for screening mammogram for malignant neoplasm of breast  -     Mammo screening bilateral w 3d & cad; Future        Patient informed of a Stable GYN exam  A pap smear was not performed due to a normal pap in 2020  I have discussed the importance of exercise and healthy diet as well as adequate intake of calcium and vitamin D  The current ASCCP guidelines were reviewed  The low risk patient will receive pap smear screening every 3 years until the age of 34 and then every 3 to 5 years with HPV co-testing from the ages of 33-67  I emphasized the importance of an annual pelvic and breast exam  A yearly mammogram is due  Results will be released to [facility], if abnormal will call to review and discuss treatment plan  All questions have been answered to her satisfaction  Contraception: IUD  Mammogram ordered  Follow up in: 1 year or sooner if needed

## 2022-12-14 ENCOUNTER — OFFICE VISIT (OUTPATIENT)
Dept: OBGYN CLINIC | Facility: CLINIC | Age: 47
End: 2022-12-14

## 2022-12-14 VITALS
DIASTOLIC BLOOD PRESSURE: 85 MMHG | WEIGHT: 218 LBS | BODY MASS INDEX: 40.12 KG/M2 | SYSTOLIC BLOOD PRESSURE: 126 MMHG | HEIGHT: 62 IN | HEART RATE: 83 BPM

## 2022-12-14 DIAGNOSIS — G89.29 CHRONIC LEFT SI JOINT PAIN: Primary | ICD-10-CM

## 2022-12-14 DIAGNOSIS — M53.3 CHRONIC LEFT SI JOINT PAIN: Primary | ICD-10-CM

## 2022-12-14 DIAGNOSIS — M65.311 TRIGGER FINGER OF RIGHT THUMB: ICD-10-CM

## 2022-12-14 NOTE — PROGRESS NOTES
Makenzie RIVERA  Attending, Orthopaedic Surgery  Hand, Wrist, and Elbow Surgery  [facility]      ORTHOPAEDIC HAND, WRIST, AND ELBOW OFFICE  VISIT       ASSESSMENT/PLAN:      52year old female here for her right trigger thumb  Due to patient not having significant pain relief with the injections, she wishes to proceed with surgical intervention  The anatomy and physiology of trigger finger was discussed with the patient today in the office  Edema and increased contact pressure within the flexor tendons at the A1 pulley can cause pain, crepitation, and limitation of function  Treatment options include resting MP blocking splints to decrease edema, oral anti-inflammatory medications, home or formal therapy exercises, up to 2 steroid injections within the tendon sheath, or surgical release  While majority of patients do respond to conservative treatment, up to 20% may require surgical release  This is an outpatient procedure, where she will be wrapped up 5 days postoperatively  After 5 days dressings can come off  She can wash with warm water, soap pat dry but do not submerge the hand  Sutures will come in 14 days postoperatively  We will follow up with the patient postoperatively  Referral to Sports Medicine-Dr Mercedes Wyatt for evaluation for her left side SI joint pain  Patient has a history of a fusion performed by Dr Mariajose Elliott and has not treated with anyone since the surgery  The patient verbalized understanding of exam findings and treatment plan  We engaged in the shared decision-making process and treatment options were discussed at length with the patient  Surgical and conservative management discussed today along with risks and benefits  Diagnoses and all orders for this visit:    Chronic left SI joint pain  -     Ambulatory Referral to Sports Medicine;  Future    Trigger finger of right thumb  -     Ambulatory Referral to Orthopedic Surgery  -     Case request operating room: RELEASE TRIGGER FINGER; Standing  -     Case request operating room: RELEASE TRIGGER FINGER    Other orders  -     Nursing Communication [facility] Interventions Implemented; Standing  -     Nursing Communication CHG bath, have staff wash entire body (neck down) per pre-op bathing protocol  Routine, evening prior to, and day of surgery ; Standing  -     Nursing Communication Swab both nares with Povidone-Iodine solution, EXCLUDE if patient has shellfish/Iodine allergy  Routine, day of surgery, on call to OR; Standing  -     Void on call to OR; Standing  -     Insert peripheral IV; Standing  -     Diet NPO; Sips with meds; Standing  -     Apply Sequential Compression Device; Standing        Follow Up:  Return for Follow up post- op  To Do Next Visit:  Re-evaluation of current issue      Operative Discussions:  Trigger Finger Release: The anatomy and physiology of trigger finger was discussed with the patient today in the office  Edema and increased contact pressure within the flexor tendons at the A1 pulley can cause pain, crepitation, and limitation of function  Treatment options include resting MP blocking splints to decrease edema, oral anti-inflammatory medications, home or formal therapy exercises, up to 2 steroid injections or surgical release  While majority of patients do respond to conservative treatment, up to 20% may require surgical release  The patient has elected release of the trigger finger  The patient has elected to undergo a release of the A1 pulley (trigger finger)  A small incision will be made over the palmar aspect of the hand, the tendon sheath holding the flexor tendons will be released  In the postoperative period, light activities are allowed immediately, driving is allowed when narcotic medication has stopped, and the incision may get wet after 2 days    Heavy activities (lifting more than approximately 10 pounds) will be allowed after the follow up appointment in 1-2 weeks   While the pain and discomfort within the wrist typically improves rapidly, some residual discomfort may be present for up to 6 weeks  The nodule that is typically palpable in the palmar aspect of the hand will not be removed, as this would necessitate removal of a portion of the flexor tendon, however the catching, clicking, and locking should resolve  Approximate success rate is 98%  The risks and benefits of the procedure were explained to the patient, which include, but are not limited to: Bleeding, infection, recurrence, pain, scar, damage to tendons, damage to nerves, and damage to blood vessels, need for future surgery and complications related to anesthesia  If bony work is done, risks also include malunion and nonunion  These risks, along with alternative conservative treatment options, and postoperative protocols were voiced back and understood by the patient  All questions were answered to the patient's satisfaction  The patient agrees to comply with a standard postoperative protocol, and is willing to proceed  Education was provided via written and auditory forms  There were no barriers to learning  Written handouts regarding wound care, incision and scar care, and general preoperative information, as well as risks and benefits were provided to the patient       ____________________________________________________________________________________________________________________________________________      CHIEF COMPLAINT:  Chief Complaint   Patient presents with   • Right Hand - Locking       SUBJECTIVE:  Darius Navarro is a 52y o  year old RHD female who presents today for consultation for her right trigger thumb referred by Dr Laury Chaparro  Patient states that she has a right thumb sprain in September of 2020  She notes that over the last couple of years the right thumb started sticking on her    She notes that in July 2020 to the triggering started to become more frequent when she would  things  She has tried cortisone injections x2, last being 08/26/2022  She has started using the thumb spica brace more often due to pain         Pain/symptom timing:  Worse during the day when active  Pain/symptom context:  Worse with activites and work  Pain/symptom modifying factors:  Rest makes better, activities make worse  Pain/symptom associated signs/symptoms: none    Prior treatment   · NSAIDsNo   · Injections Yes   · Bracing/Orthotics Yes    Physical Therapy No     I have personally reviewed all the relevant PMH, PSH, SH, FH, Medications and allergies      PAST MEDICAL HISTORY:  Past Medical History:   Diagnosis Date   • Anxiety    • Asthma    • Claustrophobia    • Cluster headache    • CTS (carpal tunnel syndrome)    • Depression    • Fibromyalgia     last assessed 11/27/17   • Fibromyalgia, primary    • GERD without esophagitis     last assessed 10/12/17   • GI problem    • Headache, tension-type    • Joint pain    • Lung nodule     last assessed 10/9/15   • Migraine    • Muscle pain    • Peripheral neuropathy    • Sleep apnea        PAST SURGICAL HISTORY:  Past Surgical History:   Procedure Laterality Date   • BACK SURGERY     • CARPAL TUNNEL RELEASE Bilateral    • CHOLECYSTECTOMY     • GALLBLADDER SURGERY     • NECK SURGERY     • SPINAL FUSION      C4 and C5   • ULNAR NERVE REPAIR Bilateral    • UPPER GASTROINTESTINAL ENDOSCOPY         FAMILY HISTORY:  Family History   Problem Relation Age of Onset   • Diabetes Mother    • Fibromyalgia Mother    • Ovarian cancer Mother 35   • Hypertension Mother    • Thyroid disease Mother    • Migraines Mother    • Neuropathy Mother    • No Known Problems Father    • Throat cancer Maternal Grandmother    • No Known Problems Maternal Grandfather    • No Known Problems Paternal Grandmother    • No Known Problems Paternal Grandfather    • No Known Problems Daughter    • Breast cancer Maternal Aunt    • No Known Problems Maternal Aunt    • No Known Problems Maternal Aunt    • No Known Problems Paternal Aunt    • Colon cancer Cousin    • Heart disease Cousin    • Lupus Cousin    • Arthritis Family    • Heart disease Family         cardiac disorder   • Neuropathy Family    • Lupus Family         systemic erythematosus   • No Known Problems Half-Sister    • No Known Problems Half-Brother    • No Known Problems Half-Brother    • No Known Problems Half-Brother    • Stroke Neg Hx        SOCIAL HISTORY:  Social History     Tobacco Use   • Smoking status: Never   • Smokeless tobacco: Never   Vaping Use   • Vaping Use: Never used   Substance Use Topics   • Alcohol use: Yes     Alcohol/week: 2 0 standard drinks     Types: 1 Glasses of wine, 1 Cans of beer per week     Comment: occasional   • Drug use: Yes     Types: Marijuana     Comment: medicinal       MEDICATIONS:    Current Outpatient Medications:   •  albuterol (PROVENTIL HFA,VENTOLIN HFA) 90 mcg/act inhaler, Inhale 2 puffs every 6 (six) hours as needed for wheezing or shortness of breath, Disp: 1 Inhaler, Rfl: 5  •  Atogepant 30 MG TABS, 1 tab PO daily, Disp: 30 tablet, Rfl: 3  •  Botox 200 units SOLR, , Disp: , Rfl:   •  buPROPion (Wellbutrin XL) 150 mg 24 hr tablet, Take 1 tablet (150 mg total) by mouth every morning, Disp: 90 tablet, Rfl: 3  •  cholecalciferol (VITAMIN D3) 1,000 units tablet, Take 1 capsule by mouth once a week 3000 units daily , Disp: , Rfl:   •  cholestyramine (QUESTRAN) 4 GM/DOSE powder, Take 1 packet (4 g total) by mouth 2 (two) times a day with meals, Disp: 378 g, Rfl: 2  •  dexamethasone (DECADRON) 2 mg tablet, 1 tab qam with food prn migraine  , Disp: 5 tablet, Rfl: 0  •  divalproex sodium (DEPAKOTE) 250 mg EC tablet, 2 tabs q12 hours x 2 days, then stop  Repeat if needed  , Disp: 16 tablet, Rfl: 0  •  ergocalciferol (VITAMIN D2) 50,000 units, , Disp: , Rfl:   •  fluticasone (FLONASE) 50 mcg/act nasal spray, 1 spray in each nostril once daily, Disp: 30 mL, Rfl: 0  •  ibuprofen (MOTRIN) 400 mg tablet, 1 tab q8 hours prn migraine  Max 2 per day and 4 per week  Hold toradol , Disp: 20 tablet, Rfl: 0  •  ketorolac (TORADOL) 30 mg/mL injection, 1-2 ml IM injection once per 24 hours p r n  migraine  No more than 2 injections per week , Disp: 8 mL, Rfl: 0  •  Lasmiditan Succinate (Reyvow) 50 MG tablet, One tab qhs at migraine onset  Caution sedation  Max 1 tab/24 hours  , Disp: 8 tablet, Rfl: 0  •  levonorgestrel (MIRENA) 20 MCG/24HR IUD, 1 each by Intrauterine route once, Disp: , Rfl:   •  meclizine (ANTIVERT) 12 5 MG tablet, Take 1 tablet (12 5 mg total) by mouth 3 (three) times a day as needed for dizziness or nausea, Disp: 30 tablet, Rfl: 0  •  Melatonin ER 3 MG TBCR, 1-2 tabs qhs, Disp: 60 tablet, Rfl: 2  •  metoclopramide (REGLAN) 10 mg tablet, 1 tab q6 hours prn migraine (with toradol and benadryl), Disp: 20 tablet, Rfl: 0  •  NON FORMULARY, Medical marijuana  , Disp: , Rfl:   •  omeprazole (PriLOSEC) 40 MG capsule, TAKE ONE CAPSULE BY MOUTH TWICE A DAY, Disp: 60 capsule, Rfl: 11  •  polyethylene glycol (GLYCOLAX) 17 GM/SCOOP powder, Take 17 g by mouth daily as needed (constipation), Disp: 289 g, Rfl: 0  •  Syringe/Needle, Disp, (SYRINGE 3CC/14NZ4-6/2") 25G X 1-1/2" 3 ML MISC, Use for toradol IM injections  , Disp: 10 each, Rfl: 0  •  tiZANidine (ZANAFLEX) 2 mg tablet, Take 1 tablet (2 mg total) by mouth every 8 (eight) hours as needed for muscle spasms, Disp: 90 tablet, Rfl: 1  •  traMADol (ULTRAM) 50 mg tablet, take one tablet at onset of moderate to severe headach, can repeat once in 8 hours   MAX 2 DAYS PER WEEK, Disp: 10 tablet, Rfl: 0  •  ZOLMitriptan (ZOMIG-ZMT) 5 MG disintegrating tablet, Take 1 tablet (5 mg total) by mouth once as needed for migraine for up to 1 dose, Disp: 6 tablet, Rfl: 0  •  dicyclomine (BENTYL) 20 mg tablet, Take 1 tablet (20 mg total) by mouth 3 (three) times a day as needed (migraine/cramps), Disp: 60 tablet, Rfl: 0  •  famotidine (PEPCID) 20 mg tablet, Take 1 tablet (20 mg total) by mouth daily at bedtime, Disp: 90 tablet, Rfl: 0    ALLERGIES:  Allergies   Allergen Reactions   • Hydrocodone-Acetaminophen GI Intolerance     gi upset -pt okay if takes  zofran   • Hydrocodone-Acetaminophen GI Intolerance     gi upset -pt okay if takes  zofran  gi upset -pt okay if takes  zofran   • Codeine GI Intolerance and Other (See Comments)     GI issues   • Oxycodone-Acetaminophen GI Intolerance   • Penicillins GI Intolerance           REVIEW OF SYSTEMS:  Review of Systems   Constitutional: Negative for chills, fever and unexpected weight change  HENT: Negative for hearing loss, nosebleeds and sore throat  Eyes: Negative for pain, redness and visual disturbance  Respiratory: Negative for cough, shortness of breath and wheezing  Cardiovascular: Negative for chest pain, palpitations and leg swelling  Gastrointestinal: Negative for abdominal pain and nausea  Genitourinary: Negative for dyspareunia, dysuria and frequency  Skin: Negative for rash and wound  Neurological: Negative for dizziness, numbness and headaches  Psychiatric/Behavioral: Negative for decreased concentration and suicidal ideas  The patient is not nervous/anxious          VITALS:  Vitals:    12/14/22 1037   BP: 126/85   Pulse: 83       LABS:  HgA1c:   Lab Results   Component Value Date    HGBA1C 5 7 (H) 06/22/2022     BMP:   Lab Results   Component Value Date    GLUCOSE 88 02/20/2015    CALCIUM 8 8 10/13/2021     02/20/2015    K 3 8 10/13/2021    CO2 25 10/13/2021     10/13/2021    BUN 11 10/13/2021    CREATININE 0 74 10/13/2021       _____________________________________________________  PHYSICAL EXAMINATION:  General: well developed and well nourished, alert, oriented times 3 and appears comfortable  Psychiatric: Normal  HEENT: Normocephalic, Atraumatic Trachea Midline, No torticollis  Pulmonary: No audible wheezing or respiratory distress   Abdomen/GI: Non tender, non distended   Cardiovascular: No pitting edema, 2+ radial pulse   Skin: No masses, erythema, lacerations, fluctation, ulcerations  Neurovascular: Sensation Intact to the Median, Ulnar, Radial Nerve, Motor Intact to the Median, Ulnar, Radial Nerve and Pulses Intact  Musculoskeletal: Normal, except as noted in detailed exam and in HPI  MUSCULOSKELETAL EXAMINATION:  right thumb finger:  Positive palpable nodule over the A1 pulley  Positive tenderness to palpation over A1 pulley  Negative catching   Positive clicking       ___________________________________________________  STUDIES REVIEWED:  No images reviewed at today's visit        PROCEDURES PERFORMED:  Procedures  No Procedures performed today    _____________________________________________________      Levar Brunner    I,:  Manju Forbes am acting as a scribe while in the presence of the attending physician :       I,:  Porsche Braun MD personally performed the services described in this documentation    as scribed in my presence :

## 2022-12-14 NOTE — LETTER
December 16, 2022     [address] [place] [address],[address]    Patient: Magalys Starr   YOB: 1975   Date of Visit: 12/14/2022       Dear Dr Flower Story: Thank you for referring Magalys Starr to me for evaluation  Below are my notes for this consultation  If you have questions, please do not hesitate to call me  I look forward to following your patient along with you  Sincerely,        Wander Ibarra MD        CC: No Recipients  Wander Ibarra MD  12/15/2022 11:43 AM  Barby RIVERA  Attending, Orthopaedic Surgery  Hand, Wrist, and Elbow Surgery  [facility]      ORTHOPAEDIC HAND, WRIST, AND ELBOW OFFICE  VISIT       ASSESSMENT/PLAN:      52year old female here for her right trigger thumb  Due to patient not having significant pain relief with the injections, she wishes to proceed with surgical intervention  The anatomy and physiology of trigger finger was discussed with the patient today in the office  Edema and increased contact pressure within the flexor tendons at the A1 pulley can cause pain, crepitation, and limitation of function  Treatment options include resting MP blocking splints to decrease edema, oral anti-inflammatory medications, home or formal therapy exercises, up to 2 steroid injections within the tendon sheath, or surgical release  While majority of patients do respond to conservative treatment, up to 20% may require surgical release  This is an outpatient procedure, where she will be wrapped up 5 days postoperatively  After 5 days dressings can come off  She can wash with warm water, soap pat dry but do not submerge the hand  Sutures will come in 14 days postoperatively  We will follow up with the patient postoperatively  Referral to Sports Medicine-Dr Saad Partida for evaluation for her left side SI joint pain    Patient has a history of a fusion performed by Dr Jeremiah Young and has not treated with anyone since the surgery  The patient verbalized understanding of exam findings and treatment plan  We engaged in the shared decision-making process and treatment options were discussed at length with the patient  Surgical and conservative management discussed today along with risks and benefits  Diagnoses and all orders for this visit:    Chronic left SI joint pain  -     Ambulatory Referral to Sports Medicine; Future    Trigger finger of right thumb  -     Ambulatory Referral to Orthopedic Surgery  -     Case request operating room: RELEASE TRIGGER FINGER; Standing  -     Case request operating room: RELEASE TRIGGER FINGER    Other orders  -     Nursing Communication [address] [place] [address] Interventions Implemented; Standing  -     Nursing Communication CHG bath, have staff wash entire body (neck down) per pre-op bathing protocol  Routine, evening prior to, and day of surgery ; Standing  -     Nursing Communication Swab both nares with Povidone-Iodine solution, EXCLUDE if patient has shellfish/Iodine allergy  Routine, day of surgery, on call to OR; Standing  -     Void on call to OR; Standing  -     Insert peripheral IV; Standing  -     Diet NPO; Sips with meds; Standing  -     Apply Sequential Compression Device; Standing        Follow Up:  Return for Follow up post- op  To Do Next Visit:  Re-evaluation of current issue      Operative Discussions:  Trigger Finger Release: The anatomy and physiology of trigger finger was discussed with the patient today in the office  Edema and increased contact pressure within the flexor tendons at the A1 pulley can cause pain, crepitation, and limitation of function  Treatment options include resting MP blocking splints to decrease edema, oral anti-inflammatory medications, home or formal therapy exercises, up to 2 steroid injections or surgical release    While majority of patients do respond to conservative treatment, up to 20% may require surgical release  The patient has elected release of the trigger finger  The patient has elected to undergo a release of the A1 pulley (trigger finger)  A small incision will be made over the palmar aspect of the hand, the tendon sheath holding the flexor tendons will be released  In the postoperative period, light activities are allowed immediately, driving is allowed when narcotic medication has stopped, and the incision may get wet after 2 days  Heavy activities (lifting more than approximately 10 pounds) will be allowed after the follow up appointment in 1-2 weeks  While the pain and discomfort within the wrist typically improves rapidly, some residual discomfort may be present for up to 6 weeks  The nodule that is typically palpable in the palmar aspect of the hand will not be removed, as this would necessitate removal of a portion of the flexor tendon, however the catching, clicking, and locking should resolve  Approximate success rate is 98%  The risks and benefits of the procedure were explained to the patient, which include, but are not limited to: Bleeding, infection, recurrence, pain, scar, damage to tendons, damage to nerves, and damage to blood vessels, need for future surgery and complications related to anesthesia  If bony work is done, risks also include malunion and nonunion  These risks, along with alternative conservative treatment options, and postoperative protocols were voiced back and understood by the patient  All questions were answered to the patient's satisfaction  The patient agrees to comply with a standard postoperative protocol, and is willing to proceed  Education was provided via written and auditory forms  There were no barriers to learning   Written handouts regarding wound care, incision and scar care, and general preoperative information, as well as risks and benefits were provided to the patient       ____________________________________________________________________________________________________________________________________________      CHIEF COMPLAINT:  Chief Complaint   Patient presents with   • Right Hand - Locking       SUBJECTIVE:  Darius Navarro is a 52y o  year old RHD female who presents today for consultation for her right trigger thumb referred by Dr Laury Chaparro  Patient states that she has a right thumb sprain in September of 2020  She notes that over the last couple of years the right thumb started sticking on her  She notes that in July 2020 to the triggering started to become more frequent when she would  things  She has tried cortisone injections x2, last being 08/26/2022  She has started using the thumb spica brace more often due to pain         Pain/symptom timing:  Worse during the day when active  Pain/symptom context:  Worse with activites and work  Pain/symptom modifying factors:  Rest makes better, activities make worse  Pain/symptom associated signs/symptoms: none    Prior treatment   · NSAIDsNo   · Injections Yes   · Bracing/Orthotics Yes    Physical Therapy No     I have personally reviewed all the relevant PMH, PSH, SH, FH, Medications and allergies      PAST MEDICAL HISTORY:  Past Medical History:   Diagnosis Date   • Anxiety    • Asthma    • Claustrophobia    • Cluster headache    • CTS (carpal tunnel syndrome)    • Depression    • Fibromyalgia     last assessed 11/27/17   • Fibromyalgia, primary    • GERD without esophagitis     last assessed 10/12/17   • GI problem    • Headache, tension-type    • Joint pain    • Lung nodule     last assessed 10/9/15   • Migraine    • Muscle pain    • Peripheral neuropathy    • Sleep apnea        PAST SURGICAL HISTORY:  Past Surgical History:   Procedure Laterality Date   • BACK SURGERY     • CARPAL TUNNEL RELEASE Bilateral    • CHOLECYSTECTOMY     • GALLBLADDER SURGERY     • NECK SURGERY     • SPINAL FUSION      C4 and C5   • ULNAR NERVE REPAIR Bilateral    • UPPER GASTROINTESTINAL ENDOSCOPY         FAMILY HISTORY:  Family History   Problem Relation Age of Onset   • Diabetes Mother    • Fibromyalgia Mother    • Ovarian cancer Mother 35   • Hypertension Mother    • Thyroid disease Mother    • Migraines Mother    • Neuropathy Mother    • No Known Problems Father    • Throat cancer Maternal Grandmother    • No Known Problems Maternal Grandfather    • No Known Problems Paternal Grandmother    • No Known Problems Paternal Grandfather    • No Known Problems Daughter    • Breast cancer Maternal Aunt    • No Known Problems Maternal Aunt    • No Known Problems Maternal Aunt    • No Known Problems Paternal Aunt    • Colon cancer Cousin    • Heart disease Cousin    • Lupus Cousin    • Arthritis Family    • Heart disease Family         cardiac disorder   • Neuropathy Family    • Lupus Family         systemic erythematosus   • No Known Problems Half-Sister    • No Known Problems Half-Brother    • No Known Problems Half-Brother    • No Known Problems Half-Brother    • Stroke Neg Hx        SOCIAL HISTORY:  Social History     Tobacco Use   • Smoking status: Never   • Smokeless tobacco: Never   Vaping Use   • Vaping Use: Never used   Substance Use Topics   • Alcohol use:  Yes     Alcohol/week: 2 0 standard drinks     Types: 1 Glasses of wine, 1 Cans of beer per week     Comment: occasional   • Drug use: Yes     Types: Marijuana     Comment: medicinal       MEDICATIONS:    Current Outpatient Medications:   •  albuterol (PROVENTIL HFA,VENTOLIN HFA) 90 mcg/act inhaler, Inhale 2 puffs every 6 (six) hours as needed for wheezing or shortness of breath, Disp: 1 Inhaler, Rfl: 5  •  Atogepant 30 MG TABS, 1 tab PO daily, Disp: 30 tablet, Rfl: 3  •  Botox 200 units SOLR, , Disp: , Rfl:   •  buPROPion (Wellbutrin XL) 150 mg 24 hr tablet, Take 1 tablet (150 mg total) by mouth every morning, Disp: 90 tablet, Rfl: 3  •  cholecalciferol (VITAMIN D3) 1,000 units tablet, Take 1 capsule by mouth once a week 3000 units daily , Disp: , Rfl:   •  cholestyramine (QUESTRAN) 4 GM/DOSE powder, Take 1 packet (4 g total) by mouth 2 (two) times a day with meals, Disp: 378 g, Rfl: 2  •  dexamethasone (DECADRON) 2 mg tablet, 1 tab qam with food prn migraine  , Disp: 5 tablet, Rfl: 0  •  divalproex sodium (DEPAKOTE) 250 mg EC tablet, 2 tabs q12 hours x 2 days, then stop  Repeat if needed  , Disp: 16 tablet, Rfl: 0  •  ergocalciferol (VITAMIN D2) 50,000 units, , Disp: , Rfl:   •  fluticasone (FLONASE) 50 mcg/act nasal spray, 1 spray in each nostril once daily, Disp: 30 mL, Rfl: 0  •  ibuprofen (MOTRIN) 400 mg tablet, 1 tab q8 hours prn migraine  Max 2 per day and 4 per week  Hold toradol , Disp: 20 tablet, Rfl: 0  •  ketorolac (TORADOL) 30 mg/mL injection, 1-2 ml IM injection once per 24 hours p r n  migraine  No more than 2 injections per week , Disp: 8 mL, Rfl: 0  •  Lasmiditan Succinate (Reyvow) 50 MG tablet, One tab qhs at migraine onset  Caution sedation  Max 1 tab/24 hours  , Disp: 8 tablet, Rfl: 0  •  levonorgestrel (MIRENA) 20 MCG/24HR IUD, 1 each by Intrauterine route once, Disp: , Rfl:   •  meclizine (ANTIVERT) 12 5 MG tablet, Take 1 tablet (12 5 mg total) by mouth 3 (three) times a day as needed for dizziness or nausea, Disp: 30 tablet, Rfl: 0  •  Melatonin ER 3 MG TBCR, 1-2 tabs qhs, Disp: 60 tablet, Rfl: 2  •  metoclopramide (REGLAN) 10 mg tablet, 1 tab q6 hours prn migraine (with toradol and benadryl), Disp: 20 tablet, Rfl: 0  •  NON FORMULARY, Medical marijuana  , Disp: , Rfl:   •  omeprazole (PriLOSEC) 40 MG capsule, TAKE ONE CAPSULE BY MOUTH TWICE A DAY, Disp: 60 capsule, Rfl: 11  •  polyethylene glycol (GLYCOLAX) 17 GM/SCOOP powder, Take 17 g by mouth daily as needed (constipation), Disp: 289 g, Rfl: 0  •  Syringe/Needle, Disp, (SYRINGE 3CC/56UG6-2/2") 25G X 1-1/2" 3 ML MISC, Use for toradol IM injections  , Disp: 10 each, Rfl: 0  •  tiZANidine (ZANAFLEX) 2 mg tablet, Take 1 tablet (2 mg total) by mouth every 8 (eight) hours as needed for muscle spasms, Disp: 90 tablet, Rfl: 1  •  traMADol (ULTRAM) 50 mg tablet, take one tablet at onset of moderate to severe headach, can repeat once in 8 hours  MAX 2 DAYS PER WEEK, Disp: 10 tablet, Rfl: 0  •  ZOLMitriptan (ZOMIG-ZMT) 5 MG disintegrating tablet, Take 1 tablet (5 mg total) by mouth once as needed for migraine for up to 1 dose, Disp: 6 tablet, Rfl: 0  •  dicyclomine (BENTYL) 20 mg tablet, Take 1 tablet (20 mg total) by mouth 3 (three) times a day as needed (migraine/cramps), Disp: 60 tablet, Rfl: 0  •  famotidine (PEPCID) 20 mg tablet, Take 1 tablet (20 mg total) by mouth daily at bedtime, Disp: 90 tablet, Rfl: 0    ALLERGIES:  Allergies   Allergen Reactions   • Hydrocodone-Acetaminophen GI Intolerance     gi upset -pt okay if takes  zofran   • Hydrocodone-Acetaminophen GI Intolerance     gi upset -pt okay if takes  zofran  gi upset -pt okay if takes  zofran   • Codeine GI Intolerance and Other (See Comments)     GI issues   • Oxycodone-Acetaminophen GI Intolerance   • Penicillins GI Intolerance           REVIEW OF SYSTEMS:  Review of Systems   Constitutional: Negative for chills, fever and unexpected weight change  HENT: Negative for hearing loss, nosebleeds and sore throat  Eyes: Negative for pain, redness and visual disturbance  Respiratory: Negative for cough, shortness of breath and wheezing  Cardiovascular: Negative for chest pain, palpitations and leg swelling  Gastrointestinal: Negative for abdominal pain and nausea  Genitourinary: Negative for dyspareunia, dysuria and frequency  Skin: Negative for rash and wound  Neurological: Negative for dizziness, numbness and headaches  Psychiatric/Behavioral: Negative for decreased concentration and suicidal ideas  The patient is not nervous/anxious          VITALS:  Vitals:    12/14/22 1037   BP: 126/85   Pulse: 83       LABS:  HgA1c:   Lab Results   Component Value Date HGBA1C 5 7 (H) 06/22/2022     BMP:   Lab Results   Component Value Date    GLUCOSE 88 02/20/2015    CALCIUM 8 8 10/13/2021     02/20/2015    K 3 8 10/13/2021    CO2 25 10/13/2021     10/13/2021    BUN 11 10/13/2021    CREATININE 0 74 10/13/2021       _____________________________________________________  PHYSICAL EXAMINATION:  General: well developed and well nourished, alert, oriented times 3 and appears comfortable  Psychiatric: Normal  HEENT: Normocephalic, Atraumatic Trachea Midline, No torticollis  Pulmonary: No audible wheezing or respiratory distress   Abdomen/GI: Non tender, non distended   Cardiovascular: No pitting edema, 2+ radial pulse   Skin: No masses, erythema, lacerations, fluctation, ulcerations  Neurovascular: Sensation Intact to the Median, Ulnar, Radial Nerve, Motor Intact to the Median, Ulnar, Radial Nerve and Pulses Intact  Musculoskeletal: Normal, except as noted in detailed exam and in HPI  MUSCULOSKELETAL EXAMINATION:  right thumb finger:  Positive palpable nodule over the A1 pulley  Positive tenderness to palpation over A1 pulley  Negative catching   Positive clicking       ___________________________________________________  STUDIES REVIEWED:  No images reviewed at today's visit        PROCEDURES PERFORMED:  Procedures  No Procedures performed today    _____________________________________________________      Rosibel Michael    I,:  Bebe Toledo am acting as a scribe while in the presence of the attending physician :       I,:  Germán Edmondson MD personally performed the services described in this documentation    as scribed in my presence :

## 2022-12-15 NOTE — H&P
Torsten RIVERA  Attending, Orthopaedic Surgery  Hand, Wrist, and Elbow Surgery  [facility]      ORTHOPAEDIC HAND, WRIST, AND ELBOW OFFICE  VISIT       ASSESSMENT/PLAN:      52year old female here for her right trigger thumb  Due to patient not having significant pain relief with the injections, she wishes to proceed with surgical intervention  The anatomy and physiology of trigger finger was discussed with the patient today in the office  Edema and increased contact pressure within the flexor tendons at the A1 pulley can cause pain, crepitation, and limitation of function  Treatment options include resting MP blocking splints to decrease edema, oral anti-inflammatory medications, home or formal therapy exercises, up to 2 steroid injections within the tendon sheath, or surgical release  While majority of patients do respond to conservative treatment, up to 20% may require surgical release  This is an outpatient procedure, where she will be wrapped up 5 days postoperatively  After 5 days dressings can come off  She can wash with warm water, soap pat dry but do not submerge the hand  Sutures will come in 14 days postoperatively  We will follow up with the patient postoperatively  Referral to Sports Medicine-Dr Vida Orellana for evaluation for her left side SI joint pain  Patient has a history of a fusion performed by Dr Mary De La Rosa and has not treated with anyone since the surgery  The patient verbalized understanding of exam findings and treatment plan  We engaged in the shared decision-making process and treatment options were discussed at length with the patient  Surgical and conservative management discussed today along with risks and benefits  Diagnoses and all orders for this visit:    Chronic left SI joint pain  -     Ambulatory Referral to Sports Medicine;  Future    Trigger finger of right thumb  -     Ambulatory Referral to Orthopedic Surgery  -     Case request operating room: RELEASE TRIGGER FINGER; Standing  -     Case request operating room: RELEASE TRIGGER FINGER    Other orders  -     Nursing Communication [facility] Interventions Implemented; Standing  -     Nursing Communication CHG bath, have staff wash entire body (neck down) per pre-op bathing protocol  Routine, evening prior to, and day of surgery ; Standing  -     Nursing Communication Swab both nares with Povidone-Iodine solution, EXCLUDE if patient has shellfish/Iodine allergy  Routine, day of surgery, on call to OR; Standing  -     Void on call to OR; Standing  -     Insert peripheral IV; Standing  -     Diet NPO; Sips with meds; Standing  -     Apply Sequential Compression Device; Standing        Follow Up:  Return for Follow up post- op  To Do Next Visit:  Re-evaluation of current issue      Operative Discussions:  Trigger Finger Release: The anatomy and physiology of trigger finger was discussed with the patient today in the office  Edema and increased contact pressure within the flexor tendons at the A1 pulley can cause pain, crepitation, and limitation of function  Treatment options include resting MP blocking splints to decrease edema, oral anti-inflammatory medications, home or formal therapy exercises, up to 2 steroid injections or surgical release  While majority of patients do respond to conservative treatment, up to 20% may require surgical release  The patient has elected release of the trigger finger  The patient has elected to undergo a release of the A1 pulley (trigger finger)  A small incision will be made over the palmar aspect of the hand, the tendon sheath holding the flexor tendons will be released  In the postoperative period, light activities are allowed immediately, driving is allowed when narcotic medication has stopped, and the incision may get wet after 2 days    Heavy activities (lifting more than approximately 10 pounds) will be allowed after the follow up appointment in 1-2 weeks   While the pain and discomfort within the wrist typically improves rapidly, some residual discomfort may be present for up to 6 weeks  The nodule that is typically palpable in the palmar aspect of the hand will not be removed, as this would necessitate removal of a portion of the flexor tendon, however the catching, clicking, and locking should resolve  Approximate success rate is 98%  The risks and benefits of the procedure were explained to the patient, which include, but are not limited to: Bleeding, infection, recurrence, pain, scar, damage to tendons, damage to nerves, and damage to blood vessels, need for future surgery and complications related to anesthesia  If bony work is done, risks also include malunion and nonunion  These risks, along with alternative conservative treatment options, and postoperative protocols were voiced back and understood by the patient  All questions were answered to the patient's satisfaction  The patient agrees to comply with a standard postoperative protocol, and is willing to proceed  Education was provided via written and auditory forms  There were no barriers to learning  Written handouts regarding wound care, incision and scar care, and general preoperative information, as well as risks and benefits were provided to the patient       ____________________________________________________________________________________________________________________________________________      CHIEF COMPLAINT:  Chief Complaint   Patient presents with   • Right Hand - Locking       SUBJECTIVE:  Jessica Garsia is a 52y o  year old RHD female who presents today for consultation for her right trigger thumb referred by Dr Tyra Luna  Patient states that she has a right thumb sprain in September of 2020  She notes that over the last couple of years the right thumb started sticking on her    She notes that in July 2020 to the triggering started to become more frequent when she would  things  She has tried cortisone injections x2, last being 08/26/2022  She has started using the thumb spica brace more often due to pain         Pain/symptom timing:  Worse during the day when active  Pain/symptom context:  Worse with activites and work  Pain/symptom modifying factors:  Rest makes better, activities make worse  Pain/symptom associated signs/symptoms: none    Prior treatment   · NSAIDsNo   · Injections Yes   · Bracing/Orthotics Yes    Physical Therapy No     I have personally reviewed all the relevant PMH, PSH, SH, FH, Medications and allergies      PAST MEDICAL HISTORY:  Past Medical History:   Diagnosis Date   • Anxiety    • Asthma    • Claustrophobia    • Cluster headache    • CTS (carpal tunnel syndrome)    • Depression    • Fibromyalgia     last assessed 11/27/17   • Fibromyalgia, primary    • GERD without esophagitis     last assessed 10/12/17   • GI problem    • Headache, tension-type    • Joint pain    • Lung nodule     last assessed 10/9/15   • Migraine    • Muscle pain    • Peripheral neuropathy    • Sleep apnea        PAST SURGICAL HISTORY:  Past Surgical History:   Procedure Laterality Date   • BACK SURGERY     • CARPAL TUNNEL RELEASE Bilateral    • CHOLECYSTECTOMY     • GALLBLADDER SURGERY     • NECK SURGERY     • SPINAL FUSION      C4 and C5   • ULNAR NERVE REPAIR Bilateral    • UPPER GASTROINTESTINAL ENDOSCOPY         FAMILY HISTORY:  Family History   Problem Relation Age of Onset   • Diabetes Mother    • Fibromyalgia Mother    • Ovarian cancer Mother 35   • Hypertension Mother    • Thyroid disease Mother    • Migraines Mother    • Neuropathy Mother    • No Known Problems Father    • Throat cancer Maternal Grandmother    • No Known Problems Maternal Grandfather    • No Known Problems Paternal Grandmother    • No Known Problems Paternal Grandfather    • No Known Problems Daughter    • Breast cancer Maternal Aunt    • No Known Problems Maternal Aunt    • No Known Problems Maternal Aunt    • No Known Problems Paternal Aunt    • Colon cancer Cousin    • Heart disease Cousin    • Lupus Cousin    • Arthritis Family    • Heart disease Family         cardiac disorder   • Neuropathy Family    • Lupus Family         systemic erythematosus   • No Known Problems Half-Sister    • No Known Problems Half-Brother    • No Known Problems Half-Brother    • No Known Problems Half-Brother    • Stroke Neg Hx        SOCIAL HISTORY:  Social History     Tobacco Use   • Smoking status: Never   • Smokeless tobacco: Never   Vaping Use   • Vaping Use: Never used   Substance Use Topics   • Alcohol use: Yes     Alcohol/week: 2 0 standard drinks     Types: 1 Glasses of wine, 1 Cans of beer per week     Comment: occasional   • Drug use: Yes     Types: Marijuana     Comment: medicinal       MEDICATIONS:    Current Outpatient Medications:   •  albuterol (PROVENTIL HFA,VENTOLIN HFA) 90 mcg/act inhaler, Inhale 2 puffs every 6 (six) hours as needed for wheezing or shortness of breath, Disp: 1 Inhaler, Rfl: 5  •  Atogepant 30 MG TABS, 1 tab PO daily, Disp: 30 tablet, Rfl: 3  •  Botox 200 units SOLR, , Disp: , Rfl:   •  buPROPion (Wellbutrin XL) 150 mg 24 hr tablet, Take 1 tablet (150 mg total) by mouth every morning, Disp: 90 tablet, Rfl: 3  •  cholecalciferol (VITAMIN D3) 1,000 units tablet, Take 1 capsule by mouth once a week 3000 units daily , Disp: , Rfl:   •  cholestyramine (QUESTRAN) 4 GM/DOSE powder, Take 1 packet (4 g total) by mouth 2 (two) times a day with meals, Disp: 378 g, Rfl: 2  •  dexamethasone (DECADRON) 2 mg tablet, 1 tab qam with food prn migraine  , Disp: 5 tablet, Rfl: 0  •  divalproex sodium (DEPAKOTE) 250 mg EC tablet, 2 tabs q12 hours x 2 days, then stop  Repeat if needed  , Disp: 16 tablet, Rfl: 0  •  ergocalciferol (VITAMIN D2) 50,000 units, , Disp: , Rfl:   •  fluticasone (FLONASE) 50 mcg/act nasal spray, 1 spray in each nostril once daily, Disp: 30 mL, Rfl: 0  •  ibuprofen (MOTRIN) 400 mg tablet, 1 tab q8 hours prn migraine  Max 2 per day and 4 per week  Hold toradol , Disp: 20 tablet, Rfl: 0  •  ketorolac (TORADOL) 30 mg/mL injection, 1-2 ml IM injection once per 24 hours p r n  migraine  No more than 2 injections per week , Disp: 8 mL, Rfl: 0  •  Lasmiditan Succinate (Reyvow) 50 MG tablet, One tab qhs at migraine onset  Caution sedation  Max 1 tab/24 hours  , Disp: 8 tablet, Rfl: 0  •  levonorgestrel (MIRENA) 20 MCG/24HR IUD, 1 each by Intrauterine route once, Disp: , Rfl:   •  meclizine (ANTIVERT) 12 5 MG tablet, Take 1 tablet (12 5 mg total) by mouth 3 (three) times a day as needed for dizziness or nausea, Disp: 30 tablet, Rfl: 0  •  Melatonin ER 3 MG TBCR, 1-2 tabs qhs, Disp: 60 tablet, Rfl: 2  •  metoclopramide (REGLAN) 10 mg tablet, 1 tab q6 hours prn migraine (with toradol and benadryl), Disp: 20 tablet, Rfl: 0  •  NON FORMULARY, Medical marijuana  , Disp: , Rfl:   •  omeprazole (PriLOSEC) 40 MG capsule, TAKE ONE CAPSULE BY MOUTH TWICE A DAY, Disp: 60 capsule, Rfl: 11  •  polyethylene glycol (GLYCOLAX) 17 GM/SCOOP powder, Take 17 g by mouth daily as needed (constipation), Disp: 289 g, Rfl: 0  •  Syringe/Needle, Disp, (SYRINGE 3CC/29SM3-5/2") 25G X 1-1/2" 3 ML MISC, Use for toradol IM injections  , Disp: 10 each, Rfl: 0  •  tiZANidine (ZANAFLEX) 2 mg tablet, Take 1 tablet (2 mg total) by mouth every 8 (eight) hours as needed for muscle spasms, Disp: 90 tablet, Rfl: 1  •  traMADol (ULTRAM) 50 mg tablet, take one tablet at onset of moderate to severe headach, can repeat once in 8 hours   MAX 2 DAYS PER WEEK, Disp: 10 tablet, Rfl: 0  •  ZOLMitriptan (ZOMIG-ZMT) 5 MG disintegrating tablet, Take 1 tablet (5 mg total) by mouth once as needed for migraine for up to 1 dose, Disp: 6 tablet, Rfl: 0  •  dicyclomine (BENTYL) 20 mg tablet, Take 1 tablet (20 mg total) by mouth 3 (three) times a day as needed (migraine/cramps), Disp: 60 tablet, Rfl: 0  •  famotidine (PEPCID) 20 mg tablet, Take 1 tablet (20 mg total) by mouth daily at bedtime, Disp: 90 tablet, Rfl: 0    ALLERGIES:  Allergies   Allergen Reactions   • Hydrocodone-Acetaminophen GI Intolerance     gi upset -pt okay if takes  zofran   • Hydrocodone-Acetaminophen GI Intolerance     gi upset -pt okay if takes  zofran  gi upset -pt okay if takes  zofran   • Codeine GI Intolerance and Other (See Comments)     GI issues   • Oxycodone-Acetaminophen GI Intolerance   • Penicillins GI Intolerance           REVIEW OF SYSTEMS:  Review of Systems   Constitutional: Negative for chills, fever and unexpected weight change  HENT: Negative for hearing loss, nosebleeds and sore throat  Eyes: Negative for pain, redness and visual disturbance  Respiratory: Negative for cough, shortness of breath and wheezing  Cardiovascular: Negative for chest pain, palpitations and leg swelling  Gastrointestinal: Negative for abdominal pain and nausea  Genitourinary: Negative for dyspareunia, dysuria and frequency  Skin: Negative for rash and wound  Neurological: Negative for dizziness, numbness and headaches  Psychiatric/Behavioral: Negative for decreased concentration and suicidal ideas  The patient is not nervous/anxious          VITALS:  Vitals:    12/14/22 1037   BP: 126/85   Pulse: 83       LABS:  HgA1c:   Lab Results   Component Value Date    HGBA1C 5 7 (H) 06/22/2022     BMP:   Lab Results   Component Value Date    GLUCOSE 88 02/20/2015    CALCIUM 8 8 10/13/2021     02/20/2015    K 3 8 10/13/2021    CO2 25 10/13/2021     10/13/2021    BUN 11 10/13/2021    CREATININE 0 74 10/13/2021       _____________________________________________________  PHYSICAL EXAMINATION:  General: well developed and well nourished, alert, oriented times 3 and appears comfortable  Psychiatric: Normal  HEENT: Normocephalic, Atraumatic Trachea Midline, No torticollis  Pulmonary: No audible wheezing or respiratory distress   Abdomen/GI: Non tender, non distended   Cardiovascular: No pitting edema, 2+ radial pulse   Skin: No masses, erythema, lacerations, fluctation, ulcerations  Neurovascular: Sensation Intact to the Median, Ulnar, Radial Nerve, Motor Intact to the Median, Ulnar, Radial Nerve and Pulses Intact  Musculoskeletal: Normal, except as noted in detailed exam and in HPI  MUSCULOSKELETAL EXAMINATION:  right thumb finger:  Positive palpable nodule over the A1 pulley  Positive tenderness to palpation over A1 pulley  Negative catching   Positive clicking       ___________________________________________________  STUDIES REVIEWED:  No images reviewed at today's visit        PROCEDURES PERFORMED:  Procedures  No Procedures performed today    _____________________________________________________      Bessie Herrera    I,:  Geovanna Goodrich am acting as a scribe while in the presence of the attending physician :       I,:  Karla Oliva MD personally performed the services described in this documentation    as scribed in my presence :

## 2022-12-30 ENCOUNTER — TELEPHONE (OUTPATIENT)
Dept: NEUROLOGY | Facility: CLINIC | Age: 47
End: 2022-12-30

## 2022-12-30 NOTE — TELEPHONE ENCOUNTER
Received fax from patient plan with approval details below  Approved   Botox 200 UNITS  Qty  1  [facility]#[id number]  Valid:12/30/2022-12/30/2023  Visits: 4    Please use [facility]

## 2022-12-30 NOTE — TELEPHONE ENCOUNTER
Pt current PA on file set to  1/3/33128  A new PA has been sent to plan via fax/form for pt next appointment 2023  Last OFV  22 with [facility]  Do you agree with verbal order? Botox 200 UNITS  Qty  1  Sig: Inject up to 200 UNITS I M into the various sites in the head and neck once every three months for one year with  Refills: 3     If you agree to this order transcribed above  For DX [id number],  please respond directly if you agree or not, so I may proceed further with authorization and verbal order for pharmacy  Thank you

## 2023-01-04 ENCOUNTER — TELEPHONE (OUTPATIENT)
Dept: NEUROLOGY | Facility: CLINIC | Age: 48
End: 2023-01-04

## 2023-01-04 NOTE — TELEPHONE ENCOUNTER
Contacted [facility], spoke to [facility] that advised a verbal order is needed to fill Botox order  Transferred to Kendra Blizzard the pharmacist; Vo given   Botox 200 Units, Qty 1, with 3 refills as noted in previous encounter  Will continue to follow up with Marcia

## 2023-01-06 ENCOUNTER — ANESTHESIA EVENT (OUTPATIENT)
Dept: PERIOP | Facility: AMBULARY SURGERY CENTER | Age: 48
End: 2023-01-06

## 2023-01-10 NOTE — TELEPHONE ENCOUNTER
Spoke to Laz Pierce she advised patients Botox is ready to be delivered 1/17/2023  Botox 200 UNITS  Qty  1  Scheduled:1/17/2023  Location:[facility]  Via: [facility]/[employer]    Please advise if Botox does not arrive  Thank you

## 2023-01-12 NOTE — PRE-PROCEDURE INSTRUCTIONS
Pre-Surgery Instructions:   Medication Instructions   • albuterol (PROVENTIL HFA,VENTOLIN HFA) 90 mcg/act inhaler Uses PRN- OK to take day of surgery   • Botox 200 units SOLR Continue schedule   • buPROPion (Wellbutrin XL) 150 mg 24 hr tablet Take day of surgery  • cholecalciferol (VITAMIN D3) 1,000 units tablet Hold day of surgery  • cholestyramine (QUESTRAN) 4 GM/DOSE powder Hold day of surgery  • dexamethasone (DECADRON) 2 mg tablet Uses PRN- DO NOT take day of surgery   • dicyclomine (BENTYL) 20 mg tablet Uses PRN- OK to take day of surgery   • divalproex sodium (DEPAKOTE) 250 mg EC tablet Uses PRN- OK to take day of surgery   • ergocalciferol (VITAMIN D2) 50,000 units Hold day of surgery  • fluticasone (FLONASE) 50 mcg/act nasal spray Uses PRN- OK to take day of surgery   • ketorolac (TORADOL) 30 mg/mL injection Uses PRN- OK to take day of surgery   • Lasmiditan Succinate (Reyvow) 50 MG tablet Uses PRN- OK to take day of surgery   • meclizine (ANTIVERT) 12 5 MG tablet Uses PRN- OK to take day of surgery   • Melatonin ER 3 MG TBCR Stop 1/12  • metoclopramide (REGLAN) 10 mg tablet Uses PRN- OK to take day of surgery   • NON FORMULARY Do not take after 10 PM evening prior   • omeprazole (PriLOSEC) 40 MG capsule Take day of surgery  • polyethylene glycol (GLYCOLAX) 17 GM/SCOOP powder Uses PRN- DO NOT take day of surgery   • tiZANidine (ZANAFLEX) 2 mg tablet Uses PRN- OK to take day of surgery   • ZOLMitriptan (ZOMIG-ZMT) 5 MG disintegrating tablet Uses PRN- OK to take day of surgery   Covid screening negative as per patient  Fully vaccinated  Reviewed showering and medication instructions  Instructed to stop NSAIDS and non prescribed vitamins  Tylenol is OK to take  Take medications morning of surgery with a small sip of water  Patient verbalized understanding  Advised NPO after MN and ASC will call with scheduled surgical time      Advised to call surgeon's office for any illness prior to day of surgery

## 2023-01-16 ENCOUNTER — ANESTHESIA (OUTPATIENT)
Dept: PERIOP | Facility: AMBULARY SURGERY CENTER | Age: 48
End: 2023-01-16

## 2023-01-16 ENCOUNTER — HOSPITAL ENCOUNTER (OUTPATIENT)
Facility: AMBULARY SURGERY CENTER | Age: 48
Setting detail: OUTPATIENT SURGERY
Discharge: HOME | End: 2023-01-16
Attending: SURGERY | Admitting: SURGERY

## 2023-01-16 VITALS
WEIGHT: 219.2 LBS | BODY MASS INDEX: 40.34 KG/M2 | TEMPERATURE: 97 F | DIASTOLIC BLOOD PRESSURE: 69 MMHG | SYSTOLIC BLOOD PRESSURE: 104 MMHG | RESPIRATION RATE: 18 BRPM | OXYGEN SATURATION: 98 % | HEART RATE: 63 BPM | HEIGHT: 62 IN

## 2023-01-16 DIAGNOSIS — Z47.89 AFTERCARE FOLLOWING SURGERY OF THE MUSCULOSKELETAL SYSTEM: ICD-10-CM

## 2023-01-16 DIAGNOSIS — M65.311 TRIGGER FINGER OF RIGHT THUMB: Primary | ICD-10-CM

## 2023-01-16 LAB
EXT PREGNANCY TEST URINE: NEGATIVE
EXT. CONTROL: NORMAL

## 2023-01-16 RX ORDER — FENTANYL CITRATE/PF 50 MCG/ML
50 SYRINGE (ML) INJECTION
Status: DISCONTINUED | OUTPATIENT
Start: 2023-01-16 | End: 2023-01-16 | Stop reason: HOSPADM

## 2023-01-16 RX ORDER — MAGNESIUM HYDROXIDE 1200 MG/15ML
LIQUID ORAL AS NEEDED
Status: DISCONTINUED | OUTPATIENT
Start: 2023-01-16 | End: 2023-01-16 | Stop reason: HOSPADM

## 2023-01-16 RX ORDER — OXYCODONE HYDROCHLORIDE 5 MG/1
5 TABLET ORAL EVERY 4 HOURS PRN
Status: DISCONTINUED | OUTPATIENT
Start: 2023-01-16 | End: 2023-01-16 | Stop reason: HOSPADM

## 2023-01-16 RX ORDER — KETAMINE HYDROCHLORIDE 50 MG/ML
INJECTION, SOLUTION, CONCENTRATE INTRAMUSCULAR; INTRAVENOUS AS NEEDED
Status: DISCONTINUED | OUTPATIENT
Start: 2023-01-16 | End: 2023-01-16

## 2023-01-16 RX ORDER — PROPOFOL 10 MG/ML
INJECTION, EMULSION INTRAVENOUS CONTINUOUS PRN
Status: DISCONTINUED | OUTPATIENT
Start: 2023-01-16 | End: 2023-01-16

## 2023-01-16 RX ORDER — OXYCODONE HYDROCHLORIDE AND ACETAMINOPHEN 5; 325 MG/1; MG/1
1 TABLET ORAL EVERY 4 HOURS PRN
Qty: 5 TABLET | Refills: 0 | Status: SHIPPED | OUTPATIENT
Start: 2023-01-16 | End: 2023-01-26

## 2023-01-16 RX ORDER — PROMETHAZINE HYDROCHLORIDE 25 MG/ML
25 INJECTION, SOLUTION INTRAMUSCULAR; INTRAVENOUS ONCE AS NEEDED
Status: DISCONTINUED | OUTPATIENT
Start: 2023-01-16 | End: 2023-01-16 | Stop reason: HOSPADM

## 2023-01-16 RX ORDER — MIDAZOLAM HYDROCHLORIDE 2 MG/2ML
INJECTION, SOLUTION INTRAMUSCULAR; INTRAVENOUS AS NEEDED
Status: DISCONTINUED | OUTPATIENT
Start: 2023-01-16 | End: 2023-01-16

## 2023-01-16 RX ORDER — PROPOFOL 10 MG/ML
INJECTION, EMULSION INTRAVENOUS AS NEEDED
Status: DISCONTINUED | OUTPATIENT
Start: 2023-01-16 | End: 2023-01-16

## 2023-01-16 RX ORDER — SODIUM CHLORIDE, SODIUM LACTATE, POTASSIUM CHLORIDE, CALCIUM CHLORIDE 600; 310; 30; 20 MG/100ML; MG/100ML; MG/100ML; MG/100ML
125 INJECTION, SOLUTION INTRAVENOUS CONTINUOUS
Status: CANCELLED | OUTPATIENT
Start: 2023-01-16

## 2023-01-16 RX ORDER — CLINDAMYCIN PHOSPHATE 900 MG/50ML
INJECTION INTRAVENOUS AS NEEDED
Status: DISCONTINUED | OUTPATIENT
Start: 2023-01-16 | End: 2023-01-16

## 2023-01-16 RX ORDER — SODIUM CHLORIDE, SODIUM LACTATE, POTASSIUM CHLORIDE, CALCIUM CHLORIDE 600; 310; 30; 20 MG/100ML; MG/100ML; MG/100ML; MG/100ML
INJECTION, SOLUTION INTRAVENOUS CONTINUOUS PRN
Status: DISCONTINUED | OUTPATIENT
Start: 2023-01-16 | End: 2023-01-16

## 2023-01-16 RX ORDER — CLINDAMYCIN PHOSPHATE 900 MG/50ML
900 INJECTION INTRAVENOUS EVERY 8 HOURS
Status: DISCONTINUED | OUTPATIENT
Start: 2023-01-16 | End: 2023-01-16 | Stop reason: HOSPADM

## 2023-01-16 RX ORDER — ONDANSETRON 2 MG/ML
INJECTION INTRAMUSCULAR; INTRAVENOUS AS NEEDED
Status: DISCONTINUED | OUTPATIENT
Start: 2023-01-16 | End: 2023-01-16

## 2023-01-16 RX ORDER — ONDANSETRON 2 MG/ML
4 INJECTION INTRAMUSCULAR; INTRAVENOUS ONCE AS NEEDED
Status: DISCONTINUED | OUTPATIENT
Start: 2023-01-16 | End: 2023-01-16 | Stop reason: HOSPADM

## 2023-01-16 RX ORDER — MEPERIDINE HYDROCHLORIDE 25 MG/ML
12.5 INJECTION INTRAMUSCULAR; INTRAVENOUS; SUBCUTANEOUS
Status: DISCONTINUED | OUTPATIENT
Start: 2023-01-16 | End: 2023-01-16 | Stop reason: HOSPADM

## 2023-01-16 RX ORDER — CEFAZOLIN SODIUM 2 G/50ML
2000 SOLUTION INTRAVENOUS ONCE
Status: DISCONTINUED | OUTPATIENT
Start: 2023-01-16 | End: 2023-01-16

## 2023-01-16 RX ORDER — FENTANYL CITRATE 50 UG/ML
INJECTION, SOLUTION INTRAMUSCULAR; INTRAVENOUS AS NEEDED
Status: DISCONTINUED | OUTPATIENT
Start: 2023-01-16 | End: 2023-01-16

## 2023-01-16 RX ADMIN — KETAMINE HYDROCHLORIDE 20 MG: 50 INJECTION INTRAMUSCULAR; INTRAVENOUS at 08:18

## 2023-01-16 RX ADMIN — PROPOFOL 80 MCG/KG/MIN: 10 INJECTION, EMULSION INTRAVENOUS at 08:18

## 2023-01-16 RX ADMIN — ONDANSETRON 4 MG: 2 INJECTION INTRAMUSCULAR; INTRAVENOUS at 08:20

## 2023-01-16 RX ADMIN — SODIUM CHLORIDE, SODIUM LACTATE, POTASSIUM CHLORIDE, AND CALCIUM CHLORIDE: .6; .31; .03; .02 INJECTION, SOLUTION INTRAVENOUS at 08:34

## 2023-01-16 RX ADMIN — FENTANYL CITRATE 50 MCG: 50 INJECTION, SOLUTION INTRAMUSCULAR; INTRAVENOUS at 08:18

## 2023-01-16 RX ADMIN — CLINDAMYCIN PHOSPHATE 900 MG: 900 INJECTION, SOLUTION INTRAVENOUS at 08:19

## 2023-01-16 RX ADMIN — SODIUM CHLORIDE, SODIUM LACTATE, POTASSIUM CHLORIDE, AND CALCIUM CHLORIDE: .6; .31; .03; .02 INJECTION, SOLUTION INTRAVENOUS at 08:01

## 2023-01-16 RX ADMIN — MIDAZOLAM HYDROCHLORIDE 2 MG: 1 INJECTION, SOLUTION INTRAMUSCULAR; INTRAVENOUS at 08:15

## 2023-01-16 RX ADMIN — FENTANYL CITRATE 50 MCG: 50 INJECTION, SOLUTION INTRAMUSCULAR; INTRAVENOUS at 08:28

## 2023-01-16 RX ADMIN — PROPOFOL 50 MG: 10 INJECTION, EMULSION INTRAVENOUS at 08:18

## 2023-01-16 NOTE — DISCHARGE INSTR - AVS FIRST PAGE
Post Operative Instructions    You have had surgery on your arm today, please read and follow the information below:  Elevate your hand above your elbow during the next 24-48 hours to help with swelling  Place your hand and arm over your head with motion at your shoulder three times a day  Do not apply any cream/ointment/oil to your incisions including antibiotics  Do not soak your hands in standing water (dishwater, tubs, Jacuzzi's, pools, etc ) until given permission (typically 2-3 weeks after injury)    Call the office if you notice any:  Increased numbness or tingling of your hand or fingers that is not relieved with elevation  Increasing pain that is not controlled with medication  Difficulty chewing, breathing, swallowing  Chest pains or shortness of breath  Fever over 101 4 degrees  Bandage: Please keep bandages clean and dry  Remove bandage after 5 days  Once bandages are removed you may wash hands with soap and water  Short showers are okay as well, but please avoid soaking the hand as described above (ie no pools, baths, dirty dish water, hot tubs, ocean/lake water, etc)  Sutures will be removed in the office at your first follow up visit, please do not remove them yourself  Please do NOT put any topical agents on the surgical wound including neosporin, peroxide, tea tree oil, vitamin E, etc  as these can delay wound healing  Motion: Move fingers into a fist 5 times a day, DO NOT move any splinted fingers  Weight bearing status: Avoid heavy lifting (>5 pounds) with the extremity that was operated on until follow up appointment  Normal activities of daily living are OK  Ice: Ice for 10 minutes every hour as needed for swelling x 24 hours  Sling: No sling necessary      Pain medication:   Naproxen 220 mg two time a day (do not take this medication if you were told by your doctor that you cannot take anti-inflammatories or NSAIDS)  Tylenol Extended Release 650 mg every 8 hours  Percocet one tab every 6 hours ONLY AS NEEDED for severe pain         Follow-up Appointment: 10-14 days with Dr Alicia Dangelo        Please call the office if you have any questions or concerns regarding your post-operative care

## 2023-01-16 NOTE — H&P
ASSESSMENT/PLAN:       52year old female here for her right trigger thumb  Due to patient not having significant pain relief with the injections, she wishes to proceed with surgical intervention  The anatomy and physiology of trigger finger was discussed with the patient today in the office  Edema and increased contact pressure within the flexor tendons at the A1 pulley can cause pain, crepitation, and limitation of function  Treatment options include resting MP blocking splints to decrease edema, oral anti-inflammatory medications, home or formal therapy exercises, up to 2 steroid injections within the tendon sheath, or surgical release  While majority of patients do respond to conservative treatment, up to 20% may require surgical release  This is an outpatient procedure, where she will be wrapped up 5 days postoperatively  After 5 days dressings can come off  She can wash with warm water, soap pat dry but do not submerge the hand  Sutures will come in 14 days postoperatively  We will follow up with the patient postoperatively  Referral to Sports Medicine-Dr Usha Gonzalez for evaluation for her left side SI joint pain  Patient has a history of a fusion performed by Dr Mat Acevedo and has not treated with anyone since the surgery          The patient verbalized understanding of exam findings and treatment plan  We engaged in the shared decision-making process and treatment options were discussed at length with the patient  Surgical and conservative management discussed today along with risks and benefits      Diagnoses and all orders for this visit:     Chronic left SI joint pain  -     Ambulatory Referral to Sports Medicine;  Future     Trigger finger of right thumb  -     Ambulatory Referral to Orthopedic Surgery  -     Case request operating room: RELEASE TRIGGER FINGER; Standing  -     Case request operating room: RELEASE TRIGGER FINGER     Other orders  -     Nursing Communication Warmimg Interventions Implemented; Standing  -     Nursing Communication CHG bath, have staff wash entire body (neck down) per pre-op bathing protocol  Routine, evening prior to, and day of surgery ; Standing  -     Nursing Communication Swab both nares with Povidone-Iodine solution, EXCLUDE if patient has shellfish/Iodine allergy  Routine, day of surgery, on call to OR; Standing  -     Void on call to OR; Standing  -     Insert peripheral IV; Standing  -     Diet NPO; Sips with meds; Standing  -     Apply Sequential Compression Device; Standing           Follow Up:  Return for Follow up post- op      To Do Next Visit:  Re-evaluation of current issue        Operative Discussions:  Trigger Finger Release: The anatomy and physiology of trigger finger was discussed with the patient today in the office  Edema and increased contact pressure within the flexor tendons at the A1 pulley can cause pain, crepitation, and limitation of function  Treatment options include resting MP blocking splints to decrease edema, oral anti-inflammatory medications, home or formal therapy exercises, up to 2 steroid injections or surgical release  While majority of patients do respond to conservative treatment, up to 20% may require surgical release  The patient has elected release of the trigger finger  The patient has elected to undergo a release of the A1 pulley (trigger finger)  A small incision will be made over the palmar aspect of the hand, the tendon sheath holding the flexor tendons will be released  In the postoperative period, light activities are allowed immediately, driving is allowed when narcotic medication has stopped, and the incision may get wet after 2 days  Heavy activities (lifting more than approximately 10 pounds) will be allowed after the follow up appointment in 1-2 weeks  While the pain and discomfort within the wrist typically improves rapidly, some residual discomfort may be present for up to 6 weeks    The nodule that is typically palpable in the palmar aspect of the hand will not be removed, as this would necessitate removal of a portion of the flexor tendon, however the catching, clicking, and locking should resolve  Approximate success rate is 98%  The risks and benefits of the procedure were explained to the patient, which include, but are not limited to: Bleeding, infection, recurrence, pain, scar, damage to tendons, damage to nerves, and damage to blood vessels, need for future surgery and complications related to anesthesia  If bony work is done, risks also include malunion and nonunion  These risks, along with alternative conservative treatment options, and postoperative protocols were voiced back and understood by the patient  All questions were answered to the patient's satisfaction  The patient agrees to comply with a standard postoperative protocol, and is willing to proceed  Education was provided via written and auditory forms  There were no barriers to learning  Written handouts regarding wound care, incision and scar care, and general preoperative information, as well as risks and benefits were provided to the patient         ____________________________________________________________________________________________________________________________________________        CHIEF COMPLAINT:      Chief Complaint   Patient presents with   • Right Hand - Locking         SUBJECTIVE:  Hailey Heller is a 52y o  year old RHD female who presents today for consultation for her right trigger thumb referred by Dr Adeel Barreto  Patient states that she has a right thumb sprain in September of 2020  She notes that over the last couple of years the right thumb started sticking on her  She notes that in July 2020 to the triggering started to become more frequent when she would  things  She has tried cortisone injections x2, last being 08/26/2022  She has started using the thumb spica brace more often due to pain  Pain/symptom timing:  Worse during the day when active  Pain/symptom context:  Worse with activites and work  Pain/symptom modifying factors:  Rest makes better, activities make worse  Pain/symptom associated signs/symptoms: none     Prior treatment   • NSAIDsNo   • Injections Yes   • Bracing/Orthotics Yes    Physical Therapy No      I have personally reviewed all the relevant PMH, PSH, SH, FH, Medications and allergies        PAST MEDICAL HISTORY:       Past Medical History:   Diagnosis Date   • Anxiety     • Asthma     • Claustrophobia     • Cluster headache     • CTS (carpal tunnel syndrome)     • Depression     • Fibromyalgia       last assessed 11/27/17   • Fibromyalgia, primary     • GERD without esophagitis       last assessed 10/12/17   • GI problem     • Headache, tension-type     • Joint pain     • Lung nodule       last assessed 10/9/15   • Migraine     • Muscle pain     • Peripheral neuropathy     • Sleep apnea           PAST SURGICAL HISTORY:        Past Surgical History:   Procedure Laterality Date   • BACK SURGERY       • CARPAL TUNNEL RELEASE Bilateral     • CHOLECYSTECTOMY       • GALLBLADDER SURGERY       • NECK SURGERY       • SPINAL FUSION         C4 and C5   • ULNAR NERVE REPAIR Bilateral     • UPPER GASTROINTESTINAL ENDOSCOPY             FAMILY HISTORY:        Family History   Problem Relation Age of Onset   • Diabetes Mother     • Fibromyalgia Mother     • Ovarian cancer Mother 35   • Hypertension Mother     • Thyroid disease Mother     • Migraines Mother     • Neuropathy Mother     • No Known Problems Father     • Throat cancer Maternal Grandmother     • No Known Problems Maternal Grandfather     • No Known Problems Paternal Grandmother     • No Known Problems Paternal Grandfather     • No Known Problems Daughter     • Breast cancer Maternal Aunt     • No Known Problems Maternal Aunt     • No Known Problems Maternal Aunt     • No Known Problems Paternal Aunt     • Colon cancer Cousin     • Heart disease Cousin     • Lupus Cousin     • Arthritis Family     • Heart disease Family           cardiac disorder   • Neuropathy Family     • Lupus Family           systemic erythematosus   • No Known Problems Half-Sister     • No Known Problems Half-Brother     • No Known Problems Half-Brother     • No Known Problems Half-Brother     • Stroke Neg Hx           SOCIAL HISTORY:  Social History            Tobacco Use   • Smoking status: Never   • Smokeless tobacco: Never   Vaping Use   • Vaping Use: Never used   Substance Use Topics   • Alcohol use: Yes       Alcohol/week: 2 0 standard drinks       Types: 1 Glasses of wine, 1 Cans of beer per week       Comment: occasional   • Drug use: Yes       Types: Marijuana       Comment: medicinal         MEDICATIONS:     Current Outpatient Medications:   •  albuterol (PROVENTIL HFA,VENTOLIN HFA) 90 mcg/act inhaler, Inhale 2 puffs every 6 (six) hours as needed for wheezing or shortness of breath, Disp: 1 Inhaler, Rfl: 5  •  Atogepant 30 MG TABS, 1 tab PO daily, Disp: 30 tablet, Rfl: 3  •  Botox 200 units SOLR, , Disp: , Rfl:   •  buPROPion (Wellbutrin XL) 150 mg 24 hr tablet, Take 1 tablet (150 mg total) by mouth every morning, Disp: 90 tablet, Rfl: 3  •  cholecalciferol (VITAMIN D3) 1,000 units tablet, Take 1 capsule by mouth once a week 3000 units daily , Disp: , Rfl:   •  cholestyramine (QUESTRAN) 4 GM/DOSE powder, Take 1 packet (4 g total) by mouth 2 (two) times a day with meals, Disp: 378 g, Rfl: 2  •  dexamethasone (DECADRON) 2 mg tablet, 1 tab qam with food prn migraine  , Disp: 5 tablet, Rfl: 0  •  divalproex sodium (DEPAKOTE) 250 mg EC tablet, 2 tabs q12 hours x 2 days, then stop  Repeat if needed  , Disp: 16 tablet, Rfl: 0  •  ergocalciferol (VITAMIN D2) 50,000 units, , Disp: , Rfl:   •  fluticasone (FLONASE) 50 mcg/act nasal spray, 1 spray in each nostril once daily, Disp: 30 mL, Rfl: 0  •  ibuprofen (MOTRIN) 400 mg tablet, 1 tab q8 hours prn migraine   Max 2 per day and 4 per week  Hold toradol , Disp: 20 tablet, Rfl: 0  •  ketorolac (TORADOL) 30 mg/mL injection, 1-2 ml IM injection once per 24 hours p r n  migraine  No more than 2 injections per week , Disp: 8 mL, Rfl: 0  •  Lasmiditan Succinate (Reyvow) 50 MG tablet, One tab qhs at migraine onset  Caution sedation  Max 1 tab/24 hours  , Disp: 8 tablet, Rfl: 0  •  levonorgestrel (MIRENA) 20 MCG/24HR IUD, 1 each by Intrauterine route once, Disp: , Rfl:   •  meclizine (ANTIVERT) 12 5 MG tablet, Take 1 tablet (12 5 mg total) by mouth 3 (three) times a day as needed for dizziness or nausea, Disp: 30 tablet, Rfl: 0  •  Melatonin ER 3 MG TBCR, 1-2 tabs qhs, Disp: 60 tablet, Rfl: 2  •  metoclopramide (REGLAN) 10 mg tablet, 1 tab q6 hours prn migraine (with toradol and benadryl), Disp: 20 tablet, Rfl: 0  •  NON FORMULARY, Medical marijuana  , Disp: , Rfl:   •  omeprazole (PriLOSEC) 40 MG capsule, TAKE ONE CAPSULE BY MOUTH TWICE A DAY, Disp: 60 capsule, Rfl: 11  •  polyethylene glycol (GLYCOLAX) 17 GM/SCOOP powder, Take 17 g by mouth daily as needed (constipation), Disp: 289 g, Rfl: 0  •  Syringe/Needle, Disp, (SYRINGE 3CC/60YA1-7/2") 25G X 1-1/2" 3 ML MISC, Use for toradol IM injections  , Disp: 10 each, Rfl: 0  •  tiZANidine (ZANAFLEX) 2 mg tablet, Take 1 tablet (2 mg total) by mouth every 8 (eight) hours as needed for muscle spasms, Disp: 90 tablet, Rfl: 1  •  traMADol (ULTRAM) 50 mg tablet, take one tablet at onset of moderate to severe headach, can repeat once in 8 hours   MAX 2 DAYS PER WEEK, Disp: 10 tablet, Rfl: 0  •  ZOLMitriptan (ZOMIG-ZMT) 5 MG disintegrating tablet, Take 1 tablet (5 mg total) by mouth once as needed for migraine for up to 1 dose, Disp: 6 tablet, Rfl: 0  •  dicyclomine (BENTYL) 20 mg tablet, Take 1 tablet (20 mg total) by mouth 3 (three) times a day as needed (migraine/cramps), Disp: 60 tablet, Rfl: 0  •  famotidine (PEPCID) 20 mg tablet, Take 1 tablet (20 mg total) by mouth daily at bedtime, Disp: 90 tablet, Rfl: 0     ALLERGIES:        Allergies   Allergen Reactions   • Hydrocodone-Acetaminophen GI Intolerance       gi upset -pt okay if takes  zofran   • Hydrocodone-Acetaminophen GI Intolerance       gi upset -pt okay if takes  zofran  gi upset -pt okay if takes  zofran   • Codeine GI Intolerance and Other (See Comments)       GI issues   • Oxycodone-Acetaminophen GI Intolerance   • Penicillins GI Intolerance               REVIEW OF SYSTEMS:  Review of Systems   Constitutional: Negative for chills, fever and unexpected weight change  HENT: Negative for hearing loss, nosebleeds and sore throat  Eyes: Negative for pain, redness and visual disturbance  Respiratory: Negative for cough, shortness of breath and wheezing  Cardiovascular: Negative for chest pain, palpitations and leg swelling  Gastrointestinal: Negative for abdominal pain and nausea  Genitourinary: Negative for dyspareunia, dysuria and frequency  Skin: Negative for rash and wound  Neurological: Negative for dizziness, numbness and headaches  Psychiatric/Behavioral: Negative for decreased concentration and suicidal ideas   The patient is not nervous/anxious           VITALS:      Vitals:     12/14/22 1037   BP: 126/85   Pulse: 83         LABS:  HgA1c:         Lab Results   Component Value Date     HGBA1C 5 7 (H) 06/22/2022      BMP:         Lab Results   Component Value Date     GLUCOSE 88 02/20/2015     CALCIUM 8 8 10/13/2021      02/20/2015     K 3 8 10/13/2021     CO2 25 10/13/2021      10/13/2021     BUN 11 10/13/2021     CREATININE 0 74 10/13/2021         _____________________________________________________  PHYSICAL EXAMINATION:  General: well developed and well nourished, alert, oriented times 3 and appears comfortable  Psychiatric: Normal  HEENT: Normocephalic, Atraumatic Trachea Midline, No torticollis  Pulmonary: No audible wheezing or respiratory distress   Abdomen/GI: Non tender, non distended Cardiovascular: No pitting edema, 2+ radial pulse   Skin: No masses, erythema, lacerations, fluctation, ulcerations  Neurovascular: Sensation Intact to the Median, Ulnar, Radial Nerve, Motor Intact to the Median, Ulnar, Radial Nerve and Pulses Intact  Musculoskeletal: Normal, except as noted in detailed exam and in HPI         MUSCULOSKELETAL EXAMINATION:  right thumb finger:  Positive palpable nodule over the A1 pulley  Positive tenderness to palpation over A1 pulley  Negative catching   Positive clicking        ___________________________________________________  STUDIES REVIEWED:  No images reviewed at today's visit

## 2023-01-16 NOTE — OP NOTE
OPERATIVE REPORT  PATIENT NAME: Phil Ramírez  :  1975  MRN: [id number]  Pt Location: [facility] MAIN OR    SURGERY DATE: 23    Surgeon(s) and Role:     * Artis Sales MD - Primary     * Ag Mccallum PA-C - Assisting    Pre-Op Diagnosis:  Trigger finger of right thumb [M65 311]    Post-Op Diagnosis Codes:     * Trigger finger of right thumb [M65 311]    Procedure(s):  THUMB TRIGGER FINGER RELEASE (Right)    Specimen(s):  * No orders in the log *    Estimated Blood Loss:   Minimal    Anesthesia Type:   Conscious Sedation     IMPLANTS:  * No implants in log *    PERIOPERATIVE ANTIBIOTICS:    clindamycin, 900 mg    Tourniquet Time: 2 min  at 250 mmHg          Operative Indications: The patient has a history of Trigger Finger  right  thumb that was recalcitrant to conservative management  The decision was made to bring the patient to the operating room for Trigger Finger Release  right  thumb  Risks of the procedure were explained which include, but are not limited to bleeding; infection; damage to nerves, arteries,veins, tendons; scar; pain; need for reoperation; failure to give desired result; and risks of anaesthesia  All questions were answered to satisfaction and they were willing to proceed  Operative Findings:  Hypertrophy A1 pulley  Mild volar abrasion FPL    Complications:   None    Procedure and Technique:  After the patient, site, and procedure were identified, the patient was brought into the operating room in a supine position  Local anaesthesia and sedation were provided  A well padded tourniquet was applied to the extremity, set at 250 mmHg  The  right upper extremity was then prepped and drapped in a normal, sterile, orthopedic fashion  A  Horizontal incision is made in line with the proximal crease of the right thumb, overlying the A1 pulley Dissection was  carried down under loupe magnification  Skin and subcutaneous tissues were sharply incised   The ulnar digital nerve was identified and protected The tissue was elevated off the A1 pulley  The A1 pulley was  identified and incised  There was no retinacular cyst noted  Tenosynovectomy was not carried out  The FPL was noted to have  scuffing at the volar surface     At the completion of the procedure, hemostasis was obtained with cautery and direct pressure  The wounds were copiously irrigated with sterile solution  The wounds were closed with Prolene  Sterile dressings were applied, including Xeroform, gauze, tweeners, webril, ACE  Please note, all sponge, needle, and instrument counts were correct prior to closure  Loupe magnification was utilized  The patient tolerated the procedure well       I was present for the entire procedure, A qualified resident physician was not available and A physician assistant was required during the procedure for retraction tissue handling,dissection and suturing    Patient Disposition:  PACU     SIGNATURE: Agnes Spring MD  DATE: 01/16/23  TIME: 8:28 AM

## 2023-01-16 NOTE — ANESTHESIA POSTPROCEDURE EVALUATION
Post-Op Assessment Note    CV Status:  Stable  Pain Score: 0    Pain management: adequate     Mental Status:  Alert and awake   Hydration Status:  Euvolemic   PONV Controlled:  Controlled   Airway Patency:  Patent      Post Op Vitals Reviewed: Yes      Staff: CRNA         No notable events documented      BP   108/58   Temp  97 4   Pulse  78   Resp   16   SpO2   95

## 2023-01-16 NOTE — ANESTHESIA PREPROCEDURE EVALUATION
Procedure:  THUMB TRIGGER FINGER RELEASE (Right: Thumb)    Relevant Problems   CARDIO   (+) Borderline hyperlipidemia   (+) Chronic migraine without aura without status migrainosus, not intractable   (+) Intractable chronic migraine without aura and with status migrainosus   (+) Intractable chronic migraine without aura and without status migrainosus      GI/HEPATIC   (+) GERD without esophagitis      /RENAL   (+) Nephrolithiasis      MUSCULOSKELETAL   (+) Fibromyalgia   (+) Impingement syndrome of left shoulder   (+) Osteoarthritis of carpometacarpal (CMC) joint of right thumb   (+) Trapezius muscle spasm      NEURO/PSYCH   (+) Chronic migraine without aura without status migrainosus, not intractable   (+) Depression with anxiety   (+) Fibromyalgia   (+) Intractable chronic migraine without aura and with status migrainosus   (+) Intractable chronic migraine without aura and without status migrainosus   (+) Post traumatic stress disorder (PTSD)      PULMONARY   (+) Mild intermittent asthma        Physical Exam    Airway    Mallampati score: I  TM Distance: >3 FB  Neck ROM: full     Dental   No notable dental hx     Cardiovascular      Pulmonary      Other Findings        Anesthesia Plan  ASA Score- 2     Anesthesia Type- IV sedation with anesthesia with ASA Monitors  Additional Monitors:   Airway Plan:           Plan Factors-Exercise tolerance (METS): >4 METS  Chart reviewed  Existing labs reviewed  Patient summary reviewed  Induction- intravenous  Postoperative Plan-     Informed Consent- Anesthetic plan and risks discussed with patient  I personally reviewed this patient with the CRNA  Discussed and agreed on the Anesthesia Plan with the CRNA  Raz Horton

## 2023-01-23 ENCOUNTER — TELEPHONE (OUTPATIENT)
Dept: INTERNAL MEDICINE CLINIC | Facility: CLINIC | Age: 48
End: 2023-01-23

## 2023-01-23 NOTE — TELEPHONE ENCOUNTER
----- Message from Doug Sauer sent at 1/20/2023  2:25 PM EST -----  Regarding: PHYSICIAN REFERRAL/ORDER REQUIRED  Patient is scheduled with neurology 1/30 and requires physician referral/order from PCP office    Please enter in epic    Dx:  [phone],  [phone]

## 2023-01-24 ENCOUNTER — OFFICE VISIT (OUTPATIENT)
Dept: OBGYN CLINIC | Facility: CLINIC | Age: 48
End: 2023-01-24

## 2023-01-24 ENCOUNTER — APPOINTMENT (OUTPATIENT)
Dept: RADIOLOGY | Facility: AMBULARY SURGERY CENTER | Age: 48
End: 2023-01-24

## 2023-01-24 VITALS
WEIGHT: 219 LBS | SYSTOLIC BLOOD PRESSURE: 116 MMHG | HEART RATE: 76 BPM | BODY MASS INDEX: 40.3 KG/M2 | HEIGHT: 62 IN | DIASTOLIC BLOOD PRESSURE: 79 MMHG

## 2023-01-24 DIAGNOSIS — G89.29 CHRONIC LEFT SI JOINT PAIN: ICD-10-CM

## 2023-01-24 DIAGNOSIS — M53.3 CHRONIC LEFT SI JOINT PAIN: ICD-10-CM

## 2023-01-24 DIAGNOSIS — M25.551 BILATERAL HIP PAIN: Primary | ICD-10-CM

## 2023-01-24 DIAGNOSIS — M25.552 BILATERAL HIP PAIN: Primary | ICD-10-CM

## 2023-01-24 DIAGNOSIS — M25.552 BILATERAL HIP PAIN: ICD-10-CM

## 2023-01-24 DIAGNOSIS — M25.551 BILATERAL HIP PAIN: ICD-10-CM

## 2023-01-24 RX ORDER — DIHYDROERGOTAMINE MESYLATE 4 MG/ML
SPRAY, METERED NASAL
COMMUNITY
Start: 2023-01-16

## 2023-01-24 RX ORDER — TRIAMCINOLONE ACETONIDE 40 MG/ML
80 INJECTION, SUSPENSION INTRA-ARTICULAR; INTRAMUSCULAR
Status: COMPLETED | OUTPATIENT
Start: 2023-01-24 | End: 2023-01-24

## 2023-01-24 RX ORDER — ROPIVACAINE HYDROCHLORIDE 5 MG/ML
10 INJECTION, SOLUTION EPIDURAL; INFILTRATION; PERINEURAL
Status: COMPLETED | OUTPATIENT
Start: 2023-01-24 | End: 2023-01-24

## 2023-01-24 RX ADMIN — ROPIVACAINE HYDROCHLORIDE 10 ML: 5 INJECTION, SOLUTION EPIDURAL; INFILTRATION; PERINEURAL at 10:23

## 2023-01-24 RX ADMIN — TRIAMCINOLONE ACETONIDE 80 MG: 40 INJECTION, SUSPENSION INTRA-ARTICULAR; INTRAMUSCULAR at 10:23

## 2023-01-24 NOTE — PROGRESS NOTES
1  Bilateral hip pain  XR hips bilateral 3-4 vw w pelvis if performed    Ambulatory referral to Physical Therapy    Large joint arthrocentesis: L greater trochanteric bursa      2  Chronic left SI joint pain  Ambulatory Referral to Sports Medicine    XR spine lumbar 2 or 3 views injury    Ambulatory referral to Physical Therapy        Orders Placed This Encounter   Procedures   • Large joint arthrocentesis: L greater trochanteric bursa   • XR hips bilateral 3-4 vw w pelvis if performed   • XR spine lumbar 2 or 3 views injury   • Ambulatory referral to Physical Therapy     Impression:  Bilateral hip/pelvis pain likely multifactorial and secondary to bilateral SI joint dysfunction leading to greater trochanteric pain syndrome  We discussed different treatment options and decided to proceed with a left greater trochanteric injection/tenotomy  No true allergy to ropivacaine  Patient will start physical therapy  She can try Voltaren gel as well  I will see her back in 5-6 weeks to reassess  Can consider ultrasound-guided hip joint injection for both diagnostic and therapeutic reasons, depending on presentation on follow-up  Imaging Studies (I personally reviewed images in PACS and report):  Lumbar x-rays most recent to this encounter reviewed  These images show fusion of the left sacroiliac joint  Mild L5-S1 disc space narrowing  Mild lower lumbar facet hypertrophy  There are surgical staples noted  Bilateral hip/pelvis x-rays show bilateral acetabular over coverage and loss of the femoral head and neck sphericity which is consistent with mixed type femoral acetabular impingement  No acute osseous abnormalities  No follow-ups on file  Patient is in agreement with the above plan  HPI:  Randa Howard is a 52 y o  female  who presents for evaluation of   Chief Complaint   Patient presents with   • Left Hip - Locking, Pain     Pt reports bilateral hip pain, left worse than right   Pt states her hips lock up on her randomly, she cannot rule out what the cause is  Pt reports left sided SI joint fusion in 2012  • Right Hip - Locking, Pain     Onset/Mechanism: Chronic pain without a recent injury  History of left SIJ fusion  Location: Lateral hip and down the thigh  Does not go past the knee  Radiation: Also in the low back area  Provocative: Random  Usually when sitting down  Severity: Can be severe  Associated Symptoms: Denies  Treatment so far: No recent treatment      Following history reviewed and updated:  Past Medical History:   Diagnosis Date   • Anxiety    • Asthma    • Claustrophobia    • Cluster headache    • CTS (carpal tunnel syndrome)    • Depression    • Fibromyalgia     last assessed 11/27/17   • Fibromyalgia, primary    • GERD (gastroesophageal reflux disease)    • GERD without esophagitis     last assessed 10/12/17   • GI problem    • Headache, tension-type    • Irritable bowel syndrome    • Joint pain    • Kidney stone    • Lung nodule     last assessed 10/9/15   • Migraine    • Muscle pain    • Peripheral neuropathy    • Sleep apnea      Past Surgical History:   Procedure Laterality Date   • BACK SURGERY     • CARPAL TUNNEL RELEASE Bilateral    • CHOLECYSTECTOMY     • CYSTOSCOPY     • GALLBLADDER SURGERY     • GASTRECTOMY  08/09/2022    Sleeve   • NECK SURGERY     • VT TENDON SHEATH INCISION Right 01/16/2023    Procedure: THUMB TRIGGER FINGER RELEASE;  Surgeon: Warden Timmy MD;  Location[facility];  Service: Orthopedics   • SACROILIAC JOINT FUSION Left    • SPINAL FUSION      C4 and C5   • ULNAR NERVE REPAIR Bilateral    • UPPER GASTROINTESTINAL ENDOSCOPY       Social History   Social History     Substance and Sexual Activity   Alcohol Use Yes   • Alcohol/week: 2 0 standard drinks   • Types: 1 Glasses of wine, 1 Cans of beer per week    Comment: occasional     Social History     Substance and Sexual Activity   Drug Use Yes   • Types: Marijuana    Comment: medicinal Social History     Tobacco Use   Smoking Status Never   Smokeless Tobacco Never     Family History   Problem Relation Age of Onset   • Diabetes Mother    • Fibromyalgia Mother    • Ovarian cancer Mother 35   • Hypertension Mother    • Thyroid disease Mother    • Migraines Mother    • Neuropathy Mother    • No Known Problems Father    • Throat cancer Maternal Grandmother    • No Known Problems Maternal Grandfather    • No Known Problems Paternal Grandmother    • No Known Problems Paternal Grandfather    • No Known Problems Daughter    • Breast cancer Maternal Aunt    • No Known Problems Maternal Aunt    • No Known Problems Maternal Aunt    • No Known Problems Paternal Aunt    • Colon cancer Cousin    • Heart disease Cousin    • Lupus Cousin    • Arthritis Family    • Heart disease Family         cardiac disorder   • Neuropathy Family    • Lupus Family         systemic erythematosus   • No Known Problems Half-Sister    • No Known Problems Half-Brother    • No Known Problems Half-Brother    • No Known Problems Half-Brother    • Stroke Neg Hx      Allergies   Allergen Reactions   • Hydrocodone-Acetaminophen GI Intolerance     gi upset -pt okay if takes  zofran   • Hydrocodone-Acetaminophen GI Intolerance     gi upset -pt okay if takes  zofran  gi upset -pt okay if takes  zofran   • Codeine GI Intolerance and Other (See Comments)     GI issues   • Oxycodone-Acetaminophen GI Intolerance   • Penicillins GI Intolerance   • Mexiletine Rash        Constitutional:  /79   Pulse 76   Ht 5' 2" (1 575 m)   Wt 99 3 kg (219 lb)   BMI 40 06 kg/m²    General: NAD  Eyes: Anicteric sclerae  Neck: Supple  Lungs: Unlabored breathing  Cardiovascular: No lower extremity edema  Skin: Intact without erythema  Neurologic: Sensation intact to light touch  Psychiatric: Mood and affect are appropriate  Right Hip Exam     Tenderness   The patient is experiencing tenderness in the greater trochanter      Range of Motion   The patient has normal right hip ROM  Muscle Strength   The patient has normal right hip strength  Other   Erythema: absent  Scars: absent  Sensation: normal  Pulse: present      Left Hip Exam     Tenderness   The patient is experiencing tenderness in the greater trochanter  Range of Motion   The patient has normal left hip ROM  Muscle Strength   The patient has normal left hip strength  Tests   SALLIE: positive  Jorje: positive    Other   Erythema: absent  Scars: absent  Sensation: normal  Pulse: present    Comments:  Pain with log roll, Stinchfield and hip scour along the posterolateral hip  Large joint arthrocentesis: L greater trochanteric bursa  Universal Protocol:  Procedure performed by: (Chaperone present)  Consent: Verbal consent obtained  Written consent not obtained  Risks and benefits: risks, benefits and alternatives were discussed  Consent given by: patient  Timeout called at: 1/24/2023 10:22 AM   Patient understanding: patient states understanding of the procedure being performed  Site marked: the operative site was marked  Radiology Images displayed and confirmed  If images not available, report reviewed: imaging studies available  Patient identity confirmed: verbally with patient    Supporting Documentation  Indications: pain   Procedure Details  Location: hip - L greater trochanteric bursa  Needle size: 20 G (20G 3 5'')  Ultrasound guidance: no  Approach: Lateral to medial approach  Medications administered: 80 mg triamcinolone acetonide 40 mg/mL; 10 mL ropivacaine 0 5 %    Patient tolerance: patient tolerated the procedure well with no immediate complications  Dressing:  Sterile dressing applied    A modified tenotomy was performed by redirecting the needle in three directions and dispersing the medication equally in all three directions  There was little to no resistance encountered during the injection      Risks of this procedure include:    - Risk of bleeding since a needle is involved  - Risk of infection (1/10,000 chance as per recent studies)  Signs/symptoms were discussed and they would prompt an urgent evaluation at an emergency department   - Risk of pigmentation or skin dimpling in the skin (2-3% chance as per recent studies) from the steroid  - Risk of increased pain from steroid flare (1% chance as per recent studies) that typically lasts 24-48 hours  - Risk of increased blood sugars from the steroid medication that can last for a few weeks  If the patient is a diabetic or pre-diabetic, they were encouraged to closely monitor their blood sugars and discuss with PCP if elevated more than usual or if having symptoms  The benefits outweigh the risks and so the procedure was completed

## 2023-01-24 NOTE — LETTER
January 26, 2023     Brenda Dueñas DO  [facility]    Patient: Hailey Heller   YOB: 1975   Date of Visit: 1/24/2023       Dear Dr Thais Vidal: Thank you for referring Hailey Heller to me for evaluation  Below are my notes for this consultation  If you have questions, please do not hesitate to call me  I look forward to following your patient along with you  Sincerely,        Shantal Grover DO        CC: No Recipients  Shantal Macedo DO  1/24/2023 11:08 AM  Signed  1  Bilateral hip pain  XR hips bilateral 3-4 vw w pelvis if performed    Ambulatory referral to Physical Therapy    Large joint arthrocentesis: L greater trochanteric bursa      2  Chronic left SI joint pain  Ambulatory Referral to Sports Medicine    XR spine lumbar 2 or 3 views injury    Ambulatory referral to Physical Therapy        Orders Placed This Encounter   Procedures   • Large joint arthrocentesis: L greater trochanteric bursa   • XR hips bilateral 3-4 vw w pelvis if performed   • XR spine lumbar 2 or 3 views injury   • Ambulatory referral to Physical Therapy     Impression:  Bilateral hip/pelvis pain likely multifactorial and secondary to bilateral SI joint dysfunction leading to greater trochanteric pain syndrome  We discussed different treatment options and decided to proceed with a left greater trochanteric injection/tenotomy  No true allergy to ropivacaine  Patient will start physical therapy  She can try Voltaren gel as well  I will see her back in 5-6 weeks to reassess  Can consider ultrasound-guided hip joint injection for both diagnostic and therapeutic reasons, depending on presentation on follow-up  Imaging Studies (I personally reviewed images in PACS and report):  Lumbar x-rays most recent to this encounter reviewed  These images show fusion of the left sacroiliac joint  Mild L5-S1 disc space narrowing  Mild lower lumbar facet hypertrophy    There are surgical staples noted     Bilateral hip/pelvis x-rays show bilateral acetabular over coverage and loss of the femoral head and neck sphericity which is consistent with mixed type femoral acetabular impingement  No acute osseous abnormalities  No follow-ups on file  Patient is in agreement with the above plan  HPI:  Saman Ross is a 52 y o  female  who presents for evaluation of   Chief Complaint   Patient presents with   • Left Hip - Locking, Pain     Pt reports bilateral hip pain, left worse than right  Pt states her hips lock up on her randomly, she cannot rule out what the cause is  Pt reports left sided SI joint fusion in 2012  • Right Hip - Locking, Pain     Onset/Mechanism: Chronic pain without a recent injury  History of left SIJ fusion  Location: Lateral hip and down the thigh  Does not go past the knee  Radiation: Also in the low back area  Provocative: Random  Usually when sitting down  Severity: Can be severe  Associated Symptoms: Denies  Treatment so far: No recent treatment      Following history reviewed and updated:  Past Medical History:   Diagnosis Date   • Anxiety    • Asthma    • Claustrophobia    • Cluster headache    • CTS (carpal tunnel syndrome)    • Depression    • Fibromyalgia     last assessed 11/27/17   • Fibromyalgia, primary    • GERD (gastroesophageal reflux disease)    • GERD without esophagitis     last assessed 10/12/17   • GI problem    • Headache, tension-type    • Irritable bowel syndrome    • Joint pain    • Kidney stone    • Lung nodule     last assessed 10/9/15   • Migraine    • Muscle pain    • Peripheral neuropathy    • Sleep apnea      Past Surgical History:   Procedure Laterality Date   • BACK SURGERY     • CARPAL TUNNEL RELEASE Bilateral    • CHOLECYSTECTOMY     • CYSTOSCOPY     • GALLBLADDER SURGERY     • GASTRECTOMY  08/09/2022    Sleeve   • NECK SURGERY     • VT TENDON SHEATH INCISION Right 01/16/2023    Procedure: THUMB TRIGGER FINGER RELEASE;  Surgeon: Martha Woodall MD;  Location: [facility] MAIN OR;  Service: Orthopedics   • SACROILIAC JOINT FUSION Left    • SPINAL FUSION      C4 and C5   • ULNAR NERVE REPAIR Bilateral    • UPPER GASTROINTESTINAL ENDOSCOPY       Social History   Social History     Substance and Sexual Activity   Alcohol Use Yes   • Alcohol/week: 2 0 standard drinks   • Types: 1 Glasses of wine, 1 Cans of beer per week    Comment: occasional     Social History     Substance and Sexual Activity   Drug Use Yes   • Types: Marijuana    Comment: medicinal     Social History     Tobacco Use   Smoking Status Never   Smokeless Tobacco Never     Family History   Problem Relation Age of Onset   • Diabetes Mother    • Fibromyalgia Mother    • Ovarian cancer Mother 35   • Hypertension Mother    • Thyroid disease Mother    • Migraines Mother    • Neuropathy Mother    • No Known Problems Father    • Throat cancer Maternal Grandmother    • No Known Problems Maternal Grandfather    • No Known Problems Paternal Grandmother    • No Known Problems Paternal Grandfather    • No Known Problems Daughter    • Breast cancer Maternal Aunt    • No Known Problems Maternal Aunt    • No Known Problems Maternal Aunt    • No Known Problems Paternal Aunt    • Colon cancer Cousin    • Heart disease Cousin    • Lupus Cousin    • Arthritis Family    • Heart disease Family         cardiac disorder   • Neuropathy Family    • Lupus Family         systemic erythematosus   • No Known Problems Half-Sister    • No Known Problems Half-Brother    • No Known Problems Half-Brother    • No Known Problems Half-Brother    • Stroke Neg Hx      Allergies   Allergen Reactions   • Hydrocodone-Acetaminophen GI Intolerance     gi upset -pt okay if takes  zofran   • Hydrocodone-Acetaminophen GI Intolerance     gi upset -pt okay if takes  zofran  gi upset -pt okay if takes  zofran   • Codeine GI Intolerance and Other (See Comments)     GI issues   • Oxycodone-Acetaminophen GI Intolerance   • Penicillins GI Intolerance   • Mexiletine Rash        Constitutional:  /79   Pulse 76   Ht 5' 2" (1 575 m)   Wt 99 3 kg (219 lb)   BMI 40 06 kg/m²    General: NAD  Eyes: Anicteric sclerae  Neck: Supple  Lungs: Unlabored breathing  Cardiovascular: No lower extremity edema  Skin: Intact without erythema  Neurologic: Sensation intact to light touch  Psychiatric: Mood and affect are appropriate  Right Hip Exam     Tenderness   The patient is experiencing tenderness in the greater trochanter  Range of Motion   The patient has normal right hip ROM  Muscle Strength   The patient has normal right hip strength  Other   Erythema: absent  Scars: absent  Sensation: normal  Pulse: present      Left Hip Exam     Tenderness   The patient is experiencing tenderness in the greater trochanter  Range of Motion   The patient has normal left hip ROM  Muscle Strength   The patient has normal left hip strength  Tests   SALLIE: positive  Jorje: positive    Other   Erythema: absent  Scars: absent  Sensation: normal  Pulse: present    Comments:  Pain with log roll, Stinchfield and hip scour along the posterolateral hip  Large joint arthrocentesis: L greater trochanteric bursa  Universal Protocol:  Procedure performed by: (Chaperone present)  Consent: Verbal consent obtained  Written consent not obtained  Risks and benefits: risks, benefits and alternatives were discussed  Consent given by: patient  Timeout called at: 1/24/2023 10:22 AM   Patient understanding: patient states understanding of the procedure being performed  Site marked: the operative site was marked  Radiology Images displayed and confirmed   If images not available, report reviewed: imaging studies available  Patient identity confirmed: verbally with patient    Supporting Documentation  Indications: pain   Procedure Details  Location: hip - L greater trochanteric bursa  Needle size: 20 G (20G 3 5'')  Ultrasound guidance: no  Approach: Lateral to medial approach  Medications administered: 80 mg triamcinolone acetonide 40 mg/mL; 10 mL ropivacaine 0 5 %    Patient tolerance: patient tolerated the procedure well with no immediate complications  Dressing:  Sterile dressing applied    A modified tenotomy was performed by redirecting the needle in three directions and dispersing the medication equally in all three directions  There was little to no resistance encountered during the injection  Risks of this procedure include:    - Risk of bleeding since a needle is involved  - Risk of infection (1/10,000 chance as per recent studies)  Signs/symptoms were discussed and they would prompt an urgent evaluation at an emergency department   - Risk of pigmentation or skin dimpling in the skin (2-3% chance as per recent studies) from the steroid  - Risk of increased pain from steroid flare (1% chance as per recent studies) that typically lasts 24-48 hours  - Risk of increased blood sugars from the steroid medication that can last for a few weeks  If the patient is a diabetic or pre-diabetic, they were encouraged to closely monitor their blood sugars and discuss with PCP if elevated more than usual or if having symptoms  The benefits outweigh the risks and so the procedure was completed

## 2023-01-26 ENCOUNTER — OFFICE VISIT (OUTPATIENT)
Dept: OBGYN CLINIC | Facility: CLINIC | Age: 48
End: 2023-01-26

## 2023-01-26 VITALS
DIASTOLIC BLOOD PRESSURE: 81 MMHG | BODY MASS INDEX: 38.45 KG/M2 | SYSTOLIC BLOOD PRESSURE: 133 MMHG | WEIGHT: 217 LBS | HEART RATE: 79 BPM | HEIGHT: 63 IN

## 2023-01-26 DIAGNOSIS — Z98.890 S/P TRIGGER FINGER RELEASE: Primary | ICD-10-CM

## 2023-01-26 DIAGNOSIS — M18.12 ARTHRITIS OF CARPOMETACARPAL (CMC) JOINT OF LEFT THUMB: ICD-10-CM

## 2023-01-26 NOTE — PROGRESS NOTES
Assessment/Plan:  Patient ID: Sarai Ochoa 52 y o  female   Surgery: Thumb Trigger Finger Release - Right  Date of Surgery: 1/16/2023    10 days s/p above surgery  Sutures were removed today  She may shower normally, continuing to avoid standing water for 3-4 more days to allow sutures holes to close  She was advised to start to perform scar massage on Monday  She was shown opposition exercises in the office today, to be performed at home  Discussed left thumb pain is likely due to some early [facility] arthritic changes  We discussed warm moist heat can be beneficial  Voltaren Gel can also be beneficial and used up to 4x a day, this was prescribed and sent to her pharmacy electronically  She was fit with a comfort cool brace to be worn for comfort and support with activities  If her left thumb pain worsens or fails to improve she was advised to follow up for left hand x-rays and a possible CMC CSI  Follow up in the office as needed if symptoms worsen or fail to improve  Follow Up:  PRN    To Do Next Visit:  X-rays of the  left  hand with CMC view       CHIEF COMPLAINT:  No chief complaint on file  SUBJECTIVE:  Sarai Ochoa is a 52y o  year old female who presents for follow up after Thumb Trigger Finger Release - Right  Today patient has intermittent pain to the base of her left thumb  She is unsure if this is related to over use as she has not been using her right hand as much         PHYSICAL EXAMINATION:  General: well developed and well nourished, alert, oriented times 3 and appears comfortable  Psychiatric: Normal    MUSCULOSKELETAL EXAMINATION:  Incision: clean, dry and suture(s) intact   Surgery Site: normal, no evidence of infection   Range of Motion: As expected  Neurovascular status: Neuro intact, good cap refill  Activity Restrictions: Avoid standing water, scar massage and the gym until Monday   Done today: Sutures out    Left CMC Exam:  No adduction contracture  No hyperextension deformity of MCP joint  Positive localized tenderness over radial and dorsal aspect of thumb (CMC joint)  Grind test is Negative for pain and Negative for crepitus  Metacarpal load shift test positive   No triggering or tenderness over the A1 pulley    STUDIES REVIEWED:  No Studies to review      PROCEDURES PERFORMED:  Procedures  No Procedures performed today    Scribe Attestation    I,:  Kash Cleaning am acting as a scribe while in the presence of the attending physician :       I,:  Jacinta Villa MD personally performed the services described in this documentation    as scribed in my presence :
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
## 2023-01-27 ENCOUNTER — EVALUATION (OUTPATIENT)
Dept: PHYSICAL THERAPY | Facility: REHABILITATION | Age: 48
End: 2023-01-27

## 2023-01-27 ENCOUNTER — TELEPHONE (OUTPATIENT)
Dept: INTERNAL MEDICINE CLINIC | Facility: CLINIC | Age: 48
End: 2023-01-27

## 2023-01-27 DIAGNOSIS — M25.552 BILATERAL HIP PAIN: ICD-10-CM

## 2023-01-27 DIAGNOSIS — M25.551 BILATERAL HIP PAIN: ICD-10-CM

## 2023-01-27 DIAGNOSIS — G89.29 CHRONIC LEFT SI JOINT PAIN: ICD-10-CM

## 2023-01-27 DIAGNOSIS — M53.3 CHRONIC LEFT SI JOINT PAIN: ICD-10-CM

## 2023-01-27 NOTE — TELEPHONE ENCOUNTER
----- Message from Lenise Crigler sent at 1/26/2023  8:35 AM EST -----  Regarding: FW: PHYSICIAN REFERRAL/ORDER REQUIRED  On 1/20 I sent in basket requesting physician referral/order  Patient is scheduled with neurology 1/30  Please enter in epic    Dx:  U97 [id number],  G96 00    ----- Message -----  From: Lenise Crigler  Sent: 1/20/2023   2:26 PM EST  To: [facility] Clerical  Subject: PHYSICIAN REFERRAL/ORDER REQUIRED                Patient is scheduled with neurology 1/30 and requires physician referral/order from PCP office    Please enter in epic    Dx:  [id number],  G96 00

## 2023-01-29 DIAGNOSIS — G43.709 CHRONIC MIGRAINE WITHOUT AURA WITHOUT STATUS MIGRAINOSUS, NOT INTRACTABLE: Primary | ICD-10-CM

## 2023-01-30 ENCOUNTER — TELEPHONE (OUTPATIENT)
Dept: NEUROLOGY | Facility: CLINIC | Age: 48
End: 2023-01-30

## 2023-01-30 NOTE — TELEPHONE ENCOUNTER
Pt called in to r/s her botox for today at 10:30 due to being sick  Please assist with the r/s as I did not see anything open within a week

## 2023-01-30 NOTE — PROGRESS NOTES
Daily Note     Today's date: 2023  Patient name: Lisa Sahni  : 1975  MRN: [id number]  Referring provider: Linda Anderson DO  Dx: No diagnosis found  Subjective: ***      Objective: See treatment diary below      Assessment: Tolerated treatment {Tolerated treatment :0386742834}   Patient {assessment:3774134344}      Plan: {PLAN:0539356246}     Precautions: standard                 Vitals 130/90  HR 62  SpO2 94          Patient Ed             *SIJ provocation tests                                                                           Neuro Re-Ed           L sideglide Complete with wall when her hip locks                                                                            Ther Ex           Aerobic conditioning           Manan stretch 3 x 30s ea          Clam Butts TB  3 x 10 ea                                                                 Ther Activity                                 Manual                                                                  Modalities

## 2023-02-02 ENCOUNTER — APPOINTMENT (OUTPATIENT)
Dept: PHYSICAL THERAPY | Facility: REHABILITATION | Age: 48
End: 2023-02-02

## 2023-02-03 ENCOUNTER — TELEPHONE (OUTPATIENT)
Dept: OBGYN CLINIC | Facility: HOSPITAL | Age: 48
End: 2023-02-03

## 2023-02-03 DIAGNOSIS — M18.12 ARTHRITIS OF CARPOMETACARPAL (CMC) JOINT OF LEFT THUMB: Primary | ICD-10-CM

## 2023-02-03 NOTE — TELEPHONE ENCOUNTER
Spoke with patient and provided instructions per Dr Madiha Wilde  Looks pretty normal for post operative site  I would continue scar massage and use voltaren gel and heat at dorsal MP and CMC joints       She will follow plan of care and call if she is having any worsening of symptoms to schedule and appt for evaluation and possible injection  Patient is asking if an RX for Voltaren gel could be sent to her pharmacy - [facility] pharmacy on file?   Thanks

## 2023-02-03 NOTE — TELEPHONE ENCOUNTER
Where is the sensation of popping and cracking located? At surgical site? Volar vs dorsal? CMC joint? Is her thumb getting locked?

## 2023-02-03 NOTE — TELEPHONE ENCOUNTER
Spoke with patient and she said that the pain is not at the surgical site but further back, Volar  - where thumb meets palm - CMC joint   Has swelling and tenderness to touch  Surgical site a bit red  Will send a picture thru fredy for 's review

## 2023-02-03 NOTE — TELEPHONE ENCOUNTER
Sounds like she has Aia 16 exacerbation, will likely need joint injection  I will take a look at images when they come through

## 2023-02-03 NOTE — TELEPHONE ENCOUNTER
Caller: Patient    Doctor: Dr Malcolm Aguila    Reason for call: Patient had surgery for right trigger thumb on 1/16/23 and then had sutures removed on 1/26/23    She is experiencing some "popping and cracking" in the thumb now that she is moving it more and she was wondering if this is normal?    Call back#: [phone]

## 2023-02-06 NOTE — PROGRESS NOTES
Daily Note     Today's date: 2023  Patient name: Farooq Whitney  : 1975  MRN: [id number]  Referring provider: Charo Gillette DO  Dx:   Encounter Diagnosis     ICD-10-CM    1  Chronic left SI joint pain  M53 3     G89 29       2  Bilateral hip pain  M25 551     M25 552           Start Time: 1100  Stop Time: 1145  Total time in clinic (min): 45 minutes    Subjective: Has had GI virus since last PT session  Hasn't felt her hip lock up much since last PT session  Feel a little bit of it today  Objective: See treatment diary below      Assessment: Patient able to complete progression exercises without provoking her hip locking symptoms  Locking symptoms may be due to overactivation of lumbar paraspinals due to decreased local strength at her multifidi and hip musculature  Patient's lumbar mobility improved post extension mobility exercise  Finally, initiated TrA activation which patient required moderate cuing to properly complete with DB  Patient would benefit from continued PT to improve patient's hip symptoms with focus on lumbar mobility and proximal strengthening  Plan: Continue per plan of care  Progress treatment as tolerated         Precautions: standard                Vitals 130/90  HR 62  SpO2 94          Patient Ed                                 Neuro Re-Ed           L sideglide Complete with wall when her hip locks          TA activation  W/ DB 10x    W/ Bridge  2 x 10                                                                Ther Ex           Aerobic conditioning  TM 2 mph  6 min         Manan stretch 3 x 30s ea 3 x 30s ea         Clam Peach TB  3 x 10 ea Peach TB  3 x 10 ea         Prone  PPU 2 x 6                                                       Ther Activity                                 Manual                                                                  Modalities

## 2023-02-07 ENCOUNTER — OFFICE VISIT (OUTPATIENT)
Dept: PHYSICAL THERAPY | Facility: REHABILITATION | Age: 48
End: 2023-02-07

## 2023-02-07 DIAGNOSIS — M25.552 BILATERAL HIP PAIN: ICD-10-CM

## 2023-02-07 DIAGNOSIS — M25.551 BILATERAL HIP PAIN: ICD-10-CM

## 2023-02-07 DIAGNOSIS — G89.29 CHRONIC LEFT SI JOINT PAIN: Primary | ICD-10-CM

## 2023-02-07 DIAGNOSIS — M53.3 CHRONIC LEFT SI JOINT PAIN: Primary | ICD-10-CM

## 2023-02-08 ENCOUNTER — HOSPITAL ENCOUNTER (OUTPATIENT)
Dept: RADIOLOGY | Age: 48
Discharge: HOME | End: 2023-02-08

## 2023-02-08 VITALS — WEIGHT: 217 LBS | HEIGHT: 63 IN | BODY MASS INDEX: 38.45 KG/M2

## 2023-02-08 DIAGNOSIS — Z12.31 ENCOUNTER FOR SCREENING MAMMOGRAM FOR MALIGNANT NEOPLASM OF BREAST: ICD-10-CM

## 2023-02-09 ENCOUNTER — PROCEDURE VISIT (OUTPATIENT)
Dept: NEUROLOGY | Facility: CLINIC | Age: 48
End: 2023-02-09

## 2023-02-09 VITALS — HEART RATE: 70 BPM | TEMPERATURE: 97.5 F | DIASTOLIC BLOOD PRESSURE: 76 MMHG | SYSTOLIC BLOOD PRESSURE: 112 MMHG

## 2023-02-09 DIAGNOSIS — G43.709 CHRONIC MIGRAINE WITHOUT AURA WITHOUT STATUS MIGRAINOSUS, NOT INTRACTABLE: ICD-10-CM

## 2023-02-09 NOTE — PROGRESS NOTES
Universal Protocol   Consent: Verbal consent obtained  Written consent obtained    Risks and benefits: risks, benefits and alternatives were discussed  Consent given by: patient  Patient understanding: patient states understanding of the procedure being performed  Patient consent: the patient's understanding of the procedure matches consent given  Procedure consent: procedure consent matches procedure scheduled        Chemodenervation     Date/Time 2/9/2023 7:56 AM     Performed by  Elsie Hogan PA-C     Authorized by Elsie Hogan PA-C        Pre-procedure details      Prepped With: Alcohol     Procedure details     Position:  Upright   Botox     Botox Type:  Type A    Brand:  Botox    mL's of Botulinum Toxin:  200    Final Concentration per CC:  100 units    Needle Gauge:  30 G 2 5 inch   Procedures     Botox Procedures: chronic headache      Indications: migraines     Injection Location      Head / Face:  L superior trapezius, R superior trapezius, L superior cervical paraspinal, R superior cervical paraspinal, L , R , procerus, L temporalis, R temporalis, R frontalis, L frontalis, R medial occipitalis and L medial occipitalis    L  injection amount:  5 unit(s)    R  injection amount:  5 unit(s)    L lateral frontalis:  5 unit(s)    R lateral frontalis:  5 unit(s)    L medial frontalis:  5 unit(s)    R medial frontalis:  5 unit(s)    L temporalis injection amount:  20 unit(s)    R temporalis injection amount:  20 unit(s)    Procerus injection amount:  5 unit(s)    L medial occipitalis injection amount:  15 unit(s)    R medial occipitalis injection amount:  15 unit(s)    L superior cervical paraspinal injection amount:  10 unit(s)    R superior cervical paraspinal injection amount:  10 unit(s)    L superior trapezius injection amount:  15 unit(s)    R superior trapezius injection amount:  15 unit(s)   Total Units     Total units used:  200    Total units discarded:  0 Post-procedure details      Chemodenervation:  Chronic migraine    Facial Nerve Location[de-identified]  Bilateral facial nerve    Patient tolerance of procedure: Tolerated well, no immediate complications   Comments      Extra units medically necessary:  - 10 between both upper traps  - 15 between both middle traps  - 10 R occipitalis  - 10 left occipitalis  Majority of headaches in the back of head  Blood pressure 112/76, pulse 70, temperature 97 5 °F (36 4 °C), temperature source Tympanic  Pt asking for tramadol, only medication that does not cause sedation after taking it for migraine  Will discuss with Dr Mary Ann Schmid, Dr [facility] rx in the past/ 2020  We reviewed the brain MRI which appears unchanged in the interval/ no acute changes  Pt now on disability  I discussed with her how to keep HA journal and gave handouts; she will try to do this  Figueroa Banks recently increased her mexilitine, will watch for rash  She reports worsening headache with Cefaly, will try Nerivio next  Toradol injections sent by Figueroa Banks  Pt needs to   Return to prior level of ind.

## 2023-02-09 NOTE — PATIENT INSTRUCTIONS
Botox Therapy  Important Information    Our goal is to make sure you fully understand how Botox Therapy treatment may benefit you and to help you understand how you can play an active role in your treatments and ongoing care  Please review the following information below  Call our office IMMEDIATELY @ [phone] and speak to one of our Botox Coordinators if you have a change in insurance  (a prior authorization is required and accurate information is vital)  Please call at least 24 hours in advance if you can't make your appointment  Appointments are scheduled every 91 days (this will be scheduled in advance before leaving the office)  You must allow for at least 2-3 treatments to determine if Botox is right for you  It may take a few weeks to see a response from treatment  No hair dye or scalp massage or treatment within 24 hours of treatment  We encourage you to use a headache diary or journal to document your headache frequency and severity  You can also utilize the Migraine Manoj kelsey (downloadable on [employer])  Sign up for the Botox Savings Program  (commercial insurance patients may qualify) Sign up at [employer] or call [phone] Option: 4  To get more information on Botox therapy for Chronic Migraines and see frequently asked questions, please visit [facility]  If you have any questions or concerns, please speak to one of our Botox Coordinators at [phone]  We look forward to servicing you!

## 2023-02-10 ENCOUNTER — VBI (OUTPATIENT)
Dept: ADMINISTRATIVE | Facility: OTHER | Age: 48
End: 2023-02-10

## 2023-02-10 NOTE — PROGRESS NOTES
Daily Note     Today's date: 2/10/2023  Patient name: Zhane Sin  : 1975  MRN: [id number]  Referring provider: Kirk Hope DO  Dx:   No diagnosis found  Subjective: Has had GI virus since last PT session  Hasn't felt her hip lock up much since last PT session  Feel a little bit of it today  Objective: See treatment diary below      Assessment: Patient able to complete progression exercises without provoking her hip locking symptoms  Locking symptoms may be due to overactivation of lumbar paraspinals due to decreased local strength at her multifidi and hip musculature  Patient's lumbar mobility improved post extension mobility exercise  Finally, initiated TrA activation which patient required moderate cuing to properly complete with DB  Patient would benefit from continued PT to improve patient's hip symptoms with focus on lumbar mobility and proximal strengthening  Plan: Continue per plan of care  Progress treatment as tolerated         Precautions: standard                Vitals 130/90  HR 62  SpO2 94          Patient Ed                                 Neuro Re-Ed           L sideglide Complete with wall when her hip locks          TA activation  W/ DB 10x    W/ Bridge  2 x 10                                                                Ther Ex           Aerobic conditioning  TM 2 mph  6 min         Manan stretch 3 x 30s ea 3 x 30s ea         Clam Peach TB  3 x 10 ea Peach TB  3 x 10 ea         Prone  PPU 2 x 6                                                       Ther Activity                                 Manual                                                                  Modalities

## 2023-02-14 ENCOUNTER — APPOINTMENT (OUTPATIENT)
Dept: PHYSICAL THERAPY | Facility: REHABILITATION | Age: 48
End: 2023-02-14

## 2023-02-15 ENCOUNTER — TELEPHONE (OUTPATIENT)
Dept: OTHER | Facility: OTHER | Age: 48
End: 2023-02-15

## 2023-02-15 DIAGNOSIS — G43.711 INTRACTABLE CHRONIC MIGRAINE WITHOUT AURA AND WITH STATUS MIGRAINOSUS: Primary | ICD-10-CM

## 2023-02-15 RX ORDER — SODIUM CHLORIDE 9 MG/ML
20 INJECTION, SOLUTION INTRAVENOUS ONCE
Status: CANCELLED | OUTPATIENT
Start: 2023-02-17

## 2023-02-15 NOTE — TELEPHONE ENCOUNTER
Spoke to pt  She has f/u appt 03/01/23 and will discuss other alternative preps with dr at that time    Will scheduled after f/u

## 2023-02-17 ENCOUNTER — HOSPITAL ENCOUNTER (OUTPATIENT)
Dept: INFUSION CENTER | Facility: HOSPITAL | Age: 48
End: 2023-02-17
Attending: PSYCHIATRY & NEUROLOGY

## 2023-02-17 VITALS
DIASTOLIC BLOOD PRESSURE: 73 MMHG | TEMPERATURE: 97.2 F | HEART RATE: 76 BPM | SYSTOLIC BLOOD PRESSURE: 118 MMHG | RESPIRATION RATE: 18 BRPM

## 2023-02-17 DIAGNOSIS — G43.711 INTRACTABLE CHRONIC MIGRAINE WITHOUT AURA AND WITH STATUS MIGRAINOSUS: Primary | ICD-10-CM

## 2023-02-17 RX ORDER — SODIUM CHLORIDE 9 MG/ML
20 INJECTION, SOLUTION INTRAVENOUS ONCE
OUTPATIENT
Start: 2023-05-12

## 2023-02-17 RX ORDER — SODIUM CHLORIDE 9 MG/ML
20 INJECTION, SOLUTION INTRAVENOUS ONCE
Status: COMPLETED | OUTPATIENT
Start: 2023-02-17 | End: 2023-02-17

## 2023-02-17 RX ADMIN — EPTINEZUMAB-JJMR 300 MG: 100 INJECTION INTRAVENOUS at 09:03

## 2023-02-17 RX ADMIN — SODIUM CHLORIDE 20 ML/HR: 0.9 INJECTION, SOLUTION INTRAVENOUS at 09:06

## 2023-02-17 NOTE — PLAN OF CARE
Problem: Potential for Falls  Goal: Patient will remain free of falls  Description: INTERVENTIONS:  - Educate patient/family on patient safety including physical limitations  - Instruct patient to call for assistance with activity   - Consult OT/PT to assist with strengthening/mobility   - Keep Call bell within reach  - Keep bed low and locked with side rails adjusted as appropriate  - Keep care items and personal belongings within reach  - Initiate and maintain comfort rounds  Outcome: Progressing

## 2023-02-20 NOTE — PROGRESS NOTES
Daily Note     Today's date: 2023  Patient name: Valentino Senna  : 1975  MRN: [id number]  Referring provider: Roberta Ruby DO  Dx:   Encounter Diagnosis     ICD-10-CM    1  Chronic left SI joint pain  M53 3     G89 29       2  Bilateral hip pain  M25 551     M25 552           Start Time: 1100  Stop Time: 1145  Total time in clinic (min): 45 minutes    Subjective:  Has lost 6 lbs the past month, had her gastric sleeve last August  She hasn't felt her hip lock, has been doing her exercises which has helped  Objective: See treatment diary below      Assessment: Patient continuing to report less frequent "hip locking" symptoms since last PT session  Progressed patient's HEP to higher band resistance and including prone hip ext and side steps which challenged her hip strength  Patient would benefit from continued PT to improve patient's hip symptoms with focus on lumbar mobility and proximal strengthening  Plan: Continue per plan of care  Progress treatment as tolerated  Precautions: standard  Access Code: [email or web address]  URL: [email or web address]  Date: 2023  Prepared by: Latonya Gum    Exercises  Clamshell with Resistance - 1 x daily - 7 x weekly - 3 sets - 10 reps  Modified Manan Stretch - 3 x daily - 7 x weekly - 3 sets - 30 hold  Prone Press Up - 1 x daily - 7 x weekly - 2 sets - 8 reps  Supine Bridge - 1 x daily - 7 x weekly - 3 sets - 15 reps  Prone Hip Extension - 1 x daily - 7 x weekly - 3 sets - 10 reps  Side Stepping with Resistance at Ankles - 1 x daily - 7 x weekly - 3 sets - 8 reps               Vitals 130/90  HR 62  SpO2 94          Patient Ed                                 Neuro Re-Ed           L sideglide Complete with wall when her hip locks          TA activation  W/ DB 10x    W/ Bridge  2 x 10 W/ Bridge  3 x 15                                                               Ther Ex           Aerobic conditioning  TM 2 mph  6 min TM 2 mph  8 min Manan stretch 3 x 30s ea 3 x 30s ea 3 x 30s ea        Clam Peach TB  3 x 10 ea Peach TB  3 x 10 ea Orange TB  3 x 10 ea        Prone  PPU 2 x 6   PPU 2 x 8    Prone hip ext  3 x 8        Side steps   Orange TB around ankles  3 laps x 12ft                                         Ther Activity                                 Manual                                                                  Modalities

## 2023-02-21 ENCOUNTER — OFFICE VISIT (OUTPATIENT)
Dept: PHYSICAL THERAPY | Facility: REHABILITATION | Age: 48
End: 2023-02-21

## 2023-02-21 DIAGNOSIS — G89.29 CHRONIC LEFT SI JOINT PAIN: Primary | ICD-10-CM

## 2023-02-21 DIAGNOSIS — M25.552 BILATERAL HIP PAIN: ICD-10-CM

## 2023-02-21 DIAGNOSIS — M53.3 CHRONIC LEFT SI JOINT PAIN: Primary | ICD-10-CM

## 2023-02-21 DIAGNOSIS — M25.551 BILATERAL HIP PAIN: ICD-10-CM

## 2023-02-24 ENCOUNTER — APPOINTMENT (OUTPATIENT)
Dept: LAB | Facility: CLINIC | Age: 48
End: 2023-02-24

## 2023-02-24 ENCOUNTER — OFFICE VISIT (OUTPATIENT)
Dept: INTERNAL MEDICINE CLINIC | Facility: CLINIC | Age: 48
End: 2023-02-24

## 2023-02-24 VITALS
TEMPERATURE: 98.2 F | SYSTOLIC BLOOD PRESSURE: 125 MMHG | HEART RATE: 77 BPM | DIASTOLIC BLOOD PRESSURE: 74 MMHG | BODY MASS INDEX: 38.31 KG/M2 | WEIGHT: 216.2 LBS | HEIGHT: 63 IN

## 2023-02-24 DIAGNOSIS — Z11.59 NEED FOR HEPATITIS C SCREENING TEST: ICD-10-CM

## 2023-02-24 DIAGNOSIS — K21.9 GASTROESOPHAGEAL REFLUX DISEASE, UNSPECIFIED WHETHER ESOPHAGITIS PRESENT: ICD-10-CM

## 2023-02-24 DIAGNOSIS — Z23 NEED FOR PNEUMOCOCCAL VACCINATION: ICD-10-CM

## 2023-02-24 DIAGNOSIS — Z11.4 SCREENING FOR HIV (HUMAN IMMUNODEFICIENCY VIRUS): ICD-10-CM

## 2023-02-24 DIAGNOSIS — J45.20 MILD INTERMITTENT ASTHMA, UNSPECIFIED WHETHER COMPLICATED: ICD-10-CM

## 2023-02-24 DIAGNOSIS — Z00.00 ANNUAL PHYSICAL EXAM: Primary | ICD-10-CM

## 2023-02-24 DIAGNOSIS — G43.109 MIGRAINE WITH AURA AND WITHOUT STATUS MIGRAINOSUS, NOT INTRACTABLE: ICD-10-CM

## 2023-02-24 DIAGNOSIS — Z23 NEED FOR INFLUENZA VACCINATION: ICD-10-CM

## 2023-02-24 LAB
HCV AB SER QL: NORMAL
HIV 1+2 AB+HIV1 P24 AG SERPL QL IA: NORMAL
HIV 2 AB SERPL QL IA: NORMAL
HIV1 AB SERPL QL IA: NORMAL
HIV1 P24 AG SERPL QL IA: NORMAL

## 2023-02-24 RX ORDER — FAMOTIDINE 20 MG/1
20 TABLET, FILM COATED ORAL
Qty: 90 TABLET | Refills: 0 | Status: SHIPPED | OUTPATIENT
Start: 2023-02-24 | End: 2023-05-25

## 2023-02-24 RX ORDER — ALBUTEROL SULFATE 90 UG/1
2 AEROSOL, METERED RESPIRATORY (INHALATION) EVERY 6 HOURS PRN
Qty: 8 G | Refills: 2 | Status: SHIPPED | OUTPATIENT
Start: 2023-02-24

## 2023-02-24 RX ORDER — ESOMEPRAZOLE MAGNESIUM 40 MG/1
40 CAPSULE, DELAYED RELEASE ORAL
Qty: 120 CAPSULE | Refills: 0 | Status: SHIPPED | OUTPATIENT
Start: 2023-02-24 | End: 2023-04-25

## 2023-02-24 RX ORDER — MEXILETINE HYDROCHLORIDE 150 MG/1
150 CAPSULE ORAL 3 TIMES DAILY
COMMUNITY

## 2023-02-24 NOTE — PROGRESS NOTES
ADULT ANNUAL [address]SIDE Giselle Amador    NAME: Dimple Tellez  AGE: 52 y o  SEX: female  : 1975     DATE: 2023     Assessment and Plan:     1  Annual physical exam: Annual physical completed today  Medication reconciliation completed at today's visit  Health maintenance measures up-to-date, she will see GI soon to discuss prep options for colonoscopy as this was not tolerated recently  See below for problem specific assessment and plans  2  Migraine with aura and without status migrainosus, not intractable: Recently saw neurology for migraines, S/p botox on   [facility] headache center recently increased mexilitine  Continues on riboflavin and co-enzyme q10  Recommended CBT-I  Using Cefaly (electrode stimulating trigeminal nerve)    3  Gastroesophageal reflux disease, unspecified whether esophagitis present: Persistent reflux symptoms despite long-term omeprazole 40 mg BID  Follows with GI  Most recent endoscopy showed normal esophagus, stomach, duodenum without hiatal hernia  Also started patient on Pepcid last year, however she has not taken this since she ran out  Given persistent reflux symptoms despite maximum dose PPI therapy, will transition to as omeprazole 40 mg twice daily since this has relative stronger potency in regards to omeprazole equivalents  She has upcoming appointment with GI, so I recommend discussion at that time regarding her GERD and she may potentially need pH monitoring   -     esomeprazole (NexIUM) 40 MG capsule; Take 1 capsule (40 mg total) by mouth 2 (two) times a day before meals  -     famotidine (PEPCID) 20 mg tablet; Take 1 tablet (20 mg total) by mouth daily at bedtime    4  Mild intermittent asthma, unspecified whether complicated: Controlled, requires rescue inhaler infrequently  -     albuterol (PROVENTIL HFA,VENTOLIN HFA) 90 mcg/act inhaler;  Inhale 2 puffs every 6 (six) hours as needed for wheezing or shortness of breath    5  Need for hepatitis C screening test  -     Hepatitis C Antibody ([facility]); Future    6  Screening for HIV (human immunodeficiency virus)  -     HIV 1/2 AG/AB w Reflex [facility] for 2 yr old and above; Future    7  Need for pneumococcal vaccination  -     Pneumococcal Conjugate Vaccine 20-valent (Pcv20)    8  Need for influenza vaccination  -     influenza vaccine, quadrivalent, recombinant, PF, 0 5 mL, for patients 18 yr+ (FLUBLOK)    Immunizations and preventive care screenings were discussed with patient today  Appropriate education was printed on patient's after visit summary  Chief Complaint:     Chief Complaint   Patient presents with   • Annual Exam      History of Present Illness:     Adult Annual Physical   Patient here for a lorenzoen nitin physical exam  The patient reports persistent reflux despite maximum dose PPI therapy  She is also post to have colonoscopy done recently, however she cannot tolerate the prep so she has upcoming appointments GI to discuss this  Recommended she discuss her reflux symptoms with GI at that time  Her migraines have been well controlled, following with neurology and [facility] headache center  She also has history of gastric sleeve following with bariatrics  She continues to lose weight and has lost at least 40 pounds since I last saw her  I    Diet and Physical Activity  · Diet/Nutrition: well balanced diet  · Exercise: no formal exercise  Depression Screening  PHQ-2/9 Depression Screening         General Health  · Sleep: sleeps well  · Hearing: normal - bilateral   · Vision: no vision problems  · Dental: regular dental visits  Review of Systems:     Review of Systems   Constitutional: Negative for chills, fatigue, fever and unexpected weight change  HENT: Negative for congestion, rhinorrhea, sinus pain and sore throat  Eyes: Negative for visual disturbance     Respiratory: Negative for cough and shortness of breath  Cardiovascular: Negative for chest pain, palpitations and leg swelling  Gastrointestinal: Negative for abdominal pain, blood in stool, constipation, diarrhea, nausea and vomiting  Heartburn   Endocrine: Negative for cold intolerance, heat intolerance, polydipsia and polyuria  Genitourinary: Negative for difficulty urinating, dysuria, frequency, hematuria and urgency  Musculoskeletal: Negative for arthralgias, back pain and myalgias  Skin: Negative for rash and wound  Neurological: Positive for headaches  Negative for dizziness, syncope, weakness and light-headedness  Psychiatric/Behavioral: Negative for behavioral problems, confusion and sleep disturbance         Past Medical History:     Past Medical History:   Diagnosis Date   • Anxiety    • Asthma    • Claustrophobia    • Cluster headache    • CTS (carpal tunnel syndrome)    • Depression    • Fibromyalgia     last assessed 11/27/17   • Fibromyalgia, primary    • GERD (gastroesophageal reflux disease)    • GERD without esophagitis     last assessed 10/12/17   • GI problem    • Headache, tension-type    • Irritable bowel syndrome    • Joint pain    • Kidney stone    • Lung nodule     last assessed 10/9/15   • Migraine    • Muscle pain    • Peripheral neuropathy    • Sleep apnea       Past Surgical History:     Past Surgical History:   Procedure Laterality Date   • BACK SURGERY     • CARPAL TUNNEL RELEASE Bilateral    • CHOLECYSTECTOMY     • CYSTOSCOPY     • GALLBLADDER SURGERY     • GASTRECTOMY  08/09/2022    Sleeve   • NECK SURGERY     • [place] TENDON SHEATH INCISION Right 01/16/2023    Procedure: THUMB TRIGGER FINGER RELEASE;  Surgeon: Germán Edmondson MD;  Location: [facility] MAIN OR;  Service: Orthopedics   • SACROILIAC JOINT FUSION Left    • SPINAL FUSION      C4 and C5   • ULNAR NERVE REPAIR Bilateral    • UPPER GASTROINTESTINAL ENDOSCOPY        Social History:     Social History     Socioeconomic History   • Marital status:      Spouse name: None   • Number of children: None   • Years of education: None   • Highest education level: None   Occupational History   • None   Tobacco Use   • Smoking status: Never   • Smokeless tobacco: Never   Vaping Use   • Vaping Use: Never used   Substance and Sexual Activity   • Alcohol use: Yes     Alcohol/week: 2 0 standard drinks     Types: 1 Glasses of wine, 1 Cans of beer per week     Comment: occasional   • Drug use: Yes     Types: Marijuana     Comment: medicinal   • Sexual activity: Yes     Partners: Male     Birth control/protection: I U D  Other Topics Concern   • None   Social History Narrative    Daily cola, tea    No preference on religous beliefs     Social Determinants of Health     Financial Resource Strain: Medium Risk   • Difficulty of Paying Living Expenses: Somewhat hard   Food Insecurity: Food Insecurity Present   • Worried About Running Out of Food in the Last Year: Sometimes true   • Ran Out of Food in the Last Year: Sometimes true   Transportation Needs: No Transportation Needs   • Lack of Transportation (Medical): No   • Lack of Transportation (Non-Medical):  No   Physical Activity: Not on file   Stress: Not on file   Social Connections: Not on file   Intimate Partner Violence: Not on file   Housing Stability: Not on file      Family History:     Family History   Problem Relation Age of Onset   • Diabetes Mother    • Fibromyalgia Mother    • Ovarian cancer Mother 35   • Hypertension Mother    • Thyroid disease Mother    • Migraines Mother    • Neuropathy Mother    • No Known Problems Father    • Throat cancer Maternal Grandmother    • No Known Problems Maternal Grandfather    • No Known Problems Paternal Grandmother    • No Known Problems Paternal Grandfather    • No Known Problems Daughter    • Breast cancer Maternal Aunt    • No Known Problems Maternal Aunt    • No Known Problems Maternal Aunt    • No Known Problems Paternal Aunt    • Colon cancer Cousin    • Heart disease Cousin    • Lupus Cousin    • Arthritis Family    • Heart disease Family         cardiac disorder   • Neuropathy Family    • Lupus Family         systemic erythematosus   • No Known Problems Half-Sister    • No Known Problems Half-Brother    • No Known Problems Half-Brother    • No Known Problems Half-Brother    • Stroke Neg Hx       Current Medications:     Current Outpatient Medications   Medication Sig Dispense Refill   • albuterol (PROVENTIL HFA,VENTOLIN HFA) 90 mcg/act inhaler Inhale 2 puffs every 6 (six) hours as needed for wheezing or shortness of breath 8 g 2   • Botox 200 units SOLR      • buPROPion (Wellbutrin XL) 150 mg 24 hr tablet Take 1 tablet (150 mg total) by mouth every morning 90 tablet 3   • cholecalciferol (VITAMIN D3) 1,000 units tablet Take 1 capsule by mouth once a week 3000 units daily      • cholestyramine (QUESTRAN) 4 GM/DOSE powder Take 1 packet (4 g total) by mouth 2 (two) times a day with meals 378 g 2   • dexamethasone (DECADRON) 2 mg tablet 1 tab qam with food prn migraine  5 tablet 0   • Diclofenac Sodium (VOLTAREN) 1 % Apply 2 g topically 4 (four) times a day 100 g 2   • dicyclomine (BENTYL) 20 mg tablet Take 1 tablet (20 mg total) by mouth 3 (three) times a day as needed (migraine/cramps) 60 tablet 0   • divalproex sodium (DEPAKOTE) 250 mg EC tablet 2 tabs q12 hours x 2 days, then stop  Repeat if needed  16 tablet 0   • esomeprazole (NexIUM) 40 MG capsule Take 1 capsule (40 mg total) by mouth 2 (two) times a day before meals 120 capsule 0   • famotidine (PEPCID) 20 mg tablet Take 1 tablet (20 mg total) by mouth daily at bedtime 90 tablet 0   • fluticasone (FLONASE) 50 mcg/act nasal spray 1 spray in each nostril once daily 30 mL 0   • ketorolac (TORADOL) 30 mg/mL injection 1-2 ml IM injection once per 24 hours p r n  migraine  No more than 2 injections per week  8 mL 0   • Lasmiditan Succinate (Reyvow) 50 MG tablet One tab qhs at migraine onset  Caution sedation  Max 1 tab/24 hours  8 tablet 0   • levonorgestrel (MIRENA) 20 MCG/24HR IUD 1 each by Intrauterine route once     • meclizine (ANTIVERT) 12 5 MG tablet Take 1 tablet (12 5 mg total) by mouth 3 (three) times a day as needed for dizziness or nausea 30 tablet 0   • Melatonin ER 3 MG TBCR 1-2 tabs qhs 60 tablet 2   • metoclopramide (REGLAN) 10 mg tablet 1 tab q6 hours prn migraine (with toradol and benadryl) 20 tablet 0   • mexiletine (MEXITIL) 150 mg capsule Take 150 mg by mouth 3 (three) times a day     • NON FORMULARY Medical marijuana       • polyethylene glycol (GLYCOLAX) 17 GM/SCOOP powder Take 17 g by mouth daily as needed (constipation) 289 g 0   • Riboflavin (Vitamin B-2) 100 MG TABS Take 400 mg by mouth daily     • Syringe/Needle, Disp, (SYRINGE 3CC/21XX8-9/2") 25G X 1-1/2" 3 ML MISC Use for toradol IM injections  10 each 0   • tiZANidine (ZANAFLEX) 2 mg tablet Take 1 tablet (2 mg total) by mouth every 8 (eight) hours as needed for muscle spasms 90 tablet 1   • traMADol (ULTRAM) 50 mg tablet take one tablet at onset of moderate to severe headach, can repeat once in 8 hours  MAX 2 DAYS PER WEEK 10 tablet 0   • Trudhesa 0 725 MG/ACT AERS INSTILL ONE SPRAY INTO EACH NOSTRIL AS NEEDED AT ONSET OF MIGRAINE MAY REPEAT IN TWO HOURS MAX OF TWO DOSES PER DAY MAX OF THREE DOSES PER W     • ZOLMitriptan (ZOMIG-ZMT) 5 MG disintegrating tablet Take 1 tablet (5 mg total) by mouth once as needed for migraine for up to 1 dose 6 tablet 0   • ibuprofen (MOTRIN) 400 mg tablet 1 tab q8 hours prn migraine  Max 2 per day and 4 per week  Hold toradol  20 tablet 0     No current facility-administered medications for this visit  Allergies:      Allergies   Allergen Reactions   • Hydrocodone-Acetaminophen GI Intolerance     gi upset -pt okay if takes  zofran   • Hydrocodone-Acetaminophen GI Intolerance     gi upset -pt okay if takes  zofran  gi upset -pt okay if takes  zofran   • Codeine GI Intolerance and Other (See Comments)     GI issues   • Oxycodone-Acetaminophen GI Intolerance   • Penicillins GI Intolerance   • Mexiletine Rash      Physical Exam:     /74 (BP Location: Left arm, Patient Position: Sitting, Cuff Size: Large)   Pulse 77   Temp 98 2 °F (36 8 °C) (Temporal)   Ht 5' 3" (1 6 m)   Wt 98 1 kg (216 lb 3 2 oz)   BMI 38 30 kg/m²     Physical Exam:  General: No apparent distress, resting comfortably   Head: Normocephalic, atraumatic  Eyes: Anicteric, no conjunctival erythema  ENT: External ear normal, no nasal discharge  Neck: Trachea midline, no visible lymphadenopathy or goiter  Respiratory: CTA  Non-labored respirations, symmetric thorax expansion  Cardiovascular: RRR   Extremities appear well-perfused  Abdomen: Non-tender, non-distended  Extremities: Moves extremities spontaneously, no peripheral edema  Skin: No visible rashes, wounds, or jaundice  Neuro: A&O x 3, no gross focal deficits, no aphasia    Dominique Dickerson, DO  [address]

## 2023-02-27 NOTE — PROGRESS NOTES
"Daily Note     Today's date: 2023  Patient name: Quan Rizvi  : 1975  MRN: [id number]  Referring provider: Rebecca Ramirez DO  Dx:   No diagnosis found  Subjective:  Has lost 6 lbs the past month, had her gastric sleeve last August  She hasn't felt her hip lock, has been doing her exercises which has helped  Objective: See treatment diary below      Assessment: Patient continuing to report less frequent \"hip locking\" symptoms since last PT session  Progressed patient's HEP to higher band resistance and including prone hip ext and side steps which challenged her hip strength  Patient would benefit from continued PT to improve patient's hip symptoms with focus on lumbar mobility and proximal strengthening  Plan: Continue per plan of care  Progress treatment as tolerated  Precautions: standard  Access Code: [email or web address]  URL: [email or web address]  Date: 2023  Prepared by: Danish Carbajal    Exercises  Clamshell with Resistance - 1 x daily - 7 x weekly - 3 sets - 10 reps  Modified Manan Stretch - 3 x daily - 7 x weekly - 3 sets - 30 hold  Prone Press Up - 1 x daily - 7 x weekly - 2 sets - 8 reps  Supine Bridge - 1 x daily - 7 x weekly - 3 sets - 15 reps  Prone Hip Extension - 1 x daily - 7 x weekly - 3 sets - 10 reps  Side Stepping with Resistance at Ankles - 1 x daily - 7 x weekly - 3 sets - 8 reps               Vitals 130/90  HR 62  SpO2 94          Patient Ed                                 Neuro Re-Ed           L sideglide Complete with wall when her hip locks          TA activation  W/ DB 10x    W/ Bridge  2 x 10 W/ Bridge  3 x 15                                                               Ther Ex           Aerobic conditioning  TM 2 mph  6 min TM 2 mph  8 min        Manna stretch 3 x 30s ea 3 x 30s ea 3 x 30s ea        Clam Peach TB  3 x 10 ea Peach TB  3 x 10 ea Orange TB  3 x 10 ea        Prone  PPU 2 x 6   PPU 2 x 8    Prone hip ext  3 x " 8        Side steps   Orange TB around ankles  3 laps x 12ft                                         Ther Activity                                 Manual                                                                  Modalities

## 2023-03-02 ENCOUNTER — APPOINTMENT (OUTPATIENT)
Dept: PHYSICAL THERAPY | Facility: REHABILITATION | Age: 48
End: 2023-03-02

## 2023-03-07 NOTE — PROGRESS NOTES
"Daily Note     Today's date: 3/7/2023  Patient name: Melissa Luther  : 1975  MRN: [id number]  Referring provider: Pro Caruso DO  Dx:   No diagnosis found  Subjective:  Has lost 6 lbs the past month, had her gastric sleeve last August  She hasn't felt her hip lock, has been doing her exercises which has helped  Objective: See treatment diary below      Assessment: Patient continuing to report less frequent \"hip locking\" symptoms since last PT session  Progressed patient's HEP to higher band resistance and including prone hip ext and side steps which challenged her hip strength  Patient would benefit from continued PT to improve patient's hip symptoms with focus on lumbar mobility and proximal strengthening  Plan: Continue per plan of care  Progress treatment as tolerated  Precautions: standard  Access Code: [email or web address]  URL: [email or web address]  Date: 2023  Prepared by: Judith Leo    Exercises  Clamshell with Resistance - 1 x daily - 7 x weekly - 3 sets - 10 reps  Modified Manan Stretch - 3 x daily - 7 x weekly - 3 sets - 30 hold  Prone Press Up - 1 x daily - 7 x weekly - 2 sets - 8 reps  Supine Bridge - 1 x daily - 7 x weekly - 3 sets - 15 reps  Prone Hip Extension - 1 x daily - 7 x weekly - 3 sets - 10 reps  Side Stepping with Resistance at Ankles - 1 x daily - 7 x weekly - 3 sets - 8 reps               Vitals 130/90  HR 62  SpO2 94          Patient Ed                                 Neuro Re-Ed           L sideglide Complete with wall when her hip locks          TA activation  W/ DB 10x    W/ Bridge  2 x 10 W/ Bridge  3 x 15                                                               Ther Ex           Aerobic conditioning  TM 2 mph  6 min TM 2 mph  8 min        Manan stretch 3 x 30s ea 3 x 30s ea 3 x 30s ea        Clam Peach TB  3 x 10 ea Peach TB  3 x 10 ea Orange TB  3 x 10 ea        Prone  PPU 2 x 6   PPU 2 x 8    Prone hip ext  3 x 8 " Side steps   Orange TB around ankles  3 laps x 12ft                                         Ther Activity                                 Manual                                                                  Modalities

## 2023-03-09 ENCOUNTER — APPOINTMENT (OUTPATIENT)
Dept: PHYSICAL THERAPY | Facility: REHABILITATION | Age: 48
End: 2023-03-09

## 2023-03-14 NOTE — PROGRESS NOTES
Daily Note     Today's date: 3/16/2023  Patient name: Berenice Malloy  : 1975  MRN: [id number]  Referring provider: Marta Steward DO  Dx:   Encounter Diagnosis     ICD-10-CM    1  Chronic left SI joint pain  M53 3     G89 29       2  Bilateral hip pain  M25 551     M25 552           Start Time: 1150  Stop Time: 1230  Total time in clinic (min): 40 minutes    Subjective:  She is recovering from a viral illness, doing better  She hasn't been doing her exercises because she is taking care of her brother who is newly dx with heart failure  Reports her hip locked up a little earlier this week and a little today  Objective: See treatment diary below      Assessment: Patient returning to PT after 3 weeks due to being sick and taking care of her brother who was just hospitalized  Patient fatigued with her HEP as she has not been able to do her exercises  Patient would benefit from continued PT to improve patient's hip symptoms with focus on lumbar mobility and proximal strengthening  Plan: Continue per plan of care  Progress treatment as tolerated  Precautions: standard  Access Code: [email or web address]  URL: [email or web address]  Date: 2023  Prepared by: Armando Hoang    Exercises  Clamshell with Resistance - 1 x daily - 7 x weekly - 3 sets - 10 reps  Modified Manan Stretch - 3 x daily - 7 x weekly - 3 sets - 30 hold  Prone Press Up - 1 x daily - 7 x weekly - 2 sets - 8 reps  Supine Bridge - 1 x daily - 7 x weekly - 3 sets - 15 reps  Prone Hip Extension - 1 x daily - 7 x weekly - 3 sets - 10 reps  Side Stepping with Resistance at Ankles - 1 x daily - 7 x weekly - 3 sets - 8 reps       1/27 2/7 2/21 3/16       Vitals 130/90  HR 62  SpO2 94          Patient Ed                                 Neuro Re-Ed           L sideglide Complete with wall when her hip locks   2 x 10       TA activation  W/ DB 10x    W/ Bridge  2 x 10 W/ Bridge  3 x 15 W/ bridge  3 x 10 Ther Ex           Aerobic conditioning  TM 2 mph  6 min TM 2 mph  8 min TM 1 6 mph  8 min       Manan stretch 3 x 30s ea 3 x 30s ea 3 x 30s ea        Clam Peach TB  3 x 10 ea Peach TB  3 x 10 ea Orange TB  3 x 10 ea        Prone  PPU 2 x 6   PPU 2 x 8    Prone hip ext  3 x 8 PPU 2 x 10    Prone hip ext  2 x 10 ea       Side steps   Orange TB around ankles  3 laps x 12ft                                         Ther Activity                                 Manual                                                                  Modalities

## 2023-03-16 ENCOUNTER — OFFICE VISIT (OUTPATIENT)
Dept: PHYSICAL THERAPY | Facility: REHABILITATION | Age: 48
End: 2023-03-16

## 2023-03-16 DIAGNOSIS — M53.3 CHRONIC LEFT SI JOINT PAIN: Primary | ICD-10-CM

## 2023-03-16 DIAGNOSIS — M25.552 BILATERAL HIP PAIN: ICD-10-CM

## 2023-03-16 DIAGNOSIS — G89.29 CHRONIC LEFT SI JOINT PAIN: Primary | ICD-10-CM

## 2023-03-16 DIAGNOSIS — M25.551 BILATERAL HIP PAIN: ICD-10-CM

## 2023-03-21 NOTE — PROGRESS NOTES
Daily Note     Today's date: 3/21/2023  Patient name: Librado Mohs  : 1975  MRN: [id number]  Referring provider: Lorin Menchaca DO  Dx:   No diagnosis found  Subjective:  She is recovering from a viral illness, doing better  She hasn't been doing her exercises because she is taking care of her brother who is newly dx with heart failure  Reports her hip locked up a little earlier this week and a little today  Objective: See treatment diary below      Assessment: Patient returning to PT after 3 weeks due to being sick and taking care of her brother who was just hospitalized  Patient fatigued with her HEP as she has not been able to do her exercises  Patient would benefit from continued PT to improve patient's hip symptoms with focus on lumbar mobility and proximal strengthening  Plan: Continue per plan of care  Progress treatment as tolerated  Precautions: standard  Access Code: [email or web address]  URL: [email or web address]  Date: 2023  Prepared by: Teodora Benitez    Exercises  Clamshell with Resistance - 1 x daily - 7 x weekly - 3 sets - 10 reps  Modified Manan Stretch - 3 x daily - 7 x weekly - 3 sets - 30 hold  Prone Press Up - 1 x daily - 7 x weekly - 2 sets - 8 reps  Supine Bridge - 1 x daily - 7 x weekly - 3 sets - 15 reps  Prone Hip Extension - 1 x daily - 7 x weekly - 3 sets - 10 reps  Side Stepping with Resistance at Ankles - 1 x daily - 7 x weekly - 3 sets - 8 reps       1/27 2/7 2/21 3/16       Vitals 130/90  HR 62  SpO2 94          Patient Ed                                 Neuro Re-Ed           L sideglide Complete with wall when her hip locks   2 x 10       TA activation  W/ DB 10x    W/ Bridge  2 x 10 W/ Bridge  3 x 15 W/ bridge  3 x 10                                                              Ther Ex           Aerobic conditioning  TM 2 mph  6 min TM 2 mph  8 min TM 1 6 mph  8 min       Manan stretch 3 x 30s ea 3 x 30s ea 3 x 30s ea        Mercedes Triplett TB  3 x 10 ea Peach TB  3 x 10 ea Orange TB  3 x 10 ea        Prone  PPU 2 x 6   PPU 2 x 8    Prone hip ext  3 x 8 PPU 2 x 10    Prone hip ext  2 x 10 ea       Side steps   Orange TB around ankles  3 laps x 12ft                                         Ther Activity                                 Manual                                                                  Modalities

## 2023-03-23 ENCOUNTER — APPOINTMENT (OUTPATIENT)
Dept: PHYSICAL THERAPY | Facility: REHABILITATION | Age: 48
End: 2023-03-23

## 2023-03-28 NOTE — PROGRESS NOTES
Daily Note     Today's date: 3/28/2023  Patient name: Josué Calderon  : 1975  MRN: [id number]  Referring provider: Donnell Gauthier DO  Dx:   No diagnosis found  Subjective:  She is recovering from a viral illness, doing better  She hasn't been doing her exercises because she is taking care of her brother who is newly dx with heart failure  Reports her hip locked up a little earlier this week and a little today  Objective: See treatment diary below      Assessment: Patient returning to PT after 3 weeks due to being sick and taking care of her brother who was just hospitalized  Patient fatigued with her HEP as she has not been able to do her exercises  Patient would benefit from continued PT to improve patient's hip symptoms with focus on lumbar mobility and proximal strengthening  Plan: Continue per plan of care  Progress treatment as tolerated  Precautions: standard  Access Code: [email or web address]  URL: [email or web address]  Date: 2023  Prepared by: Delia Griffith    Exercises  Clamshell with Resistance - 1 x daily - 7 x weekly - 3 sets - 10 reps  Modified Manan Stretch - 3 x daily - 7 x weekly - 3 sets - 30 hold  Prone Press Up - 1 x daily - 7 x weekly - 2 sets - 8 reps  Supine Bridge - 1 x daily - 7 x weekly - 3 sets - 15 reps  Prone Hip Extension - 1 x daily - 7 x weekly - 3 sets - 10 reps  Side Stepping with Resistance at Ankles - 1 x daily - 7 x weekly - 3 sets - 8 reps       1/27 2/7 2/21 3/16       Vitals 130/90  HR 62  SpO2 94          Patient Ed                                 Neuro Re-Ed           L sideglide Complete with wall when her hip locks   2 x 10       TA activation  W/ DB 10x    W/ Bridge  2 x 10 W/ Bridge  3 x 15 W/ bridge  3 x 10                                                              Ther Ex           Aerobic conditioning  TM 2 mph  6 min TM 2 mph  8 min TM 1 6 mph  8 min       Manan stretch 3 x 30s ea 3 x 30s ea 3 x 30s ea        Mercedes Triplett TB  3 x 10 ea Peach TB  3 x 10 ea Orange TB  3 x 10 ea        Prone  PPU 2 x 6   PPU 2 x 8    Prone hip ext  3 x 8 PPU 2 x 10    Prone hip ext  2 x 10 ea       Side steps   Orange TB around ankles  3 laps x 12ft                                         Ther Activity                                 Manual                                                                  Modalities

## 2023-03-30 ENCOUNTER — APPOINTMENT (OUTPATIENT)
Dept: PHYSICAL THERAPY | Facility: REHABILITATION | Age: 48
End: 2023-03-30

## 2023-04-04 ENCOUNTER — OFFICE VISIT (OUTPATIENT)
Dept: OBGYN CLINIC | Facility: CLINIC | Age: 48
End: 2023-04-04

## 2023-04-04 VITALS
HEIGHT: 63 IN | HEART RATE: 85 BPM | SYSTOLIC BLOOD PRESSURE: 107 MMHG | WEIGHT: 216 LBS | DIASTOLIC BLOOD PRESSURE: 74 MMHG | BODY MASS INDEX: 38.27 KG/M2

## 2023-04-04 DIAGNOSIS — M25.551 BILATERAL HIP PAIN: ICD-10-CM

## 2023-04-04 DIAGNOSIS — M25.50 ARTHRALGIA OF MULTIPLE JOINTS: Primary | ICD-10-CM

## 2023-04-04 DIAGNOSIS — M79.7 FIBROMYALGIA: ICD-10-CM

## 2023-04-04 DIAGNOSIS — M25.552 BILATERAL HIP PAIN: ICD-10-CM

## 2023-04-04 NOTE — PROGRESS NOTES
1  Arthralgia of multiple joints        2  Fibromyalgia        3  Bilateral hip pain          No orders of the defined types were placed in this encounter  Impression:  Patient is here in follow up of bilateral hip/pelvis pain likely multifactorial and secondary to bilateral SI joint dysfunction leading to greater trochanteric pain syndrome  Treatment has included left greater trochanteric injection/tenotomy, Voltaren gel and physical therapy  Patient is improving with physical therapy which she can continue  I will see her back if needed  Imaging Studies (I personally reviewed images in PACS and report):  Lumbar x-rays most recent to this encounter reviewed  These images show fusion of the left sacroiliac joint  Mild L5-S1 disc space narrowing  Mild lower lumbar facet hypertrophy  There are surgical staples noted      Bilateral hip/pelvis x-rays show bilateral acetabular over coverage and loss of the femoral head and neck sphericity which is consistent with mixed type femoral acetabular impingement  No acute osseous abnormalities  No follow-ups on file  Patient is in agreement with the above plan  HPI:  Ayaka Sanchez is a 52 y o  female  who presents in follow up  Here for   Chief Complaint   Patient presents with   • Left Hip - Follow-up, Pain     Pt reports some relief in left hip pain since having the injection and physical therapy  She feels PT has been helping but she did have to skip the last 2 weeks for personal reasons  Since last visit: See above  She is improving with physical therapy  She has a little bit of locking up  The injection was helpful        Following history reviewed and updated:  Past Medical History:   Diagnosis Date   • Anxiety    • Asthma    • Claustrophobia    • Cluster headache    • CTS (carpal tunnel syndrome)    • Depression    • Fibromyalgia     last assessed 11/27/17   • Fibromyalgia, primary    • GERD (gastroesophageal reflux disease)    • GERD without esophagitis     last assessed 10/12/17   • GI problem    • Headache, tension-type    • Irritable bowel syndrome    • Joint pain    • Kidney stone    • Lung nodule     last assessed 10/9/15   • Migraine    • Muscle pain    • Peripheral neuropathy    • Sleep apnea      Past Surgical History:   Procedure Laterality Date   • BACK SURGERY     • CARPAL TUNNEL RELEASE Bilateral    • CHOLECYSTECTOMY     • CYSTOSCOPY     • GALLBLADDER SURGERY     • GASTRECTOMY  08/09/2022    Sleeve   • NECK SURGERY     • NV TENDON SHEATH INCISION Right 01/16/2023    Procedure: THUMB TRIGGER FINGER RELEASE;  Surgeon: Ganesh An MD;  [place] [facility] [place]  Service: Orthopedics   • SACROILIAC JOINT FUSION Left    • SPINAL FUSION      C4 and C5   • ULNAR NERVE REPAIR Bilateral    • UPPER GASTROINTESTINAL ENDOSCOPY       Social History   Social History     Substance and Sexual Activity   Alcohol Use Yes   • Alcohol/week: 2 0 standard drinks   • Types: 1 Glasses of wine, 1 Cans of beer per week    Comment: occasional     Social History     Substance and Sexual Activity   Drug Use Yes   • Types: Marijuana    Comment: medicinal     Social History     Tobacco Use   Smoking Status Never   Smokeless Tobacco Never     Family History   Problem Relation Age of Onset   • Diabetes Mother    • Fibromyalgia Mother    • Ovarian cancer Mother 35   • Hypertension Mother    • Thyroid disease Mother    • Migraines Mother    • Neuropathy Mother    • No Known Problems Father    • Throat cancer Maternal Grandmother    • No Known Problems Maternal Grandfather    • No Known Problems Paternal Grandmother    • No Known Problems Paternal Grandfather    • No Known Problems Daughter    • Breast cancer Maternal Aunt    • No Known Problems Maternal Aunt    • No Known Problems Maternal Aunt    • No Known Problems Paternal Aunt    • Colon cancer Cousin    • Heart disease Cousin    • Lupus Cousin    • Arthritis Family    • Heart disease Family "cardiac disorder   • Neuropathy Family    • Lupus Family         systemic erythematosus   • No Known Problems Half-Sister    • No Known Problems Half-Brother    • No Known Problems Half-Brother    • No Known Problems Half-Brother    • Stroke Neg Hx      Allergies   Allergen Reactions   • Hydrocodone-Acetaminophen GI Intolerance     gi upset -pt okay if takes  zofran   • Hydrocodone-Acetaminophen GI Intolerance     gi upset -pt okay if takes  zofran  gi upset -pt okay if takes  zofran   • Codeine GI Intolerance and Other (See Comments)     GI issues   • Oxycodone-Acetaminophen GI Intolerance   • Penicillins GI Intolerance   • Mexiletine Rash        Constitutional:  /74   Pulse 85   Ht 5' 3\" (1 6 m)   Wt 98 kg (216 lb)   BMI 38 26 kg/m²    General: NAD  Eyes: Clear sclerae  ENT: No inflammation, lesion, or mass of lips  No tracheal deviation  Musculoskeletal: As mentioned below  Integumentary: No visible rashes or skin lesions  Pulmonary/Chest: Effort normal  No respiratory distress  Neuro: CN's grossly intact, COX  Psych: Normal affect and judgement  Vascular: WWP  Right Hip Exam     Range of Motion   Internal rotation: normal     Tests   SALLIE: negative      Left Hip Exam     Range of Motion   Internal rotation: normal     Tests   SALLIE: negative      Back Exam     Tenderness   The patient is experiencing tenderness in the sacroiliac  Muscle Strength   The patient has normal back strength      Other   Sensation: normal  Gait: normal   Erythema: no back redness  Scars: absent             Procedures  "

## 2023-04-18 ENCOUNTER — TELEPHONE (OUTPATIENT)
Dept: NEUROLOGY | Facility: CLINIC | Age: 48
End: 2023-04-18

## 2023-04-18 NOTE — TELEPHONE ENCOUNTER
[facility] left a VM re: setting up Botox delivery.    Transcribed VM:   Hello, my name's Tyler, i'm calling from [facility]. And I'm calling you about a mutual patient. Skye Olvera. YOB: 1975. Calling to set up the delivery of her botox. If someone please would return my phone call. It's [phone] its option to you. Thank you and have an amazing day.    Please assist. Thank you.

## 2023-05-11 ENCOUNTER — OFFICE VISIT (OUTPATIENT)
Dept: OBGYN CLINIC | Facility: CLINIC | Age: 48
End: 2023-05-11

## 2023-05-11 ENCOUNTER — APPOINTMENT (OUTPATIENT)
Dept: RADIOLOGY | Facility: AMBULARY SURGERY CENTER | Age: 48
End: 2023-05-11
Attending: SURGERY

## 2023-05-11 VITALS — SYSTOLIC BLOOD PRESSURE: 118 MMHG | DIASTOLIC BLOOD PRESSURE: 79 MMHG | HEART RATE: 81 BPM

## 2023-05-11 DIAGNOSIS — M18.12 ARTHRITIS OF CARPOMETACARPAL (CMC) JOINT OF LEFT THUMB: ICD-10-CM

## 2023-05-11 DIAGNOSIS — M18.12 ARTHRITIS OF CARPOMETACARPAL (CMC) JOINT OF LEFT THUMB: Primary | ICD-10-CM

## 2023-05-11 DIAGNOSIS — M19.041 OSTEOARTHRITIS OF METACARPOPHALANGEAL (MCP) JOINT OF RIGHT THUMB: ICD-10-CM

## 2023-05-11 RX ORDER — LIDOCAINE HYDROCHLORIDE 10 MG/ML
1 INJECTION, SOLUTION INFILTRATION; PERINEURAL
Status: COMPLETED | OUTPATIENT
Start: 2023-05-11 | End: 2023-05-11

## 2023-05-11 RX ORDER — BUPIVACAINE HYDROCHLORIDE 2.5 MG/ML
0.5 INJECTION, SOLUTION INFILTRATION; PERINEURAL
Status: COMPLETED | OUTPATIENT
Start: 2023-05-11 | End: 2023-05-11

## 2023-05-11 RX ORDER — TRIAMCINOLONE ACETONIDE 40 MG/ML
20 INJECTION, SUSPENSION INTRA-ARTICULAR; INTRAMUSCULAR
Status: COMPLETED | OUTPATIENT
Start: 2023-05-11 | End: 2023-05-11

## 2023-05-11 RX ADMIN — LIDOCAINE HYDROCHLORIDE 1 ML: 10 INJECTION, SOLUTION INFILTRATION; PERINEURAL at 14:46

## 2023-05-11 RX ADMIN — BUPIVACAINE HYDROCHLORIDE 0.5 ML: 2.5 INJECTION, SOLUTION INFILTRATION; PERINEURAL at 14:46

## 2023-05-11 RX ADMIN — TRIAMCINOLONE ACETONIDE 20 MG: 40 INJECTION, SUSPENSION INTRA-ARTICULAR; INTRAMUSCULAR at 14:46

## 2023-05-11 NOTE — PROGRESS NOTES
ASSESSMENT/PLAN:      52 y o  female with left thumb CMC arthritis and right thumb MP joint arthritis     X-rays were performed and reviewed  The decision was made to proceed with a left thumb CMC CSI and a right thumb MP joint CSI  The CSI's were performed in the office without complication  Post injection protocol/expectations were reviewed  The CSI's can be repeated on a 3 month basis as needed  Follow up in 8 weeks time for re-evaluation  The patient verbalized understanding of exam findings and treatment plan  We engaged in the shared decision-making process and treatment options were discussed at length with the patient  Surgical and conservative management discussed today along with risks and benefits  Diagnoses and all orders for this visit:    Arthritis of carpometacarpal Barron) joint of left thumb  -     XR hand 3+ vw left; Future  -     Small joint arthrocentesis: L thumb CMC    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  -     Small joint arthrocentesis: R thumb MCP      Follow Up:  Return in about 8 weeks (around 7/6/2023)  To Do Next Visit:  Re-evaluation of current issue    ____________________________________________________________________________________________________________________________________________      CHIEF COMPLAINT:  Chief Complaint   Patient presents with   • Left Hand - Pain     Thumb Trigger Finger, c/o increased pain    • Right Hand - Pain     Thumb Trigger Finger Release, c/o burning, pain         SUBJECTIVE:  Ace Nice is a 52y o  year old female who presents to the office for bilateral thumb pain  She notes a c-shape pain to the base of both thumbs  She notes pain will worsen with grasping objects or when opening jars  She will take Tylenol as needed for pain control  She underwent a right trigger thumb release on 1/16/23  She will wear braces bilaterally on an as needed basis          I have personally reviewed all the relevant PMH, PSH, SH, FH, Medications and allergies       PAST MEDICAL HISTORY:  Past Medical History:   Diagnosis Date   • Anxiety    • Asthma    • Claustrophobia    • Cluster headache    • CTS (carpal tunnel syndrome)    • Depression    • Fibromyalgia     last assessed 11/27/17   • Fibromyalgia, primary    • GERD (gastroesophageal reflux disease)    • GERD without esophagitis     last assessed 10/12/17   • GI problem    • Headache, tension-type    • Irritable bowel syndrome    • Joint pain    • Kidney stone    • Lung nodule     last assessed 10/9/15   • Migraine    • Muscle pain    • Peripheral neuropathy    • Sleep apnea        PAST SURGICAL HISTORY:  Past Surgical History:   Procedure Laterality Date   • BACK SURGERY     • CARPAL TUNNEL RELEASE Bilateral    • CHOLECYSTECTOMY     • CYSTOSCOPY     • GALLBLADDER SURGERY     • GASTRECTOMY  08/09/2022    Sleeve   • NECK SURGERY     • NM TENDON SHEATH INCISION Right 01/16/2023    Procedure: THUMB TRIGGER FINGER RELEASE;  Surgeon: Chiquis Walters MD;  Location: [facility] OR;  Service: Orthopedics   • SACROILIAC JOINT FUSION Left    • SPINAL FUSION      C4 and C5   • ULNAR NERVE REPAIR Bilateral    • UPPER GASTROINTESTINAL ENDOSCOPY         FAMILY HISTORY:  Family History   Problem Relation Age of Onset   • Diabetes Mother    • Fibromyalgia Mother    • Ovarian cancer Mother 35   • Hypertension Mother    • Thyroid disease Mother    • Migraines Mother    • Neuropathy Mother    • No Known Problems Father    • Throat cancer Maternal Grandmother    • No Known Problems Maternal Grandfather    • No Known Problems Paternal Grandmother    • No Known Problems Paternal Grandfather    • No Known Problems Daughter    • Breast cancer Maternal Aunt    • No Known Problems Maternal Aunt    • No Known Problems Maternal Aunt    • No Known Problems Paternal Aunt    • Colon cancer Cousin    • Heart disease Cousin    • Lupus Cousin    • Arthritis Family    • Heart disease Family         cardiac disorder • Neuropathy Family    • Lupus Family         systemic erythematosus   • No Known Problems Half-Sister    • No Known Problems Half-Brother    • No Known Problems Half-Brother    • No Known Problems Half-Brother    • Stroke Neg Hx        SOCIAL HISTORY:  Social History     Tobacco Use   • Smoking status: Never   • Smokeless tobacco: Never   Vaping Use   • Vaping Use: Never used   Substance Use Topics   • Alcohol use: Yes     Alcohol/week: 2 0 standard drinks     Types: 1 Glasses of wine, 1 Cans of beer per week     Comment: occasional   • Drug use: Yes     Types: Marijuana     Comment: medicinal       MEDICATIONS:    Current Outpatient Medications:   •  albuterol (PROVENTIL HFA,VENTOLIN HFA) 90 mcg/act inhaler, Inhale 2 puffs every 6 (six) hours as needed for wheezing or shortness of breath, Disp: 8 g, Rfl: 2  •  Botox 200 units SOLR, , Disp: , Rfl:   •  buPROPion (Wellbutrin XL) 150 mg 24 hr tablet, Take 1 tablet (150 mg total) by mouth every morning, Disp: 90 tablet, Rfl: 3  •  cholecalciferol (VITAMIN D3) 1,000 units tablet, Take 1 capsule by mouth once a week 3000 units daily , Disp: , Rfl:   •  cholestyramine (QUESTRAN) 4 GM/DOSE powder, Take 1 packet (4 g total) by mouth 2 (two) times a day with meals, Disp: 378 g, Rfl: 2  •  dexamethasone (DECADRON) 2 mg tablet, 1 tab qam with food prn migraine  , Disp: 5 tablet, Rfl: 0  •  Diclofenac Sodium (VOLTAREN) 1 %, Apply 2 g topically 4 (four) times a day, Disp: 100 g, Rfl: 2  •  dicyclomine (BENTYL) 20 mg tablet, Take 1 tablet (20 mg total) by mouth 3 (three) times a day as needed (migraine/cramps), Disp: 60 tablet, Rfl: 0  •  divalproex sodium (DEPAKOTE) 250 mg EC tablet, 2 tabs q12 hours x 2 days, then stop  Repeat if needed  , Disp: 16 tablet, Rfl: 0  •  esomeprazole (NexIUM) 40 MG capsule, Take 1 capsule (40 mg total) by mouth 2 (two) times a day before meals, Disp: 120 capsule, Rfl: 0  •  famotidine (PEPCID) 20 mg tablet, Take 1 tablet (20 mg total) by mouth "daily at bedtime, Disp: 90 tablet, Rfl: 0  •  fluticasone (FLONASE) 50 mcg/act nasal spray, 1 spray in each nostril once daily, Disp: 30 mL, Rfl: 0  •  ibuprofen (MOTRIN) 400 mg tablet, 1 tab q8 hours prn migraine  Max 2 per day and 4 per week  Hold toradol , Disp: 20 tablet, Rfl: 0  •  ketorolac (TORADOL) 30 mg/mL injection, 1-2 ml IM injection once per 24 hours p r n  migraine  No more than 2 injections per week , Disp: 8 mL, Rfl: 0  •  Lasmiditan Succinate (Reyvow) 50 MG tablet, One tab qhs at migraine onset  Caution sedation  Max 1 tab/24 hours  , Disp: 8 tablet, Rfl: 0  •  levonorgestrel (MIRENA) 20 MCG/24HR IUD, 1 each by Intrauterine route once, Disp: , Rfl:   •  meclizine (ANTIVERT) 12 5 MG tablet, Take 1 tablet (12 5 mg total) by mouth 3 (three) times a day as needed for dizziness or nausea, Disp: 30 tablet, Rfl: 0  •  Melatonin ER 3 MG TBCR, 1-2 tabs qhs, Disp: 60 tablet, Rfl: 2  •  metoclopramide (REGLAN) 10 mg tablet, 1 tab q6 hours prn migraine (with toradol and benadryl), Disp: 20 tablet, Rfl: 0  •  mexiletine (MEXITIL) 150 mg capsule, Take 150 mg by mouth 3 (three) times a day, Disp: , Rfl:   •  NON FORMULARY, Medical marijuana  , Disp: , Rfl:   •  polyethylene glycol (GLYCOLAX) 17 GM/SCOOP powder, Take 17 g by mouth daily as needed (constipation), Disp: 289 g, Rfl: 0  •  Riboflavin (Vitamin B-2) 100 MG TABS, Take 400 mg by mouth daily, Disp: , Rfl:   •  Syringe/Needle, Disp, (SYRINGE 3CC/90UQ4-6/2\") 25G X 1-1/2\" 3 ML MISC, Use for toradol IM injections  , Disp: 10 each, Rfl: 0  •  tiZANidine (ZANAFLEX) 2 mg tablet, Take 1 tablet (2 mg total) by mouth every 8 (eight) hours as needed for muscle spasms, Disp: 90 tablet, Rfl: 1  •  traMADol (ULTRAM) 50 mg tablet, take one tablet at onset of moderate to severe headach, can repeat once in 8 hours   MAX 2 DAYS PER WEEK, Disp: 10 tablet, Rfl: 0  •  Trudhesa 0 725 MG/ACT AERS, INSTILL ONE SPRAY INTO EACH NOSTRIL AS NEEDED AT ONSET OF MIGRAINE MAY REPEAT IN " TWO HOURS MAX OF TWO DOSES PER DAY MAX OF THREE DOSES PER W, Disp: , Rfl:   •  ZOLMitriptan (ZOMIG-ZMT) 5 MG disintegrating tablet, Take 1 tablet (5 mg total) by mouth once as needed for migraine for up to 1 dose, Disp: 6 tablet, Rfl: 0    ALLERGIES:  Allergies   Allergen Reactions   • Hydrocodone-Acetaminophen GI Intolerance     gi upset -pt okay if takes  zofran   • Hydrocodone-Acetaminophen GI Intolerance     gi upset -pt okay if takes  zofran  gi upset -pt okay if takes  zofran   • Codeine GI Intolerance and Other (See Comments)     GI issues   • Oxycodone-Acetaminophen GI Intolerance   • Penicillins GI Intolerance   • Mexiletine Rash       REVIEW OF SYSTEMS:  Review of Systems   Constitutional: Negative for chills, fever and unexpected weight change  HENT: Negative for hearing loss, nosebleeds and sore throat  Eyes: Negative for pain, redness and visual disturbance  Respiratory: Negative for cough, shortness of breath and wheezing  Cardiovascular: Negative for chest pain, palpitations and leg swelling  Gastrointestinal: Negative for abdominal pain, nausea and vomiting  Endocrine: Negative for polydipsia and polyuria  Genitourinary: Negative for difficulty urinating and hematuria  Musculoskeletal: Positive for arthralgias  Negative for joint swelling and myalgias  Skin: Negative for rash and wound  Neurological: Negative for dizziness, numbness and headaches  Psychiatric/Behavioral: Negative for decreased concentration, dysphoric mood and suicidal ideas  The patient is not nervous/anxious          VITALS:  Vitals:    05/11/23 1402   BP: 118/79   Pulse: 81       LABS:  HgA1c:   Lab Results   Component Value Date    HGBA1C 5 7 (H) 06/22/2022     BMP:   Lab Results   Component Value Date    GLUCOSE 88 02/20/2015    CALCIUM 8 8 10/13/2021     02/20/2015    K 3 8 10/13/2021    CO2 25 10/13/2021     10/13/2021    BUN 11 10/13/2021    CREATININE 0 74 10/13/2021 "      _____________________________________________________  PHYSICAL EXAMINATION:  General: well developed and well nourished, alert, oriented times 3 and appears comfortable  Psychiatric: Normal  HEENT: Normocephalic, Atraumatic Trachea Midline, No torticollis  Pulmonary: No audible wheezing or respiratory distress   Cardiovascular: No pitting edema, 2+ radial pulse   Abdominal/GI: abdomen non tender, non distended   Skin: No masses, erythema, lacerations, fluctation, ulcerations  Neurovascular: Sensation Intact to the Median, Ulnar, Radial Nerve, Motor Intact to the Median, Ulnar, Radial Nerve and Pulses Intact  Musculoskeletal: Normal, except as noted in detailed exam and in HPI  MUSCULOSKELETAL EXAMINATION:    Right CMC Exam:  No adduction contracture  No hyperextension deformity of MCP joint  Mild localized tenderness over radial and dorsal aspect of thumb (CMC joint)  Grind test is Negative for pain and Negative for crepitus  Metacarpal load shift test negative   No triggering   Dorsal MP joint tenderness     Left CMC Exam:  No adduction contracture  No hyperextension deformity of MCP joint  Positive localized tenderness over radial and dorsal aspect of thumb (CMC joint)  Grind test is Positive for pain and Negative for crepitus  Metacarpal load shift test positive   No triggering or tenderness over the A1 pulley     ___________________________________________________  STUDIES REVIEWED:  I have personally reviewed AP lateral and oblique radiographs of left hand    which demonstrate mild cmc arthrosis            PROCEDURES PERFORMED:  Small joint arthrocentesis: L thumb CMC  Trion Protocol:  Consent: Verbal consent obtained  Written consent not obtained    Risks and benefits: risks, benefits and alternatives were discussed  Consent given by: patient  Time out: Immediately prior to procedure a \"time out\" was called to verify the correct patient, procedure, equipment, support staff and site/side marked " "as required  Patient understanding: patient states understanding of the procedure being performed  Site marked: the operative site was marked  Patient identity confirmed: verbally with patient    Supporting Documentation  Indications: pain   Procedure Details  Location: thumb - L thumb CMC  Preparation: Patient was prepped and draped in the usual sterile fashion  Needle size: 25 G  Ultrasound guidance: no  Medications administered: 0 5 mL bupivacaine 0 25 %; 1 mL lidocaine 1 %; 20 mg triamcinolone acetonide 40 mg/mL    Patient tolerance: patient tolerated the procedure well with no immediate complications  Dressing:  Sterile dressing applied    Small joint arthrocentesis: R thumb MCP  Universal Protocol:  Consent: Verbal consent obtained  Written consent not obtained  Risks and benefits: risks, benefits and alternatives were discussed  Consent given by: patient  Time out: Immediately prior to procedure a \"time out\" was called to verify the correct patient, procedure, equipment, support staff and site/side marked as required    Patient understanding: patient states understanding of the procedure being performed  Site marked: the operative site was marked  Patient identity confirmed: verbally with patient    Supporting Documentation  Indications: pain   Procedure Details  Location: thumb - R thumb MCP  Preparation: Patient was prepped and draped in the usual sterile fashion  Needle size: 25 G  Ultrasound guidance: no  Medications administered: 0 5 mL bupivacaine 0 25 %; 1 mL lidocaine 1 %; 20 mg triamcinolone acetonide 40 mg/mL    Patient tolerance: patient tolerated the procedure well with no immediate complications  Dressing:  Sterile dressing applied           _____________________________________________________      Scribe Attestation    I,:  Colleen Cleaning am acting as a scribe while in the presence of the attending physician :       I,:  Chiquis Walters MD personally performed the services described in " this documentation    as scribed in my presence :

## 2023-05-12 ENCOUNTER — HOSPITAL ENCOUNTER (OUTPATIENT)
Dept: INFUSION CENTER | Facility: HOSPITAL | Age: 48
Discharge: HOME | End: 2023-05-12
Attending: PSYCHIATRY & NEUROLOGY

## 2023-05-12 VITALS
TEMPERATURE: 97.5 F | SYSTOLIC BLOOD PRESSURE: 124 MMHG | RESPIRATION RATE: 20 BRPM | HEART RATE: 76 BPM | DIASTOLIC BLOOD PRESSURE: 81 MMHG

## 2023-05-12 DIAGNOSIS — G43.711 INTRACTABLE CHRONIC MIGRAINE WITHOUT AURA AND WITH STATUS MIGRAINOSUS: Primary | ICD-10-CM

## 2023-05-12 RX ORDER — SODIUM CHLORIDE 9 MG/ML
20 INJECTION, SOLUTION INTRAVENOUS ONCE
OUTPATIENT
Start: 2023-08-04

## 2023-05-12 RX ORDER — SODIUM CHLORIDE 9 MG/ML
20 INJECTION, SOLUTION INTRAVENOUS ONCE
Status: COMPLETED | OUTPATIENT
Start: 2023-05-12 | End: 2023-05-12

## 2023-05-12 RX ADMIN — EPTINEZUMAB-JJMR 300 MG: 100 INJECTION INTRAVENOUS at 10:06

## 2023-05-12 RX ADMIN — SODIUM CHLORIDE 20 ML/HR: 0.9 INJECTION, SOLUTION INTRAVENOUS at 10:05

## 2023-05-22 ENCOUNTER — PROCEDURE VISIT (OUTPATIENT)
Dept: NEUROLOGY | Facility: CLINIC | Age: 48
End: 2023-05-22

## 2023-05-22 VITALS
HEART RATE: 86 BPM | HEIGHT: 63 IN | WEIGHT: 216 LBS | TEMPERATURE: 97.9 F | BODY MASS INDEX: 38.27 KG/M2 | DIASTOLIC BLOOD PRESSURE: 74 MMHG | SYSTOLIC BLOOD PRESSURE: 127 MMHG

## 2023-05-22 DIAGNOSIS — G43.709 CHRONIC MIGRAINE WITHOUT AURA WITHOUT STATUS MIGRAINOSUS, NOT INTRACTABLE: Primary | ICD-10-CM

## 2023-05-22 RX ORDER — KETOROLAC TROMETHAMINE 30 MG/ML
INJECTION, SOLUTION INTRAMUSCULAR; INTRAVENOUS
Qty: 8 ML | Refills: 0 | Status: SHIPPED | OUTPATIENT
Start: 2023-05-22

## 2023-05-22 RX ORDER — KETOROLAC TROMETHAMINE 10 MG/1
10 TABLET, FILM COATED ORAL EVERY 6 HOURS PRN
Qty: 10 TABLET | Refills: 2 | Status: SHIPPED | OUTPATIENT
Start: 2023-05-22

## 2023-05-22 NOTE — PATIENT INSTRUCTIONS
"Headache management instructions  - When patient has a moderate to severe headache, they should seek rest, initiate relaxation and apply cold compresses to the head  - Maintain regular sleep schedule  Adults need at least 7-8 hours of uninterrupted a night  - Limit over the counter medications such as Tylenol, Ibuprofen, Aleve, Excedrin  (No more than 2- 3 times a week or max 10 a month)  - Maintain headache diary  Free KELSEY for a smart phone, which can be used is \"Migraine manoj\"  - Limit caffeine to 1-2 cups 8 to 16 oz a day or less  - Avoid dietary trigger  (aged cheese, peanuts, MSG, aspartame and nitrates)  - Patient is to have regular frequent meals to prevent headache onset  - Please drink at least 64 ounces of water a day to help remain hydrated  Botox Therapy  Important Information    Our goal is to make sure you fully understand how Botox Therapy treatment may benefit you and to help you understand how you can play an active role in your treatments and ongoing care  Please review the following information below  Call our office IMMEDIATELY @ [phone] and speak to one of our Botox Coordinators if you have a change in insurance  (a prior authorization is required and accurate information is vital)  Please call at least 24 hours in advance if you can't make your appointment  Appointments are scheduled every 91 days (this will be scheduled in advance before leaving the office)  You must allow for at least 2-3 treatments to determine if Botox is right for you  It may take a few weeks to see a response from treatment  No hair dye or scalp massage or treatment within 24 hours of treatment  We encourage you to use a headache diary or journal to document your headache frequency and severity   You can also utilize the Migraine Manoj kelsey (downloadable on [employer])  Sign up for the Botox Savings Program  (commercial insurance patients may qualify) Sign up at botoxsavingsprogram com or " call [phone] Option: 4  To get more information on Botox therapy for Chronic Migraines and see frequently asked questions, please visit cloud[employer]IQ  If you have any questions or concerns, please speak to one of our Botox Coordinators at [phone]  We look forward to servicing you!

## 2023-05-22 NOTE — PROGRESS NOTES
Universal Protocol   Consent: Verbal consent obtained  Written consent obtained    Risks and benefits: risks, benefits and alternatives were discussed  Consent given by: patient  Patient understanding: patient states understanding of the procedure being performed  Patient consent: the patient's understanding of the procedure matches consent given  Procedure consent: procedure consent matches procedure scheduled        Chemodenervation     Date/Time 5/22/2023 1:15 PM     Performed by  Chalino Ascencio PA-C     Authorized by Chalino Ascencio PA-C        Pre-procedure details      Prepped With: Alcohol     Procedure details     Position:  Upright   Botox     Botox Type:  Type A    Brand:  Botox    mL's of Botulinum Toxin:  200    Final Concentration per CC:  100 units    Needle Gauge:  30 G 2 5 inch   Procedures     Botox Procedures: chronic headache      Indications: migraines     Injection Location      Head / Face:  L superior trapezius, R superior trapezius, L superior cervical paraspinal, R superior cervical paraspinal, L , R , procerus, L temporalis, R temporalis, R frontalis, L frontalis, R medial occipitalis and L medial occipitalis    L  injection amount:  5 unit(s)    R  injection amount:  5 unit(s)    L lateral frontalis:  5 unit(s)    R lateral frontalis:  5 unit(s)    L medial frontalis:  5 unit(s)    R medial frontalis:  5 unit(s)    L temporalis injection amount:  20 unit(s)    R temporalis injection amount:  20 unit(s)    Procerus injection amount:  5 unit(s)    L medial occipitalis injection amount:  15 unit(s)    R medial occipitalis injection amount:  15 unit(s)    L superior cervical paraspinal injection amount:  10 unit(s)    R superior cervical paraspinal injection amount:  10 unit(s)    L superior trapezius injection amount:  15 unit(s)    R superior trapezius injection amount:  15 unit(s)   Total Units     Total units used:  200    Total units discarded:  0 "  Post-procedure details      Chemodenervation:  Chronic migraine    Facial Nerve Location[de-identified]  Bilateral facial nerve    Patient tolerance of procedure: Tolerated well, no immediate complications   Comments      Extra units medically necessary:  - 10 between both upper traps  - 15 between both middle traps  - 10 R occipitalis  - 10 left occipitalis  Majority of headaches in the back of head  Blood pressure 127/74, pulse 86, temperature 97 9 °F (36 6 °C), temperature source Temporal, height 5' 3\" (1 6 m), weight 98 kg (216 lb)  Migraine headaches reduced with >injections botox in the occipital regions b/l  Continue toradol PO prn migraine  Prefers to hold toradol inj unless very severe migraine  Alternative reyvow  Pt with increased stress, 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

## 2023-06-01 ENCOUNTER — TELEPHONE (OUTPATIENT)
Dept: OBGYN CLINIC | Facility: CLINIC | Age: 48
End: 2023-06-01

## 2023-06-01 ENCOUNTER — TELEPHONE (OUTPATIENT)
Dept: NEUROLOGY | Facility: CLINIC | Age: 48
End: 2023-06-01

## 2023-06-01 ENCOUNTER — TELEPHONE (OUTPATIENT)
Dept: INTERNAL MEDICINE CLINIC | Facility: CLINIC | Age: 48
End: 2023-06-01

## 2023-06-01 NOTE — TELEPHONE ENCOUNTER
"Spoke to patient and she stated that the forms were to be filled out for her \"migraines\"  I advised patient that per last office note with us, Dr Amanda Longoria stated that her migraines were weel controlled and she followed with Neurology, so she should reach out to them to see if they are able to fill forms out for her  Patient will stop by to  forms  They are in accordion folder at front    "

## 2023-06-01 NOTE — TELEPHONE ENCOUNTER
Pt. Scheduled with a doctor for a poss. Prolapse. She does not notice it when she is laying down but notices it when she is standing or using the bathroom, it is not causing her pain, she is not having trouble urinating but it is uncomfortable. Aware to call us if any changes and we can look for a sooner appointment if possible.

## 2023-06-01 NOTE — TELEPHONE ENCOUNTER
Pt left VM re: paperwork to be filled out by [facility]  Transcribed VM:   I this is Jerome Cuellar, July 1st, 1975  This message is for [facility]  I need to know, I have paperwork that I need to be filled out by the doctor and she's the one that's been taking care of me for my chronic migraines, and I need to know if I have to, if I can drop it in your bracket off or do I need to make an appointment and have them filled out  My number is [phone]  Already handled below

## 2023-06-01 NOTE — TELEPHONE ENCOUNTER
Folder Color-Orange     Name of Form- Attending Physicians Statement of Disability     Form to be filled out by- Dr Tanvi Duffy to be Faxed [phone]  Patient will also  form  Patient made aware of 10 business day policy

## 2023-06-01 NOTE — TELEPHONE ENCOUNTER
Patient dropped off form to be filled out  Fee dropped and collected  Patient would papers to be faxed to [phone] to her insurance office  She would also like a call to let her know they were filled out  Scanned to chart and rout to Arnoldo Goel

## 2023-06-06 ENCOUNTER — TELEPHONE (OUTPATIENT)
Dept: NEUROLOGY | Facility: CLINIC | Age: 48
End: 2023-06-06

## 2023-06-06 NOTE — TELEPHONE ENCOUNTER
Called pt to let her know that Mirian Moody is out of the office currently and I will have her take a look at her paperwork she dropped off once she comes back in Next Tuesday  Pt understood

## 2023-06-14 ENCOUNTER — TELEPHONE (OUTPATIENT)
Dept: NEUROLOGY | Facility: CLINIC | Age: 48
End: 2023-06-14

## 2023-06-14 DIAGNOSIS — G43.709 CHRONIC MIGRAINE WITHOUT AURA WITHOUT STATUS MIGRAINOSUS, NOT INTRACTABLE: ICD-10-CM

## 2023-06-14 DIAGNOSIS — R42 VERTIGO: ICD-10-CM

## 2023-06-14 DIAGNOSIS — G47.09 OTHER INSOMNIA: ICD-10-CM

## 2023-06-14 RX ORDER — CHOLECALCIFEROL (VITAMIN D3) 25 MCG
TABLET ORAL
Qty: 60 TABLET | Refills: 0 | Status: SHIPPED | OUTPATIENT
Start: 2023-06-14

## 2023-06-14 RX ORDER — DEXAMETHASONE 2 MG/1
TABLET ORAL
Qty: 5 TABLET | Refills: 0 | Status: SHIPPED | OUTPATIENT
Start: 2023-06-14

## 2023-06-14 NOTE — TELEPHONE ENCOUNTER
Pt left a VM re: ongoing migraine  Transcribed VM:   Hi, this is Niger  Birthday July 1st, 1975  Just calling to see if Dr Bryant Martinez can give me something for this ongoing migraine that I've had since the week of the 22nd when I got the Botox  Sometimes she'll give me a steroid or a Toradol shot  So I just Aydee Rosswimitchell know if anything can be done or what else she would suggest  My number is [phone]  Thank you  Called back to gather more information and spoke with pt  When did migraine start? A few days before Botox on 5/22  Location/Description: Right now in forehead and down to eye and on top of the eyes  Right now a dull, fuzzy feeling and pressure  At it's peek very sharp and stabbing  Pain scale: 8/10    Associated symptoms: Nauseated, light and sound sensitivity  Dizziness  Precipitating factors: Possibly weather  Alleviating factors: Oral Toradol helps a little and so does Reyvow, low light and cold environment  What medications have you tried for this migraine headache? Toradol, Reyvow and she is about to try her Nerivio device that she has one treatment left (Got from [facility])  Current migraine medications are confirmed as:  1  Toradol 10 mg   2  Reyvow  3  Reglan  4  Zomig nasal (needs refill)    Medications tried in the past?  1  Has tried Depakote and Decadron, but is unsure if she ever tried Zyprexa  Please advise  Thank you

## 2023-06-14 NOTE — TELEPHONE ENCOUNTER
Seems like pt is requesting toradol injection or decadron  I am okay with either  I sent decadron in case she wants to try that first,  Thanks

## 2023-06-15 RX ORDER — MECLIZINE HCL 12.5 MG/1
12.5 TABLET ORAL 3 TIMES DAILY PRN
Qty: 30 TABLET | Refills: 3 | Status: SHIPPED | OUTPATIENT
Start: 2023-06-15

## 2023-06-16 ENCOUNTER — CLINICAL SUPPORT (OUTPATIENT)
Dept: NEUROLOGY | Facility: CLINIC | Age: 48
End: 2023-06-16
Payer: COMMERCIAL

## 2023-06-16 DIAGNOSIS — G43.711 INTRACTABLE CHRONIC MIGRAINE WITHOUT AURA AND WITH STATUS MIGRAINOSUS: Primary | ICD-10-CM

## 2023-06-16 DIAGNOSIS — G43.119 INTRACTABLE MIGRAINE WITH VISUAL AURA AND WITHOUT STATUS MIGRAINOSUS: ICD-10-CM

## 2023-06-16 PROCEDURE — 96372 THER/PROPH/DIAG INJ SC/IM: CPT | Performed by: PSYCHIATRY & NEUROLOGY

## 2023-06-16 RX ORDER — KETOROLAC TROMETHAMINE 30 MG/ML
30 INJECTION, SOLUTION INTRAMUSCULAR; INTRAVENOUS ONCE
Status: COMPLETED | OUTPATIENT
Start: 2023-06-16 | End: 2023-06-16

## 2023-06-16 RX ADMIN — KETOROLAC TROMETHAMINE 30 MG: 30 INJECTION, SOLUTION INTRAMUSCULAR; INTRAVENOUS at 15:05

## 2023-06-16 NOTE — TELEPHONE ENCOUNTER
Phone call to pt  Pt would like to come in today for a Toradol injection  Pt does not have the correct needles at home  Injection scheduled for 3 pm in [place]     Pt has not taken any medication such as Toradol or IBU today      Claudia, would you kindly place the order  Thank you!

## 2023-06-22 DIAGNOSIS — F41.8 DEPRESSION WITH ANXIETY: ICD-10-CM

## 2023-06-23 RX ORDER — BUPROPION HYDROCHLORIDE 150 MG/1
150 TABLET ORAL EVERY MORNING
Qty: 90 TABLET | Refills: 0 | Status: SHIPPED | OUTPATIENT
Start: 2023-06-23

## 2023-07-06 ENCOUNTER — OFFICE VISIT (OUTPATIENT)
Dept: OBGYN CLINIC | Facility: CLINIC | Age: 48
End: 2023-07-06
Payer: COMMERCIAL

## 2023-07-06 VITALS — DIASTOLIC BLOOD PRESSURE: 66 MMHG | SYSTOLIC BLOOD PRESSURE: 102 MMHG | HEART RATE: 88 BPM

## 2023-07-06 DIAGNOSIS — M18.12 ARTHRITIS OF CARPOMETACARPAL (CMC) JOINT OF LEFT THUMB: Primary | ICD-10-CM

## 2023-07-06 DIAGNOSIS — M19.041 OSTEOARTHRITIS OF METACARPOPHALANGEAL (MCP) JOINT OF RIGHT THUMB: ICD-10-CM

## 2023-07-06 PROCEDURE — 99213 OFFICE O/P EST LOW 20 MIN: CPT | Performed by: SURGERY

## 2023-07-06 NOTE — PROGRESS NOTES
ASSESSMENT/PLAN:      50 y.o. female with left thumb CMC arthritis and right thumb MP joint arthritis    Repeat evaluation performed today. Patient notes significant improvements since left thumb CMC CSI and right thumb MP CSI, 5/11/2023. It is too soon to repeat injections today, patient also states she only has mild intermittent pain at this time. Discussed follow up in 6 weeks if symptoms return. Possible repeat injections at that time. The patient verbalized understanding of exam findings and treatment plan. We engaged in the shared decision-making process and treatment options were discussed at length with the patient. Surgical and conservative management discussed today along with risks and benefits. 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 States feels better, at times has pain when gripping items. • Right Hand - Follow-up     Rt thumb follow up. States feels better, at times has pain when gripping items. SUBJECTIVE:  Roger Bailey is a 50y.o. year old female who presents for follow up regarding left thumb CMC osteoarthritis and right thumb metcarpophalangeal osteoarthritis. Patient over all states she is doing well. Her pain today is a mild intermittent ache rated 3/10 at its worst. She notes increases in pain with pinching motion.     Radiation: None  Associated symptoms: Pain  Mild  Intermittant  Dull    3/10  Intermittent with activity    PAST MEDICAL HISTORY:  Past Medical History:   Diagnosis Date   • Anxiety    • Asthma    • Claustrophobia    • Cluster headache    • CTS (carpal tunnel syndrome) • Depression    • Fibromyalgia     last assessed 11/27/17   • Fibromyalgia, primary    • GERD (gastroesophageal reflux disease)    • GERD without esophagitis     last assessed 10/12/17   • GI problem    • Headache, tension-type    • Irritable bowel syndrome    • Joint pain    • Kidney stone    • Lung nodule     last assessed 10/9/15   • Migraine    • Muscle pain    • Peripheral neuropathy    • Sleep apnea        PAST SURGICAL HISTORY:  Past Surgical History:   Procedure Laterality Date   • BACK SURGERY     • CARPAL TUNNEL RELEASE Bilateral    • CHOLECYSTECTOMY     • CYSTOSCOPY     • GALLBLADDER SURGERY     • GASTRECTOMY  08/09/2022    Sleeve   • NECK SURGERY     • [place] TENDON SHEATH INCISION Right 01/16/2023    Procedure: THUMB TRIGGER FINGER RELEASE;  Surgeon: Enrike Mejia MD;  Location: [facility] MAIN OR;  Service: Orthopedics   • SACROILIAC JOINT FUSION Left    • SPINAL FUSION      C4 and C5   • ULNAR NERVE REPAIR Bilateral    • UPPER GASTROINTESTINAL ENDOSCOPY         FAMILY HISTORY:  Family History   Problem Relation Age of Onset   • Diabetes Mother    • Fibromyalgia Mother    • Ovarian cancer Mother 35   • Hypertension Mother    • Thyroid disease Mother    • Migraines Mother    • Neuropathy Mother    • No Known Problems Father    • Throat cancer Maternal Grandmother    • No Known Problems Maternal Grandfather    • No Known Problems Paternal Grandmother    • No Known Problems Paternal Grandfather    • No Known Problems Daughter    • Breast cancer Maternal Aunt    • No Known Problems Maternal Aunt    • No Known Problems Maternal Aunt    • No Known Problems Paternal Aunt    • Colon cancer Cousin    • Heart disease Cousin    • Lupus Cousin    • Arthritis Family    • Heart disease Family         cardiac disorder   • Neuropathy Family    • Lupus Family         systemic erythematosus   • No Known Problems Half-Sister    • No Known Problems Half-Brother    • No Known Problems Half-Brother    • No Known Problems Half-Brother    • Stroke Neg Hx        SOCIAL HISTORY:  Social History     Tobacco Use   • Smoking status: Never   • Smokeless tobacco: Never   Vaping Use   • Vaping Use: Never used   Substance Use Topics   • Alcohol use: Yes     Alcohol/week: 2.0 standard drinks of alcohol     Types: 1 Glasses of wine, 1 Cans of beer per week     Comment: occasional   • Drug use: Yes     Types: Marijuana     Comment: medicinal       MEDICATIONS:    Current Outpatient Medications:   •  albuterol (PROVENTIL HFA,VENTOLIN HFA) 90 mcg/act inhaler, Inhale 2 puffs every 6 (six) hours as needed for wheezing or shortness of breath, Disp: 8 g, Rfl: 2  •  Botox 200 units SOLR, , Disp: , Rfl:   •  buPROPion (Wellbutrin XL) 150 mg 24 hr tablet, Take 1 tablet (150 mg total) by mouth every morning, Disp: 90 tablet, Rfl: 0  •  cholecalciferol (VITAMIN D3) 1,000 units tablet, Take 1 capsule by mouth once a week 3000 units daily , Disp: , Rfl:   •  cholestyramine (QUESTRAN) 4 GM/DOSE powder, Take 1 packet (4 g total) by mouth 2 (two) times a day with meals, Disp: 378 g, Rfl: 2  •  dexamethasone (DECADRON) 2 mg tablet, 1 tab qam with food prn migraine. , Disp: 5 tablet, Rfl: 0  •  Diclofenac Sodium (VOLTAREN) 1 %, Apply 2 g topically 4 (four) times a day, Disp: 100 g, Rfl: 2  •  dicyclomine (BENTYL) 20 mg tablet, Take 1 tablet (20 mg total) by mouth 3 (three) times a day as needed (migraine/cramps), Disp: 60 tablet, Rfl: 0  •  divalproex sodium (DEPAKOTE) 250 mg EC tablet, 2 tabs q12 hours x 2 days, then stop. Repeat if needed. , Disp: 16 tablet, Rfl: 0  •  esomeprazole (NexIUM) 40 MG capsule, Take 1 capsule (40 mg total) by mouth 2 (two) times a day before meals, Disp: 120 capsule, Rfl: 0  •  famotidine (PEPCID) 20 mg tablet, Take 1 tablet (20 mg total) by mouth daily at bedtime, Disp: 90 tablet, Rfl: 0  •  fluticasone (FLONASE) 50 mcg/act nasal spray, 1 spray in each nostril once daily, Disp: 30 mL, Rfl: 0  •  ibuprofen (MOTRIN) 400 mg tablet, 1 tab q8 hours prn migraine. Max 2 per day and 4 per week. Hold toradol., Disp: 20 tablet, Rfl: 0  •  ketorolac (TORADOL) 10 mg tablet, Take 1 tablet (10 mg total) by mouth every 6 (six) hours as needed (migraine) Max 2-3 per week., Disp: 10 tablet, Rfl: 2  •  ketorolac (TORADOL) 30 mg/mL injection, 1-2 ml IM injection once per 24 hours p.r.n. migraine. No more than 2 injections per week., Disp: 8 mL, Rfl: 0  •  lasmiditan succinate (Reyvow) 50 mg tablet, One tab qhs at migraine onset. Caution sedation. Max 1 tab/24 hours. , Disp: 8 tablet, Rfl: 2  •  levonorgestrel (MIRENA) 20 MCG/24HR IUD, 1 each by Intrauterine route once, Disp: , Rfl:   •  meclizine (ANTIVERT) 12.5 MG tablet, Take 1 tablet (12.5 mg total) by mouth 3 (three) times a day as needed for dizziness or nausea, Disp: 30 tablet, Rfl: 3  •  Melatonin ER 3 MG TBCR, 1-2 tabs qhs, Disp: 60 tablet, Rfl: 0  •  metoclopramide (REGLAN) 10 mg tablet, 1 tab q6 hours prn migraine (with toradol and benadryl), Disp: 20 tablet, Rfl: 0  •  mexiletine (MEXITIL) 150 mg capsule, Take 150 mg by mouth 3 (three) times a day, Disp: , Rfl:   •  NON FORMULARY, Medical marijuana  , Disp: , Rfl:   •  polyethylene glycol (GLYCOLAX) 17 GM/SCOOP powder, Take 17 g by mouth daily as needed (constipation), Disp: 289 g, Rfl: 0  •  Riboflavin (Vitamin B-2) 100 MG TABS, Take 400 mg by mouth daily, Disp: , Rfl:   •  Syringe/Needle, Disp, (SYRINGE 3CC/18DC9-5/2") 25G X 1-1/2" 3 ML MISC, Use for toradol IM injections. , Disp: 10 each, Rfl: 0  •  tiZANidine (ZANAFLEX) 2 mg tablet, Take 1 tablet (2 mg total) by mouth every 8 (eight) hours as needed for muscle spasms, Disp: 90 tablet, Rfl: 1  •  traMADol (ULTRAM) 50 mg tablet, take one tablet at onset of moderate to severe headach, can repeat once in 8 hours.  MAX 2 DAYS PER WEEK, Disp: 10 tablet, Rfl: 0  •  Trudhesa 0.725 MG/ACT AERS, INSTILL ONE SPRAY INTO EACH NOSTRIL AS NEEDED AT ONSET OF MIGRAINE MAY REPEAT IN TWO HOURS MAX OF TWO DOSES PER DAY MAX OF THREE DOSES PER W, Disp: , Rfl:   •  ZOLMitriptan (ZOMIG-ZMT) 5 MG disintegrating tablet, Take 1 tablet (5 mg total) by mouth once as needed for migraine for up to 1 dose, Disp: 6 tablet, Rfl: 0    ALLERGIES:  Allergies   Allergen Reactions   • Hydrocodone-Acetaminophen GI Intolerance     gi upset -pt okay if takes  zofran   • Hydrocodone-Acetaminophen GI Intolerance     gi upset -pt okay if takes  zofran  gi upset -pt okay if takes  zofran   • Codeine GI Intolerance and Other (See Comments)     GI issues   • Oxycodone-Acetaminophen GI Intolerance   • Penicillins GI Intolerance   • Mexiletine Rash       REVIEW OF SYSTEMS:  Pertinent items are noted in HPI. A comprehensive review of systems was negative.     LABS:  HgA1c:   Lab Results   Component Value Date    HGBA1C 5.7 (H) 06/22/2022     BMP:   Lab Results   Component Value Date    GLUCOSE 88 02/20/2015    CALCIUM 8.8 10/13/2021     02/20/2015    K 3.8 10/13/2021    CO2 25 10/13/2021     10/13/2021    BUN 11 10/13/2021    CREATININE 0.74 10/13/2021           _____________________________________________________  PHYSICAL EXAMINATION:  General: well developed and well nourished, alert, oriented times 3 and appears comfortable  Psychiatric: Normal  HEENT: Trachea Midline, No torticollis  Cardiovascular: No discernable arrhythmia  Pulmonary: No wheezing or stridor  Skin: No masses, erythema, lacerations, fluctation, ulcerations  Neurovascular: Sensation Intact to the Median, Ulnar, Radial Nerve, Motor Intact to the Median, Ulnar, Radial Nerve and Pulses Intact    MUSCULOSKELETAL EXAMINATION:  Right CMC exam  No adduction contracture  No hyperextension deformity of MCP joint  TTP MP joint  (-) grind test   Sensation intact  Brisk capillary refill    Left CMC   No adduction contracture  No hyperextension deformity of MCP joint  TTP CMC joint  (+) grind test  Sensation intact  Brisk capillary refill _____________________________________________________  STUDIES REVIEWED:  No Studies to review      PROCEDURES PERFORMED:  Procedures  No Procedures performed today

## 2023-07-10 ENCOUNTER — NURSE TRIAGE (OUTPATIENT)
Age: 48
End: 2023-07-10

## 2023-07-10 ENCOUNTER — OFFICE VISIT (OUTPATIENT)
Dept: GASTROENTEROLOGY | Facility: CLINIC | Age: 48
End: 2023-07-10
Payer: COMMERCIAL

## 2023-07-10 VITALS
SYSTOLIC BLOOD PRESSURE: 122 MMHG | BODY MASS INDEX: 37.95 KG/M2 | TEMPERATURE: 98.1 F | WEIGHT: 214.2 LBS | DIASTOLIC BLOOD PRESSURE: 76 MMHG | HEIGHT: 63 IN

## 2023-07-10 DIAGNOSIS — Z12.11 COLON CANCER SCREENING: ICD-10-CM

## 2023-07-10 DIAGNOSIS — K59.00 CONSTIPATION, UNSPECIFIED CONSTIPATION TYPE: Primary | ICD-10-CM

## 2023-07-10 DIAGNOSIS — K21.9 GASTROESOPHAGEAL REFLUX DISEASE, UNSPECIFIED WHETHER ESOPHAGITIS PRESENT: ICD-10-CM

## 2023-07-10 PROCEDURE — 99214 OFFICE O/P EST MOD 30 MIN: CPT | Performed by: PHYSICIAN ASSISTANT

## 2023-07-10 RX ORDER — FAMOTIDINE 20 MG/1
20 TABLET, FILM COATED ORAL
Qty: 90 TABLET | Refills: 1 | Status: SHIPPED | OUTPATIENT
Start: 2023-07-10 | End: 2023-10-08

## 2023-07-10 RX ORDER — POLYETHYLENE GLYCOL 3350 17 G/17G
17 POWDER, FOR SOLUTION ORAL DAILY
Qty: 850 G | Refills: 2 | Status: SHIPPED | OUTPATIENT
Start: 2023-07-10

## 2023-07-10 RX ORDER — SOD SULF/POT CHLORIDE/MAG SULF 1.479 G
TABLET ORAL
Qty: 24 TABLET | Refills: 0 | Status: SHIPPED | OUTPATIENT
Start: 2023-07-10

## 2023-07-10 RX ORDER — LANOLIN ALCOHOL/MO/W.PET/CERES
CREAM (GRAM) TOPICAL
COMMUNITY
Start: 2023-06-14

## 2023-07-10 NOTE — PATIENT INSTRUCTIONS
Scheduled date of EGD/colonoscopy (as of today):07/27/2023  Physician performing EGD/colonoscopy:Bharat  Location of EGD/colonoscopy[facility]  Desired bowel prep reviewed with patient:Sutab  Instructions reviewed with patient by:Kaye  Clearances:   N/A    Recall with Jose Leary or Pam in 2-3 months for follow up

## 2023-07-10 NOTE — H&P (VIEW-ONLY)
[facility] - Outpatient Follow-up Note  Olya Vidal 50 y.o. female MRN: [id number]  Encounter: [id number]          ASSESSMENT AND PLAN:      Caridad Del Rio is a 51 y/o female who is s/p gastric sleeve and s/p cholecystectomy who presents for follow-up. 1. GERD without esophagitis  2. S/P gastric sleeve 8/2022    EGD prior to her sleeve revealed normal duodenum, stomach, and esophagus with no hiatal hernia. she says she is on PPI BID but continues to have daily heartburn. She would like to schedule her repeat EGD at this time. -EGD ordered; instructions given; risks and benefits reviewed   -continue nexium 40 mg BID for now  -start pepcid 20 mg QHS  -explained to pt that I suspect once she gets a better handle of her constipation, her reflux will get better as well (See below)  -follow-up with bariatrics     2. Chronic constipation   3. Colon cancer screening  Pt has hx of post cholecystectomy diarrhea. She states she is currently having a bowel movement every 1-2 weeks. She says she usually only gets diarrhea once in a while, but this is usually after going several days without a BM and goes out to eat after this. Colonoscopy was scheduled at her last OV but she did not get this done as Pt says she cannot tolerate miralax bowel prep or GOLYTELY bowel prep; she says she was told about a pill prep that she would like to try. the Golytely. -explained overflow diarrhea to the pt  - start miralax once daily, increase to twice daily if needed  - increase daily water intake   -colonoscopy with SUTAB prep ordered per pt request   ______________________________________________________________________    SUBJECTIVE:  Pt says she is here to discuss bowel prep as she could not tolerate miralax prep or GOLYTELY. She says she continues to only have 1 BM every 1-2 weeks. She says her diarrhea only occurs once in a while, usually after going out to eat.  Pt denies using miralax for her BMs, and also notes she does not drink much water. Pt says she still gets daily heartburn despite taking nexium twice/day and this has been happening since her sleeve. Pt denies NSAID use, fevers, chills, night sweats, bloody or black BMs. REVIEW OF SYSTEMS IS OTHERWISE NEGATIVE.       Historical Information   Past Medical History:   Diagnosis Date   • Anxiety    • Asthma    • Claustrophobia    • Cluster headache    • CTS (carpal tunnel syndrome)    • Depression    • Fibromyalgia     last assessed 11/27/17   • Fibromyalgia, primary    • GERD (gastroesophageal reflux disease)    • GERD without esophagitis     last assessed 10/12/17   • GI problem    • Headache, tension-type    • Irritable bowel syndrome    • Joint pain    • Kidney stone    • Lung nodule     last assessed 10/9/15   • Migraine    • Muscle pain    • Peripheral neuropathy    • Sleep apnea      Past Surgical History:   Procedure Laterality Date   • BACK SURGERY     • CARPAL TUNNEL RELEASE Bilateral    • CHOLECYSTECTOMY     • CYSTOSCOPY     • GALLBLADDER SURGERY     • GASTRECTOMY  08/09/2022    Sleeve   • NECK SURGERY     • AK TENDON SHEATH INCISION Right 01/16/2023    Procedure: THUMB TRIGGER FINGER RELEASE;  Surgeon: Carloz Sanchez MD;  Location: [facility] MA[facility];  Service: Orthopedics   • SACROILIAC JOINT FUSION Left    • SPINAL FUSION      C4 and C5   • ULNAR NERVE REPAIR Bilateral    • UPPER GASTROINTESTINAL ENDOSCOPY       Social History   Social History     Substance and Sexual Activity   Alcohol Use Yes   • Alcohol/week: 2.0 standard drinks of alcohol   • Types: 1 Glasses of wine, 1 Cans of beer per week    Comment: occasional     Social History     Substance and Sexual Activity   Drug Use Yes   • Types: Marijuana    Comment: medicinal     Social History     Tobacco Use   Smoking Status Never   Smokeless Tobacco Never     Family History   Problem Relation Age of Onset   • Diabetes Mother    • Fibromyalgia Mother    • Ovarian cancer Mother 35   • Hypertension Mother    • Thyroid disease Mother    • Migraines Mother    • Neuropathy Mother    • No Known Problems Father    • Throat cancer Maternal Grandmother    • No Known Problems Maternal Grandfather    • No Known Problems Paternal Grandmother    • No Known Problems Paternal Grandfather    • No Known Problems Daughter    • Breast cancer Maternal Aunt    • No Known Problems Maternal Aunt    • No Known Problems Maternal Aunt    • No Known Problems Paternal Aunt    • Colon cancer Cousin    • Heart disease Cousin    • Lupus Cousin    • Arthritis Family    • Heart disease Family         cardiac disorder   • Neuropathy Family    • Lupus Family         systemic erythematosus   • No Known Problems Half-Sister    • No Known Problems Half-Brother    • No Known Problems Half-Brother    • No Known Problems Half-Brother    • Stroke Neg Hx        Meds/Allergies       Current Outpatient Medications:   •  albuterol (PROVENTIL HFA,VENTOLIN HFA) 90 mcg/act inhaler  •  Botox 200 units SOLR  •  buPROPion (Wellbutrin XL) 150 mg 24 hr tablet  •  cholecalciferol (VITAMIN D3) 1,000 units tablet  •  cholestyramine (QUESTRAN) 4 GM/DOSE powder  •  dexamethasone (DECADRON) 2 mg tablet  •  Diclofenac Sodium (VOLTAREN) 1 %  •  dicyclomine (BENTYL) 20 mg tablet  •  divalproex sodium (DEPAKOTE) 250 mg EC tablet  •  esomeprazole (NexIUM) 40 MG capsule  •  famotidine (PEPCID) 20 mg tablet  •  fluticasone (FLONASE) 50 mcg/act nasal spray  •  ibuprofen (MOTRIN) 400 mg tablet  •  ketorolac (TORADOL) 10 mg tablet  •  ketorolac (TORADOL) 30 mg/mL injection  •  lasmiditan succinate (Reyvow) 50 mg tablet  •  levonorgestrel (MIRENA) 20 MCG/24HR IUD  •  meclizine (ANTIVERT) 12.5 MG tablet  •  Melatonin ER 3 MG TBCR  •  metoclopramide (REGLAN) 10 mg tablet  •  mexiletine (MEXITIL) 150 mg capsule  •  NON FORMULARY  •  polyethylene glycol (GLYCOLAX) 17 GM/SCOOP powder  •  Riboflavin (Vitamin B-2) 100 MG TABS  •  Syringe/Needle, Disp, (SYRINGE 3CC/45KK8-4/2") 25G X 1-1/2" 3 ML MISC  •  tiZANidine (ZANAFLEX) 2 mg tablet  •  traMADol (ULTRAM) 50 mg tablet  •  Trudhesa 0.725 MG/ACT AERS  •  ZOLMitriptan (ZOMIG-ZMT) 5 MG disintegrating tablet    Allergies   Allergen Reactions   • Hydrocodone-Acetaminophen GI Intolerance     gi upset -pt okay if takes  zofran   • Hydrocodone-Acetaminophen GI Intolerance     gi upset -pt okay if takes  zofran  gi upset -pt okay if takes  zofran   • Codeine GI Intolerance and Other (See Comments)     GI issues   • Oxycodone-Acetaminophen GI Intolerance   • Penicillins GI Intolerance   • Mexiletine Rash           Objective     There were no vitals taken for this visit. There is no height or weight on file to calculate BMI. PHYSICAL EXAM:      General Appearance:   Alert, cooperative, no distress   HEENT:   Normocephalic, atraumatic, anicteric.     Neck:  Supple, symmetrical, trachea midline   Lungs:   Clear to auscultation bilaterally; no rales, rhonchi or wheezing; respirations unlabored    Heart[de-identified]   Regular rate and rhythm; no murmur, rub, or gallop. Abdomen:   Soft, non-tender, non-distended; normal bowel sounds; no masses, no organomegaly    Genitalia:   Deferred    Rectal:   Deferred    Extremities:  No cyanosis, clubbing or edema    Pulses:  2+ and symmetric    Skin:  No jaundice, rashes, or lesions    Lymph nodes:  No palpable cervical lymphadenopathy        Lab Results:   No visits with results within 1 Day(s) from this visit. Latest known visit with results is:   Appointment on 02/24/2023   Component Date Value   • Hepatitis C Ab 02/24/2023 Non-reactive    • HIV-1 p24 Antigen 02/24/2023 Non-Reactive    • HIV-1 Antibody 02/24/2023 Non-Reactive    • HIV-2 Antibody 02/24/2023 Non-Reactive    • HIV Ag-Ab 5th Gen 02/24/2023 Non-Reactive          Radiology Results:   No results found.

## 2023-07-10 NOTE — PROGRESS NOTES
[facility] - Outpatient Follow-up Note  Brain Ovens 50 y.o. female MRN: [id number]  Encounter: [id number]          ASSESSMENT AND PLAN:      Scott Monteiro is a 51 y/o female who is s/p gastric sleeve and s/p cholecystectomy who presents for follow-up. 1. GERD without esophagitis  2. S/P gastric sleeve 8/2022    EGD prior to her sleeve revealed normal duodenum, stomach, and esophagus with no hiatal hernia. she says she is on PPI BID but continues to have daily heartburn. She would like to schedule her repeat EGD at this time. -EGD ordered; instructions given; risks and benefits reviewed   -continue nexium 40 mg BID for now  -start pepcid 20 mg QHS  -explained to pt that I suspect once she gets a better handle of her constipation, her reflux will get better as well (See below)  -follow-up with bariatrics     2. Chronic constipation   3. Colon cancer screening  Pt has hx of post cholecystectomy diarrhea. She states she is currently having a bowel movement every 1-2 weeks. She says she usually only gets diarrhea once in a while, but this is usually after going several days without a BM and goes out to eat after this. Colonoscopy was scheduled at her last OV but she did not get this done as Pt says she cannot tolerate miralax bowel prep or GOLYTELY bowel prep; she says she was told about a pill prep that she would like to try. the Golytely. -explained overflow diarrhea to the pt  - start miralax once daily, increase to twice daily if needed  - increase daily water intake   -colonoscopy with SUTAB prep ordered per pt request   ______________________________________________________________________    SUBJECTIVE:  Pt says she is here to discuss bowel prep as she could not tolerate miralax prep or GOLYTELY. She says she continues to only have 1 BM every 1-2 weeks. She says her diarrhea only occurs once in a while, usually after going out to eat.  Pt denies using miralax for her BMs, and also notes she does not drink much water. Pt says she still gets daily heartburn despite taking nexium twice/day and this has been happening since her sleeve. Pt denies NSAID use, fevers, chills, night sweats, bloody or black BMs. REVIEW OF SYSTEMS IS OTHERWISE NEGATIVE.       Historical Information   Past Medical History:   Diagnosis Date   • Anxiety    • Asthma    • Claustrophobia    • Cluster headache    • CTS (carpal tunnel syndrome)    • Depression    • Fibromyalgia     last assessed 11/27/17   • Fibromyalgia, primary    • GERD (gastroesophageal reflux disease)    • GERD without esophagitis     last assessed 10/12/17   • GI problem    • Headache, tension-type    • Irritable bowel syndrome    • Joint pain    • Kidney stone    • Lung nodule     last assessed 10/9/15   • Migraine    • Muscle pain    • Peripheral neuropathy    • Sleep apnea      Past Surgical History:   Procedure Laterality Date   • BACK SURGERY     • CARPAL TUNNEL RELEASE Bilateral    • CHOLECYSTECTOMY     • CYSTOSCOPY     • GALLBLADDER SURGERY     • GASTRECTOMY  08/09/2022    Sleeve   • NECK SURGERY     • ID TENDON SHEATH INCISION Right 01/16/2023    Procedure: THUMB TRIGGER FINGER RELEASE;  Surgeon: Marianna Macias MD;  Location: [facility] MAIN OR;  Service: Orthopedics   • SACROILIAC JOINT FUSION Left    • SPINAL FUSION      C4 and C5   • ULNAR NERVE REPAIR Bilateral    • UPPER GASTROINTESTINAL ENDOSCOPY       Social History   Social History     Substance and Sexual Activity   Alcohol Use Yes   • Alcohol/week: 2.0 standard drinks of alcohol   • Types: 1 Glasses of wine, 1 Cans of beer per week    Comment: occasional     Social History     Substance and Sexual Activity   Drug Use Yes   • Types: Marijuana    Comment: medicinal     Social History     Tobacco Use   Smoking Status Never   Smokeless Tobacco Never     Family History   Problem Relation Age of Onset   • Diabetes Mother    • Fibromyalgia Mother    • Ovarian cancer Mother 35   • Hypertension Mother    • Thyroid disease Mother    • Migraines Mother    • Neuropathy Mother    • No Known Problems Father    • Throat cancer Maternal Grandmother    • No Known Problems Maternal Grandfather    • No Known Problems Paternal Grandmother    • No Known Problems Paternal Grandfather    • No Known Problems Daughter    • Breast cancer Maternal Aunt    • No Known Problems Maternal Aunt    • No Known Problems Maternal Aunt    • No Known Problems Paternal Aunt    • Colon cancer Cousin    • Heart disease Cousin    • Lupus Cousin    • Arthritis Family    • Heart disease Family         cardiac disorder   • Neuropathy Family    • Lupus Family         systemic erythematosus   • No Known Problems Half-Sister    • No Known Problems Half-Brother    • No Known Problems Half-Brother    • No Known Problems Half-Brother    • Stroke Neg Hx        Meds/Allergies       Current Outpatient Medications:   •  albuterol (PROVENTIL HFA,VENTOLIN HFA) 90 mcg/act inhaler  •  Botox 200 units SOLR  •  buPROPion (Wellbutrin XL) 150 mg 24 hr tablet  •  cholecalciferol (VITAMIN D3) 1,000 units tablet  •  cholestyramine (QUESTRAN) 4 GM/DOSE powder  •  dexamethasone (DECADRON) 2 mg tablet  •  Diclofenac Sodium (VOLTAREN) 1 %  •  dicyclomine (BENTYL) 20 mg tablet  •  divalproex sodium (DEPAKOTE) 250 mg EC tablet  •  esomeprazole (NexIUM) 40 MG capsule  •  famotidine (PEPCID) 20 mg tablet  •  fluticasone (FLONASE) 50 mcg/act nasal spray  •  ibuprofen (MOTRIN) 400 mg tablet  •  ketorolac (TORADOL) 10 mg tablet  •  ketorolac (TORADOL) 30 mg/mL injection  •  lasmiditan succinate (Reyvow) 50 mg tablet  •  levonorgestrel (MIRENA) 20 MCG/24HR IUD  •  meclizine (ANTIVERT) 12.5 MG tablet  •  Melatonin ER 3 MG TBCR  •  metoclopramide (REGLAN) 10 mg tablet  •  mexiletine (MEXITIL) 150 mg capsule  •  NON FORMULARY  •  polyethylene glycol (GLYCOLAX) 17 GM/SCOOP powder  •  Riboflavin (Vitamin B-2) 100 MG TABS  •  Syringe/Needle, Disp, (SYRINGE 3CC/09JZ1-4/2") 25G X 1-1/2" 3 ML MISC  •  tiZANidine (ZANAFLEX) 2 mg tablet  •  traMADol (ULTRAM) 50 mg tablet  •  Trudhesa 0.725 MG/ACT AERS  •  ZOLMitriptan (ZOMIG-ZMT) 5 MG disintegrating tablet    Allergies   Allergen Reactions   • Hydrocodone-Acetaminophen GI Intolerance     gi upset -pt okay if takes  zofran   • Hydrocodone-Acetaminophen GI Intolerance     gi upset -pt okay if takes  zofran  gi upset -pt okay if takes  zofran   • Codeine GI Intolerance and Other (See Comments)     GI issues   • Oxycodone-Acetaminophen GI Intolerance   • Penicillins GI Intolerance   • Mexiletine Rash           Objective     There were no vitals taken for this visit. There is no height or weight on file to calculate BMI. PHYSICAL EXAM:      General Appearance:   Alert, cooperative, no distress   HEENT:   Normocephalic, atraumatic, anicteric.     Neck:  Supple, symmetrical, trachea midline   Lungs:   Clear to auscultation bilaterally; no rales, rhonchi or wheezing; respirations unlabored    Heart[de-identified]   Regular rate and rhythm; no murmur, rub, or gallop. Abdomen:   Soft, non-tender, non-distended; normal bowel sounds; no masses, no organomegaly    Genitalia:   Deferred    Rectal:   Deferred    Extremities:  No cyanosis, clubbing or edema    Pulses:  2+ and symmetric    Skin:  No jaundice, rashes, or lesions    Lymph nodes:  No palpable cervical lymphadenopathy        Lab Results:   No visits with results within 1 Day(s) from this visit. Latest known visit with results is:   Appointment on 02/24/2023   Component Date Value   • Hepatitis C Ab 02/24/2023 Non-reactive    • HIV-1 p24 Antigen 02/24/2023 Non-Reactive    • HIV-1 Antibody 02/24/2023 Non-Reactive    • HIV-2 Antibody 02/24/2023 Non-Reactive    • HIV Ag-Ab 5th Gen 02/24/2023 Non-Reactive          Radiology Results:   No results found.

## 2023-07-10 NOTE — TELEPHONE ENCOUNTER
Patient calling in, sutab prep is not covered by insurance. Patient is not able to tolerate miralax or Golytely prep. Patient will find out how much it is to pay for sutab prep out of pocket.        Reason for Disposition  • Bowel prep for colonoscopy, questions about    Protocols used: COLONOSCOPY SYMPTOMS AND QUESTIONS-ADULT-

## 2023-07-10 NOTE — TELEPHONE ENCOUNTER
I returned call to patient advised we do not have any samples, she may go online to website, maybe find a coupon code , if not covered pt will have to pay out of pocket

## 2023-07-12 DIAGNOSIS — Z12.11 COLON CANCER SCREENING: Primary | ICD-10-CM

## 2023-07-12 NOTE — TELEPHONE ENCOUNTER
I sent in clenpiq instead however please let pt know that we have coupons for SUTAB in the office (this will decrease the price but won't make it free) so she can choose whichever she'd like. Thanks.

## 2023-07-24 ENCOUNTER — OFFICE VISIT (OUTPATIENT)
Dept: OBGYN CLINIC | Facility: CLINIC | Age: 48
End: 2023-07-24
Payer: COMMERCIAL

## 2023-07-24 ENCOUNTER — TELEPHONE (OUTPATIENT)
Dept: INTERNAL MEDICINE CLINIC | Facility: CLINIC | Age: 48
End: 2023-07-24

## 2023-07-24 VITALS
HEIGHT: 63 IN | DIASTOLIC BLOOD PRESSURE: 86 MMHG | BODY MASS INDEX: 38.55 KG/M2 | WEIGHT: 217.6 LBS | SYSTOLIC BLOOD PRESSURE: 120 MMHG

## 2023-07-24 DIAGNOSIS — Z46.89 ENCOUNTER FOR FITTING AND ADJUSTMENT OF PESSARY: ICD-10-CM

## 2023-07-24 DIAGNOSIS — N81.4 PROLAPSED UTERUS: Primary | ICD-10-CM

## 2023-07-24 PROCEDURE — 99214 OFFICE O/P EST MOD 30 MIN: CPT | Performed by: STUDENT IN AN ORGANIZED HEALTH CARE EDUCATION/TRAINING PROGRAM

## 2023-07-24 PROCEDURE — 57160 INSERT PESSARY/OTHER DEVICE: CPT | Performed by: STUDENT IN AN ORGANIZED HEALTH CARE EDUCATION/TRAINING PROGRAM

## 2023-07-24 PROCEDURE — A4562 PESSARY, NON RUBBER,ANY TYPE: HCPCS | Performed by: STUDENT IN AN ORGANIZED HEALTH CARE EDUCATION/TRAINING PROGRAM

## 2023-07-24 NOTE — TELEPHONE ENCOUNTER
Folder Color- Red    Name of Form- [employer]ican Certificiation Form    Form to be filled out by- Dr. Little Pay to be Faxed [phone]

## 2023-07-24 NOTE — Clinical Note
Hao Ramirez,  Can you help me order a ring pessary with support #3 (R2.5) for this patient please? Thanks!

## 2023-07-24 NOTE — PROGRESS NOTES
Assessment/Plan:  Danyell Tavera is a 50 y.o. [id number] with uterine prolpase who presents for consultation     1. Prolapsed uterus        2. Encounter for fitting and adjustment of pessary           · Discussed expectant management (wouldn't recommend given how pronounced prolapse is) vs. Pessary vs. Hysterectomy  · Given her young age and severity of prolapse, would recommend consideration of hysterectomy  · Reviewed hysterectomy for prolapse can sometimes reveal underlying bladder issues postoperatively, which may require further procedures after hysterectomy, can pursue hysterectomy with generalist understanding this possibility or opt to have hysterectomy with urogynecology  · Not currently ready for hysterectomy, would like pessary as bridge and will pursue hysterectomy when ready  · Fitted for #3 ring (R2.5) pessary with support, we will order and call back when ready for placement  · Today was taught how to place and remove herself for self-maintenance and in order to remove when wanting to have intercourse    Subjective:      Patient ID: Danyell Tavera is a 50 y.o. female.     HPI  Has been noting prolapse for last few years  Maybe first in 2016 or so  Has occasionally felt it more prominent now  Not interfering with bowel or bladder function  Not splinting for urination or BMs  hasn't been sexually active in a few months (not with anyone currently) but does not recall prolapse interfering with intercourse    Not having pelvic pain at baseline but when reduces prolpase sometimes that action is uncomfortable  No unusual vaginal discharge or irritation    Mirena IUD in place for menorrhagia, likes it--amenorrheic    2xSVDs  No abdominal surgeries    The following portions of the patient's history were reviewed and updated as appropriate: allergies, current medications, past family history, past medical history, past social history, past surgical history and problem list.    Review of Systems   Gastrointestinal: Negative for abdominal pain. Genitourinary: Negative for dysuria, pelvic pain, vaginal bleeding, vaginal discharge and vaginal pain. Objective:  /86 (BP Location: Left arm, Patient Position: Sitting, Cuff Size: Large)   Ht 5' 3" (1.6 m)   Wt 98.7 kg (217 lb 9.6 oz)   BMI 38.55 kg/m²      Physical Exam  Constitutional:       Appearance: Normal appearance. She is not ill-appearing. Eyes:      Extraocular Movements: Extraocular movements intact. Conjunctiva/sclera: Conjunctivae normal.   Pulmonary:      Effort: Pulmonary effort is normal.   Genitourinary:     Comments: Vulva: normal, no lesions  Vagina: normal, no lesions or ttp  Urethra: normal, no lesions, masses or ttp  Bladder: normal, no masses or ttp  Cervix: no lesions, IUD strings visible  Uterus: prolapse to introitus without valsalva, with valsalva cervix protrudes past introitus, easily reducible  Adnexa: no masses or ttp    Musculoskeletal:         General: Normal range of motion. Cervical back: Normal range of motion. Skin:     General: Skin is warm and dry. Neurological:      General: No focal deficit present. Psychiatric:         Mood and Affect: Mood normal.         Behavior: Behavior normal.         Thought Content: Thought content normal.             Pessary    Date/Time: 7/24/2023 3:30 PM    Performed by: Bettye Deng MD  Authorized by: Bettye Deng MD  Universal Protocol:  Consent: Verbal consent obtained. Risks and benefits: risks, benefits and alternatives were discussed  Consent given by: patient  Time out: Immediately prior to procedure a "time out" was called to verify the correct patient, procedure, equipment, support staff and site/side marked as required.   Patient understanding: patient states understanding of the procedure being performed  Required items: required blood products, implants, devices, and special equipment available  Patient identity confirmed: verbally with patient      Indication:     Indication for pessary: uterine prolapse    Pre-procedure:     Pessary procedure type:  Fitting  Problems:     Pessary complications: none    Procedure:     Pessary type:  Ring w/ support    Pessary size:  #3 (R2.5)    Patient tolerance of procedure:   Tolerated well, no immediate complications

## 2023-07-25 RX ORDER — SODIUM CHLORIDE 9 MG/ML
125 INJECTION, SOLUTION INTRAVENOUS CONTINUOUS
Status: CANCELLED | OUTPATIENT
Start: 2023-07-25

## 2023-07-26 ENCOUNTER — TELEPHONE (OUTPATIENT)
Dept: NEUROLOGY | Facility: CLINIC | Age: 48
End: 2023-07-26

## 2023-07-27 ENCOUNTER — ANESTHESIA (OUTPATIENT)
Dept: GASTROENTEROLOGY | Facility: MEDICAL CENTER | Age: 48
End: 2023-07-27

## 2023-07-27 ENCOUNTER — HOSPITAL ENCOUNTER (OUTPATIENT)
Dept: GASTROENTEROLOGY | Facility: MEDICAL CENTER | Age: 48
Setting detail: OUTPATIENT SURGERY
End: 2023-07-27
Payer: COMMERCIAL

## 2023-07-27 ENCOUNTER — ANESTHESIA EVENT (OUTPATIENT)
Dept: GASTROENTEROLOGY | Facility: MEDICAL CENTER | Age: 48
End: 2023-07-27

## 2023-07-27 VITALS
DIASTOLIC BLOOD PRESSURE: 71 MMHG | RESPIRATION RATE: 16 BRPM | OXYGEN SATURATION: 95 % | TEMPERATURE: 97.9 F | HEART RATE: 73 BPM | SYSTOLIC BLOOD PRESSURE: 111 MMHG

## 2023-07-27 DIAGNOSIS — K21.9 GASTROESOPHAGEAL REFLUX DISEASE, UNSPECIFIED WHETHER ESOPHAGITIS PRESENT: ICD-10-CM

## 2023-07-27 DIAGNOSIS — Z12.11 COLON CANCER SCREENING: ICD-10-CM

## 2023-07-27 LAB
EXT PREGNANCY TEST URINE: NEGATIVE
EXT. CONTROL: NORMAL

## 2023-07-27 PROCEDURE — 45380 COLONOSCOPY AND BIOPSY: CPT | Performed by: INTERNAL MEDICINE

## 2023-07-27 PROCEDURE — 45385 COLONOSCOPY W/LESION REMOVAL: CPT | Performed by: INTERNAL MEDICINE

## 2023-07-27 PROCEDURE — 88305 TISSUE EXAM BY PATHOLOGIST: CPT | Performed by: SPECIALIST

## 2023-07-27 PROCEDURE — 43239 EGD BIOPSY SINGLE/MULTIPLE: CPT | Performed by: INTERNAL MEDICINE

## 2023-07-27 PROCEDURE — 88313 SPECIAL STAINS GROUP 2: CPT | Performed by: SPECIALIST

## 2023-07-27 PROCEDURE — 88342 IMHCHEM/IMCYTCHM 1ST ANTB: CPT | Performed by: SPECIALIST

## 2023-07-27 PROCEDURE — 81025 URINE PREGNANCY TEST: CPT | Performed by: ANESTHESIOLOGY

## 2023-07-27 RX ORDER — SODIUM CHLORIDE 9 MG/ML
125 INJECTION, SOLUTION INTRAVENOUS CONTINUOUS
Status: DISCONTINUED | OUTPATIENT
Start: 2023-07-27 | End: 2023-07-31 | Stop reason: HOSPADM

## 2023-07-27 RX ORDER — PROPOFOL 10 MG/ML
INJECTION, EMULSION INTRAVENOUS AS NEEDED
Status: DISCONTINUED | OUTPATIENT
Start: 2023-07-27 | End: 2023-07-27

## 2023-07-27 RX ADMIN — PROPOFOL 50 MG: 10 INJECTION, EMULSION INTRAVENOUS at 12:35

## 2023-07-27 RX ADMIN — PROPOFOL 150 MG: 10 INJECTION, EMULSION INTRAVENOUS at 12:22

## 2023-07-27 RX ADMIN — PROPOFOL 50 MG: 10 INJECTION, EMULSION INTRAVENOUS at 12:30

## 2023-07-27 RX ADMIN — PROPOFOL 50 MG: 10 INJECTION, EMULSION INTRAVENOUS at 12:28

## 2023-07-27 RX ADMIN — SODIUM CHLORIDE 125 ML/HR: 0.9 INJECTION, SOLUTION INTRAVENOUS at 11:53

## 2023-07-27 NOTE — ANESTHESIA PREPROCEDURE EVALUATION
Procedure:  COLONOSCOPY  EGD    Relevant Problems   CARDIO   (+) Borderline hyperlipidemia   (+) Chronic migraine without aura without status migrainosus, not intractable   (+) Intractable chronic migraine without aura and with status migrainosus   (+) Intractable chronic migraine without aura and without status migrainosus      GI/HEPATIC   (+) GERD without esophagitis      /RENAL   (+) Nephrolithiasis      MUSCULOSKELETAL   (+) Fibromyalgia   (+) Impingement syndrome of left shoulder   (+) Osteoarthritis of carpometacarpal (CMC) joint of right thumb   (+) Trapezius muscle spasm      NEURO/PSYCH   (+) Chronic migraine without aura without status migrainosus, not intractable   (+) Depression with anxiety   (+) Fibromyalgia   (+) Intractable chronic migraine without aura and with status migrainosus   (+) Intractable chronic migraine without aura and without status migrainosus   (+) Post traumatic stress disorder (PTSD)      PULMONARY   (+) Mild intermittent asthma        Physical Exam    Airway    Mallampati score: I  TM Distance: >3 FB  Neck ROM: full     Dental   No notable dental hx     Cardiovascular      Pulmonary      Other Findings        Anesthesia Plan  ASA Score- 2     Anesthesia Type- IV sedation with anesthesia with ASA Monitors. Additional Monitors:   Airway Plan:           Plan Factors-Exercise tolerance (METS): >4 METS. Chart reviewed. Existing labs reviewed. Patient summary reviewed. Induction- intravenous. Postoperative Plan-     Informed Consent- Anesthetic plan and risks discussed with patient. I personally reviewed this patient with the CRNA. Discussed and agreed on the Anesthesia Plan with the CRNA. Gwendolyn Benítez

## 2023-07-27 NOTE — ANESTHESIA POSTPROCEDURE EVALUATION
Post-Op Assessment Note    CV Status:  Stable    Pain management: adequate     Mental Status:  Alert and awake   Hydration Status:  Euvolemic   PONV Controlled:  Controlled   Airway Patency:  Patent      Post Op Vitals Reviewed: Yes            No notable events documented.     BP      Temp      Pulse     Resp      SpO2      /71   Pulse 73   Temp 97.9 °F (36.6 °C) (Temporal)   Resp 16   SpO2 95%

## 2023-07-27 NOTE — INTERVAL H&P NOTE
H&P reviewed. After examining the patient I find no changes in the patients condition since the H&P had been written.     Vitals:    07/27/23 1152   BP: 111/72   Pulse: 88   Resp: 16   Temp: 97.9 °F (36.6 °C)   SpO2: 97%

## 2023-07-28 ENCOUNTER — PATIENT MESSAGE (OUTPATIENT)
Dept: NEUROLOGY | Facility: CLINIC | Age: 48
End: 2023-07-28

## 2023-08-01 PROCEDURE — 88342 IMHCHEM/IMCYTCHM 1ST ANTB: CPT | Performed by: SPECIALIST

## 2023-08-01 PROCEDURE — 88305 TISSUE EXAM BY PATHOLOGIST: CPT | Performed by: SPECIALIST

## 2023-08-01 PROCEDURE — 88313 SPECIAL STAINS GROUP 2: CPT | Performed by: SPECIALIST

## 2023-08-03 ENCOUNTER — PATIENT MESSAGE (OUTPATIENT)
Dept: NEUROLOGY | Facility: CLINIC | Age: 48
End: 2023-08-03

## 2023-08-04 ENCOUNTER — TELEPHONE (OUTPATIENT)
Dept: OBGYN CLINIC | Facility: CLINIC | Age: 48
End: 2023-08-04

## 2023-08-04 ENCOUNTER — HOSPITAL ENCOUNTER (OUTPATIENT)
Dept: INFUSION CENTER | Facility: HOSPITAL | Age: 48
End: 2023-08-04
Attending: PSYCHIATRY & NEUROLOGY
Payer: COMMERCIAL

## 2023-08-04 VITALS
RESPIRATION RATE: 18 BRPM | SYSTOLIC BLOOD PRESSURE: 119 MMHG | TEMPERATURE: 97.9 F | HEART RATE: 68 BPM | DIASTOLIC BLOOD PRESSURE: 76 MMHG

## 2023-08-04 DIAGNOSIS — G43.711 INTRACTABLE CHRONIC MIGRAINE WITHOUT AURA AND WITH STATUS MIGRAINOSUS: Primary | ICD-10-CM

## 2023-08-04 RX ORDER — SODIUM CHLORIDE 9 MG/ML
20 INJECTION, SOLUTION INTRAVENOUS ONCE
OUTPATIENT
Start: 2023-10-27

## 2023-08-04 RX ORDER — SODIUM CHLORIDE 9 MG/ML
20 INJECTION, SOLUTION INTRAVENOUS ONCE
Status: COMPLETED | OUTPATIENT
Start: 2023-08-04 | End: 2023-08-04

## 2023-08-04 RX ADMIN — EPTINEZUMAB-JJMR 300 MG: 100 INJECTION INTRAVENOUS at 09:43

## 2023-08-04 RX ADMIN — SODIUM CHLORIDE 20 ML/HR: 0.9 INJECTION, SOLUTION INTRAVENOUS at 09:16

## 2023-08-04 NOTE — TELEPHONE ENCOUNTER
Patient scheduled for September 18, aware she can call back if she is experiencing any pain or discomfort.

## 2023-08-06 ENCOUNTER — TELEPHONE (OUTPATIENT)
Dept: OTHER | Facility: OTHER | Age: 48
End: 2023-08-06

## 2023-08-06 ENCOUNTER — TELEPHONE (OUTPATIENT)
Dept: NEUROLOGY | Facility: CLINIC | Age: 48
End: 2023-08-06

## 2023-08-06 ENCOUNTER — DOCUMENTATION (OUTPATIENT)
Dept: NEUROLOGY | Facility: CLINIC | Age: 48
End: 2023-08-06

## 2023-08-06 DIAGNOSIS — G43.711 INTRACTABLE CHRONIC MIGRAINE WITHOUT AURA AND WITH STATUS MIGRAINOSUS: Primary | ICD-10-CM

## 2023-08-06 NOTE — TELEPHONE ENCOUNTER
Patient is feeling extremely fatigue and drosey since infusion on Friday not sure what to do .        Via tc

## 2023-08-09 NOTE — TELEPHONE ENCOUNTER
Called pt. States that she is still having a bit of fatigue and a bit of confusion. Confusion comes and goes, she has had this for years. .  Headache is also better now-4/5/10 stats that she can live with this. Symptoms are better. She is not sure if the symptoms were from the infusion. States that she typically feels tired after the infusion but this time it was worse. States that she can usually just lay down for a little bit and be ok. States that headaches was worse after infusion. Pain was so intense    Couldn't get out of bed until Monday. She did not take any medications that would make her feel the way she felt. She did states that she had colonoscopy on 7/27. She has been receiving 300mg dose of vyepti ever since she started vyepti. She is agreeable to continuing 300mg dose if you don't think that it is related to vyepti and if it occurs again than it may be related to vyepti. She also states that she used to take compazine but states it was stopped. She doesn't remember why but she found some this past weekend and took 1 and it did help with her migraine. Compazine really doesn't make her tired. She is asking if you can prescribe this for her again.     Please advise  [phone] to leave detailed message or ok to send [employer] message

## 2023-08-09 NOTE — TELEPHONE ENCOUNTER
August 7, 2023  Brianne Washington MD  to Caron Garrett MD • Genna Carlos PA-C • Neurology [facility] Clinical Team 4        8/7/23  8:24 AM  John Doherty, thanks. Certainly not a reaction I would expect from a Vyepti infusion. .. [address] team will you please check with her to get details on how she is feeling?     Mk...what do you think? Thanks!

## 2023-08-10 ENCOUNTER — TELEPHONE (OUTPATIENT)
Dept: NEUROLOGY | Facility: CLINIC | Age: 48
End: 2023-08-10

## 2023-08-10 ENCOUNTER — OFFICE VISIT (OUTPATIENT)
Dept: OBGYN CLINIC | Facility: CLINIC | Age: 48
End: 2023-08-10
Payer: COMMERCIAL

## 2023-08-10 VITALS — WEIGHT: 217 LBS | HEIGHT: 63 IN | BODY MASS INDEX: 38.45 KG/M2

## 2023-08-10 DIAGNOSIS — M77.8 RIGHT WRIST TENDONITIS: Primary | ICD-10-CM

## 2023-08-10 DIAGNOSIS — M18.11 ARTHRITIS OF CARPOMETACARPAL (CMC) JOINT OF RIGHT THUMB: ICD-10-CM

## 2023-08-10 DIAGNOSIS — M18.12 ARTHRITIS OF CARPOMETACARPAL (CMC) JOINT OF LEFT THUMB: ICD-10-CM

## 2023-08-10 PROCEDURE — 20600 DRAIN/INJ JOINT/BURSA W/O US: CPT | Performed by: SURGERY

## 2023-08-10 PROCEDURE — 20550 NJX 1 TENDON SHEATH/LIGAMENT: CPT | Performed by: SURGERY

## 2023-08-10 PROCEDURE — 99214 OFFICE O/P EST MOD 30 MIN: CPT | Performed by: SURGERY

## 2023-08-10 RX ORDER — TRIAMCINOLONE ACETONIDE 40 MG/ML
20 INJECTION, SUSPENSION INTRA-ARTICULAR; INTRAMUSCULAR
Status: COMPLETED | OUTPATIENT
Start: 2023-08-10 | End: 2023-08-10

## 2023-08-10 RX ORDER — LIDOCAINE HYDROCHLORIDE 10 MG/ML
1 INJECTION, SOLUTION INFILTRATION; PERINEURAL
Status: COMPLETED | OUTPATIENT
Start: 2023-08-10 | End: 2023-08-10

## 2023-08-10 RX ORDER — DEXAMETHASONE SODIUM PHOSPHATE 10 MG/ML
40 INJECTION, SOLUTION INTRAMUSCULAR; INTRAVENOUS
Status: COMPLETED | OUTPATIENT
Start: 2023-08-10 | End: 2023-08-10

## 2023-08-10 RX ADMIN — DEXAMETHASONE SODIUM PHOSPHATE 40 MG: 10 INJECTION, SOLUTION INTRAMUSCULAR; INTRAVENOUS at 13:00

## 2023-08-10 RX ADMIN — LIDOCAINE HYDROCHLORIDE 1 ML: 10 INJECTION, SOLUTION INFILTRATION; PERINEURAL at 13:00

## 2023-08-10 RX ADMIN — TRIAMCINOLONE ACETONIDE 20 MG: 40 INJECTION, SUSPENSION INTRA-ARTICULAR; INTRAMUSCULAR at 13:00

## 2023-08-10 NOTE — PROGRESS NOTES
ASSESSMENT/PLAN:      50year old female here for acute exacerbation of chronic bilateral thumb CMC arthritis  and new issue of ECU tendonitis  of right wrist. Conservative treatments were discussed with the patient that included localized cortisone injections to the bilateral thumb CMC, right ECU. She tolerated the injections well. Referral to OT to work on motion. We will follow up in 3 months. The patient verbalized understanding of exam findings and treatment plan. We engaged in the shared decision-making process and treatment options were discussed at length with the patient. Surgical and conservative management discussed today along with risks and benefits. Diagnoses and all orders for this visit:    Right wrist ECU tendonitis  -     Ambulatory Referral to PT/OT Hand Therapy; Future    Arthritis of carpometacarpal [place]) joint of left thumb    Arthritis of carpometacarpal (CMC) joint of right thumb    Other orders  -     Small joint arthrocentesis  -     Small joint arthrocentesis  -     Hand/upper extremity injection          Follow Up:  Return in about 3 months (around 11/10/2023) for bilateral hands. To Do Next Visit:  Re-evaluation of current issue    ____________________________________________________________________________________________________________________________________________      CHIEF COMPLAINT:  Chief Complaint   Patient presents with   • Follow-up     B/L CMC injections        SUBJECTIVE:  Janna Meredith is a 50y.o. year old RHD female who presents today for her bilateral thumb CMC joint pain and right ulnar wrist pain. She reports that the pain does go up to the lateral elbow. She reports that she doesn't recall what caused the pain in the right wrist.        I have personally reviewed all the relevant PMH, PSH, SH, FH, Medications and allergies.      PAST MEDICAL HISTORY:  Past Medical History:   Diagnosis Date   • Anxiety    • Asthma    • Claustrophobia    • Cluster headache • CTS (carpal tunnel syndrome)    • Depression    • Fibromyalgia     last assessed 11/27/17   • Fibromyalgia, primary    • GERD (gastroesophageal reflux disease)    • GERD without esophagitis     last assessed 10/12/17   • GI problem    • Headache, tension-type    • Irritable bowel syndrome    • Joint pain    • Kidney stone    • Lung nodule     last assessed 10/9/15   • Migraine    • Muscle pain    • Peripheral neuropathy    • Sleep apnea        PAST SURGICAL HISTORY:  Past Surgical History:   Procedure Laterality Date   • BACK SURGERY     • BARIATRIC SURGERY  8/9/2022   • CARPAL TUNNEL RELEASE Bilateral    • CHOLECYSTECTOMY     • CYSTOSCOPY     • GALLBLADDER SURGERY     • GASTRECTOMY  08/09/2022    Sleeve   • NECK SURGERY     • OK TENDON SHEATH INCISION Right 01/16/2023    Procedure: THUMB TRIGGER FINGER RELEASE;  Surgeon: Shahrzad Ocampo MD;  Location: [facility] MAIN OR;  Service: Orthopedics   • SACROILIAC JOINT FUSION Left    • SPINAL FUSION      C4 and C5   • ULNAR NERVE REPAIR Bilateral    • UPPER GASTROINTESTINAL ENDOSCOPY         FAMILY HISTORY:  Family History   Problem Relation Age of Onset   • Diabetes Mother    • Fibromyalgia Mother    • Ovarian cancer Mother    • Hypertension Mother    • Thyroid disease Mother    • Migraines Mother    • Neuropathy Mother    • Cancer Mother         has been removed   • No Known Problems Father    • Throat cancer Maternal Grandmother    • No Known Problems Maternal Grandfather    • No Known Problems Paternal Grandmother    • No Known Problems Paternal Grandfather    • No Known Problems Daughter    • Breast cancer Maternal Aunt    • No Known Problems Maternal Aunt    • No Known Problems Maternal Aunt    • No Known Problems Paternal Aunt    • Colon cancer Cousin    • Heart disease Cousin    • Lupus Cousin    • Arthritis Family    • Heart disease Family         cardiac disorder   • Neuropathy Family    • Lupus Family         systemic erythematosus   • No Known Problems Half-Sister    • No Known Problems Half-Brother    • No Known Problems Half-Brother    • No Known Problems Half-Brother    • Asthma Brother    • Stroke Neg Hx        SOCIAL HISTORY:  Social History     Tobacco Use   • Smoking status: Never   • Smokeless tobacco: Never   • Tobacco comments:     Never smoked. Vaping Use   • Vaping Use: Never used   Substance Use Topics   • Alcohol use: Yes     Alcohol/week: 2.0 standard drinks of alcohol     Types: 2 Glasses of wine per week     Comment: Occasionally   • Drug use: Yes     Types: Marijuana     Comment: medicinal       MEDICATIONS:    Current Outpatient Medications:   •  albuterol (PROVENTIL HFA,VENTOLIN HFA) 90 mcg/act inhaler, Inhale 2 puffs every 6 (six) hours as needed for wheezing or shortness of breath, Disp: 8 g, Rfl: 2  •  Botox 200 units SOLR, , Disp: , Rfl:   •  buPROPion (Wellbutrin XL) 150 mg 24 hr tablet, Take 1 tablet (150 mg total) by mouth every morning, Disp: 90 tablet, Rfl: 0  •  cholecalciferol (VITAMIN D3) 1,000 units tablet, Take 1 capsule by mouth once a week 3000 units daily , Disp: , Rfl:   •  cholestyramine (QUESTRAN) 4 GM/DOSE powder, Take 1 packet (4 g total) by mouth 2 (two) times a day with meals, Disp: 378 g, Rfl: 2  •  dexamethasone (DECADRON) 2 mg tablet, 1 tab qam with food prn migraine. , Disp: 5 tablet, Rfl: 0  •  Diclofenac Sodium (VOLTAREN) 1 %, Apply 2 g topically 4 (four) times a day, Disp: 100 g, Rfl: 2  •  divalproex sodium (DEPAKOTE) 250 mg EC tablet, 2 tabs q12 hours x 2 days, then stop. Repeat if needed. , Disp: 16 tablet, Rfl: 0  •  famotidine (PEPCID) 20 mg tablet, Take 1 tablet (20 mg total) by mouth daily at bedtime, Disp: 90 tablet, Rfl: 1  •  fluticasone (FLONASE) 50 mcg/act nasal spray, 1 spray in each nostril once daily, Disp: 30 mL, Rfl: 0  •  ketorolac (TORADOL) 10 mg tablet, Take 1 tablet (10 mg total) by mouth every 6 (six) hours as needed (migraine) Max 2-3 per week., Disp: 10 tablet, Rfl: 2  •  lasmiditan succinate (Reyvow) 50 mg tablet, One tab qhs at migraine onset. Caution sedation. Max 1 tab/24 hours. , Disp: 8 tablet, Rfl: 2  •  mexiletine (MEXITIL) 150 mg capsule, Take 150 mg by mouth 3 (three) times a day, Disp: , Rfl:   •  NON FORMULARY, Medical marijuana  , Disp: , Rfl:   •  Riboflavin (Vitamin B-2) 100 MG TABS, Take 400 mg by mouth daily, Disp: , Rfl:   •  tiZANidine (ZANAFLEX) 2 mg tablet, Take 1 tablet (2 mg total) by mouth every 8 (eight) hours as needed for muscle spasms, Disp: 90 tablet, Rfl: 1  •  Trudhesa 0.725 MG/ACT AERS, INSTILL ONE SPRAY INTO EACH NOSTRIL AS NEEDED AT ONSET OF MIGRAINE MAY REPEAT IN TWO HOURS MAX OF TWO DOSES PER DAY MAX OF THREE DOSES PER W, Disp: , Rfl:   •  ZOLMitriptan (ZOMIG-ZMT) 5 MG disintegrating tablet, Take 1 tablet (5 mg total) by mouth once as needed for migraine for up to 1 dose, Disp: 6 tablet, Rfl: 0  •  dicyclomine (BENTYL) 20 mg tablet, Take 1 tablet (20 mg total) by mouth 3 (three) times a day as needed (migraine/cramps), Disp: 60 tablet, Rfl: 0  •  esomeprazole (NexIUM) 40 MG capsule, Take 1 capsule (40 mg total) by mouth 2 (two) times a day before meals, Disp: 120 capsule, Rfl: 0  •  ketorolac (TORADOL) 30 mg/mL injection, 1-2 ml IM injection once per 24 hours p.r.n. migraine. No more than 2 injections per week.  (Patient not taking: Reported on 7/24/2023), Disp: 8 mL, Rfl: 0  •  levonorgestrel (MIRENA) 20 MCG/24HR IUD, 1 each by Intrauterine route once, Disp: , Rfl:   •  meclizine (ANTIVERT) 12.5 MG tablet, Take 1 tablet (12.5 mg total) by mouth 3 (three) times a day as needed for dizziness or nausea, Disp: 30 tablet, Rfl: 3  •  melatonin 3 mg, , Disp: , Rfl:   •  Melatonin ER 3 MG TBCR, 1-2 tabs qhs, Disp: 60 tablet, Rfl: 0  •  metoclopramide (REGLAN) 10 mg tablet, 1 tab q6 hours prn migraine (with toradol and benadryl), Disp: 20 tablet, Rfl: 0  •  polyethylene glycol (GLYCOLAX) 17 GM/SCOOP powder, Take 17 g by mouth daily (Patient not taking: Reported on 7/24/2023), Disp: 850 g, Rfl: 2  •  sodium picosulfate, magnesium oxide, citric acid (Clenpiq) oral solution, Follow office instructions (Patient not taking: Reported on 7/24/2023), Disp: 350 mL, Rfl: 0  •  Sodium Sulfate-Mag Sulfate-KCl (Sutab) 2201-355-042 MG TABS, Use as needed as instructed by office staff (Patient not taking: Reported on 7/24/2023), Disp: 24 tablet, Rfl: 0  •  Syringe/Needle, Disp, (SYRINGE 3CC/10OT6-8/2") 25G X 1-1/2" 3 ML MISC, Use for toradol IM injections. (Patient not taking: Reported on 7/24/2023), Disp: 10 each, Rfl: 0  •  traMADol (ULTRAM) 50 mg tablet, take one tablet at onset of moderate to severe headach, can repeat once in 8 hours. MAX 2 DAYS PER WEEK (Patient not taking: Reported on 7/24/2023), Disp: 10 tablet, Rfl: 0    ALLERGIES:  Allergies   Allergen Reactions   • Hydrocodone-Acetaminophen GI Intolerance     gi upset -pt okay if takes  zofran   • Hydrocodone-Acetaminophen GI Intolerance     gi upset -pt okay if takes  zofran  gi upset -pt okay if takes  zofran   • Codeine GI Intolerance and Other (See Comments)     GI issues   • Oxycodone-Acetaminophen GI Intolerance   • Penicillins GI Intolerance   • Mexiletine Rash       REVIEW OF SYSTEMS:  Review of Systems   Constitutional: Negative for chills, fever and unexpected weight change. HENT: Negative for hearing loss, nosebleeds and sore throat. Eyes: Negative for pain, redness and visual disturbance. Respiratory: Negative for cough, shortness of breath and wheezing. Cardiovascular: Negative for chest pain, palpitations and leg swelling. Gastrointestinal: Negative for abdominal pain and nausea. Genitourinary: Negative for dyspareunia, dysuria and frequency. Musculoskeletal: Positive for arthralgias. Skin: Negative for rash and wound. Neurological: Negative for dizziness, numbness and headaches. Psychiatric/Behavioral: Negative for decreased concentration and suicidal ideas. The patient is not nervous/anxious. VITALS:  Vitals:       LABS:  HgA1c:   Lab Results   Component Value Date    HGBA1C 5.7 (H) 06/22/2022     BMP:   Lab Results   Component Value Date    GLUCOSE 88 02/20/2015    CALCIUM 8.8 10/13/2021     02/20/2015    K 3.8 10/13/2021    CO2 25 10/13/2021     10/13/2021    BUN 11 10/13/2021    CREATININE 0.74 10/13/2021       _____________________________________________________  PHYSICAL EXAMINATION:  General: well developed and well nourished, alert, oriented times 3 and appears comfortable  Psychiatric: Normal  HEENT: Normocephalic, Atraumatic Trachea Midline, No torticollis  Pulmonary: No audible wheezing or respiratory distress   Cardiovascular: No pitting edema, 2+ radial pulse   Abdominal/GI: abdomen non tender, non distended   Skin: No masses, erythema, lacerations, fluctation, ulcerations  Neurovascular: Sensation Intact to the Median, Ulnar, Radial Nerve, Motor Intact to the Median, Ulnar, Radial Nerve and Pulses Intact  Musculoskeletal: Normal, except as noted in detailed exam and in HPI. MUSCULOSKELETAL EXAMINATION:    Bilateral CMC Exam:  No adduction contracture  No hyperextension deformity of MCP joint  Positive localized tenderness over radial and dorsal aspect of thumb (CMC joint)  Grind test is Positive for pain and Positive for crepitus ( on left)  Metacarpal load shift test positive  No triggering or tenderness over the A1 pulley  Negative pain with Finkelstein’s maneuver     + Tenderness at the ECU  + Synegry test      ___________________________________________________  STUDIES REVIEWED:    No images reviewed at today's visit        PROCEDURES PERFORMED:  Small joint arthrocentesis: L thumb CMC  [place] Protocol:  Consent: Verbal consent obtained.   Risks and benefits: risks, benefits and alternatives were discussed  Consent given by: patient  Time out: Immediately prior to procedure a "time out" was called to verify the correct patient, procedure, equipment, support staff and site/side marked as required. Timeout called at: 8/10/2023 1:34 PM.  Patient understanding: patient states understanding of the procedure being performed  Site marked: the operative site was marked  Patient identity confirmed: verbally with patient    Supporting Documentation  Indications: pain   Procedure Details  Location: thumb - L thumb CMC  Preparation: Patient was prepped and draped in the usual sterile fashion  Needle size: 25 G  Ultrasound guidance: no  Approach: volar  Medications administered: 1 mL lidocaine 1 %; 20 mg triamcinolone acetonide 40 mg/mL    Patient tolerance: patient tolerated the procedure well with no immediate complications  Dressing:  Sterile dressing applied    Small joint arthrocentesis: R thumb CMC  [place] Protocol:  Consent: Verbal consent obtained. Risks and benefits: risks, benefits and alternatives were discussed  Consent given by: patient  Time out: Immediately prior to procedure a "time out" was called to verify the correct patient, procedure, equipment, support staff and site/side marked as required. Timeout called at: 8/10/2023 1:35 PM.  Patient understanding: patient states understanding of the procedure being performed  Site marked: the operative site was marked  Supporting Documentation  Indications: pain   Procedure Details  Location: thumb - R thumb CMC  Preparation: Patient was prepped and draped in the usual sterile fashion  Needle size: 25 G  Ultrasound guidance: no  Approach: volar  Medications administered: 1 mL lidocaine 1 %; 20 mg triamcinolone acetonide 40 mg/mL    Patient tolerance: patient tolerated the procedure well with no immediate complications  Dressing:  Sterile dressing applied    Hand/upper extremity injection: R wrist  [place] Protocol:  Consent: Verbal consent obtained.   Risks and benefits: risks, benefits and alternatives were discussed  Consent given by: patient  Time out: Immediately prior to procedure a "time out" was called to verify the correct patient, procedure, equipment, support staff and site/side marked as required.   Timeout called at: 8/10/2023 1:35 PM.  Patient understanding: patient states understanding of the procedure being performed  Site marked: the operative site was marked  Supporting Documentation  Indications: tendon swelling and pain   Procedure Details  Condition:tendonitis Site: R wrist   Preparation: Patient was prepped and draped in the usual sterile fashion  Needle size: 25 G  Ultrasound guidance: no  Approach: volar  Medications administered: 1 mL lidocaine 1 %; 40 mg dexamethasone 100 mg/10 mL    Patient tolerance: patient tolerated the procedure well with no immediate complications  Dressing:  Sterile dressing applied         _____________________________________________________      Scribe Attestation    I,:  Narinder Bailey am acting as a scribe while in the presence of the attending physician.:       I,:  Supa Yanes MD personally performed the services described in this documentation    as scribed in my presence.:

## 2023-08-10 NOTE — TELEPHONE ENCOUNTER
Iesha Kelley has called to schedule pt's upcoming Botox Treatment and stated she has called several times and unable to schedule the delivery. She has asked that we call the speciality pharmacy back at [phone]. Patient's appt is on 8/24/2023, [facility]  [facility].     Thank you in advance,     Danyelle Rivera

## 2023-08-10 NOTE — TELEPHONE ENCOUNTER
Followed up w/ [facility] to schedule patients Botox delivery. (Please see separate telephone encounter for delivery note). Thank you.

## 2023-08-11 NOTE — TELEPHONE ENCOUNTER
Botox number of units: 200  Botox quantity: 1  Arrived at what location: [facility]  Botox at Correct Administering Location: yes  NDC number: 4921-6189-60  Lot number: [id number]  Expiration Date: 2/2026  Appt notes indicate correct medication: yes

## 2023-08-14 ENCOUNTER — OFFICE VISIT (OUTPATIENT)
Dept: NEUROLOGY | Facility: CLINIC | Age: 48
End: 2023-08-14
Payer: COMMERCIAL

## 2023-08-14 VITALS
HEART RATE: 80 BPM | WEIGHT: 217.6 LBS | DIASTOLIC BLOOD PRESSURE: 79 MMHG | SYSTOLIC BLOOD PRESSURE: 118 MMHG | TEMPERATURE: 98.4 F | HEIGHT: 63 IN | BODY MASS INDEX: 38.55 KG/M2

## 2023-08-14 DIAGNOSIS — G43.709 CHRONIC MIGRAINE WITHOUT AURA WITHOUT STATUS MIGRAINOSUS, NOT INTRACTABLE: ICD-10-CM

## 2023-08-14 PROCEDURE — 99214 OFFICE O/P EST MOD 30 MIN: CPT | Performed by: PHYSICIAN ASSISTANT

## 2023-08-14 RX ORDER — PROCHLORPERAZINE MALEATE 10 MG
10 TABLET ORAL EVERY 6 HOURS PRN
Qty: 30 TABLET | Refills: 0 | Status: SHIPPED | OUTPATIENT
Start: 2023-08-14

## 2023-08-14 RX ORDER — SYRINGE W-NEEDLE,DISPOSAB,3 ML 25GX5/8"
SYRINGE, EMPTY DISPOSABLE MISCELLANEOUS
Refills: 0 | Status: CANCELLED | OUTPATIENT
Start: 2023-08-14

## 2023-08-14 RX ORDER — KETOROLAC TROMETHAMINE 30 MG/ML
INJECTION, SOLUTION INTRAMUSCULAR; INTRAVENOUS
Qty: 4 ML | Refills: 0 | Status: SHIPPED | OUTPATIENT
Start: 2023-08-14

## 2023-08-14 NOTE — TELEPHONE ENCOUNTER
I sent the compazine. appts in august and sept, would like to make earlier appt to discuss fatigue. Isatu Gamboa- are there any earlier appt openings by chance? Thanks.
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
## 2023-08-14 NOTE — PATIENT INSTRUCTIONS
Headache management instructions  - When patient has a moderate to severe headache, they should seek rest, initiate relaxation and apply cold compresses to the head. - Maintain regular sleep schedule. Adults need at least 7-8 hours of uninterrupted a night. - Limit over the counter medications such as Tylenol, Ibuprofen, Aleve, Excedrin. (No more than 2- 3 times a week or max 10 a month). - Maintain headache diary. Free MATTHEW for a smart phone, which can be used is "Migraine courtney"  - Limit caffeine to 1-2 cups 8 to 16 oz a day or less. - Avoid dietary trigger. (aged cheese, peanuts, MSG, aspartame and nitrates). - Patient is to have regular frequent meals to prevent headache onset. - Please drink at least 64 ounces of water a day to help remain hydrated.

## 2023-08-15 ENCOUNTER — TELEPHONE (OUTPATIENT)
Dept: NEUROLOGY | Facility: CLINIC | Age: 48
End: 2023-08-15

## 2023-08-15 RX ORDER — ACETAZOLAMIDE 250 MG/1
TABLET ORAL
Qty: 90 TABLET | Refills: 0 | Status: SHIPPED | OUTPATIENT
Start: 2023-08-15

## 2023-08-15 NOTE — TELEPHONE ENCOUNTER
Patient left voicemail following up on medications ordered from 8/14/2023 visit.     Call returned to patient. States she was aware of orders for toradol and compazine but was under the impression MK was going to order another medication to start this morning and be taken every morning.     UYEN - I do not see mention of any other meds in note. Is there anything else you were wanting to order?

## 2023-08-24 ENCOUNTER — PROCEDURE VISIT (OUTPATIENT)
Dept: NEUROLOGY | Facility: CLINIC | Age: 48
End: 2023-08-24
Payer: COMMERCIAL

## 2023-08-24 ENCOUNTER — OFFICE VISIT (OUTPATIENT)
Dept: OBGYN CLINIC | Facility: CLINIC | Age: 48
End: 2023-08-24
Payer: COMMERCIAL

## 2023-08-24 ENCOUNTER — APPOINTMENT (OUTPATIENT)
Dept: RADIOLOGY | Facility: AMBULARY SURGERY CENTER | Age: 48
End: 2023-08-24
Attending: ORTHOPAEDIC SURGERY
Payer: COMMERCIAL

## 2023-08-24 VITALS
WEIGHT: 217 LBS | DIASTOLIC BLOOD PRESSURE: 84 MMHG | HEIGHT: 63 IN | TEMPERATURE: 98.1 F | BODY MASS INDEX: 38.45 KG/M2 | HEART RATE: 97 BPM | SYSTOLIC BLOOD PRESSURE: 137 MMHG

## 2023-08-24 VITALS — WEIGHT: 217 LBS | BODY MASS INDEX: 38.45 KG/M2 | HEIGHT: 63 IN

## 2023-08-24 DIAGNOSIS — M25.511 RIGHT SHOULDER PAIN, UNSPECIFIED CHRONICITY: ICD-10-CM

## 2023-08-24 DIAGNOSIS — G43.709 CHRONIC MIGRAINE WITHOUT AURA WITHOUT STATUS MIGRAINOSUS, NOT INTRACTABLE: Primary | ICD-10-CM

## 2023-08-24 DIAGNOSIS — M75.21 BICEPS TENDINITIS OF RIGHT SHOULDER: ICD-10-CM

## 2023-08-24 DIAGNOSIS — M25.511 RIGHT SHOULDER PAIN, UNSPECIFIED CHRONICITY: Primary | ICD-10-CM

## 2023-08-24 DIAGNOSIS — M75.51 BURSITIS OF RIGHT SHOULDER: ICD-10-CM

## 2023-08-24 PROCEDURE — 99214 OFFICE O/P EST MOD 30 MIN: CPT | Performed by: ORTHOPAEDIC SURGERY

## 2023-08-24 PROCEDURE — 20610 DRAIN/INJ JOINT/BURSA W/O US: CPT | Performed by: ORTHOPAEDIC SURGERY

## 2023-08-24 PROCEDURE — 64615 CHEMODENERV MUSC MIGRAINE: CPT | Performed by: PHYSICIAN ASSISTANT

## 2023-08-24 PROCEDURE — 73030 X-RAY EXAM OF SHOULDER: CPT

## 2023-08-24 RX ORDER — LIDOCAINE HYDROCHLORIDE 10 MG/ML
1 INJECTION, SOLUTION INFILTRATION; PERINEURAL
Status: COMPLETED | OUTPATIENT
Start: 2023-08-24 | End: 2023-08-24

## 2023-08-24 RX ORDER — METHYLPREDNISOLONE ACETATE 40 MG/ML
1 INJECTION, SUSPENSION INTRA-ARTICULAR; INTRALESIONAL; INTRAMUSCULAR; SOFT TISSUE
Status: COMPLETED | OUTPATIENT
Start: 2023-08-24 | End: 2023-08-24

## 2023-08-24 RX ORDER — BUPIVACAINE HYDROCHLORIDE 2.5 MG/ML
1 INJECTION, SOLUTION INFILTRATION; PERINEURAL
Status: COMPLETED | OUTPATIENT
Start: 2023-08-24 | End: 2023-08-24

## 2023-08-24 RX ADMIN — BUPIVACAINE HYDROCHLORIDE 1 ML: 2.5 INJECTION, SOLUTION INFILTRATION; PERINEURAL at 09:00

## 2023-08-24 RX ADMIN — LIDOCAINE HYDROCHLORIDE 1 ML: 10 INJECTION, SOLUTION INFILTRATION; PERINEURAL at 09:00

## 2023-08-24 RX ADMIN — METHYLPREDNISOLONE ACETATE 1 ML: 40 INJECTION, SUSPENSION INTRA-ARTICULAR; INTRALESIONAL; INTRAMUSCULAR; SOFT TISSUE at 09:00

## 2023-08-24 NOTE — PROGRESS NOTES
Universal Protocol   Consent: Verbal consent obtained. Written consent obtained.   Risks and benefits: risks, benefits and alternatives were discussed  Consent given by: patient  Patient understanding: patient states understanding of the procedure being performed  Patient consent: the patient's understanding of the procedure matches consent given  Procedure consent: procedure consent matches procedure scheduled        Chemodenervation     Date/Time 8/24/2023 1:00 PM     Performed by  Madeline Kruger PA-C   Authorized by Madeline Kruger PA-C       Pre-procedure details      Prepped With: Alcohol     Procedure details     Position:  Upright   Botox     Botox Type:  Type A    Brand:  Botox    mL's of Botulinum Toxin:  200    Final Concentration per CC:  100 units    Needle Gauge:  30 G 2.5 inch   Procedures     Botox Procedures: chronic headache      Indications: migraines     Injection Location      Head / Face:  L superior trapezius, R superior trapezius, L superior cervical paraspinal, R superior cervical paraspinal, L , R , procerus, L temporalis, R temporalis, R frontalis, L frontalis, R medial occipitalis and L medial occipitalis    L  injection amount:  5 unit(s)    R  injection amount:  5 unit(s)    L lateral frontalis:  5 unit(s)    R lateral frontalis:  5 unit(s)    L medial frontalis:  5 unit(s)    R medial frontalis:  5 unit(s)    L temporalis injection amount:  20 unit(s)    R temporalis injection amount:  20 unit(s)    Procerus injection amount:  5 unit(s)    L medial occipitalis injection amount:  15 unit(s)    R medial occipitalis injection amount:  15 unit(s)    L superior cervical paraspinal injection amount:  10 unit(s)    R superior cervical paraspinal injection amount:  10 unit(s)    L superior trapezius injection amount:  15 unit(s)    R superior trapezius injection amount:  15 unit(s)   Total Units     Total units used:  200    Total units discarded:  0 Post-procedure details      Chemodenervation:  Chronic migraine    Facial Nerve Location[de-identified]  Bilateral facial nerve    Patient tolerance of procedure: Tolerated well, no immediate complications   Comments      Extra units medically necessary:  - 10 between both upper traps  - 15 between both middle traps  - 10 R occipitalis  - 10 L occipitalis  Majority of headaches in the back of head. Blood pressure 137/84, pulse 97, temperature 98.1 °F (36.7 °C), temperature source Temporal, height 5' 3" (1.6 m), weight 98.4 kg (217 lb). A slight reduction of migraines with diamox 250 mg, continue this for a few weeks and will contact me if needing increase.

## 2023-08-24 NOTE — PROGRESS NOTES
Assessment:  1. Right shoulder pain, unspecified chronicity  XR shoulder 2+ vw right    Large joint arthrocentesis      2. Biceps tendinitis of right shoulder  Large joint arthrocentesis      3. Bursitis of right shoulder  Large joint arthrocentesis        Patient Active Problem List   Diagnosis   • Cervicalgia   • Chronic migraine without aura without status migrainosus, not intractable   • Saccadic eye movements   • Vertigo   • Allergic rhinitis   • Arthralgia of multiple joints   • Colon, diverticulosis   • Obesity, morbid, BMI 40.0-49.9 (Formerly [facility])   • Vitamin D deficiency   • GERD without esophagitis   • Foot swelling   • Irritable bowel syndrome with both constipation and diarrhea   • Fibromyalgia   • Depression with anxiety   • Borderline hyperlipidemia   • History of angioedema   • Intractable chronic migraine without aura and with status migrainosus   • Post traumatic stress disorder (PTSD)   • Sleep-disordered breathing   • Genital prolapse   • Abnormal Pap smear of cervix   • Vitamin B12 deficiency   • Mild intermittent asthma   • Intractable chronic migraine without aura and without status migrainosus   • Trapezius muscle spasm   • Cervical radiculopathy   • Impingement syndrome of left shoulder   • Internal derangement of left shoulder   • Nephrolithiasis   • Osteoarthritis of carpometacarpal (CMC) joint of right thumb   • Other insomnia   • COVID-19   • Morbid obesity (Formerly [facility])   • Trigger finger of right thumb   • Bilateral hip pain           Plan      50year old female with biceps tendonitis and acromioclavicular bursitis. · Treatment options were discussed including OTC analgesics, oral steroids, and injections  · Avoid overhead activities  · Patient was offered a corticosteroid injection for her right biceps tendon  · Follow up 2 weeks       Subjective:     Patient ID:    [address] 50 y.o. female      HPI    Patient comes in today with regards to right shoulder pain.   The patient reports that the pain is in the on top of her shoulder and has been going on for 3 months. The pain is rated at3 at its best and 7 at its worst.  The pain is described as dull and achy. It is worsened with lifting heavy objects and laying on that side, and is made better with rest.  The patient has taken resting for treatment. She had lifted a heavy bag off of her bed in June while she was packing for a vacation. She felt pain the next day. The following portions of the patient's history were reviewed and updated as appropriate: allergies, current medications, past family history, past social history, past surgical history and problem list.        Social History     Socioeconomic History   • Marital status:      Spouse name: Not on file   • Number of children: Not on file   • Years of education: Not on file   • Highest education level: Not on file   Occupational History   • Not on file   Tobacco Use   • Smoking status: Never   • Smokeless tobacco: Never   • Tobacco comments:     Never smoked. Vaping Use   • Vaping Use: Never used   Substance and Sexual Activity   • Alcohol use: Yes     Alcohol/week: 2.0 standard drinks of alcohol     Types: 2 Glasses of wine per week     Comment: Occasionally   • Drug use: Yes     Types: Marijuana     Comment: medicinal   • Sexual activity: Yes     Partners: Male     Birth control/protection: Abstinence, Condom Male, I.U.D.    Other Topics Concern   • Not on file   Social History Narrative    Daily cola, tea    No preference on religous beliefs     Social Determinants of Health     Financial Resource Strain: Medium Risk (2/24/2023)    Overall Financial Resource Strain (CARDIA)    • Difficulty of Paying Living Expenses: Somewhat hard   Food Insecurity: Food Insecurity Present (2/24/2023)    Hunger Vital Sign    • Worried About Running Out of Food in the Last Year: Sometimes true    • Ran Out of Food in the Last Year: Sometimes true   Transportation Needs: No Transportation Needs (2/24/2023)    PRAPARE - Transportation    • Lack of Transportation (Medical): No    • Lack of Transportation (Non-Medical): No   Physical Activity: Inactive (9/8/2021)    Exercise Vital Sign    • Days of Exercise per Week: 0 days    • Minutes of Exercise per Session: 0 min   Stress: Stress Concern Present (9/8/2021)    [address]    • Feeling of Stress : Rather much   Social Connections: Socially Isolated (9/8/2021)    Social Connection and Isolation Panel [NHANES]    • Frequency of Communication with Friends and Family:  Three times a week    • Frequency of Social Gatherings with Friends and Family: More than three times a week    • Attends Zoroastrian Services: Never    • Active Member of Clubs or Organizations: No    • Attends Club or Organization Meetings: Never    • Marital Status:    Intimate Partner Violence: Not At Risk (9/8/2021)    Humiliation, Afraid, Rape, and Kick questionnaire    • Fear of Current or Ex-Partner: No    • Emotionally Abused: No    • Physically Abused: No    • Sexually Abused: No   Housing Stability: Low Risk  (9/8/2021)    Housing Stability Vital Sign    • Unable to Pay for Housing in the Last Year: No    • Number of [address] in the Last Year: 1    • Unstable Housing in the Last Year: No     Past Medical History:   Diagnosis Date   • Anxiety    • Asthma    • Claustrophobia    • Cluster headache    • CTS (carpal tunnel syndrome)    • Depression    • Fibromyalgia     last assessed 11/27/17   • Fibromyalgia, primary    • GERD (gastroesophageal reflux disease)    • GERD without esophagitis     last assessed 10/12/17   • GI problem    • Headache, tension-type    • Irritable bowel syndrome    • Joint pain    • Kidney stone    • Lung nodule     last assessed 10/9/15   • Migraine    • Muscle pain    • Peripheral neuropathy    • Sleep apnea      Past Surgical History:   Procedure Laterality Date   • BACK SURGERY     • BARIATRIC SURGERY  8/9/2022   • CARPAL TUNNEL RELEASE Bilateral    • CHOLECYSTECTOMY     • CYSTOSCOPY     • GALLBLADDER SURGERY     • GASTRECTOMY  08/09/2022    Sleeve   • NECK SURGERY     • IA TENDON SHEATH INCISION Right 01/16/2023    Procedure: THUMB TRIGGER FINGER RELEASE;  Surgeon: Jez Bhardwaj MD;  Location: [facility] MAIN [facility];  Service: Orthopedics   • SACROILIAC JOINT FUSION Left    • SPINAL FUSION      C4 and C5   • ULNAR NERVE REPAIR Bilateral    • UPPER GASTROINTESTINAL ENDOSCOPY       Allergies   Allergen Reactions   • Hydrocodone-Acetaminophen GI Intolerance     gi upset -pt okay if takes  zofran   • Hydrocodone-Acetaminophen GI Intolerance     gi upset -pt okay if takes  zofran  gi upset -pt okay if takes  zofran   • Codeine GI Intolerance and Other (See Comments)     GI issues   • Oxycodone-Acetaminophen GI Intolerance   • Penicillins GI Intolerance   • Mexiletine Rash     Current Outpatient Medications on File Prior to Visit   Medication Sig Dispense Refill   • acetaZOLAMIDE (DIAMOX) 250 mg tablet 1 tab qam 90 tablet 0   • albuterol (PROVENTIL HFA,VENTOLIN HFA) 90 mcg/act inhaler Inhale 2 puffs every 6 (six) hours as needed for wheezing or shortness of breath 8 g 2   • Botox 200 units SOLR      • buPROPion (Wellbutrin XL) 150 mg 24 hr tablet Take 1 tablet (150 mg total) by mouth every morning 90 tablet 0   • cholecalciferol (VITAMIN D3) 1,000 units tablet Take 1 capsule by mouth once a week 3000 units daily      • cholestyramine (QUESTRAN) 4 GM/DOSE powder Take 1 packet (4 g total) by mouth 2 (two) times a day with meals 378 g 2   • dexamethasone (DECADRON) 2 mg tablet 1 tab qam with food prn migraine.  5 tablet 0   • Diclofenac Sodium (VOLTAREN) 1 % Apply 2 g topically 4 (four) times a day 100 g 2   • dicyclomine (BENTYL) 20 mg tablet Take 1 tablet (20 mg total) by mouth 3 (three) times a day as needed (migraine/cramps) 60 tablet 0   • divalproex sodium (DEPAKOTE) 250 mg EC tablet 2 tabs q12 hours x 2 days, then stop. Repeat if needed. 16 tablet 0   • esomeprazole (NexIUM) 40 MG capsule Take 1 capsule (40 mg total) by mouth 2 (two) times a day before meals 120 capsule 0   • famotidine (PEPCID) 20 mg tablet Take 1 tablet (20 mg total) by mouth daily at bedtime 90 tablet 1   • fluticasone (FLONASE) 50 mcg/act nasal spray 1 spray in each nostril once daily 30 mL 0   • ketorolac (TORADOL) 10 mg tablet Take 1 tablet (10 mg total) by mouth every 6 (six) hours as needed (migraine) Max 2-3 per week. 10 tablet 2   • ketorolac (TORADOL) 30 mg/mL injection 1-2 ml IM injection once per 24 hours p.r.n. migraine. No more than 2 injections per week. 4 mL 0   • lasmiditan succinate (Reyvow) 50 mg tablet One tab qhs at migraine onset. Caution sedation. Max 1 tab/24 hours.  8 tablet 2   • levonorgestrel (MIRENA) 20 MCG/24HR IUD 1 each by Intrauterine route once     • meclizine (ANTIVERT) 12.5 MG tablet Take 1 tablet (12.5 mg total) by mouth 3 (three) times a day as needed for dizziness or nausea 30 tablet 3   • melatonin 3 mg      • Melatonin ER 3 MG TBCR 1-2 tabs qhs 60 tablet 0   • mexiletine (MEXITIL) 150 mg capsule Take 150 mg by mouth 3 (three) times a day     • NON FORMULARY Medical marijuana       • polyethylene glycol (GLYCOLAX) 17 GM/SCOOP powder Take 17 g by mouth daily (Patient not taking: Reported on 7/24/2023) 850 g 2   • prochlorperazine (COMPAZINE) 10 mg tablet Take 1 tablet (10 mg total) by mouth every 6 (six) hours as needed for nausea or vomiting 30 tablet 0   • Riboflavin (Vitamin B-2) 100 MG TABS Take 400 mg by mouth daily     • sodium picosulfate, magnesium oxide, citric acid (Clenpiq) oral solution Follow office instructions (Patient not taking: Reported on 7/24/2023) 350 mL 0   • Sodium Sulfate-Mag Sulfate-KCl (Sutab) 1506-643-479 MG TABS Use as needed as instructed by office staff (Patient not taking: Reported on 7/24/2023) 24 tablet 0   • Syringe/Needle, Disp, (SYRINGE 3CC/18GS3-4/2") 25G X 1-1/2" 3 ML MISC Use for toradol IM injections. 10 each 0   • tiZANidine (ZANAFLEX) 2 mg tablet Take 1 tablet (2 mg total) by mouth every 8 (eight) hours as needed for muscle spasms 90 tablet 1   • Trudhesa 0.725 MG/ACT AERS INSTILL ONE SPRAY INTO EACH NOSTRIL AS NEEDED AT ONSET OF MIGRAINE MAY REPEAT IN TWO HOURS MAX OF TWO DOSES PER DAY MAX OF THREE DOSES PER W     • ZOLMitriptan (ZOMIG-ZMT) 5 MG disintegrating tablet Take 1 tablet (5 mg total) by mouth once as needed for migraine for up to 1 dose 6 tablet 0     No current facility-administered medications on file prior to visit. Objective:    Review of Systems    Right Shoulder Exam     Tenderness   The patient is experiencing tenderness in the acromioclavicular joint and biceps tendon. Range of Motion   Active abduction:  160 normal   External rotation: 60   Forward flexion: 170   Internal rotation 0 degrees: T10     Muscle Strength   Abduction: 5/5   Internal rotation: 5/5   External rotation: 5/5   Supraspinatus: 5/5   Subscapularis: 5/5   Biceps: 5/5     Other   Erythema: absent  Scars: absent  Sensation: normal    Comments:  Empty can (-)  [employer] (+) for pain  Speeds (+)  Neers (-)      Left Shoulder Exam     Tenderness   The patient is experiencing no tenderness. Range of Motion   Active abduction: 170   External rotation: 60   Forward flexion: 170   Internal rotation 0 degrees: T10     Muscle Strength   Abduction: 5/5   Internal rotation: 5/5   External rotation: 5/5   Supraspinatus: 5/5   Subscapularis: 5/5   Biceps: 5/5     Other   Erythema: absent  Scars: absent             Physical Exam    Large joint arthrocentesis  Universal Protocol:  Consent: Verbal consent obtained.   Risks and benefits: risks, benefits and alternatives were discussed  Consent given by: patient  Time out: Immediately prior to procedure a "time out" was called to verify the correct patient, procedure, equipment, support staff and site/side marked as required. Patient understanding: patient states understanding of the procedure being performed  Site marked: the operative site was marked  Patient identity confirmed: verbally with patient    Supporting Documentation  Indications: pain   Procedure Details  Location: shoulder -   Preparation: Patient was prepped and draped in the usual sterile fashion  Needle size: 22 G  Ultrasound guidance: no  Approach: anterior  Medications administered: 1 mL bupivacaine 0.25 %; 1 mL lidocaine 1 %; 1 mL methylPREDNISolone acetate 40 mg/mL    Patient tolerance: patient tolerated the procedure well with no immediate complications  Dressing:  Sterile dressing applied          . maximo  I have personally reviewed pertinent films in PACS. Xray of left shoulder taken 8/24/23 demonstrate mild degenerative changes of acromioclavicular joint. Portions of the record may have been created with voice recognition software.  Occasional wrong word or "sound a like" substitutions may have occurred due to the inherent limitations of voice recognition software.  Read the chart carefully and recognize, using context, where substitutions have occurred.

## 2023-08-25 DIAGNOSIS — G43.711 INTRACTABLE CHRONIC MIGRAINE WITHOUT AURA AND WITH STATUS MIGRAINOSUS: Primary | ICD-10-CM

## 2023-08-30 ENCOUNTER — HOSPITAL ENCOUNTER (EMERGENCY)
Facility: HOSPITAL | Age: 48
Discharge: HOME | End: 2023-08-30
Attending: EMERGENCY MEDICINE
Payer: COMMERCIAL

## 2023-08-30 ENCOUNTER — APPOINTMENT (EMERGENCY)
Dept: RADIOLOGY | Facility: HOSPITAL | Age: 48
End: 2023-08-30
Payer: COMMERCIAL

## 2023-08-30 VITALS
DIASTOLIC BLOOD PRESSURE: 71 MMHG | HEART RATE: 74 BPM | OXYGEN SATURATION: 98 % | RESPIRATION RATE: 18 BRPM | SYSTOLIC BLOOD PRESSURE: 117 MMHG | TEMPERATURE: 97.6 F

## 2023-08-30 DIAGNOSIS — N20.0 NEPHROLITHIASIS: Primary | ICD-10-CM

## 2023-08-30 LAB
AMORPH URATE CRY URNS QL MICRO: ABNORMAL
ANION GAP SERPL CALCULATED.3IONS-SCNC: 4 MMOL/L
ATRIAL RATE: 70 BPM
BACTERIA UR QL AUTO: ABNORMAL /HPF
BILIRUB UR QL STRIP: NEGATIVE
BUN SERPL-MCNC: 14 MG/DL (ref 5–25)
CALCIUM SERPL-MCNC: 8.9 MG/DL (ref 8.4–10.2)
CHLORIDE SERPL-SCNC: 104 MMOL/L (ref 96–108)
CLARITY UR: ABNORMAL
CO2 SERPL-SCNC: 31 MMOL/L (ref 21–32)
COLOR UR: YELLOW
CREAT SERPL-MCNC: 0.68 MG/DL (ref 0.6–1.3)
EXT PREGNANCY TEST URINE: NEGATIVE
EXT. CONTROL: NORMAL
GFR SERPL CREATININE-BSD FRML MDRD: 103 ML/MIN/1.73SQ M
GLUCOSE SERPL-MCNC: 88 MG/DL (ref 65–140)
GLUCOSE UR STRIP-MCNC: NEGATIVE MG/DL
HGB UR QL STRIP.AUTO: ABNORMAL
KETONES UR STRIP-MCNC: NEGATIVE MG/DL
LEUKOCYTE ESTERASE UR QL STRIP: NEGATIVE
MUCOUS THREADS UR QL AUTO: ABNORMAL
NITRITE UR QL STRIP: NEGATIVE
NON-SQ EPI CELLS URNS QL MICRO: ABNORMAL /HPF
P AXIS: 26 DEGREES
PH UR STRIP.AUTO: 7 [PH]
POTASSIUM SERPL-SCNC: 3.6 MMOL/L (ref 3.5–5.3)
PR INTERVAL: 176 MS
PROT UR STRIP-MCNC: NEGATIVE MG/DL
QRS AXIS: 65 DEGREES
QRSD INTERVAL: 80 MS
QT INTERVAL: 358 MS
QTC INTERVAL: 386 MS
RBC #/AREA URNS AUTO: ABNORMAL /HPF
SODIUM SERPL-SCNC: 139 MMOL/L (ref 135–147)
SP GR UR STRIP.AUTO: 1.02 (ref 1–1.03)
T WAVE AXIS: 34 DEGREES
UROBILINOGEN UR STRIP-ACNC: 4 MG/DL
VENTRICULAR RATE: 70 BPM
WBC #/AREA URNS AUTO: ABNORMAL /HPF

## 2023-08-30 PROCEDURE — 93005 ELECTROCARDIOGRAM TRACING: CPT

## 2023-08-30 PROCEDURE — 99285 EMERGENCY DEPT VISIT HI MDM: CPT | Performed by: EMERGENCY MEDICINE

## 2023-08-30 PROCEDURE — 80048 BASIC METABOLIC PNL TOTAL CA: CPT

## 2023-08-30 PROCEDURE — G1004 CDSM NDSC: HCPCS

## 2023-08-30 PROCEDURE — 96374 THER/PROPH/DIAG INJ IV PUSH: CPT

## 2023-08-30 PROCEDURE — 36415 COLL VENOUS BLD VENIPUNCTURE: CPT

## 2023-08-30 PROCEDURE — 81025 URINE PREGNANCY TEST: CPT

## 2023-08-30 PROCEDURE — 81001 URINALYSIS AUTO W/SCOPE: CPT

## 2023-08-30 PROCEDURE — 93010 ELECTROCARDIOGRAM REPORT: CPT | Performed by: INTERNAL MEDICINE

## 2023-08-30 PROCEDURE — 99284 EMERGENCY DEPT VISIT MOD MDM: CPT

## 2023-08-30 PROCEDURE — 74177 CT ABD & PELVIS W/CONTRAST: CPT

## 2023-08-30 RX ORDER — MELOXICAM 7.5 MG/1
7.5 TABLET ORAL DAILY
Qty: 7 TABLET | Refills: 0 | Status: SHIPPED | OUTPATIENT
Start: 2023-08-30 | End: 2023-09-06

## 2023-08-30 RX ORDER — KETOROLAC TROMETHAMINE 30 MG/ML
15 INJECTION, SOLUTION INTRAMUSCULAR; INTRAVENOUS ONCE
Status: COMPLETED | OUTPATIENT
Start: 2023-08-30 | End: 2023-08-30

## 2023-08-30 RX ADMIN — KETOROLAC TROMETHAMINE 15 MG: 30 INJECTION, SOLUTION INTRAMUSCULAR; INTRAVENOUS at 08:33

## 2023-08-30 RX ADMIN — IOHEXOL 50 ML: 240 INJECTION, SOLUTION INTRATHECAL; INTRAVASCULAR; INTRAVENOUS; ORAL at 09:00

## 2023-08-30 RX ADMIN — IOHEXOL 100 ML: 350 INJECTION, SOLUTION INTRAVENOUS at 11:48

## 2023-08-30 NOTE — DISCHARGE INSTRUCTIONS
To the emergency department for abdominal pain. CT showed that you have a kidney stone that is 4 mm in size and will likely pass on its own. Please keep your appointment with urology.

## 2023-08-30 NOTE — ED ATTENDING ATTESTATION
8/30/2023  IRodrick DO, saw and evaluated the patient. I have discussed the patient with the resident/non-physician practitioner and agree with the resident's/non-physician practitioner's findings, Plan of Care, and MDM as documented in the resident's/non-physician practitioner's note, except where noted. All available labs and Radiology studies were reviewed. I was present for key portions of any procedure(s) performed by the resident/non-physician practitioner and I was immediately available to provide assistance. At this point I agree with the current assessment done in the Emergency Department. I have conducted an independent evaluation of this patient a history and physical is as follows:    Chief Complaint   Patient presents with   • Flank Pain     Pt reports upper L sided abdominal pain and L flank pain starting Monday and radiating into pelvis, denies n/v      77-year-old female presents with left flank pain that patient states feels same as previous kidney stones. Denies nausea or vomiting, no fevers. Has had stones in the past.  No dysuria. Patient has history of gastric bypass surgery a year ago. On exam-no acute distress, heart regular, no respiratory distress, abdomen soft with tenderness in the left side. Plan-CT abdomen, check labs. We will also check urine to rule out infection.   Concern for kidney stone but given patient's history of abdominal surgery we will do CT abdomen with contrast.    ED Course         Critical Care Time  Procedures

## 2023-08-31 NOTE — ED PROVIDER NOTES
Final Diagnoses:     1. Nephrolithiasis         Nursing Triage:     Chief Complaint   Patient presents with   • Flank Pain     Pt reports upper L sided abdominal pain and L flank pain starting Monday and radiating into pelvis, denies n/v      HPI:   This is a 50 y.o. female presenting for evaluation of abdominal pain. Relevant past medical history of anxiety, headaches, carpal tunnel, fibromyalgia, GERD, headache, IBS, nephrolithiasis, peripheral neuropathy. Patient states that she has been having colicky left flank pain that radiates into her left side of her abdomen. Patient states that it feels similar to when she has had stones in the past.  Patient denies any dysuria or gross hematuria. Patient denies any vaginal discharge or bleeding. Patient states that she had a gastric sleeve done a year ago. ASSESSMENT + PLAN:   Kidney stone versus gastric sleeve anastomotic leak versus gastritis versus UTI  Physical:   Pertinent: Left-sided abdominal tenderness, no left-sided flank pain    General: VS reviewed  Appears in NAD  awake, alert. Well-nourished, well-developed. Appears stated age. Speaking normally in full sentences. Head: Normocephalic, atraumatic  Eyes: EOM-I. No diplopia. No hyphema. No subconjunctival hemorrhages. Symmetrical lids. ENT: Atraumatic external nose and ears. MMM  No malocclusion. No stridor. Normal phonation. No drooling. Normal swallowing. Neck: No JVD. CV: Regular rate and rhythm, no murmurs rubs or gallops, no pallor noted  Lungs:   No tachypnea  No respiratory distress  Abd: soft nd no rebound/guarding  MSK:   FROM spontaneously  Skin: Dry, intact. Neuro: Awake, alert, GCS15, CN II-XII grossly intact. Motor grossly intact.       Vitals:    08/30/23 0748 08/30/23 1207   BP: 134/87 117/71   BP Location: Left arm Right arm   Pulse: 77 74   Resp: 18 18   Temp: 97.6 °F (36.4 °C)    TempSrc: Tympanic    SpO2: 98% 98%     - There are no obvious limitations to social determinants of care. - Nursing note reviewed. - Vitals reviewed. - Orders placed by myself and/or advanced practitioner / resident.    - Previous chart was reviewed  - No language barrier.   - History obtained from patient. - There are no limitations to the history obtained:     Past Medical:    has a past medical history of Anxiety, Asthma, Claustrophobia, Cluster headache, CTS (carpal tunnel syndrome), Depression, Fibromyalgia, Fibromyalgia, primary, GERD (gastroesophageal reflux disease), GERD without esophagitis, GI problem, Headache, tension-type, Irritable bowel syndrome, Joint pain, Kidney stone, Lung nodule, Migraine, Muscle pain, Peripheral neuropathy, and Sleep apnea. Past Surgical:    has a past surgical history that includes Carpal tunnel release (Bilateral); Ulnar nerve repair (Bilateral); Spinal fusion; Cholecystectomy; Back surgery; Gallbladder surgery; Neck surgery; Upper gastrointestinal endoscopy; Cystoscopy; Gastrectomy (08/09/2022); pr tendon sheath incision (Right, 01/16/2023); SACROILIAC JOINT FUSION (Left); and Bariatric Surgery (8/9/2022). Social:     Social History     Substance and Sexual Activity   Alcohol Use Yes   • Alcohol/week: 2.0 standard drinks of alcohol   • Types: 2 Glasses of wine per week    Comment: Occasionally     Social History     Tobacco Use   Smoking Status Never   Smokeless Tobacco Never   Tobacco Comments    Never smoked. Social History     Substance and Sexual Activity   Drug Use Yes   • Types: Marijuana    Comment: medicinal       Echo:   No results found for this or any previous visit. No results found for this or any previous visit. Cath:    No results found for this or any previous visit.       Code Status: Prior  Advance Directive and Living Will:      Power of :    POLST:    Medications   ketorolac (TORADOL) injection 15 mg (15 mg Intravenous Given 8/30/23 0833)   iohexol (OMNIPAQUE) 240 MG/ML solution 50 mL (50 mL Oral Given 8/30/23 0900)   iohexol (OMNIPAQUE) 350 MG/ML injection (SINGLE-DOSE) 100 mL (100 mL Intravenous Given 8/30/23 1148)     CT abdomen pelvis with contrast   Final Result      A 4 mm distal left ureteral calculus causes minimal upstream hydroureteronephrosis.             Workstation performed: [id number]           Orders Placed This Encounter   Procedures   • CT abdomen pelvis with contrast   • Basic metabolic panel   • UA w Reflex to Microscopic w Reflex to Culture   • Urine Microscopic   • POCT pregnancy, urine   • ECG 12 lead     Labs Reviewed   UA W REFLEX TO MICROSCOPIC WITH REFLEX TO CULTURE - Abnormal       Result Value Ref Range Status    Color, UA Yellow   Final    Clarity, UA Turbid   Final    Specific Gravity, UA 1.016  1.003 - 1.030 Final    pH, UA 7.0  4.5, 5.0, 5.5, 6.0, 6.5, 7.0, 7.5, 8.0 Final    Leukocytes, UA Negative  Negative Final    Nitrite, UA Negative  Negative Final    Protein, UA Negative  Negative mg/dl Final    Glucose, UA Negative  Negative mg/dl Final    Ketones, UA Negative  Negative mg/dl Final    Urobilinogen, UA 4.0 (*) <2.0 mg/dl mg/dl Final    Bilirubin, UA Negative  Negative Final    Occult Blood, UA Moderate (*) Negative Final   URINE MICROSCOPIC - Abnormal    RBC, UA Innumerable (*) None Seen, 1-2 /hpf Final    WBC, UA 1-2  None Seen, 1-2 /hpf Final    Epithelial Cells Occasional  None Seen, Occasional /hpf Final    Bacteria, UA Occasional  None Seen, Occasional /hpf Final    MUCUS THREADS Occasional (*) None Seen Final    Amorphous Crystals, UA Moderate   Final   POCT PREGNANCY, URINE - Normal    EXT Preg Test, Ur Negative   Final    Control Valid   Final   BASIC METABOLIC PANEL    Sodium 666  135 - 147 mmol/L Final    Potassium 3.6  3.5 - 5.3 mmol/L Final    Chloride 104  96 - 108 mmol/L Final    CO2 31  21 - 32 mmol/L Final    ANION GAP 4  mmol/L Final    BUN 14  5 - 25 mg/dL Final    Creatinine 0.68  0.60 - 1.30 mg/dL Final    Comment: Standardized to IDMS reference method Glucose 88  65 - 140 mg/dL Final    Comment: If the patient is fasting, the ADA then defines impaired fasting glucose as > 100 mg/dL and diabetes as > or equal to 123 mg/dL. Calcium 8.9  8.4 - 10.2 mg/dL Final    eGFR 103  ml/min/1.73sq m Final    Narrative:     [facility] guidelines for Chronic Kidney Disease (CKD):   •  Stage 1 with normal or high GFR (GFR > 90 mL/min/1.73 square meters)  •  Stage 2 Mild CKD (GFR = 60-89 mL/min/1.73 square meters)  •  Stage 3A Moderate CKD (GFR = 45-59 mL/min/1.73 square meters)  •  Stage 3B Moderate CKD (GFR = 30-44 mL/min/1.73 square meters)  •  Stage 4 Severe CKD (GFR = 15-29 mL/min/1.73 square meters)  •  Stage 5 End Stage CKD (GFR <15 mL/min/1.73 square meters)  Note: GFR calculation is accurate only with a steady state creatinine     Time reflects when diagnosis was documented in both MDM as applicable and the Disposition within this note     Time User Action Codes Description Comment    8/30/2023  1:08 PM Nolan Poole Add [N20.0] Nephrolithiasis       ED Disposition     ED Disposition   Discharge    Condition   Stable    Date/Time   Wed Aug 30, 2023  1:08 PM    Bebo0 Basilio Berumen discharge to home/self care.                Follow-up Information    None       Discharge Medication List as of 8/30/2023  1:10 PM      START taking these medications    Details   meloxicam (Mobic) 7.5 mg tablet Take 1 tablet (7.5 mg total) by mouth daily for 7 days, Starting Wed 8/30/2023, Until Wed 9/6/2023, Normal         CONTINUE these medications which have NOT CHANGED    Details   acetaZOLAMIDE (DIAMOX) 250 mg tablet 1 tab qam, Normal      albuterol (PROVENTIL HFA,VENTOLIN HFA) 90 mcg/act inhaler Inhale 2 puffs every 6 (six) hours as needed for wheezing or shortness of breath, Starting Fri 2/24/2023, Normal      Botox 200 units SOLR Starting Fri 1/7/2022, Historical Med      buPROPion (Wellbutrin XL) 150 mg 24 hr tablet Take 1 tablet (150 mg total) by mouth every morning, Starting Fri 6/23/2023, Normal      cholecalciferol (VITAMIN D3) 1,000 units tablet Take 1 capsule by mouth once a week 3000 units daily , Starting Thu 4/13/2017, Historical Med      cholestyramine (QUESTRAN) 4 GM/DOSE powder Take 1 packet (4 g total) by mouth 2 (two) times a day with meals, Starting Tue 12/6/2022, Normal      dexamethasone (DECADRON) 2 mg tablet 1 tab qam with food prn migraine., Normal      Diclofenac Sodium (VOLTAREN) 1 % Apply 2 g topically 4 (four) times a day, Starting Fri 2/3/2023, Normal      dicyclomine (BENTYL) 20 mg tablet Take 1 tablet (20 mg total) by mouth 3 (three) times a day as needed (migraine/cramps), Starting Mon 6/20/2022, Until Mon 8/14/2023 at 2359, Normal      divalproex sodium (DEPAKOTE) 250 mg EC tablet 2 tabs q12 hours x 2 days, then stop. Repeat if needed., Normal      esomeprazole (NexIUM) 40 MG capsule Take 1 capsule (40 mg total) by mouth 2 (two) times a day before meals, Starting Fri 2/24/2023, Until Mon 8/14/2023, Normal      famotidine (PEPCID) 20 mg tablet Take 1 tablet (20 mg total) by mouth daily at bedtime, Starting Mon 7/10/2023, Until Sun 10/8/2023, Normal      fluticasone (FLONASE) 50 mcg/act nasal spray 1 spray in each nostril once daily, Normal      ketorolac (TORADOL) 10 mg tablet Take 1 tablet (10 mg total) by mouth every 6 (six) hours as needed (migraine) Max 2-3 per week., Starting Mon 5/22/2023, Normal      ketorolac (TORADOL) 30 mg/mL injection 1-2 ml IM injection once per 24 hours p.r.n. migraine. No more than 2 injections per week., Normal      lasmiditan succinate (Reyvow) 50 mg tablet One tab qhs at migraine onset. Caution sedation. Max 1 tab/24 hours. , Normal      levonorgestrel (MIRENA) 20 MCG/24HR IUD 1 each by Intrauterine route once, Historical Med      meclizine (ANTIVERT) 12.5 MG tablet Take 1 tablet (12.5 mg total) by mouth 3 (three) times a day as needed for dizziness or nausea, Starting Thu 6/15/2023, Normal melatonin 3 mg Starting 2023, Historical Med      Melatonin ER 3 MG TBCR 1-2 tabs qhs, Normal      NON FORMULARY Medical marijuana  , Historical Med      polyethylene glycol (GLYCOLAX) 17 GM/SCOOP powder Take 17 g by mouth daily, Starting Mon 7/10/2023, Normal      prochlorperazine (COMPAZINE) 10 mg tablet Take 1 tablet (10 mg total) by mouth every 6 (six) hours as needed for nausea or vomiting, Starting 2023, Normal      Riboflavin (Vitamin B-2) 100 MG TABS Take 400 mg by mouth daily, Starting 2022, Historical Med      sodium picosulfate, magnesium oxide, citric acid (Clenpiq) oral solution Follow office instructions, Normal      Sodium Sulfate-Mag Sulfate-KCl (Sutab) 4667-545-489 MG TABS Use as needed as instructed by office staff, Normal      Syringe/Needle, Disp, (SYRINGE 3CC/58NR6-4/2") 25G X 1-1/2" 3 ML MISC Use for toradol IM injections. , Normal      tiZANidine (ZANAFLEX) 2 mg tablet Take 1 tablet (2 mg total) by mouth every 8 (eight) hours as needed for muscle spasms, Starting Mon 3/8/2021, Normal      Trudhesa 0.725 MG/ACT AERS INSTILL ONE SPRAY INTO EACH NOSTRIL AS NEEDED AT ONSET OF MIGRAINE MAY REPEAT IN TWO HOURS MAX OF TWO DOSES PER DAY MAX OF THREE DOSES PER W, Historical Med      ZOLMitriptan (ZOMIG-ZMT) 5 MG disintegrating tablet Take 1 tablet (5 mg total) by mouth once as needed for migraine for up to 1 dose, Starting 2022, Normal           No discharge procedures on file. Prior to Admission Medications   Prescriptions Last Dose Informant Patient Reported? Taking?    Botox 200 units SOLR   Yes No   Diclofenac Sodium (VOLTAREN) 1 %   No No   Sig: Apply 2 g topically 4 (four) times a day   Melatonin ER 3 MG TBCR   No No   Si-2 tabs qhs   NON FORMULARY  Self Yes No   Sig: Medical marijuana     Riboflavin (Vitamin B-2) 100 MG TABS   Yes No   Sig: Take 400 mg by mouth daily   Sodium Sulfate-Mag Sulfate-KCl (Sutab) 5588-051-345 MG TABS   No No   Sig: Use as needed as instructed by office staff   Patient not taking: Reported on 2023   Syringe/Needle, Disp, (SYRINGE 3CC/99QS3-0/2") 25G X 1-1/2" 3 ML MISC   No No   Sig: Use for toradol IM injections. Trudhesa 0.725 MG/ACT AERS   Yes No   Sig: INSTILL ONE SPRAY INTO EACH NOSTRIL AS NEEDED AT ONSET OF MIGRAINE MAY REPEAT IN TWO HOURS MAX OF TWO DOSES PER DAY MAX OF THREE DOSES PER W   ZOLMitriptan (ZOMIG-ZMT) 5 MG disintegrating tablet   No No   Sig: Take 1 tablet (5 mg total) by mouth once as needed for migraine for up to 1 dose   acetaZOLAMIDE (DIAMOX) 250 mg tablet   No No   Si tab qam   albuterol (PROVENTIL HFA,VENTOLIN HFA) 90 mcg/act inhaler   No No   Sig: Inhale 2 puffs every 6 (six) hours as needed for wheezing or shortness of breath   buPROPion (Wellbutrin XL) 150 mg 24 hr tablet   No No   Sig: Take 1 tablet (150 mg total) by mouth every morning   cholecalciferol (VITAMIN D3) 1,000 units tablet  Self Yes No   Sig: Take 1 capsule by mouth once a week 3000 units daily    cholestyramine (QUESTRAN) 4 GM/DOSE powder   No No   Sig: Take 1 packet (4 g total) by mouth 2 (two) times a day with meals   dexamethasone (DECADRON) 2 mg tablet   No No   Si tab qam with food prn migraine. dicyclomine (BENTYL) 20 mg tablet   No No   Sig: Take 1 tablet (20 mg total) by mouth 3 (three) times a day as needed (migraine/cramps)   divalproex sodium (DEPAKOTE) 250 mg EC tablet   No No   Si tabs q12 hours x 2 days, then stop. Repeat if needed.    esomeprazole (NexIUM) 40 MG capsule   No No   Sig: Take 1 capsule (40 mg total) by mouth 2 (two) times a day before meals   famotidine (PEPCID) 20 mg tablet   No No   Sig: Take 1 tablet (20 mg total) by mouth daily at bedtime   fluticasone (FLONASE) 50 mcg/act nasal spray   No No   Si spray in each nostril once daily   ketorolac (TORADOL) 10 mg tablet   No No   Sig: Take 1 tablet (10 mg total) by mouth every 6 (six) hours as needed (migraine) Max 2-3 per week.   ketorolac (TORADOL) 30 mg/mL injection   No No   Si-2 ml IM injection once per 24 hours p.r.n. migraine. No more than 2 injections per week. lasmiditan succinate (Reyvow) 50 mg tablet   No No   Sig: One tab qhs at migraine onset. Caution sedation. Max 1 tab/24 hours. levonorgestrel (MIRENA) 20 MCG/24HR IUD  Self Yes No   Si each by Intrauterine route once   meclizine (ANTIVERT) 12.5 MG tablet   No No   Sig: Take 1 tablet (12.5 mg total) by mouth 3 (three) times a day as needed for dizziness or nausea   melatonin 3 mg   Yes No   polyethylene glycol (GLYCOLAX) 17 GM/SCOOP powder   No No   Sig: Take 17 g by mouth daily   Patient not taking: Reported on 2023   prochlorperazine (COMPAZINE) 10 mg tablet   No No   Sig: Take 1 tablet (10 mg total) by mouth every 6 (six) hours as needed for nausea or vomiting   sodium picosulfate, magnesium oxide, citric acid (Clenpiq) oral solution   No No   Sig: Follow office instructions   Patient not taking: Reported on 2023   tiZANidine (ZANAFLEX) 2 mg tablet   No No   Sig: Take 1 tablet (2 mg total) by mouth every 8 (eight) hours as needed for muscle spasms      Facility-Administered Medications: None                        Portions of the record may have been created with voice recognition software. Occasional wrong word or "sound a like" substitutions may have occurred due to the inherent limitations of voice recognition software. Read the chart carefully and recognize, using context, where substitutions have occurred.        Eugenio Mejia MD  [phone]

## 2023-09-05 ENCOUNTER — TELEPHONE (OUTPATIENT)
Dept: GASTROENTEROLOGY | Facility: CLINIC | Age: 48
End: 2023-09-05

## 2023-09-05 DIAGNOSIS — K21.9 GASTROESOPHAGEAL REFLUX DISEASE, UNSPECIFIED WHETHER ESOPHAGITIS PRESENT: ICD-10-CM

## 2023-09-05 RX ORDER — ESOMEPRAZOLE MAGNESIUM 40 MG/1
40 CAPSULE, DELAYED RELEASE ORAL
Qty: 120 CAPSULE | Refills: 0 | Status: SHIPPED | OUTPATIENT
Start: 2023-09-05 | End: 2023-11-04

## 2023-09-05 NOTE — TELEPHONE ENCOUNTER
----- Message from Emiliano Rollins PA-C sent at 9/5/2023  2:28 PM EDT -----  Regarding: FW: Follow up   Contact: [phone]  Please call her to make an appt. Thanks !  ----- Message -----  From: Autumn Duyenmiriam  Sent: 9/5/2023   2:24 PM EDT  To: Emiliano Rollins PA-C  Subject: FW: Follow up                                      ----- Message -----  From: Mirtha Monday  Sent: 9/5/2023   2:19 PM EDT  To: [facility]  Subject: Follow up                                        I get the heartburn just about every day and night. Nights sometimes are worse. I use the Nexium and Pepcid. These last few days I had to use zantac, because it's the only thing I had. Estefany Zhu

## 2023-09-07 ENCOUNTER — OFFICE VISIT (OUTPATIENT)
Dept: GASTROENTEROLOGY | Facility: CLINIC | Age: 48
End: 2023-09-07
Payer: COMMERCIAL

## 2023-09-07 VITALS
WEIGHT: 217 LBS | TEMPERATURE: 97 F | HEIGHT: 63 IN | DIASTOLIC BLOOD PRESSURE: 80 MMHG | SYSTOLIC BLOOD PRESSURE: 118 MMHG | BODY MASS INDEX: 38.45 KG/M2

## 2023-09-07 DIAGNOSIS — K44.9 HIATAL HERNIA WITH GERD WITHOUT ESOPHAGITIS: Primary | ICD-10-CM

## 2023-09-07 DIAGNOSIS — K21.9 HIATAL HERNIA WITH GERD WITHOUT ESOPHAGITIS: Primary | ICD-10-CM

## 2023-09-07 DIAGNOSIS — Z90.3 H/O GASTRIC SLEEVE: ICD-10-CM

## 2023-09-07 DIAGNOSIS — K58.2 IRRITABLE BOWEL SYNDROME WITH BOTH CONSTIPATION AND DIARRHEA: ICD-10-CM

## 2023-09-07 PROCEDURE — 99213 OFFICE O/P EST LOW 20 MIN: CPT | Performed by: PHYSICIAN ASSISTANT

## 2023-09-07 RX ORDER — FAMOTIDINE 40 MG/1
40 TABLET, FILM COATED ORAL
Qty: 30 TABLET | Refills: 3 | Status: SHIPPED | OUTPATIENT
Start: 2023-09-07 | End: 2023-10-07

## 2023-09-07 NOTE — PATIENT INSTRUCTIONS
Start Miralax daily OTC one capful to help with constipation     GERD (Gastroesophageal Reflux Disease)   AMBULATORY CARE:   Gastroesophageal reflux disease (GERD)  is reflux that happens more than 2 times a week for a few weeks. Reflux means acid and food in your stomach back up into your esophagus. GERD can cause other health problems over time if it is not treated. Common causes of GERD:  GERD often happens because the lower muscle (sphincter) of the esophagus does not close properly. The sphincter normally opens to let food into the stomach. It then closes to keep food and stomach acid in the stomach. If the sphincter does not close properly, stomach acid and food back up (reflux) into the esophagus. The following may increase your risk for GERD:  Certain foods such as spicy foods, chocolate, foods that contain caffeine, peppermint, and fried foods    Hiatal hernia    Certain medicines such as calcium channel blockers (used to treat high blood pressure), allergy medicines, sedatives, or antidepressants    Pregnancy, obesity, or scleroderma    Lying down after a meal    Drinking alcohol or smoking cigarettes    Signs and symptoms:   Heartburn (burning pain in your chest)    Pain after meals that spreads to your neck, jaw, or shoulder    Pain that gets better when you change positions    Bitter or acid taste in your mouth    A dry cough    Trouble swallowing or pain with swallowing    Hoarseness or a sore throat    Burping or hiccups    Feeling full soon after you start eating    Call your local emergency number (911 in the [address]) if:   You have severe chest pain and sudden trouble breathing. Seek care immediately if:   You have trouble breathing after you vomit. You have trouble swallowing, or pain with swallowing. Your bowel movements are black, bloody, or tarry-looking. Your vomit looks like coffee grounds or has blood in it.     Call your doctor or gastroenterologist if:   You feel full and cannot burp or vomit. You vomit large amounts, or you vomit often. You are losing weight without trying. Your symptoms get worse or do not improve with treatment. You have questions or concerns about your condition or care. Treatment for GERD:   Medicines  are used to decrease stomach acid. Medicine may also be used to help your lower esophageal sphincter and stomach contract (tighten) more. Surgery  is done to wrap the upper part of the stomach around the esophageal sphincter. This will strengthen the sphincter and prevent reflux. Manage GERD:       Do not have foods or drinks that may increase heartburn. These include chocolate, peppermint, fried or fatty foods, drinks that contain caffeine, or carbonated drinks (soda). Other foods include spicy foods, onions, tomatoes, and tomato-based foods. Do not have foods or drinks that can irritate your esophagus, such as citrus fruits, juices, and alcohol. Do not eat large meals. When you eat a lot of food at one time, your stomach needs more acid to digest it. Eat 6 small meals each day instead of 3 large meals, and eat slowly. Do not eat meals 2 to 3 hours before bedtime. Elevate the head of your bed. Place 6-inch blocks under the head of your bed frame. You may also use more than one pillow under your head and shoulders while you sleep. Maintain a healthy weight. If you are overweight, weight loss may help relieve symptoms of GERD. Do not smoke. Smoking weakens the lower esophageal sphincter and increases the risk of GERD. Ask your healthcare provider for information if you currently smoke and need help to quit. E-cigarettes or smokeless tobacco still contain nicotine. Talk to your healthcare provider before you use these products. Do not put pressure on your abdomen. Pressure pushes acid up into your esophagus. Do not wear clothing that is tight around your waist. Do not bend over.  Bend at the knees if you need to pick something up. Follow up with your doctor or gastroenterologist as directed:  Write down your questions so you remember to ask them during your visits. © Copyright Katja Arellano 2022 Information is for End User's use only and may not be sold, redistributed or otherwise used for commercial purposes. The above information is an  only. It is not intended as medical advice for individual conditions or treatments. Talk to your doctor, nurse or pharmacist before following any medical regimen to see if it is safe and effective for you.

## 2023-09-07 NOTE — PROGRESS NOTES
[facility] - Outpatient Follow-up Note  Lizzette Elders 50 y.o. female MRN: [id number]  Encounter: [id number]    ASSESSMENT AND PLAN:      1. Hiatal Hernia with GERD without esophagitis  2. H/O gastric sleeve  We reviewed EGD and biopsy results. She expressed understanding to results. She continues with significant reflux symptoms despite maximum acid suppression with PPI twice a day and H2 blockers. At this time I recommend that she continue Nexium twice a day 30 minutes before breakfast and dinner. We will increase her famotidine to 40 mg once daily at bedtime. We discussed and I recommended that she follow a GERD diet and lifestyle. Encouraged small frequent meals. We discussed that given she has failed medical therapy, it is reasonable to refer to surgery at this time- will place order     - famotidine (PEPCID) 40 MG tablet; Take 1 tablet (40 mg total) by mouth daily at bedtime  Dispense: 30 tablet; Refill: 3  - Ambulatory referral to Bariatric Surgery; Future    3. Irritable bowel syndrome with both constipation and diarrhea  We reviewed recent colonoscopy results. She expressed understanding to results. Due for recall colonoscopy in 10 years. She continues to complain of constipation, did not try previously recommended MiraLAX. At this time I recommend that she start daily MiraLAX over-the-counter 1 capful once daily. We discussed she can try taking every other day if it is too much, or increase it to BID if needed       Patient was instructed to call the office with any questions, concerns, new/ worsening/ persisting GI symptoms. Advised patient go to the ER with any severe or worsening abdominal pain, fevers/ chills, intractable N/V, chest pain, SOB, dizziness, lightheadedness, feeling something stuck in esophagus that will not go down. Patient expressed understanding and is in agreement with treatment plan.      Will plan to follow up in 3 months __________________________________________________________    SUBJECTIVE:      Patient presents to the office today for follow up. She was last seen in the office 7/2023 by Soheila Ferreira for GERD, constipation, CRC screening at which time Enrique ordered EGD and colonoscopy and recommended miralax daily and continue PPI BID with addition of H2B at nighttime. EGD   Colonoscopy 7/27/2023 reviewed and significant for abnormal mucosa at the hepatic flexure (normal biopsy), subcentimeter polyp in the sigmoid s/p removal (benign), otherwise normal colon. Recall colonoscopy recommended in 10 years. EGD 7/27/2023 showed 3 cm type 1 hiatal hernia, healthy previous sleeve gastrectomy stomach, nodular mucosa in the body of the stomach. Biopsy + for chronic inactive negative for H pylori. Normal duodenum. Patient presents with ongoing heartburn almost daily. She does think her GERD has gotten worse after her sleeve gastrectomy in 2019 done at [facility]. Her symptoms are worse at night. She tries to avoid trigger foods but symptoms persist.   Associated reflux and nausea. No vomiting. She is taking Nexium 40 mg BID and symptoms persist. She was on omeprazole in the past as well. Also on pepcid 20 mg in the evening. In addition to these medications has also used zantac prn with minimal relief. She continues with constipation and occasional diarrhea. She never tried recommended miralax daily. She uses questran prn diarrhea. She denies blood in stool or black stools. Patient reports her weight has been about the same. Patient tells me she was recently in the ER last week for kidney stones, she thinks she may have passed them     Tobacco use- None  Alcohol use- Occasional  NSAID use- None    Review of Systems   Constitutional: Negative for chills and fever. HENT: Negative for ear pain and sore throat. Eyes: Negative for pain and visual disturbance. Respiratory: Negative for cough and shortness of breath. Cardiovascular: Negative for chest pain and palpitations. Gastrointestinal: Positive for abdominal pain, constipation, diarrhea and nausea. Negative for vomiting. Genitourinary: Negative for dysuria, frequency and hematuria. Musculoskeletal: Negative for arthralgias, back pain and myalgias. Skin: Negative for color change and rash. Neurological: Negative for seizures, syncope and headaches. All other systems reviewed and are negative.      Historical Information   Past Medical History:   Diagnosis Date   • Anxiety    • Asthma    • Claustrophobia    • Cluster headache    • CTS (carpal tunnel syndrome)    • Depression    • Fibromyalgia     last assessed 11/27/17   • Fibromyalgia, primary    • GERD (gastroesophageal reflux disease)    • GERD without esophagitis     last assessed 10/12/17   • GI problem    • Headache, tension-type    • Irritable bowel syndrome    • Joint pain    • Kidney stone    • Lung nodule     last assessed 10/9/15   • Migraine    • Muscle pain    • Peripheral neuropathy    • Sleep apnea      Past Surgical History:   Procedure Laterality Date   • BACK SURGERY     • BARIATRIC SURGERY  8/9/2022   • CARPAL TUNNEL RELEASE Bilateral    • CHOLECYSTECTOMY     • CYSTOSCOPY     • GALLBLADDER SURGERY     • GASTRECTOMY  08/09/2022    Sleeve   • NECK SURGERY     • AR TENDON SHEATH INCISION Right 01/16/2023    Procedure: THUMB TRIGGER FINGER RELEASE;  Surgeon: Bekah Hernandez MD;  [facility];  Service: Orthopedics   • SACROILIAC JOINT FUSION Left    • SPINAL FUSION      C4 and C5   • ULNAR NERVE REPAIR Bilateral    • UPPER GASTROINTESTINAL ENDOSCOPY       Social History   Social History     Substance and Sexual Activity   Alcohol Use Yes   • Alcohol/week: 2.0 standard drinks of alcohol   • Types: 2 Glasses of wine per week    Comment: Occasionally     Social History     Substance and Sexual Activity   Drug Use Yes   • Types: Marijuana    Comment: medicinal     Social History Tobacco Use   Smoking Status Never   Smokeless Tobacco Never   Tobacco Comments    Never smoked.      Family History   Problem Relation Age of Onset   • Diabetes Mother    • Fibromyalgia Mother    • Ovarian cancer Mother    • Hypertension Mother    • Thyroid disease Mother    • Migraines Mother    • Neuropathy Mother    • Cancer Mother         has been removed   • No Known Problems Father    • Throat cancer Maternal Grandmother    • No Known Problems Maternal Grandfather    • No Known Problems Paternal Grandmother    • No Known Problems Paternal Grandfather    • No Known Problems Daughter    • Breast cancer Maternal Aunt    • No Known Problems Maternal Aunt    • No Known Problems Maternal Aunt    • No Known Problems Paternal Aunt    • Colon cancer Cousin    • Heart disease Cousin    • Lupus Cousin    • Arthritis Family    • Heart disease Family         cardiac disorder   • Neuropathy Family    • Lupus Family         systemic erythematosus   • No Known Problems Half-Sister    • No Known Problems Half-Brother    • No Known Problems Half-Brother    • No Known Problems Half-Brother    • Asthma Brother    • Stroke Neg Hx        Meds/Allergies       Current Outpatient Medications:   •  acetaZOLAMIDE (DIAMOX) 250 mg tablet  •  albuterol (PROVENTIL HFA,VENTOLIN HFA) 90 mcg/act inhaler  •  Botox 200 units SOLR  •  buPROPion (Wellbutrin XL) 150 mg 24 hr tablet  •  cholecalciferol (VITAMIN D3) 1,000 units tablet  •  cholestyramine (QUESTRAN) 4 GM/DOSE powder  •  dexamethasone (DECADRON) 2 mg tablet  •  Diclofenac Sodium (VOLTAREN) 1 %  •  dicyclomine (BENTYL) 20 mg tablet  •  divalproex sodium (DEPAKOTE) 250 mg EC tablet  •  esomeprazole (NexIUM) 40 MG capsule  •  famotidine (PEPCID) 20 mg tablet  •  fluticasone (FLONASE) 50 mcg/act nasal spray  •  ketorolac (TORADOL) 10 mg tablet  •  ketorolac (TORADOL) 30 mg/mL injection  •  lasmiditan succinate (Reyvow) 50 mg tablet  •  levonorgestrel (MIRENA) 20 MCG/24HR IUD  • meclizine (ANTIVERT) 12.5 MG tablet  •  melatonin 3 mg  •  Melatonin ER 3 MG TBCR  •  meloxicam (Mobic) 7.5 mg tablet  •  NON FORMULARY  •  polyethylene glycol (GLYCOLAX) 17 GM/SCOOP powder  •  prochlorperazine (COMPAZINE) 10 mg tablet  •  Riboflavin (Vitamin B-2) 100 MG TABS  •  sodium picosulfate, magnesium oxide, citric acid (Clenpiq) oral solution  •  Sodium Sulfate-Mag Sulfate-KCl (Sutab) 3484-810-784 MG TABS  •  Syringe/Needle, Disp, (SYRINGE 3CC/49WP2-5/2") 25G X 1-1/2" 3 ML MISC  •  tiZANidine (ZANAFLEX) 2 mg tablet  •  Trudhesa 0.725 MG/ACT AERS  •  ZOLMitriptan (ZOMIG-ZMT) 5 MG disintegrating tablet    Allergies   Allergen Reactions   • Hydrocodone-Acetaminophen GI Intolerance     gi upset -pt okay if takes  zofran   • Hydrocodone-Acetaminophen GI Intolerance     gi upset -pt okay if takes  zofran  gi upset -pt okay if takes  zofran   • Codeine GI Intolerance and Other (See Comments)     GI issues   • Oxycodone-Acetaminophen GI Intolerance   • Penicillins GI Intolerance   • Mexiletine Rash           Objective     Wt Readings from Last 3 Encounters:   08/24/23 98.4 kg (217 lb)   08/24/23 98.4 kg (217 lb)   08/14/23 98.7 kg (217 lb 9.6 oz)     Temp Readings from Last 3 Encounters:   08/30/23 97.6 °F (36.4 °C) (Tympanic)   08/24/23 98.1 °F (36.7 °C) (Temporal)   08/14/23 98.4 °F (36.9 °C) (Temporal)     BP Readings from Last 3 Encounters:   08/30/23 117/71   08/24/23 137/84   08/14/23 118/79     Pulse Readings from Last 3 Encounters:   08/30/23 74   08/24/23 97   08/14/23 80          PHYSICAL EXAM:      Physical Exam  Vitals reviewed. Constitutional:       General: She is not in acute distress. Appearance: She is not toxic-appearing. HENT:      Head: Normocephalic and atraumatic. Eyes:      Extraocular Movements: Extraocular movements intact. Conjunctiva/sclera: Conjunctivae normal.   Cardiovascular:      Rate and Rhythm: Normal rate and regular rhythm.    Pulmonary:      Effort: Pulmonary effort is normal. No respiratory distress. Breath sounds: Normal breath sounds. Abdominal:      General: Bowel sounds are normal.      Palpations: Abdomen is soft. Tenderness: There is no abdominal tenderness. Musculoskeletal:         General: No swelling or tenderness. Cervical back: Normal range of motion and neck supple. Skin:     General: Skin is warm and dry. Coloration: Skin is not jaundiced. Neurological:      General: No focal deficit present. Mental Status: She is alert and oriented to person, place, and time. Mental status is at baseline. Psychiatric:         Mood and Affect: Mood normal.         Behavior: Behavior normal.         Thought Content: Thought content normal.          Lab Results:   No visits with results within 1 Day(s) from this visit.    Latest known visit with results is:   Admission on 08/30/2023, Discharged on 08/30/2023   Component Date Value   • Ventricular Rate 08/30/2023 70    • Atrial Rate 08/30/2023 70    • AK Interval 08/30/2023 176    • QRSD Interval 08/30/2023 80    • QT Interval 08/30/2023 358    • QTC Interval 08/30/2023 386    • P Axis 08/30/2023 26    • QRS Axis 08/30/2023 65    • T Wave Axis 08/30/2023 34    • Sodium 08/30/2023 139    • Potassium 08/30/2023 3.6    • Chloride 08/30/2023 104    • CO2 08/30/2023 31    • ANION GAP 08/30/2023 4    • BUN 08/30/2023 14    • Creatinine 08/30/2023 0.68    • Glucose 08/30/2023 88    • Calcium 08/30/2023 8.9    • eGFR 08/30/2023 103    • Color, UA 08/30/2023 Yellow    • Clarity, UA 08/30/2023 Turbid    • Specific Gravity, UA 08/30/2023 1.016    • pH, UA 08/30/2023 7.0    • Leukocytes, UA 08/30/2023 Negative    • Nitrite, UA 08/30/2023 Negative    • Protein, UA 08/30/2023 Negative    • Glucose, UA 08/30/2023 Negative    • Ketones, UA 08/30/2023 Negative    • Urobilinogen, UA 08/30/2023 4.0 (A)    • Bilirubin, UA 08/30/2023 Negative    • Occult Blood, UA 08/30/2023 Moderate (A)    • EXT Preg Test, Ur 08/30/2023 Negative    • Control 08/30/2023 Valid    • RBC, UA 08/30/2023 Innumerable (A)    • WBC, UA 08/30/2023 1-2    • Epithelial Cells 08/30/2023 Occasional    • Bacteria, UA 08/30/2023 Occasional    • MUCUS THREADS 08/30/2023 Occasional (A)    • Amorphous Crystals, UA 08/30/2023 Moderate        Lab Results   Component Value Date    WBC 12.27 (H) 11/16/2021    HGB 13.6 11/16/2021    HCT 43.5 11/16/2021    MCV 83 11/16/2021     11/16/2021       Lab Results   Component Value Date     02/20/2015    SODIUM 139 08/30/2023    K 3.6 08/30/2023     08/30/2023    CO2 31 08/30/2023    ANIONGAP 5 02/20/2015    AGAP 4 08/30/2023    BUN 14 08/30/2023    CREATININE 0.68 08/30/2023    GLUC 88 08/30/2023    GLUF 101 (H) 10/13/2021    CALCIUM 8.9 08/30/2023    AST 15 10/13/2021    ALT 29 10/13/2021    ALKPHOS 118 (H) 10/13/2021    PROT 7.8 02/20/2015    TP 7.4 10/13/2021    BILITOT 0.49 02/20/2015    TBILI 0.38 10/13/2021    EGFR 103 08/30/2023     Radiology Results:   CT renal stone study abdomen pelvis wo contrast    Result Date: 9/1/2023  Narrative: Clinical History: Left ureteral stone [N20.1 (ICD-10-CM)]; Flank pain [R10.9 (ICD-10-CM)]. Left flank pain, kidney stone suspected. Comparison: Prior CT the abdomen pelvis, 12/19/2020 Comment: CT of the abdomen and pelvis was performed without intravenous contrast.   Abdomen: Lower chest: Lung bases clear. Atherosclerotic calcification within portions of the visualized coronary arteries. Small hiatal hernia. Liver: Unremarkable, given the lack of intravenous contrast. Spleen: Unremarkable Pancreas: Unremarkable Adrenal glands: Unremarkable Gallbladder/bile ducts: Status post cholecystectomy. No biliary ductal dilatation. Kidneys/ureters: There appear to be a few tiny subtle 1 to 2 mm nonobstructing renal calculi bilaterally, approximately 5 right and 4 left. No definite ureteral calculus or hydroureteronephrosis to suggest obstructive uropathy.  Subcentimeter calcification within the region of the distal left ureter is felt to represent a phlebolith rather than an obstructing left ureteral calculus given the lack of upstream hydroureteronephrosis. Bowel: Small and large bowel normal in caliber. Appendix is normal in appearance. The patient is status post sleeve gastrectomy with grossly intact surgical suture line. Residual oral contrast is seen within the colon. Lymph nodes: No retroperitoneal or mesenteric adenopathy Peritoneum: No free fluid or free intraperitoneal air within the abdomen Vessels: Abdominal aorta normal in caliber. Abdominal wall: Unremarkable. Pelvis: Urinary bladder: Underdistended which was evaluation. No bladder calculus. Lymph nodes: No pelvic adenopathy. Intrauterine device is seen centered within the uterine body and fundus. Bones: Status post fusion of the left sacroiliac joint. Mild degenerative changes within the spine. Impression: Impression: Scattered tiny 1 to 2 mm nonobstructing renal calculi bilaterally. No definite ureteral calculus or hydroureteronephrosis to suggest obstructive uropathy as described above. Status post sleeve gastrectomy. Intrauterine device centered within the uterine body and fundus. Small hiatal hernia. Other findings as above. [id number]    CT abdomen pelvis with contrast    Result Date: 8/30/2023  Narrative: CT ABDOMEN AND PELVIS WITH IV CONTRAST INDICATION:   abd pain + flank c/f stone vs gastric sleeve leak. COMPARISON: CT abdomen/pelvis 5/24/2020. TECHNIQUE:  CT examination of the abdomen and pelvis was performed. Multiplanar 2D reformatted images were created from the source data. This examination, like all CT scans performed in the [facility], was performed utilizing techniques to minimize radiation dose exposure, including the use of iterative reconstruction and automated exposure control.  Radiation dose length product (DLP) for this visit:  1006.94 mGy-cm IV Contrast:  100 mL of iohexol (OMNIPAQUE) Enteric Contrast: Enteric contrast was administered. FINDINGS: ABDOMEN LOWER CHEST:  No clinically significant abnormality identified in the visualized lower chest. LIVER/BILIARY TREE:  Unremarkable. GALLBLADDER: Gallbladder is surgically absent. SPLEEN:  Unremarkable. PANCREAS:  Unremarkable. ADRENAL GLANDS:  Unremarkable. KIDNEYS/URETERS: There is a 4 mm distal left ureteral calculus causing minimal upstream hydroureteronephrosis. Punctate nonobstructing right mid renal calculus. STOMACH AND BOWEL: Postsurgical changes of sleeve gastrectomy with a small hiatal hernia. No evidence of complication. No bowel obstruction. APPENDIX:  No findings to suggest appendicitis. ABDOMINOPELVIC CAVITY:  No ascites. No pneumoperitoneum. No lymphadenopathy. VESSELS:  Unremarkable for patient's age. PELVIS REPRODUCTIVE ORGANS:  Unremarkable for patient's age. IUD in place. URINARY BLADDER:  Unremarkable. ABDOMINAL WALL/INGUINAL REGIONS: There is a small fat-containing umbilical hernia and supraumbilical ventral abdominal hernia. OSSEOUS STRUCTURES:  No acute fracture or destructive osseous lesion. Left sacroiliac joint fusion. Impression: A 4 mm distal left ureteral calculus causes minimal upstream hydroureteronephrosis. Workstation performed: [id number]     XR shoulder 2+ vw right    Result Date: 8/30/2023  Narrative: RIGHT SHOULDER INDICATION:   M25.511: Pain in right shoulder. COMPARISON:  None VIEWS:  XR SHOULDER 2+ VW RIGHT FINDINGS: There is no acute fracture or dislocation. No significant degenerative changes. No lytic or blastic osseous lesion. Soft tissues are unremarkable. Impression: No acute osseous abnormality.  Workstation performed: [id number]     Cintia Thomson PA-C   Available on [employer]

## 2023-09-11 ENCOUNTER — OFFICE VISIT (OUTPATIENT)
Dept: OBGYN CLINIC | Facility: CLINIC | Age: 48
End: 2023-09-11
Payer: COMMERCIAL

## 2023-09-11 VITALS — WEIGHT: 217 LBS | HEIGHT: 63 IN | BODY MASS INDEX: 38.45 KG/M2

## 2023-09-11 DIAGNOSIS — M75.41 IMPINGEMENT SYNDROME OF RIGHT SHOULDER: ICD-10-CM

## 2023-09-11 DIAGNOSIS — M75.81 ROTATOR CUFF TENDINITIS, RIGHT: ICD-10-CM

## 2023-09-11 DIAGNOSIS — M75.21 BICEPS TENDINITIS OF RIGHT SHOULDER: Primary | ICD-10-CM

## 2023-09-11 PROCEDURE — 99212 OFFICE O/P EST SF 10 MIN: CPT | Performed by: ORTHOPAEDIC SURGERY

## 2023-09-11 PROCEDURE — 20610 DRAIN/INJ JOINT/BURSA W/O US: CPT | Performed by: ORTHOPAEDIC SURGERY

## 2023-09-11 RX ORDER — BUPIVACAINE HYDROCHLORIDE 5 MG/ML
1 INJECTION, SOLUTION PERINEURAL
Status: COMPLETED | OUTPATIENT
Start: 2023-09-11 | End: 2023-09-11

## 2023-09-11 RX ORDER — LIDOCAINE HYDROCHLORIDE 10 MG/ML
1 INJECTION, SOLUTION INFILTRATION; PERINEURAL
Status: COMPLETED | OUTPATIENT
Start: 2023-09-11 | End: 2023-09-11

## 2023-09-11 RX ORDER — BETAMETHASONE SODIUM PHOSPHATE AND BETAMETHASONE ACETATE 3; 3 MG/ML; MG/ML
6 INJECTION, SUSPENSION INTRA-ARTICULAR; INTRALESIONAL; INTRAMUSCULAR; SOFT TISSUE
Status: COMPLETED | OUTPATIENT
Start: 2023-09-11 | End: 2023-09-11

## 2023-09-11 RX ADMIN — LIDOCAINE HYDROCHLORIDE 1 ML: 10 INJECTION, SOLUTION INFILTRATION; PERINEURAL at 15:15

## 2023-09-11 RX ADMIN — BETAMETHASONE SODIUM PHOSPHATE AND BETAMETHASONE ACETATE 6 MG: 3; 3 INJECTION, SUSPENSION INTRA-ARTICULAR; INTRALESIONAL; INTRAMUSCULAR; SOFT TISSUE at 15:15

## 2023-09-11 RX ADMIN — BUPIVACAINE HYDROCHLORIDE 1 ML: 5 INJECTION, SOLUTION PERINEURAL at 15:15

## 2023-09-11 NOTE — PROGRESS NOTES
Assessment:  1. Biceps tendinitis of right shoulder  Ambulatory Referral to Physical Therapy      2. Rotator cuff tendinitis, right  Ambulatory Referral to Physical Therapy    Large joint arthrocentesis: R subacromial bursa      3. Impingement syndrome of right shoulder          Patient Active Problem List   Diagnosis   • Cervicalgia   • Chronic migraine without aura without status migrainosus, not intractable   • Saccadic eye movements   • Vertigo   • Allergic rhinitis   • Arthralgia of multiple joints   • Colon, diverticulosis   • Obesity, morbid, BMI 40.0-49.9 ([facility])   • Vitamin D deficiency   • GERD without esophagitis   • Foot swelling   • Irritable bowel syndrome with both constipation and diarrhea   • Fibromyalgia   • Depression with anxiety   • Borderline hyperlipidemia   • History of angioedema   • Intractable chronic migraine without aura and with status migrainosus   • Post traumatic stress disorder (PTSD)   • Sleep-disordered breathing   • Genital prolapse   • Abnormal Pap smear of cervix   • Vitamin B12 deficiency   • Mild intermittent asthma   • Intractable chronic migraine without aura and without status migrainosus   • Trapezius muscle spasm   • Cervical radiculopathy   • Impingement syndrome of left shoulder   • Internal derangement of left shoulder   • Nephrolithiasis   • Osteoarthritis of carpometacarpal (CMC) joint of right thumb   • Other insomnia   • COVID-19   • Morbid obesity ([facility])   • Trigger finger of right thumb   • Bilateral hip pain   • Impingement syndrome of right shoulder   • Rotator cuff tendinitis, right   • Biceps tendinitis of right shoulder           Plan      50year old female with biceps tendonitis and acromioclavicular bursitis. · Based on patients symptoms today I believe she is experiencing biceps tendinitis and rotator cuff tendinitis.    · Patient was given CSI of subacromial space  · Initiate PT referral for rotator cuff tendinitis and biceps tendinitis   · Follow up as needed       Subjective:     Patient ID:    Chief Complaint:Jenny Jj Barefoot 50 y.o. female      HPI    Patient comes in today with regards to a follow up of biceps tendinitis of the right shoulder. Patient was last seen by me 8/24/23 where we administered CSI of her biceps tendon. She states she feels better then before. The patient reports today that the pain is in the on top of her shoulder and her overall pain has decreased since last visit. The pain is rated at 2  at its best and 7 at its worst.  The pain is described as dull and achy. It is worsened with lifting heavy objects and laying on that side, and is made better with rest.  The patient has taken resting for treatment. She reports she has not needed to take medication for her pain. She had lifted a heavy bag off of her bed in June while she was packing for a vacation. She felt pain the next day. The following portions of the patient's history were reviewed and updated as appropriate: allergies, current medications, past family history, past social history, past surgical history and problem list.        Social History     Socioeconomic History   • Marital status:      Spouse name: Not on file   • Number of children: Not on file   • Years of education: Not on file   • Highest education level: Not on file   Occupational History   • Not on file   Tobacco Use   • Smoking status: Never   • Smokeless tobacco: Never   • Tobacco comments:     Never smoked. Vaping Use   • Vaping Use: Never used   Substance and Sexual Activity   • Alcohol use: Yes     Alcohol/week: 2.0 standard drinks of alcohol     Types: 2 Glasses of wine per week     Comment: Occasionally   • Drug use: Yes     Types: Marijuana     Comment: medicinal   • Sexual activity: Yes     Partners: Male     Birth control/protection: Abstinence, Condom Male, I.U.D.    Other Topics Concern   • Not on file   Social History Narrative    Daily cola, tea    No preference on religous beliefs Social Determinants of Health     Financial Resource Strain: Medium Risk (2/24/2023)    Overall Financial Resource Strain (CARDIA)    • Difficulty of Paying Living Expenses: Somewhat hard   Food Insecurity: Food Insecurity Present (2/24/2023)    Hunger Vital Sign    • Worried About Running Out of Food in the Last Year: Sometimes true    • Ran Out of Food in the Last Year: Sometimes true   Transportation Needs: No Transportation Needs (2/24/2023)    PRAPARE - Transportation    • Lack of Transportation (Medical): No    • Lack of Transportation (Non-Medical): No   Physical Activity: Inactive (9/8/2021)    Exercise Vital Sign    • Days of Exercise per Week: 0 days    • Minutes of Exercise per Session: 0 min   Stress: Stress Concern Present (9/8/2021)    [address] [facility]    • Feeling of Stress : Rather much   Social Connections: Socially Isolated (9/8/2021)    Social Connection and Isolation Panel [NHANES]    • Frequency of Communication with Friends and Family:  Three times a week    • Frequency of Social Gatherings with Friends and Family: More than three times a week    • Attends Mandaen Services: Never    • Active Member of Clubs or Organizations: No    • Attends Club or Organization Meetings: Never    • Marital Status:    Intimate Partner Violence: Not At Risk (9/8/2021)    Humiliation, Afraid, Rape, and Kick questionnaire    • Fear of Current or Ex-Partner: No    • Emotionally Abused: No    • Physically Abused: No    • Sexually Abused: No   Housing Stability: Low Risk  (9/8/2021)    Housing Stability Vital Sign    • Unable to Pay for Housing in the Last Year: No    • Number of [address] in the Last Year: 1    • Unstable Housing in the Last Year: No     Past Medical History:   Diagnosis Date   • Anxiety    • Asthma    • Claustrophobia    • Cluster headache    • CTS (carpal tunnel syndrome)    • Depression    • Fibromyalgia     last assessed 11/27/17   • Fibromyalgia, primary    • GERD (gastroesophageal reflux disease)    • GERD without esophagitis     last assessed 10/12/17   • GI problem    • Headache, tension-type    • Irritable bowel syndrome    • Joint pain    • Kidney stone    • Lung nodule     last assessed 10/9/15   • Migraine    • Muscle pain    • Peripheral neuropathy    • Sleep apnea      Past Surgical History:   Procedure Laterality Date   • BACK SURGERY     • BARIATRIC SURGERY  8/9/2022   • CARPAL TUNNEL RELEASE Bilateral    • CHOLECYSTECTOMY     • CYSTOSCOPY     • GALLBLADDER SURGERY     • GASTRECTOMY  08/09/2022    Sleeve   • NECK SURGERY     • HI TENDON SHEATH INCISION Right 01/16/2023    Procedure: THUMB TRIGGER FINGER RELEASE;  Surgeon: Christy Patel MD;  Location: [facility] MAIN OR;  Service: Orthopedics   • SACROILIAC JOINT FUSION Left    • SPINAL FUSION      C4 and C5   • ULNAR NERVE REPAIR Bilateral    • UPPER GASTROINTESTINAL ENDOSCOPY       Allergies   Allergen Reactions   • Hydrocodone-Acetaminophen GI Intolerance     gi upset -pt okay if takes  zofran   • Hydrocodone-Acetaminophen GI Intolerance     gi upset -pt okay if takes  zofran  gi upset -pt okay if takes  zofran   • Codeine GI Intolerance and Other (See Comments)     GI issues   • Oxycodone-Acetaminophen GI Intolerance   • Penicillins GI Intolerance   • Mexiletine Rash     Current Outpatient Medications on File Prior to Visit   Medication Sig Dispense Refill   • acetaZOLAMIDE (DIAMOX) 250 mg tablet 1 tab qam 90 tablet 0   • albuterol (PROVENTIL HFA,VENTOLIN HFA) 90 mcg/act inhaler Inhale 2 puffs every 6 (six) hours as needed for wheezing or shortness of breath 8 g 2   • Botox 200 units SOLR      • buPROPion (Wellbutrin XL) 150 mg 24 hr tablet Take 1 tablet (150 mg total) by mouth every morning 90 tablet 0   • cholecalciferol (VITAMIN D3) 1,000 units tablet Take 1 capsule by mouth once a week 3000 units daily      • cholestyramine (QUESTRAN) 4 GM/DOSE powder Take 1 packet (4 g total) by mouth 2 (two) times a day with meals 378 g 2   • dexamethasone (DECADRON) 2 mg tablet 1 tab qam with food prn migraine. 5 tablet 0   • Diclofenac Sodium (VOLTAREN) 1 % Apply 2 g topically 4 (four) times a day 100 g 2   • dicyclomine (BENTYL) 20 mg tablet Take 1 tablet (20 mg total) by mouth 3 (three) times a day as needed (migraine/cramps) 60 tablet 0   • divalproex sodium (DEPAKOTE) 250 mg EC tablet 2 tabs q12 hours x 2 days, then stop. Repeat if needed. 16 tablet 0   • esomeprazole (NexIUM) 40 MG capsule Take 1 capsule (40 mg total) by mouth 2 (two) times a day before meals 120 capsule 0   • famotidine (PEPCID) 40 MG tablet Take 1 tablet (40 mg total) by mouth daily at bedtime 30 tablet 3   • fluticasone (FLONASE) 50 mcg/act nasal spray 1 spray in each nostril once daily 30 mL 0   • ketorolac (TORADOL) 10 mg tablet Take 1 tablet (10 mg total) by mouth every 6 (six) hours as needed (migraine) Max 2-3 per week. 10 tablet 2   • ketorolac (TORADOL) 30 mg/mL injection 1-2 ml IM injection once per 24 hours p.r.n. migraine. No more than 2 injections per week. 4 mL 0   • lasmiditan succinate (Reyvow) 50 mg tablet One tab qhs at migraine onset. Caution sedation. Max 1 tab/24 hours.  8 tablet 2   • levonorgestrel (MIRENA) 20 MCG/24HR IUD 1 each by Intrauterine route once     • meclizine (ANTIVERT) 12.5 MG tablet Take 1 tablet (12.5 mg total) by mouth 3 (three) times a day as needed for dizziness or nausea 30 tablet 3   • melatonin 3 mg      • Melatonin ER 3 MG TBCR 1-2 tabs qhs 60 tablet 0   • meloxicam (Mobic) 7.5 mg tablet Take 1 tablet (7.5 mg total) by mouth daily for 7 days 7 tablet 0   • NON FORMULARY Medical marijuana       • polyethylene glycol (GLYCOLAX) 17 GM/SCOOP powder Take 17 g by mouth daily 850 g 2   • prochlorperazine (COMPAZINE) 10 mg tablet Take 1 tablet (10 mg total) by mouth every 6 (six) hours as needed for nausea or vomiting 30 tablet 0   • Riboflavin (Vitamin B-2) 100 MG TABS Take 400 mg by mouth daily     • sodium picosulfate, magnesium oxide, citric acid (Clenpiq) oral solution Follow office instructions (Patient not taking: Reported on 7/24/2023) 350 mL 0   • Sodium Sulfate-Mag Sulfate-KCl (Sutab) 1783-930-447 MG TABS Use as needed as instructed by office staff (Patient not taking: Reported on 7/24/2023) 24 tablet 0   • Syringe/Needle, Disp, (SYRINGE 3CC/94SB7-7/2") 25G X 1-1/2" 3 ML MISC Use for toradol IM injections. 10 each 0   • tiZANidine (ZANAFLEX) 2 mg tablet Take 1 tablet (2 mg total) by mouth every 8 (eight) hours as needed for muscle spasms 90 tablet 1   • Trudhesa 0.725 MG/ACT AERS INSTILL ONE SPRAY INTO EACH NOSTRIL AS NEEDED AT ONSET OF MIGRAINE MAY REPEAT IN TWO HOURS MAX OF TWO DOSES PER DAY MAX OF THREE DOSES PER W     • ZOLMitriptan (ZOMIG-ZMT) 5 MG disintegrating tablet Take 1 tablet (5 mg total) by mouth once as needed for migraine for up to 1 dose 6 tablet 0     No current facility-administered medications on file prior to visit. Objective:    Review of Systems   Constitutional: Negative for chills, fever and unexpected weight change. HENT: Negative for hearing loss, nosebleeds and sore throat. Eyes: Negative for pain, redness and visual disturbance. Respiratory: Negative for cough, shortness of breath and wheezing. Cardiovascular: Negative for chest pain, palpitations and leg swelling. Gastrointestinal: Negative for abdominal pain, nausea and vomiting. Endocrine: Negative for polydipsia and polyuria. Genitourinary: Negative for dysuria and hematuria. Musculoskeletal:        See HPI   Skin: Negative for rash and wound. Neurological: Negative for dizziness, numbness and headaches. Psychiatric/Behavioral: Negative for decreased concentration and suicidal ideas. The patient is not nervous/anxious.         Right Shoulder Exam     Tenderness   The patient is experiencing tenderness in the acromioclavicular joint and biceps tendon. Range of Motion   Active abduction:  160 normal   External rotation: 60   Forward flexion: 170   Internal rotation 0 degrees: T10     Muscle Strength   Abduction: 5/5   Internal rotation: 5/5   External rotation: 5/5   Supraspinatus: 5/5   Subscapularis: 5/5   Biceps: 5/5     Other   Erythema: absent  Scars: absent  Sensation: normal    Comments:  Empty can (+)  Horn blowers (+) for pain  Speeds (+)  Neers (-)  Medel milan negative       Left Shoulder Exam     Tenderness   The patient is experiencing no tenderness. Range of Motion   Active abduction: 170   External rotation: 60   Forward flexion: 170   Internal rotation 0 degrees: T10     Muscle Strength   Abduction: 5/5   Internal rotation: 5/5   External rotation: 5/5   Supraspinatus: 5/5   Subscapularis: 5/5   Biceps: 5/5     Other   Erythema: absent  Scars: absent             Physical Exam  Vitals and nursing note reviewed. Constitutional:       Appearance: Normal appearance. She is well-developed. HENT:      Head: Normocephalic and atraumatic. Right Ear: External ear normal.      Left Ear: External ear normal.   Eyes:      General: No scleral icterus. Extraocular Movements: Extraocular movements intact. Conjunctiva/sclera: Conjunctivae normal.   Cardiovascular:      Rate and Rhythm: Normal rate. Pulmonary:      Effort: Pulmonary effort is normal. No respiratory distress. Musculoskeletal:      Cervical back: Normal range of motion and neck supple. Comments: See Ortho exam   Skin:     General: Skin is warm and dry. Neurological:      General: No focal deficit present. Mental Status: She is alert and oriented to person, place, and time. Psychiatric:         Behavior: Behavior normal.         Large joint arthrocentesis: R subacromial bursa  Universal Protocol:  Consent: Verbal consent obtained.   Risks and benefits: risks, benefits and alternatives were discussed  Consent given by: patient  Patient understanding: patient states understanding of the procedure being performed  Site marked: the operative site was marked  Patient identity confirmed: verbally with patient    Supporting Documentation  Indications: pain   Procedure Details  Location: shoulder - R subacromial bursa  Preparation: Patient was prepped and draped in the usual sterile fashion  Needle size: 20 G  Ultrasound guidance: no  Approach: posterior  Medications administered: 1 mL lidocaine 1 %; 1 mL bupivacaine 0.5 %; 6 mg betamethasone acetate-betamethasone sodium phosphate 6 (3-3) mg/mL    Patient tolerance: patient tolerated the procedure well with no immediate complications  Dressing:  Sterile dressing applied            I have personally reviewed pertinent films in PACS. Xray of left shoulder taken 8/24/23 demonstrate mild degenerative changes of acromioclavicular joint. Portions of the record may have been created with voice recognition software.  Occasional wrong word or "sound a like" substitutions may have occurred due to the inherent limitations of voice recognition software.  Read the chart carefully and recognize, using context, where substitutions have occurred.

## 2023-09-18 ENCOUNTER — PATIENT MESSAGE (OUTPATIENT)
Dept: OBGYN CLINIC | Facility: CLINIC | Age: 48
End: 2023-09-18

## 2023-09-18 ENCOUNTER — OFFICE VISIT (OUTPATIENT)
Dept: OBGYN CLINIC | Facility: CLINIC | Age: 48
End: 2023-09-18
Payer: COMMERCIAL

## 2023-09-18 VITALS
SYSTOLIC BLOOD PRESSURE: 118 MMHG | HEIGHT: 63 IN | WEIGHT: 216 LBS | DIASTOLIC BLOOD PRESSURE: 60 MMHG | BODY MASS INDEX: 38.27 KG/M2

## 2023-09-18 DIAGNOSIS — Z46.89 ENCOUNTER FOR FITTING AND ADJUSTMENT OF PESSARY: Primary | ICD-10-CM

## 2023-09-18 PROCEDURE — 99213 OFFICE O/P EST LOW 20 MIN: CPT | Performed by: OBSTETRICS & GYNECOLOGY

## 2023-09-18 PROCEDURE — 57160 INSERT PESSARY/OTHER DEVICE: CPT | Performed by: OBSTETRICS & GYNECOLOGY

## 2023-09-18 NOTE — ASSESSMENT & PLAN NOTE
Reviewed removal and placement  Reviewed cleaning with soap and water when necessary  May remove as needed and/or for intercourse  May use lubricant as necessary  Pessary placed without difficulty. Patient encouraged to walk around office and urinate prior to leaving.    RTO for APE

## 2023-09-18 NOTE — PROGRESS NOTES
Subjective:     Kristy Silva is a 50 y.o.  female who presents for pessary placement after initial consultation w/ Dr. Celeste Geiger on 23. She was fitted for pessary at that appointment as she is not ready for hysterectomy. She has a Mirena IUD in place and is not currently sexually active. She reports that her uterus has been "like this for a while".      Patient Active Problem List   Diagnosis   • Cervicalgia   • Chronic migraine without aura without status migrainosus, not intractable   • Saccadic eye movements   • Vertigo   • Allergic rhinitis   • Arthralgia of multiple joints   • Colon, diverticulosis   • Obesity, morbid, BMI 40.0-49.9 ([facility])   • Vitamin D deficiency   • GERD without esophagitis   • Foot swelling   • Irritable bowel syndrome with both constipation and diarrhea   • Fibromyalgia   • Depression with anxiety   • Borderline hyperlipidemia   • History of angioedema   • Intractable chronic migraine without aura and with status migrainosus   • Post traumatic stress disorder (PTSD)   • Sleep-disordered breathing   • Genital prolapse   • Abnormal Pap smear of cervix   • Vitamin B12 deficiency   • Mild intermittent asthma   • Intractable chronic migraine without aura and without status migrainosus   • Trapezius muscle spasm   • Cervical radiculopathy   • Impingement syndrome of left shoulder   • Internal derangement of left shoulder   • Nephrolithiasis   • Osteoarthritis of carpometacarpal (CMC) joint of right thumb   • Other insomnia   • COVID-19   • Morbid obesity ([facility])   • Trigger finger of right thumb   • Bilateral hip pain   • Impingement syndrome of right shoulder   • Rotator cuff tendinitis, right   • Biceps tendinitis of right shoulder   • Encounter for fitting and adjustment of pessary     Past Medical History:   Diagnosis Date   • Anxiety    • Asthma    • Claustrophobia    • Cluster headache    • CTS (carpal tunnel syndrome)    • Depression    • Fibromyalgia     last assessed 17 • Fibromyalgia, primary    • GERD (gastroesophageal reflux disease)    • GERD without esophagitis     last assessed 10/12/17   • GI problem    • Headache, tension-type    • Irritable bowel syndrome    • Joint pain    • Kidney stone    • Lung nodule     last assessed 10/9/15   • Migraine    • Muscle pain    • Peripheral neuropathy    • Sleep apnea        Objective:    Vitals: Blood pressure 118/60, height 5' 3" (1.6 m), weight 98 kg (216 lb). Body mass index is 38.26 kg/m². Physical Exam  Vitals reviewed. Constitutional:       General: She is not in acute distress. Appearance: Normal appearance. She is well-developed. She is not ill-appearing, toxic-appearing or diaphoretic. Cardiovascular:      Rate and Rhythm: Normal rate. Pulmonary:      Effort: Pulmonary effort is normal. No respiratory distress. Genitourinary:     General: Normal vulva. Exam position: Lithotomy position. Labia:         Right: No rash, tenderness, lesion or injury. Left: No rash, tenderness, lesion or injury. Comments: Cervix at the introitus without valsalva  Easily reducible  Pessary placed without difficulty  Skin:     General: Skin is warm and dry. Neurological:      Mental Status: She is alert and oriented to person, place, and time. Psychiatric:         Mood and Affect: Mood normal.         Behavior: Behavior normal.       Pessary    Date/Time: 9/18/2023 1:00 PM    Performed by: Sweetie Quinn MD  Authorized by: Sweetie Quinn MD  Universal Protocol:  Consent: Verbal consent obtained.   Risks and benefits: risks, benefits and alternatives were discussed  Consent given by: patient  Patient understanding: patient states understanding of the procedure being performed  Patient consent: the patient's understanding of the procedure matches consent given  Required items: required blood products, implants, devices, and special equipment available  Patient identity confirmed: verbally with patient      Indication:     Indication for pessary: uterine prolapse    Pre-procedure:     Pessary procedure type: Insertion  Problems:     Pessary complications: none    Procedure:     Pessary type:  Ring w/ support    Pessary size:  #3    Patient tolerance of procedure: Tolerated well, no immediate complications      Assessment/Plan:    Problem List Items Addressed This Visit        Unprioritized    Encounter for fitting and adjustment of pessary - Primary     Reviewed removal and placement  Reviewed cleaning with soap and water when necessary  May remove as needed and/or for intercourse  May use lubricant as necessary  Pessary placed without difficulty. Patient encouraged to walk around office and urinate prior to leaving.    RTO for TAISHA Cárdenas MD  9/18/2023  1:18 PM

## 2023-09-19 ENCOUNTER — OFFICE VISIT (OUTPATIENT)
Dept: BARIATRICS | Facility: CLINIC | Age: 48
End: 2023-09-19

## 2023-09-19 VITALS — HEART RATE: 76 BPM | SYSTOLIC BLOOD PRESSURE: 110 MMHG | DIASTOLIC BLOOD PRESSURE: 80 MMHG

## 2023-09-19 DIAGNOSIS — Z90.3 H/O GASTRIC SLEEVE: ICD-10-CM

## 2023-09-19 DIAGNOSIS — Z48.815 ENCOUNTER FOR SURGICAL AFTERCARE FOLLOWING SURGERY OF DIGESTIVE SYSTEM: Primary | ICD-10-CM

## 2023-09-19 DIAGNOSIS — K21.9 HIATAL HERNIA WITH GERD WITHOUT ESOPHAGITIS: ICD-10-CM

## 2023-09-19 DIAGNOSIS — K44.9 HIATAL HERNIA WITH GERD WITHOUT ESOPHAGITIS: ICD-10-CM

## 2023-09-19 DIAGNOSIS — Z98.84 BARIATRIC SURGERY STATUS: ICD-10-CM

## 2023-09-19 DIAGNOSIS — E66.9 OBESITY, CLASS II, BMI 35-39.9: ICD-10-CM

## 2023-09-19 DIAGNOSIS — K91.2 POSTSURGICAL MALABSORPTION: ICD-10-CM

## 2023-09-19 RX ORDER — ONDANSETRON 4 MG/1
4 TABLET, FILM COATED ORAL EVERY 12 HOURS PRN
COMMUNITY
Start: 2023-09-01

## 2023-09-19 RX ORDER — OXYCODONE HYDROCHLORIDE AND ACETAMINOPHEN 5; 325 MG/1; MG/1
TABLET ORAL
COMMUNITY
Start: 2023-09-01

## 2023-09-19 NOTE — PROGRESS NOTES
Assessment/Plan:     Patient ID: Mak Vo is a 50 y.o. female. Bariatric Surgery Status/GERD    -s/p Vertical Sleeve Gastrectomy with Dr. Mnose Oliveros at [facility] in 08/09/2022. Presents to the office today to establish care with concerns of worsening reflux and hiatal hernia. Has been dealing with heartburn symptoms since 2009 however noticed after her sleeve gastrectomy worsened. She is currently taking nexium 40 mg twice a day and pepcid 40 mg PO QHS. Despite these medications, she is still having symptoms especially at night. Denies smoking. Occasionally use ETOH. Takes NSAIDs - toradol for her migraine and steroids PRN for migraines. Very rarely use of caffeinated beverages. Of note, had an EGD on 07/2023 - wants to discuss the next steps top help with her symptoms. [id number]  [address] [place] [address]  [phone]        DATE OF SERVICE:  7/27/23     PHYSICIAN(S):  Attending:   Le Fonseca MD      Fellow:   No Staff Documented         INDICATION:  Gastroesophageal reflux disease, unspecified whether esophagitis present     POST-OP DIAGNOSIS:  See the impression below.     PREPROCEDURE:  Informed consent was obtained for the procedure, including sedation. Risks of perforation, hemorrhage, adverse drug reaction and aspiration were discussed. The patient was placed in the left lateral decubitus position.     Patient was explained about the risks and benefits of the procedure. Risks including but not limited to bleeding, infection, and perforation were explained in detail. Also explained about less than 100% sensitivity with the exam and other alternatives.     PROCEDURE: EGD     DETAILS OF PROCEDURE:   Patient was taken to the procedure room where a time out was performed to confirm correct patient and correct procedure.  The patient underwent monitored anesthesia care, which was administered by an anesthesia professional. The patient's blood pressure, heart rate, level of consciousness, respirations and oxygen were monitored throughout the procedure. The scope was advanced to the second part of the duodenum. Retroflexion was performed in the fundus. The patient experienced no blood loss. The procedure was not difficult. The patient tolerated the procedure well. There were no apparent adverse events.      ANESTHESIA INFORMATION:  ASA: II  Anesthesia Type: IV Sedation with Anesthesia     MEDICATIONS:  No administrations occurring from 1215 to 1225 on 07/27/23         FINDINGS:  • 3 cm sliding hiatal hernia (type I hiatal hernia) - GE junction 33 cm from the incisors, diaphragmatic impression 36 cm from the incisors  • Healthy previous sleeve gastrectomy in the stomach  • Nodular mucosa in the body of the stomach; performed cold forceps biopsy for dysplasia screening  • The 1st part of the duodenum and 2nd part of the duodenum appeared normal.        SPECIMENS:  ID Type Source Tests Collected by Time Destination   1 : bx gastric nodule Tissue Stomach TISSUE EXAM Evelina Barton MD 7/27/2023 12:23 PM              IMPRESSION:  • 3 cm type I hiatal hernia  • Healthy previous sleeve gastrectomy in the stomach  • Nodular mucosa in the body of the stomach; performed cold forceps biopsy  • The 1st part of the duodenum and 2nd part of the duodenum appeared normal.        RECOMMENDATION:    Await pathology results                  Evelina Barton MD     Tissue Exam -  A. Stomach, bx gastric nodule:  -  Chronic inactive oxyntic gastritis and proton pump inhibitor treatment effect like changes. -  Negative for Helicobacter pylori, by immunostain.  -  Negative for intestinal metaplasia, dysplasia or carcinoma. PLAN:     - discuss returning back to see Dr. Robe Denson to discuss possible surgical options to help with her reflux. Hiatal hernia repair vs. RNYGB conversion.  Patient is open to surgical options.   - UGI order to evaluate anatomy. Ordered 24 ph study with manometry. She denies nickel allergies. Advised to stop her PPI for 5 days prior to study.   - GERD precautions provided  - Routine follow up with Dr. Heena Al to discuss surgical options. - Continue with healthy lifestyle, adequate protein intake of 60 gm, fluid intake of at least 64 oz. - Continue with MVI daily. Recommended to start on bariatric MVI and have labs check in 1 month. - Activity as tolerated. - Labs ordered and will adjust accordingly if any deficiency. - Follow up with RD and SW as needed. · Continued/Maintain healthy weight loss with good nutrition intakes. · Adequate hydration with at least 64oz. fluid intake. · Follow diet as discussed. · Follow vitamin and mineral recommendations as reviewed with you. · Exercise as tolerated. · Colonoscopy referral made: UTD  · Mammogram - UTD  · EGD - UTD    · Follow-up after 24 ph manometry. We kindly ask that your arrive 15 minutes before your scheduled appointment time with your provider to allow our staff to room you, get your vital signs and update your chart. · Get lab work done. Please call the office if you need a script. It is recommended to check with your insurance BEFORE getting labs done to make sure they are covered by your policy. · Call our office if you have any problems with abdominal pain especially associated with fever, chills, nausea, vomiting or any other concerns. · All  Post-bariatric surgery patients should be aware that very small quantities of any alcohol can cause impairment and it is very possible not to feel the effect. The effect can be in the system for several hours. It is also a stomach irritant. · It is advised to AVOID alcohol, Nonsteroidal antiinflammatory drugs (NSAIDS) and nicotine of all forms . Any of these can cause stomach irritation/pain. · Discussed the effects of alcohol on a bariatric patient and the increased impairment risk.      · Keep up the good work! Postsurgical Malabsorption   -At risk for malabsorption of vitamins/minerals secondary to malabsorption and restriction of intake from bariatric surgery  -NOT Currently taking adequate postop bariatric surgery vitamin supplementation  -Next set of bariatric labs ordered for approximately 1 month  -Patient received education about the importance of adhering to a lifelong supplementation regimen to avoid vitamin/mineral deficiencies      Diagnoses and all orders for this visit:    Encounter for surgical aftercare following surgery of digestive system  -     FL UPPER GI UGI; Future  -     Zinc; Future  -     Vitamin D 25 hydroxy; Future  -     Vitamin B12; Future  -     Vitamin B1, whole blood; Future  -     Vitamin A; Future  -     TIBC Panel (incl. Iron, TIBC, % Iron Saturation); Future  -     CBC and Platelet; Future  -     Comprehensive metabolic panel; Future  -     Ferritin; Future  -     PTH, intact; Future  -     Folate; Future    Bariatric surgery status  -     FL UPPER GI UGI; Future  -     Zinc; Future  -     Vitamin D 25 hydroxy; Future  -     Vitamin B12; Future  -     Vitamin B1, whole blood; Future  -     Vitamin A; Future  -     TIBC Panel (incl. Iron, TIBC, % Iron Saturation); Future  -     CBC and Platelet; Future  -     Comprehensive metabolic panel; Future  -     Ferritin; Future  -     PTH, intact; Future  -     Folate; Future  -     Espoh manometry/24hr ph; Future    Postsurgical malabsorption  -     FL UPPER GI UGI; Future  -     Zinc; Future  -     Vitamin D 25 hydroxy; Future  -     Vitamin B12; Future  -     Vitamin B1, whole blood; Future  -     Vitamin A; Future  -     TIBC Panel (incl. Iron, TIBC, % Iron Saturation); Future  -     CBC and Platelet; Future  -     Comprehensive metabolic panel; Future  -     Ferritin; Future  -     PTH, intact; Future  -     Folate;  Future    Hiatal hernia with GERD without esophagitis  -     Ambulatory referral to Bariatric Surgery  - FL UPPER GI UGI; Future  -     Zinc; Future  -     Vitamin D 25 hydroxy; Future  -     Vitamin B12; Future  -     Vitamin B1, whole blood; Future  -     Vitamin A; Future  -     TIBC Panel (incl. Iron, TIBC, % Iron Saturation); Future  -     CBC and Platelet; Future  -     Comprehensive metabolic panel; Future  -     Ferritin; Future  -     PTH, intact; Future  -     Folate; Future  -     Espoh manometry/24hr ph; Future    H/O gastric sleeve  -     Ambulatory referral to Bariatric Surgery  -     FL UPPER GI UGI; Future  -     Zinc; Future  -     Vitamin D 25 hydroxy; Future  -     Vitamin B12; Future  -     Vitamin B1, whole blood; Future  -     Vitamin A; Future  -     TIBC Panel (incl. Iron, TIBC, % Iron Saturation); Future  -     CBC and Platelet; Future  -     Comprehensive metabolic panel; Future  -     Ferritin; Future  -     PTH, intact; Future  -     Folate; Future  -     Espoh manometry/24hr ph; Future    Obesity, Class II, BMI 35-39.9  -     FL UPPER GI UGI; Future  -     Zinc; Future  -     Vitamin D 25 hydroxy; Future  -     Vitamin B12; Future  -     Vitamin B1, whole blood; Future  -     Vitamin A; Future  -     TIBC Panel (incl. Iron, TIBC, % Iron Saturation); Future  -     CBC and Platelet; Future  -     Comprehensive metabolic panel; Future  -     Ferritin; Future  -     PTH, intact; Future  -     Folate; Future    Other orders  -     ondansetron (ZOFRAN) 4 mg tablet; Take 4 mg by mouth every 12 (twelve) hours as needed (Patient not taking: Reported on 9/19/2023)  -     oxyCODONE-acetaminophen (PERCOCET) 5-325 mg per tablet         Subjective:      Patient ID: Kristy Silva is a 50 y.o. female. -s/p Vertical Sleeve Gastrectomy with Dr. Jodi Fuller at [facility] in 08/09/2022. Presents to the office today to establish care with concerns of worsening reflux and hiatal hernia. Has been dealing with heartburn symptoms since 2009 however noticed after her sleeve gastrectomy worsened.  She is currently taking nexium 40 mg twice a day and pepcid 40 mg PO QHS. Despite these medications, she is still having symptoms especially at night. Denies smoking. Occasionally use ETOH. Takes NSAIDs - toradol for her migraine and steroids PRN for migraines. Very rarely use of caffeinated beverages. Initial: 263 lbs  Current: 216 lbs  EWL: (Weight loss is ahead of schedule at this post surgical period.)  Carroll: 211 lbs  Current BMI is There is no height or weight on file to calculate BMI. · Tolerating a regular diet-yes  · Eating at least 60 grams of protein per day-yes  · Following 30/60 minute rule with liquids-yes most of the time. Encouraged this. · Drinking at least 64 ounces of fluid per day-yes  · Drinking carbonated beverages-no  · Sufficient exercise-no - feeling very fatigue. · Using NSAIDs regularly-yes  · Using nicotine-no  · Using alcohol-rarely   · Supplements: Multivitamins, Calcium  and biotin - regular MVI with iron + 600 mg of calcium     · EWL is 82%, which places the patient ahead of schedule for expected post surgical weight loss at this time. The following portions of the patient's history were reviewed and updated as appropriate: allergies, current medications, past family history, past medical history, past social history, past surgical history and problem list.    Review of Systems   Constitutional: Positive for fatigue and unexpected weight change. Respiratory: Negative. Cardiovascular: Negative. Gastrointestinal: Positive for constipation. Heartburn     Musculoskeletal: Negative. Neurological: Negative. Psychiatric/Behavioral: Negative. Objective:    /80   Pulse 76      Physical Exam  Vitals and nursing note reviewed. Constitutional:       Appearance: Normal appearance. She is obese. Cardiovascular:      Rate and Rhythm: Normal rate and regular rhythm. Pulses: Normal pulses. Heart sounds: Normal heart sounds.    Pulmonary: Effort: Pulmonary effort is normal.      Breath sounds: Normal breath sounds. Abdominal:      General: Bowel sounds are normal.      Palpations: Abdomen is soft. Tenderness: There is no abdominal tenderness. Musculoskeletal:         General: Normal range of motion. Skin:     General: Skin is warm and dry. Neurological:      General: No focal deficit present. Mental Status: She is alert and oriented to person, place, and time. Psychiatric:         Mood and Affect: Mood normal.         Behavior: Behavior normal.         Thought Content:  Thought content normal.         Judgment: Judgment normal.

## 2023-09-19 NOTE — TELEPHONE ENCOUNTER
Yes. I can see her tomorrow at 12:00. She will likely need a different sized pessary so we will refit. Thanks!

## 2023-09-20 ENCOUNTER — TELEPHONE (OUTPATIENT)
Dept: OBGYN CLINIC | Facility: CLINIC | Age: 48
End: 2023-09-20

## 2023-09-20 ENCOUNTER — OFFICE VISIT (OUTPATIENT)
Dept: OBGYN CLINIC | Facility: CLINIC | Age: 48
End: 2023-09-20

## 2023-09-20 VITALS — BODY MASS INDEX: 37.91 KG/M2 | SYSTOLIC BLOOD PRESSURE: 120 MMHG | WEIGHT: 214 LBS | DIASTOLIC BLOOD PRESSURE: 72 MMHG

## 2023-09-20 DIAGNOSIS — Z46.89 ENCOUNTER FOR FITTING AND ADJUSTMENT OF PESSARY: Primary | ICD-10-CM

## 2023-09-20 NOTE — TELEPHONE ENCOUNTER
Patient called to let us know that her pessary fell out while she was using the restroom. She would like a call back on what she can do moving forward.

## 2023-09-20 NOTE — PROGRESS NOTES
Pessary    Date/Time: 9/20/2023 12:00 PM    Performed by: Maru Kaba MD  Authorized by: Maru Kaba MD  Universal Protocol:  Consent: Verbal consent obtained. Risks and benefits: risks, benefits and alternatives were discussed  Consent given by: patient  Patient understanding: patient states understanding of the procedure being performed  Patient consent: the patient's understanding of the procedure matches consent given  Required items: required blood products, implants, devices, and special equipment available  Patient identity confirmed: verbally with patient      Indication:     Indication for pessary: uterine prolapse    Pre-procedure:     Pessary procedure type:  Resize  Problems:     Pessary complications: pessary fell out/lost    Procedure:     Pessary type:  Ring w/ support    Pessary size:  #6    Patient tolerance of procedure: Tolerated well, no immediate complications  Comments:     Procedure comments:  Jefferson Capellan was initially fitted for a #3 pessary with support. The pessary was placed on Monday without difficulty but she reports that it fell out and she has been unable to keep it in place. She was refitted today and we were able to send her home with a #6 pessary with support. She was able to remove it herself after a few tries but was unable to get it entirely in. She would like to keep trying at home and reports that it is comfortable (after moving around the office and attempting urination with it in). She will RTO for her annual exam and will call with any pessary issues. Martha Ng.  Venkat Nicholson MD  OB/GYN  9/20/2023  12:30 PM

## 2023-09-20 NOTE — TELEPHONE ENCOUNTER
Patient scheduled for pessary reinsertion tomorrow w/ Dr. Nayana Asif in [facility] office.

## 2023-09-21 ENCOUNTER — PROCEDURE VISIT (OUTPATIENT)
Dept: OBGYN CLINIC | Facility: CLINIC | Age: 48
End: 2023-09-21
Payer: COMMERCIAL

## 2023-09-21 VITALS
HEIGHT: 63 IN | BODY MASS INDEX: 37.74 KG/M2 | WEIGHT: 213 LBS | SYSTOLIC BLOOD PRESSURE: 118 MMHG | DIASTOLIC BLOOD PRESSURE: 74 MMHG

## 2023-09-21 DIAGNOSIS — Z01.818 PREOPERATIVE EXAM FOR GYNECOLOGIC SURGERY: ICD-10-CM

## 2023-09-21 DIAGNOSIS — N81.4 PROLAPSED UTERUS: Primary | ICD-10-CM

## 2023-09-21 PROCEDURE — 99214 OFFICE O/P EST MOD 30 MIN: CPT | Performed by: STUDENT IN AN ORGANIZED HEALTH CARE EDUCATION/TRAINING PROGRAM

## 2023-09-21 NOTE — PROGRESS NOTES
Assessment/Plan:  Aurora Coyle is a 50 y.o. [id number] who presents for pessary replacement    1. Prolapsed uterus  US pelvis complete w transvaginal      2. Preoperative exam for gynecologic surgery  US pelvis complete w transvaginal         · Discussed replacing pessary, switching to gellhorn and those limitations   · Ready to proceed with surgery  · Extensively reviewed risks and benefits of ongoing medical management, endometrial ablation, and hysterectomy. Patient opts for definitive surgical management with a total laparoscopic hysterectomy and bilateral salpingectomy and cystoscopy. · Reviewed possibility of revealing underlying bladder issues that may require urogyn evaluation and treatment, offered urogyn eval prior to surgery, but prefers to have hyst and if any issues followup with them prn  · Reviewed procedure in detail and risks including but not limited to VTE/stroke, bleeding that may require transfusion, infection, injury to surrounding structures including bowel or bladder that may require additional procedures or surgeries. Reviewed possibility of open incision if any unanticipated difficulty or complications. · Will be switching to medicare (fibromyalgia, severe migraines) from [facility] in October  · UCx today  · Pelvic US  All questions answered to the best of my ability. Subjective:      Patient ID: Aurora Coyle is a 50 y.o. female.     HPI  Fitted for #3 pessary, fell out multiple times  Re-fitted for #6 pessary, but this also fell out after her visit  No longer wants to try pessary  Felt like when pessary was in felt like needed to urinary more often, frequency  No ABDIRAHMAN  Some dysuria--had been there since dx with kidney stones  No hematuria    Two vaginal deliveries  S/p gastric sleeve  lsc gallbladder     The following portions of the patient's history were reviewed and updated as appropriate: allergies, current medications, past medical history, past social history, past surgical history and problem list.    Review of Systems   HENT: Negative for trouble swallowing. Respiratory: Negative for shortness of breath. Cardiovascular: Negative for chest pain. Gastrointestinal: Negative for abdominal pain. Genitourinary: Positive for dyspareunia, dysuria and pelvic pain. Negative for menstrual problem. Neurological: Negative for seizures. Objective:  /74 (BP Location: Left arm, Patient Position: Sitting, Cuff Size: Large)   Ht 5' 3" (1.6 m)   Wt 96.6 kg (213 lb)   LMP  (LMP Unknown)   BMI 37.73 kg/m²      Physical Exam  Eyes:      Extraocular Movements: Extraocular movements intact. Conjunctiva/sclera: Conjunctivae normal.   Cardiovascular:      Rate and Rhythm: Normal rate and regular rhythm. Heart sounds: Normal heart sounds. Pulmonary:      Effort: Pulmonary effort is normal.      Breath sounds: Normal breath sounds. Abdominal:      General: There is no distension. Palpations: Abdomen is soft. Musculoskeletal:         General: Normal range of motion. Cervical back: Normal range of motion. Skin:     General: Skin is warm and dry. Neurological:      General: No focal deficit present. Psychiatric:         Mood and Affect: Mood normal.         Behavior: Behavior normal.         Thought Content:  Thought content normal.

## 2023-09-22 ENCOUNTER — TELEPHONE (OUTPATIENT)
Dept: GASTROENTEROLOGY | Facility: HOSPITAL | Age: 48
End: 2023-09-22

## 2023-09-26 ENCOUNTER — EVALUATION (OUTPATIENT)
Dept: PHYSICAL THERAPY | Facility: REHABILITATION | Age: 48
End: 2023-09-26
Payer: COMMERCIAL

## 2023-09-26 DIAGNOSIS — M75.81 ROTATOR CUFF TENDINITIS, RIGHT: ICD-10-CM

## 2023-09-26 DIAGNOSIS — M75.21 BICEPS TENDINITIS OF RIGHT SHOULDER: ICD-10-CM

## 2023-09-26 PROCEDURE — 97161 PT EVAL LOW COMPLEX 20 MIN: CPT | Performed by: PHYSICAL THERAPIST

## 2023-09-26 NOTE — PROGRESS NOTES
PT Evaluation     Today's date: 2023  Patient name: Aurora Coyle  : 1975  MRN: [id number]  Referring provider: Silvia Benitez DO  Dx:   Encounter Diagnosis     ICD-10-CM    1. Biceps tendinitis of right shoulder  M75.21 Ambulatory Referral to Physical Therapy      2. Rotator cuff tendinitis, right  M75.81 Ambulatory Referral to Physical Therapy          Start Time: 1110  Stop Time: 1140  Total time in clinic (min): 30 minutes    Assessment  Assessment details: Problem List:  1) weakness of R RTC    Aurora Coyle is a pleasant 50 y.o. female who presents with R shoulder pain that has been bothering her since .  she has global weakness of her R shld, but most noticeable into ER, resulting in the pain she is experiencing, worry over not knowing what's wrong, concern at no signs of improvement and fear of not being able to keep active. No further referral appears necessary at this time based upon examination results. I expect she will improve in 6-8 weeks. Positive prognostic indicators include positive attitude toward recovery and good understanding of diagnosis and treatment plan options. Negative prognostic indicators include chronicity of symptoms, high symptom irritability, fibromyalgia and obesity. Kaylan Dunn would benefit from skilled physical therapy to address her shoulder weakness and allow her to reach and lift without pain. Impairments: abnormal or restricted ROM, activity intolerance, impaired physical strength, lacks appropriate home exercise program, pain with function, weight-bearing intolerance and poor posture     Symptom irritability: highUnderstanding of Dx/Px/POC: good   Prognosis: good    Goals  ST-4 weeks  Patient will be independent with home exercise program.   Patient will be able to manage symptoms independently.   Patient will decrease pain by 25-50%    LTG: by discharge  Patient will improve FOTO to goal  Patient will report minimal (1-2/10) pain with aggravating activities to display improvements in overall functional status  Patient will perform full pain free AROM    Plan  Patient would benefit from: skilled physical therapy  Planned modality interventions: cryotherapy, thermotherapy: hydrocollator packs and unattended electrical stimulation  Planned therapy interventions: IADL retraining, joint mobilization, manual therapy, massage, ADL training, activity modification, abdominal trunk stabilization, ADL retraining, balance, balance/weight bearing training, neuromuscular re-education, body mechanics training, behavior modification, strengthening, stretching, therapeutic activities, therapeutic exercise, therapeutic training, transfer training, graded exercise, graded motor, home exercise program, graded activity, gait training, functional ROM exercises, patient education, postural training, IASTM, kinesiology taping and flexibility  Frequency: 2x week  Duration in visits: 16  Duration in weeks: 8  Treatment plan discussed with: patient        Subjective Evaluation    History of Present Illness  Mechanism of injury: Isa Feldman is a 50 y.o. female presenting to physical therapy on 23 with referral from MD for R shoulder pain that began in  when she was lifting a heavy bag. Does not report a popping at that time. Reports pain the next day. She had 2 injections which did help her pain. Does go to the gym on her own and would like to know what exercises she can and can't do. Denies numbness/tingling. Denies pain down the arm. Feels clicking and cracking in her shoulder. Pain is on top of the shoulder and is worse when reaching and lifting.          Quality of life: good    Patient Goals  Patient goals for therapy: increased strength, independence with ADLs/IADLs, return to sport/leisure activities, decreased pain and increased motion  Patient goal: reaching overhead, lifting stuff without pain  Pain  Current pain ratin  At best pain rating: 3  At worst pain rating: 8  Location: top of R shoulder  Quality: sharp and dull ache  Relieving factors: change in position, relaxation and rest  Aggravating factors: lifting and overhead activity  Progression: improved    Treatments  Previous treatment: injection treatment        Objective  Myotomes all intact b/l  Dermatome: all intact b/l                    Palpation: TTP over infraspinatus, teres minor, UT, LHB           MMT         AROM          PROM    Shoulder       L       R        L           R      L     R   Flex.  5 4 [facility] WFL   Abd. 5 4 [facility] WFL   IR. 5 5 WFL [facility]   ER. 5 4 WFL [facility] WFL WFL            Rhomboid         Mid Trap         Low Trap         Serratus         Infraspnatus         Teres Major         Sub Scap             Rotator Cuff Testing:  ER Lag: negative   Drop Arm: negative   Painful Arc Sign: positive    Impingement Testing:   Infraspinatus MMT: positive  Painful Arc Sign: positive    Thoracic Spine:            Segmental mobility: GHJ: hypomobile into posterior/inferior glides bilaterally        Precautions: GERD, fibromyalgia      Manuals 9/26                                                                Neuro Re-Ed 9/26            No money OTB HEP            XS             rows             Wall slides             Bottoms up                                       Ther Ex                                                                                                                     Ther Activity                                       Gait Training                                       Modalities

## 2023-09-27 ENCOUNTER — TELEPHONE (OUTPATIENT)
Dept: GASTROENTEROLOGY | Facility: HOSPITAL | Age: 48
End: 2023-09-27

## 2023-09-27 DIAGNOSIS — G43.711 INTRACTABLE CHRONIC MIGRAINE WITHOUT AURA AND WITH STATUS MIGRAINOSUS: Primary | ICD-10-CM

## 2023-09-28 ENCOUNTER — TELEPHONE (OUTPATIENT)
Dept: NEUROLOGY | Facility: CLINIC | Age: 48
End: 2023-09-28

## 2023-09-28 NOTE — TELEPHONE ENCOUNTER
Patient rescheduled botox f/u appointment for today with [facility] because she is having bad vertigo and migraines.  Patient is scheduled for 12/8/23 at 7:30am

## 2023-09-29 ENCOUNTER — HOSPITAL ENCOUNTER (OUTPATIENT)
Dept: RADIOLOGY | Facility: HOSPITAL | Age: 48
Discharge: HOME | End: 2023-09-29
Payer: COMMERCIAL

## 2023-09-29 DIAGNOSIS — Z98.84 BARIATRIC SURGERY STATUS: ICD-10-CM

## 2023-09-29 DIAGNOSIS — K91.2 POSTSURGICAL MALABSORPTION: ICD-10-CM

## 2023-09-29 DIAGNOSIS — Z48.815 ENCOUNTER FOR SURGICAL AFTERCARE FOLLOWING SURGERY OF DIGESTIVE SYSTEM: ICD-10-CM

## 2023-09-29 DIAGNOSIS — E66.9 OBESITY, CLASS II, BMI 35-39.9: ICD-10-CM

## 2023-09-29 DIAGNOSIS — Z90.3 H/O GASTRIC SLEEVE: ICD-10-CM

## 2023-09-29 DIAGNOSIS — K21.9 HIATAL HERNIA WITH GERD WITHOUT ESOPHAGITIS: ICD-10-CM

## 2023-09-29 DIAGNOSIS — K44.9 HIATAL HERNIA WITH GERD WITHOUT ESOPHAGITIS: ICD-10-CM

## 2023-09-29 PROCEDURE — 74240 X-RAY XM UPR GI TRC 1CNTRST: CPT

## 2023-10-05 ENCOUNTER — HOSPITAL ENCOUNTER (OUTPATIENT)
Dept: GASTROENTEROLOGY | Facility: HOSPITAL | Age: 48
End: 2023-10-05
Payer: MEDICARE

## 2023-10-05 VITALS
RESPIRATION RATE: 16 BRPM | HEART RATE: 75 BPM | DIASTOLIC BLOOD PRESSURE: 88 MMHG | TEMPERATURE: 97.9 F | OXYGEN SATURATION: 98 % | SYSTOLIC BLOOD PRESSURE: 131 MMHG

## 2023-10-05 DIAGNOSIS — K21.9 HIATAL HERNIA WITH GERD WITHOUT ESOPHAGITIS: ICD-10-CM

## 2023-10-05 DIAGNOSIS — Z98.84 BARIATRIC SURGERY STATUS: ICD-10-CM

## 2023-10-05 DIAGNOSIS — K44.9 HIATAL HERNIA WITH GERD WITHOUT ESOPHAGITIS: ICD-10-CM

## 2023-10-05 DIAGNOSIS — Z90.3 H/O GASTRIC SLEEVE: ICD-10-CM

## 2023-10-05 PROCEDURE — 91010 ESOPHAGUS MOTILITY STUDY: CPT

## 2023-10-05 PROCEDURE — 91038 ESOPH IMPED FUNCT TEST > 1HR: CPT

## 2023-10-05 RX ADMIN — TOPICAL ANESTHETIC 2 SPRAY: 200 SPRAY DENTAL; PERIODONTAL at 11:44

## 2023-10-05 NOTE — PERIOPERATIVE NURSING NOTE
Patient brought in the room and explained the esophageal manometry procedure. After the confirmation of allergies, hurricaine spray one given via oral cavity and  a transnasal insertion of the High Resolution esophageal manometry catheter was inserted via left nostril by Carlos Singletary RN . Patient given water to drink during the insertion and once the catheter inserted pressure bands of both Upper esophageal sphincter  (UES) and Lower esophageal sphincter ( LES) were observed on the color contour. Patient instructed to take a deep breath to verify placement of the catheter, diaphragmatic pinch noted on inspiration. Catheter was secured to left cheek. Patient was assisted to supine position . Patient was instructed to relax  while acclimating the catheter for about 5 minutes. A 30 second baseline resting pressure was obtained to identify the UES and LES followed by a series of 10 liquid swallows using 5 cc room temperature normal saline to assess esophageal motility and bolus transit. Patient administered 10 viscous swallows using 5 cc viscous solution, 1 multiple rapid drink swallow using 2 cc room temperature normal saline given a total of 5 drinks. Patient instructed to sit up at the edge of the stretcher and given 5 upright liquid swallows using 5 cc room temperature normal saline and 1 rapid drink challenge using 190 cc room temperature water. At the end of the procedure the high resolution esophageal manometry catheter was removed from the nostril intact. sensor PH probe inserted via left nostril and secured by Veronica Willams RN. Zephr recorder teachback performed and patient verbalized understanding. Patient instructed to return next day to have probe remove. Discharge instructions given and patient ambulated out of room in stable condition.

## 2023-10-06 ENCOUNTER — OFFICE VISIT (OUTPATIENT)
Dept: INTERNAL MEDICINE CLINIC | Facility: CLINIC | Age: 48
End: 2023-10-06

## 2023-10-06 VITALS
DIASTOLIC BLOOD PRESSURE: 57 MMHG | BODY MASS INDEX: 37.56 KG/M2 | WEIGHT: 212 LBS | TEMPERATURE: 97.9 F | HEIGHT: 63 IN | HEART RATE: 82 BPM | SYSTOLIC BLOOD PRESSURE: 110 MMHG

## 2023-10-06 DIAGNOSIS — E66.01 OBESITY, MORBID, BMI 40.0-49.9 (HCC): ICD-10-CM

## 2023-10-06 DIAGNOSIS — K21.9 GERD WITHOUT ESOPHAGITIS: Primary | ICD-10-CM

## 2023-10-06 DIAGNOSIS — Z90.3 H/O GASTRIC SLEEVE: ICD-10-CM

## 2023-10-06 PROCEDURE — 99214 OFFICE O/P EST MOD 30 MIN: CPT | Performed by: INTERNAL MEDICINE

## 2023-10-06 NOTE — PROGRESS NOTES
Name: Mirtha Monday      : 1975      MRN: [id number]  Encounter Provider: Vipin Nguyen MD  Encounter Date: 10/6/2023   Encounter department: [address]     1. GERD without esophagitis  Currently undergoing 24hr PH monitoring so not on nexium until test is done but most recent EGD shows no concern for peguero's and bariatric surgery discussing potential bypass to help with weight loss and gerd. Will continue to monitor     2. Obesity, morbid, BMI 40.0-49.9 ([address])      3. H/O gastric sleeve  Concern for malabsorption 2/2 gastric sleeve. Vitamin studies ordered. Patient started on bariatric vitamin now. Subjective      HPI     51 yo F with hx of gastric sleeve, obesity, depression, chronic migraines presenting with complaint of low energy. Patient had her gastric sleeve procedure 1 year ago and states that for the last 4-6 moths she has felt very fatigued and low energy. She usually takes a normal multivitamin, does not take bariatric vitamins and has noticed severe reflux. Patient endorses having reflux prior to her procedure but since after the procedure her reflux has become worse to the point that she gets pain after eating no matter what. She is usually on Nexium and Pepcid and follows with GI and bariatric surgery. She had an EGD in July that was normal. Bariatric surgeon saw her 2 weeks ago and believe that her low energy may be due to malabsorption and ordered vitamin studies. Patient denies chest pain, SOB, fever/chills. Review of Systems   Constitutional: Negative for chills and fever. HENT: Negative for ear pain and sore throat. Eyes: Negative for pain and visual disturbance. Respiratory: Negative for cough and shortness of breath. Cardiovascular: Negative for chest pain and palpitations. Gastrointestinal: Negative for abdominal pain and vomiting. Genitourinary: Negative for dysuria and hematuria. Musculoskeletal: Negative for arthralgias and back pain. Skin: Negative for color change and rash. Neurological: Negative for seizures and syncope. All other systems reviewed and are negative. Current Outpatient Medications on File Prior to Visit   Medication Sig   • acetaZOLAMIDE (DIAMOX) 250 mg tablet 1 tab qam   • albuterol (PROVENTIL HFA,VENTOLIN HFA) 90 mcg/act inhaler Inhale 2 puffs every 6 (six) hours as needed for wheezing or shortness of breath (Patient not taking: Reported on 9/19/2023)   • Botox 200 units SOLR  (Patient not taking: Reported on 9/19/2023)   • buPROPion (Wellbutrin XL) 150 mg 24 hr tablet Take 1 tablet (150 mg total) by mouth every morning   • cholecalciferol (VITAMIN D3) 1,000 units tablet Take 1 capsule by mouth once a week 3000 units daily  (Patient not taking: Reported on 9/18/2023)   • cholestyramine (QUESTRAN) 4 GM/DOSE powder Take 1 packet (4 g total) by mouth 2 (two) times a day with meals (Patient not taking: Reported on 9/19/2023)   • dexamethasone (DECADRON) 2 mg tablet 1 tab qam with food prn migraine. (Patient not taking: Reported on 9/19/2023)   • Diclofenac Sodium (VOLTAREN) 1 % Apply 2 g topically 4 (four) times a day (Patient not taking: Reported on 9/19/2023)   • dicyclomine (BENTYL) 20 mg tablet Take 1 tablet (20 mg total) by mouth 3 (three) times a day as needed (migraine/cramps)   • divalproex sodium (DEPAKOTE) 250 mg EC tablet 2 tabs q12 hours x 2 days, then stop. Repeat if needed. (Patient not taking: Reported on 9/19/2023)   • esomeprazole (NexIUM) 40 MG capsule Take 1 capsule (40 mg total) by mouth 2 (two) times a day before meals   • famotidine (PEPCID) 40 MG tablet Take 1 tablet (40 mg total) by mouth daily at bedtime   • fluticasone (FLONASE) 50 mcg/act nasal spray 1 spray in each nostril once daily   • ketorolac (TORADOL) 10 mg tablet Take 1 tablet (10 mg total) by mouth every 6 (six) hours as needed (migraine) Max 2-3 per week.    • ketorolac (TORADOL) 30 mg/mL injection 1-2 ml IM injection once per 24 hours p.r.n. migraine. No more than 2 injections per week. (Patient not taking: Reported on 9/19/2023)   • lasmiditan succinate (Reyvow) 50 mg tablet One tab qhs at migraine onset. Caution sedation. Max 1 tab/24 hours.  (Patient not taking: Reported on 9/19/2023)   • levonorgestrel (MIRENA) 20 MCG/24HR IUD 1 each by Intrauterine route once   • meclizine (ANTIVERT) 12.5 MG tablet Take 1 tablet (12.5 mg total) by mouth 3 (three) times a day as needed for dizziness or nausea (Patient not taking: Reported on 9/19/2023)   • melatonin 3 mg  (Patient not taking: Reported on 9/19/2023)   • Melatonin ER 3 MG TBCR 1-2 tabs qhs (Patient not taking: Reported on 9/18/2023)   • meloxicam (Mobic) 7.5 mg tablet Take 1 tablet (7.5 mg total) by mouth daily for 7 days (Patient not taking: Reported on 9/19/2023)   • NON FORMULARY Medical marijuana   (Patient not taking: Reported on 9/19/2023)   • ondansetron (ZOFRAN) 4 mg tablet Take 4 mg by mouth every 12 (twelve) hours as needed (Patient not taking: Reported on 9/19/2023)   • oxyCODONE-acetaminophen (PERCOCET) 5-325 mg per tablet    • polyethylene glycol (GLYCOLAX) 17 GM/SCOOP powder Take 17 g by mouth daily (Patient not taking: Reported on 9/18/2023)   • prochlorperazine (COMPAZINE) 10 mg tablet Take 1 tablet (10 mg total) by mouth every 6 (six) hours as needed for nausea or vomiting (Patient not taking: Reported on 9/19/2023)   • Riboflavin (Vitamin B-2) 100 MG TABS Take 400 mg by mouth daily (Patient not taking: Reported on 9/18/2023)   • sodium picosulfate, magnesium oxide, citric acid (Clenpiq) oral solution Follow office instructions (Patient not taking: Reported on 7/24/2023)   • Sodium Sulfate-Mag Sulfate-KCl (Sutab) 4140-013-801 MG TABS Use as needed as instructed by office staff (Patient not taking: Reported on 7/24/2023)   • Syringe/Needle, Disp, (SYRINGE 3CC/75JE0-5/2") 25G X 1-1/2" 3 ML MISC Use for toradol IM injections. (Patient not taking: Reported on 9/18/2023)   • tiZANidine (ZANAFLEX) 2 mg tablet Take 1 tablet (2 mg total) by mouth every 8 (eight) hours as needed for muscle spasms (Patient not taking: Reported on 9/19/2023)   • Trudhesa 0.725 MG/ACT AERS INSTILL ONE SPRAY INTO EACH NOSTRIL AS NEEDED AT ONSET OF MIGRAINE MAY REPEAT IN TWO HOURS MAX OF TWO DOSES PER DAY MAX OF THREE DOSES PER W (Patient not taking: Reported on 9/19/2023)   • ZOLMitriptan (ZOMIG-ZMT) 5 MG disintegrating tablet Take 1 tablet (5 mg total) by mouth once as needed for migraine for up to 1 dose (Patient not taking: Reported on 9/19/2023)       Objective     /57 (BP Location: Right arm, Patient Position: Sitting, Cuff Size: Large)   Pulse 82   Temp 97.9 °F (36.6 °C) (Temporal)   Ht 5' 3" (1.6 m)   Wt 96.2 kg (212 lb)   LMP  (LMP Unknown)   BMI 37.55 kg/m²     Physical Exam  Constitutional:       Appearance: Normal appearance. She is obese. HENT:      Nose: Nose normal.      Mouth/Throat:      Mouth: Mucous membranes are moist.      Pharynx: Oropharynx is clear. Eyes:      Extraocular Movements: Extraocular movements intact. Conjunctiva/sclera: Conjunctivae normal.   Cardiovascular:      Rate and Rhythm: Normal rate and regular rhythm. Pulmonary:      Effort: Pulmonary effort is normal.      Breath sounds: Normal breath sounds. Abdominal:      General: Bowel sounds are normal.      Palpations: Abdomen is soft. Musculoskeletal:         General: Normal range of motion. Cervical back: Normal range of motion. Right lower leg: No edema. Left lower leg: No edema. Neurological:      General: No focal deficit present. Mental Status: She is alert and oriented to person, place, and time.        Heraclio Espinoza MD

## 2023-10-08 ENCOUNTER — HOSPITAL ENCOUNTER (OUTPATIENT)
Dept: RADIOLOGY | Facility: HOSPITAL | Age: 48
Discharge: HOME | End: 2023-10-08
Attending: STUDENT IN AN ORGANIZED HEALTH CARE EDUCATION/TRAINING PROGRAM
Payer: MEDICARE

## 2023-10-08 DIAGNOSIS — N81.4 PROLAPSED UTERUS: ICD-10-CM

## 2023-10-08 DIAGNOSIS — Z01.818 PREOPERATIVE EXAM FOR GYNECOLOGIC SURGERY: ICD-10-CM

## 2023-10-08 PROCEDURE — 76856 US EXAM PELVIC COMPLETE: CPT

## 2023-10-08 PROCEDURE — 76830 TRANSVAGINAL US NON-OB: CPT

## 2023-10-18 ENCOUNTER — APPOINTMENT (OUTPATIENT)
Dept: LAB | Facility: CLINIC | Age: 48
End: 2023-10-18
Payer: MEDICARE

## 2023-10-18 DIAGNOSIS — Z48.815 ENCOUNTER FOR SURGICAL AFTERCARE FOLLOWING SURGERY OF DIGESTIVE SYSTEM: ICD-10-CM

## 2023-10-18 DIAGNOSIS — Z90.3 H/O GASTRIC SLEEVE: ICD-10-CM

## 2023-10-18 DIAGNOSIS — K44.9 HIATAL HERNIA WITH GERD WITHOUT ESOPHAGITIS: ICD-10-CM

## 2023-10-18 DIAGNOSIS — E66.9 OBESITY, CLASS II, BMI 35-39.9: ICD-10-CM

## 2023-10-18 DIAGNOSIS — K21.9 HIATAL HERNIA WITH GERD WITHOUT ESOPHAGITIS: ICD-10-CM

## 2023-10-18 DIAGNOSIS — K91.2 POSTSURGICAL MALABSORPTION: ICD-10-CM

## 2023-10-18 DIAGNOSIS — Z98.84 BARIATRIC SURGERY STATUS: ICD-10-CM

## 2023-10-18 LAB
25(OH)D3 SERPL-MCNC: 26.8 NG/ML (ref 30–100)
ALBUMIN SERPL BCP-MCNC: 3.8 G/DL (ref 3.5–5)
ALP SERPL-CCNC: 103 U/L (ref 34–104)
ALT SERPL W P-5'-P-CCNC: 24 U/L (ref 7–52)
ANION GAP SERPL CALCULATED.3IONS-SCNC: 10 MMOL/L
AST SERPL W P-5'-P-CCNC: 41 U/L (ref 13–39)
BILIRUB SERPL-MCNC: 0.44 MG/DL (ref 0.2–1)
BUN SERPL-MCNC: 15 MG/DL (ref 5–25)
CALCIUM SERPL-MCNC: 8.8 MG/DL (ref 8.4–10.2)
CHLORIDE SERPL-SCNC: 109 MMOL/L (ref 96–108)
CO2 SERPL-SCNC: 22 MMOL/L (ref 21–32)
CREAT SERPL-MCNC: 0.73 MG/DL (ref 0.6–1.3)
ERYTHROCYTE [DISTWIDTH] IN BLOOD BY AUTOMATED COUNT: 14.6 % (ref 11.6–15.1)
FERRITIN SERPL-MCNC: 33 NG/ML (ref 11–307)
FOLATE SERPL-MCNC: >22.3 NG/ML
GFR SERPL CREATININE-BSD FRML MDRD: 97 ML/MIN/1.73SQ M
GLUCOSE P FAST SERPL-MCNC: 88 MG/DL (ref 65–99)
HCT VFR BLD AUTO: 42.8 % (ref 34.8–46.1)
HGB BLD-MCNC: 13.4 G/DL (ref 11.5–15.4)
IRON SATN MFR SERPL: 26 % (ref 15–50)
IRON SERPL-MCNC: 69 UG/DL (ref 50–212)
MCH RBC QN AUTO: 27 PG (ref 26.8–34.3)
MCHC RBC AUTO-ENTMCNC: 31.3 G/DL (ref 31.4–37.4)
MCV RBC AUTO: 86 FL (ref 82–98)
PLATELET # BLD AUTO: 297 THOUSANDS/UL (ref 149–390)
PMV BLD AUTO: 9.7 FL (ref 8.9–12.7)
POTASSIUM SERPL-SCNC: 3.5 MMOL/L (ref 3.5–5.3)
PROT SERPL-MCNC: 6.8 G/DL (ref 6.4–8.4)
PTH-INTACT SERPL-MCNC: 41.3 PG/ML (ref 12–88)
RBC # BLD AUTO: 4.96 MILLION/UL (ref 3.81–5.12)
SODIUM SERPL-SCNC: 141 MMOL/L (ref 135–147)
TIBC SERPL-MCNC: 266 UG/DL (ref 250–450)
UIBC SERPL-MCNC: 197 UG/DL (ref 155–355)
VIT B12 SERPL-MCNC: 193 PG/ML (ref 180–914)
WBC # BLD AUTO: 7.71 THOUSAND/UL (ref 4.31–10.16)

## 2023-10-18 PROCEDURE — 83970 ASSAY OF PARATHORMONE: CPT

## 2023-10-18 PROCEDURE — 80053 COMPREHEN METABOLIC PANEL: CPT

## 2023-10-18 PROCEDURE — 82746 ASSAY OF FOLIC ACID SERUM: CPT

## 2023-10-18 PROCEDURE — 84590 ASSAY OF VITAMIN A: CPT

## 2023-10-18 PROCEDURE — 82607 VITAMIN B-12: CPT

## 2023-10-18 PROCEDURE — 82728 ASSAY OF FERRITIN: CPT

## 2023-10-18 PROCEDURE — 84425 ASSAY OF VITAMIN B-1: CPT

## 2023-10-18 PROCEDURE — 36415 COLL VENOUS BLD VENIPUNCTURE: CPT

## 2023-10-18 PROCEDURE — 83540 ASSAY OF IRON: CPT

## 2023-10-18 PROCEDURE — 83550 IRON BINDING TEST: CPT

## 2023-10-18 PROCEDURE — 82306 VITAMIN D 25 HYDROXY: CPT

## 2023-10-18 PROCEDURE — 85027 COMPLETE CBC AUTOMATED: CPT

## 2023-10-18 PROCEDURE — 84630 ASSAY OF ZINC: CPT

## 2023-10-19 DIAGNOSIS — G43.711 INTRACTABLE CHRONIC MIGRAINE WITHOUT AURA AND WITH STATUS MIGRAINOSUS: Primary | ICD-10-CM

## 2023-10-22 LAB — VIT B1 BLD-SCNC: 99 NMOL/L (ref 66.5–200)

## 2023-10-24 LAB — VIT A SERPL-MCNC: 37.7 UG/DL (ref 20.1–62)

## 2023-10-25 ENCOUNTER — OFFICE VISIT (OUTPATIENT)
Dept: BARIATRICS | Facility: CLINIC | Age: 48
End: 2023-10-25
Payer: MEDICARE

## 2023-10-25 VITALS
DIASTOLIC BLOOD PRESSURE: 84 MMHG | SYSTOLIC BLOOD PRESSURE: 120 MMHG | BODY MASS INDEX: 37.56 KG/M2 | HEART RATE: 85 BPM | TEMPERATURE: 98.7 F | HEIGHT: 63 IN | WEIGHT: 212 LBS

## 2023-10-25 DIAGNOSIS — K21.9 GERD (GASTROESOPHAGEAL REFLUX DISEASE): Primary | ICD-10-CM

## 2023-10-25 DIAGNOSIS — Z98.84 BARIATRIC SURGERY STATUS: ICD-10-CM

## 2023-10-25 PROCEDURE — 99213 OFFICE O/P EST LOW 20 MIN: CPT | Performed by: SURGERY

## 2023-10-25 NOTE — PROGRESS NOTES
OFFICE VISIT - BARIATRIC SURGERY  Agustin Lafleur 50 y.o. female MRN: [id number]  Unit/Bed#:  Encounter: [id number]      HPI:  Agustin Lafleur is a 50 y.o. female status post sleeve gastrectomy by Dr. Mando Clinton on 8/2022. Comes to the office today with complaints of insignificant weight loss and GERD    Subjective     At the time of their surgery the patient was 264lbs, and was able to drop down as low as 210. Today, their weight is 210, with a BMI of 37.91. She had GERD prior to her sleeve gastrectomy which worsened following her procedure in 2022. She is here to establish care given her worsening GERD as well as insufficient weight loss. She is interested in gastric bypass. Review of Systems   All other systems reviewed and are negative.       Historical Information   Past Medical History:   Diagnosis Date    Anxiety     Asthma     Claustrophobia     Cluster headache     CTS (carpal tunnel syndrome)     Depression     Fibromyalgia     last assessed 11/27/17    Fibromyalgia, primary     GERD (gastroesophageal reflux disease)     GERD without esophagitis     last assessed 10/12/17    GI problem     Headache, tension-type     Irritable bowel syndrome     Joint pain     Kidney stone     Lung nodule     last assessed 10/9/15    Migraine     Muscle pain     Peripheral neuropathy     Sleep apnea      Past Surgical History:   Procedure Laterality Date    BACK SURGERY      BARIATRIC SURGERY  8/9/2022    CARPAL TUNNEL RELEASE Bilateral     CHOLECYSTECTOMY      CYSTOSCOPY      GALLBLADDER SURGERY      GASTRECTOMY  08/09/2022    Sleeve    NECK SURGERY      LA TENDON SHEATH INCISION Right 01/16/2023    Procedure: THUMB TRIGGER FINGER RELEASE;  Surgeon: Dolly Lucas MD;  Location: [facility] MAIN OR;  Service: Orthopedics    SACROILIAC JOINT FUSION Left     SPINAL FUSION      C4 and C5    ULNAR NERVE REPAIR Bilateral     UPPER GASTROINTESTINAL ENDOSCOPY       Social History   Social History     Substance and Sexual Activity Alcohol Use Yes    Alcohol/week: 2.0 standard drinks of alcohol    Types: 2 Glasses of wine per week    Comment: Occasionally     Social History     Substance and Sexual Activity   Drug Use Not Currently    Types: Marijuana    Comment: medicinal     Social History     Tobacco Use   Smoking Status Never   Smokeless Tobacco Never   Tobacco Comments    Never smoked. Objective       Current Vitals:   Blood Pressure: 120/84 (10/25/23 1311)  Pulse: 85 (10/25/23 1311)  Temperature: 98.7 °F (37.1 °C) (10/25/23 1311)  Temp Source: Tympanic (10/25/23 1311)  Height: 5' 2.7" (159.3 cm) (10/25/23 1311)  Weight - Scale: 96.2 kg (212 lb) (10/25/23 1311)    Invasive Devices       None                   Physical Exam  HENT:      Head: Normocephalic. Cardiovascular:      Rate and Rhythm: Normal rate. Pulses: Normal pulses. Pulmonary:      Effort: Pulmonary effort is normal.   Abdominal:      Palpations: Abdomen is soft. Skin:     General: Skin is warm. Neurological:      General: No focal deficit present. Mental Status: She is alert and oriented to person, place, and time. Pathology, and Other Studies: I have personally reviewed pertinent reports. Assessment/PLAN:    Gust Skiff is a  50 y.o. female status post sleeve gastrectomy by Dr. Scarlett Sky on 8/2022. Comes to the office today with complaints of insignificant weight loss and GERD. UGI  Narrative & Impression   UPPER GI SERIES SINGLE CONTRAST     INDICATION:  K44.9: Diaphragmatic hernia without obstruction or gangrene  K21.9: Gastro-esophageal reflux disease without esophagitis  Z90.3: Acquired absence of stomach (part of)  Z48.815: Encounter for surgical aftercare following surgery on the digestive system  Z98.84: Bariatric surgery status  K91.2: Postsurgical malabsorption, not elsewhere classified  E66.9: Obesity, unspecified.      COMPARISON: CT scan dated August 30, 2023     IMAGES:  37     FLUOROSCOPY TIME:  2 MIN 17 SEC TECHNIQUE:  The patient was given thick and thin barium by mouth and images of the esophagus, stomach, and small bowel were obtained. Patient cannot tolerate effervescent crystals. FINDINGS:   images of the chest and upper abdomen show cervical spine fusion hardware at C4-C5 and surgical clips in the upper abdomen from cholecystectomy and gastric sleeve surgery. The esophagus is normal in caliber. Esophageal motility is normal and emptying of contrast from the esophagus is prompt. There is no mucosal mass, ulceration or fold thickening identified. The contour of the stomach is in keeping with sleeve gastrectomy, however, the overall luminal diameter seems a little bit more prominent than typically seen after this type of procedure. Gastric mucosa appears grossly normal within the limits of this   exam. No obvious ulceration or mass. Contrast empties promptly into the duodenum. The duodenum is normal in caliber. The ligament of Treitz/duodenojejunal junction lies in a normal position. Gastroesophageal reflux was observed. There is a type I hiatal hernia. This seems similar to the recent prior CT. IMPRESSION:     Type I hiatal hernia. At least mild gastroesophageal reflux witnessed on this exam. Postop changes as mentioned, with overall girth of the stomach seems somewhat more prominent than typically seen after sleeve gastrectomy. EGD        Pathology from EGD         MANOMETRY/pH Study  Narrative & Impression   Indication-history of hiatal hernia     Esophageal manometry  Esophageal motility-5/10 swallows demonstrate normal esophageal contractile pattern with mean DCI of 429 mmHg. s.cm  5 out of 10 swallows demonstrated weak esophageal contractility pattern with 2 failed swallows     LES-median IRP is in normal limits     Impedance-liquid bolus swallow did not clear well for all swallows        Gastroesophageal junction relative to the LES measures 6.5 cm  Rapid swallow index is greater than 1     Classification-normal esophageal motility        24-hour pH study-GERD  Off PPI therapy     Acid exposure time in upright position is 3.5%  Acid exposure time in recumbent position is 1.6%     75 episodes of reflux overall  67 episode reflux in the upright position  8 episode reflux in recumbent position        Symptom correlation was significant with reported symptoms of food stuck in chest, gurgling in chest. Remaining reported symptoms were not significant        Based on acid exposure time acid reflux was borderline while a number episode of reflux and symptom correlation were beyond physiological range and in the pathological range            GASTRIC EMPTYING STUDY      --------------------------------------------------------------------    Level 3 revision for robotic karyn-anali gastric bypass given symptomatic GERD              Lazarus Piety MD  Bariatric Surgery  10/25/2023  1:30 PM

## 2023-10-27 ENCOUNTER — TELEPHONE (OUTPATIENT)
Dept: NEUROLOGY | Facility: CLINIC | Age: 48
End: 2023-10-27

## 2023-10-27 ENCOUNTER — HOSPITAL ENCOUNTER (OUTPATIENT)
Dept: INFUSION CENTER | Facility: HOSPITAL | Age: 48
Discharge: HOME | End: 2023-10-27
Attending: PSYCHIATRY & NEUROLOGY
Payer: MEDICARE

## 2023-10-27 VITALS
RESPIRATION RATE: 18 BRPM | DIASTOLIC BLOOD PRESSURE: 82 MMHG | SYSTOLIC BLOOD PRESSURE: 118 MMHG | TEMPERATURE: 97 F | HEART RATE: 78 BPM

## 2023-10-27 DIAGNOSIS — G43.711 INTRACTABLE CHRONIC MIGRAINE WITHOUT AURA AND WITH STATUS MIGRAINOSUS: Primary | ICD-10-CM

## 2023-10-27 LAB — ZINC SERPL-MCNC: 78 UG/DL (ref 44–115)

## 2023-10-27 PROCEDURE — 96365 THER/PROPH/DIAG IV INF INIT: CPT

## 2023-10-27 RX ORDER — SODIUM CHLORIDE 9 MG/ML
20 INJECTION, SOLUTION INTRAVENOUS ONCE
OUTPATIENT
Start: 2024-01-19

## 2023-10-27 RX ORDER — SODIUM CHLORIDE 9 MG/ML
20 INJECTION, SOLUTION INTRAVENOUS ONCE
Status: COMPLETED | OUTPATIENT
Start: 2023-10-27 | End: 2023-10-27

## 2023-10-27 RX ADMIN — SODIUM CHLORIDE 20 ML/HR: 0.9 INJECTION, SOLUTION INTRAVENOUS at 09:35

## 2023-10-27 RX ADMIN — EPTINEZUMAB-JJMR 100 MG: 100 INJECTION INTRAVENOUS at 09:35

## 2023-10-27 NOTE — TELEPHONE ENCOUNTER
Received message from [facility] asking me to TT dr tran or dr Jossie Argueta as she is not able to review plan. A physician must review. TT sent to dr tran and dr Jossie Argueta    Received TT from dr Jose Lei)  Unfortunately patient is not established with my practice, will not be able to help here  8:51 AM    Received TT from dr Jossie Argueta-  [place]([facility])  It’s just reviewing the plan itself. I was able to look at her prior progress note, I entered the review.     Teams message sent to sherry wong in infusion center

## 2023-10-27 NOTE — TELEPHONE ENCOUNTER
TT sent to [facility] to review    Teams message sent to [facility] and natanael ALVAREZ

## 2023-10-27 NOTE — TELEPHONE ENCOUNTER
rom: Yris Osorioies   Sent: 10/27/2023   7:41 AM EDT   To: Elton Domínguez RN; Monique Castro PA-C; *            Pt is scheduled today for her Vyepti and the plan needs review in order for us to [place]. Thank you so much!

## 2023-10-29 NOTE — TELEPHONE ENCOUNTER
August 6, 2023  Sandy Harris MD  to Sindi Espinoza PA-C • Lovely Howard MD          8/6/23  6:22 PM  Patient called with concerns of increased fatigue and sedation since infusion on Friday. She also feels if anything migraine is worse and took some compazine this am. She wanted to make her team aware to see if there was an explanation for this or any advise on how to proceed. fall precautions

## 2023-10-30 ENCOUNTER — TELEPHONE (OUTPATIENT)
Dept: BARIATRICS | Facility: CLINIC | Age: 48
End: 2023-10-30

## 2023-10-30 DIAGNOSIS — E53.8 VITAMIN B12 DEFICIENCY: ICD-10-CM

## 2023-10-30 DIAGNOSIS — Z98.84 BARIATRIC SURGERY STATUS: Primary | ICD-10-CM

## 2023-10-30 DIAGNOSIS — K91.2 POSTSURGICAL MALABSORPTION: ICD-10-CM

## 2023-10-30 DIAGNOSIS — E55.9 VITAMIN D DEFICIENCY: ICD-10-CM

## 2023-10-30 RX ORDER — CYANOCOBALAMIN 1000 UG/ML
250 INJECTION, SOLUTION INTRAMUSCULAR; SUBCUTANEOUS WEEKLY
Status: SHIPPED | OUTPATIENT
Start: 2023-10-31 | End: 2023-11-28

## 2023-10-30 RX ORDER — CYANOCOBALAMIN 1000 UG/ML
250 INJECTION, SOLUTION INTRAMUSCULAR; SUBCUTANEOUS
Status: SHIPPED | OUTPATIENT
Start: 2023-12-21 | End: 2024-03-20

## 2023-10-30 NOTE — TELEPHONE ENCOUNTER
Reached out to Pt re: labs. Pt stated she viewed results in her MyChart and is agreeable to B12 injections. Transferred Pt to  to schedule appts.

## 2023-10-30 NOTE — TELEPHONE ENCOUNTER
----- Message from Grayson Cadet sent at 10/30/2023  8:15 AM EDT -----  Please call pt with the following:     - Vitamin b12 level is very low. I would like her to come in to have B12 shots if she is ok with this. It is weekly x 4 weeks then monthly x 3 months. This may be the cause of her fatigue.     - vitamin D is low. Please start on vitamin D3 4000 IU daily to help with this. - the rest are within limits. Please continue with bariatric multivitamins and calcium as is.     - Repeat low levels in 4 months.      Steve Mckeon

## 2023-11-01 ENCOUNTER — OFFICE VISIT (OUTPATIENT)
Dept: BARIATRICS | Facility: CLINIC | Age: 48
End: 2023-11-01
Payer: COMMERCIAL

## 2023-11-01 DIAGNOSIS — E53.8 VITAMIN B12 DEFICIENCY: Primary | ICD-10-CM

## 2023-11-01 PROCEDURE — 96372 THER/PROPH/DIAG INJ SC/IM: CPT | Performed by: NURSE PRACTITIONER

## 2023-11-01 PROCEDURE — RECHECK

## 2023-11-01 RX ADMIN — CYANOCOBALAMIN 250 MCG: 1000 INJECTION, SOLUTION INTRAMUSCULAR; SUBCUTANEOUS at 09:18

## 2023-11-01 NOTE — PROGRESS NOTES
Pt tolerated procedure well, no issue noted          Bariatric surgery status    Postsurgical malabsorption    Vitamin B12 deficiency    Vitamin D deficiency
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
## 2023-11-08 ENCOUNTER — OFFICE VISIT (OUTPATIENT)
Dept: BARIATRICS | Facility: CLINIC | Age: 48
End: 2023-11-08
Payer: COMMERCIAL

## 2023-11-08 ENCOUNTER — OFFICE VISIT (OUTPATIENT)
Dept: BARIATRICS | Facility: CLINIC | Age: 48
End: 2023-11-08

## 2023-11-08 VITALS — WEIGHT: 215.8 LBS | BODY MASS INDEX: 38.59 KG/M2

## 2023-11-08 DIAGNOSIS — E53.8 VITAMIN B12 DEFICIENCY: Primary | ICD-10-CM

## 2023-11-08 DIAGNOSIS — E66.01 MORBID OBESITY (HCC): ICD-10-CM

## 2023-11-08 DIAGNOSIS — E66.01 OBESITY, MORBID, BMI 40.0-49.9 (HCC): ICD-10-CM

## 2023-11-08 DIAGNOSIS — K21.9 GERD WITHOUT ESOPHAGITIS: Primary | ICD-10-CM

## 2023-11-08 PROCEDURE — 96372 THER/PROPH/DIAG INJ SC/IM: CPT | Performed by: NURSE PRACTITIONER

## 2023-11-08 PROCEDURE — RECHECK: Performed by: DIETITIAN, REGISTERED

## 2023-11-08 PROCEDURE — RECHECK

## 2023-11-08 RX ADMIN — CYANOCOBALAMIN 250 MCG: 1000 INJECTION, SOLUTION INTRAMUSCULAR; SUBCUTANEOUS at 09:51

## 2023-11-08 NOTE — PROGRESS NOTES
Pt tolerated procedure well, no issue noted.       Bariatric surgery status     Postsurgical malabsorption     Vitamin B12 deficiency     Vitamin D deficiency

## 2023-11-13 DIAGNOSIS — E66.01 MORBID OBESITY (HCC): Primary | ICD-10-CM

## 2023-11-13 DIAGNOSIS — Z01.818 PREOPERATIVE CLEARANCE: ICD-10-CM

## 2023-11-13 DIAGNOSIS — E78.5 BORDERLINE HYPERLIPIDEMIA: ICD-10-CM

## 2023-11-13 DIAGNOSIS — Z01.812 BLOOD TESTS PRIOR TO TREATMENT OR PROCEDURE: ICD-10-CM

## 2023-11-13 DIAGNOSIS — E66.01 OBESITY, MORBID, BMI 40.0-49.9 (HCC): ICD-10-CM

## 2023-11-14 ENCOUNTER — CONSULT (OUTPATIENT)
Dept: CARDIOLOGY CLINIC | Facility: CLINIC | Age: 48
End: 2023-11-14
Payer: COMMERCIAL

## 2023-11-14 VITALS
HEIGHT: 63 IN | BODY MASS INDEX: 38.66 KG/M2 | WEIGHT: 218.2 LBS | DIASTOLIC BLOOD PRESSURE: 70 MMHG | OXYGEN SATURATION: 93 % | HEART RATE: 68 BPM | SYSTOLIC BLOOD PRESSURE: 110 MMHG

## 2023-11-14 DIAGNOSIS — R01.1 HEART MURMUR: Primary | ICD-10-CM

## 2023-11-14 DIAGNOSIS — K21.9 GERD WITHOUT ESOPHAGITIS: ICD-10-CM

## 2023-11-14 PROCEDURE — 99204 OFFICE O/P NEW MOD 45 MIN: CPT | Performed by: INTERNAL MEDICINE

## 2023-11-14 NOTE — PROGRESS NOTES
Cardiology   Harvey Montenegro 50 y.o. female MRN: [id number]   Encounter: [id number]        Reason for Consult / Principal Problem: Preoperative risk evaluation    Physician Requesting Consult: Maribell Ren MD    PCP: Lala Fernandez DO        Assessment/Plan:    >> Preoperative risk evaluation  >> Systolic murmur  >> Morbid obesity  >> Migraine      Plan:  -Patient is low risk for moderate to high risk surgery  -Given her excellent exercise capacity she may proceed for surgery without further cardiac testing  -We will check echocardiogram to evaluate heart murmur  -Return to office as needed      CC: Preoperative risk evaluation    HPI  50 y.o. female presents for preoperative risk evaluation. She had a gastric sleeve surgery done previously, however it has left her with minimal weight loss and GERD, she is now being evaluated to have gastric bypass surgery. She currently has no complaints, is able to perform all of her daily activities without any restrictions. Goes to the gym 3-4 times a week and works out for over an hour each time. Her workouts include weightlifting, walking on the treadmill, exercise bike. She denies any chest discomfort or excessive shortness of breath during these activities. The patient denies chest pain, shortness of breath, palpitations, orthopnea, PND, pedal edema, syncope, presyncope, diaphoresis, nausea/vomiting       SH: Smoking/Tobacco/Vaping: No; Alcohol: Occasional; Drugs: Previously used medical marijuana, has not used anything for a year      ECG done today showed normal sinus rhythm, normal ECG. Physical exam  Objective   Vitals: Blood pressure 110/70, pulse 68, height 5' 2.7" (1.593 m), weight 99 kg (218 lb 3.2 oz), SpO2 93 %.     General:  AO x3, no acute distress  Cardiac:  S1-S2 normal, 3/6 systolic murmur best heard in the aortic area, not radiating to the carotids, S2 preserved, no rubs or gallops, JVP: Normal  Lungs:  Clear to auscultation bilaterally, no wheezing or crackles. Abdomen:  Soft, nontender, nondistended. Extremities:  Warm, well perfused, pulses palpable, no ulcers or rashes. Edema: Absent  Neuro: Grossly nonfocall        ======================================================  TREADMILL STRESS  Results for orders placed during the hospital encounter of 17    Stress test only, exercise    Narrative  [address]  [facility], [address] [facility]  [phone]    Stress Electrocardiography during Exercise    Name: Raphael Castaneda  MR #: [id number]  Account #: [de-identified]  Study date: 2017  : 1975  Age: 39 years  Gender: Female  Height: 63 in  Weight: 224 lb  BSA: 2.03 m squared    Allergies: CODEINE    Diagnosis: R06.9 - Unspecified abnormalities of breathing    Primary Physician:  Elaina Calderon, [address] [facility]  Referring Physician:  CATE Lassiter  Group:  [facility]  Cardiology Fellow:  Monique Velez MD  Technician:  Antonio Anderson  Technician:  Oscar Pyle  Interpreting Physician:  Jamal Oviedo MD    CLINICAL QUESTION: Detection of coronary artery disease. HISTORY: The patient is a 39year old  female. Chest pain status: chest pain. Coronary artery disease risk factors: family history of premature coronary artery disease and diabetes mellitus. Co-morbidity: obesity. REST ECG: Normal sinus rhythm. PROCEDURE: Treadmill exercise testing was performed, using the Stew protocol. Stress electrocardiographic evaluation was performed. STEW PROTOCOL:  HR bpm SBP mmHg DBP mmHg Symptoms  Baseline 74 120 80 chest discomfort  Stage 1 133 120 80 chest discomfort, fatigue  Stage 2 155 160 90 same as above, dizziness  Recovery 1 127 140 70 same as above  Recovery 2 105 120 78 none  Pre 02 Sat 99%and Peak 02 Sat. Peak. No medications or fluids given. STRESS SUMMARY: Duration of exercise was 6 min and 24 sec. The patient exercised to protocol stage 2.  Maximal work rate was 7 METs. Functional capacity was slightly decreased (20% to 30%). Maximal heart rate during stress was 157 bpm ( 88  % of maximal predicted heart rate). The heart rate response to stress was exaggerated consistent with physical deconditioning. There was normal resting blood pressure with an appropriate response to stress. The rate-pressure product for  the peak heart rate and blood pressure was 70491. The patient experienced chest pain during stress; pain resolved spontaneously. The stress test was terminated due to chest discomfort, dyspnea, and fatigue. The stress ECG was negative for  ischemia. Arrhythmia during stress: isolated premature ventricular beats. SUMMARY:  -  Stress results: Duration of exercise was 6 min and 24 sec. Functional capacity was slightly decreased (20% to 30%). Target heart rate was achieved. The patient experienced chest pain during stress; pain resolved spontaneously. -  ECG conclusions: The stress ECG was negative for ischemia. IMPRESSIONS: Normal study after maximal exercise. Patient did complain of chest pain at the end of exercise but she says its slightly different from the chest pain she had in the past. Stress ECG showed no evidence of ischemia. Prepared and signed by    Estelle Chery MD  Signed 06/08/2017 12:43:52     ----------------------------------------------------------------------------------------------  NUCLEAR STRESS TEST: No results found for this or any previous visit. No results found for this or any previous visit.      --------------------------------------------------------------------------------  CATH:  No results found for this or any previous visit.    --------------------------------------------------------------------------------  ECHO:   No results found for this or any previous visit.     No results found for this or any previous visit.    --------------------------------------------------------------------------------  HOLTER  No results found for this or any previous visit.    --------------------------------------------------------------------------------  CAROTIDS  No results found for this or any previous visit. Diagnoses and all orders for this visit:    Heart murmur  -     Echo complete w/ contrast if indicated; Future    GERD without esophagitis  -     Ambulatory referral to Cardiology       ======================================================          Review of Systems  ROS as noted above, otherwise 12 point review of systems was performed and is negative.      Historical Information   Past Medical History:   Diagnosis Date    Anxiety     Asthma     Claustrophobia     Cluster headache     CTS (carpal tunnel syndrome)     Depression     Fibromyalgia     last assessed 11/27/17    Fibromyalgia, primary     GERD (gastroesophageal reflux disease)     GERD without esophagitis     last assessed 10/12/17    GI problem     Headache, tension-type     Irritable bowel syndrome     Joint pain     Kidney stone     Lung nodule     last assessed 10/9/15    Migraine     Muscle pain     Peripheral neuropathy     Sleep apnea      Past Surgical History:   Procedure Laterality Date    BACK SURGERY      BARIATRIC SURGERY  8/9/2022    CARPAL TUNNEL RELEASE Bilateral     CHOLECYSTECTOMY      CYSTOSCOPY      GALLBLADDER SURGERY      GASTRECTOMY  08/09/2022    Sleeve    NECK SURGERY      [place] TENDON SHEATH INCISION Right 01/16/2023    Procedure: THUMB TRIGGER FINGER RELEASE;  Surgeon: Sarabjit Eller MD;  Location: [facility] MAIN OR;  Service: Orthopedics    SACROILIAC JOINT FUSION Left     SPINAL FUSION      C4 and C5    ULNAR NERVE REPAIR Bilateral     UPPER GASTROINTESTINAL ENDOSCOPY       Social History     Substance and Sexual Activity   Alcohol Use Yes    Alcohol/week: 2.0 standard drinks of alcohol    Types: 2 Glasses of wine per week    Comment: Occasionally     Social History     Substance and Sexual Activity   Drug Use Not Currently    Types: Marijuana    Comment: medicinal     Social History     Tobacco Use   Smoking Status Never   Smokeless Tobacco Never   Tobacco Comments    Never smoked.      Family History   Problem Relation Age of Onset    Diabetes Mother     Fibromyalgia Mother     Ovarian cancer Mother     Hypertension Mother     Thyroid disease Mother     Migraines Mother     Neuropathy Mother     Cancer Mother         has been removed    No Known Problems Father     Throat cancer Maternal Grandmother     No Known Problems Maternal Grandfather     No Known Problems Paternal Grandmother     No Known Problems Paternal Grandfather     No Known Problems Daughter     Breast cancer Maternal Aunt     No Known Problems Maternal Aunt     No Known Problems Maternal Aunt     No Known Problems Paternal Aunt     Colon cancer Cousin     Heart disease Cousin     Lupus Cousin     Arthritis Family     Heart disease Family         cardiac disorder    Neuropathy Family     Lupus Family         systemic erythematosus    No Known Problems Half-Sister     No Known Problems Half-Brother     No Known Problems Half-Brother     No Known Problems Half-Brother     Asthma Brother     Stroke Neg Hx        Meds/Allergies   Hospital Medications:   Current Facility-Administered Medications   Medication Dose Route Frequency    cyanocobalamin injection 250 mcg  250 mcg Intramuscular Weekly    [START ON 12/21/2023] cyanocobalamin injection 250 mcg  250 mcg Intramuscular Q30 Days     Home Medications: (Not in a hospital admission)      Allergies   Allergen Reactions    Hydrocodone-Acetaminophen GI Intolerance     gi upset -pt okay if takes  zofran    Hydrocodone-Acetaminophen GI Intolerance     gi upset -pt okay if takes  zofran  gi upset -pt okay if takes  zofran    Codeine GI Intolerance and Other (See Comments)     GI issues    Oxycodone-Acetaminophen GI Intolerance and Other (See Comments)     GI issues    Penicillins GI Intolerance and Other (See Comments)     GI issues Seasonal Ic [Octacosanol] Sneezing     Runny nose sneezing. Mexiletine Rash         Portions of the record may have been created with voice recognition software. Occasional wrong words or "sound a like" substitutions may have occurred due to the inherent limitations of voice recognition software. Read the chart carefully and recognize, using context, where substitutions have occurred.

## 2023-11-14 NOTE — LETTER
November 14, 2023     Tessa Saldivar, [address]  [address]    Patient: Ben Yoo   YOB: 1975   Date of Visit: 11/14/2023       Dear Dr. Jacqui Balderas: Thank you for referring Ben Yoo to me for evaluation. Below are my notes for this consultation. If you have questions, please do not hesitate to call me. I look forward to following your patient along with you. Sincerely,        Veronica Song MD        CC: DO Veronica Espinoza MD  11/14/2023 10:40 AM  Sign when Signing Visit  Cardiology   Ben Yoo 50 y.o. female MRN: [id number]   Encounter: [id number]        Reason for Consult / Principal Problem: Preoperative risk evaluation    Physician Requesting Consult: Tessa Saldivar MD    PCP: Aliza Pickard DO        Assessment/Plan:    >> Preoperative risk evaluation  >> Systolic murmur  >> Morbid obesity  >> Migraine      Plan:  -Patient is low risk for moderate to high risk surgery  -Given her excellent exercise capacity she may proceed for surgery without further cardiac testing  -We will check echocardiogram to evaluate heart murmur  -Return to office as needed      CC: Preoperative risk evaluation    HPI  50 y.o. female presents for preoperative risk evaluation. She had a gastric sleeve surgery done previously, however it has left her with minimal weight loss and GERD, she is now being evaluated to have gastric bypass surgery. She currently has no complaints, is able to perform all of her daily activities without any restrictions. Goes to the gym 3-4 times a week and works out for over an hour each time. Her workouts include weightlifting, walking on the treadmill, exercise bike. She denies any chest discomfort or excessive shortness of breath during these activities.       The patient denies chest pain, shortness of breath, palpitations, orthopnea, PND, pedal edema, syncope, presyncope, diaphoresis, nausea/vomiting SH: Smoking/Tobacco/Vaping: No; Alcohol: Occasional; Drugs: Previously used medical marijuana, has not used anything for a year            Physical exam  Objective  Vitals: Blood pressure 110/70, pulse 68, height 5' 2.7" (1.593 m), weight 99 kg (218 lb 3.2 oz), SpO2 93 %. General:  AO x3, no acute distress  Cardiac:  S1-S2 normal, 3/6 systolic murmur best heard in the aortic area, not radiating to the carotids, S2 preserved, no rubs or gallops, JVP: Normal  Lungs:  Clear to auscultation bilaterally, no wheezing or crackles. Abdomen:  Soft, nontender, nondistended. Extremities:  Warm, well perfused, pulses palpable, no ulcers or rashes. Edema: Absent  Neuro: Grossly nonfocall        ======================================================  TREADMILL STRESS  Results for orders placed during the hospital encounter of 17    Stress test only, exercise    Narrative  [address] [facility] [address]  [facility], [address] [facility]  [phone]    Stress Electrocardiography during Exercise    Name: Ever Wan  MR #: [id number]  Account #: [de-identified]  Study date: 2017  : 1975  Age: 39 years  Gender: Female  Height: 63 in  Weight: 224 lb  BSA: 2.03 m squared    Allergies: CODEINE    Diagnosis: R06.9 - Unspecified abnormalities of breathing    Primary Physician:  Triny Villa, [address] [facility]  Referring Physician:  CATE Mattson  Group:  [facility]  Cardiology Fellow:  Lizy Caro MD  Technician:  Everton Loja  Technician:  Noe Radford  Interpreting Physician:  Viviana Hood MD    CLINICAL QUESTION: Detection of coronary artery disease. HISTORY: The patient is a 39year old  female. Chest pain status: chest pain. Coronary artery disease risk factors: family history of premature coronary artery disease and diabetes mellitus. Co-morbidity: obesity. REST ECG: Normal sinus rhythm.     PROCEDURE: Treadmill exercise testing was performed, using the Rhonda protocol. Stress electrocardiographic evaluation was performed. RHONDA PROTOCOL:  HR bpm SBP mmHg DBP mmHg Symptoms  Baseline 74 120 80 chest discomfort  Stage 1 133 120 80 chest discomfort, fatigue  Stage 2 155 160 90 same as above, dizziness  Recovery 1 127 140 70 same as above  Recovery 2 105 120 78 none  Pre 02 Sat 99%and Peak 02 Sat. Peak. No medications or fluids given. STRESS SUMMARY: Duration of exercise was 6 min and 24 sec. The patient exercised to protocol stage 2. Maximal work rate was 7 METs. Functional capacity was slightly decreased (20% to 30%). Maximal heart rate during stress was 157 bpm ( 88  % of maximal predicted heart rate). The heart rate response to stress was exaggerated consistent with physical deconditioning. There was normal resting blood pressure with an appropriate response to stress. The rate-pressure product for  the peak heart rate and blood pressure was 16113. The patient experienced chest pain during stress; pain resolved spontaneously. The stress test was terminated due to chest discomfort, dyspnea, and fatigue. The stress ECG was negative for  ischemia. Arrhythmia during stress: isolated premature ventricular beats. SUMMARY:  -  Stress results: Duration of exercise was 6 min and 24 sec. Functional capacity was slightly decreased (20% to 30%). Target heart rate was achieved. The patient experienced chest pain during stress; pain resolved spontaneously. -  ECG conclusions: The stress ECG was negative for ischemia. IMPRESSIONS: Normal study after maximal exercise. Patient did complain of chest pain at the end of exercise but she says its slightly different from the chest pain she had in the past. Stress ECG showed no evidence of ischemia.     Prepared and signed by    Eric Boyle MD  Signed 06/08/2017 12:43:52     ----------------------------------------------------------------------------------------------  NUCLEAR STRESS TEST: No results found for this or any previous visit. No results found for this or any previous visit.      --------------------------------------------------------------------------------  CATH:  No results found for this or any previous visit.    --------------------------------------------------------------------------------  ECHO:   No results found for this or any previous visit. No results found for this or any previous visit.    --------------------------------------------------------------------------------  HOLTER  No results found for this or any previous visit.    --------------------------------------------------------------------------------  CAROTIDS  No results found for this or any previous visit. Diagnoses and all orders for this visit:    Heart murmur  -     Echo complete w/ contrast if indicated; Future    GERD without esophagitis  -     Ambulatory referral to Cardiology       ======================================================          Review of Systems  ROS as noted above, otherwise 12 point review of systems was performed and is negative.      Historical Information  Past Medical History:   Diagnosis Date   • Anxiety    • Asthma    • Claustrophobia    • Cluster headache    • CTS (carpal tunnel syndrome)    • Depression    • Fibromyalgia     last assessed 11/27/17   • Fibromyalgia, primary    • GERD (gastroesophageal reflux disease)    • GERD without esophagitis     last assessed 10/12/17   • GI problem    • Headache, tension-type    • Irritable bowel syndrome    • Joint pain    • Kidney stone    • Lung nodule     last assessed 10/9/15   • Migraine    • Muscle pain    • Peripheral neuropathy    • Sleep apnea      Past Surgical History:   Procedure Laterality Date   • BACK SURGERY     • BARIATRIC SURGERY  8/9/2022   • CARPAL TUNNEL RELEASE Bilateral    • CHOLECYSTECTOMY     • CYSTOSCOPY     • GALLBLADDER SURGERY     • GASTRECTOMY  08/09/2022    Sleeve   • NECK SURGERY     • [place] TENDON SHEATH INCISION Right 01/16/2023    Procedure: THUMB TRIGGER FINGER RELEASE;  Surgeon: Dolly Lucas MD;  [facility];  Service: Orthopedics   • SACROILIAC JOINT FUSION Left    • SPINAL FUSION      C4 and C5   • ULNAR NERVE REPAIR Bilateral    • UPPER GASTROINTESTINAL ENDOSCOPY       Social History     Substance and Sexual Activity   Alcohol Use Yes   • Alcohol/week: 2.0 standard drinks of alcohol   • Types: 2 Glasses of wine per week    Comment: Occasionally     Social History     Substance and Sexual Activity   Drug Use Not Currently   • Types: Marijuana    Comment: medicinal     Social History     Tobacco Use   Smoking Status Never   Smokeless Tobacco Never   Tobacco Comments    Never smoked.      Family History   Problem Relation Age of Onset   • Diabetes Mother    • Fibromyalgia Mother    • Ovarian cancer Mother    • Hypertension Mother    • Thyroid disease Mother    • Migraines Mother    • Neuropathy Mother    • Cancer Mother         has been removed   • No Known Problems Father    • Throat cancer Maternal Grandmother    • No Known Problems Maternal Grandfather    • No Known Problems Paternal Grandmother    • No Known Problems Paternal Grandfather    • No Known Problems Daughter    • Breast cancer Maternal Aunt    • No Known Problems Maternal Aunt    • No Known Problems Maternal Aunt    • No Known Problems Paternal Aunt    • Colon cancer Cousin    • Heart disease Cousin    • Lupus Cousin    • Arthritis Family    • Heart disease Family         cardiac disorder   • Neuropathy Family    • Lupus Family         systemic erythematosus   • No Known Problems Half-Sister    • No Known Problems Half-Brother    • No Known Problems Half-Brother    • No Known Problems Half-Brother    • Asthma Brother    • Stroke Neg Hx        Meds/Allergies  Hospital Medications:   Current Facility-Administered Medications   Medication Dose Route Frequency   • cyanocobalamin injection 250 mcg  250 mcg Intramuscular Weekly   • [START ON 12/21/2023] cyanocobalamin injection 250 mcg  250 mcg Intramuscular Q30 Days     Home Medications: (Not in a hospital admission)      Allergies   Allergen Reactions   • Hydrocodone-Acetaminophen GI Intolerance     gi upset -pt okay if takes  zofran   • Hydrocodone-Acetaminophen GI Intolerance     gi upset -pt okay if takes  zofran  gi upset -pt okay if takes  zofran   • Codeine GI Intolerance and Other (See Comments)     GI issues   • Oxycodone-Acetaminophen GI Intolerance and Other (See Comments)     GI issues   • Penicillins GI Intolerance and Other (See Comments)     GI issues   • Seasonal Ic [Octacosanol] Sneezing     Runny nose sneezing. • Mexiletine Rash         Portions of the record may have been created with voice recognition software. Occasional wrong words or "sound a like" substitutions may have occurred due to the inherent limitations of voice recognition software. Read the chart carefully and recognize, using context, where substitutions have occurred.

## 2023-11-15 ENCOUNTER — OFFICE VISIT (OUTPATIENT)
Dept: BARIATRICS | Facility: CLINIC | Age: 48
End: 2023-11-15
Payer: COMMERCIAL

## 2023-11-15 ENCOUNTER — APPOINTMENT (OUTPATIENT)
Dept: LAB | Facility: MEDICAL CENTER | Age: 48
End: 2023-11-15
Attending: SURGERY
Payer: COMMERCIAL

## 2023-11-15 DIAGNOSIS — E78.5 BORDERLINE HYPERLIPIDEMIA: ICD-10-CM

## 2023-11-15 DIAGNOSIS — Z01.818 PREOPERATIVE CLEARANCE: ICD-10-CM

## 2023-11-15 DIAGNOSIS — E66.01 MORBID OBESITY (HCC): ICD-10-CM

## 2023-11-15 DIAGNOSIS — E66.01 OBESITY, MORBID, BMI 40.0-49.9 (HCC): ICD-10-CM

## 2023-11-15 DIAGNOSIS — Z01.812 BLOOD TESTS PRIOR TO TREATMENT OR PROCEDURE: ICD-10-CM

## 2023-11-15 DIAGNOSIS — E53.8 VITAMIN B12 DEFICIENCY: Primary | ICD-10-CM

## 2023-11-15 LAB
CHOLEST SERPL-MCNC: 187 MG/DL
HDLC SERPL-MCNC: 54 MG/DL
LDLC SERPL CALC-MCNC: 115 MG/DL (ref 0–100)
NONHDLC SERPL-MCNC: 133 MG/DL
TRIGL SERPL-MCNC: 88 MG/DL

## 2023-11-15 PROCEDURE — 80061 LIPID PANEL: CPT

## 2023-11-15 PROCEDURE — 36415 COLL VENOUS BLD VENIPUNCTURE: CPT

## 2023-11-15 PROCEDURE — RECHECK

## 2023-11-15 PROCEDURE — 96372 THER/PROPH/DIAG INJ SC/IM: CPT | Performed by: NURSE PRACTITIONER

## 2023-11-15 RX ADMIN — CYANOCOBALAMIN 250 MCG: 1000 INJECTION, SOLUTION INTRAMUSCULAR; SUBCUTANEOUS at 09:28

## 2023-11-16 ENCOUNTER — OFFICE VISIT (OUTPATIENT)
Dept: INTERNAL MEDICINE CLINIC | Facility: CLINIC | Age: 48
End: 2023-11-16

## 2023-11-16 VITALS
BODY MASS INDEX: 38.63 KG/M2 | HEART RATE: 76 BPM | SYSTOLIC BLOOD PRESSURE: 119 MMHG | DIASTOLIC BLOOD PRESSURE: 75 MMHG | WEIGHT: 216 LBS | TEMPERATURE: 97.9 F

## 2023-11-16 DIAGNOSIS — H93.13 TINNITUS AURIUM, BILATERAL: ICD-10-CM

## 2023-11-16 DIAGNOSIS — J06.9 VIRAL UPPER RESPIRATORY TRACT INFECTION: Primary | ICD-10-CM

## 2023-11-16 DIAGNOSIS — J30.9 ALLERGIC RHINITIS, UNSPECIFIED SEASONALITY, UNSPECIFIED TRIGGER: ICD-10-CM

## 2023-11-16 PROCEDURE — 99213 OFFICE O/P EST LOW 20 MIN: CPT | Performed by: INTERNAL MEDICINE

## 2023-11-17 PROBLEM — J06.9 VIRAL UPPER RESPIRATORY TRACT INFECTION: Status: ACTIVE | Noted: 2023-11-17

## 2023-11-17 NOTE — PROGRESS NOTES
Assessment/Plan:    Tinnitus aurium, bilateral  Chronic tinnitus that pt states is always present and in both ears. Pt states she saw ENT in past and requesting second opinion. Records from previous ENT cannot be found in Epic. No acute findings on exam.    Refer to ENT        Viral upper respiratory tract infection  One week history of sore throat / runny nose / sinus pressure / headache / fatigue. Endorses chills two days ago that have resolved. Afebrile. No known sick contacts. Seen by [facility] 11/9 with neg strep test. Pt reports two neg COVID tests. Symptoms of URI on exam likely viral given duration. Drink tea with honey; use products such as dayquil/nyquil / decongestant  Pt to call in one week if symptoms do not resolve     Diagnoses and all orders for this visit:    Viral upper respiratory tract infection    Tinnitus aurium, bilateral  -     Ambulatory Referral to Otolaryngology; Future    Allergic rhinitis, unspecified seasonality, unspecified trigger          Patient Instructions   Drink tea with honey; use dayquil/nightquil    Return for Next scheduled follow up. Subjective:      Patient ID: Lizzette Arcos is a 50 y.o. female with a pmhx of hx of gastric sleeve, obesity, depression, chronic migraines who is here for sinus congrestion / yellow mucus / dry cough. Chief Complaint   Patient presents with    Cough     Dry cough, headache, runny nose, body aches, chills on and off, nasal congestion, PND x 1 week       Last Wednesday pt started having sore throat which progressed to sinus pain with a yellow mucus discharge. Progressed to dry cough / sneezing / body aches and chills that resolved two days prior to appointment. States has remained afebrile. [facility] Friday and had negative strep test. Has tested negative for COVID twice with home test. No known sick contacts.  Pt has been taking OTC cold medication but did not know name of product but said it was okay for people who had high blood pressure. The following portions of the patient's history were reviewed and updated as appropriate: allergies, current medications, past family history, past medical history, past social history, past surgical history and problem list.    ROS as indicated in HPI otherwise negative        Current Outpatient Medications   Medication Sig Dispense Refill    acetaZOLAMIDE (DIAMOX) 250 mg tablet 1 tab qam 90 tablet 0    albuterol (PROVENTIL HFA,VENTOLIN HFA) 90 mcg/act inhaler Inhale 2 puffs every 6 (six) hours as needed for wheezing or shortness of breath 8 g 2    Botox 200 units SOLR       buPROPion (Wellbutrin XL) 150 mg 24 hr tablet Take 1 tablet (150 mg total) by mouth every morning 90 tablet 0    cholecalciferol (VITAMIN D3) 1,000 units tablet Take 1 capsule by mouth once a week 3000 units daily      cholestyramine (QUESTRAN) 4 GM/DOSE powder Take 1 packet (4 g total) by mouth 2 (two) times a day with meals 378 g 2    dexamethasone (DECADRON) 2 mg tablet 1 tab qam with food prn migraine. (Patient not taking: Reported on 9/19/2023) 5 tablet 0    Diclofenac Sodium (VOLTAREN) 1 % Apply 2 g topically 4 (four) times a day (Patient not taking: Reported on 11/14/2023) 100 g 2    dicyclomine (BENTYL) 20 mg tablet Take 1 tablet (20 mg total) by mouth 3 (three) times a day as needed (migraine/cramps) 60 tablet 0    divalproex sodium (DEPAKOTE) 250 mg EC tablet 2 tabs q12 hours x 2 days, then stop. Repeat if needed. 16 tablet 0    esomeprazole (NexIUM) 40 MG capsule Take 1 capsule (40 mg total) by mouth 2 (two) times a day before meals 120 capsule 0    famotidine (PEPCID) 40 MG tablet Take 1 tablet (40 mg total) by mouth daily at bedtime 30 tablet 3    fluticasone (FLONASE) 50 mcg/act nasal spray 1 spray in each nostril once daily 30 mL 0    ketorolac (TORADOL) 10 mg tablet Take 1 tablet (10 mg total) by mouth every 6 (six) hours as needed (migraine) Max 2-3 per week.  10 tablet 2    ketorolac (TORADOL) 30 mg/mL injection 1-2 ml IM injection once per 24 hours p.r.n. migraine. No more than 2 injections per week. 4 mL 0    lasmiditan succinate (Reyvow) 50 mg tablet One tab qhs at migraine onset. Caution sedation. Max 1 tab/24 hours. 8 tablet 2    levonorgestrel (MIRENA) 20 MCG/24HR IUD 1 each by Intrauterine route once      meclizine (ANTIVERT) 12.5 MG tablet Take 1 tablet (12.5 mg total) by mouth 3 (three) times a day as needed for dizziness or nausea 30 tablet 3    melatonin 3 mg       Melatonin ER 3 MG TBCR 1-2 tabs qhs (Patient not taking: Reported on 9/18/2023) 60 tablet 0    meloxicam (Mobic) 7.5 mg tablet Take 1 tablet (7.5 mg total) by mouth daily for 7 days (Patient not taking: Reported on 9/19/2023) 7 tablet 0    NON FORMULARY Medical marijuana   (Patient not taking: Reported on 9/19/2023)      ondansetron (ZOFRAN) 4 mg tablet Take 4 mg by mouth every 12 (twelve) hours as needed      oxyCODONE-acetaminophen (PERCOCET) 5-325 mg per tablet  (Patient not taking: Reported on 10/25/2023)      polyethylene glycol (GLYCOLAX) 17 GM/SCOOP powder Take 17 g by mouth daily 850 g 2    prochlorperazine (COMPAZINE) 10 mg tablet Take 1 tablet (10 mg total) by mouth every 6 (six) hours as needed for nausea or vomiting 30 tablet 0    Riboflavin (Vitamin B-2) 100 MG TABS Take 400 mg by mouth daily (Patient not taking: Reported on 9/18/2023)      sodium picosulfate, magnesium oxide, citric acid (Clenpiq) oral solution Follow office instructions (Patient not taking: Reported on 7/24/2023) 350 mL 0    Sodium Sulfate-Mag Sulfate-KCl (Sutab) 7478-125-126 MG TABS Use as needed as instructed by office staff (Patient not taking: Reported on 7/24/2023) 24 tablet 0    Syringe/Needle, Disp, (SYRINGE 3CC/23CL7-5/2") 25G X 1-1/2" 3 ML MISC Use for toradol IM injections.  10 each 0    tiZANidine (ZANAFLEX) 2 mg tablet Take 1 tablet (2 mg total) by mouth every 8 (eight) hours as needed for muscle spasms 90 tablet 1    Trudhesa 0.725 MG/ACT AERS       ZOLMitriptan (ZOMIG-ZMT) 5 MG disintegrating tablet Take 1 tablet (5 mg total) by mouth once as needed for migraine for up to 1 dose 6 tablet 0     Current Facility-Administered Medications   Medication Dose Route Frequency Provider Last Rate Last Admin    cyanocobalamin injection 250 mcg  250 mcg Intramuscular Weekly LAURI PalumboNP   250 mcg at 11/15/23 0928    [START ON 12/21/2023] cyanocobalamin injection 250 mcg  250 mcg Intramuscular Q30 Days Monsterajrachel HitchcockbethelyoanCATE           Objective:    /75 (BP Location: Left arm, Patient Position: Sitting, Cuff Size: Large)   Pulse 76   Temp 97.9 °F (36.6 °C) (Temporal)   Wt 98 kg (216 lb)   BMI 38.63 kg/m²        Physical Exam  Constitutional:       General: She is not in acute distress. Appearance: She is obese. She is not ill-appearing or toxic-appearing. HENT:      Right Ear: Tympanic membrane normal.      Left Ear: Tympanic membrane normal.      Nose: Congestion and rhinorrhea present. Right Turbinates: Enlarged and swollen. Left Turbinates: Enlarged and swollen. Mouth/Throat:      Mouth: Mucous membranes are moist.      Pharynx: Oropharynx is clear. Posterior oropharyngeal erythema present. Eyes:      General: No scleral icterus. Conjunctiva/sclera: Conjunctivae normal.   Cardiovascular:      Rate and Rhythm: Normal rate and regular rhythm. Heart sounds: No murmur heard. Pulmonary:      Effort: Pulmonary effort is normal.      Breath sounds: Normal breath sounds. No wheezing, rhonchi or rales. Abdominal:      General: Abdomen is flat. Bowel sounds are normal.      Palpations: Abdomen is soft. Musculoskeletal:      Right lower leg: No edema. Left lower leg: No edema. Skin:     General: Skin is warm and dry. Neurological:      Mental Status: She is alert and oriented to person, place, and time. Mental status is at baseline.    Psychiatric:         Mood and Affect: Mood normal.         Behavior: Behavior normal.         Thought Content:  Thought content normal.         Judgment: Judgment normal.                Kandis Kennedy,   11/17/23  [facility] Internal Medicine Program, PGY-1

## 2023-11-17 NOTE — ASSESSMENT & PLAN NOTE
Chronic tinnitus that pt states is always present and in both ears. Pt states she saw ENT in past and requesting second opinion. Records from previous ENT cannot be found in Epic.  No acute findings on exam.    Refer to ENT

## 2023-11-17 NOTE — ASSESSMENT & PLAN NOTE
One week history of sore throat / runny nose / sinus pressure / headache / fatigue. Endorses chills two days ago that have resolved. Afebrile. No known sick contacts. Seen by [facility] 11/9 with neg strep test. Pt reports two neg COVID tests. Symptoms of URI on exam likely viral given duration.      Drink tea with honey; use products such as dayquil/nyquil / decongestant  Pt to call in one week if symptoms do not resolve

## 2023-11-20 ENCOUNTER — TELEPHONE (OUTPATIENT)
Dept: NEUROLOGY | Facility: CLINIC | Age: 48
End: 2023-11-20

## 2023-11-20 NOTE — TELEPHONE ENCOUNTER
Submitted & Received the following Authorization Approval info via fax from [employer]:     Approved Under Medical Benefit: Juventino Wylie. Botox-200 Units. QTY:1, Q3 Months. DX: G43.709, Chronic Migraine. Auth# [id number]  Valid: 11/15/2023 until 11/14/2024  4 Visits     Please Use Our Stock. Called Patients [facility]Med[employer] Ins. Plans Providers# [phone] and spoke to [facility] in the [facility] [place] Department to see if PA is needed for CPT-Code: 96518. After review [facility] provided me with the following information:     Ly-Rumf-Kppsehkj for CPT: 16553. DX: G43.709, Chronic Migraine. Call-Ref# [id number].

## 2023-11-22 ENCOUNTER — OFFICE VISIT (OUTPATIENT)
Dept: BARIATRICS | Facility: CLINIC | Age: 48
End: 2023-11-22
Payer: COMMERCIAL

## 2023-11-22 DIAGNOSIS — E53.8 VITAMIN B12 DEFICIENCY: Primary | ICD-10-CM

## 2023-11-22 PROCEDURE — 96372 THER/PROPH/DIAG INJ SC/IM: CPT | Performed by: NURSE PRACTITIONER

## 2023-11-22 PROCEDURE — RECHECK

## 2023-11-22 RX ADMIN — CYANOCOBALAMIN 250 MCG: 1000 INJECTION, SOLUTION INTRAMUSCULAR; SUBCUTANEOUS at 09:20

## 2023-11-22 NOTE — PROGRESS NOTES
Left deltoid Pt tolerated well , no issues noted         Bariatric surgery status     Postsurgical malabsorption     Vitamin B12 deficiency     Vitamin D deficiency.

## 2023-11-22 NOTE — TELEPHONE ENCOUNTER
Received VM transcription from 11/21/23, 9:29 AM:    Good morning, this is for [facility] pharmacy calling in regards to patient Maggi Stevens. YOB: 1975. Calling in reference to a prescription we have here for the patients Botox 200 units. It is requiring a prior authorization by the patient's plan, [facility]. We sent a fax to the doctor's office, we have done a follow up and we're following up again to check on the status. And once the office has a status update, if they could kindly call us back at [phone]. We would greatly appreciate it. Thank you and have a great rest of your day.

## 2023-11-24 ENCOUNTER — TELEPHONE (OUTPATIENT)
Dept: GYNECOLOGY | Facility: CLINIC | Age: 48
End: 2023-11-24

## 2023-11-24 ENCOUNTER — PROCEDURE VISIT (OUTPATIENT)
Dept: NEUROLOGY | Facility: CLINIC | Age: 48
End: 2023-11-24
Payer: COMMERCIAL

## 2023-11-24 ENCOUNTER — DOCUMENTATION (OUTPATIENT)
Dept: LABOR AND DELIVERY | Facility: HOSPITAL | Age: 48
End: 2023-11-24

## 2023-11-24 VITALS — HEART RATE: 79 BPM | DIASTOLIC BLOOD PRESSURE: 72 MMHG | SYSTOLIC BLOOD PRESSURE: 115 MMHG | TEMPERATURE: 98.2 F

## 2023-11-24 DIAGNOSIS — G43.711 INTRACTABLE CHRONIC MIGRAINE WITHOUT AURA AND WITH STATUS MIGRAINOSUS: Primary | ICD-10-CM

## 2023-11-24 DIAGNOSIS — N81.4 PROLAPSED UTERUS: Primary | ICD-10-CM

## 2023-11-24 PROCEDURE — 64615 CHEMODENERV MUSC MIGRAINE: CPT | Performed by: PHYSICIAN ASSISTANT

## 2023-11-24 NOTE — PROGRESS NOTES
Universal Protocol   Consent: Verbal consent obtained. Written consent obtained.   Risks and benefits: risks, benefits and alternatives were discussed  Consent given by: patient  Patient understanding: patient states understanding of the procedure being performed  Patient consent: the patient's understanding of the procedure matches consent given  Procedure consent: procedure consent matches procedure scheduled      Chemodenervation     Date/Time  11/24/2023 1:30 PM     Performed by  Pawel Amos PA-C   Authorized by  Pawel Amos PA-C     Pre-procedure details      Prepped With: Alcohol     Procedure details      Position:  Upright   Botox      Botox Type:  Type A    Brand:  Botox    mL's of Botulinum Toxin:  200    Final Concentration per CC:  100 units    Needle Gauge:  30 G 2.5 inch   Procedures      Botox Procedures: chronic headache      Indications: migraines     Injection Location      Head / Face:  L superior trapezius, R superior trapezius, L superior cervical paraspinal, R superior cervical paraspinal, L , R , procerus, L temporalis, R temporalis, R frontalis, L frontalis, R medial occipitalis and L medial occipitalis    L  injection amount:  5 unit(s)    R  injection amount:  5 unit(s)    L lateral frontalis:  5 unit(s)    R lateral frontalis:  5 unit(s)    L medial frontalis:  5 unit(s)    R medial frontalis:  5 unit(s)    L temporalis injection amount:  20 unit(s)    R temporalis injection amount:  20 unit(s)    Procerus injection amount:  5 unit(s)    L medial occipitalis injection amount:  15 unit(s)    R medial occipitalis injection amount:  15 unit(s)    L superior cervical paraspinal injection amount:  10 unit(s)    R superior cervical paraspinal injection amount:  10 unit(s)    L superior trapezius injection amount:  15 unit(s)    R superior trapezius injection amount:  15 unit(s)   Total Units      Total units used:  200    Total units discarded:  0 Post-procedure details      Chemodenervation:  Chronic migraine    Facial Nerve Location[de-identified]  Bilateral facial nerve    Patient tolerance of procedure: Tolerated well, no immediate complications   Comments         Extra units medically necessary:  - 10 between both upper traps  - 15 between both middle traps  - 10 R occipitalis  - 10 L occipitalis  Majority of headaches in the back of head. Blood pressure 115/72, pulse 79, temperature 98.2 °F (36.8 °C), temperature source Temporal.  The patient tells me that she needs to have a gastric bypass procedure for severe reflux caused by the sleeve. She was told by her other providers that she needs to have a lumbar puncture prior to this. I do not think the patient needs to have a lumbar puncture as she is not having symptoms of increased intracranial pressure at this time. She was recommended to get a repeat eye exam at this time, which in the past the dilated eye exam was completely unremarkable for papilledema. She is not sure if Diamox is helping. I would like her to continue it for now until I see her next, she states it does not hurt to take it. Meaning she does not have side effects. I will message her providers indicating my opinion about the lumbar puncture. Continue Vyepti 100 mg infusion every 84 days approximately.   She has not had any recurrent dizziness with this dose however we are both unsure if the 300 mg was the cause of dizziness or if it was coincidental.

## 2023-11-24 NOTE — TELEPHONE ENCOUNTER
Spoke with pt and Dr. Lilo Conway today. Pt advised of Dr. Rajinder Dugan thoughts on having a consult and surgery with urogyn because of her underlying bladder issues. Pt agreed with Dr. Lilo Conway putting a referral in to be seen for consult.

## 2023-11-28 ENCOUNTER — TELEPHONE (OUTPATIENT)
Dept: NEUROLOGY | Facility: CLINIC | Age: 48
End: 2023-11-28

## 2023-11-28 NOTE — TELEPHONE ENCOUNTER
received vm from 11/27 at 1:10pm-Good afternoon, this is [facility] calling in regards to patient. Skye wong. YOB: 1975, calling in regards to a prescription we have here for the patients, botox, 200 units. We are calling to advise the doctor that if the medication is still needed, the patient provide a new plan through [facility] and they are recquiring a prior authorization. And we'd sent a fax to the doctor's office and then follow calls regarding the fax. And once the office has a status update, if they could kindly call us back at [phone]. We would greatly appreciate it. Thank you. Have a great rest of your day.

## 2023-11-28 NOTE — TELEPHONE ENCOUNTER
Noted. Following up [facility] to inform them that patient is no longer receiving Botox form them at this time as they have new Ins. Plan.  Thank you1

## 2023-11-30 ENCOUNTER — HOSPITAL ENCOUNTER (OUTPATIENT)
Dept: NON INVASIVE DIAGNOSTICS | Facility: CLINIC | Age: 48
Discharge: HOME | End: 2023-11-30
Payer: COMMERCIAL

## 2023-11-30 VITALS
HEIGHT: 63 IN | DIASTOLIC BLOOD PRESSURE: 72 MMHG | SYSTOLIC BLOOD PRESSURE: 115 MMHG | BODY MASS INDEX: 38.27 KG/M2 | HEART RATE: 73 BPM | WEIGHT: 216 LBS

## 2023-11-30 DIAGNOSIS — R01.1 HEART MURMUR: ICD-10-CM

## 2023-11-30 LAB
AORTIC ROOT: 2.3 CM
APICAL FOUR CHAMBER EJECTION FRACTION: 58 %
ASCENDING AORTA: 2.7 CM
E WAVE DECELERATION TIME: 213 MS
E/A RATIO: 1.57
FRACTIONAL SHORTENING: 36 (ref 28–44)
INTERVENTRICULAR SEPTUM IN DIASTOLE (PARASTERNAL SHORT AXIS VIEW): 1 CM
INTERVENTRICULAR SEPTUM: 1 CM (ref 0.6–1.1)
LAAS-AP2: 10.2 CM2
LAAS-AP4: 17.1 CM2
LEFT ATRIUM SIZE: 3.8 CM
LEFT ATRIUM VOLUME (MOD BIPLANE): 29 ML
LEFT ATRIUM VOLUME INDEX (MOD BIPLANE): 14.6 ML/M2
LEFT INTERNAL DIMENSION IN SYSTOLE: 2.3 CM (ref 2.1–4)
LEFT VENTRICULAR INTERNAL DIMENSION IN DIASTOLE: 3.6 CM (ref 3.5–6)
LEFT VENTRICULAR POSTERIOR WALL IN END DIASTOLE: 0.9 CM
LEFT VENTRICULAR STROKE VOLUME: 38 ML
LVSV (TEICH): 38 ML
MV E'TISSUE VEL-LAT: 17 CM/S
MV E'TISSUE VEL-SEP: 13 CM/S
MV PEAK A VEL: 0.67 M/S
MV PEAK E VEL: 105 CM/S
MV STENOSIS PRESSURE HALF TIME: 62 MS
MV VALVE AREA P 1/2 METHOD: 3.55
RA PRESSURE ESTIMATED: 3 MMHG
RIGHT ATRIUM AREA SYSTOLE A4C: 8.3 CM2
RIGHT VENTRICLE ID DIMENSION: 2.8 CM
RV PSP: 15 MMHG
SL CV LEFT ATRIUM LENGTH A2C: 4.6 CM
SL CV LV EF: 60
SL CV PED ECHO LEFT VENTRICLE DIASTOLIC VOLUME (MOD BIPLANE) 2D: 56 ML
SL CV PED ECHO LEFT VENTRICLE SYSTOLIC VOLUME (MOD BIPLANE) 2D: 18 ML
TR MAX PG: 12 MMHG
TR PEAK VELOCITY: 1.8 M/S
TRICUSPID ANNULAR PLANE SYSTOLIC EXCURSION: 2.5 CM
TRICUSPID VALVE PEAK REGURGITATION VELOCITY: 1.75 M/S

## 2023-11-30 PROCEDURE — 93306 TTE W/DOPPLER COMPLETE: CPT | Performed by: INTERNAL MEDICINE

## 2023-11-30 PROCEDURE — 93306 TTE W/DOPPLER COMPLETE: CPT

## 2023-12-07 NOTE — PROGRESS NOTES
Behavioral Health Follow Up Note        Name: Tiffany Harris              Starting weight 262  Last time weight 215.8  Today's weight 222.2  BMI:39.74  Surgery date:1/30/24  Surgeon: Dr. Madyson Coyle  Type of Surgery: Bypass    Mental health and wellness -   Patient presents to the office today for a weight check and support visit. The pt shared that she has a very supportive family and she is hoping that with this surgery is  able to lose the weight because she has been following the guidelines and after not been able to lose as much weight as she wanted with the sleeve she is hopeful this time she is able to lose more. Eating behaviors - meals between 3-5 a day. Sometimes snacks with fruits and veg. Uses small plate for portioning. Chewing food. Practicing 30/60 rule. Hydration-48-60 oz of water, ice tea sometimes and OJ. Reading bariatric book. Activity -  currently not going to the gym as she was sick. Normally goes to the gym 3-4 times a week. Reviewed and discussed  Patient educated and handouts provided. Adequate hydration  Exercise  Meal planning and preparation  Lifestyle changes  Possible problems with poor eating habits  Practice mindful eating. Setting aside time to eat slowly, and savor food    Goal: 1-increase activity level.    Next appointment:1/18/23 Dr. Madyson Coyle

## 2023-12-11 ENCOUNTER — TELEPHONE (OUTPATIENT)
Dept: OBGYN CLINIC | Facility: CLINIC | Age: 48
End: 2023-12-11

## 2023-12-11 NOTE — TELEPHONE ENCOUNTER
Pt ron, was wondering who she can contact about the referral for urology? she has not heard from them yet. She also has gastric bypass scheduled on jan 30th. Just wanted to let Dr Kanika Guzman know.

## 2023-12-12 ENCOUNTER — TELEPHONE (OUTPATIENT)
Dept: INTERNAL MEDICINE CLINIC | Facility: CLINIC | Age: 48
End: 2023-12-12

## 2023-12-12 ENCOUNTER — TELEPHONE (OUTPATIENT)
Dept: OBGYN CLINIC | Facility: CLINIC | Age: 48
End: 2023-12-12

## 2023-12-12 ENCOUNTER — OFFICE VISIT (OUTPATIENT)
Dept: BARIATRICS | Facility: CLINIC | Age: 48
End: 2023-12-12

## 2023-12-12 VITALS — WEIGHT: 222.2 LBS | BODY MASS INDEX: 39.74 KG/M2

## 2023-12-12 DIAGNOSIS — N81.4 PROLAPSED UTERUS: Primary | ICD-10-CM

## 2023-12-12 DIAGNOSIS — E66.9 OBESITY, CLASS II, BMI 35-39.9: Primary | ICD-10-CM

## 2023-12-12 DIAGNOSIS — M79.7 FIBROMYALGIA: Primary | ICD-10-CM

## 2023-12-12 PROCEDURE — RECHECK

## 2023-12-12 NOTE — TELEPHONE ENCOUNTER
Patient is asking to schedule appt with Dr. Cotto. There is no referral placed. Does patient need to be seen by him? If so, can you please put in referral. Appt scheduled on 12/19

## 2023-12-12 NOTE — TELEPHONE ENCOUNTER
Pt lmom, dr Enoch Pantoja referred to Dr Latoya Glover. Dr Latoya Glover does not do hysterectomy. Unsure why she is referred to Her since she is not having any bladder issues. Was wondering who she should schedule appointment with?

## 2023-12-14 ENCOUNTER — OFFICE VISIT (OUTPATIENT)
Dept: NEUROLOGY | Facility: CLINIC | Age: 48
End: 2023-12-14
Payer: COMMERCIAL

## 2023-12-14 VITALS
HEART RATE: 73 BPM | HEIGHT: 62 IN | TEMPERATURE: 98.1 F | WEIGHT: 217 LBS | SYSTOLIC BLOOD PRESSURE: 120 MMHG | BODY MASS INDEX: 39.93 KG/M2 | DIASTOLIC BLOOD PRESSURE: 75 MMHG

## 2023-12-14 DIAGNOSIS — K21.9 GERD WITHOUT ESOPHAGITIS: ICD-10-CM

## 2023-12-14 DIAGNOSIS — G43.709 CHRONIC MIGRAINE WITHOUT AURA WITHOUT STATUS MIGRAINOSUS, NOT INTRACTABLE: Primary | ICD-10-CM

## 2023-12-14 PROCEDURE — 99214 OFFICE O/P EST MOD 30 MIN: CPT | Performed by: PHYSICIAN ASSISTANT

## 2023-12-14 RX ORDER — VENLAFAXINE HYDROCHLORIDE 37.5 MG/1
CAPSULE, EXTENDED RELEASE ORAL
Qty: 7 CAPSULE | Refills: 0 | Status: SHIPPED | OUTPATIENT
Start: 2023-12-14

## 2023-12-14 RX ORDER — KETOROLAC TROMETHAMINE 30 MG/ML
INJECTION, SOLUTION INTRAMUSCULAR; INTRAVENOUS
Qty: 4 ML | Refills: 2 | Status: SHIPPED | OUTPATIENT
Start: 2023-12-14

## 2023-12-14 RX ORDER — VENLAFAXINE HYDROCHLORIDE 75 MG/1
CAPSULE, EXTENDED RELEASE ORAL
Qty: 30 CAPSULE | Refills: 2 | Status: SHIPPED | OUTPATIENT
Start: 2023-12-14
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
## 2023-12-19 ENCOUNTER — CONSULT (OUTPATIENT)
Dept: MULTI SPECIALTY CLINIC | Facility: CLINIC | Age: 48
End: 2023-12-19

## 2023-12-19 VITALS
DIASTOLIC BLOOD PRESSURE: 84 MMHG | WEIGHT: 217 LBS | OXYGEN SATURATION: 98 % | SYSTOLIC BLOOD PRESSURE: 124 MMHG | TEMPERATURE: 98.6 F | HEART RATE: 80 BPM | BODY MASS INDEX: 39.69 KG/M2

## 2023-12-19 DIAGNOSIS — M79.7 FIBROMYALGIA: ICD-10-CM

## 2023-12-19 DIAGNOSIS — F43.10 POST TRAUMATIC STRESS DISORDER (PTSD): Primary | ICD-10-CM

## 2023-12-19 DIAGNOSIS — Z98.1 S/P FUSION OF SACROILIAC JOINT: ICD-10-CM

## 2023-12-19 DIAGNOSIS — M46.1 SACROILIITIS (HCC): ICD-10-CM

## 2023-12-19 DIAGNOSIS — Z48.811 ENCOUNTER FOR SURGICAL AFTERCARE FOLLOWING SURGERY ON THE NERVOUS SYSTEM: ICD-10-CM
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
## 2023-12-21 ENCOUNTER — APPOINTMENT (OUTPATIENT)
Dept: LAB | Facility: CLINIC | Age: 48
End: 2023-12-21
Payer: COMMERCIAL

## 2023-12-21 ENCOUNTER — OFFICE VISIT (OUTPATIENT)
Dept: OBGYN CLINIC | Facility: CLINIC | Age: 48
End: 2023-12-21
Payer: COMMERCIAL

## 2023-12-21 VITALS — WEIGHT: 217 LBS | HEIGHT: 62 IN | BODY MASS INDEX: 39.93 KG/M2

## 2023-12-21 DIAGNOSIS — M75.20 BICEPS TENDINITIS, UNSPECIFIED LATERALITY: Primary | ICD-10-CM

## 2023-12-21 DIAGNOSIS — Z48.811 ENCOUNTER FOR SURGICAL AFTERCARE FOLLOWING SURGERY ON THE NERVOUS SYSTEM: ICD-10-CM

## 2023-12-21 DIAGNOSIS — M79.7 FIBROMYALGIA: ICD-10-CM

## 2023-12-21 DIAGNOSIS — M18.12 ARTHRITIS OF CARPOMETACARPAL (CMC) JOINT OF LEFT THUMB: ICD-10-CM

## 2023-12-21 DIAGNOSIS — M46.1 SACROILIITIS (HCC): ICD-10-CM

## 2023-12-21 DIAGNOSIS — Z98.1 S/P FUSION OF SACROILIAC JOINT: ICD-10-CM

## 2023-12-21 DIAGNOSIS — M18.11 ARTHRITIS OF CARPOMETACARPAL (CMC) JOINT OF RIGHT THUMB: ICD-10-CM

## 2023-12-21 LAB
BASOPHILS # BLD AUTO: 0.05 THOUSANDS/ÂΜL (ref 0–0.1)
BASOPHILS NFR BLD AUTO: 1 % (ref 0–1)
C3 SERPL-MCNC: 151 MG/DL (ref 87–200)
C4 SERPL-MCNC: 41 MG/DL (ref 19–52)
CK SERPL-CCNC: 59 U/L (ref 26–192)
CRP SERPL QL: 15.6 MG/L
EOSINOPHIL # BLD AUTO: 0.23 THOUSAND/ÂΜL (ref 0–0.61)
EOSINOPHIL NFR BLD AUTO: 4 % (ref 0–6)
ERYTHROCYTE [DISTWIDTH] IN BLOOD BY AUTOMATED COUNT: 13.2 % (ref 11.6–15.1)
ERYTHROCYTE [SEDIMENTATION RATE] IN BLOOD: 42 MM/HOUR (ref 0–19)
HCT VFR BLD AUTO: 46.2 % (ref 34.8–46.1)
HGB BLD-MCNC: 14.2 G/DL (ref 11.5–15.4)
IMM GRANULOCYTES # BLD AUTO: 0.02 THOUSAND/UL (ref 0–0.2)
IMM GRANULOCYTES NFR BLD AUTO: 0 % (ref 0–2)
LYMPHOCYTES # BLD AUTO: 2.25 THOUSANDS/ÂΜL (ref 0.6–4.47)
LYMPHOCYTES NFR BLD AUTO: 38 % (ref 14–44)
MCH RBC QN AUTO: 26.5 PG (ref 26.8–34.3)
MCHC RBC AUTO-ENTMCNC: 30.7 G/DL (ref 31.4–37.4)
MCV RBC AUTO: 86 FL (ref 82–98)
MONOCYTES # BLD AUTO: 0.45 THOUSAND/ÂΜL (ref 0.17–1.22)
MONOCYTES NFR BLD AUTO: 8 % (ref 4–12)
NEUTROPHILS # BLD AUTO: 2.94 THOUSANDS/ÂΜL (ref 1.85–7.62)
NEUTS SEG NFR BLD AUTO: 49 % (ref 43–75)
NRBC BLD AUTO-RTO: 0 /100 WBCS
PLATELET # BLD AUTO: 256 THOUSANDS/UL (ref 149–390)
PMV BLD AUTO: 9.8 FL (ref 8.9–12.7)
RBC # BLD AUTO: 5.36 MILLION/UL (ref 3.81–5.12)
WBC # BLD AUTO: 5.94 THOUSAND/UL (ref 4.31–10.16)

## 2023-12-21 PROCEDURE — 82550 ASSAY OF CK (CPK): CPT

## 2023-12-21 PROCEDURE — 84165 PROTEIN E-PHORESIS SERUM: CPT

## 2023-12-21 PROCEDURE — 86140 C-REACTIVE PROTEIN: CPT

## 2023-12-21 PROCEDURE — 20600 DRAIN/INJ JOINT/BURSA W/O US: CPT | Performed by: SURGERY

## 2023-12-21 PROCEDURE — 85025 COMPLETE CBC W/AUTO DIFF WBC: CPT

## 2023-12-21 PROCEDURE — 86038 ANTINUCLEAR ANTIBODIES: CPT

## 2023-12-21 PROCEDURE — 86235 NUCLEAR ANTIGEN ANTIBODY: CPT

## 2023-12-21 PROCEDURE — 36415 COLL VENOUS BLD VENIPUNCTURE: CPT

## 2023-12-21 PROCEDURE — 86200 CCP ANTIBODY: CPT

## 2023-12-21 PROCEDURE — 86160 COMPLEMENT ANTIGEN: CPT

## 2023-12-21 PROCEDURE — 86039 ANTINUCLEAR ANTIBODIES (ANA): CPT

## 2023-12-21 PROCEDURE — 99214 OFFICE O/P EST MOD 30 MIN: CPT | Performed by: SURGERY

## 2023-12-21 PROCEDURE — 85652 RBC SED RATE AUTOMATED: CPT

## 2023-12-21 RX ORDER — BUPIVACAINE HYDROCHLORIDE 2.5 MG/ML
0.5 INJECTION, SOLUTION INFILTRATION; PERINEURAL
Status: COMPLETED | OUTPATIENT
Start: 2023-12-21 | End: 2023-12-21

## 2023-12-21 RX ORDER — LIDOCAINE HYDROCHLORIDE 10 MG/ML
1 INJECTION, SOLUTION INFILTRATION; PERINEURAL
Status: COMPLETED | OUTPATIENT
Start: 2023-12-21 | End: 2023-12-21

## 2023-12-21 RX ORDER — TRIAMCINOLONE ACETONIDE 40 MG/ML
20 INJECTION, SUSPENSION INTRA-ARTICULAR; INTRAMUSCULAR
Status: COMPLETED | OUTPATIENT
Start: 2023-12-21 | End: 2023-12-21

## 2023-12-21 RX ADMIN — LIDOCAINE HYDROCHLORIDE 1 ML: 10 INJECTION, SOLUTION INFILTRATION; PERINEURAL at 09:00

## 2023-12-21 RX ADMIN — BUPIVACAINE HYDROCHLORIDE 0.5 ML: 2.5 INJECTION, SOLUTION INFILTRATION; PERINEURAL at 09:00

## 2023-12-21 RX ADMIN — TRIAMCINOLONE ACETONIDE 20 MG: 40 INJECTION, SUSPENSION INTRA-ARTICULAR; INTRAMUSCULAR at 09:00

## 2023-12-21 NOTE — PROGRESS NOTES
ASSESSMENT/PLAN:      48 y.o. female with bilateral thumb CMC arthritis and bilateral distal biceps  tendinitis bilaterally (new issue)     Due to acute exacerbation of chronic bilateral thumb CMC arthritis, the decision was made to proceed with bilateral thumb CMC CSI's. Bilateral thumb CMC CSI's were performed in the office without complication. Post injection protocol/expectations were reviewed. The CSI's may be repeated on a 3 month basis as needed. Therapy script was provided for distal biceps tendinitis, which is a new issues. If elbow symptoms fail to improve, she will be referred to a shoulder/elbow specialist, Dr. Li. Advised to attend approx. 6-8 weeks of formal PT prior to consultation with Dr. Li. Follow up in the office as needed if symptoms worsen or fail to improve.        The patient verbalized understanding of exam findings and treatment plan. We engaged in the shared decision-making process and treatment options were discussed at length with the patient. Surgical and conservative management discussed today along with risks and benefits.    Diagnoses and all orders for this visit:    Biceps tendinitis, unspecified laterality  -     Ambulatory Referral to Orthopedic Surgery; Future    Arthritis of carpometacarpal (CMC) joint of left thumb    Arthritis of carpometacarpal (CMC) joint of right thumb      Follow Up:  No follow-ups on file.      To Do Next Visit:  Re-evaluation of current issue    ____________________________________________________________________________________________________________________________________________      CHIEF COMPLAINT:  Chief Complaint   Patient presents with    Left Hand - Follow-up     Lt thumb follow up. States feels better, at times has pain when gripping items.     Right Hand - Follow-up     Rt thumb follow up. States feels better, at times has pain when gripping items.        SUBJECTIVE:  Jenny Olvera is a 48 y.o. year old RHD female who presents to the  office for bilateral thumb pain and bilateral elbow pain. She notes pain to the base of both thumbs. She notes her bilateral thumb CMC CSI's in August where beneficial for her until recently. She now notes pain to her antecubital fossa bilaterally. This will worsen with lifting. She denies any injury or trauma but notes she been helping her friend with her baby, which required a lot of repetitive lifting. She is currently not taking anything for pain control.        I have personally reviewed all the relevant PMH, PSH, SH, FH, Medications and allergies.     PAST MEDICAL HISTORY:  Past Medical History:   Diagnosis Date    Anxiety     Asthma     Claustrophobia     Cluster headache     CTS (carpal tunnel syndrome)     Depression     Fibromyalgia     last assessed 11/27/17    Fibromyalgia, primary     GERD (gastroesophageal reflux disease)     GERD without esophagitis     last assessed 10/12/17    GI problem     Headache, tension-type     Irritable bowel syndrome     Joint pain     Kidney stone     Lung nodule     last assessed 10/9/15    Migraine     Muscle pain     Peripheral neuropathy     Sleep apnea        PAST SURGICAL HISTORY:  Past Surgical History:   Procedure Laterality Date    BACK SURGERY      BARIATRIC SURGERY  8/9/2022    CARPAL TUNNEL RELEASE Bilateral     CHOLECYSTECTOMY      CYSTOSCOPY      GALLBLADDER SURGERY      GASTRECTOMY  08/09/2022    Sleeve    NECK SURGERY      ID TENDON SHEATH INCISION Right 01/16/2023    Procedure: THUMB TRIGGER FINGER RELEASE;  Surgeon: Gail Wylie MD;  Location: [facility];  Service: Orthopedics    SACROILIAC JOINT FUSION Left     SPINAL FUSION      C4 and C5    ULNAR NERVE REPAIR Bilateral     UPPER GASTROINTESTINAL ENDOSCOPY         FAMILY HISTORY:  Family History   Problem Relation Age of Onset    Diabetes Mother     Fibromyalgia Mother     Ovarian cancer Mother 33    Hypertension Mother     Thyroid disease Mother     Migraines Mother     Neuropathy Mother      Cancer Mother         has been removed    No Known Problems Father     Throat cancer Maternal Grandmother     No Known Problems Maternal Grandfather     No Known Problems Paternal Grandmother     No Known Problems Paternal Grandfather     No Known Problems Daughter     Breast cancer Maternal Aunt     No Known Problems Maternal Aunt     No Known Problems Maternal Aunt     No Known Problems Paternal Aunt     Colon cancer Cousin     Heart disease Cousin     Lupus Cousin     Arthritis Family     Heart disease Family         cardiac disorder    Neuropathy Family     Lupus Family         systemic erythematosus    No Known Problems Half-Sister     No Known Problems Half-Brother     No Known Problems Half-Brother     No Known Problems Half-Brother     Asthma Brother     Stroke Neg Hx        SOCIAL HISTORY:  Social History     Tobacco Use    Smoking status: Never    Smokeless tobacco: Never    Tobacco comments:     Never smoked.   Vaping Use    Vaping status: Never Used   Substance Use Topics    Alcohol use: Yes     Alcohol/week: 2.0 standard drinks of alcohol     Types: 2 Glasses of wine per week     Comment: Occasionally    Drug use: Not Currently     Types: Marijuana     Comment: medicinal       MEDICATIONS:    Current Outpatient Medications:     albuterol (PROVENTIL HFA,VENTOLIN HFA) 90 mcg/act inhaler, Inhale 2 puffs every 6 (six) hours as needed for wheezing or shortness of breath, Disp: 8 g, Rfl: 2    Botox 200 units SOLR, , Disp: , Rfl:     buPROPion (Wellbutrin XL) 150 mg 24 hr tablet, Take 1 tablet (150 mg total) by mouth every morning, Disp: 90 tablet, Rfl: 0    cholecalciferol (VITAMIN D3) 1,000 units tablet, Take 1 capsule by mouth once a week 3000 units daily, Disp: , Rfl:     cholestyramine (QUESTRAN) 4 GM/DOSE powder, Take 1 packet (4 g total) by mouth 2 (two) times a day with meals, Disp: 378 g, Rfl: 2    dicyclomine (BENTYL) 20 mg tablet, Take 1 tablet (20 mg total) by mouth 3 (three) times a day as needed  "(migraine/cramps), Disp: 60 tablet, Rfl: 0    esomeprazole (NexIUM) 40 MG capsule, Take 1 capsule (40 mg total) by mouth 2 (two) times a day before meals, Disp: 120 capsule, Rfl: 0    famotidine (PEPCID) 40 MG tablet, Take 1 tablet (40 mg total) by mouth daily at bedtime, Disp: 30 tablet, Rfl: 3    fluticasone (FLONASE) 50 mcg/act nasal spray, 1 spray in each nostril once daily, Disp: 30 mL, Rfl: 0    ketorolac (TORADOL) 10 mg tablet, Take 1 tablet (10 mg total) by mouth every 6 (six) hours as needed (migraine) Max 2-3 per week., Disp: 10 tablet, Rfl: 2    ketorolac (TORADOL) 30 mg/mL injection, 1-2 ml IM injection once per 24 hours p.r.n. migraine.  No more than 2 injections per week., Disp: 4 mL, Rfl: 2    lasmiditan succinate (Reyvow) 50 mg tablet, One tab qhs at migraine onset.  Caution sedation.  Max 1 tab/24 hours., Disp: 8 tablet, Rfl: 2    levonorgestrel (MIRENA) 20 MCG/24HR IUD, 1 each by Intrauterine route once, Disp: , Rfl:     meclizine (ANTIVERT) 12.5 MG tablet, Take 1 tablet (12.5 mg total) by mouth 3 (three) times a day as needed for dizziness or nausea, Disp: 30 tablet, Rfl: 3    melatonin 3 mg, , Disp: , Rfl:     ondansetron (ZOFRAN) 4 mg tablet, Take 4 mg by mouth every 12 (twelve) hours as needed, Disp: , Rfl:     polyethylene glycol (GLYCOLAX) 17 GM/SCOOP powder, Take 17 g by mouth daily, Disp: 850 g, Rfl: 2    prochlorperazine (COMPAZINE) 10 mg tablet, Take 1 tablet (10 mg total) by mouth every 6 (six) hours as needed for nausea or vomiting, Disp: 30 tablet, Rfl: 0    Syringe/Needle, Disp, (SYRINGE 3CC/01UA9-5/2\") 25G X 1-1/2\" 3 ML MISC, Use for toradol IM injections., Disp: 10 each, Rfl: 0    tiZANidine (ZANAFLEX) 2 mg tablet, Take 1 tablet (2 mg total) by mouth every 8 (eight) hours as needed for muscle spasms, Disp: 90 tablet, Rfl: 1    Trudhesa 0.725 MG/ACT AERS, , Disp: , Rfl:     venlafaxine (EFFEXOR-XR) 37.5 mg 24 hr capsule, One capsule q.a.m. With food x7 days, Disp: 7 capsule, Rfl: 0   "  venlafaxine (EFFEXOR-XR) 75 mg 24 hr capsule, After titration with lower dose, start 75 mg q.a.m. With food, Disp: 30 capsule, Rfl: 2    Current Facility-Administered Medications:     cyanocobalamin injection 250 mcg, 250 mcg, Intramuscular, Q30 Days, CATE Garrison    ALLERGIES:  Allergies   Allergen Reactions    Hydrocodone-Acetaminophen GI Intolerance     gi upset -pt okay if takes  zofran    Hydrocodone-Acetaminophen GI Intolerance     gi upset -pt okay if takes  zofran  gi upset -pt okay if takes  zofran    Codeine GI Intolerance and Other (See Comments)     GI issues    Oxycodone-Acetaminophen GI Intolerance and Other (See Comments)     GI issues    Penicillins GI Intolerance and Other (See Comments)     GI issues    Seasonal Ic [Octacosanol] Sneezing     Runny nose sneezing.     Mexiletine Rash       REVIEW OF SYSTEMS:  Review of Systems   Constitutional:  Negative for chills, fever and unexpected weight change.   HENT:  Negative for hearing loss, nosebleeds and sore throat.    Eyes:  Negative for pain, redness and visual disturbance.   Respiratory:  Negative for cough, shortness of breath and wheezing.    Cardiovascular:  Negative for chest pain, palpitations and leg swelling.   Gastrointestinal:  Negative for abdominal pain, nausea and vomiting.   Endocrine: Negative for polydipsia and polyuria.   Genitourinary:  Negative for difficulty urinating and hematuria.   Musculoskeletal:  Negative for arthralgias, joint swelling and myalgias.   Skin:  Negative for rash and wound.   Neurological:  Negative for dizziness, numbness and headaches.   Psychiatric/Behavioral:  Negative for decreased concentration, dysphoric mood and suicidal ideas. The patient is not nervous/anxious.        VITALS:  There were no vitals filed for this visit.    LABS:  HgA1c:   Lab Results   Component Value Date    HGBA1C 5.7 (H) 06/22/2022     BMP:   Lab Results   Component Value Date    GLUCOSE 88 02/20/2015    CALCIUM 8.8  "10/18/2023     02/20/2015    K 3.5 10/18/2023    CO2 22 10/18/2023     (H) 10/18/2023    BUN 15 10/18/2023    CREATININE 0.73 10/18/2023       _____________________________________________________  PHYSICAL EXAMINATION:  General: well developed and well nourished, alert, oriented times 3, and appears comfortable  Psychiatric: Normal  HEENT: Normocephalic, Atraumatic Trachea Midline, No torticollis  Pulmonary: No audible wheezing or respiratory distress   Cardiovascular: No pitting edema, 2+ radial pulse   Abdominal/GI: abdomen non tender, non distended   Skin: No masses, erythema, lacerations, fluctation, ulcerations  Neurovascular: Sensation Intact to the Median, Ulnar, Radial Nerve, Motor Intact to the Median, Ulnar, Radial Nerve, and Pulses Intact  Musculoskeletal: Normal, except as noted in detailed exam and in HPI.      MUSCULOSKELETAL EXAMINATION:    Bilateral CMC Exam:  No adduction contracture  No hyperextension deformity of MCP joint  Positive localized tenderness over radial and dorsal aspect of thumb (CMC joint)  Grind test is Positive for pain and Negative for crepitus  Metacarpal load shift test positive   No triggering or tenderness over the A1 pulley    Distal biceps tenderness bilaterally     ___________________________________________________  STUDIES REVIEWED:  No new imaging to review         PROCEDURES PERFORMED:  Small joint arthrocentesis: bilateral thumb CMC  Universal Protocol:  Consent: Verbal consent obtained. Written consent not obtained.  Risks and benefits: risks, benefits and alternatives were discussed  Consent given by: patient  Time out: Immediately prior to procedure a \"time out\" was called to verify the correct patient, procedure, equipment, support staff and site/side marked as required.  Patient understanding: patient states understanding of the procedure being performed  Site marked: the operative site was marked  Patient identity confirmed: verbally with " patient  Supporting Documentation  Indications: pain   Procedure Details  Location: thumb - bilateral thumb CMC  Preparation: Patient was prepped and draped in the usual sterile fashion  Needle size: 25 G  Ultrasound guidance: no    Medications (Right): 0.5 mL bupivacaine 0.25 %; 1 mL lidocaine 1 %; 20 mg triamcinolone acetonide 40 mg/mLMedications (Left): 0.5 mL bupivacaine 0.25 %; 1 mL lidocaine 1 %; 20 mg triamcinolone acetonide 40 mg/mL   Patient tolerance: patient tolerated the procedure well with no immediate complications  Dressing:  Sterile dressing applied           _____________________________________________________      Scribe Attestation      I,:  Radha Cleaning am acting as a scribe while in the presence of the attending physician.:       I,:  Gail Wylie MD personally performed the services described in this documentation    as scribed in my presence.:

## 2023-12-22 ENCOUNTER — OFFICE VISIT (OUTPATIENT)
Dept: BARIATRICS | Facility: CLINIC | Age: 48
End: 2023-12-22
Payer: COMMERCIAL

## 2023-12-22 DIAGNOSIS — E53.8 VITAMIN B12 DEFICIENCY: Primary | ICD-10-CM

## 2023-12-22 LAB
ALBUMIN SERPL ELPH-MCNC: 3.96 G/DL (ref 3.2–5.1)
ALBUMIN SERPL ELPH-MCNC: 55 % (ref 48–70)
ALPHA1 GLOB SERPL ELPH-MCNC: 0.32 G/DL (ref 0.15–0.47)
ALPHA1 GLOB SERPL ELPH-MCNC: 4.5 % (ref 1.8–7)
ALPHA2 GLOB SERPL ELPH-MCNC: 0.74 G/DL (ref 0.42–1.04)
ALPHA2 GLOB SERPL ELPH-MCNC: 10.3 % (ref 5.9–14.9)
ANA HOMOGEN SER QL IF: NORMAL
ANA HOMOGEN TITR SER: NORMAL {TITER}
ANA SER QL IA: POSITIVE
BETA GLOB ABNORMAL SERPL ELPH-MCNC: 0.42 G/DL (ref 0.31–0.57)
BETA1 GLOB SERPL ELPH-MCNC: 5.8 % (ref 4.7–7.7)
BETA2 GLOB SERPL ELPH-MCNC: 7.3 % (ref 3.1–7.9)
BETA2+GAMMA GLOB SERPL ELPH-MCNC: 0.53 G/DL (ref 0.2–0.58)
ENA SS-A AB SER-ACNC: 1.4 AI (ref 0–0.9)
ENA SS-B AB SER-ACNC: <0.2 AI (ref 0–0.9)
GAMMA GLOB ABNORMAL SERPL ELPH-MCNC: 1.23 G/DL (ref 0.4–1.66)
GAMMA GLOB SERPL ELPH-MCNC: 17.1 % (ref 6.9–22.3)
IGG/ALB SER: 1.22 {RATIO} (ref 1.1–1.8)
PROT PATTERN SERPL ELPH-IMP: NORMAL
PROT SERPL-MCNC: 7.2 G/DL (ref 6.4–8.2)

## 2023-12-22 PROCEDURE — 96372 THER/PROPH/DIAG INJ SC/IM: CPT

## 2023-12-22 PROCEDURE — 84165 PROTEIN E-PHORESIS SERUM: CPT | Performed by: PATHOLOGY

## 2023-12-22 RX ADMIN — CYANOCOBALAMIN 250 MCG: 1000 INJECTION, SOLUTION INTRAMUSCULAR; SUBCUTANEOUS at 13:05

## 2023-12-23 LAB — CCP AB SER IA-ACNC: 1.9

## 2024-01-02 ENCOUNTER — TELEPHONE (OUTPATIENT)
Dept: MULTI SPECIALTY CLINIC | Facility: CLINIC | Age: 49
End: 2024-01-02

## 2024-01-02 NOTE — TELEPHONE ENCOUNTER
Patient was seen by Dr Cotto 12/19/23 who ordered labs -  lab work was done 12/21/23 and the patient is requesting the results.    Please have Dr Cotto read the labs and call the patient with the results.    thanks

## 2024-01-03 NOTE — TELEPHONE ENCOUNTER
Called and spoke to patient, patient made aware  is not here until 1/23/24 and DR. Miner will reach out to patient once he's back in office

## 2024-01-09 NOTE — PRE-PROCEDURE INSTRUCTIONS
Pre-Surgery Instructions:   Medication Instructions    albuterol (PROVENTIL HFA,VENTOLIN HFA) 90 mcg/act inhaler Uses PRN- OK to take day of surgery    Botox 200 units SOLR Every three months    buPROPion (Wellbutrin XL) 150 mg 24 hr tablet Take day of surgery.    cholecalciferol (VITAMIN D3) 1,000 units tablet Instructions provided by MD    dicyclomine (BENTYL) 20 mg tablet Uses PRN- OK to take day of surgery    famotidine (PEPCID) 40 MG tablet Take night before surgery    fluticasone (FLONASE) 50 mcg/act nasal spray Uses PRN- OK to take day of surgery    ketorolac (TORADOL) 10 mg tablet Uses PRN- OK to take day of surgery    ketorolac (TORADOL) 30 mg/mL injection Uses PRN- OK to take day of surgery    lasmiditan succinate (Reyvow) 50 mg tablet Uses PRN- DO NOT take day of surgery    levonorgestrel (MIRENA) 20 MCG/24HR IUD IUD    meclizine (ANTIVERT) 12.5 MG tablet Uses PRN- OK to take day of surgery    pantoprazole (PROTONIX) 40 mg tablet Take day of surgery.    prochlorperazine (COMPAZINE) 10 mg tablet Uses PRN- OK to take day of surgery    tiZANidine (ZANAFLEX) 2 mg tablet Take night before surgery    Trudhesa 0.725 MG/ACT AERS Uses PRN- OK to take day of surgery    venlafaxine (EFFEXOR-XR) 37.5 mg 24 hr capsule Take day of surgery.        Medication instructions for day surgery reviewed. Please use only a sip of water to take your instructed medications. Avoid all over the counter vitamins, supplements and NSAIDS for one week prior to surgery per anesthesia guidelines. Tylenol is ok to take as needed.     You will receive a call one business day prior to surgery with an arrival time and hospital directions. If your surgery is scheduled on a Monday, the hospital will be calling you on the Friday prior to your surgery. If you have not heard from anyone by 8pm, please call the [facility] supervisor through the hospital  at [phone]. (Benoit [phone]).    Do not eat or drink anything after  midnight the night before your surgery, including candy, mints, lifesavers, or chewing gum. Do not drink alcohol 24hrs before your surgery. Try not to smoke at least 24hrs before your surgery.       Follow the pre surgery showering instructions as listed in the “My Surgical Experience Booklet” or otherwise provided by your surgeon's office. Do not use a blade to shave the surgical area 1 week before surgery. It is okay to use a clean electric clippers up to 24 hours before surgery. Do not apply any lotions, creams, including makeup, cologne, deodorant, or perfumes after showering on the day of your surgery. Do not use dry shampoo, hair spray, hair gel, or any type of hair products.     No contact lenses, eye make-up, or artificial eyelashes. Remove nail polish, including gel polish, and any artificial, gel, or acrylic nails if possible. Remove all jewelry including rings and body piercing jewelry.     Wear causal clothing that is easy to take on and off. Consider your type of surgery.    Keep any valuables, jewelry, piercings at home. Please bring any specially ordered equipment (sling, braces) if indicated.    Arrange for a responsible person to drive you to and from the hospital on the day of your surgery. Visitor Guidelines discussed.     Call the surgeon's office with any new illnesses, exposures, or additional questions prior to surgery.    Please reference your “My Surgical Experience Booklet” for additional information to prepare for your upcoming surgery.

## 2024-01-12 ENCOUNTER — OFFICE VISIT (OUTPATIENT)
Dept: INTERNAL MEDICINE CLINIC | Facility: CLINIC | Age: 49
End: 2024-01-12

## 2024-01-12 VITALS
WEIGHT: 216 LBS | OXYGEN SATURATION: 98 % | SYSTOLIC BLOOD PRESSURE: 117 MMHG | TEMPERATURE: 98 F | DIASTOLIC BLOOD PRESSURE: 77 MMHG | BODY MASS INDEX: 39.51 KG/M2 | HEART RATE: 80 BPM

## 2024-01-12 DIAGNOSIS — K21.9 HIATAL HERNIA WITH GERD WITHOUT ESOPHAGITIS: ICD-10-CM

## 2024-01-12 DIAGNOSIS — K44.9 HIATAL HERNIA WITH GERD WITHOUT ESOPHAGITIS: ICD-10-CM

## 2024-01-12 DIAGNOSIS — Z23 ENCOUNTER FOR IMMUNIZATION: ICD-10-CM

## 2024-01-12 DIAGNOSIS — Z12.31 ENCOUNTER FOR SCREENING MAMMOGRAM FOR BREAST CANCER: ICD-10-CM

## 2024-01-12 DIAGNOSIS — Z90.3 HISTORY OF SLEEVE GASTRECTOMY: ICD-10-CM

## 2024-01-12 DIAGNOSIS — E66.01 MORBID OBESITY (HCC): ICD-10-CM

## 2024-01-12 DIAGNOSIS — Z00.00 ANNUAL PHYSICAL EXAM: Primary | ICD-10-CM

## 2024-01-12 PROCEDURE — 99213 OFFICE O/P EST LOW 20 MIN: CPT | Performed by: HOSPITALIST

## 2024-01-12 RX ORDER — FAMOTIDINE 40 MG/1
40 TABLET, FILM COATED ORAL
Qty: 30 TABLET | Refills: 3 | Status: SHIPPED | OUTPATIENT
Start: 2024-01-12 | End: 2024-05-11

## 2024-01-12 RX ORDER — ONDANSETRON 4 MG/1
4 TABLET, FILM COATED ORAL EVERY 12 HOURS PRN
Qty: 20 TABLET | Refills: 1 | Status: SHIPPED | OUTPATIENT
Start: 2024-01-12

## 2024-01-12 NOTE — PROGRESS NOTES
ADULT ANNUAL PHYSICAL  [facility]    NAME: Jenny Olvera  AGE: 48 y.o. SEX: female  : 1975     DATE: 2024     Assessment and Plan:     Patient presents for annual physical exam.  Medication reconciliation completed at today's visit.  General health preventative screenings reviewed; she is up-to-date on colon cancer screening and cervical cancer screening will be completed at her upcoming annual OB/GYN visit.  She is due for mammogram in February, so we will place order at this visit.    Lab review:  Bariatrics labs done in    Reviewed CBC and metabolic panel   Rheum labs done  by rheum for her fibromyalgia showed elevated CRP, ESR with slightly positive LANE and SS-A Ab    Plan:  Refill Pepcid for persistent reflux with upcoming plan for sleeve gastrectomy conversion to Isela-en-Y which will hopefully improve reflux symptoms  Obtain mammogram  Follow-up with rheumatology for positive rheumatologic labs as noted above  BMI Counseling: Body mass index is 39.51 kg/m². The BMI is above normal. Nutrition recommendations include reducing portion sizes and decreasing overall calorie intake. Exercise recommendations include moderate aerobic physical activity for 150 minutes/week.     Problem List Items Addressed This Visit          Other    Morbid obesity (HCC)     Other Visit Diagnoses       Annual physical exam    -  Primary    History of sleeve gastrectomy        Hiatal hernia with GERD without esophagitis        Relevant Medications    famotidine (PEPCID) 40 MG tablet    ondansetron (ZOFRAN) 4 mg tablet    Encounter for immunization        Relevant Orders    influenza vaccine, quadrivalent, 0.5 mL, preservative-free, for adult and pediatric patients 6 mos+ (AFLURIA, FLUARIX, FLULAVAL, FLUZONE)    Encounter for screening mammogram for breast cancer        Relevant Orders    Mammo screening bilateral w 3d & cad           Immunizations and preventive care screenings were discussed with patient today. Appropriate education was printed on patient's after visit summary.    Counseling:  Alcohol/drug use: discussed moderation in alcohol intake, the recommendations for healthy alcohol use, and avoidance of illicit drug use.  Dental Health: discussed importance of regular tooth brushing, flossing, and dental visits.  Injury prevention: discussed safety/seat belts, safety helmets, smoke detectors, carbon dioxide detectors, and smoking near bedding or upholstery.  Sexual health: discussed sexually transmitted diseases, partner selection, use of condoms, avoidance of unintended pregnancy, and contraceptive alternatives.  Exercise: the importance of regular exercise/physical activity was discussed. Recommend exercise 3-5 times per week for at least 30 minutes.        Return in 6 months (on 2024) for follow-up chronic conditions.     Chief Complaint:     Chief Complaint   Patient presents with    Knee Pain     Left knee, spasm, 1 week      History of Present Illness:     Adult Annual Physical   Patient here for a comprehensive physical exam. The patient reports no problems.    Diet and Physical Activity  Diet/Nutrition: well balanced diet.     Depression Screening  PHQ-2/9 Depression Screening    Little interest or pleasure in doing things: 3 - nearly every day  Feeling down, depressed, or hopeless: 3 - nearly every day  Trouble falling or staying asleep, or sleeping too much: 3 - nearly every day  Feeling tired or having little energy: 3 - nearly every day  Poor appetite or overeatin - several days  Feeling bad about yourself - or that you are a failure or have let yourself or your family down: 2 - more than half the days  Trouble concentrating on things, such as reading the newspaper or watching television: 1 - several days  Moving or speaking so slowly that other people could have noticed. Or the opposite - being so fidgety or  restless that you have been moving around a lot more than usual: 1 - several days  Thoughts that you would be better off dead, or of hurting yourself in some way: 0 - not at all  PHQ-9 Score: 17  PHQ-9 Interpretation: Moderately severe depression       General Health  Sleep: sleeps well.   Hearing: normal - bilateral.  Vision: no vision problems.   Dental: regular dental visits.       /GYN Health  Follows with gynecology? yes   Patient is: perimenopausal  Last menstrual period: >10 years   Contraceptive method: IUD placement.     Review of Systems:     Review of Systems -negative other than stated above     Past Medical History:     Past Medical History:   Diagnosis Date    Anxiety     Asthma     Claustrophobia     Cluster headache     CTS (carpal tunnel syndrome)     Depression     Fibromyalgia     primary; last assessed 11/27/17    GERD (gastroesophageal reflux disease)     GI problem     Headache, tension-type     Irritable bowel syndrome     Joint pain     Kidney stone     Lung nodule     last assessed 10/9/15    Migraine     Muscle pain     Peripheral neuropathy     Sjogren's syndrome (HCC)       Past Surgical History:     Past Surgical History:   Procedure Laterality Date    BACK SURGERY      BARIATRIC SURGERY  8/9/2022    CARPAL TUNNEL RELEASE Bilateral     CHOLECYSTECTOMY      CYSTOSCOPY      GALLBLADDER SURGERY      GASTRECTOMY  08/09/2022    Sleeve    NECK SURGERY      MI TENDON SHEATH INCISION Right 01/16/2023    Procedure: THUMB TRIGGER FINGER RELEASE;  Surgeon: Gail Wylie MD;  Location: [facility] MAIN OR;  Service: Orthopedics    SACROILIAC JOINT FUSION Left     SPINAL FUSION      C4 and C5    ULNAR NERVE REPAIR Bilateral     UPPER GASTROINTESTINAL ENDOSCOPY        Social History:     Social History     Socioeconomic History    Marital status:      Spouse name: Not on file    Number of children: Not on file    Years of education: Not on file    Highest education level: Not on file    Occupational History    Not on file   Tobacco Use    Smoking status: Never    Smokeless tobacco: Never    Tobacco comments:     Never smoked.   Vaping Use    Vaping status: Never Used   Substance and Sexual Activity    Alcohol use: Yes     Alcohol/week: 2.0 standard drinks of alcohol     Types: 2 Glasses of wine per week     Comment: Occasionally    Drug use: Not Currently     Types: Marijuana     Comment: medicinal    Sexual activity: Yes     Partners: Male     Birth control/protection: Abstinence, Condom Male, I.U.D.   Other Topics Concern    Not on file   Social History Narrative    Daily cola, tea    No preference on religous beliefs     Social Determinants of Health     Financial Resource Strain: Low Risk  (1/12/2024)    Overall Financial Resource Strain (CARDIA)     Difficulty of Paying Living Expenses: Not very hard   Food Insecurity: No Food Insecurity (1/12/2024)    Hunger Vital Sign     Worried About Running Out of Food in the Last Year: Never true     Ran Out of Food in the Last Year: Never true   Transportation Needs: No Transportation Needs (1/12/2024)    PRAPARE - Transportation     Lack of Transportation (Medical): No     Lack of Transportation (Non-Medical): No   Physical Activity: Inactive (9/8/2021)    Exercise Vital Sign     Days of Exercise per Week: 0 days     Minutes of Exercise per Session: 0 min   Stress: Stress Concern Present (9/8/2021)    [employer] [place] [employer] - Occupational Stress Questionnaire     Feeling of Stress : Rather much   Social Connections: Socially Isolated (9/8/2021)    Social Connection and Isolation Panel [NHANES]     Frequency of Communication with Friends and Family: Three times a week     Frequency of Social Gatherings with Friends and Family: More than three times a week     Attends Restorationism Services: Never     Active Member of Clubs or Organizations: No     Attends Club or Organization Meetings: Never     Marital Status:    Intimate  Partner Violence: Not At Risk (9/8/2021)    Humiliation, Afraid, Rape, and Kick questionnaire     Fear of Current or Ex-Partner: No     Emotionally Abused: No     Physically Abused: No     Sexually Abused: No   Housing Stability: Low Risk  (9/8/2021)    Housing Stability Vital Sign     Unable to Pay for Housing in the Last Year: No     Number of Places Lived in the Last Year: 1     Unstable Housing in the Last Year: No      Family History:     Family History   Problem Relation Age of Onset    Diabetes Mother     Fibromyalgia Mother     Ovarian cancer Mother 33    Hypertension Mother     Thyroid disease Mother     Migraines Mother     Neuropathy Mother     Cancer Mother         has been removed    No Known Problems Father     Throat cancer Maternal Grandmother     No Known Problems Maternal Grandfather     No Known Problems Paternal Grandmother     No Known Problems Paternal Grandfather     No Known Problems Daughter     Breast cancer Maternal Aunt     No Known Problems Maternal Aunt     No Known Problems Maternal Aunt     No Known Problems Paternal Aunt     Colon cancer Cousin     Heart disease Cousin     Lupus Cousin     Arthritis Family     Heart disease Family         cardiac disorder    Neuropathy Family     Lupus Family         systemic erythematosus    No Known Problems Half-Sister     No Known Problems Half-Brother     No Known Problems Half-Brother     No Known Problems Half-Brother     Asthma Brother     Stroke Neg Hx       Current Medications:     Current Outpatient Medications   Medication Sig Dispense Refill    famotidine (PEPCID) 40 MG tablet Take 1 tablet (40 mg total) by mouth daily at bedtime 30 tablet 3    ondansetron (ZOFRAN) 4 mg tablet Take 1 tablet (4 mg total) by mouth every 12 (twelve) hours as needed for nausea 20 tablet 1    albuterol (PROVENTIL HFA,VENTOLIN HFA) 90 mcg/act inhaler Inhale 2 puffs every 6 (six) hours as needed for wheezing or shortness of breath 8 g 2    Botox 200 units SOLR  "Every three months for migraines      buPROPion (Wellbutrin XL) 150 mg 24 hr tablet Take 1 tablet (150 mg total) by mouth every morning 90 tablet 0    cholecalciferol (VITAMIN D3) 1,000 units tablet Take 1 capsule by mouth once a week 3000 units daily      cholestyramine (QUESTRAN) 4 GM/DOSE powder Take 1 packet (4 g total) by mouth 2 (two) times a day with meals (Patient taking differently: Take 4 g by mouth 2 (two) times a day as needed) 378 g 2    dicyclomine (BENTYL) 20 mg tablet Take 1 tablet (20 mg total) by mouth 3 (three) times a day as needed (migraine/cramps) 60 tablet 0    fluticasone (FLONASE) 50 mcg/act nasal spray 1 spray in each nostril once daily 30 mL 0    ketorolac (TORADOL) 10 mg tablet Take 1 tablet (10 mg total) by mouth every 6 (six) hours as needed (migraine) Max 2-3 per week. 10 tablet 2    ketorolac (TORADOL) 30 mg/mL injection 1-2 ml IM injection once per 24 hours p.r.n. migraine.  No more than 2 injections per week. 4 mL 2    lasmiditan succinate (Reyvow) 50 mg tablet One tab qhs at migraine onset.  Caution sedation.  Max 1 tab/24 hours. 8 tablet 2    levonorgestrel (MIRENA) 20 MCG/24HR IUD 1 each by Intrauterine route once      meclizine (ANTIVERT) 12.5 MG tablet Take 1 tablet (12.5 mg total) by mouth 3 (three) times a day as needed for dizziness or nausea 30 tablet 3    melatonin 3 mg  (Patient not taking: Reported on 1/9/2024)      pantoprazole (PROTONIX) 40 mg tablet Take 1 tablet (40 mg total) by mouth 2 (two) times a day 60 tablet 3    polyethylene glycol (GLYCOLAX) 17 GM/SCOOP powder Take 17 g by mouth daily (Patient not taking: Reported on 1/9/2024) 850 g 2    prochlorperazine (COMPAZINE) 10 mg tablet Take 1 tablet (10 mg total) by mouth every 6 (six) hours as needed for nausea or vomiting 30 tablet 0    Syringe/Needle, Disp, (SYRINGE 3CC/29NN8-1/2\") 25G X 1-1/2\" 3 ML MISC Use for toradol IM injections. 10 each 0    tiZANidine (ZANAFLEX) 2 mg tablet Take 1 tablet (2 mg total) by " mouth every 8 (eight) hours as needed for muscle spasms 90 tablet 1    Trudhesa 0.725 MG/ACT AERS       venlafaxine (EFFEXOR-XR) 37.5 mg 24 hr capsule One capsule q.a.m. With food 90 capsule 0     Current Facility-Administered Medications   Medication Dose Route Frequency Provider Last Rate Last Admin    cyanocobalamin injection 250 mcg  250 mcg Intramuscular Q30 Days Dave VuongCATE   250 mcg at 12/22/23 1305      Allergies:     Allergies   Allergen Reactions    Hydrocodone-Acetaminophen GI Intolerance     gi upset -pt okay if takes  zofran      Codeine GI Intolerance     GI issues    Mexiletine Rash    Oxycodone-Acetaminophen GI Intolerance     GI issues    Penicillins Other (See Comments) and GI Intolerance     GI issues,vaginitis    Pollen Extract-Tree Extract [Pollen Extract] Nasal Congestion      Physical Exam:     /77 (BP Location: Right arm, Patient Position: Sitting, Cuff Size: Standard)   Pulse 80   Temp 98 °F (36.7 °C) (Temporal)   Wt 98 kg (216 lb)   SpO2 98%   BMI 39.51 kg/m²     Physical Exam:  General: Obese. No apparent distress, resting comfortably   Head: Normocephalic, atraumatic  Eyes: Anicteric, no conjunctival erythema  ENT: External ear normal, no nasal discharge  Neck: Trachea midline, no visible lymphadenopathy or goiter  Respiratory: CTA.  Non-labored respirations, symmetric thorax expansion  Cardiovascular: RRR.  Extremities appear well-perfused  Abdomen: Non-distended  Extremities: Moves extremities spontaneously, no peripheral edema  Skin: No visible rashes, wounds, or jaundice  Neuro: A&O x 3, no gross focal deficits, no aphasia       Dominique Dickerson DO  [facility] [place]

## 2024-01-16 PROBLEM — J06.9 VIRAL UPPER RESPIRATORY TRACT INFECTION: Status: RESOLVED | Noted: 2023-11-17 | Resolved: 2024-01-16

## 2024-01-18 ENCOUNTER — CLINICAL SUPPORT (OUTPATIENT)
Dept: BARIATRICS | Facility: CLINIC | Age: 49
End: 2024-01-18
Payer: COMMERCIAL

## 2024-01-18 ENCOUNTER — TELEPHONE (OUTPATIENT)
Dept: NEUROLOGY | Facility: CLINIC | Age: 49
End: 2024-01-18

## 2024-01-18 ENCOUNTER — OFFICE VISIT (OUTPATIENT)
Dept: BARIATRICS | Facility: CLINIC | Age: 49
End: 2024-01-18
Payer: COMMERCIAL

## 2024-01-18 VITALS
HEART RATE: 75 BPM | SYSTOLIC BLOOD PRESSURE: 120 MMHG | DIASTOLIC BLOOD PRESSURE: 72 MMHG | WEIGHT: 215.5 LBS | BODY MASS INDEX: 38.18 KG/M2 | HEIGHT: 63 IN | TEMPERATURE: 98 F

## 2024-01-18 DIAGNOSIS — E66.9 OBESITY, CLASS II, BMI 35-39.9: Primary | ICD-10-CM

## 2024-01-18 DIAGNOSIS — E66.9 OBESITY, CLASS II, BMI 35-39.9: ICD-10-CM

## 2024-01-18 DIAGNOSIS — E53.8 VITAMIN B12 DEFICIENCY: Primary | ICD-10-CM

## 2024-01-18 DIAGNOSIS — Z98.84 BARIATRIC SURGERY STATUS: Primary | ICD-10-CM

## 2024-01-18 PROCEDURE — RECHECK

## 2024-01-18 PROCEDURE — 96372 THER/PROPH/DIAG INJ SC/IM: CPT | Performed by: NURSE PRACTITIONER

## 2024-01-18 PROCEDURE — RECHECK: Performed by: DIETITIAN, REGISTERED

## 2024-01-18 PROCEDURE — ERR1 ERRONEOUS ENCOUNTER-DISREGARD

## 2024-01-18 PROCEDURE — 99213 OFFICE O/P EST LOW 20 MIN: CPT | Performed by: SURGERY

## 2024-01-18 RX ORDER — HEPARIN SODIUM 5000 [USP'U]/ML
5000 INJECTION, SOLUTION INTRAVENOUS; SUBCUTANEOUS ONCE
OUTPATIENT
Start: 2024-01-30

## 2024-01-18 RX ORDER — CEFAZOLIN SODIUM 2 G/50ML
2000 SOLUTION INTRAVENOUS ONCE
OUTPATIENT
Start: 2024-01-30 | End: 2024-01-18

## 2024-01-18 RX ORDER — ACETAMINOPHEN 10 MG/ML
1000 INJECTION, SOLUTION INTRAVENOUS ONCE
OUTPATIENT
Start: 2024-01-30 | End: 2024-01-18

## 2024-01-18 RX ORDER — CELECOXIB 200 MG/1
200 CAPSULE ORAL ONCE
OUTPATIENT
Start: 2024-01-30 | End: 2024-01-18

## 2024-01-18 RX ORDER — ENOXAPARIN SODIUM 100 MG/ML
40 INJECTION SUBCUTANEOUS ONCE
Status: CANCELLED | OUTPATIENT
Start: 2024-01-18 | End: 2024-01-18

## 2024-01-18 RX ADMIN — CYANOCOBALAMIN 250 MCG: 1000 INJECTION, SOLUTION INTRAMUSCULAR; SUBCUTANEOUS at 11:43

## 2024-01-18 NOTE — H&P (VIEW-ONLY)
BARIATRIC HISTORY AND PHYSICAL - BARIATRIC SURGERY  Jenny Olvera 48 y.o. female MRN: [id number]  Unit/Bed#:  Encounter: [id number]      HPI:  Jenny Olvera is a 48 y.o. female who presents to review their preoperative workup and see if they are a good candidate to undergo a bariatric revisional procedure.     Hiatal Hernia? 3cm type 1 hiatal hernia  Prior bariatric surgery? Sleeve gastrectomy Dr. Jacobsen    Review of Systems   Constitutional:  Negative for chills and fever.   HENT:  Negative for ear pain and sore throat.    Eyes:  Negative for pain and visual disturbance.   Respiratory:  Negative for cough and shortness of breath.    Cardiovascular:  Negative for chest pain and palpitations.   Gastrointestinal:  Negative for abdominal pain and vomiting.   Genitourinary:  Negative for dysuria and hematuria.   Musculoskeletal:  Negative for arthralgias and back pain.   Skin:  Negative for color change and rash.   Neurological:  Negative for seizures and syncope.   All other systems reviewed and are negative.      Historical Information   Past Medical History:   Diagnosis Date    Anxiety     Asthma     Claustrophobia     Cluster headache     CTS (carpal tunnel syndrome)     Depression     Fibromyalgia     primary; last assessed 11/27/17    GERD (gastroesophageal reflux disease)     GI problem     Headache, tension-type     Irritable bowel syndrome     Joint pain     Kidney stone     Lung nodule     last assessed 10/9/15    Migraine     Muscle pain     Peripheral neuropathy     Sjogren's syndrome (HCC)      Past Surgical History:   Procedure Laterality Date    BACK SURGERY      BARIATRIC SURGERY  8/9/2022    CARPAL TUNNEL RELEASE Bilateral     CHOLECYSTECTOMY      CYSTOSCOPY      GALLBLADDER SURGERY      GASTRECTOMY  08/09/2022    Sleeve    NECK SURGERY      MT TENDON SHEATH INCISION Right 01/16/2023    Procedure: THUMB TRIGGER FINGER RELEASE;  Surgeon: Gail Wylie MD;  [facility];  Service:  "Orthopedics    SACROILIAC JOINT FUSION Left     SPINAL FUSION      C4 and C5    ULNAR NERVE REPAIR Bilateral     UPPER GASTROINTESTINAL ENDOSCOPY       Social History   Social History     Substance and Sexual Activity   Alcohol Use Yes    Alcohol/week: 2.0 standard drinks of alcohol    Types: 2 Glasses of wine per week    Comment: Occasionally     Social History     Substance and Sexual Activity   Drug Use Not Currently    Types: Marijuana    Comment: medicinal     Social History     Tobacco Use   Smoking Status Never   Smokeless Tobacco Never   Tobacco Comments    Never smoked.     Family History: non-contributory    Meds/Allergies   all medications and allergies reviewed  Allergies   Allergen Reactions    Hydrocodone-Acetaminophen GI Intolerance     gi upset -pt okay if takes  zofran      Codeine GI Intolerance     GI issues    Mexiletine Rash    Oxycodone-Acetaminophen GI Intolerance     GI issues    Penicillins Other (See Comments) and GI Intolerance     GI issues,vaginitis    Pollen Extract-Tree Extract [Pollen Extract] Nasal Congestion       Objective     Current Vitals:   Blood Pressure: 120/72 (01/18/24 1255)  Pulse: 75 (01/18/24 1255)  Temperature: 98 °F (36.7 °C) (01/18/24 1255)  Temp Source: Tympanic (01/18/24 1255)  Height: 5' 2.7\" (159.3 cm) (01/18/24 1255)  Weight - Scale: 97.8 kg (215 lb 8 oz) (01/18/24 1255)  bowel movement  [unfilled]    Invasive Devices       None                   Physical Exam  Vitals reviewed.   Constitutional:       General: She is not in acute distress.     Appearance: Normal appearance. She is obese. She is not ill-appearing.   HENT:      Head: Normocephalic and atraumatic.      Nose: Nose normal.      Mouth/Throat:      Mouth: Mucous membranes are moist.      Pharynx: Oropharynx is clear.   Eyes:      General: No scleral icterus.  Cardiovascular:      Rate and Rhythm: Normal rate and regular rhythm.   Pulmonary:      Effort: Pulmonary effort is normal. No respiratory " "distress.      Breath sounds: Normal breath sounds.   Abdominal:      General: There is no distension.      Palpations: Abdomen is soft.      Tenderness: There is no abdominal tenderness.      Hernia: No hernia is present.   Musculoskeletal:         General: Normal range of motion.   Skin:     General: Skin is warm and dry.      Capillary Refill: Capillary refill takes less than 2 seconds.   Neurological:      General: No focal deficit present.      Mental Status: She is alert. Mental status is at baseline.   Psychiatric:         Mood and Affect: Mood normal.         Behavior: Behavior normal.         Lab Results: I have personally reviewed pertinent lab results.  , CBC: No results found for: \"WBC\", \"HGB\", \"HCT\", \"MCV\", \"PLT\", \"ADJUSTEDWBC\", \"RBC\", \"MCH\", \"MCHC\", \"RDW\", \"MPV\", \"NRBC\", CMP: No results found for: \"SODIUM\", \"K\", \"CL\", \"CO2\", \"ANIONGAP\", \"BUN\", \"CREATININE\", \"GLUCOSE\", \"CALCIUM\", \"AST\", \"ALT\", \"ALKPHOS\", \"PROT\", \"BILITOT\", \"EGFR\"  Imaging: I have personally reviewed pertinent reports.    EKG, Pathology, and Other Studies: I have personally reviewed pertinent reports.      Code Status: [unfilled]  Advance Directive and Living Will:      Power of :    POLST:        Assessment/Plan:    The patient presented to review the preoperative workup and see if bariatric surgery is appropriate and indicated following the extensive preoperative workup and the enrollment in our weight loss program.  Preoperative workup was complete. Results were reviewed with the patient including the blood work results and the endoscopy findings and the biopsy results. We also reviewed the cardiology evaluation.    The patient was determined to be a good candidate for robotic conversion from sleeve gastrectomy to karyn-en-Y gastric bypass with possible hiatal hernia repair with mesh  ----------------------------------------------------------------------  Smoking: no  Blood thinner use: no  Home pain medication use and who " manages it: Effexor   CPAP use: no (If yes, sleep study obtained and reminded pt to bring CPAP to hospital)  History of blood clots: no  Previous abdominal surgeries: Gallbladder 1996  Cardiac clearance obtained: 11/14/23 Zana, ECHO ordered to workup heart murmur   EKG performed: 11/30/2023 - ECHO  EGD prior to surgery: 7/27/2023 [facility]  Screening Labs obtained (CBC, CMP): Hgb 14.2  H. Pylori status: none  Medication allergies: Ancef  SLIM consult Post-Op: no  Reminded patient about 2 week pre-op liquid diet: n/a  10% body weight loss pre-operatively met/discussed: n/a  Consent signed: yes  Pre-Op ERAS Orders placed: yes  -----------------------------------------------------------------------  Risks and benefits explained one more time to the patient. Alternatives to surgery and alternative forms of surgery were also explained. Post-surgical commitment and after care programs were explained.  Consent was signed. Questions were answered and concerns were addressed. Patient will need to start the 2 week liquid diet prior to surgery.  Patient wishes to proceed. As per [facility] guidelines, I had a discussion with the patient regarding their CODE STATUS in the perioperative period and the patient is level 1 or FULL CODE STATUS.    Ignacio Brown MD  Bariatric Surgery   1/18/2024  1:02 PM

## 2024-01-18 NOTE — TELEPHONE ENCOUNTER
Received teams message from calista pre-encounter  [Yesterday 4:13 PM] Calista Mosquera  Good afternoon !  Jenny Olvera  Female, 48 y.o., 1975  Patient had medicare for her last Vyepti treatment   looks like she has [facility] medicare manage care   not sure if she provided that card to the office   --------------------------------------------------------  I was out of the office at the time this message was sent    Pt scheduled for vyepti 1/19 at 9am    called [facility] [phone], spoke to trevin.  Pt ID-[id number]   Gave codes , 54692, 96520   needs PA done thru pharm services [phone]  No auth for [address], [address]  Buy and bill ok  Gave [facility]-they are participating provider     Call then transferred to pharm services to complete PA  Spoke to vin.    PA completed over the phone  Marked as urgent  24 hours turn around time  Can call [phone] to check status, their business hours are 8a-6pm    Will call later this afternoon to check status

## 2024-01-18 NOTE — TELEPHONE ENCOUNTER
Nayana received teams message from delores (pre-encounter) that she would need to know determination by 12pm if approved.     Called [facility] at [phone] and spoke to marla.    He then transferred me to pharmacy services-([phone])     Spoke to vikas.  States that vyepti has been denied.    Pt must have tried both aimovig and emgality and can't be used in combo with botox.    States that emgality or aimovig can't be used with botox either.      States that we can do peer to peer with clinical  She then spoke to clinical-states that Medicare doesn't do peer to peers, would have to do an appeal  Would need to send appeal to complaints and grievances dept and elizabeth as expedited-turn around time is 72 hours if marked as expedited. Can fax appeal to [phone]  She recommended that we also send documentation regarding using both botox and vyepti.      Per chart review-pt also tried emgality.  I did not note emgality when PA was submitted originally but will still have to submit appeal as it was also denied as pt is receiving botox.    Called pt and made her aware of above,  advised I would be cancelling her infusion appt for tomorrow.    Appt canceled  states that she Currently has 1-3 migraines a week and the intensity and duration has decreased since being on both botox and vyepti  Advised we would call her once we have more info in regards to appeal  [phone] to leave detailed message    Appeal letter generated and faxed to [phone]  Also faxed last office note-faxed separately

## 2024-01-18 NOTE — H&P
BARIATRIC HISTORY AND PHYSICAL - BARIATRIC SURGERY  Jenny Olvera 48 y.o. female MRN: [id number]  Unit/Bed#:  Encounter: [id number]      HPI:  Jenny Olvera is a 48 y.o. female who presents to review their preoperative workup and see if they are a good candidate to undergo a bariatric revisional procedure.     Hiatal Hernia? 3cm type 1 hiatal hernia  Prior bariatric surgery? Sleeve gastrectomy Dr. Jacobsen    Review of Systems   Constitutional:  Negative for chills and fever.   HENT:  Negative for ear pain and sore throat.    Eyes:  Negative for pain and visual disturbance.   Respiratory:  Negative for cough and shortness of breath.    Cardiovascular:  Negative for chest pain and palpitations.   Gastrointestinal:  Negative for abdominal pain and vomiting.   Genitourinary:  Negative for dysuria and hematuria.   Musculoskeletal:  Negative for arthralgias and back pain.   Skin:  Negative for color change and rash.   Neurological:  Negative for seizures and syncope.   All other systems reviewed and are negative.      Historical Information   Past Medical History:   Diagnosis Date    Anxiety     Asthma     Claustrophobia     Cluster headache     CTS (carpal tunnel syndrome)     Depression     Fibromyalgia     primary; last assessed 11/27/17    GERD (gastroesophageal reflux disease)     GI problem     Headache, tension-type     Irritable bowel syndrome     Joint pain     Kidney stone     Lung nodule     last assessed 10/9/15    Migraine     Muscle pain     Peripheral neuropathy     Sjogren's syndrome (HCC)      Past Surgical History:   Procedure Laterality Date    BACK SURGERY      BARIATRIC SURGERY  8/9/2022    CARPAL TUNNEL RELEASE Bilateral     CHOLECYSTECTOMY      CYSTOSCOPY      GALLBLADDER SURGERY      GASTRECTOMY  08/09/2022    Sleeve    NECK SURGERY      TX TENDON SHEATH INCISION Right 01/16/2023    Procedure: THUMB TRIGGER FINGER RELEASE;  Surgeon: Gail Wylie MD;  Location: [facility] MA[facility] OR;  Service:  "Orthopedics    SACROILIAC JOINT FUSION Left     SPINAL FUSION      C4 and C5    ULNAR NERVE REPAIR Bilateral     UPPER GASTROINTESTINAL ENDOSCOPY       Social History   Social History     Substance and Sexual Activity   Alcohol Use Yes    Alcohol/week: 2.0 standard drinks of alcohol    Types: 2 Glasses of wine per week    Comment: Occasionally     Social History     Substance and Sexual Activity   Drug Use Not Currently    Types: Marijuana    Comment: medicinal     Social History     Tobacco Use   Smoking Status Never   Smokeless Tobacco Never   Tobacco Comments    Never smoked.     Family History: non-contributory    Meds/Allergies   all medications and allergies reviewed  Allergies   Allergen Reactions    Hydrocodone-Acetaminophen GI Intolerance     gi upset -pt okay if takes  zofran      Codeine GI Intolerance     GI issues    Mexiletine Rash    Oxycodone-Acetaminophen GI Intolerance     GI issues    Penicillins Other (See Comments) and GI Intolerance     GI issues,vaginitis    Pollen Extract-Tree Extract [Pollen Extract] Nasal Congestion       Objective     Current Vitals:   Blood Pressure: 120/72 (01/18/24 1255)  Pulse: 75 (01/18/24 1255)  Temperature: 98 °F (36.7 °C) (01/18/24 1255)  Temp Source: Tympanic (01/18/24 1255)  Height: 5' 2.7\" (159.3 cm) (01/18/24 1255)  Weight - Scale: 97.8 kg (215 lb 8 oz) (01/18/24 1255)  bowel movement  [unfilled]    Invasive Devices       None                   Physical Exam  Vitals reviewed.   Constitutional:       General: She is not in acute distress.     Appearance: Normal appearance. She is obese. She is not ill-appearing.   HENT:      Head: Normocephalic and atraumatic.      Nose: Nose normal.      Mouth/Throat:      Mouth: Mucous membranes are moist.      Pharynx: Oropharynx is clear.   Eyes:      General: No scleral icterus.  Cardiovascular:      Rate and Rhythm: Normal rate and regular rhythm.   Pulmonary:      Effort: Pulmonary effort is normal. No respiratory " "distress.      Breath sounds: Normal breath sounds.   Abdominal:      General: There is no distension.      Palpations: Abdomen is soft.      Tenderness: There is no abdominal tenderness.      Hernia: No hernia is present.   Musculoskeletal:         General: Normal range of motion.   Skin:     General: Skin is warm and dry.      Capillary Refill: Capillary refill takes less than 2 seconds.   Neurological:      General: No focal deficit present.      Mental Status: She is alert. Mental status is at baseline.   Psychiatric:         Mood and Affect: Mood normal.         Behavior: Behavior normal.         Lab Results: I have personally reviewed pertinent lab results.  , CBC: No results found for: \"WBC\", \"HGB\", \"HCT\", \"MCV\", \"PLT\", \"ADJUSTEDWBC\", \"RBC\", \"MCH\", \"MCHC\", \"RDW\", \"MPV\", \"NRBC\", CMP: No results found for: \"SODIUM\", \"K\", \"CL\", \"CO2\", \"ANIONGAP\", \"BUN\", \"CREATININE\", \"GLUCOSE\", \"CALCIUM\", \"AST\", \"ALT\", \"ALKPHOS\", \"PROT\", \"BILITOT\", \"EGFR\"  Imaging: I have personally reviewed pertinent reports.    EKG, Pathology, and Other Studies: I have personally reviewed pertinent reports.      Code Status: [unfilled]  Advance Directive and Living Will:      Power of :    POLST:        Assessment/Plan:    The patient presented to review the preoperative workup and see if bariatric surgery is appropriate and indicated following the extensive preoperative workup and the enrollment in our weight loss program.  Preoperative workup was complete. Results were reviewed with the patient including the blood work results and the endoscopy findings and the biopsy results. We also reviewed the cardiology evaluation.    The patient was determined to be a good candidate for robotic conversion from sleeve gastrectomy to karyn-en-Y gastric bypass with possible hiatal hernia repair with mesh  ----------------------------------------------------------------------  Smoking: no  Blood thinner use: no  Home pain medication use and who " manages it: Effexor   CPAP use: no (If yes, sleep study obtained and reminded pt to bring CPAP to hospital)  History of blood clots: no  Previous abdominal surgeries: Gallbladder 1996  Cardiac clearance obtained: 11/14/23 Zana, ECHO ordered to workup heart murmur   EKG performed: 11/30/2023 - ECHO  EGD prior to surgery: 7/27/2023 [facility]  Screening Labs obtained (CBC, CMP): Hgb 14.2  H. Pylori status: none  Medication allergies: Ancef  SLIM consult Post-Op: no  Reminded patient about 2 week pre-op liquid diet: n/a  10% body weight loss pre-operatively met/discussed: n/a  Consent signed: yes  Pre-Op ERAS Orders placed: yes  -----------------------------------------------------------------------  Risks and benefits explained one more time to the patient. Alternatives to surgery and alternative forms of surgery were also explained. Post-surgical commitment and after care programs were explained.  Consent was signed. Questions were answered and concerns were addressed. Patient will need to start the 2 week liquid diet prior to surgery.  Patient wishes to proceed. As per [facility] guidelines, I had a discussion with the patient regarding their CODE STATUS in the perioperative period and the patient is level 1 or FULL CODE STATUS.    Ignacio Brown MD  Bariatric Surgery   1/18/2024  1:02 PM

## 2024-01-18 NOTE — PROGRESS NOTES
Left deltoid Pt tolerated well , no issues noted .    Bariatric surgery status     Postsurgical malabsorption.          Vitamin B12 deficiency     Vitamin D deficiency.

## 2024-01-19 ENCOUNTER — HOSPITAL ENCOUNTER (OUTPATIENT)
Dept: INFUSION CENTER | Facility: HOSPITAL | Age: 49
End: 2024-01-19
Attending: PSYCHIATRY & NEUROLOGY

## 2024-01-19 DIAGNOSIS — E66.9 OBESITY, CLASS II, BMI 35-39.9: Primary | ICD-10-CM

## 2024-01-19 RX ORDER — OMEPRAZOLE 20 MG/1
20 CAPSULE, DELAYED RELEASE ORAL DAILY
Qty: 90 CAPSULE | Refills: 1 | Status: SHIPPED | OUTPATIENT
Start: 2024-01-19

## 2024-01-19 NOTE — TELEPHONE ENCOUNTER
Po med laparoscopic Revision Conversion to MICHAEL-EN-Y Gastric Bypass with Robotics and Intraoperative EGD.  SX 1/30/2024 Osmel Figueroa     **ALLERGIES**    Hydrocodone-acetaminophen   Medium  GI Intolerance  gi upset -pt okay if takes  zofran     Codeine   Low  GI IntoleranceGI issues     Mexiletine    Low   Rash     Oxycodone-acetaminophen   Low   GI Intolerance   GI issues     Penicillins   Low  Other (See Comments), GI IntoleranceGI issues,vaginitis

## 2024-01-23 ENCOUNTER — TELEPHONE (OUTPATIENT)
Dept: NEUROLOGY | Facility: CLINIC | Age: 49
End: 2024-01-23

## 2024-01-23 ENCOUNTER — TELEPHONE (OUTPATIENT)
Dept: BARIATRICS | Facility: CLINIC | Age: 49
End: 2024-01-23

## 2024-01-23 NOTE — TELEPHONE ENCOUNTER
Jesus called from [phone]     Attempted to call jesus x3, phone rings 1-2 times and then disconnects.      Will await denial letter

## 2024-01-23 NOTE — TELEPHONE ENCOUNTER
Pre-op call was made to patient, to follow up on how they are doing and to remind them to continue with all medical and dietary directions that were given at pre-op class regarding liver shrinking diet and hydration. Advised to stop eating all vegetables 48hrs prior to surgery unless directed otherwise by surgeon or RD. They were encouraged to purchase all vitamins and protein shakes for post op use as well as to begin Miralax three days prior to surgery as directed in Section 6 of their manual.  They were reminded of the Ensure Pre-surgery drinks protocol and to bring their completed yellow form with them to surgery as well as their CPAP-BiPAP machine if they use one.  Lastly, they were informed that they would be weighed the morning of surgery and to give the office a call if they had any further questions or concerns.

## 2024-01-23 NOTE — TELEPHONE ENCOUNTER
"Sheila Mai         1/23/24 10:13 AM  Note     Jesus from [facility] called in.     Wanted RADHA Ward's team to know that the Auth for Vyepti 110 mg Vial was denied.     However, Patient has a \"Transitional Fill\" which allows office to bill the medicine through medical for the first 3 months w/o auth.     Auth will be needed after March 2024.     Please contact Jesus at number provided with any questions or concerns.        "

## 2024-01-23 NOTE — TELEPHONE ENCOUNTER
"Jesus from [facility] called in.    Wanted RADHA Ward's team to know that the Auth for Vyepti 110 mg Vial was denied.    However, Patient has a \"Transitional Fill\" which allows office to bill the medicine through medical for the first 3 months w/o auth.    Auth will be needed after March 2024.    Please contact Jesus at number provided with any questions or concerns.  "

## 2024-01-26 NOTE — TELEPHONE ENCOUNTER
Patient made aware that these will be discussed at next office visit in June. No questions at this time.

## 2024-01-26 NOTE — TELEPHONE ENCOUNTER
Attempted call deshaun at [phone], phone again rings 1-2 times then disconnects    Denial letter not received either    Called pharm services at [phone] and spoke to karolyn.    States that they did not see that pt tried emaglity and that pt may be using vyepti in combo with botox.    I made her aware that in appeal letter, I had noted that pt also tried emgality and that she is receiving botox in addition to vyepti    She reached out to clinical pharm.  They stated that Appeal was reviewed and upheld because they do not allow botox in addition to vyepit  States that we can submit second level appeal which would be reviewed by independent reviewer  Can fax second level appeal to same fax number that appeal was faxed to    In regards to transitional fill, she states that pt's Plan started on 11/1/23 so technically she is out of the window for transitional fill as transitional fill only allows coverage for the first 90 days that pt is with the plan    MK-we can submit 2nd level appeal if you would like but do you have any articles in regards to pt's receiving both botox and vyepti that we can also submit?    Please advise

## 2024-01-26 NOTE — TELEPHONE ENCOUNTER
Per Dr. Cotto:    LANE - Low positive, very low significant titer  SS-A (RO) A - low positive - not significant titer  SS-B (LA) Ab - test for inflammation still evolved CRP     Will discuss at next visit

## 2024-01-29 ENCOUNTER — ANESTHESIA EVENT (OUTPATIENT)
Dept: PERIOP | Facility: HOSPITAL | Age: 49
DRG: 328 | End: 2024-01-29
Payer: COMMERCIAL

## 2024-01-30 ENCOUNTER — HOSPITAL ENCOUNTER (INPATIENT)
Facility: HOSPITAL | Age: 49
LOS: 1 days | Discharge: HOME | DRG: 328 | End: 2024-01-31
Attending: SURGERY | Admitting: SURGERY
Payer: COMMERCIAL

## 2024-01-30 ENCOUNTER — ANESTHESIA (OUTPATIENT)
Dept: PERIOP | Facility: HOSPITAL | Age: 49
DRG: 328 | End: 2024-01-30
Payer: COMMERCIAL

## 2024-01-30 DIAGNOSIS — Z98.84 STATUS POST BARIATRIC SURGERY: ICD-10-CM

## 2024-01-30 DIAGNOSIS — K21.9 GASTROESOPHAGEAL REFLUX DISEASE: ICD-10-CM

## 2024-01-30 DIAGNOSIS — Z98.84 S/P LAPAROSCOPIC SLEEVE GASTRECTOMY: ICD-10-CM

## 2024-01-30 DIAGNOSIS — K44.9 HIATAL HERNIA: Primary | ICD-10-CM

## 2024-01-30 DIAGNOSIS — Z90.49 STATUS POST CHOLECYSTECTOMY: ICD-10-CM

## 2024-01-30 PROBLEM — E66.2 CLASS 2 OBESITY WITH ALVEOLAR HYPOVENTILATION, SERIOUS COMORBIDITY, AND BODY MASS INDEX (BMI) OF 38.0 TO 38.9 IN ADULT: Status: ACTIVE | Noted: 2022-08-09

## 2024-01-30 PROBLEM — E66.812 CLASS 2 OBESITY WITH ALVEOLAR HYPOVENTILATION, SERIOUS COMORBIDITY, AND BODY MASS INDEX (BMI) OF 38.0 TO 38.9 IN ADULT (HCC): Status: ACTIVE | Noted: 2022-08-09

## 2024-01-30 LAB
EXT PREGNANCY TEST URINE: NEGATIVE
EXT. CONTROL: NORMAL

## 2024-01-30 PROCEDURE — C1781 MESH (IMPLANTABLE): HCPCS | Performed by: SURGERY

## 2024-01-30 PROCEDURE — 0BQT4ZZ REPAIR DIAPHRAGM, PERCUTANEOUS ENDOSCOPIC APPROACH: ICD-10-PCS | Performed by: SURGERY

## 2024-01-30 PROCEDURE — 0DJ08ZZ INSPECTION OF UPPER INTESTINAL TRACT, VIA NATURAL OR ARTIFICIAL OPENING ENDOSCOPIC: ICD-10-PCS | Performed by: SURGERY

## 2024-01-30 PROCEDURE — 88302 TISSUE EXAM BY PATHOLOGIST: CPT | Performed by: PATHOLOGY

## 2024-01-30 PROCEDURE — 81025 URINE PREGNANCY TEST: CPT | Performed by: SURGERY

## 2024-01-30 PROCEDURE — 43282 LAP PARAESOPH HER RPR W/MESH: CPT | Performed by: SURGERY

## 2024-01-30 PROCEDURE — G0008 ADMIN INFLUENZA VIRUS VAC: HCPCS | Performed by: SURGERY

## 2024-01-30 PROCEDURE — 43659 UNLISTED LAPS PX STOMACH: CPT | Performed by: SURGERY

## 2024-01-30 PROCEDURE — 43282 LAP PARAESOPH HER RPR W/MESH: CPT | Performed by: STUDENT IN AN ORGANIZED HEALTH CARE EDUCATION/TRAINING PROGRAM

## 2024-01-30 PROCEDURE — 8E0W4CZ ROBOTIC ASSISTED PROCEDURE OF TRUNK REGION, PERCUTANEOUS ENDOSCOPIC APPROACH: ICD-10-PCS | Performed by: SURGERY

## 2024-01-30 PROCEDURE — S2900 ROBOTIC SURGICAL SYSTEM: HCPCS | Performed by: SURGERY

## 2024-01-30 PROCEDURE — 90686 IIV4 VACC NO PRSV 0.5 ML IM: CPT | Performed by: SURGERY

## 2024-01-30 PROCEDURE — C9290 INJ, BUPIVACAINE LIPOSOME: HCPCS | Performed by: STUDENT IN AN ORGANIZED HEALTH CARE EDUCATION/TRAINING PROGRAM

## 2024-01-30 PROCEDURE — 0D164ZA BYPASS STOMACH TO JEJUNUM, PERCUTANEOUS ENDOSCOPIC APPROACH: ICD-10-PCS | Performed by: SURGERY

## 2024-01-30 DEVICE — BIO-A TISSUE REINFORCEMENT 7CMX10CM
Type: IMPLANTABLE DEVICE | Site: ABDOMEN | Status: FUNCTIONAL
Brand: GORE BIO-A TISSUE REINFORCEMENT

## 2024-01-30 DEVICE — SEAMGUARD STPL REINF INTUITIVE SUREFORM 60 GREEN: Type: IMPLANTABLE DEVICE | Site: STOMACH | Status: FUNCTIONAL

## 2024-01-30 RX ORDER — HYDRALAZINE HYDROCHLORIDE 20 MG/ML
10 INJECTION INTRAMUSCULAR; INTRAVENOUS ONCE AS NEEDED
Status: DISCONTINUED | OUTPATIENT
Start: 2024-01-30 | End: 2024-01-30 | Stop reason: HOSPADM

## 2024-01-30 RX ORDER — BUPROPION HYDROCHLORIDE 150 MG/1
150 TABLET ORAL EVERY MORNING
Status: DISCONTINUED | OUTPATIENT
Start: 2024-01-31 | End: 2024-01-31 | Stop reason: HOSPADM

## 2024-01-30 RX ORDER — MIDAZOLAM HYDROCHLORIDE 2 MG/2ML
INJECTION, SOLUTION INTRAMUSCULAR; INTRAVENOUS AS NEEDED
Status: DISCONTINUED | OUTPATIENT
Start: 2024-01-30 | End: 2024-01-30

## 2024-01-30 RX ORDER — PROMETHAZINE HYDROCHLORIDE 25 MG/ML
25 INJECTION, SOLUTION INTRAMUSCULAR; INTRAVENOUS EVERY 6 HOURS PRN
Status: DISCONTINUED | OUTPATIENT
Start: 2024-01-30 | End: 2024-01-31 | Stop reason: HOSPADM

## 2024-01-30 RX ORDER — ACETAMINOPHEN 10 MG/ML
1000 INJECTION, SOLUTION INTRAVENOUS ONCE
Status: COMPLETED | OUTPATIENT
Start: 2024-01-30 | End: 2024-01-30

## 2024-01-30 RX ORDER — ONDANSETRON 2 MG/ML
4 INJECTION INTRAMUSCULAR; INTRAVENOUS ONCE AS NEEDED
Status: DISCONTINUED | OUTPATIENT
Start: 2024-01-30 | End: 2024-01-30 | Stop reason: HOSPADM

## 2024-01-30 RX ORDER — HYDROMORPHONE HCL/PF 1 MG/ML
SYRINGE (ML) INJECTION AS NEEDED
Status: DISCONTINUED | OUTPATIENT
Start: 2024-01-30 | End: 2024-01-30

## 2024-01-30 RX ORDER — FENTANYL CITRATE/PF 50 MCG/ML
50 SYRINGE (ML) INJECTION
Status: DISCONTINUED | OUTPATIENT
Start: 2024-01-30 | End: 2024-01-30 | Stop reason: HOSPADM

## 2024-01-30 RX ORDER — TIZANIDINE 4 MG/1
2 TABLET ORAL EVERY 8 HOURS PRN
Status: DISCONTINUED | OUTPATIENT
Start: 2024-01-30 | End: 2024-01-31 | Stop reason: HOSPADM

## 2024-01-30 RX ORDER — LIDOCAINE HYDROCHLORIDE 20 MG/ML
INJECTION, SOLUTION EPIDURAL; INFILTRATION; INTRACAUDAL; PERINEURAL AS NEEDED
Status: DISCONTINUED | OUTPATIENT
Start: 2024-01-30 | End: 2024-01-30

## 2024-01-30 RX ORDER — LABETALOL HYDROCHLORIDE 5 MG/ML
5 INJECTION, SOLUTION INTRAVENOUS ONCE AS NEEDED
Status: DISCONTINUED | OUTPATIENT
Start: 2024-01-30 | End: 2024-01-30 | Stop reason: HOSPADM

## 2024-01-30 RX ORDER — MORPHINE SULFATE 4 MG/ML
4 INJECTION, SOLUTION INTRAMUSCULAR; INTRAVENOUS EVERY 2 HOUR PRN
Status: DISCONTINUED | OUTPATIENT
Start: 2024-01-30 | End: 2024-01-31 | Stop reason: HOSPADM

## 2024-01-30 RX ORDER — ALBUTEROL SULFATE 90 UG/1
2 AEROSOL, METERED RESPIRATORY (INHALATION) EVERY 6 HOURS PRN
Status: DISCONTINUED | OUTPATIENT
Start: 2024-01-30 | End: 2024-01-31 | Stop reason: HOSPADM

## 2024-01-30 RX ORDER — PROMETHAZINE HYDROCHLORIDE 25 MG/ML
6.25 INJECTION, SOLUTION INTRAMUSCULAR; INTRAVENOUS ONCE AS NEEDED
Status: DISCONTINUED | OUTPATIENT
Start: 2024-01-30 | End: 2024-01-30 | Stop reason: HOSPADM

## 2024-01-30 RX ORDER — INDOCYANINE GREEN AND WATER 25 MG
KIT INJECTION AS NEEDED
Status: DISCONTINUED | OUTPATIENT
Start: 2024-01-30 | End: 2024-01-30

## 2024-01-30 RX ORDER — MAGNESIUM HYDROXIDE 1200 MG/15ML
LIQUID ORAL AS NEEDED
Status: DISCONTINUED | OUTPATIENT
Start: 2024-01-30 | End: 2024-01-30 | Stop reason: HOSPADM

## 2024-01-30 RX ORDER — FENTANYL CITRATE 50 UG/ML
INJECTION, SOLUTION INTRAMUSCULAR; INTRAVENOUS AS NEEDED
Status: DISCONTINUED | OUTPATIENT
Start: 2024-01-30 | End: 2024-01-30

## 2024-01-30 RX ORDER — EPHEDRINE SULFATE 50 MG/ML
INJECTION INTRAVENOUS AS NEEDED
Status: DISCONTINUED | OUTPATIENT
Start: 2024-01-30 | End: 2024-01-30

## 2024-01-30 RX ORDER — SODIUM CHLORIDE 9 MG/ML
125 INJECTION, SOLUTION INTRAVENOUS CONTINUOUS
Status: DISCONTINUED | OUTPATIENT
Start: 2024-01-30 | End: 2024-01-30 | Stop reason: SDUPTHER

## 2024-01-30 RX ORDER — HEPARIN SODIUM 5000 [USP'U]/ML
5000 INJECTION, SOLUTION INTRAVENOUS; SUBCUTANEOUS ONCE
Status: COMPLETED | OUTPATIENT
Start: 2024-01-30 | End: 2024-01-30

## 2024-01-30 RX ORDER — METOCLOPRAMIDE HYDROCHLORIDE 5 MG/ML
10 INJECTION INTRAMUSCULAR; INTRAVENOUS EVERY 6 HOURS PRN
Status: DISCONTINUED | OUTPATIENT
Start: 2024-01-30 | End: 2024-01-31 | Stop reason: HOSPADM

## 2024-01-30 RX ORDER — HYDROMORPHONE HCL/PF 1 MG/ML
0.5 SYRINGE (ML) INJECTION
Status: DISCONTINUED | OUTPATIENT
Start: 2024-01-30 | End: 2024-01-30 | Stop reason: HOSPADM

## 2024-01-30 RX ORDER — DIPHENHYDRAMINE HCL 25 MG
25 TABLET ORAL
Status: DISCONTINUED | OUTPATIENT
Start: 2024-01-30 | End: 2024-01-31 | Stop reason: HOSPADM

## 2024-01-30 RX ORDER — LANOLIN ALCOHOL/MO/W.PET/CERES
3 CREAM (GRAM) TOPICAL
Status: DISCONTINUED | OUTPATIENT
Start: 2024-01-30 | End: 2024-01-31 | Stop reason: HOSPADM

## 2024-01-30 RX ORDER — KETOROLAC TROMETHAMINE 30 MG/ML
15 INJECTION, SOLUTION INTRAMUSCULAR; INTRAVENOUS EVERY 6 HOURS
Status: DISCONTINUED | OUTPATIENT
Start: 2024-01-30 | End: 2024-01-31 | Stop reason: HOSPADM

## 2024-01-30 RX ORDER — CELECOXIB 200 MG/1
200 CAPSULE ORAL ONCE
Status: COMPLETED | OUTPATIENT
Start: 2024-01-30 | End: 2024-01-30

## 2024-01-30 RX ORDER — SODIUM CHLORIDE, SODIUM LACTATE, POTASSIUM CHLORIDE, CALCIUM CHLORIDE 600; 310; 30; 20 MG/100ML; MG/100ML; MG/100ML; MG/100ML
INJECTION, SOLUTION INTRAVENOUS CONTINUOUS PRN
Status: DISCONTINUED | OUTPATIENT
Start: 2024-01-30 | End: 2024-01-30

## 2024-01-30 RX ORDER — SODIUM CHLORIDE, SODIUM LACTATE, POTASSIUM CHLORIDE, CALCIUM CHLORIDE 600; 310; 30; 20 MG/100ML; MG/100ML; MG/100ML; MG/100ML
75 INJECTION, SOLUTION INTRAVENOUS CONTINUOUS
Status: DISCONTINUED | OUTPATIENT
Start: 2024-01-30 | End: 2024-01-31 | Stop reason: HOSPADM

## 2024-01-30 RX ORDER — FAMOTIDINE 10 MG/ML
20 INJECTION, SOLUTION INTRAVENOUS EVERY 12 HOURS SCHEDULED
Status: DISCONTINUED | OUTPATIENT
Start: 2024-01-30 | End: 2024-01-31 | Stop reason: HOSPADM

## 2024-01-30 RX ORDER — ONDANSETRON 2 MG/ML
4 INJECTION INTRAMUSCULAR; INTRAVENOUS EVERY 6 HOURS PRN
Status: DISCONTINUED | OUTPATIENT
Start: 2024-01-30 | End: 2024-01-31 | Stop reason: HOSPADM

## 2024-01-30 RX ORDER — VENLAFAXINE HYDROCHLORIDE 37.5 MG/1
37.5 CAPSULE, EXTENDED RELEASE ORAL EVERY EVENING
Status: DISCONTINUED | OUTPATIENT
Start: 2024-01-30 | End: 2024-01-31 | Stop reason: HOSPADM

## 2024-01-30 RX ORDER — SIMETHICONE 80 MG
80 TABLET,CHEWABLE ORAL 4 TIMES DAILY PRN
Status: DISCONTINUED | OUTPATIENT
Start: 2024-01-30 | End: 2024-01-31 | Stop reason: HOSPADM

## 2024-01-30 RX ORDER — ROCURONIUM BROMIDE 10 MG/ML
INJECTION, SOLUTION INTRAVENOUS AS NEEDED
Status: DISCONTINUED | OUTPATIENT
Start: 2024-01-30 | End: 2024-01-30

## 2024-01-30 RX ORDER — ACETAMINOPHEN 10 MG/ML
1000 INJECTION, SOLUTION INTRAVENOUS EVERY 6 HOURS SCHEDULED
Status: DISCONTINUED | OUTPATIENT
Start: 2024-01-30 | End: 2024-01-31 | Stop reason: HOSPADM

## 2024-01-30 RX ORDER — ONDANSETRON 2 MG/ML
INJECTION INTRAMUSCULAR; INTRAVENOUS AS NEEDED
Status: DISCONTINUED | OUTPATIENT
Start: 2024-01-30 | End: 2024-01-30

## 2024-01-30 RX ORDER — MEPERIDINE HYDROCHLORIDE 25 MG/ML
12.5 INJECTION INTRAMUSCULAR; INTRAVENOUS; SUBCUTANEOUS ONCE AS NEEDED
Status: DISCONTINUED | OUTPATIENT
Start: 2024-01-30 | End: 2024-01-30 | Stop reason: HOSPADM

## 2024-01-30 RX ORDER — CEFAZOLIN SODIUM 2 G/50ML
2000 SOLUTION INTRAVENOUS ONCE
Status: COMPLETED | OUTPATIENT
Start: 2024-01-30 | End: 2024-01-30

## 2024-01-30 RX ORDER — BUPIVACAINE HYDROCHLORIDE 5 MG/ML
INJECTION, SOLUTION EPIDURAL; INTRACAUDAL AS NEEDED
Status: DISCONTINUED | OUTPATIENT
Start: 2024-01-30 | End: 2024-01-30 | Stop reason: HOSPADM

## 2024-01-30 RX ORDER — PROPOFOL 10 MG/ML
INJECTION, EMULSION INTRAVENOUS AS NEEDED
Status: DISCONTINUED | OUTPATIENT
Start: 2024-01-30 | End: 2024-01-30

## 2024-01-30 RX ADMIN — MIDAZOLAM 2 MG: 1 INJECTION INTRAMUSCULAR; INTRAVENOUS at 13:48

## 2024-01-30 RX ADMIN — SUGAMMADEX 200 MG: 100 INJECTION, SOLUTION INTRAVENOUS at 16:36

## 2024-01-30 RX ADMIN — SODIUM CHLORIDE 125 ML/HR: 0.9 INJECTION, SOLUTION INTRAVENOUS at 11:14

## 2024-01-30 RX ADMIN — EPHEDRINE SULFATE 5 MG: 50 INJECTION, SOLUTION INTRAVENOUS at 14:33

## 2024-01-30 RX ADMIN — ACETAMINOPHEN 1000 MG: 10 INJECTION INTRAVENOUS at 23:29

## 2024-01-30 RX ADMIN — LIDOCAINE HYDROCHLORIDE 100 MG: 20 INJECTION, SOLUTION EPIDURAL; INFILTRATION; INTRACAUDAL at 13:54

## 2024-01-30 RX ADMIN — SODIUM CHLORIDE, SODIUM LACTATE, POTASSIUM CHLORIDE, AND CALCIUM CHLORIDE: .6; .31; .03; .02 INJECTION, SOLUTION INTRAVENOUS at 15:10

## 2024-01-30 RX ADMIN — FENTANYL CITRATE 50 MCG: 50 INJECTION INTRAMUSCULAR; INTRAVENOUS at 14:49

## 2024-01-30 RX ADMIN — FENTANYL CITRATE 50 MCG: 50 INJECTION INTRAMUSCULAR; INTRAVENOUS at 17:14

## 2024-01-30 RX ADMIN — METOCLOPRAMIDE 10 MG: 5 INJECTION, SOLUTION INTRAMUSCULAR; INTRAVENOUS at 20:13

## 2024-01-30 RX ADMIN — ACETAMINOPHEN 1000 MG: 10 INJECTION INTRAVENOUS at 11:54

## 2024-01-30 RX ADMIN — EPHEDRINE SULFATE 10 MG: 50 INJECTION, SOLUTION INTRAVENOUS at 14:59

## 2024-01-30 RX ADMIN — HEPARIN SODIUM 5000 UNITS: 5000 INJECTION INTRAVENOUS; SUBCUTANEOUS at 11:13

## 2024-01-30 RX ADMIN — ONDANSETRON 4 MG: 2 INJECTION INTRAMUSCULAR; INTRAVENOUS at 16:31

## 2024-01-30 RX ADMIN — EPHEDRINE SULFATE 5 MG: 50 INJECTION, SOLUTION INTRAVENOUS at 14:31

## 2024-01-30 RX ADMIN — SODIUM CHLORIDE, SODIUM LACTATE, POTASSIUM CHLORIDE, AND CALCIUM CHLORIDE 75 ML/HR: .6; .31; .03; .02 INJECTION, SOLUTION INTRAVENOUS at 18:24

## 2024-01-30 RX ADMIN — KETOROLAC TROMETHAMINE 15 MG: 30 INJECTION, SOLUTION INTRAMUSCULAR; INTRAVENOUS at 18:48

## 2024-01-30 RX ADMIN — CELECOXIB 200 MG: 200 CAPSULE ORAL at 11:13

## 2024-01-30 RX ADMIN — FAMOTIDINE 20 MG: 10 INJECTION INTRAVENOUS at 20:16

## 2024-01-30 RX ADMIN — VENLAFAXINE HYDROCHLORIDE 37.5 MG: 37.5 CAPSULE, EXTENDED RELEASE ORAL at 20:18

## 2024-01-30 RX ADMIN — ROCURONIUM BROMIDE 50 MG: 10 INJECTION, SOLUTION INTRAVENOUS at 13:54

## 2024-01-30 RX ADMIN — ACETAMINOPHEN 1000 MG: 10 INJECTION INTRAVENOUS at 18:49

## 2024-01-30 RX ADMIN — PROPOFOL 200 MG: 10 INJECTION, EMULSION INTRAVENOUS at 13:54

## 2024-01-30 RX ADMIN — INDOCYANINE GREEN AND WATER 12.5 MG: KIT at 15:56

## 2024-01-30 RX ADMIN — EPHEDRINE SULFATE 10 MG: 50 INJECTION, SOLUTION INTRAVENOUS at 15:33

## 2024-01-30 RX ADMIN — CEFAZOLIN SODIUM 2000 MG: 2 SOLUTION INTRAVENOUS at 13:45

## 2024-01-30 RX ADMIN — FENTANYL CITRATE 50 MCG: 50 INJECTION INTRAMUSCULAR; INTRAVENOUS at 15:53

## 2024-01-30 RX ADMIN — SODIUM CHLORIDE 150 MG: 0.9 INJECTION, SOLUTION INTRAVENOUS at 11:14

## 2024-01-30 RX ADMIN — INFLUENZA VIRUS VACCINE 0.5 ML: 15; 15; 15; 15 SUSPENSION INTRAMUSCULAR at 18:56

## 2024-01-30 RX ADMIN — MORPHINE SULFATE 4 MG: 4 INJECTION INTRAVENOUS at 20:02

## 2024-01-30 RX ADMIN — HYDROMORPHONE HYDROCHLORIDE 0.5 MG: 1 INJECTION, SOLUTION INTRAMUSCULAR; INTRAVENOUS; SUBCUTANEOUS at 15:54

## 2024-01-30 NOTE — ANESTHESIA PREPROCEDURE EVALUATION
Procedure:  REVISION CONVERSION TO R-N-Y GASTRIC BYPASS W/ ROBOTICS AND INTRAOPERATIVE EGD (Abdomen)    Relevant Problems   CARDIO   (+) Borderline hyperlipidemia   (+) Chronic migraine without aura without status migrainosus, not intractable   (+) Intractable chronic migraine without aura and with status migrainosus   (+) Intractable chronic migraine without aura and without status migrainosus      GI/HEPATIC   (+) GERD without esophagitis      /RENAL   (+) Nephrolithiasis      MUSCULOSKELETAL   (+) Fibromyalgia   (+) Impingement syndrome of left shoulder   (+) Impingement syndrome of right shoulder   (+) Osteoarthritis of carpometacarpal (CMC) joint of right thumb   (+) Trapezius muscle spasm      NEURO/PSYCH   (+) Chronic migraine without aura without status migrainosus, not intractable   (+) Depression with anxiety   (+) Fibromyalgia   (+) Intractable chronic migraine without aura and with status migrainosus   (+) Intractable chronic migraine without aura and without status migrainosus   (+) Post traumatic stress disorder (PTSD)      PULMONARY   (+) Mild intermittent asthma      Other   (+) Class 2 obesity with alveolar hypoventilation, serious comorbidity, and body mass index (BMI) of 38.0 to 38.9 in adult    (+) S/P laparoscopic sleeve gastrectomy        Physical Exam    Airway    Mallampati score: II  TM Distance: >3 FB  Neck ROM: full     Dental   No notable dental hx     Cardiovascular  Rhythm: regular, Rate: normal, Cardiovascular exam normal    Pulmonary  Pulmonary exam normal Breath sounds clear to auscultation    Other Findings  post-pubertal.      Anesthesia Plan  ASA Score- 2     Anesthesia Type- general with ASA Monitors.         Additional Monitors:     Airway Plan: ETT.           Plan Factors-Exercise tolerance (METS): >4 METS.    Chart reviewed.   Existing labs reviewed. Patient summary reviewed.    Patient is not a current smoker. Patient not instructed to abstain from smoking on day of  procedure. Patient did not smoke on day of surgery.    There is medical exclusion for perioperative obstructive sleep apnea risk education.        Induction- intravenous.    Postoperative Plan-     Informed Consent- Anesthetic plan and risks discussed with patient and mother.

## 2024-01-30 NOTE — PLAN OF CARE
Problem: PAIN - ADULT  Goal: Verbalizes/displays adequate comfort level or baseline comfort level  Description: Interventions:  - Encourage patient to monitor pain and request assistance  - Assess pain using appropriate pain scale  - Administer analgesics based on type and severity of pain and evaluate response  - Implement non-pharmacological measures as appropriate and evaluate response  - Consider cultural and social influences on pain and pain management  - Notify physician/advanced practitioner if interventions unsuccessful or patient reports new pain  Outcome: Progressing     Problem: INFECTION - ADULT  Goal: Absence or prevention of progression during hospitalization  Description: INTERVENTIONS:  - Assess and monitor for signs and symptoms of infection  - Monitor lab/diagnostic results  - Monitor all insertion sites, i.e. indwelling lines, tubes, and drains  - Monitor endotracheal if appropriate and nasal secretions for changes in amount and color  - [place] appropriate cooling/warming therapies per order  - Administer medications as ordered  - Instruct and encourage patient and family to use good hand hygiene technique  - Identify and instruct in appropriate isolation precautions for identified infection/condition  Outcome: Progressing  Goal: Absence of fever/infection during neutropenic period  Description: INTERVENTIONS:  - Monitor WBC    Outcome: Progressing     Problem: SAFETY ADULT  Goal: Patient will remain free of falls  Description: INTERVENTIONS:  - Educate patient/family on patient safety including physical limitations  - Instruct patient to call for assistance with activity   - Consult OT/PT to assist with strengthening/mobility   - Keep Call bell within reach  - Keep bed low and locked with side rails adjusted as appropriate  - Keep care items and personal belongings within reach  - Initiate and maintain comfort rounds  - Make Fall Risk Sign visible to staff  - Offer Toileting every 23 Hours,  in advance of need  - Apply yellow socks and bracelet for high fall risk patients  - Consider moving patient to room near nurses station  Outcome: Progressing  Goal: Maintain or return to baseline ADL function  Description: INTERVENTIONS:  -  Assess patient's ability to carry out ADLs; assess patient's baseline for ADL function and identify physical deficits which impact ability to perform ADLs (bathing, care of mouth/teeth, toileting, grooming, dressing, etc.)  - Assess/evaluate cause of self-care deficits   - Assess range of motion  - Assess patient's mobility; develop plan if impaired  - Assess patient's need for assistive devices and provide as appropriate  - Encourage maximum independence but intervene and supervise when necessary  - Involve family in performance of ADLs  - Assess for home care needs following discharge   - Consider OT consult to assist with ADL evaluation and planning for discharge  - Provide patient education as appropriate  Outcome: Progressing  Goal: Maintains/Returns to pre admission functional level  Description: INTERVENTIONS:  - Perform AM-PAC 6 Click Basic Mobility/ Daily Activity assessment daily.  - Set and communicate daily mobility goal to care team and patient/family/caregiver.   - Collaborate with rehabilitation services on mobility goals if consulted  - Out of bed for toileting  - Record patient progress and toleration of activity level   Outcome: Progressing     Problem: DISCHARGE PLANNING  Goal: Discharge to home or other facility with appropriate resources  Description: INTERVENTIONS:  - Identify barriers to discharge w/patient and caregiver  - Arrange for needed discharge resources and transportation as appropriate  - Identify discharge learning needs (meds, wound care, etc.)  - Arrange for interpretive services to assist at discharge as needed  - Refer to Case Management Department for coordinating discharge planning if the patient needs post-hospital services based on  physician/advanced practitioner order or complex needs related to functional status, cognitive ability, or social support system  Outcome: Progressing     Problem: Knowledge Deficit  Goal: Patient/family/caregiver demonstrates understanding of disease process, treatment plan, medications, and discharge instructions  Description: Complete learning assessment and assess knowledge base.  Interventions:  - Provide teaching at level of understanding  - Provide teaching via preferred learning methods  Outcome: Progressing     Problem: Nutrition/Hydration-ADULT  Goal: Nutrient/Hydration intake appropriate for improving, restoring or maintaining nutritional needs  Description: Monitor and assess patient's nutrition/hydration status for malnutrition. Collaborate with interdisciplinary team and initiate plan and interventions as ordered.  Monitor patient's weight and dietary intake as ordered or per policy. Utilize nutrition screening tool and intervene as necessary. Determine patient's food preferences and provide high-protein, high-caloric foods as appropriate.     INTERVENTIONS:  - Monitor oral intake, urinary output, labs, and treatment plans  - Assess nutrition and hydration status and recommend course of action  - Evaluate amount of meals eaten  - Assist patient with eating if necessary   - Allow adequate time for meals  - Recommend/ encourage appropriate diets, oral nutritional supplements, and vitamin/mineral supplements  - Order, calculate, and assess calorie counts as needed  - Recommend, monitor, and adjust tube feedings and TPN/PPN based on assessed needs  - Assess need for intravenous fluids  - Provide specific nutrition/hydration education as appropriate  - Include patient/family/caregiver in decisions related to nutrition  Outcome: Progressing

## 2024-01-30 NOTE — OP NOTE
OPERATIVE REPORT  PATIENT NAME: Jenny Olvera    :  1975  MRN: [id number]  Pt Location: [facility] OR ROOM 08    SURGERY DATE: 2024    Surgeons and Role:     * Felix Akins MD - Primary     * Ignacio Brown MD - Fellow    Preop Diagnosis:  Gastroesophageal reflux disease [K21.9]  Hiatal hernia [K44.9]  Status post bariatric surgery [Z98.84]    Post-Op Diagnosis Codes:     * Gastroesophageal reflux disease [K21.9]     * Hiatal hernia [K44.9]     * Status post bariatric surgery [Z98.84]    Procedure(s):  REVISION CONVERSION TO R-N-Y GASTRIC BYPASS W/ ROBOTICS AND INTRAOPERATIVE EGD    Specimen(s):  ID Type Source Tests Collected by Time Destination   1 : hernia sac Tissue Soft Tissue, Other TISSUE EXAM Felix Akins MD 2024 [id number]        Estimated Blood Loss:   20 ml    Drains:  * No LDAs found *    Anesthesia Type:   General    Operative Indications:  Gastroesophageal reflux disease [K21.9]  Hiatal hernia [K44.9]  Status post bariatric surgery [Z98.84]      Operative Findings:  Large paraesophageal hernia with sleeve migration into the thoracic cavity status post sleeve gastrectomy  Extensive intra-abdominal adhesions    Complications:   None    Procedure and Technique:  Robotic extensive lysis of adhesions  Robotic paraesophageal hernia repair with bio a mesh  Robotic Isela-en-Y gastric bypass with IntraOp endoscopy   I was present for the entire procedure.    Patient Disposition:  hemodynamically stable    No qualified resident was available to assist assistance was necessary for traction counter-traction and help with stapling and intraoperative endoscopy     Procedure:  The patient was brought to the operating room and placed in a supine position. The patient received a dose of IV antibiotics and a dose of subcutaneous Heparin prior to the procedure. The patient was induced under general endotracheal anesthesia. The abdominal wall was prepped and draped under sterile conditions in the usual  fashion. The procedure was started by obtaining access to the abdominal cavity using a Veress needle to the left side of the midline around 6 inches from the xiphoid the abdominal cavity was insufflated with CO2 to a pressure of 15 mmHg. After that, the abdomen was entered with an 8 mm trocar using an Optiview trocar under direct visualization. At that point, an 8 and 12 mm robotic trocars were placed on the right side of the abdominal wall, under direct visualization, and another 8 mm robotic trocar and 12 mm assistant trocar were placed on the left side of the abdominal wall, also under direct visualization. A TAP block was performed using 20 ml of Exparel mixed with saline and 0.5 % Marcaine for a total of 100 ml. The patient was then placed in a reverse Trendelenburg position. A Mikhail retractor was placed through a small stab incision below the xiphoid and was used to retract the left lobe of the liver in a medial fashion, the robot was then docked and locked in place.  An OG tube was placed to decompress the stomach and then removed immediately.   The procedure was started by lifting the omentum in a cephalad fashion. The omentum was then split in half using the vessel sealer. The ligament of Treitz was identified and then the small bowel was transected with a 60 mm stapler using a white load.  The mesentery of the small bowel was then transected using the vessel sealer,   After that, the distal small bowel was run for 100 cm in a way to obtain a 100 cm long Isela limb. At that point, two enterotomies were made in the BP limb and the Isela limb with hot scissors l, and the jejunojejunostomy was fashioned using a 60 mm stapler with a white load. After firing the stapler, the staple line was inspected and there was no evidence of bleeding and the staple line was well formed. The common enterotomy of the jejunojejunostomy was closed in two layers using 2-0 Vicryl running suture for the first layer and  2-0 V LOC  in a running fashion for the second layer. The mesenteric defect of the small bowel was approximated using nonabsorbable suture in a running fashion.  At that point, we turned our attention to the upper portion of the abdomen.  Upon inspecting the hiatus there was evidence of a paraesophageal hernia in the upper portion of the gastric pouch appeared to have migrated into the thoracic cavity.  I started the dissection on the right side by taking the pars flaccida down all the way up to the right nick the right nick was dissected away from the hernia sac and esophageal wall, the right vagus nerve was clearly identified and kept out of harm's way.  I then turned my attention to the left nick, in order to expose left nick clearly I mobilized the gastric fundus using the vessel sealer, this took a significant amount of time to be able to lyse all the adhesions to mobilize the gastric pouch all the way up to the left nick, of note the fundus was acutely inflamed and had herniated into the thoracic cavity.  The fundus was reduced and the hernia sac dissected away from the left nick and then the plane of dissection around the left nick was connected anteriorly with my plane of dissection that was developed previously while dissecting the right nick.  The phrenoesophageal ligament was also taken down in its entirety and the hernia sac dissected anteriorly away from the esophageal wall left vagus nerve was identified and preserved.  At that point the hernia sac was completely dissected and excised. The decision was then made to repair the hernia posteriorly. Right nick and left nick were dissected away from the hernia sac and skeletonized all the way down to the confluence. Esophagus was kept out of harm's way during the dissection. Both vagi were also identified and preserved as previously mentioned.  Dissection was carried all the way up in the thoracic cavity to obtain more length of the esophagus. The dissection was  completed circumferentially around the esophagus. We had at least 3 cm of the lower esophagus in an intra-abdominal location by the end of the dissection. Hernia sac was completely dissected and excised. Right nick and left nick were then approximated using 0 Ethibond sutures placed in an interrupted fashion. A bio a mesh was trimmed to accommodate the esophagus and was placed around the esophagus and secured in place with simple 0 Ethibond suture.  The pars flaccida was opened and a gastric pouch was created with a single firing of the Sureform 60 mm intuitive  stapler using green cartridge, after the formation of the gastric pouch, the staple lines of the pouch and the gastric remnant were inspected and appeared to be well formed and also appeared to be hemostatic.  A new gastrojejunostomy anastomosis was then fashioned in an antecolic antegastric fashion in 2 layers. Outer layer was a running layer of 2-0 V lock suture and the inner  layer was a running 2-0 Vicryl suture. Upper endoscopy was then performed and showed no evidence of bubbles or bleeding at the suture line of the anastomosis or the staple line of the gastric pouch.  Hickey space was closed with 0 Ethibond suture, the Mikhail liver retractor was removed. The 12 mm port was closed  with 1-0 Vicryl in a simple fashion.   All the skin edges of the trocar sites were approximated with 4-0 Monocryl in a subcuticular inverted fashion. The patient was extubated and transferred to the PACU in stable condition.    SIGNATURE: Felix Akins MD  DATE: January 30, 2024  TIME: 4:37 PM

## 2024-01-30 NOTE — INTERVAL H&P NOTE
H&P reviewed. After examining the patient I find no changes in the patients condition since the H&P had been written.    Vitals:    01/30/24 1128   BP: 112/64   Pulse: 87   Resp: 20   Temp: 97.6 °F (36.4 °C)   SpO2: 97%

## 2024-01-30 NOTE — ANESTHESIA POSTPROCEDURE EVALUATION
"Post-Op Assessment Note    CV Status:  Stable    Pain management: adequate       Mental Status:  Alert and awake   Hydration Status:  Euvolemic   PONV Controlled:  Controlled   Airway Patency:  Patent     Post Op Vitals Reviewed: Yes    No anethesia notable event occurred.    Staff: Anesthesiologist               BP      Temp      Pulse     Resp      SpO2      /68   Pulse 75   Temp (!) 97.3 °F (36.3 °C)   Resp (!) 26   Ht 5' 2.75\" (1.594 m)   Wt 97.2 kg (214 lb 4.6 oz)   SpO2 96%   BMI 38.26 kg/m²     "

## 2024-01-31 ENCOUNTER — APPOINTMENT (INPATIENT)
Dept: RADIOLOGY | Facility: HOSPITAL | Age: 49
DRG: 328 | End: 2024-01-31
Payer: COMMERCIAL

## 2024-01-31 VITALS
HEART RATE: 70 BPM | OXYGEN SATURATION: 95 % | HEIGHT: 63 IN | SYSTOLIC BLOOD PRESSURE: 125 MMHG | DIASTOLIC BLOOD PRESSURE: 75 MMHG | WEIGHT: 221.12 LBS | RESPIRATION RATE: 19 BRPM | BODY MASS INDEX: 39.18 KG/M2 | TEMPERATURE: 97.7 F

## 2024-01-31 LAB
ANION GAP SERPL CALCULATED.3IONS-SCNC: 4 MMOL/L
BUN SERPL-MCNC: 9 MG/DL (ref 5–25)
CALCIUM SERPL-MCNC: 8.3 MG/DL (ref 8.4–10.2)
CHLORIDE SERPL-SCNC: 107 MMOL/L (ref 96–108)
CO2 SERPL-SCNC: 24 MMOL/L (ref 21–32)
CREAT SERPL-MCNC: 0.52 MG/DL (ref 0.6–1.3)
ERYTHROCYTE [DISTWIDTH] IN BLOOD BY AUTOMATED COUNT: 13.9 % (ref 11.6–15.1)
GFR SERPL CREATININE-BSD FRML MDRD: 113 ML/MIN/1.73SQ M
GLUCOSE SERPL-MCNC: 119 MG/DL (ref 65–140)
HCT VFR BLD AUTO: 38.5 % (ref 34.8–46.1)
HGB BLD-MCNC: 11.8 G/DL (ref 11.5–15.4)
MCH RBC QN AUTO: 26.3 PG (ref 26.8–34.3)
MCHC RBC AUTO-ENTMCNC: 30.6 G/DL (ref 31.4–37.4)
MCV RBC AUTO: 86 FL (ref 82–98)
PLATELET # BLD AUTO: 245 THOUSANDS/UL (ref 149–390)
PMV BLD AUTO: 10.5 FL (ref 8.9–12.7)
POTASSIUM SERPL-SCNC: 4 MMOL/L (ref 3.5–5.3)
RBC # BLD AUTO: 4.48 MILLION/UL (ref 3.81–5.12)
SODIUM SERPL-SCNC: 135 MMOL/L (ref 135–147)
WBC # BLD AUTO: 12.85 THOUSAND/UL (ref 4.31–10.16)

## 2024-01-31 PROCEDURE — 85027 COMPLETE CBC AUTOMATED: CPT | Performed by: STUDENT IN AN ORGANIZED HEALTH CARE EDUCATION/TRAINING PROGRAM

## 2024-01-31 PROCEDURE — 80048 BASIC METABOLIC PNL TOTAL CA: CPT | Performed by: STUDENT IN AN ORGANIZED HEALTH CARE EDUCATION/TRAINING PROGRAM

## 2024-01-31 PROCEDURE — 74240 X-RAY XM UPR GI TRC 1CNTRST: CPT

## 2024-01-31 PROCEDURE — NC001 PR NO CHARGE: Performed by: STUDENT IN AN ORGANIZED HEALTH CARE EDUCATION/TRAINING PROGRAM

## 2024-01-31 PROCEDURE — 99024 POSTOP FOLLOW-UP VISIT: CPT | Performed by: STUDENT IN AN ORGANIZED HEALTH CARE EDUCATION/TRAINING PROGRAM

## 2024-01-31 RX ORDER — OXYCODONE HYDROCHLORIDE 5 MG/1
5 TABLET ORAL EVERY 6 HOURS PRN
Qty: 10 TABLET | Refills: 0 | Status: SHIPPED | OUTPATIENT
Start: 2024-01-31

## 2024-01-31 RX ORDER — ONDANSETRON 4 MG/1
4 TABLET, FILM COATED ORAL EVERY 8 HOURS PRN
Qty: 20 TABLET | Refills: 0 | Status: SHIPPED | OUTPATIENT
Start: 2024-01-31

## 2024-01-31 RX ORDER — ACETAMINOPHEN 160 MG/5ML
640 LIQUID ORAL EVERY 8 HOURS
Qty: 420 ML | Refills: 0 | Status: SHIPPED | OUTPATIENT
Start: 2024-01-31 | End: 2024-02-07

## 2024-01-31 RX ADMIN — IOHEXOL 30 ML: 350 INJECTION, SOLUTION INTRAVENOUS at 09:10

## 2024-01-31 RX ADMIN — ACETAMINOPHEN 1000 MG: 10 INJECTION INTRAVENOUS at 13:30

## 2024-01-31 RX ADMIN — KETOROLAC TROMETHAMINE 15 MG: 30 INJECTION, SOLUTION INTRAMUSCULAR; INTRAVENOUS at 13:30

## 2024-01-31 RX ADMIN — BUPROPION HYDROCHLORIDE 150 MG: 150 TABLET, EXTENDED RELEASE ORAL at 10:11

## 2024-01-31 RX ADMIN — KETOROLAC TROMETHAMINE 15 MG: 30 INJECTION, SOLUTION INTRAMUSCULAR; INTRAVENOUS at 00:36

## 2024-01-31 RX ADMIN — ACETAMINOPHEN 1000 MG: 10 INJECTION INTRAVENOUS at 05:23

## 2024-01-31 RX ADMIN — FAMOTIDINE 20 MG: 10 INJECTION INTRAVENOUS at 10:11

## 2024-01-31 RX ADMIN — MORPHINE SULFATE 2 MG: 2 INJECTION, SOLUTION INTRAMUSCULAR; INTRAVENOUS at 10:31

## 2024-01-31 RX ADMIN — KETOROLAC TROMETHAMINE 15 MG: 30 INJECTION, SOLUTION INTRAMUSCULAR; INTRAVENOUS at 06:08

## 2024-01-31 RX ADMIN — SODIUM CHLORIDE, SODIUM LACTATE, POTASSIUM CHLORIDE, AND CALCIUM CHLORIDE 75 ML/HR: .6; .31; .03; .02 INJECTION, SOLUTION INTRAVENOUS at 08:05

## 2024-01-31 NOTE — CASE MANAGEMENT
Case Management Assessment & Discharge Planning Note    Patient name Jenny Olvera  Location [facility] /E5 -* MRN [id number]  : 1975 Date 2024       Current Admission Date: 2024  Current Admission Diagnosis:Class 2 obesity with alveolar hypoventilation, serious comorbidity, and body mass index (BMI) of 38.0 to 38.9 in adult    Patient Active Problem List    Diagnosis Date Noted    S/P laparoscopic sleeve gastrectomy 2024    Tinnitus aurium, bilateral 2023    Encounter for fitting and adjustment of pessary 2023    Impingement syndrome of right shoulder 2023    Rotator cuff tendinitis, right 2023    Biceps tendinitis of right shoulder 2023    Bilateral hip pain 2023    Trigger finger of right thumb 2023    Class 2 obesity with alveolar hypoventilation, serious comorbidity, and body mass index (BMI) of 38.0 to 38.9 in adult  2022    COVID-19 2022    Other insomnia 2022    Osteoarthritis of carpometacarpal (CMC) joint of right thumb 2021    Nephrolithiasis 2020    Internal derangement of left shoulder 2020    Trapezius muscle spasm 2020    Cervical radiculopathy 2020    Impingement syndrome of left shoulder 2020    Intractable chronic migraine without aura and without status migrainosus 2020    Mild intermittent asthma 2020    Vitamin B12 deficiency 2020    Abnormal Pap smear of cervix 10/16/2019    Genital prolapse 2019    Sleep-disordered breathing 2019    Post traumatic stress disorder (PTSD) 2019    Intractable chronic migraine without aura and with status migrainosus 2019    Irritable bowel syndrome with both constipation and diarrhea 01/10/2019    Fibromyalgia 01/10/2019    Depression with anxiety 01/10/2019    Borderline hyperlipidemia 01/10/2019    History of angioedema 01/10/2019    Foot swelling 2018    Vertigo 2018     Cervicalgia 02/06/2018    Chronic migraine without aura without status migrainosus, not intractable 02/06/2018    Saccadic eye movements 02/06/2018    GERD without esophagitis 10/12/2017    Vitamin D deficiency 04/12/2016    Arthralgia of multiple joints 04/06/2016    Obesity, morbid, BMI 40.0-49.9 ([facility]) 04/06/2016    Allergic rhinitis 11/21/2013    Colon, diverticulosis 11/21/2013      LOS (days): 1  Geometric Mean LOS (GMLOS) (days): 2.1  Days to GMLOS:1.1     OBJECTIVE:    Risk of Unplanned Readmission Score: 10.44         Current admission status: Inpatient       Preferred Pharmacy:   [facility] - [place], [place] - [address]  [address]  [address]  [place] [address]  Phone: [phone] Fax: [phone]    [facility] - [place], [place] - [address]  [address]  [address]  [place] [address]  Phone: [phone] Fax: [phone]    [id number] - [place], [place] - [address]  [address]  [place] [address]  Phone: [phone] Fax: [phone]    [facility]  [place]ator[place] - [address],  [address],  Ground Floor [place] [facility] [place] [address]  Phone: [phone] Fax: [phone]    Primary Care Provider: Dominique Dickerson DO    Primary Insurance: [facility]  Secondary Insurance: [employer]    ASSESSMENT:  Active Health Care Proxies    There are no active Health Care Proxies on file.                 Readmission Root Cause  30 Day Readmission: No    Patient Information  Admitted from:: Home  Mental Status: Alert  During Assessment patient was accompanied by: Not accompanied during assessment  Assessment information provided by:: Patient  Primary Caregiver: Family  Caregiver's Name:: Agustina Cifuentes (Mother)  [phone]  Caregiver's Relationship to Patient:: Family Member  Caregiver's Telephone Number:: Agustina Cifuentes (Mother)  [phone]  Support Systems: Self, Daughter, Parent  County of Residence:  [place]  What city do you live in?: [place]    Activities of Daily Living Prior to Admission  Functional Status: Independent  Completes ADLs independently?: Yes  Ambulates independently?: Yes  Does patient use assisted devices?: No  Does patient currently own DME?: No  Does patient have a history of Outpatient Therapy (PT/OT)?: No  Does the patient have a history of Short-Term Rehab?: No  Does patient have a history of HHC?: No         Patient Information Continued  Income Source: Unknown  Does patient have prescription coverage?: Yes  Does patient receive dialysis treatments?: No  Does patient have a history of substance abuse?: No  Does patient have a history of Mental Health Diagnosis?: No    PHQ 2/9 Screening   Reviewed PHQ 2/9 Depression Screening Score?: No    Means of Transportation  Means of Transport to Appts:: Drives Self      Housing Stability: Low Risk  (9/8/2021)    Housing Stability Vital Sign     Unable to Pay for Housing in the Last Year: No     Number of Places Lived in the Last Year: 1     Unstable Housing in the Last Year: No   Food Insecurity: No Food Insecurity (1/12/2024)    Hunger Vital Sign     Worried About Running Out of Food in the Last Year: Never true     Ran Out of Food in the Last Year: Never true   Transportation Needs: No Transportation Needs (1/12/2024)    PRAPARE - Transportation     Lack of Transportation (Medical): No     Lack of Transportation (Non-Medical): No   Utilities: Not on file       DISCHARGE DETAILS:    Discharge planning discussed with:: Patient  Freedom of Choice: Yes  Comments - Freedom of Choice: Home no needs  CM contacted family/caregiver?: Yes  Were Treatment Team discharge recommendations reviewed with patient/caregiver?: Yes  Did patient/caregiver verbalize understanding of patient care needs?: Yes  Were patient/caregiver advised of the risks associated with not following Treatment Team discharge recommendations?: Yes    Contacts  Patient Contacts:  Agustina Cifuentes (Mother)  [phone]  Relationship to Patient:: Family  Contact Method: Other (Comment) (Patient contact EC)  Reason/Outcome: Discharge Planning         DME Referral Provided  Referral made for DME?: No         Would you like to participate in our [facility] service program?  : No - Declined    Treatment Team Recommendation: Home  Discharge Destination Plan:: Home  Transport at Discharge : Family         Additional Comments: CM spoke with patient who declined any home care needs at d.c, states her mother will provide transprotation home. No CM needs or barriers to d/c home.        Dressing: bandage

## 2024-01-31 NOTE — DISCHARGE SUMMARY
Discharge Summary - Jenny Olvera 48 y.o. female MRN: [id number]    Unit/Bed#: E5 -01 Encounter: [id number]      Pre-Operative Diagnosis: Pre-Op Diagnosis Codes:     * Gastroesophageal reflux disease [K21.9]     * Hiatal hernia [K44.9]     * Status post bariatric surgery [Z98.84]    Post-Operative Diagnosis: Post-Op Diagnosis Codes:     * Gastroesophageal reflux disease [K21.9]     * Hiatal hernia [K44.9]     * Status post bariatric surgery [Z98.84]    Procedures Performed:  Procedure(s):  REVISION CONVERSION TO R-N-Y GASTRIC BYPASS W/ ROBOTICS AND INTRAOPERATIVE EGD; PARAESOPHAGEAL HERNIA REPAIR WITH MESH    Surgeon: Felix Akins MD    See H & P for full details of admission and Operative Note for full details of operations performed.     Patient tolerated surgery well without complications. In the morning postoperative Day 1, the patient had mild nausea and abdominal pain. Underwent upper GI which was negative. Tolerated a clear liquid diet without vomiting. Able to ambulate and voiding independently. Patient was deemed ready for discharge home.     Patient was seen and examined prior to discharge.      Provisions for Follow-Up Care:  See After Visit Summary/Discharge Instructions for information related to follow-up care and home orders.      Disposition: Home, in stable condition.     Planned Readmission: No    Discharge Medications:  See After Visit Summary/Discharge Instructions for reconciled discharge medications provided to patient and family.      Post Operative instructions: Reviewed with patient and/or family.    Signature:   Ignacio Brown MD  Date: 1/31/2024 Time: 9:40 AM

## 2024-01-31 NOTE — PROGRESS NOTES
"Progress Note - Bariatric Surgery   Jenny Olvera 48 y.o. female MRN: [id number]  Unit/Bed#: E5 -01 Encounter: [id number]    Assessment:  Jenny Olvera is a 48 y.o. female s/p robotic assisted paraesophageal hernia repair and conversion from sleeve gastrectomy to karyn-en-Y gastric bypass, doing well.      Plan:  Upper GI study today.  Resume clear liquid diet after upper GI completion  Pain control p.o. meds  Encourage ambulation being out of bed  Incentive spirometer  Labs and vitals were reviewed and are stable  SCDs for VTE prophylaxis  Pepcid for GI prophylaxis  Replace electrolytes as needed  Likely discharge today    Subjective/Objective   Chief Complaint: No complaints    Subjective: No acute overnight events, patient denies nausea or vomiting.  Patient is ambulating without issue.  Patient is voiding.     Objective:     Vitals: Blood pressure 113/68, pulse 79, temperature (!) 97.3 °F (36.3 °C), temperature source Oral, resp. rate 20, height 5' 2.75\" (1.594 m), weight 100 kg (221 lb 1.9 oz), SpO2 97%.,Body mass index is 39.48 kg/m².      Intake/Output Summary (Last 24 hours) at 1/31/2024 0742  Last data filed at 1/30/2024 1805  Gross per 24 hour   Intake 3050 ml   Output 220 ml   Net 2830 ml       Invasive Devices       Peripheral Intravenous Line  Duration             Peripheral IV 01/30/24 Dorsal (posterior);Left Hand <1 day                    Physical Exam: /68 (BP Location: Left arm)   Pulse 79   Temp (!) 97.3 °F (36.3 °C) (Oral)   Resp 20   Ht 5' 2.75\" (1.594 m)   Wt 100 kg (221 lb 1.9 oz)   SpO2 97%   BMI 39.48 kg/m²   General appearance: alert and oriented, in no acute distress  Head: Normocephalic, without obvious abnormality, atraumatic  Nose: no discharge  Lungs:  no apparent resipiratory distress on room air  Abdomen:  soft, nontender, nondistended, laparoscopic port sites clean dry and intact  Skin: Skin color, texture, turgor normal. No rashes or lesions    Lab, Imaging and " other studies: I have personally reviewed pertinent labs.  CBC:   Lab Results   Component Value Date    WBC 12.85 (H) 01/31/2024    HGB 11.8 01/31/2024    HCT 38.5 01/31/2024    MCV 86 01/31/2024     01/31/2024    RBC 4.48 01/31/2024    MCH 26.3 (L) 01/31/2024    MCHC 30.6 (L) 01/31/2024    RDW 13.9 01/31/2024    MPV 10.5 01/31/2024     CMP:   Lab Results   Component Value Date    SODIUM 135 01/31/2024     01/31/2024    CO2 24 01/31/2024    BUN 9 01/31/2024    CREATININE 0.52 (L) 01/31/2024    CALCIUM 8.3 (L) 01/31/2024    EGFR 113 01/31/2024     VTE Pharmacologic Prophylaxis: SCDs  VTE Mechanical Prophylaxis: sequential compression device    Ignacio Brown MD  Bariatric Surgery

## 2024-01-31 NOTE — PLAN OF CARE
Problem: PAIN - ADULT  Goal: Verbalizes/displays adequate comfort level or baseline comfort level  Description: Interventions:  - Encourage patient to monitor pain and request assistance  - Assess pain using appropriate pain scale  - Administer analgesics based on type and severity of pain and evaluate response  - Implement non-pharmacological measures as appropriate and evaluate response  - Consider cultural and social influences on pain and pain management  - Notify physician/advanced practitioner if interventions unsuccessful or patient reports new pain  Outcome: Progressing     Problem: INFECTION - ADULT  Goal: Absence or prevention of progression during hospitalization  Description: INTERVENTIONS:  - Assess and monitor for signs and symptoms of infection  - Monitor lab/diagnostic results  - Monitor all insertion sites, i.e. indwelling lines, tubes, and drains  - Monitor endotracheal if appropriate and nasal secretions for changes in amount and color  - [place] appropriate cooling/warming therapies per order  - Administer medications as ordered  - Instruct and encourage patient and family to use good hand hygiene technique  - Identify and instruct in appropriate isolation precautions for identified infection/condition  Outcome: Progressing  Goal: Absence of fever/infection during neutropenic period  Description: INTERVENTIONS:  - Monitor WBC    Outcome: Progressing     Problem: SAFETY ADULT  Goal: Patient will remain free of falls  Description: INTERVENTIONS:  - Educate patient/family on patient safety including physical limitations  - Instruct patient to call for assistance with activity   - Consult OT/PT to assist with strengthening/mobility   - Keep Call bell within reach  - Keep bed low and locked with side rails adjusted as appropriate  - Keep care items and personal belongings within reach  - Initiate and maintain comfort rounds  - Make Fall Risk Sign visible to staff  - Offer Toileting every 23 Hours,  in advance of need  - Apply yellow socks and bracelet for high fall risk patients  - Consider moving patient to room near nurses station  Outcome: Progressing  Goal: Maintain or return to baseline ADL function  Description: INTERVENTIONS:  -  Assess patient's ability to carry out ADLs; assess patient's baseline for ADL function and identify physical deficits which impact ability to perform ADLs (bathing, care of mouth/teeth, toileting, grooming, dressing, etc.)  - Assess/evaluate cause of self-care deficits   - Assess range of motion  - Assess patient's mobility; develop plan if impaired  - Assess patient's need for assistive devices and provide as appropriate  - Encourage maximum independence but intervene and supervise when necessary  - Involve family in performance of ADLs  - Assess for home care needs following discharge   - Consider OT consult to assist with ADL evaluation and planning for discharge  - Provide patient education as appropriate  Outcome: Progressing  Goal: Maintains/Returns to pre admission functional level  Description: INTERVENTIONS:  - Perform AM-PAC 6 Click Basic Mobility/ Daily Activity assessment daily.  - Set and communicate daily mobility goal to care team and patient/family/caregiver.   - Collaborate with rehabilitation services on mobility goals if consulted  - Out of bed for toileting  - Record patient progress and toleration of activity level   Outcome: Progressing     Problem: DISCHARGE PLANNING  Goal: Discharge to home or other facility with appropriate resources  Description: INTERVENTIONS:  - Identify barriers to discharge w/patient and caregiver  - Arrange for needed discharge resources and transportation as appropriate  - Identify discharge learning needs (meds, wound care, etc.)  - Arrange for interpretive services to assist at discharge as needed  - Refer to Case Management Department for coordinating discharge planning if the patient needs post-hospital services based on  physician/advanced practitioner order or complex needs related to functional status, cognitive ability, or social support system  Outcome: Progressing     Problem: Knowledge Deficit  Goal: Patient/family/caregiver demonstrates understanding of disease process, treatment plan, medications, and discharge instructions  Description: Complete learning assessment and assess knowledge base.  Interventions:  - Provide teaching at level of understanding  - Provide teaching via preferred learning methods  Outcome: Progressing     Problem: Nutrition/Hydration-ADULT  Goal: Nutrient/Hydration intake appropriate for improving, restoring or maintaining nutritional needs  Description: Monitor and assess patient's nutrition/hydration status for malnutrition. Collaborate with interdisciplinary team and initiate plan and interventions as ordered.  Monitor patient's weight and dietary intake as ordered or per policy. Utilize nutrition screening tool and intervene as necessary. Determine patient's food preferences and provide high-protein, high-caloric foods as appropriate.     INTERVENTIONS:  - Monitor oral intake, urinary output, labs, and treatment plans  - Assess nutrition and hydration status and recommend course of action  - Evaluate amount of meals eaten  - Assist patient with eating if necessary   - Allow adequate time for meals  - Recommend/ encourage appropriate diets, oral nutritional supplements, and vitamin/mineral supplements  - Order, calculate, and assess calorie counts as needed  - Recommend, monitor, and adjust tube feedings and TPN/PPN based on assessed needs  - Assess need for intravenous fluids  - Provide specific nutrition/hydration education as appropriate  - Include patient/family/caregiver in decisions related to nutrition  Outcome: Progressing     Problem: Prexisting or High Potential for Compromised Skin Integrity  Goal: Skin integrity is maintained or improved  Description: INTERVENTIONS:  - Identify patients  at risk for skin breakdown  - Assess and monitor skin integrity  - Assess and monitor nutrition and hydration status  - Monitor labs   - Assess for incontinence   - Turn and reposition patient  - Assist with mobility/ambulation  - Relieve pressure over bony prominences  - Avoid friction and shearing  - Provide appropriate hygiene as needed including keeping skin clean and dry  - Evaluate need for skin moisturizer/barrier cream  - Collaborate with interdisciplinary team   - Patient/family teaching  - Consider wound care consult   Outcome: Progressing

## 2024-01-31 NOTE — PLAN OF CARE
Problem: PAIN - ADULT  Goal: Verbalizes/displays adequate comfort level or baseline comfort level  Description: Interventions:  - Encourage patient to monitor pain and request assistance  - Assess pain using appropriate pain scale  - Administer analgesics based on type and severity of pain and evaluate response  - Implement non-pharmacological measures as appropriate and evaluate response  - Consider cultural and social influences on pain and pain management  - Notify physician/advanced practitioner if interventions unsuccessful or patient reports new pain  Outcome: Progressing     Problem: INFECTION - ADULT  Goal: Absence or prevention of progression during hospitalization  Description: INTERVENTIONS:  - Assess and monitor for signs and symptoms of infection  - Monitor lab/diagnostic results  - Monitor all insertion sites, i.e. indwelling lines, tubes, and drains  - Monitor endotracheal if appropriate and nasal secretions for changes in amount and color  - [place] appropriate cooling/warming therapies per order  - Administer medications as ordered  - Instruct and encourage patient and family to use good hand hygiene technique  - Identify and instruct in appropriate isolation precautions for identified infection/condition  Outcome: Progressing  Goal: Absence of fever/infection during neutropenic period  Description: INTERVENTIONS:  - Monitor WBC    Outcome: Progressing     Problem: SAFETY ADULT  Goal: Patient will remain free of falls  Description: INTERVENTIONS:  - Educate patient/family on patient safety including physical limitations  - Instruct patient to call for assistance with activity   - Consult OT/PT to assist with strengthening/mobility   - Keep Call bell within reach  - Keep bed low and locked with side rails adjusted as appropriate  - Keep care items and personal belongings within reach  - Initiate and maintain comfort rounds  - Make Fall Risk Sign visible to staff  - Offer Toileting every 23 Hours,  in advance of need  - Apply yellow socks and bracelet for high fall risk patients  - Consider moving patient to room near nurses station  Outcome: Progressing  Goal: Maintain or return to baseline ADL function  Description: INTERVENTIONS:  -  Assess patient's ability to carry out ADLs; assess patient's baseline for ADL function and identify physical deficits which impact ability to perform ADLs (bathing, care of mouth/teeth, toileting, grooming, dressing, etc.)  - Assess/evaluate cause of self-care deficits   - Assess range of motion  - Assess patient's mobility; develop plan if impaired  - Assess patient's need for assistive devices and provide as appropriate  - Encourage maximum independence but intervene and supervise when necessary  - Involve family in performance of ADLs  - Assess for home care needs following discharge   - Consider OT consult to assist with ADL evaluation and planning for discharge  - Provide patient education as appropriate  Outcome: Progressing  Goal: Maintains/Returns to pre admission functional level  Description: INTERVENTIONS:  - Perform AM-PAC 6 Click Basic Mobility/ Daily Activity assessment daily.  - Set and communicate daily mobility goal to care team and patient/family/caregiver.   - Collaborate with rehabilitation services on mobility goals if consulted  - Out of bed for toileting  - Record patient progress and toleration of activity level   Outcome: Progressing     Problem: DISCHARGE PLANNING  Goal: Discharge to home or other facility with appropriate resources  Description: INTERVENTIONS:  - Identify barriers to discharge w/patient and caregiver  - Arrange for needed discharge resources and transportation as appropriate  - Identify discharge learning needs (meds, wound care, etc.)  - Arrange for interpretive services to assist at discharge as needed  - Refer to Case Management Department for coordinating discharge planning if the patient needs post-hospital services based on  physician/advanced practitioner order or complex needs related to functional status, cognitive ability, or social support system  Outcome: Progressing     Problem: Knowledge Deficit  Goal: Patient/family/caregiver demonstrates understanding of disease process, treatment plan, medications, and discharge instructions  Description: Complete learning assessment and assess knowledge base.  Interventions:  - Provide teaching at level of understanding  - Provide teaching via preferred learning methods  Outcome: Progressing     Problem: Nutrition/Hydration-ADULT  Goal: Nutrient/Hydration intake appropriate for improving, restoring or maintaining nutritional needs  Description: Monitor and assess patient's nutrition/hydration status for malnutrition. Collaborate with interdisciplinary team and initiate plan and interventions as ordered.  Monitor patient's weight and dietary intake as ordered or per policy. Utilize nutrition screening tool and intervene as necessary. Determine patient's food preferences and provide high-protein, high-caloric foods as appropriate.     INTERVENTIONS:  - Monitor oral intake, urinary output, labs, and treatment plans  - Assess nutrition and hydration status and recommend course of action  - Evaluate amount of meals eaten  - Assist patient with eating if necessary   - Allow adequate time for meals  - Recommend/ encourage appropriate diets, oral nutritional supplements, and vitamin/mineral supplements  - Order, calculate, and assess calorie counts as needed  - Recommend, monitor, and adjust tube feedings and TPN/PPN based on assessed needs  - Assess need for intravenous fluids  - Provide specific nutrition/hydration education as appropriate  - Include patient/family/caregiver in decisions related to nutrition  Outcome: Progressing     Problem: Prexisting or High Potential for Compromised Skin Integrity  Goal: Skin integrity is maintained or improved  Description: INTERVENTIONS:  - Identify patients  at risk for skin breakdown  - Assess and monitor skin integrity  - Assess and monitor nutrition and hydration status  - Monitor labs   - Assess for incontinence   - Turn and reposition patient  - Assist with mobility/ambulation  - Relieve pressure over bony prominences  - Avoid friction and shearing  - Provide appropriate hygiene as needed including keeping skin clean and dry  - Evaluate need for skin moisturizer/barrier cream  - Collaborate with interdisciplinary team   - Patient/family teaching  - Consider wound care consult   Outcome: Progressing

## 2024-01-31 NOTE — DISCHARGE INSTRUCTIONS
your medications from [facility] in Hospital Lobby   Crush or cut your pills and open capsules, mix with liquid to drink.  Take Tylenol every 8 hours around the clock, unless instructed otherwise  Take your omeprazole daily  It is important to stay hydrated and follow your discharge diet progression   Mild nausea is ok as long as you can drink fluids, sip very slowly and get up and walk during any periods of nausea  You may shower normally after 48 hours, but do not scrub incision sites, blot gently with clean towel to dry incisions  Take home medications as usual unless instructed otherwise while in hospital  Follow up with Dr. Osmel Akins and your PCP within the next week  Sleeve gastrectomy patients ONLY: Complete full course of lovenox injections!

## 2024-01-31 NOTE — UTILIZATION REVIEW
"Initial Clinical Review    Elective   IP    surgical procedure    Age/Sex: 48 y.o. 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  Extensive intra-abdominal adhesions    POD#1 Progress Note:   1/31  D/C  home    Admission Orders: Date/Time/Statement:   Admission Orders (From admission, onward)       Ordered        01/30/24 1251  Inpatient Admission  Once                          Orders Placed This Encounter   Procedures    Inpatient Admission     Standing Status:   Standing     Number of Occurrences:   1     Order Specific Question:   Level of Care     Answer:   Med Surg [16]     Order Specific Question:   Bed Type     Answer:   Bariatric [1]     Order Specific Question:   Estimated length of stay     Answer:   Inpatient Only Surgery     Vital Signs: /72 (BP Location: Left arm)   Pulse 74   Temp 97.7 °F (36.5 °C) (Oral)   Resp 19   Ht 5' 2.75\" (1.594 m)   Wt 100 kg (221 lb 1.9 oz)   SpO2 95%   BMI 39.48 kg/m²     Pertinent Labs/Diagnostic Test Results:   FL upper GI UGI   Final Result by Ramon Blackwood MD (01/31 1012)      No evidence of leak.         Workstation performed: [id number]               Results from last 7 days   Lab Units 01/31/24  0523   WBC Thousand/uL 12.85*   HEMOGLOBIN g/dL 11.8   HEMATOCRIT % 38.5   PLATELETS Thousands/uL 245         Results from last 7 days   Lab Units 01/31/24  0523   SODIUM mmol/L 135   POTASSIUM mmol/L 4.0   CHLORIDE mmol/L 107   CO2 mmol/L 24   ANION GAP mmol/L 4   BUN mg/dL 9   CREATININE mg/dL 0.52*   EGFR ml/min/1.73sq m 113   CALCIUM mg/dL 8.3*             Results from last 7 days   Lab Units 01/31/24  0523   GLUCOSE RANDOM mg/dL 119             Diet:  bariatric cl  liq    Mobility:  OOB as  tolerated    DVT Prophylaxis:  " SCD's    Medications/Pain Control:   Scheduled Medications:  acetaminophen, 1,000 mg, Intravenous, Q6H INGRID  buPROPion, 150 mg, Oral, QAM  famotidine, 20 mg, Intravenous, Q12H INGRID  ketorolac, 15 mg, Intravenous, Q6H  venlafaxine, 37.5 mg, Oral, QPM      Continuous IV Infusions:  lactated ringers, 75 mL/hr, Intravenous, Continuous      PRN Meds:  albuterol, 2 puff, Inhalation, Q6H PRN  diphenhydrAMINE, 25 mg, Oral, HS PRN  melatonin, 3 mg, Oral, HS PRN  metoclopramide, 10 mg, Intravenous, Q6H PRN  morphine injection, 2 mg, Intravenous, Q2H PRN  morphine injection, 4 mg, Intravenous, Q2H PRN  ondansetron, 4 mg, Intravenous, Q6H PRN  phenol, 2 spray, Mouth/Throat, Q2H PRN  promethazine, 25 mg, Intravenous, Q6H PRN  simethicone, 80 mg, Oral, 4x Daily PRN  tiZANidine, 2 mg, Oral, Q8H PRN        Network Utilization Review Department  ATTENTION: Please call with any questions or concerns to [phone] and carefully listen to the prompts so that you are directed to the right person. All voicemails are confidential.   For Discharge needs, contact Care Management DC Support Team at [phone] opt. 2  Send all requests for admission clinical reviews, approved or denied determinations and any other requests to dedicated fax number below belonging to the campus where the patient is receiving treatment. List of dedicated fax numbers for the Facilities:  FACILITY NAME UR FAX NUMBER   ADMISSION DENIALS (Administrative/Medical Necessity) [phone]   DISCHARGE SUPPORT TEAM (NETWORK) [phone]   PARENT CHILD HEALTH (Maternity/NICU/Pediatrics) [phone]   [facility] [phone]   [facility] [phone]   [facility] [phone]   [facility] [phone]   [facility] [phone]   [facility] [phone]   [facility] [phone]   [place]  [facility] [phone]   [facility] [phone]   [facility] [phone]   [facility] [phone]   [facility] [phone]

## 2024-01-31 NOTE — DISCHARGE INSTR - AVS FIRST PAGE
Bariatric/Weight Loss Surgery  Hospital Discharge Instructions  ACTIVITY:  Progress as feels comfortable - a good rule is:  if you are doing something and it begins to hurt, stop doing the activity. Walk every hour while at home.  You may walk stairs if you do so slowly  You may shower 48 hours after surgery.  Do not scrub incision sites.  Blot gently with clean towel to dry incisions. (see #4 below)   Use your incentive spirometer 10 times per hour while awake for 1 week after surgery.  Do NOT drive for 48 hours after surgery. No driving 24 hours after taking certain prescription pain medications. Examples of such medication are Percocet, Darvocet, Oxycodone, Tylenol #3, and Tylenol with Codeine.     DIET  Stay on a liquid diet for 7 days after your surgery date, sipping slowly. Refer to your manual for examples of choices. Remember to keep your liquids sugar free or low calorie. You may have protein drinks. Make sure to drink 48 to 64 ounces per day of fluids.   You may advance to a pureed diet one week after surgery as instructed by your diet progression pamphlet. Once you get approval from your surgeon at your first post operative visit, you may advance to the soft diet and remain on soft diet for 8 weeks unless otherwise instructed.    MEDICATIONS:  The abdominal nerve block will wear off during the first 1-2 days that you are home, and you may become sore (especially over incision site/sites where abdominal wall is sutured). This may create a pulling sensation, especially while moving around, and will fade over time.  Continue to take your Tylenol and your pain medication as instructed.   Start vitamins and minerals one week after surgery or when you start stage 3/puree diet.   Anti-acid Medication as per prescription.  Other medications as indicated on the Physician Patient Discharge Instructions form given to you at the time of discharge.  Make sure that you are splitting your pill or tablet medications in  halves or fourths or even crushing them before you take them. Capsules should be opened and mixed with water or jello. You need to do this for at least 4 weeks after surgery. Eventually you will be able to take your medications the regular way as they were prescribed.   You will need to consult with your Family Doctor in regards to all your prescribed medication, particularly those for blood pressure and diabetes.  As you lose weight, medical conditions may change, requiring an alteration or elimination of the drug dose. Monitor blood pressure closely and call PCP with any concerns.   Sleeve Gastrectomy patients ONLY:  Complete full course of lovenox injections!  DO NOT TAKE BIRTH CONTROL(BC) MEDICATIONS, INSERT BC VAGINAL RINGS, OR PLACE IUD OR ANY OTHER BC METHODS UNTIL 31 DAYS FROM DAY OF DISCHARGE FROM HOSPITAL. THIS PLACES YOU AT HIGH RISK FOR A POTENTIALLY LIFE THREATENING BLOOD CLOT. Remember to always use barrier methods for birth control and speak to your GYN about using two forms of birth control to start 31 days after surgery. It is very important to avoid pregnancy until at least 18-24 months after surgery.     INCISION CARE  You may shower and get incisions wet 2 days after surgery. No soaking tub baths or swimming for 30 days after surgery. Keep abdominal area and incisions clean. Use soap and water to create a good lather and rinse off.  Do not scrub incisions.   If you have a drain, empty the drain as the nurses instructed.    FOLLOW-UP APPOINTMENT should be made for one week after discharge. Call surgeon’s office at [phone] to schedule an appointment.    CALL YOUR DOCTOR FOR:  pain not controlled by pain medications, a temperature greater than 101.5° F, any increase or change in drainage or redness from any incision, any vomiting or inability to keep liquids down, shortness of breath, shoulder pain, or bleeding

## 2024-02-01 NOTE — UTILIZATION REVIEW
NOTIFICATION OF ADMISSION DISCHARGE   This is a Notification of Discharge from [facility]. Please be advised that this patient has been discharge from our facility. Below you will find the admission and discharge date and time including the patient’s disposition.   UTILIZATION REVIEW CONTACT:  Nery Wallace  Utilization   Network Utilization Review Department  Phone: [phone] x carefully listen to the prompts. All voicemails are confidential.  Email: [email or web address]     ADMISSION INFORMATION  PRESENTATION DATE: 1/30/2024 10:48 AM  OBERVATION ADMISSION DATE:   INPATIENT ADMISSION DATE: 1/30/24 12:52 PM   DISCHARGE DATE: 1/31/2024  6:17 PM   DISPOSITION:Home/Self Care    Network Utilization Review Department  ATTENTION: Please call with any questions or concerns to [phone] and carefully listen to the prompts so that you are directed to the right person. All voicemails are confidential.   For Discharge needs, contact Care Management DC Support Team at [phone] opt. 2  Send all requests for admission clinical reviews, approved or denied determinations and any other requests to dedicated fax number below belonging to the campus where the patient is receiving treatment. List of dedicated fax numbers for the Facilities:  FACILITY NAME UR FAX NUMBER   ADMISSION DENIALS (Administrative/Medical Necessity) [phone]   DISCHARGE SUPPORT TEAM ([facility]) [phone]   PARENT CHILD HEALTH (Maternity/NICU/Pediatrics) [phone]   [facility] [phone]   [facility] [phone]   [facility] [phone]   [facility] [phone]   [facility] [phone]   [facility] [phone]   [facility] [phone]   [facility] [phone]   [facility]  [facility] [phone]   [facility] [phone]   [facility] [phone]   [facility] [phone]

## 2024-02-02 ENCOUNTER — TELEPHONE (OUTPATIENT)
Dept: BARIATRICS | Facility: CLINIC | Age: 49
End: 2024-02-02

## 2024-02-02 NOTE — TELEPHONE ENCOUNTER
Post op follow up phone call completed.  Pt is sipping liquids, using IS as instructed, reinforced importance of using IS to help prevent pneumonia. Ambulating about home without difficulty.  Pain controlled with analgesia.  Reaffirmed examples of liquid diet over the next week.  Pt stated understanding about discharge instructions and medication adjustments.  Follow up appt with surgeon scheduled for next week.   Instructed to call with any additional questions or concerns.

## 2024-02-05 PROCEDURE — 88302 TISSUE EXAM BY PATHOLOGIST: CPT | Performed by: PATHOLOGY

## 2024-02-07 ENCOUNTER — OFFICE VISIT (OUTPATIENT)
Dept: BARIATRICS | Facility: CLINIC | Age: 49
End: 2024-02-07

## 2024-02-07 VITALS
HEART RATE: 70 BPM | DIASTOLIC BLOOD PRESSURE: 84 MMHG | WEIGHT: 209.5 LBS | BODY MASS INDEX: 37.12 KG/M2 | SYSTOLIC BLOOD PRESSURE: 126 MMHG | HEIGHT: 63 IN | TEMPERATURE: 97 F

## 2024-02-07 DIAGNOSIS — Z09 POSTOP CHECK: ICD-10-CM

## 2024-02-07 DIAGNOSIS — E66.9 OBESITY, CLASS II, BMI 35-39.9: Primary | ICD-10-CM

## 2024-02-07 DIAGNOSIS — Z98.84 BARIATRIC SURGERY STATUS: ICD-10-CM

## 2024-02-07 PROCEDURE — 99024 POSTOP FOLLOW-UP VISIT: CPT | Performed by: SURGERY

## 2024-02-07 NOTE — PROGRESS NOTES
POST OP UP VISIT - BARIATRIC SURGERY  Jenny Olvera 48 y.o. female MRN: [id number]  Unit/Bed#:  Encounter: [id number]      HPI:  Jenny Olvera is a 48 y.o. female status post robotic hiatal hernia repair and sleeve conversion to RNYGB performed by Dr. Osmel Akins on 1/30 returning to office for first post op visit since surgery.    Tolerating diet.  Denies nausea and vomiting.  Taking multivitamins and PPI daily.      Review of Systems   All other systems reviewed and are negative.      Historical Information   Past Medical History:   Diagnosis Date    Anxiety     Asthma     Claustrophobia     Cluster headache     CTS (carpal tunnel syndrome)     Depression     Fibromyalgia     primary; last assessed 11/27/17    GERD (gastroesophageal reflux disease)     GI problem     Headache, tension-type     Irritable bowel syndrome     Joint pain     Kidney stone     Lung nodule     last assessed 10/9/15    Migraine     Muscle pain     Peripheral neuropathy     PONV (postoperative nausea and vomiting)     Sjogren's syndrome (HCC)      Past Surgical History:   Procedure Laterality Date    BACK SURGERY      BARIATRIC SURGERY  8/9/2022    CARPAL TUNNEL RELEASE Bilateral     CHOLECYSTECTOMY      CYSTOSCOPY      GALLBLADDER SURGERY      GASTRECTOMY  08/09/2022    Sleeve    GASTRIC BYPASS LAPAROSCOPIC N/A 1/30/2024    Procedure: REVISION CONVERSION TO R-N-Y GASTRIC BYPASS W/ ROBOTICS AND INTRAOPERATIVE EGD; PARAESOPHAGEAL HERNIA REPAIR WITH MESH;  Surgeon: Felix Akins MD;  [facility];  Service: Bariatrics    NECK SURGERY      HI TENDON SHEATH INCISION Right 01/16/2023    Procedure: THUMB TRIGGER FINGER RELEASE;  Surgeon: Gail Wylie MD;  [facility]: [facility];  Service: Orthopedics    SACROILIAC JOINT FUSION Left     SPINAL FUSION      C4 and C5    ULNAR NERVE REPAIR Bilateral     UPPER GASTROINTESTINAL ENDOSCOPY       Social History   Social History     Substance and Sexual Activity   Alcohol Use Yes     "Alcohol/week: 2.0 standard drinks of alcohol    Types: 2 Glasses of wine per week    Comment: Occasionally     Social History     Substance and Sexual Activity   Drug Use Not Currently    Types: Marijuana    Comment: medicinal over 1 yr     Social History     Tobacco Use   Smoking Status Never   Smokeless Tobacco Never   Tobacco Comments    Never smoked.     Family History: non-contributory    Meds/Allergies   all medications and allergies reviewed  Allergies   Allergen Reactions    Hydrocodone-Acetaminophen GI Intolerance     gi upset -pt okay if takes  zofran      Codeine GI Intolerance     GI issues    Mexiletine Rash    Oxycodone-Acetaminophen GI Intolerance     GI issues    Penicillins Other (See Comments) and GI Intolerance     GI issues,vaginitis    Pollen Extract-Tree Extract [Pollen Extract] Nasal Congestion       Objective       Current Vitals:   Blood Pressure: 126/84 (02/07/24 1121)  Pulse: 70 (02/07/24 1121)  Temperature: (!) 97 °F (36.1 °C) (02/07/24 1121)  Temp Source: Tympanic (02/07/24 1121)  Height: 5' 2.7\" (159.3 cm) (02/07/24 1121)  Weight - Scale: 95 kg (209 lb 8 oz) (02/07/24 1121)      Invasive Devices       None                   Physical Exam  Constitutional:       Appearance: Normal appearance.   HENT:      Head: Normocephalic.   Cardiovascular:      Rate and Rhythm: Normal rate.   Pulmonary:      Effort: Pulmonary effort is normal.   Abdominal:      Palpations: Abdomen is soft.      Comments: Incisions clean dry and intact   Neurological:      General: No focal deficit present.      Mental Status: She is alert.             Assessment/PLAN:    48 y.o. female status post robotic hiatal hernia repair and sleeve conversion to RNYGB done on 1/30 by Dr. Osmel Akins, doing well post op. No major issues and healing well.       Increase physical activity slowly as tolerated and instructed.  Advance diet as instructed by our dietitians today and as indicated in the binder.  Continue PPI    Follow up in " one month as scheduled.          Stevie Lynch MD  Bariatric Surgery   2/7/2024  11:25 AM      .

## 2024-02-07 NOTE — PROGRESS NOTES
Weight Management Nutrition Class [employer] Virtual Visit   Patient's name and  were verified during visit. Pt present in a state that I hold active licensure.   Provider explained that [employer] is a HIPPA compliant platform and to further protect their confidentiality the provider was alone and the office door was closed. Patient consented to the virtual video visit Patient consented to the [employer] visit.  This visit is free.    Diagnosis: Morbid Obesity    Bariatric Surgeon: Dr. Felix Akins    Surgery: Gastric Bypass Laparoscopic    Class: first post op note    Topics discussed today include:     fluid goals post op, protein goals post op, constipation, chew food well, exercise, avoidance of alcohol, PPI use, diet progression, hypoglycemia, dumping syndrome, protein supplems, vitamin/mineral supplements, calcium supplements, and iron supplements    Patient was able to verbalize basic diet (protein, fluid, vitamin and mineral) recommendations and possible nutrition-related complications. Yes        Stable

## 2024-02-08 ENCOUNTER — CLINICAL SUPPORT (OUTPATIENT)
Dept: BARIATRICS | Facility: CLINIC | Age: 49
End: 2024-02-08

## 2024-02-08 DIAGNOSIS — E66.2 CLASS 2 OBESITY WITH ALVEOLAR HYPOVENTILATION, SERIOUS COMORBIDITY, AND BODY MASS INDEX (BMI) OF 38.0 TO 38.9 IN ADULT: ICD-10-CM

## 2024-02-08 DIAGNOSIS — E66.01 OBESITY, MORBID, BMI 40.0-49.9 (HCC): Primary | ICD-10-CM

## 2024-02-08 DIAGNOSIS — Z98.84 BARIATRIC SURGERY STATUS: ICD-10-CM

## 2024-02-08 PROCEDURE — RECHECK: Performed by: DIETITIAN, REGISTERED

## 2024-02-13 ENCOUNTER — TELEMEDICINE (OUTPATIENT)
Dept: INTERNAL MEDICINE CLINIC | Facility: CLINIC | Age: 49
End: 2024-02-13

## 2024-02-13 DIAGNOSIS — Z98.84 HISTORY OF ROUX-EN-Y GASTRIC BYPASS: Primary | ICD-10-CM

## 2024-02-13 DIAGNOSIS — Z98.890 HISTORY OF REPAIR OF HIATAL HERNIA: ICD-10-CM

## 2024-02-13 DIAGNOSIS — Z87.19 HISTORY OF REPAIR OF HIATAL HERNIA: ICD-10-CM

## 2024-02-13 PROCEDURE — 99422 OL DIG E/M SVC 11-20 MIN: CPT | Performed by: STUDENT IN AN ORGANIZED HEALTH CARE EDUCATION/TRAINING PROGRAM

## 2024-02-13 NOTE — PROGRESS NOTES
Virtual Brief Visit    This Visit is being completed by telephone. The Patient is located at Home and in the following state in which I hold an active license [place]    The patient was identified by name and date of birth. Jenny Olvera was informed that this is a telemedicine visit and that the visit is being conducted through the [employer] Teams platform. She agrees to proceed.  My office door was closed. No one else was in the room.  She acknowledged consent and understanding of privacy and security of the video platform. The patient has agreed to participate and understands they can discontinue the visit at any time.    Patient is aware this is a billable service.     Jenny Olvera is a 48 y.o. patient with recent Isela-en-Y bypass surgery and hiatal hernia repair. Patient met with Bariatrics last week.     Doing well overall. Currently on soft food diet, has not been able to tolerate solid foods yet. Is taking vitamin supplementation. Having trouble with chewable tablet so is trying bariatric patches but will switch to capsule when tolerate. Scheduled for B12 injection tomorrow. Denies fevers, chills, nausea, vomiting, diarrhea, shortness of breath, lightheadedness, dizziness, leg swelling. Denies any symptoms of heartburn since hiatal hernia repair.     Assessment/Plan:    Problem List Items Addressed This Visit    None  Visit Diagnoses       History of Isela-en-Y gastric bypass    -  Primary    History of repair of hiatal hernia              History of Isela-en-Y gastric bypass  Discussed importance of vitamin supplementation. Advancing diet as tolerated. Taking bariatric multivitamin and calcium supplements. Per patient, no drainage, swelling, redness around incision sites. Pain is present but tolerable. Getting B12 injection tomorrow and has follow up with Bariatrics in March.      History of repair of hiatal hernia  Improved symptoms    Recent Visits  No visits were found meeting these conditions.  Showing  recent visits within past 7 days and meeting all other requirements  Today's Visits  Date Type Provider Dept   02/13/24 Telemedicine Annette Griffin DO [facility]   Showing today's visits and meeting all other requirements  Future Appointments  No visits were found meeting these conditions.  Showing future appointments within next 150 days and meeting all other requirements         Visit Time  Total Visit Duration: 18

## 2024-02-13 NOTE — PROGRESS NOTES
[facility]  INTERNAL MEDICINE OFFICE VISIT     PATIENT INFORMATION     Jenny Olvera   48 y.o. female   MRN: [id number]    ASSESSMENT/PLAN     Diagnoses and all orders for this visit:    History of Isela-en-Y gastric bypass  Discussed importance of vitamin supplementation. Advancing diet as tolerated. Taking bariatric multivitamin and calcium supplements. Per patient, no drainage, swelling, redness around incision sites. Pain is present but tolerable. Getting B12 injection tomorrow and has follow up with Bariatrics in March.     History of repair of hiatal hernia    Schedule a follow-up appointment in 2 months for MAWV.    HEALTH MAINTENANCE     Immunization History   Administered Date(s) Administered    COVID-19 MODERNA VACC 0.5 ML IM 12/10/2021    COVID-19 PFIZER VACCINE 0.3 ML IM 04/07/2021, 05/01/2021    Influenza Quadrivalent Preservative Free 3 years and older IM 10/28/2020    Influenza, injectable, quadrivalent, preservative free 0.5 mL 01/10/2019, 10/28/2020, 01/30/2024    Influenza, recombinant, quadrivalent,injectable, preservative free 10/06/2021, 02/24/2023    Pneumococcal Conjugate Vaccine 20-valent (Pcv20), Polysace 02/24/2023    Pneumococcal Polysaccharide PPV23 05/28/2020    Tdap 04/09/2019     CHIEF COMPLAINT     Chief Complaint   Patient presents with    Hospital Follow-up     S/p Isela en Y bypass and hiatal hernia repair      HISTORY OF PRESENT ILLNESS     Jenny Olvera is a 48 y.o. patient who presents to the clinic for follow up after recent Isela-en-Y bypass surgery and hiatal hernia repair. Patient met with Bariatrics last week.    Doing well overall. Currently on soft food diet, has not been able to tolerate solid foods yet. Is taking vitamin supplementation. Having trouble with chewable tablet so is trying bariatric patches but will switch to capsule when tolerate. Scheduled for B12 injection tomorrow. Denies fevers, chills, nausea, vomiting, diarrhea, shortness  of breath, lightheadedness, dizziness, leg swelling. Denies any symptoms of heartburn since hiatal hernia repair.     REVIEW OF SYSTEMS     Review of Systems   All other systems reviewed and are negative.    OBJECTIVE   There were no vitals filed for this visit.  Physical Exam  Vitals reviewed: Telemedicine visit, no vitals.       CURRENT MEDICATIONS     Current Outpatient Medications:     albuterol (PROVENTIL HFA,VENTOLIN HFA) 90 mcg/act inhaler, Inhale 2 puffs every 6 (six) hours as needed for wheezing or shortness of breath, Disp: 8 g, Rfl: 2    Botox 200 units SOLR, Every three months for migraines, Disp: , Rfl:     buPROPion (Wellbutrin XL) 150 mg 24 hr tablet, Take 1 tablet (150 mg total) by mouth every morning, Disp: 90 tablet, Rfl: 0    cholecalciferol (VITAMIN D3) 1,000 units tablet, Take 1 capsule by mouth once a week 3000 units daily, Disp: , Rfl:     cholestyramine (QUESTRAN) 4 GM/DOSE powder, Take 1 packet (4 g total) by mouth 2 (two) times a day with meals (Patient taking differently: Take 4 g by mouth 2 (two) times a day as needed), Disp: 378 g, Rfl: 2    dicyclomine (BENTYL) 20 mg tablet, Take 1 tablet (20 mg total) by mouth 3 (three) times a day as needed (migraine/cramps), Disp: 60 tablet, Rfl: 0    famotidine (PEPCID) 40 MG tablet, Take 1 tablet (40 mg total) by mouth daily at bedtime, Disp: 30 tablet, Rfl: 3    fluticasone (FLONASE) 50 mcg/act nasal spray, 1 spray in each nostril once daily, Disp: 30 mL, Rfl: 0    lasmiditan succinate (Reyvow) 50 mg tablet, One tab qhs at migraine onset.  Caution sedation.  Max 1 tab/24 hours., Disp: 8 tablet, Rfl: 2    meclizine (ANTIVERT) 12.5 MG tablet, Take 1 tablet (12.5 mg total) by mouth 3 (three) times a day as needed for dizziness or nausea, Disp: 30 tablet, Rfl: 3    melatonin 3 mg, , Disp: , Rfl:     omeprazole (PriLOSEC) 20 mg delayed release capsule, Take 1 capsule (20 mg total) by mouth daily, Disp: 90 capsule, Rfl: 1    ondansetron (ZOFRAN) 4 mg  "tablet, Take 1 tablet (4 mg total) by mouth every 12 (twelve) hours as needed for nausea, Disp: 20 tablet, Rfl: 1    ondansetron (ZOFRAN) 4 mg tablet, Take 1 tablet (4 mg total) by mouth every 8 (eight) hours as needed for nausea or vomiting, Disp: 20 tablet, Rfl: 0    oxyCODONE (Roxicodone) 5 immediate release tablet, Take 1 tablet (5 mg total) by mouth every 6 (six) hours as needed for moderate pain for up to 10 doses Take with zofran if this medication makes you nauseous. Max Daily Amount: 20 mg (Patient not taking: Reported on 2/7/2024), Disp: 10 tablet, Rfl: 0    pantoprazole (PROTONIX) 40 mg tablet, Take 1 tablet (40 mg total) by mouth 2 (two) times a day (Patient not taking: Reported on 2/7/2024), Disp: 60 tablet, Rfl: 3    polyethylene glycol (GLYCOLAX) 17 GM/SCOOP powder, Take 17 g by mouth daily (Patient not taking: Reported on 2/7/2024), Disp: 850 g, Rfl: 2    prochlorperazine (COMPAZINE) 10 mg tablet, Take 1 tablet (10 mg total) by mouth every 6 (six) hours as needed for nausea or vomiting, Disp: 30 tablet, Rfl: 0    Syringe/Needle, Disp, (SYRINGE 3CC/66AA5-8/2\") 25G X 1-1/2\" 3 ML MISC, Use for toradol IM injections. (Patient not taking: Reported on 2/7/2024), Disp: 10 each, Rfl: 0    tiZANidine (ZANAFLEX) 2 mg tablet, Take 1 tablet (2 mg total) by mouth every 8 (eight) hours as needed for muscle spasms, Disp: 90 tablet, Rfl: 1    Trudhesa 0.725 MG/ACT AERS, , Disp: , Rfl:     venlafaxine (EFFEXOR-XR) 37.5 mg 24 hr capsule, One capsule q.a.m. With food, Disp: 90 capsule, Rfl: 0    Current Facility-Administered Medications:     cyanocobalamin injection 250 mcg, 250 mcg, Intramuscular, Q30 Days, CATE Garrison, 250 mcg at 01/18/24 1143    PAST MEDICAL & SURGICAL HISTORY     Past Medical History:   Diagnosis Date    Anxiety     Asthma     Claustrophobia     Cluster headache     CTS (carpal tunnel syndrome)     Depression     Fibromyalgia     primary; last assessed 11/27/17    GERD (gastroesophageal " reflux disease)     GI problem     Headache, tension-type     Irritable bowel syndrome     Joint pain     Kidney stone     Lung nodule     last assessed 10/9/15    Migraine     Muscle pain     Peripheral neuropathy     PONV (postoperative nausea and vomiting)     Sjogren's syndrome (HCC)      Past Surgical History:   Procedure Laterality Date    BACK SURGERY      BARIATRIC SURGERY  8/9/2022    CARPAL TUNNEL RELEASE Bilateral     CHOLECYSTECTOMY      CYSTOSCOPY      GALLBLADDER SURGERY      GASTRECTOMY  08/09/2022    Sleeve    GASTRIC BYPASS LAPAROSCOPIC N/A 1/30/2024    Procedure: REVISION CONVERSION TO R-N-Y GASTRIC BYPASS W/ ROBOTICS AND INTRAOPERATIVE EGD; PARAESOPHAGEAL HERNIA REPAIR WITH MESH;  Surgeon: Felix Akins MD;  [facility];  Service: Bariatrics    NECK SURGERY      OK TENDON SHEATH INCISION Right 01/16/2023    Procedure: THUMB TRIGGER FINGER RELEASE;  Surgeon: Gail Wylie MD;  [facility];  Service: Orthopedics    SACROILIAC JOINT FUSION Left     SPINAL FUSION      C4 and C5    ULNAR NERVE REPAIR Bilateral     UPPER GASTROINTESTINAL ENDOSCOPY       SOCIAL & FAMILY HISTORY     Social History     Socioeconomic History    Marital status:      Spouse name: Not on file    Number of children: Not on file    Years of education: Not on file    Highest education level: Not on file   Occupational History    Not on file   Tobacco Use    Smoking status: Never    Smokeless tobacco: Never    Tobacco comments:     Never smoked.   Vaping Use    Vaping status: Never Used   Substance and Sexual Activity    Alcohol use: Yes     Alcohol/week: 2.0 standard drinks of alcohol     Types: 2 Glasses of wine per week     Comment: Occasionally    Drug use: Not Currently     Types: Marijuana     Comment: medicinal over 1 yr    Sexual activity: Yes     Partners: Male     Birth control/protection: Abstinence, Condom Male, I.U.D.   Other Topics Concern    Not on file   Social History  Narrative    Daily cola, tea    No preference on religous beliefs     Social Determinants of Health     Financial Resource Strain: Low Risk  (1/12/2024)    Overall Financial Resource Strain (CARDIA)     Difficulty of Paying Living Expenses: Not very hard   Food Insecurity: No Food Insecurity (1/12/2024)    Hunger Vital Sign     Worried About Running Out of Food in the Last Year: Never true     Ran Out of Food in the Last Year: Never true   Transportation Needs: No Transportation Needs (1/12/2024)    PRAPARE - Transportation     Lack of Transportation (Medical): No     Lack of Transportation (Non-Medical): No   Physical Activity: Inactive (9/8/2021)    Exercise Vital Sign     Days of Exercise per Week: 0 days     Minutes of Exercise per Session: 0 min   Stress: Stress Concern Present (9/8/2021)    [employer] - Occupational Stress Questionnaire     Feeling of Stress : Rather much   Social Connections: Socially Isolated (9/8/2021)    Social Connection and Isolation Panel [NHANES]     Frequency of Communication with Friends and Family: Three times a week     Frequency of Social Gatherings with Friends and Family: More than three times a week     Attends Temple Services: Never     Active Member of Clubs or Organizations: No     Attends Club or Organization Meetings: Never     Marital Status:    Intimate Partner Violence: Not At Risk (9/8/2021)    Humiliation, Afraid, Rape, and Kick questionnaire     Fear of Current or Ex-Partner: No     Emotionally Abused: No     Physically Abused: No     Sexually Abused: No   Housing Stability: Low Risk  (9/8/2021)    Housing Stability Vital Sign     Unable to Pay for Housing in the Last Year: No     Number of Places Lived in the Last Year: 1     Unstable Housing in the Last Year: No     Social History     Substance and Sexual Activity   Alcohol Use Yes    Alcohol/week: 2.0 standard drinks of alcohol    Types: 2 Glasses of wine per week     Comment: Occasionally       Social History     Substance and Sexual Activity   Drug Use Not Currently    Types: Marijuana    Comment: medicinal over 1 yr     Social History     Tobacco Use   Smoking Status Never   Smokeless Tobacco Never   Tobacco Comments    Never smoked.     Family History   Problem Relation Age of Onset    Diabetes Mother     Fibromyalgia Mother     Ovarian cancer Mother 33    Hypertension Mother     Thyroid disease Mother     Migraines Mother     Neuropathy Mother     Cancer Mother         has been removed    No Known Problems Father     Throat cancer Maternal Grandmother     No Known Problems Maternal Grandfather     No Known Problems Paternal Grandmother     No Known Problems Paternal Grandfather     No Known Problems Daughter     Breast cancer Maternal Aunt     No Known Problems Maternal Aunt     No Known Problems Maternal Aunt     No Known Problems Paternal Aunt     Colon cancer Cousin     Heart disease Cousin     Lupus Cousin     Arthritis Family     Heart disease Family         cardiac disorder    Neuropathy Family     Lupus Family         systemic erythematosus    No Known Problems Half-Sister     No Known Problems Half-Brother     No Known Problems Half-Brother     No Known Problems Half-Brother     Asthma Brother     Stroke Neg Hx          ==  Ali Elias, DO  [facility] Internal Medicine Residency, PGY-III    [address], [address]  [place] [address]  Office: [phone]  Fax: [phone]     ====================================  PLEASE NOTE:  This encounter was completed utilizing the Dragon [employer] One Voice Recognition Software. Grammatical errors, random word insertions, pronoun errors and incomplete sentences are occasional consequences of the system due to software limitations, ambient noise and hardware issues.These may be missed by proof reading prior to affixing electronic signature. Any questions or concerns about the  content, text or information contained within the body of this dictation should be directly addressed to the physician for clarification. Please do not hesitate to call me directly if you have any any questions or concerns.

## 2024-02-14 ENCOUNTER — OFFICE VISIT (OUTPATIENT)
Dept: BARIATRICS | Facility: CLINIC | Age: 49
End: 2024-02-14
Payer: COMMERCIAL

## 2024-02-14 DIAGNOSIS — E53.8 VITAMIN B12 DEFICIENCY: Primary | ICD-10-CM

## 2024-02-14 PROCEDURE — 96372 THER/PROPH/DIAG INJ SC/IM: CPT | Performed by: NURSE PRACTITIONER

## 2024-02-14 PROCEDURE — RECHECK

## 2024-02-14 NOTE — PROGRESS NOTES
Left deltoid Pt tolerated well , no issues noted          Vitamin B12 deficiency     Vitamin D deficiency..

## 2024-02-14 NOTE — TELEPHONE ENCOUNTER
Continue current fluoxetine dose 20 mg daily.    We will plan to recheck cholesterol level on the current supplement you are using with your annual exam in early September.   Pt called to check status os STD forms  Made her aware that these were completed and we were awaiting signature  She would like a call once these are signed and faxed  I spoke to [facility] and she will check with [address] to see if this was signed yet

## 2024-02-23 ENCOUNTER — PROCEDURE VISIT (OUTPATIENT)
Dept: NEUROLOGY | Facility: CLINIC | Age: 49
End: 2024-02-23
Payer: COMMERCIAL

## 2024-02-23 VITALS — SYSTOLIC BLOOD PRESSURE: 129 MMHG | DIASTOLIC BLOOD PRESSURE: 93 MMHG | TEMPERATURE: 98 F | HEART RATE: 78 BPM

## 2024-02-23 DIAGNOSIS — G43.709 CHRONIC MIGRAINE WITHOUT AURA WITHOUT STATUS MIGRAINOSUS, NOT INTRACTABLE: Primary | ICD-10-CM

## 2024-02-23 PROCEDURE — 64615 CHEMODENERV MUSC MIGRAINE: CPT | Performed by: PHYSICIAN ASSISTANT

## 2024-02-23 RX ORDER — BUTALBITAL, ACETAMINOPHEN AND CAFFEINE 50; 325; 40 MG/1; MG/1; MG/1
TABLET ORAL
Qty: 10 TABLET | Refills: 0 | Status: SHIPPED | OUTPATIENT
Start: 2024-02-23

## 2024-02-23 NOTE — PROGRESS NOTES
Universal Protocol   Consent: Verbal consent obtained. Written consent obtained.  Risks and benefits: risks, benefits and alternatives were discussed  Consent given by: patient  Patient understanding: patient states understanding of the procedure being performed  Patient consent: the patient's understanding of the procedure matches consent given  Procedure consent: procedure consent matches procedure scheduled      Chemodenervation     Date/Time  2/23/2024 1:30 PM     Performed by  Linda Ward PA-C   Authorized by  Linda Ward PA-C     Pre-procedure details      Prepped With: Alcohol     Procedure details      Position:  Upright   Botox      Botox Type:  Type A    Brand:  Botox    mL's of Botulinum Toxin:  200    Final Concentration per CC:  100 units    Needle Gauge:  30 G 2.5 inch   Procedures      Botox Procedures: chronic headache      Indications: migraines     Injection Location      Head / Face:  L superior trapezius, R superior trapezius, L superior cervical paraspinal, R superior cervical paraspinal, L , R , procerus, L temporalis, R temporalis, R frontalis, L frontalis, R medial occipitalis and L medial occipitalis    L  injection amount:  5 unit(s)    R  injection amount:  5 unit(s)    L lateral frontalis:  5 unit(s)    R lateral frontalis:  5 unit(s)    L medial frontalis:  5 unit(s)    R medial frontalis:  5 unit(s)    L temporalis injection amount:  20 unit(s)    R temporalis injection amount:  20 unit(s)    Procerus injection amount:  5 unit(s)    L medial occipitalis injection amount:  15 unit(s)    R medial occipitalis injection amount:  15 unit(s)    L superior cervical paraspinal injection amount:  10 unit(s)    R superior cervical paraspinal injection amount:  10 unit(s)    L superior trapezius injection amount:  15 unit(s)    R superior trapezius injection amount:  15 unit(s)   Total Units      Total units used:  200    Total units discarded:  0    Post-procedure details      Chemodenervation:  Chronic migraine    Facial Nerve Location::  Bilateral facial nerve    Patient tolerance of procedure:  Tolerated well, no immediate complications   Comments       Extra units medically necessary:  - 10 between both upper traps  - 15 between both middle traps  - 10 between bilateral occipitalis muscles  - 10 between both anterior temporalis muscles       Blood pressure 129/93, pulse 78, temperature 98 °F (36.7 °C), temperature source Temporal.    Pt states her reflux is significantly improved after hiatal hernia surgery and gastric bypass at the end of January.  GI tells her to avoid NSAIDs and toradol.  Has reyvow, compazine and trudhesa.  Try fioricet for now instead of toradol, s/e reviewed.  Hold vyepti for now.

## 2024-02-23 NOTE — TELEPHONE ENCOUNTER
I spoke with the patient today at her appointment.  I was able to find studies suggesting the effectiveness of combined Botox with Vyepti, however the patient is going to hold off on Vyepti at this time.  If she notices more migraine headaches in the absence of Vyepti she will call me and let me know, and at that point we will submit some of the studies.

## 2024-03-04 ENCOUNTER — TELEPHONE (OUTPATIENT)
Dept: BARIATRICS | Facility: CLINIC | Age: 49
End: 2024-03-04

## 2024-03-04 NOTE — TELEPHONE ENCOUNTER
Called pt to f/u via [employer] message, pt stated she she has increased her diet to soft, she has not be able to tolerate since she at a [employer] chicken wrap sandwich, every time she eats shortly after she'll vomit, pt able to tolerates liquids, spoke with provider she advised for pt to go back on a clear diet for 1-2 days slowly increase diet back to soft. Informed pt if still not feeling any better give the office a call back.

## 2024-03-04 NOTE — TELEPHONE ENCOUNTER
----- Message from Jenny Olvera sent at 3/4/2024 11:04 AM EST -----  Regarding: Eating issues.  Contact: [phone]  Hi I'm writing because I'm having some issues with eating.  Almost every time I try to eat something. I notice it gets stuck in my chest which causes very bad pain and almost always ends in me vomiting what I'm trying to eat. I feel I'm not getting the proper food intake because of this. I'm not sure what to do.   I'm currently in the soft food stage, just for reference.   I was trying to deal with it but I'm not sure if it's something to be concerned about or if it's expected since I'm roughly 1 month post op. Any suggestions is appreciated.   I appreciate your time. Thank you very much!

## 2024-03-13 ENCOUNTER — TELEPHONE (OUTPATIENT)
Age: 49
End: 2024-03-13

## 2024-03-13 NOTE — TELEPHONE ENCOUNTER
Reached out to Pt re: message. Left VM explaining to Pt most of her questions should be answered during today's 5 week global class at 3:00. Requested Pt contact office with further questions or concerns.

## 2024-03-13 NOTE — TELEPHONE ENCOUNTER
Patient calling in and asking when she can start going to the gym and also when she can start taking pills whole and not crushing them

## 2024-03-20 ENCOUNTER — CLINICAL SUPPORT (OUTPATIENT)
Dept: BARIATRICS | Facility: CLINIC | Age: 49
End: 2024-03-20

## 2024-03-20 DIAGNOSIS — Z98.84 BARIATRIC SURGERY STATUS: ICD-10-CM

## 2024-03-20 DIAGNOSIS — E66.2 CLASS 2 OBESITY WITH ALVEOLAR HYPOVENTILATION, SERIOUS COMORBIDITY, AND BODY MASS INDEX (BMI) OF 38.0 TO 38.9 IN ADULT (HCC): ICD-10-CM

## 2024-03-20 DIAGNOSIS — E66.01 OBESITY, MORBID, BMI 40.0-49.9 (HCC): Primary | ICD-10-CM

## 2024-03-20 PROCEDURE — RECHECK: Performed by: DIETITIAN, REGISTERED

## 2024-03-20 NOTE — PROGRESS NOTES
Weight Management Nutrition Class     Diagnosis: Morbid Obesity    Bariatric Surgeon: Dr. Felix Akins    Surgery: Gastric Bypass Laparoscopic    Class: 5 week post op     Topics discussed today include:     fluid goals post op, protein goals post op, constipation, chew food well, exercise, avoidance of alcohol, PPI use, diet progression, hypoglycemia, dumping syndrome, protein supplems, vitamin/mineral supplements, calcium supplements, and iron supplements    Patient was able to verbalize basic diet (protein, fluid, vitamin and mineral) recommendations and possible nutrition-related complications. Yes

## 2024-03-25 ENCOUNTER — TELEPHONE (OUTPATIENT)
Dept: INTERNAL MEDICINE CLINIC | Facility: CLINIC | Age: 49
End: 2024-03-25

## 2024-03-25 NOTE — TELEPHONE ENCOUNTER
Received call from patient. Patient requesting script for Aqua therapy. Patient stated Dr. Cotto back in 12/23 recommended Aqua therapy. Patient advised to contact his office directly to assist with script for Aqua therapy.     All questions/concerns answered at this time.

## 2024-03-25 NOTE — TELEPHONE ENCOUNTER
Patient contacted Rheumatology office and stating that PCP needs to write script for PT/Aqua therapy.     Nurse contacted [facility] and clarified they need a script stating PT/Aqua therapy and diagnosis.     Fax number provided .     Faxed to  at

## 2024-03-29 ENCOUNTER — CONSULT (OUTPATIENT)
Dept: MULTI SPECIALTY CLINIC | Facility: CLINIC | Age: 49
End: 2024-03-29

## 2024-03-29 VITALS
WEIGHT: 202.6 LBS | DIASTOLIC BLOOD PRESSURE: 82 MMHG | TEMPERATURE: 97.5 F | BODY MASS INDEX: 36.23 KG/M2 | SYSTOLIC BLOOD PRESSURE: 123 MMHG | OXYGEN SATURATION: 100 % | HEART RATE: 84 BPM

## 2024-03-29 DIAGNOSIS — H93.13 TINNITUS AURIUM, BILATERAL: ICD-10-CM

## 2024-03-29 NOTE — PROGRESS NOTES
[facility]  [facility]  [facility] Otolaryngology      Otolaryngology -- New Patient Visit    Jenny Wong is a 48 y.o. who presents with a chief complaint of tinnitus bilaterally.     HPI:  Jenny wong is a 48 year old that presents to the office with concerns of ringing bilaterally. Whooshing sound. Worse when its quiet. No hx of noise exposure.   No otalgia or otorrhea. Feels like something dripping. No q tips. Hx migraines.       Allergies   Allergen Reactions    Hydrocodone-Acetaminophen GI Intolerance     gi upset -pt okay if takes  zofran      Codeine GI Intolerance     GI issues    Mexiletine Rash    Oxycodone-Acetaminophen GI Intolerance     GI issues    Penicillins Other (See Comments) and GI Intolerance     GI issues,vaginitis    Pollen Extract-Tree Extract [Pollen Extract] Nasal Congestion     Past Medical History:   Diagnosis Date    Anxiety     Asthma     Claustrophobia     Cluster headache     CTS (carpal tunnel syndrome)     Depression     Fibromyalgia     primary; last assessed 11/27/17    GERD (gastroesophageal reflux disease)     GI problem     Headache, tension-type     Irritable bowel syndrome     Joint pain     Kidney stone     Lung nodule     last assessed 10/9/15    Migraine     Muscle pain     Peripheral neuropathy     PONV (postoperative nausea and vomiting)     Sjogren's syndrome (HCC)      Past Surgical History:   Procedure Laterality Date    BACK SURGERY      BARIATRIC SURGERY  8/9/2022    CARPAL TUNNEL RELEASE Bilateral     CHOLECYSTECTOMY      CYSTOSCOPY      GALLBLADDER SURGERY      GASTRECTOMY  08/09/2022    Sleeve    GASTRIC BYPASS LAPAROSCOPIC N/A 1/30/2024    Procedure: REVISION CONVERSION TO R-N-Y GASTRIC BYPASS W/ ROBOTICS AND INTRAOPERATIVE EGD; PARAESOPHAGEAL HERNIA REPAIR WITH MESH;  Surgeon: Felix Akins MD;  Location: [facility];  Service: Bariatrics    NECK SURGERY      DC TENDON SHEATH INCISION Right 01/16/2023    Procedure: THUMB  TRIGGER FINGER RELEASE;  Surgeon: Gail Wylie MD;  Location: [facility];  Service: Orthopedics    SACROILIAC JOINT FUSION Left     SPINAL FUSION      C4 and C5    ULNAR NERVE REPAIR Bilateral     UPPER GASTROINTESTINAL ENDOSCOPY       Family History   Problem Relation Age of Onset    Diabetes Mother     Fibromyalgia Mother     Ovarian cancer Mother 33    Hypertension Mother     Thyroid disease Mother     Migraines Mother     Neuropathy Mother     Cancer Mother         has been removed    No Known Problems Father     Throat cancer Maternal Grandmother     No Known Problems Maternal Grandfather     No Known Problems Paternal Grandmother     No Known Problems Paternal Grandfather     No Known Problems Daughter     Breast cancer Maternal Aunt     No Known Problems Maternal Aunt     No Known Problems Maternal Aunt     No Known Problems Paternal Aunt     Colon cancer Cousin     Heart disease Cousin     Lupus Cousin     Arthritis Family     Heart disease Family         cardiac disorder    Neuropathy Family     Lupus Family         systemic erythematosus    No Known Problems Half-Sister     No Known Problems Half-Brother     No Known Problems Half-Brother     No Known Problems Half-Brother     Asthma Brother     Stroke Neg Hx      Current Outpatient Medications on File Prior to Visit   Medication Sig Dispense Refill    albuterol (PROVENTIL HFA,VENTOLIN HFA) 90 mcg/act inhaler Inhale 2 puffs every 6 (six) hours as needed for wheezing or shortness of breath 8 g 2    Botox 200 units SOLR Every three months for migraines      buPROPion (Wellbutrin XL) 150 mg 24 hr tablet Take 1 tablet (150 mg total) by mouth every morning 90 tablet 0    butalbital-acetaminophen-caffeine (FIORICET,ESGIC) -40 mg per tablet 1 tab q6 hours prn migraine. No more than 2-3 per week. 10 tablet 0    cholecalciferol (VITAMIN D3) 1,000 units tablet Take 1 capsule by mouth once a week 3000 units daily      cholestyramine (QUESTRAN) 4  GM/DOSE powder Take 1 packet (4 g total) by mouth 2 (two) times a day with meals (Patient taking differently: Take 4 g by mouth 2 (two) times a day as needed) 378 g 2    dicyclomine (BENTYL) 20 mg tablet Take 1 tablet (20 mg total) by mouth 3 (three) times a day as needed (migraine/cramps) 60 tablet 0    famotidine (PEPCID) 40 MG tablet Take 1 tablet (40 mg total) by mouth daily at bedtime 30 tablet 3    fluticasone (FLONASE) 50 mcg/act nasal spray 1 spray in each nostril once daily 30 mL 0    lasmiditan succinate (Reyvow) 50 mg tablet One tab qhs at migraine onset.  Caution sedation.  Max 1 tab/24 hours. 8 tablet 2    meclizine (ANTIVERT) 12.5 MG tablet Take 1 tablet (12.5 mg total) by mouth 3 (three) times a day as needed for dizziness or nausea 30 tablet 3    melatonin 3 mg  (Patient not taking: Reported on 2/7/2024)      omeprazole (PriLOSEC) 20 mg delayed release capsule Take 1 capsule (20 mg total) by mouth daily 90 capsule 1    ondansetron (ZOFRAN) 4 mg tablet Take 1 tablet (4 mg total) by mouth every 12 (twelve) hours as needed for nausea 20 tablet 1    ondansetron (ZOFRAN) 4 mg tablet Take 1 tablet (4 mg total) by mouth every 8 (eight) hours as needed for nausea or vomiting 20 tablet 0    oxyCODONE (Roxicodone) 5 immediate release tablet Take 1 tablet (5 mg total) by mouth every 6 (six) hours as needed for moderate pain for up to 10 doses Take with zofran if this medication makes you nauseous. Max Daily Amount: 20 mg (Patient not taking: Reported on 2/7/2024) 10 tablet 0    pantoprazole (PROTONIX) 40 mg tablet Take 1 tablet (40 mg total) by mouth 2 (two) times a day (Patient not taking: Reported on 2/7/2024) 60 tablet 3    polyethylene glycol (GLYCOLAX) 17 GM/SCOOP powder Take 17 g by mouth daily (Patient not taking: Reported on 2/7/2024) 850 g 2    prochlorperazine (COMPAZINE) 10 mg tablet Take 1 tablet (10 mg total) by mouth every 6 (six) hours as needed for nausea or vomiting 30 tablet 0     "Syringe/Needle, Disp, (SYRINGE 3CC/05UP4-1/2\") 25G X 1-1/2\" 3 ML MISC Use for toradol IM injections. (Patient not taking: Reported on 2/7/2024) 10 each 0    tiZANidine (ZANAFLEX) 2 mg tablet Take 1 tablet (2 mg total) by mouth every 8 (eight) hours as needed for muscle spasms 90 tablet 1    Trudhesa 0.725 MG/ACT AERS       venlafaxine (EFFEXOR-XR) 37.5 mg 24 hr capsule One capsule q.a.m. With food 90 capsule 0     No current facility-administered medications on file prior to visit.           Results reviewed; images from any scan have been personally reviewed:        Physical exam:    /82 (BP Location: Right arm, Patient Position: Sitting, Cuff Size: Large)   Pulse 84   Temp 97.5 °F (36.4 °C) (Temporal)   Wt 91.9 kg (202 lb 9.6 oz)   SpO2 100%   BMI 36.23 kg/m²     Physical Exam  Vitals reviewed.   Constitutional:       General: She is not in acute distress.     Appearance: She is well-developed.   HENT:      Head: Normocephalic and atraumatic.      Right Ear: Tympanic membrane, ear canal and external ear normal. There is no impacted cerumen.      Left Ear: Tympanic membrane, ear canal and external ear normal. There is no impacted cerumen.      Nose: Nose normal. No congestion.      Mouth/Throat:      Mouth: Mucous membranes are moist.      Pharynx: Oropharynx is clear. No oropharyngeal exudate.   Eyes:      General:         Right eye: No discharge.         Left eye: No discharge.      Extraocular Movements: Extraocular movements intact.      Conjunctiva/sclera: Conjunctivae normal.      Pupils: Pupils are equal, round, and reactive to light.   Pulmonary:      Effort: Pulmonary effort is normal. No respiratory distress.      Breath sounds: Normal breath sounds.   Musculoskeletal:      Cervical back: Normal range of motion and neck supple.   Neurological:      Mental Status: She is alert.   Psychiatric:         Mood and Affect: Mood normal.         Procedures      Assessment:   1. Tinnitus aurium, " bilateral  Ambulatory Referral to Otolaryngology    Ambulatory Referral to Audiology          Orders  Orders Placed This Encounter   Procedures    Ambulatory Referral to Audiology     Standing Status:   Future     Standing Expiration Date:   3/29/2025     Referral Priority:   Routine     Referral Type:   Audiology     Referral Reason:   Specialty Services Required     Requested Specialty:   Audiology     Number of Visits Requested:   1     Expiration Date:   3/29/2025         Discussion/Plan:    1. Ears well appearing bilaterally on otoscopic exam. TM's intact. No infection, effusion, perforation, or retraction.  Tinnitus  Decrease caffeine intake.  Avoid tobacco, high doses of aspirin or ibuprofen; they can worsen tinnitus.  Avoid loud sounds.  We explored potential reasons for her tinnitus. Notably, she isn't taking any medications known to have tinnitus as a side effect. We also reviewed various tinnitus management strategies, encompassing observation, bedside sound masking, in-ear sound masking, hearing aids, tinnitus retraining therapy.    Recommend audiogram. Follow up to review.      Dictation software was used to dictate this note. It may contain errors with dictating incorrect words/spelling. Please contact provider directly for any questions.     Thank you for allowing me to participate in the care of your patient.

## 2024-04-01 ENCOUNTER — TELEPHONE (OUTPATIENT)
Dept: INTERNAL MEDICINE CLINIC | Facility: CLINIC | Age: 49
End: 2024-04-01

## 2024-04-01 NOTE — TELEPHONE ENCOUNTER
Received call from Edilson from [facility]. She said patient was in for her eval today.     [facility] is requesting a new referral because they said the one that was sent to them is from Dec. Edilson said that the referral should say referral to Physical therapy aqua therapy.     Please fax referral to [phone]

## 2024-04-02 DIAGNOSIS — M79.7 FIBROMYALGIA: Primary | ICD-10-CM

## 2024-04-03 ENCOUNTER — TELEPHONE (OUTPATIENT)
Dept: INTERNAL MEDICINE CLINIC | Facility: CLINIC | Age: 49
End: 2024-04-03

## 2024-04-03 NOTE — TELEPHONE ENCOUNTER
Folder Color: Red      Name of Form: [facility]     Form to be filled out by: Dr Dickerson     Form to be Faxed: [phone]      Patient made aware of 10 day business policy.

## 2024-04-12 DIAGNOSIS — K58.2 IRRITABLE BOWEL SYNDROME WITH BOTH CONSTIPATION AND DIARRHEA: ICD-10-CM

## 2024-04-12 RX ORDER — DICYCLOMINE HCL 20 MG
20 TABLET ORAL 3 TIMES DAILY PRN
Qty: 60 TABLET | Refills: 0 | Status: SHIPPED | OUTPATIENT
Start: 2024-04-12 | End: 2024-05-12

## 2024-04-17 ENCOUNTER — OFFICE VISIT (OUTPATIENT)
Dept: OBGYN CLINIC | Facility: CLINIC | Age: 49
End: 2024-04-17
Payer: COMMERCIAL

## 2024-04-17 VITALS
BODY MASS INDEX: 35.79 KG/M2 | HEART RATE: 60 BPM | DIASTOLIC BLOOD PRESSURE: 72 MMHG | HEIGHT: 63 IN | SYSTOLIC BLOOD PRESSURE: 110 MMHG | WEIGHT: 202 LBS

## 2024-04-17 DIAGNOSIS — M18.12 ARTHRITIS OF CARPOMETACARPAL (CMC) JOINT OF LEFT THUMB: Primary | ICD-10-CM

## 2024-04-17 DIAGNOSIS — M18.11 ARTHRITIS OF CARPOMETACARPAL (CMC) JOINT OF RIGHT THUMB: ICD-10-CM

## 2024-04-17 PROCEDURE — 99214 OFFICE O/P EST MOD 30 MIN: CPT | Performed by: SURGERY

## 2024-04-17 PROCEDURE — 20600 DRAIN/INJ JOINT/BURSA W/O US: CPT | Performed by: SURGERY

## 2024-04-17 RX ORDER — TRIAMCINOLONE ACETONIDE 40 MG/ML
20 INJECTION, SUSPENSION INTRA-ARTICULAR; INTRAMUSCULAR
Status: COMPLETED | OUTPATIENT
Start: 2024-04-17 | End: 2024-04-17

## 2024-04-17 RX ORDER — BUPIVACAINE HYDROCHLORIDE 2.5 MG/ML
0.5 INJECTION, SOLUTION INFILTRATION; PERINEURAL
Status: COMPLETED | OUTPATIENT
Start: 2024-04-17 | End: 2024-04-17

## 2024-04-17 RX ORDER — LIDOCAINE HYDROCHLORIDE 10 MG/ML
1 INJECTION, SOLUTION INFILTRATION; PERINEURAL
Status: COMPLETED | OUTPATIENT
Start: 2024-04-17 | End: 2024-04-17

## 2024-04-17 RX ADMIN — LIDOCAINE HYDROCHLORIDE 1 ML: 10 INJECTION, SOLUTION INFILTRATION; PERINEURAL at 14:30

## 2024-04-17 RX ADMIN — TRIAMCINOLONE ACETONIDE 20 MG: 40 INJECTION, SUSPENSION INTRA-ARTICULAR; INTRAMUSCULAR at 14:30

## 2024-04-17 RX ADMIN — BUPIVACAINE HYDROCHLORIDE 0.5 ML: 2.5 INJECTION, SOLUTION INFILTRATION; PERINEURAL at 14:30

## 2024-04-17 NOTE — PROGRESS NOTES
ASSESSMENT/PLAN:      48 y.o. female with bilateral thumb CMC arthritis   Due to acute exacerbation of chronic bilateral thumb CMC arthritis, the decision was made to proceed with repeat CSI's. Bilateral thumb CMC CSI's were performed in the office without complication. Post injection protocol/expectations were reviewed. The CMC CSI's may continue to be repeated on a 3 month basis as needed. Follow up in the office on an as needed basis.     The patient verbalized understanding of exam findings and treatment plan. We engaged in the shared decision-making process and treatment options were discussed at length with the patient. Surgical and conservative management discussed today along with risks and benefits.    Diagnoses and all orders for this visit:    Arthritis of carpometacarpal (CMC) joint of left thumb  -     Small joint arthrocentesis: bilateral thumb CMC    Arthritis of carpometacarpal (CMC) joint of right thumb  -     Small joint arthrocentesis: bilateral thumb CMC      Follow Up:  Return if symptoms worsen or fail to improve.      To Do Next Visit:  Re-evaluation of current issue    ____________________________________________________________________________________________________________________________________________      CHIEF COMPLAINT:  Chief Complaint   Patient presents with    Injections     Bilat thumb pain        SUBJECTIVE:  Jenny Olvera is a 48 y.o. year old RHD female who presents to the office for bilateral thumb pain. She was last seen in the office on 12/21/23, at which time she underwent bilateral thumb CMC CSI's. She notes that the CSI's were beneficial for her until a few weeks ago. She notes that the CSI's are beneficial for usually around 3 months. She notes pain to the base of both thumbs, right side is worse then the left. Pain will worsen with pinch,  and opening objects. She is not currently taking anything for pain control.        I have personally reviewed all the relevant  PMH, PSH, SH, FH, Medications and allergies.     PAST MEDICAL HISTORY:  Past Medical History:   Diagnosis Date    Anxiety     Asthma     Claustrophobia     Cluster headache     CTS (carpal tunnel syndrome)     Depression     Fibromyalgia     primary; last assessed 11/27/17    GERD (gastroesophageal reflux disease)     GI problem     Headache, tension-type     Irritable bowel syndrome     Joint pain     Kidney stone     Lung nodule     last assessed 10/9/15    Migraine     Muscle pain     Peripheral neuropathy     PONV (postoperative nausea and vomiting)     Sjogren's syndrome (HCC)        PAST SURGICAL HISTORY:  Past Surgical History:   Procedure Laterality Date    BACK SURGERY      BARIATRIC SURGERY  8/9/2022    CARPAL TUNNEL RELEASE Bilateral     CHOLECYSTECTOMY      CYSTOSCOPY      GALLBLADDER SURGERY      GASTRECTOMY  08/09/2022    Sleeve    GASTRIC BYPASS LAPAROSCOPIC N/A 1/30/2024    Procedure: REVISION CONVERSION TO R-N-Y GASTRIC BYPASS W/ ROBOTICS AND INTRAOPERATIVE EGD; PARAESOPHAGEAL HERNIA REPAIR WITH MESH;  Surgeon: Felix Akins MD;  [facility];  Service: Bariatrics    NECK SURGERY      [place] TENDON SHEATH INCISION Right 01/16/2023    Procedure: THUMB TRIGGER FINGER RELEASE;  Surgeon: Gail Wylie MD;  [facility];  Service: Orthopedics    SACROILIAC JOINT FUSION Left     SPINAL FUSION      C4 and C5    ULNAR NERVE REPAIR Bilateral     UPPER GASTROINTESTINAL ENDOSCOPY         FAMILY HISTORY:  Family History   Problem Relation Age of Onset    Diabetes Mother     Fibromyalgia Mother     Ovarian cancer Mother 33    Hypertension Mother     Thyroid disease Mother     Migraines Mother     Neuropathy Mother     Cancer Mother         has been removed    No Known Problems Father     Throat cancer Maternal Grandmother     No Known Problems Maternal Grandfather     No Known Problems Paternal Grandmother     No Known Problems Paternal Grandfather     No Known Problems Daughter      Breast cancer Maternal Aunt     No Known Problems Maternal Aunt     No Known Problems Maternal Aunt     No Known Problems Paternal Aunt     Colon cancer Cousin     Heart disease Cousin     Lupus Cousin     Arthritis Family     Heart disease Family         cardiac disorder    Neuropathy Family     Lupus Family         systemic erythematosus    No Known Problems Half-Sister     No Known Problems Half-Brother     No Known Problems Half-Brother     No Known Problems Half-Brother     Asthma Brother     Stroke Neg Hx        SOCIAL HISTORY:  Social History     Tobacco Use    Smoking status: Never    Smokeless tobacco: Never    Tobacco comments:     Never smoked.   Vaping Use    Vaping status: Never Used   Substance Use Topics    Alcohol use: Yes     Alcohol/week: 2.0 standard drinks of alcohol     Types: 2 Glasses of wine per week     Comment: Occasionally    Drug use: Not Currently     Types: Marijuana     Comment: medicinal over 1 yr       MEDICATIONS:    Current Outpatient Medications:     albuterol (PROVENTIL HFA,VENTOLIN HFA) 90 mcg/act inhaler, Inhale 2 puffs every 6 (six) hours as needed for wheezing or shortness of breath, Disp: 8 g, Rfl: 2    Botox 200 units SOLR, Every three months for migraines, Disp: , Rfl:     buPROPion (Wellbutrin XL) 150 mg 24 hr tablet, Take 1 tablet (150 mg total) by mouth every morning, Disp: 90 tablet, Rfl: 0    butalbital-acetaminophen-caffeine (FIORICET,ESGIC) -40 mg per tablet, 1 tab q6 hours prn migraine. No more than 2-3 per week., Disp: 10 tablet, Rfl: 0    cholecalciferol (VITAMIN D3) 1,000 units tablet, Take 1 capsule by mouth once a week 3000 units daily, Disp: , Rfl:     cholestyramine (QUESTRAN) 4 GM/DOSE powder, Take 1 packet (4 g total) by mouth 2 (two) times a day with meals (Patient taking differently: Take 4 g by mouth 2 (two) times a day as needed), Disp: 378 g, Rfl: 2    dicyclomine (BENTYL) 20 mg tablet, Take 1 tablet (20 mg total) by mouth 3 (three) times a day  as needed (migraine/cramps), Disp: 60 tablet, Rfl: 0    fluticasone (FLONASE) 50 mcg/act nasal spray, 1 spray in each nostril once daily, Disp: 30 mL, Rfl: 0    lasmiditan succinate (Reyvow) 50 mg tablet, One tab qhs at migraine onset.  Caution sedation.  Max 1 tab/24 hours., Disp: 8 tablet, Rfl: 2    meclizine (ANTIVERT) 12.5 MG tablet, Take 1 tablet (12.5 mg total) by mouth 3 (three) times a day as needed for dizziness or nausea, Disp: 30 tablet, Rfl: 3    ondansetron (ZOFRAN) 4 mg tablet, Take 1 tablet (4 mg total) by mouth every 12 (twelve) hours as needed for nausea, Disp: 20 tablet, Rfl: 1    prochlorperazine (COMPAZINE) 10 mg tablet, Take 1 tablet (10 mg total) by mouth every 6 (six) hours as needed for nausea or vomiting, Disp: 30 tablet, Rfl: 0    tiZANidine (ZANAFLEX) 2 mg tablet, Take 1 tablet (2 mg total) by mouth every 8 (eight) hours as needed for muscle spasms, Disp: 90 tablet, Rfl: 1    Trudhesa 0.725 MG/ACT AERS, , Disp: , Rfl:     venlafaxine (EFFEXOR-XR) 37.5 mg 24 hr capsule, One capsule q.a.m. With food, Disp: 90 capsule, Rfl: 0    famotidine (PEPCID) 40 MG tablet, Take 1 tablet (40 mg total) by mouth daily at bedtime (Patient not taking: Reported on 4/17/2024), Disp: 30 tablet, Rfl: 3    melatonin 3 mg, , Disp: , Rfl:     ALLERGIES:  Allergies   Allergen Reactions    Hydrocodone-Acetaminophen GI Intolerance     gi upset -pt okay if takes  zofran      Codeine GI Intolerance     GI issues    Mexiletine Rash    Oxycodone-Acetaminophen GI Intolerance     GI issues    Penicillins Other (See Comments) and GI Intolerance     GI issues,vaginitis    Pollen Extract-Tree Extract [Pollen Extract] Nasal Congestion       REVIEW OF SYSTEMS:  Review of Systems   Constitutional:  Negative for chills, fever and unexpected weight change.   HENT:  Negative for hearing loss, nosebleeds and sore throat.    Eyes:  Negative for pain, redness and visual disturbance.   Respiratory:  Negative for cough, shortness of  breath and wheezing.    Cardiovascular:  Negative for chest pain, palpitations and leg swelling.   Gastrointestinal:  Negative for abdominal pain, nausea and vomiting.   Endocrine: Negative for polydipsia and polyuria.   Genitourinary:  Negative for difficulty urinating and hematuria.   Musculoskeletal:  Positive for arthralgias. Negative for joint swelling and myalgias.   Skin:  Negative for rash and wound.   Neurological:  Negative for dizziness, numbness and headaches.   Psychiatric/Behavioral:  Negative for decreased concentration, dysphoric mood and suicidal ideas. The patient is not nervous/anxious.        VITALS:  Vitals:    04/17/24 1414   BP: 110/72   Pulse: 60         _____________________________________________________  PHYSICAL EXAMINATION:  General: well developed and well nourished, alert, oriented times 3, and appears comfortable  Psychiatric: Normal  HEENT: Normocephalic, Atraumatic Trachea Midline, No torticollis  Pulmonary: No audible wheezing or respiratory distress   Cardiovascular: No pitting edema, 2+ radial pulse   Abdominal/GI: abdomen non tender, non distended   Skin: No masses, erythema, lacerations, fluctation, ulcerations  Neurovascular: Sensation Intact to the Median, Ulnar, Radial Nerve, Motor Intact to the Median, Ulnar, Radial Nerve, and Pulses Intact  Musculoskeletal: Normal, except as noted in detailed exam and in HPI.      MUSCULOSKELETAL EXAMINATION:    Bilateral CMC Exam:  No adduction contracture  No hyperextension deformity of MCP joint  Positive localized tenderness over radial and dorsal aspect of thumb (CMC joint)  Grind test is Positive for pain and Negative for crepitus  Metacarpal load shift test positive   No triggering or tenderness over the A1 pulley    ___________________________________________________    STUDIES REVIEWED:  I have personally reviewed AP lateral and oblique radiographs of left hand and right hand  which demonstrate mild cmc arthrosis            LABS  "REVIEWED:    HgA1c:   Lab Results   Component Value Date    HGBA1C 5.7 (H) 06/22/2022     BMP:   Lab Results   Component Value Date    GLUCOSE 88 02/20/2015    CALCIUM 8.3 (L) 01/31/2024     02/20/2015    K 4.0 01/31/2024    CO2 24 01/31/2024     01/31/2024    BUN 9 01/31/2024    CREATININE 0.52 (L) 01/31/2024             PROCEDURES PERFORMED:  Small joint arthrocentesis: bilateral thumb CMC  Universal Protocol:  Consent: Verbal consent obtained. Written consent not obtained.  Risks and benefits: risks, benefits and alternatives were discussed  Consent given by: patient  Time out: Immediately prior to procedure a \"time out\" was called to verify the correct patient, procedure, equipment, support staff and site/side marked as required.  Patient understanding: patient states understanding of the procedure being performed  Site marked: the operative site was marked  Patient identity confirmed: verbally with patient  Supporting Documentation  Indications: pain   Procedure Details  Location: thumb - bilateral thumb CMC  Preparation: Patient was prepped and draped in the usual sterile fashion  Needle size: 25 G  Ultrasound guidance: no    Medications (Right): 0.5 mL bupivacaine 0.25 %; 1 mL lidocaine 1 %; 20 mg triamcinolone acetonide 40 mg/mLMedications (Left): 0.5 mL bupivacaine 0.25 %; 1 mL lidocaine 1 %; 20 mg triamcinolone acetonide 40 mg/mL   Patient tolerance: patient tolerated the procedure well with no immediate complications  Dressing:  Sterile dressing applied         _____________________________________________________      Scribe Attestation      I,:  Radha Cleaning am acting as a scribe while in the presence of the attending physician.:       I,:  Gail Wylie MD personally performed the services described in this documentation    as scribed in my presence.:                 "

## 2024-04-19 ENCOUNTER — OFFICE VISIT (OUTPATIENT)
Dept: AUDIOLOGY | Age: 49
End: 2024-04-19
Payer: COMMERCIAL

## 2024-04-19 DIAGNOSIS — H93.13 TINNITUS AURIUM, BILATERAL: ICD-10-CM

## 2024-04-19 DIAGNOSIS — H90.41 SENSORINEURAL HEARING LOSS (SNHL) OF RIGHT EAR WITH UNRESTRICTED HEARING OF LEFT EAR: Primary | ICD-10-CM

## 2024-04-19 PROCEDURE — 92567 TYMPANOMETRY: CPT

## 2024-04-19 PROCEDURE — 92557 COMPREHENSIVE HEARING TEST: CPT

## 2024-04-19 NOTE — PROGRESS NOTES
Diagnostic Hearing Evaluation    Name:  Jenny Olvera  :  1975  Age:  48 y.o.   MRN:  [id number]  Date of Evaluation: 24     HISTORY:     Reason for visit: Tinnitus    Jenny Olvera is being seen today at the request of Dr. Garcia for an initial  evaluation of hearing. Patient reports constant bilateral tinnitus. She also notes a whooshing sound that she called pulsatile tinnitus. She also notes dizziness with an unknown etiology.  Patient denies otalgia, otorrhea, fullness, noise exposure, and family history of hearing loss.     EVALUATION:    Otoscopic Evaluation:   Right Ear: Unremarkable, canal clear   Left Ear: Unremarkable, canal clear    Tympanometry:   Right Ear: Type A; normal middle ear pressure and static compliance    Left Ear: Type A; normal middle ear pressure and static compliance     Speech Audiometry:  Speech Reception (SRT)    Right Ear: 15 dB HL    Left Ear: 5 dB HL    Word Recognition Scores (WRS):  Right Ear: excellent (100 % correct)     Left Ear: excellent (100 % correct)    Stimuli: [id number]    Pure Tone Audiometry:  Conventional pure tone audiometry from 250 - 8000 Hz  was obtained with good reliability and revealed the following:     Right Ear: Normal sloping to mild likely sensorineural hearing loss at 3000 Hz rising to normal hearing    Left Ear: Normal hearing sensitivity     **3000 Hz was checked with headphones and inserts    *see attached audiogram    RECOMMENDATIONS:  Consult ENT, Return to [facility]. for F/U, and Copy to Patient/Caregiver    PATIENT EDUCATION:   The results of today's results and recommendations were reviewed with the patient and her hearing thresholds were explained at length. Treatment options, including amplification and communication strategies, were discussed as appropriate. The patient voiced understanding of her test results. Questions were addressed and the patient was encouraged to contact our department should concerns arise.      Madeleine HURD  Lucy Birmingham., [facility]  Clinical Audiologist  [facility]  [address] [id number]

## 2024-05-01 ENCOUNTER — OFFICE VISIT (OUTPATIENT)
Dept: BARIATRICS | Facility: CLINIC | Age: 49
End: 2024-05-01

## 2024-05-01 ENCOUNTER — PREP FOR PROCEDURE (OUTPATIENT)
Dept: BARIATRICS | Facility: CLINIC | Age: 49
End: 2024-05-01

## 2024-05-01 VITALS
SYSTOLIC BLOOD PRESSURE: 120 MMHG | HEART RATE: 80 BPM | DIASTOLIC BLOOD PRESSURE: 76 MMHG | BODY MASS INDEX: 35.08 KG/M2 | TEMPERATURE: 97.7 F | HEIGHT: 63 IN | WEIGHT: 198 LBS

## 2024-05-01 DIAGNOSIS — R13.10 DYSPHAGIA: Primary | ICD-10-CM

## 2024-05-01 DIAGNOSIS — Z98.84 BARIATRIC SURGERY STATUS: ICD-10-CM

## 2024-05-01 DIAGNOSIS — Z98.84 BARIATRIC SURGERY STATUS: Primary | ICD-10-CM

## 2024-05-01 PROCEDURE — 99024 POSTOP FOLLOW-UP VISIT: CPT | Performed by: SURGERY

## 2024-05-01 RX ORDER — OMEPRAZOLE 20 MG/1
20 CAPSULE, DELAYED RELEASE ORAL 2 TIMES DAILY
Qty: 90 CAPSULE | Refills: 1 | Status: SHIPPED | OUTPATIENT
Start: 2024-05-01

## 2024-05-01 NOTE — H&P (VIEW-ONLY)
POST OP UP VISIT - BARIATRIC SURGERY  Jenny Olvera 48 y.o. female MRN: [id number]  Unit/Bed#:  Encounter: [id number]      HPI:  Jenny Olvera is a 48 y.o. female status post robotic RNYGB and PEHR with mesh performed by Dr. Osmel Akins on 1/30/2024 returning to office for first post op visit since surgery.    Patient reporting issues with eating and food getting stuck and occasional vomiting. Especially eggs, meats and some vegetables,     She no longer has heartburn symptoms so she stopped her omeprazole.      Review of Systems   Gastrointestinal:  Positive for nausea and vomiting.       Historical Information   Past Medical History:   Diagnosis Date    Anxiety     Asthma     Claustrophobia     Cluster headache     CTS (carpal tunnel syndrome)     Depression     Fibromyalgia     primary; last assessed 11/27/17    GERD (gastroesophageal reflux disease)     GI problem     Headache, tension-type     Irritable bowel syndrome     Joint pain     Kidney stone     Lung nodule     last assessed 10/9/15    Migraine     Muscle pain     Peripheral neuropathy     PONV (postoperative nausea and vomiting)     Sjogren's syndrome (HCC)      Past Surgical History:   Procedure Laterality Date    BACK SURGERY      BARIATRIC SURGERY  8/9/2022    CARPAL TUNNEL RELEASE Bilateral     CHOLECYSTECTOMY      CYSTOSCOPY      GALLBLADDER SURGERY      GASTRECTOMY  08/09/2022    Sleeve    GASTRIC BYPASS LAPAROSCOPIC N/A 1/30/2024    Procedure: REVISION CONVERSION TO R-N-Y GASTRIC BYPASS W/ ROBOTICS AND INTRAOPERATIVE EGD; PARAESOPHAGEAL HERNIA REPAIR WITH MESH;  Surgeon: Felix Akins MD;  [facility];  Service: Bariatrics    NECK SURGERY      [place] TENDON SHEATH INCISION Right 01/16/2023    Procedure: THUMB TRIGGER FINGER RELEASE;  Surgeon: Gail Wylie MD;  [facility];  Service: Orthopedics    SACROILIAC JOINT FUSION Left     SPINAL FUSION      C4 and C5    ULNAR NERVE REPAIR Bilateral     UPPER GASTROINTESTINAL  "ENDOSCOPY       Social History   Social History     Substance and Sexual Activity   Alcohol Use Yes    Alcohol/week: 2.0 standard drinks of alcohol    Types: 2 Glasses of wine per week    Comment: Occasionally     Social History     Substance and Sexual Activity   Drug Use Not Currently    Types: Marijuana    Comment: medicinal over 1 yr     Social History     Tobacco Use   Smoking Status Never   Smokeless Tobacco Never   Tobacco Comments    Never smoked.     Family History: non-contributory    Meds/Allergies   all medications and allergies reviewed  Allergies   Allergen Reactions    Hydrocodone-Acetaminophen GI Intolerance     gi upset -pt okay if takes  zofran      Codeine GI Intolerance     GI issues    Mexiletine Rash    Oxycodone-Acetaminophen GI Intolerance     GI issues    Penicillins Other (See Comments) and GI Intolerance     GI issues,vaginitis    Pollen Extract-Tree Extract [Pollen Extract] Nasal Congestion       Objective       Current Vitals:   Blood Pressure: 120/76 (05/01/24 0946)  Pulse: 80 (05/01/24 0946)  Temperature: 97.7 °F (36.5 °C) (05/01/24 0946)  Temp Source: Tympanic (05/01/24 0946)  Height: 5' 2.7\" (159.3 cm) (05/01/24 0946)  Weight - Scale: 89.8 kg (198 lb) (05/01/24 0946)      Invasive Devices       None                   Physical Exam  Vitals reviewed.   Constitutional:       General: She is not in acute distress.     Appearance: Normal appearance. She is not ill-appearing.   HENT:      Head: Normocephalic.      Nose: Nose normal.      Mouth/Throat:      Mouth: Mucous membranes are moist.      Pharynx: Oropharynx is clear.   Eyes:      General: No scleral icterus.  Cardiovascular:      Rate and Rhythm: Normal rate.   Pulmonary:      Effort: Pulmonary effort is normal. No respiratory distress.   Abdominal:      Palpations: Abdomen is soft.      Tenderness: There is no abdominal tenderness.      Hernia: No hernia is present.      Comments: Laparoscopic port sites clean dry and intact. "   Musculoskeletal:         General: Normal range of motion.      Cervical back: Normal range of motion.   Skin:     General: Skin is warm and dry.      Capillary Refill: Capillary refill takes less than 2 seconds.   Neurological:      General: No focal deficit present.      Mental Status: She is alert. Mental status is at baseline.   Psychiatric:         Mood and Affect: Mood normal.             Assessment/PLAN:    48 y.o. female status post robotic RNYGB and PEHR w mesh done on 1/30/2024 by Dr. Osmel Akins, with dysphagia to solid foods with vomiting, otherwise doing well.  Good weight loss results and no heartburn.    Will schedule EGD with possible dilation with Dr. Osmel Akins.  Continue PPI BID          Ignacio Brown MD  Bariatric Surgery   5/1/2024  9:54 AM      .

## 2024-05-01 NOTE — PROGRESS NOTES
POST OP UP VISIT - BARIATRIC SURGERY  Jenny Olvera 48 y.o. female MRN: [id number]  Unit/Bed#:  Encounter: [id number]      HPI:  Jenny Olvera is a 48 y.o. female status post robotic RNYGB and PEHR with mesh performed by Dr. Osmel Akins on 1/30/2024 returning to office for first post op visit since surgery.    Patient reporting issues with eating and food getting stuck and occasional vomiting. Especially eggs, meats and some vegetables,     She no longer has heartburn symptoms so she stopped her omeprazole.      Review of Systems   Gastrointestinal:  Positive for nausea and vomiting.       Historical Information   Past Medical History:   Diagnosis Date    Anxiety     Asthma     Claustrophobia     Cluster headache     CTS (carpal tunnel syndrome)     Depression     Fibromyalgia     primary; last assessed 11/27/17    GERD (gastroesophageal reflux disease)     GI problem     Headache, tension-type     Irritable bowel syndrome     Joint pain     Kidney stone     Lung nodule     last assessed 10/9/15    Migraine     Muscle pain     Peripheral neuropathy     PONV (postoperative nausea and vomiting)     Sjogren's syndrome (HCC)      Past Surgical History:   Procedure Laterality Date    BACK SURGERY      BARIATRIC SURGERY  8/9/2022    CARPAL TUNNEL RELEASE Bilateral     CHOLECYSTECTOMY      CYSTOSCOPY      GALLBLADDER SURGERY      GASTRECTOMY  08/09/2022    Sleeve    GASTRIC BYPASS LAPAROSCOPIC N/A 1/30/2024    Procedure: REVISION CONVERSION TO R-N-Y GASTRIC BYPASS W/ ROBOTICS AND INTRAOPERATIVE EGD; PARAESOPHAGEAL HERNIA REPAIR WITH MESH;  Surgeon: Felix Akins MD;  [facility];  Service: Bariatrics    NECK SURGERY      [place] TENDON SHEATH INCISION Right 01/16/2023    Procedure: THUMB TRIGGER FINGER RELEASE;  Surgeon: Gail Wylie MD;  [facility];  Service: Orthopedics    SACROILIAC JOINT FUSION Left     SPINAL FUSION      C4 and C5    ULNAR NERVE REPAIR Bilateral     UPPER GASTROINTESTINAL  "ENDOSCOPY       Social History   Social History     Substance and Sexual Activity   Alcohol Use Yes    Alcohol/week: 2.0 standard drinks of alcohol    Types: 2 Glasses of wine per week    Comment: Occasionally     Social History     Substance and Sexual Activity   Drug Use Not Currently    Types: Marijuana    Comment: medicinal over 1 yr     Social History     Tobacco Use   Smoking Status Never   Smokeless Tobacco Never   Tobacco Comments    Never smoked.     Family History: non-contributory    Meds/Allergies   all medications and allergies reviewed  Allergies   Allergen Reactions    Hydrocodone-Acetaminophen GI Intolerance     gi upset -pt okay if takes  zofran      Codeine GI Intolerance     GI issues    Mexiletine Rash    Oxycodone-Acetaminophen GI Intolerance     GI issues    Penicillins Other (See Comments) and GI Intolerance     GI issues,vaginitis    Pollen Extract-Tree Extract [Pollen Extract] Nasal Congestion       Objective       Current Vitals:   Blood Pressure: 120/76 (05/01/24 0946)  Pulse: 80 (05/01/24 0946)  Temperature: 97.7 °F (36.5 °C) (05/01/24 0946)  Temp Source: Tympanic (05/01/24 0946)  Height: 5' 2.7\" (159.3 cm) (05/01/24 0946)  Weight - Scale: 89.8 kg (198 lb) (05/01/24 0946)      Invasive Devices       None                   Physical Exam  Vitals reviewed.   Constitutional:       General: She is not in acute distress.     Appearance: Normal appearance. She is not ill-appearing.   HENT:      Head: Normocephalic.      Nose: Nose normal.      Mouth/Throat:      Mouth: Mucous membranes are moist.      Pharynx: Oropharynx is clear.   Eyes:      General: No scleral icterus.  Cardiovascular:      Rate and Rhythm: Normal rate.   Pulmonary:      Effort: Pulmonary effort is normal. No respiratory distress.   Abdominal:      Palpations: Abdomen is soft.      Tenderness: There is no abdominal tenderness.      Hernia: No hernia is present.      Comments: Laparoscopic port sites clean dry and intact. "   Musculoskeletal:         General: Normal range of motion.      Cervical back: Normal range of motion.   Skin:     General: Skin is warm and dry.      Capillary Refill: Capillary refill takes less than 2 seconds.   Neurological:      General: No focal deficit present.      Mental Status: She is alert. Mental status is at baseline.   Psychiatric:         Mood and Affect: Mood normal.             Assessment/PLAN:    48 y.o. female status post robotic RNYGB and PEHR w mesh done on 1/30/2024 by Dr. Osmel Akins, with dysphagia to solid foods with vomiting, otherwise doing well.  Good weight loss results and no heartburn.    Will schedule EGD with possible dilation with Dr. Osmel Akins.  Continue PPI BID          Ignacio Brown MD  Bariatric Surgery   5/1/2024  9:54 AM      .

## 2024-05-03 ENCOUNTER — TELEPHONE (OUTPATIENT)
Dept: BARIATRICS | Facility: CLINIC | Age: 49
End: 2024-05-03

## 2024-05-03 NOTE — TELEPHONE ENCOUNTER
Received message that pt is having difficulty with food tolerance.  Called and spoke to pt.  Pt c/o frequent feelings of food getting stuck, nausea, and vomiting.  Discussed backtracking her diet progression to soft foods and pureed foods, discussed softer protein sources.  Discussed appropriate portion sizes.  Discussed importance of small bites, chewing well, eating slowly.  Discussed protein drinks and vitamins.  Pt currently using multivitamin and calcium+Vit D patches.  Scheduled to check in at next surgeon visit on 5/29/24.

## 2024-05-09 ENCOUNTER — TELEPHONE (OUTPATIENT)
Dept: BARIATRICS | Facility: CLINIC | Age: 49
End: 2024-05-09

## 2024-05-09 ENCOUNTER — HOSPITAL ENCOUNTER (OUTPATIENT)
Dept: RADIOLOGY | Age: 49
Discharge: HOME | End: 2024-05-09
Payer: COMMERCIAL

## 2024-05-09 DIAGNOSIS — Z12.31 ENCOUNTER FOR SCREENING MAMMOGRAM FOR BREAST CANCER: ICD-10-CM

## 2024-05-09 PROCEDURE — 77063 BREAST TOMOSYNTHESIS BI: CPT

## 2024-05-09 PROCEDURE — 77067 SCR MAMMO BI INCL CAD: CPT

## 2024-05-09 NOTE — TELEPHONE ENCOUNTER
Called and LVM to ensure pt knew of their EGD scheduled for 5/15. Let pt know to call us with any concerns or questions [phone]. Reminded patient that the hospital will call the day before with a time and not to eat or drink anything after midnight the day before and to have .

## 2024-05-12 RX ORDER — SODIUM CHLORIDE 9 MG/ML
125 INJECTION, SOLUTION INTRAVENOUS CONTINUOUS
Status: CANCELLED | OUTPATIENT
Start: 2024-05-12

## 2024-05-15 ENCOUNTER — ANESTHESIA (OUTPATIENT)
Dept: GASTROENTEROLOGY | Facility: HOSPITAL | Age: 49
End: 2024-05-15

## 2024-05-15 ENCOUNTER — HOSPITAL ENCOUNTER (OUTPATIENT)
Dept: GASTROENTEROLOGY | Facility: HOSPITAL | Age: 49
Setting detail: OUTPATIENT SURGERY
Discharge: HOME | End: 2024-05-15
Attending: SURGERY
Payer: COMMERCIAL

## 2024-05-15 ENCOUNTER — ANESTHESIA EVENT (OUTPATIENT)
Dept: GASTROENTEROLOGY | Facility: HOSPITAL | Age: 49
End: 2024-05-15

## 2024-05-15 VITALS
BODY MASS INDEX: 34.55 KG/M2 | TEMPERATURE: 97.6 F | DIASTOLIC BLOOD PRESSURE: 72 MMHG | SYSTOLIC BLOOD PRESSURE: 111 MMHG | WEIGHT: 195 LBS | RESPIRATION RATE: 18 BRPM | HEART RATE: 71 BPM | HEIGHT: 63 IN | OXYGEN SATURATION: 98 %

## 2024-05-15 DIAGNOSIS — Z98.84 S/P LAPAROSCOPIC SLEEVE GASTRECTOMY: Primary | ICD-10-CM

## 2024-05-15 DIAGNOSIS — Z98.84 BARIATRIC SURGERY STATUS: ICD-10-CM

## 2024-05-15 LAB
EXT PREGNANCY TEST URINE: NEGATIVE
EXT. CONTROL: NORMAL

## 2024-05-15 PROCEDURE — C1726 CATH, BAL DIL, NON-VASCULAR: HCPCS | Performed by: STUDENT IN AN ORGANIZED HEALTH CARE EDUCATION/TRAINING PROGRAM

## 2024-05-15 PROCEDURE — 81025 URINE PREGNANCY TEST: CPT | Performed by: ANESTHESIOLOGY

## 2024-05-15 PROCEDURE — 43245 EGD DILATE STRICTURE: CPT | Performed by: SURGERY

## 2024-05-15 RX ORDER — PROPOFOL 10 MG/ML
INJECTION, EMULSION INTRAVENOUS AS NEEDED
Status: DISCONTINUED | OUTPATIENT
Start: 2024-05-15 | End: 2024-05-15

## 2024-05-15 RX ORDER — LIDOCAINE HYDROCHLORIDE 20 MG/ML
INJECTION, SOLUTION EPIDURAL; INFILTRATION; INTRACAUDAL; PERINEURAL AS NEEDED
Status: DISCONTINUED | OUTPATIENT
Start: 2024-05-15 | End: 2024-05-15

## 2024-05-15 RX ORDER — SODIUM CHLORIDE 9 MG/ML
125 INJECTION, SOLUTION INTRAVENOUS CONTINUOUS
Status: DISCONTINUED | OUTPATIENT
Start: 2024-05-15 | End: 2024-05-19 | Stop reason: HOSPADM

## 2024-05-15 RX ADMIN — LIDOCAINE HYDROCHLORIDE 100 MG: 20 INJECTION, SOLUTION EPIDURAL; INFILTRATION; INTRACAUDAL at 12:57

## 2024-05-15 RX ADMIN — PROPOFOL 150 MG: 10 INJECTION, EMULSION INTRAVENOUS at 12:57

## 2024-05-15 RX ADMIN — PROPOFOL 20 MG: 10 INJECTION, EMULSION INTRAVENOUS at 13:02

## 2024-05-15 RX ADMIN — PROPOFOL 20 MG: 10 INJECTION, EMULSION INTRAVENOUS at 13:00

## 2024-05-15 RX ADMIN — SODIUM CHLORIDE 125 ML/HR: 0.9 INJECTION, SOLUTION INTRAVENOUS at 12:44

## 2024-05-15 NOTE — ANESTHESIA POSTPROCEDURE EVALUATION
"Post-Op Assessment Note    CV Status:  Stable    Pain management: adequate       Mental Status:  Alert and awake   Hydration Status:  Euvolemic   PONV Controlled:  Controlled   Airway Patency:  Patent     Post Op Vitals Reviewed: Yes    No anethesia notable event occurred.    Staff: Anesthesiologist               BP      Temp      Pulse     Resp      SpO2      /72   Pulse 71   Temp 97.6 °F (36.4 °C) (Temporal)   Resp 18   Ht 5' 2.7\" (1.593 m)   Wt 88.5 kg (195 lb)   LMP  (LMP Unknown)   SpO2 98%   BMI 34.87 kg/m²     "

## 2024-05-15 NOTE — ANESTHESIA PREPROCEDURE EVALUATION
Procedure:  EGD    Relevant Problems   CARDIO   (+) Borderline hyperlipidemia                  GI/HEPATIC   (+) GERD without esophagitis      /RENAL          MUSCULOSKELETAL   (+) Fibromyalgia                      NEURO/PSYCH   (+) Chronic migraine without aura without status migrainosus, not intractable   (+) Depression with anxiety   (+) Fibromyalgia   (+) Intractable chronic migraine without aura and with status migrainosus   (+) Intractable chronic migraine without aura and without status migrainosus   (+) Post traumatic stress disorder (PTSD)      PULMONARY   (+) Mild intermittent asthma        Physical Exam    Airway    Mallampati score: III  TM Distance: >3 FB  Neck ROM: full     Dental       Cardiovascular  Rhythm: regular    Pulmonary   Breath sounds clear to auscultation    Other Findings  post-pubertal.      Anesthesia Plan  ASA Score- 3     Anesthesia Type- general with ASA Monitors.         Additional Monitors:     Airway Plan:            Plan Factors-Exercise tolerance (METS): >4 METS.    Chart reviewed.   Existing labs reviewed.                   Induction- intravenous.    Postoperative Plan-         Informed Consent- Anesthetic plan and risks discussed with patient.

## 2024-05-15 NOTE — INTERVAL H&P NOTE
H&P reviewed. After examining the patient I find no changes in the patients condition since the H&P had been written.    Vitals:    05/15/24 1234   BP: 125/70   Pulse: 74   Resp: 16   Temp: 97.9 °F (36.6 °C)   SpO2: 99%

## 2024-05-20 ENCOUNTER — TELEPHONE (OUTPATIENT)
Dept: INTERNAL MEDICINE CLINIC | Facility: CLINIC | Age: 49
End: 2024-05-20

## 2024-05-20 NOTE — TELEPHONE ENCOUNTER
Folder Color- Red    Name of Form- [facility]    Form to be filled out by- Prenatt    Form to be Faxed (fax number), Mailed (address), or picked up (by whom)    Patient made aware of 10 business day policy.

## 2024-05-24 ENCOUNTER — PROCEDURE VISIT (OUTPATIENT)
Dept: NEUROLOGY | Facility: CLINIC | Age: 49
End: 2024-05-24
Payer: COMMERCIAL

## 2024-05-24 VITALS
HEART RATE: 82 BPM | OXYGEN SATURATION: 100 % | DIASTOLIC BLOOD PRESSURE: 81 MMHG | SYSTOLIC BLOOD PRESSURE: 134 MMHG | TEMPERATURE: 97.7 F

## 2024-05-24 DIAGNOSIS — G43.709 CHRONIC MIGRAINE WITHOUT AURA WITHOUT STATUS MIGRAINOSUS, NOT INTRACTABLE: Primary | ICD-10-CM

## 2024-05-24 PROCEDURE — 64615 CHEMODENERV MUSC MIGRAINE: CPT | Performed by: PHYSICIAN ASSISTANT

## 2024-05-24 NOTE — PROGRESS NOTES
Universal Protocol   Consent: Verbal consent obtained. Written consent obtained.  Risks and benefits: risks, benefits and alternatives were discussed  Consent given by: patient  Patient understanding: patient states understanding of the procedure being performed  Patient consent: the patient's understanding of the procedure matches consent given  Procedure consent: procedure consent matches procedure scheduled      Chemodenervation     Date/Time  5/24/2024 1:00 PM     Performed by  Linad Ward PA-C   Authorized by  Linda Ward PA-C     Pre-procedure details      Prepped With: Alcohol     Procedure details      Position:  Upright   Botox      Botox Type:  Type A    Brand:  Botox    mL's of Botulinum Toxin:  200    Final Concentration per CC:  100 units    Needle Gauge:  30 G 2.5 inch   Procedures      Botox Procedures: chronic headache      Indications: migraines     Injection Location      Head / Face:  L superior trapezius, R superior trapezius, L superior cervical paraspinal, R superior cervical paraspinal, L , R , procerus, L temporalis, R temporalis, R frontalis, L frontalis, R medial occipitalis and L medial occipitalis    L  injection amount:  5 unit(s)    R  injection amount:  5 unit(s)    L lateral frontalis:  5 unit(s)    R lateral frontalis:  5 unit(s)    L medial frontalis:  5 unit(s)    R medial frontalis:  5 unit(s)    L temporalis injection amount:  20 unit(s)    R temporalis injection amount:  20 unit(s)    Procerus injection amount:  5 unit(s)    L medial occipitalis injection amount:  15 unit(s)    R medial occipitalis injection amount:  15 unit(s)    L superior cervical paraspinal injection amount:  10 unit(s)    R superior cervical paraspinal injection amount:  10 unit(s)    L superior trapezius injection amount:  15 unit(s)    R superior trapezius injection amount:  15 unit(s)   Total Units      Total units used:  200    Total units discarded:  0    Post-procedure details      Chemodenervation:  Chronic migraine    Facial Nerve Location::  Bilateral facial nerve    Patient tolerance of procedure:  Tolerated well, no immediate complications   Comments       Extra units medically necessary:  - 10 between both upper traps  - 15 between both middle traps  - 10 between bilateral occipitalis muscles  - 10 between both anterior temporalis muscles       Blood pressure 134/81, pulse 82, temperature 97.7 °F (36.5 °C), temperature source Temporal, SpO2 100%.    The pt would like to retry Vyepti with botox for migraine prevention.  Per insurance request, will find studies suggesting efficacy and safety of both tx's together.  The pt  now following with Rheumatology for +SS and +LANE.

## 2024-05-29 ENCOUNTER — CLINICAL SUPPORT (OUTPATIENT)
Dept: BARIATRICS | Facility: CLINIC | Age: 49
End: 2024-05-29

## 2024-05-29 VITALS — BODY MASS INDEX: 35.02 KG/M2 | WEIGHT: 195.8 LBS

## 2024-05-29 DIAGNOSIS — Z98.84 S/P LAPAROSCOPIC SLEEVE GASTRECTOMY: Primary | ICD-10-CM

## 2024-05-29 DIAGNOSIS — E66.2 CLASS 2 OBESITY WITH ALVEOLAR HYPOVENTILATION, SERIOUS COMORBIDITY, AND BODY MASS INDEX (BMI) OF 38.0 TO 38.9 IN ADULT (HCC): ICD-10-CM

## 2024-05-29 DIAGNOSIS — E66.01 OBESITY, MORBID, BMI 40.0-49.9 (HCC): ICD-10-CM

## 2024-05-29 PROCEDURE — RECHECK: Performed by: DIETITIAN, REGISTERED
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
## 2024-06-08 ENCOUNTER — HOSPITAL ENCOUNTER (OUTPATIENT)
Dept: RADIOLOGY | Facility: HOSPITAL | Age: 49
Discharge: HOME | End: 2024-06-08
Payer: COMMERCIAL

## 2024-06-08 DIAGNOSIS — H91.8X3 ASYMMETRICAL HEARING LOSS: ICD-10-CM

## 2024-06-08 PROCEDURE — 70553 MRI BRAIN STEM W/O & W/DYE: CPT

## 2024-06-08 PROCEDURE — A9585 GADOBUTROL INJECTION: HCPCS

## 2024-06-08 RX ORDER — GADOBUTROL 604.72 MG/ML
8 INJECTION INTRAVENOUS
Status: COMPLETED | OUTPATIENT
Start: 2024-06-08 | End: 2024-06-08

## 2024-06-08 RX ADMIN — GADOBUTROL 8 ML: 604.72 INJECTION INTRAVENOUS at 11:36

## 2024-06-14 ENCOUNTER — TELEPHONE (OUTPATIENT)
Dept: BARIATRICS | Facility: CLINIC | Age: 49
End: 2024-06-14

## 2024-06-14 NOTE — TELEPHONE ENCOUNTER
Patient had to cancel appt for today with Dr Brown. Unable to reschedule for provider, please contact patient at [phone] for rescheduling.

## 2024-06-19 ENCOUNTER — HOSPITAL ENCOUNTER (OUTPATIENT)
Dept: RADIOLOGY | Facility: HOSPITAL | Age: 49
Discharge: HOME | End: 2024-06-19
Attending: ORTHOPAEDIC SURGERY
Payer: COMMERCIAL

## 2024-06-19 ENCOUNTER — OFFICE VISIT (OUTPATIENT)
Dept: OBGYN CLINIC | Facility: CLINIC | Age: 49
End: 2024-06-19
Payer: COMMERCIAL

## 2024-06-19 VITALS
HEART RATE: 89 BPM | DIASTOLIC BLOOD PRESSURE: 78 MMHG | SYSTOLIC BLOOD PRESSURE: 130 MMHG | WEIGHT: 195 LBS | HEIGHT: 62 IN | BODY MASS INDEX: 35.88 KG/M2

## 2024-06-19 DIAGNOSIS — M75.20 BICEPS TENDINITIS, UNSPECIFIED LATERALITY: ICD-10-CM

## 2024-06-19 PROCEDURE — 99214 OFFICE O/P EST MOD 30 MIN: CPT | Performed by: ORTHOPAEDIC SURGERY

## 2024-06-19 PROCEDURE — 73080 X-RAY EXAM OF ELBOW: CPT
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
## 2024-06-21 ENCOUNTER — OFFICE VISIT (OUTPATIENT)
Dept: INTERNAL MEDICINE CLINIC | Facility: CLINIC | Age: 49
End: 2024-06-21

## 2024-06-21 VITALS
HEART RATE: 71 BPM | DIASTOLIC BLOOD PRESSURE: 73 MMHG | OXYGEN SATURATION: 98 % | SYSTOLIC BLOOD PRESSURE: 109 MMHG | HEIGHT: 62 IN | TEMPERATURE: 98 F | BODY MASS INDEX: 36.07 KG/M2 | WEIGHT: 196 LBS

## 2024-06-21 DIAGNOSIS — G43.709 CHRONIC MIGRAINE WITHOUT AURA WITHOUT STATUS MIGRAINOSUS, NOT INTRACTABLE: ICD-10-CM

## 2024-06-21 DIAGNOSIS — Z59.819 HOUSING INSTABILITY: ICD-10-CM

## 2024-06-21 DIAGNOSIS — F41.8 DEPRESSION WITH ANXIETY: Primary | ICD-10-CM

## 2024-06-21 PROCEDURE — 99213 OFFICE O/P EST LOW 20 MIN: CPT | Performed by: INTERNAL MEDICINE

## 2024-06-21 RX ORDER — MIRTAZAPINE 15 MG/1
15 TABLET, FILM COATED ORAL
Qty: 30 TABLET | Refills: 5 | Status: SHIPPED | OUTPATIENT
Start: 2024-06-21 | End: 2024-06-27 | Stop reason: SINTOL

## 2024-06-21 RX ORDER — FLUTICASONE PROPIONATE 50 MCG
SPRAY, SUSPENSION (ML) NASAL
Qty: 30 ML | Refills: 0 | Status: SHIPPED | OUTPATIENT
Start: 2024-06-21

## 2024-06-21 SDOH — ECONOMIC STABILITY - HOUSING INSECURITY: HOUSING INSTABILITY UNSPECIFIED: Z59.819

## 2024-06-21 NOTE — PROGRESS NOTES
[facility]  INTERNAL MEDICINE RESIDENCY    Clinic Visit Note  Jenny Olvera 48 y.o. female   MRN: [id number]    Assessment and Plan      1. Depression with anxiety: Patient requests adding new psychotropic medication to address depression.  PHQ elevated at 15 in office.  Patient states that she was discontinued from Wellbutrin as recommended by ENT due to tinnitus.  She does remain on Effexor for migraines but feels this is not effective for her depression and she attempted to increase dose previously but experienced too many side effects so she does not want to increase the dose at this time.  She denies suicidal ideation.  She has agreed to trial Remeron at this time.  Will start Remeron 15 mg at bedtime to assist with insomnia as well.  If patient has increased appetite/weight gain consider trazodone at next visit.  -     mirtazapine (REMERON) 15 mg tablet; Take 1 tablet (15 mg total) by mouth daily at bedtime    2. Housing instability  -     Ambulatory referral to social work care management program; Future; Expected date: 06/21/2024    3. Chronic migraine without aura without status migrainosus, not intractable  -     fluticasone (FLONASE) 50 mcg/act nasal spray; 1 spray in each nostril once daily     Order shower chair        Follow up in our clinic in 3 month(s) for  follow-up depression .    Subjective   HISTORY OF PRESENT ILLNESS:  Jenny Olvera is a 48 y.o. female with a past medical history of chronic migraines, PTSD, fibromyalgia, asthma, GERD, mixed IBS, post-cholecystectomy syndrome, obesity s/p sleeve gastrectomy in August '22, nephrolithiasis (with low citrate and volume), fibromyalgia following with rheum, positive LANE and SS-A Ab who presents to clinic today for follow-up chronic conditions.  Patient requests adding new psychotropic medication to address depression.  PHQ elevated at 15 in office.  Patient states that she was discontinued from Wellbutrin as recommended by ENT due to  "tinnitus.  She does remain on Effexor for migraines but feels this is not effective for her depression and she attempted to increase dose previously but experienced too many side effects so she does not want to increase the dose at this time.  She denies suicidal ideation.  She does report insomnia.  She has agreed to trial Remeron at this time.  She has been doing well since her Isela-en-Y gastric bypass reports that she has intermittent dysphagia specifically to broccoli and has upcoming appoint with GI.  Also reports that her allergies are worsening and requests refill on Flonase.  Migraines are controlled at this time.    Review of Systems - Negative other than stated above    Objective     Vitals:    06/21/24 1004   BP: 109/73   BP Location: Left arm   Patient Position: Sitting   Cuff Size: Standard   Pulse: 71   Temp: 98 °F (36.7 °C)   TempSrc: Temporal   SpO2: 98%   Weight: 88.9 kg (196 lb)   Height: 5' 2\" (1.575 m)       Physical Exam:  General: No apparent distress, resting comfortably   Head: Normocephalic, atraumatic  Eyes: Anicteric, no conjunctival erythema  ENT: External ear normal, no nasal discharge  Neck: Trachea midline, no visible lymphadenopathy or goiter  Respiratory: CTA. Non-labored respirations, symmetric thorax expansion  Cardiovascular: RRR. Extremities appear well-perfused  Abdomen: Non-distended  Extremities: Moves extremities spontaneously, no peripheral edema  Skin: No visible rashes, wounds, or jaundice  Neuro: A&O x 3, no gross focal deficits, no aphasia    History     Current Outpatient Medications:     fluticasone (FLONASE) 50 mcg/act nasal spray, 1 spray in each nostril once daily, Disp: 30 mL, Rfl: 0    mirtazapine (REMERON) 15 mg tablet, Take 1 tablet (15 mg total) by mouth daily at bedtime, Disp: 30 tablet, Rfl: 5    albuterol (PROVENTIL HFA,VENTOLIN HFA) 90 mcg/act inhaler, Inhale 2 puffs every 6 (six) hours as needed for wheezing or shortness of breath, Disp: 8 g, Rfl: 2    " Botox 200 units SOLR, Every three months for migraines, Disp: , Rfl:     butalbital-acetaminophen-caffeine (FIORICET,ESGIC) -40 mg per tablet, 1 tab q6 hours prn migraine. No more than 2-3 per week., Disp: 10 tablet, Rfl: 0    cholecalciferol (VITAMIN D3) 1,000 units tablet, Take 1 capsule by mouth once a week 3000 units daily, Disp: , Rfl:     cholestyramine (QUESTRAN) 4 GM/DOSE powder, Take 1 packet (4 g total) by mouth 2 (two) times a day with meals (Patient taking differently: Take 4 g by mouth 2 (two) times a day as needed), Disp: 378 g, Rfl: 2    dicyclomine (BENTYL) 20 mg tablet, Take 1 tablet (20 mg total) by mouth 3 (three) times a day as needed (migraine/cramps), Disp: 60 tablet, Rfl: 0    famotidine (PEPCID) 40 MG tablet, Take 1 tablet (40 mg total) by mouth daily at bedtime (Patient not taking: Reported on 4/17/2024), Disp: 30 tablet, Rfl: 3    lasmiditan succinate (Reyvow) 50 mg tablet, One tab qhs at migraine onset.  Caution sedation.  Max 1 tab/24 hours., Disp: 8 tablet, Rfl: 2    meclizine (ANTIVERT) 12.5 MG tablet, Take 1 tablet (12.5 mg total) by mouth 3 (three) times a day as needed for dizziness or nausea, Disp: 30 tablet, Rfl: 3    melatonin 3 mg, , Disp: , Rfl:     omeprazole (PriLOSEC) 20 mg delayed release capsule, Take 1 capsule (20 mg total) by mouth 2 (two) times a day, Disp: 90 capsule, Rfl: 1    ondansetron (ZOFRAN) 4 mg tablet, Take 1 tablet (4 mg total) by mouth every 12 (twelve) hours as needed for nausea, Disp: 20 tablet, Rfl: 1    prochlorperazine (COMPAZINE) 10 mg tablet, Take 1 tablet (10 mg total) by mouth every 6 (six) hours as needed for nausea or vomiting, Disp: 30 tablet, Rfl: 0    tiZANidine (ZANAFLEX) 2 mg tablet, Take 1 tablet (2 mg total) by mouth every 8 (eight) hours as needed for muscle spasms, Disp: 90 tablet, Rfl: 1    Trudhesa 0.725 MG/ACT AERS, , Disp: , Rfl:     venlafaxine (EFFEXOR-XR) 37.5 mg 24 hr capsule, One capsule q.a.m. With food, Disp: 90 capsule,  Rfl: 0  Allergies   Allergen Reactions    Hydrocodone-Acetaminophen GI Intolerance     gi upset -pt okay if takes  zofran      Codeine GI Intolerance     GI issues    Mexiletine Rash    Oxycodone-Acetaminophen GI Intolerance     GI issues    Penicillins Other (See Comments) and GI Intolerance     GI issues,vaginitis    Pollen Extract-Tree Extract [Pollen Extract] Nasal Congestion      Past Medical History:   Diagnosis Date    Anxiety     Asthma     Claustrophobia     Cluster headache     CTS (carpal tunnel syndrome)     Depression     Fibromyalgia     primary; last assessed 11/27/17    GERD (gastroesophageal reflux disease)     GI problem     Headache, tension-type     Irritable bowel syndrome     Joint pain     Kidney stone     Lung nodule     last assessed 10/9/15    Migraine     Muscle pain     Peripheral neuropathy     PONV (postoperative nausea and vomiting)     Sjogren's syndrome (HCC)      Past Surgical History:   Procedure Laterality Date    BACK SURGERY      BARIATRIC SURGERY  8/9/2022    CARPAL TUNNEL RELEASE Bilateral     CHOLECYSTECTOMY      CYSTOSCOPY      GALLBLADDER SURGERY      GASTRECTOMY  08/09/2022    Sleeve    GASTRIC BYPASS LAPAROSCOPIC N/A 1/30/2024    Procedure: REVISION CONVERSION TO R-N-Y GASTRIC BYPASS W/ ROBOTICS AND INTRAOPERATIVE EGD; PARAESOPHAGEAL HERNIA REPAIR WITH MESH;  Surgeon: Felix Akins MD;  [facility];  Service: Bariatrics    NECK SURGERY      CO TENDON SHEATH INCISION Right 01/16/2023    Procedure: THUMB TRIGGER FINGER RELEASE;  Surgeon: Gail Wylie MD;  [facility];  Service: Orthopedics    SACROILIAC JOINT FUSION Left     SPINAL FUSION      C4 and C5    ULNAR NERVE REPAIR Bilateral     UPPER GASTROINTESTINAL ENDOSCOPY       Social History     Socioeconomic History    Marital status:      Spouse name: Not on file    Number of children: Not on file    Years of education: Not on file    Highest education level: Not on file    Occupational History    Not on file   Tobacco Use    Smoking status: Never    Smokeless tobacco: Never    Tobacco comments:     Never smoked.   Vaping Use    Vaping status: Never Used   Substance and Sexual Activity    Alcohol use: Yes     Alcohol/week: 2.0 standard drinks of alcohol     Types: 2 Glasses of wine per week     Comment: Occasionally    Drug use: Not Currently     Types: Marijuana     Comment: medicinal over 1 yr    Sexual activity: Yes     Partners: Male     Birth control/protection: Abstinence, Condom Male, I.U.D.   Other Topics Concern    Not on file   Social History Narrative    Daily cola, tea    No preference on religous beliefs     Social Determinants of Health     Financial Resource Strain: Low Risk  (1/12/2024)    Overall Financial Resource Strain (CARDIA)     Difficulty of Paying Living Expenses: Not very hard   Food Insecurity: No Food Insecurity (1/12/2024)    Hunger Vital Sign     Worried About Running Out of Food in the Last Year: Never true     Ran Out of Food in the Last Year: Never true   Transportation Needs: No Transportation Needs (1/12/2024)    PRAPARE - Transportation     Lack of Transportation (Medical): No     Lack of Transportation (Non-Medical): No   Physical Activity: Inactive (9/8/2021)    Exercise Vital Sign     Days of Exercise per Week: 0 days     Minutes of Exercise per Session: 0 min   Stress: Stress Concern Present (9/8/2021)    [employer] [place] [employer] - Occupational Stress Questionnaire     Feeling of Stress : Rather much   Social Connections: Socially Isolated (9/8/2021)    Social Connection and Isolation Panel [NHANES]     Frequency of Communication with Friends and Family: Three times a week     Frequency of Social Gatherings with Friends and Family: More than three times a week     Attends Sabianism Services: Never     Active Member of Clubs or Organizations: No     Attends Club or Organization Meetings: Never     Marital Status:     Intimate Partner Violence: Not At Risk (9/8/2021)    Humiliation, Afraid, Rape, and Kick questionnaire     Fear of Current or Ex-Partner: No     Emotionally Abused: No     Physically Abused: No     Sexually Abused: No   Housing Stability: High Risk (6/21/2024)    Housing Stability Vital Sign     Unable to Pay for Housing in the Last Year: Yes     Number of Times Moved in the Last Year: 1     Homeless in the Last Year: No     Family History   Problem Relation Age of Onset    Diabetes Mother     Fibromyalgia Mother     Ovarian cancer Mother 33    Hypertension Mother     Thyroid disease Mother     Migraines Mother     Neuropathy Mother     Cancer Mother         has been removed    No Known Problems Father     Throat cancer Maternal Grandmother     No Known Problems Maternal Grandfather     No Known Problems Paternal Grandmother     No Known Problems Paternal Grandfather     No Known Problems Daughter     Breast cancer Maternal Aunt     No Known Problems Maternal Aunt     No Known Problems Maternal Aunt     No Known Problems Paternal Aunt     Colon cancer Cousin     Heart disease Cousin     Lupus Cousin     Arthritis Family     Heart disease Family         cardiac disorder    Neuropathy Family     Lupus Family         systemic erythematosus    No Known Problems Half-Sister     No Known Problems Half-Brother     No Known Problems Half-Brother     No Known Problems Half-Brother     Asthma Brother     Stroke Neg Hx        Dominique Dickerson DO  [facility] Internal Medicine PGY-3  [facility]  [address]  [address]  [place] [address]    PLEASE NOTE:  This encounter was completed utilizing the [employer] Direct Speech Voice Recognition Software. Grammatical errors, random word insertions, pronoun errors and incomplete sentences are occasional consequences of the system due to software limitations, ambient noise and hardware issues.These may be missed by proof reading prior to affixing  electronic signature. Any questions or concerns about the content, text or information contained within the body of this dictation should be directly addressed to the physician for clarification. Please do not hesitate to call me directly if you have any any questions or concerns.

## 2024-06-22 LAB
DME PARACHUTE DELIVERY DATE ACTUAL: NORMAL
DME PARACHUTE DELIVERY DATE REQUESTED: NORMAL
DME PARACHUTE ITEM DESCRIPTION: NORMAL
DME PARACHUTE ORDER STATUS: NORMAL
DME PARACHUTE SUPPLIER NAME: NORMAL
DME PARACHUTE SUPPLIER PHONE: NORMAL

## 2024-06-25 ENCOUNTER — OFFICE VISIT (OUTPATIENT)
Dept: MULTI SPECIALTY CLINIC | Facility: CLINIC | Age: 49
End: 2024-06-25

## 2024-06-25 ENCOUNTER — TELEPHONE (OUTPATIENT)
Dept: INTERNAL MEDICINE CLINIC | Facility: CLINIC | Age: 49
End: 2024-06-25

## 2024-06-25 VITALS
HEIGHT: 62 IN | HEART RATE: 73 BPM | WEIGHT: 197 LBS | TEMPERATURE: 98.1 F | SYSTOLIC BLOOD PRESSURE: 116 MMHG | BODY MASS INDEX: 36.25 KG/M2 | DIASTOLIC BLOOD PRESSURE: 79 MMHG

## 2024-06-25 DIAGNOSIS — M35.9 UNDIFFERENTIATED CONNECTIVE TISSUE DISEASE (HCC): Primary | ICD-10-CM

## 2024-06-25 DIAGNOSIS — M79.7 FIBROMYALGIA: ICD-10-CM

## 2024-06-25 RX ORDER — HYDROXYCHLOROQUINE SULFATE 200 MG/1
TABLET, FILM COATED ORAL
Qty: 194 TABLET | Refills: 0 | Status: SHIPPED | OUTPATIENT
Start: 2024-06-25 | End: 2024-10-07

## 2024-06-25 NOTE — TELEPHONE ENCOUNTER
Patient wanted to informed pcp that the below med he prescribe her she's only taking half cause it's making her feel bad, she also requested a call from pcp or nurse    mirtazapine (REMERON) 15 mg tablet
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
## 2024-06-27 DIAGNOSIS — F41.8 DEPRESSION WITH ANXIETY: Primary | ICD-10-CM

## 2024-06-27 RX ORDER — TRAZODONE HYDROCHLORIDE 50 MG/1
25 TABLET ORAL
Qty: 15 TABLET | Refills: 0 | Status: SHIPPED | OUTPATIENT
Start: 2024-06-27 | End: 2024-07-27

## 2024-06-27 NOTE — TELEPHONE ENCOUNTER
I called the patient. Discontinued mirtazapine and sending new script for trazodone to patient's pharmacy.

## 2024-06-27 NOTE — TELEPHONE ENCOUNTER
Called and spoke to patient to obtain more information. Per patient she started the medication Remeron on Friday 6/21/24 and took as instructed and patient stated she felt severely fatigued, drowsy and making her migraines much worse and continued to the next day. Patient states she took the medication again on Monday 6/24/24 but only took half tablet and patient states she still has the symptoms fatigued, drowsy and migraines but not as severe. Patient wanted to know if she soul continue taking the medication until she adjusts to the medication or can the medication be changed.     Please review and advise

## 2024-07-11 ENCOUNTER — OFFICE VISIT (OUTPATIENT)
Dept: BARIATRICS | Facility: CLINIC | Age: 49
End: 2024-07-11
Payer: COMMERCIAL

## 2024-07-11 ENCOUNTER — APPOINTMENT (OUTPATIENT)
Dept: LAB | Facility: MEDICAL CENTER | Age: 49
End: 2024-07-11
Payer: COMMERCIAL

## 2024-07-11 ENCOUNTER — OFFICE VISIT (OUTPATIENT)
Dept: GASTROENTEROLOGY | Facility: CLINIC | Age: 49
End: 2024-07-11
Payer: COMMERCIAL

## 2024-07-11 ENCOUNTER — TELEPHONE (OUTPATIENT)
Dept: INTERNAL MEDICINE CLINIC | Facility: CLINIC | Age: 49
End: 2024-07-11

## 2024-07-11 VITALS
TEMPERATURE: 97.4 F | DIASTOLIC BLOOD PRESSURE: 76 MMHG | WEIGHT: 197 LBS | SYSTOLIC BLOOD PRESSURE: 116 MMHG | HEIGHT: 62 IN | BODY MASS INDEX: 36.25 KG/M2

## 2024-07-11 VITALS
HEART RATE: 68 BPM | TEMPERATURE: 97 F | SYSTOLIC BLOOD PRESSURE: 118 MMHG | BODY MASS INDEX: 34.91 KG/M2 | WEIGHT: 197 LBS | DIASTOLIC BLOOD PRESSURE: 78 MMHG | HEIGHT: 63 IN

## 2024-07-11 DIAGNOSIS — E66.9 CLASS 2 OBESITY WITH BODY MASS INDEX (BMI) OF 35.0 TO 35.9 IN ADULT, UNSPECIFIED OBESITY TYPE, UNSPECIFIED WHETHER SERIOUS COMORBIDITY PRESENT: ICD-10-CM

## 2024-07-11 DIAGNOSIS — Z98.84 BARIATRIC SURGERY STATUS: ICD-10-CM

## 2024-07-11 DIAGNOSIS — K58.2 IRRITABLE BOWEL SYNDROME WITH BOTH CONSTIPATION AND DIARRHEA: Primary | ICD-10-CM

## 2024-07-11 DIAGNOSIS — R13.10 DYSPHAGIA: ICD-10-CM

## 2024-07-11 DIAGNOSIS — Z98.84 HISTORY OF ROUX-EN-Y GASTRIC BYPASS: ICD-10-CM

## 2024-07-11 DIAGNOSIS — K21.9 GERD WITHOUT ESOPHAGITIS: ICD-10-CM

## 2024-07-11 DIAGNOSIS — R13.19 OTHER DYSPHAGIA: Primary | ICD-10-CM

## 2024-07-11 DIAGNOSIS — K58.2 IRRITABLE BOWEL SYNDROME WITH BOTH CONSTIPATION AND DIARRHEA: ICD-10-CM

## 2024-07-11 LAB
IGA SERPL-MCNC: 332 MG/DL (ref 66–433)
TSH SERPL DL<=0.05 MIU/L-ACNC: 1.13 UIU/ML (ref 0.45–4.5)

## 2024-07-11 PROCEDURE — 99213 OFFICE O/P EST LOW 20 MIN: CPT | Performed by: SURGERY

## 2024-07-11 PROCEDURE — 84443 ASSAY THYROID STIM HORMONE: CPT

## 2024-07-11 PROCEDURE — 36415 COLL VENOUS BLD VENIPUNCTURE: CPT

## 2024-07-11 PROCEDURE — 99214 OFFICE O/P EST MOD 30 MIN: CPT | Performed by: INTERNAL MEDICINE

## 2024-07-11 PROCEDURE — 86258 DGP ANTIBODY EACH IG CLASS: CPT

## 2024-07-11 PROCEDURE — 82784 ASSAY IGA/IGD/IGG/IGM EACH: CPT

## 2024-07-11 PROCEDURE — 86364 TISS TRNSGLTMNASE EA IG CLAS: CPT

## 2024-07-11 RX ORDER — OMEPRAZOLE 20 MG/1
20 CAPSULE, DELAYED RELEASE ORAL DAILY
Qty: 90 CAPSULE | Refills: 1 | Status: SHIPPED | OUTPATIENT
Start: 2024-07-11

## 2024-07-11 NOTE — TELEPHONE ENCOUNTER
Form given to Flores to complete   67M w/ hx of HTN, Afib on Eliquis, ESRD on HD (TTS) via RUE radiocephalic fistula (created 6/3/21) presenting with R arm swelling and pain that began with HD on 10/12/21, likely due to infiltration.    Plan/Recommendations:  - Plan for fistulogram tomorrow  - Pre-op and consent  - Document medical clearance  - Continue HD through permacath  - Continue RUE elevation    C Team Surgery   [id number]  yes

## 2024-07-11 NOTE — PATIENT INSTRUCTIONS
Patient will follow up with Dr. Dominique Dickerson in two months time  Recall is in as his schedule is not open yet for gastroenterology    Pqatient was also given labs to have done

## 2024-07-11 NOTE — PROGRESS NOTES
POST OP UP VISIT - BARIATRIC SURGERY  Jenny Olvera 49 y.o. female MRN: [id number]  Unit/Bed#:  Encounter: [id number]      HPI:  Jenny Olvera is a 49 y.o. female status post robotic conversion from sleeve to  RNYGB and PEHR with mesh performed by Dr. Osmel Akins on 1/30/2024 here for EGD review s/p dilation of GJ with 12mm CRE balloon.    She reports that her symptoms are better.    Review of Systems   Constitutional:  Negative for chills and fever.   HENT:  Negative for ear pain and sore throat.    Eyes:  Negative for pain and visual disturbance.   Respiratory:  Negative for cough and shortness of breath.    Cardiovascular:  Negative for chest pain and palpitations.   Gastrointestinal:  Positive for nausea and vomiting. Negative for abdominal pain.   Genitourinary:  Negative for dysuria and hematuria.   Musculoskeletal:  Negative for arthralgias and back pain.   Skin:  Negative for color change and rash.   Neurological:  Negative for seizures and syncope.   All other systems reviewed and are negative.      Historical Information   Past Medical History:   Diagnosis Date    Anxiety     Asthma     Claustrophobia     Cluster headache     CTS (carpal tunnel syndrome)     Depression     Fibromyalgia     primary; last assessed 11/27/17    GERD (gastroesophageal reflux disease)     GI problem     Headache, tension-type     Irritable bowel syndrome     Joint pain     Kidney stone     Lung nodule     last assessed 10/9/15    Migraine     Muscle pain     Peripheral neuropathy     PONV (postoperative nausea and vomiting)     Sjogren's syndrome (HCC)      Past Surgical History:   Procedure Laterality Date    BACK SURGERY      BARIATRIC SURGERY  8/9/2022    CARPAL TUNNEL RELEASE Bilateral     CHOLECYSTECTOMY      CYSTOSCOPY      GALLBLADDER SURGERY      GASTRECTOMY  08/09/2022    Sleeve    GASTRIC BYPASS LAPAROSCOPIC N/A 01/30/2024    Procedure: REVISION CONVERSION TO R-N-Y GASTRIC BYPASS W/ ROBOTICS AND INTRAOPERATIVE EGD;  "PARAESOPHAGEAL HERNIA REPAIR WITH MESH;  Surgeon: Felix Akins MD;  Location: [facility];  Service: Bariatrics    HERNIA REPAIR  08/09/2022    Apparently done at time gastric sleeve. Still have major issues with it.    NECK SURGERY      [place] TENDON SHEATH INCISION Right 01/16/2023    Procedure: THUMB TRIGGER FINGER RELEASE;  Surgeon: Gail Wylie MD;  Location[facility];  Service: Orthopedics    SACROILIAC JOINT FUSION Left     SLEEVE GASTROPLASTY  08/9/2022    SPINAL FUSION      C4 and C5    ULNAR NERVE REPAIR Bilateral     UPPER GASTROINTESTINAL ENDOSCOPY       Social History   Social History     Substance and Sexual Activity   Alcohol Use Yes    Alcohol/week: 2.0 standard drinks of alcohol    Types: 2 Glasses of wine per week    Comment: Occasionally     Social History     Substance and Sexual Activity   Drug Use Never    Types: Marijuana    Comment: medicinal over 1 yr     Social History     Tobacco Use   Smoking Status Never   Smokeless Tobacco Never   Tobacco Comments    Never smoked.     Family History: non-contributory    Meds/Allergies   all medications and allergies reviewed  Allergies   Allergen Reactions    Hydrocodone-Acetaminophen GI Intolerance     gi upset -pt okay if takes  zofran      Codeine GI Intolerance     GI issues    Mexiletine Rash    Oxycodone-Acetaminophen GI Intolerance     GI issues    Penicillins Other (See Comments) and GI Intolerance     GI issues,vaginitis    Pollen Extract-Tree Extract [Pollen Extract] Nasal Congestion       Objective       Current Vitals:   Blood Pressure: 118/78 (07/11/24 1522)  Pulse: 68 (07/11/24 1522)  Temperature: (!) 97 °F (36.1 °C) (07/11/24 1522)  Temp Source: Tympanic (07/11/24 1522)  Height: 5' 2.7\" (159.3 cm) (07/11/24 1522)  Weight - Scale: 89.4 kg (197 lb) (07/11/24 1522)      Invasive Devices       None                   Physical Exam  Vitals reviewed.   Constitutional:       General: She is not in acute distress.     Appearance: Normal " appearance. She is not ill-appearing.   HENT:      Head: Normocephalic.      Nose: Nose normal.      Mouth/Throat:      Mouth: Mucous membranes are moist.      Pharynx: Oropharynx is clear.   Eyes:      General: No scleral icterus.  Cardiovascular:      Rate and Rhythm: Normal rate.   Pulmonary:      Effort: Pulmonary effort is normal. No respiratory distress.   Abdominal:      Palpations: Abdomen is soft.      Tenderness: There is no abdominal tenderness.      Hernia: No hernia is present.      Comments: Laparoscopic port sites clean dry and intact.   Musculoskeletal:         General: Normal range of motion.      Cervical back: Normal range of motion.   Skin:     General: Skin is warm and dry.      Capillary Refill: Capillary refill takes less than 2 seconds.   Neurological:      General: No focal deficit present.      Mental Status: She is alert. Mental status is at baseline.   Psychiatric:         Mood and Affect: Mood normal.         Assessment/PLAN:    49 y.o. female status post robotic RNYGB and PEHR w mesh done on 1/30/2024 by Dr. Osmel Akins, with dysphagia to solid foods with vomiting s/p EGD with dilation.    The patient reports that her heartburn and reflux is better.      Follow-up in 3 months with bariatric PA.        Ignacio Brown MD  Bariatric Surgery   7/11/2024  3:38 PM      .

## 2024-07-11 NOTE — PROGRESS NOTES
[facility]  Outpatient Follow-up  Encounter: [id number]    PATIENT INFO     Name: Jenny Olvera  YOB: 1975   Age: 49 y.o.   Sex: female   MRN: [id number]    ASSESSMENT & PLAN     1. Irritable bowel syndrome with both constipation and diarrhea:  Meets ALYCIA IV criteria for IBS-mixed based on recurrent abdominal pain >2 days per week a/w defecation and change in frequency of stools. Ongoing symptoms since 1990's including abdominal cramping, bloating indigestion, pain a/w defecation and also relief with defecation. She was constipated prior to RNY in January 2024 but has now been experiencing predominant diarrhea over the past few months. No alarm features. Reviewed prior endoscopies: 5/1/24 EGD: Type 1 hiatal hernia with bypass anatomy. 7/27/23 colonoscopy: hyperplastic polyp in sigmoid colon, repeat in 2033. Notable labs include celiac disease profile in 2015 was negative; TSH from June 2022 was normal at 1.09.     Plan:  Repeat celiac profile and TSH   Recommend low FODMAP diet for 6-8 weeks; discussed with patient and handed information packet.   Follow-up in 2 months to reassess symptoms and consider pharmacotherapy at that time if not improvement.   Also consider lactose free trial if no improvement at next visit   Continue Bentyl as needed for abdominal cramping  Recommend routine exercise and continued weight loss. Hopefully recent bypass with help with weight loss as well  Probiotics are not recommended; no statistical significance shown in meta-analysis of 37 RCT's      2. GERD without esophagitis: Now with complete resolution in reflux symptoms since her RNY bypass in January 2024.      3. Class 2 obesity with body mass index (BMI) of 35.0 to 35.9 in adult, unspecified obesity type, unspecified whether serious comorbidity present    4. History of Isela-en-Y gastric bypass        Orders Placed This Encounter   Procedures    Celiac Disease Panel    TSH, 3rd  generation with Free T4 reflex       FOLLOW-UP: 2 months for IBS    HISTORY OF PRESENT ILLNESS       Jenny Olvera is a 49 y.o. female with a past medical history of GERD wiith hiatal hernia, mixed IBS, cholecystectomy in 1996, obesity s/p sleeve gastrectomy in August '22 and then conversion to RNYGB and PEHR with mesh 1/30/24 for insignificant weight loss and persistent GERD, chronic migraines, fibromyalgia following with rheum, positive LANE and SS-A Ab, and asthma who presents to GI office for follow-up IBS. Balwinder reports predominantly diarrhea at this time, with symptoms occurring 3-4 times a week. Describes Denali stool 6-7 when she has been days. Frequency between 1-3 times. Any food can trigger, salads, or greasy foods. Does report generalized abdominal pain that is chronic. Describes as crampy in nature. Pain sometimes occurs with BMs but is generally relived with BMs. Does report intermittent bloating. Has not been constipated for a few months. Before the bypass she would go weeks without a BM. Overall her diarrhea and constipation has improved since the bypass in January. She takes Bentyl as needed for cramping but has not used for a while. Does not take any other meds for IBS. On omeprazole but reflux completely resolved after surgery. Denies 1st degree relative with GI cancer. Mother had ovarian cancer unclear age. Reports paternal cousin with colon cancer.      ENDOSCOPIC HISTORY     UPPER ENDOSCOPY: 5/1/24 - Type 1 hiatal hernia with bypass anatomy  COLONOSCOPY: 7/27/23 - hyperplastic polyp in sigmoid colon, repeat in 2033     REVIEW OF SYSTEMS     ROS negative other than stated above    Historical Information   Past Medical History:   Diagnosis Date    Anxiety     Asthma     Claustrophobia     Cluster headache     CTS (carpal tunnel syndrome)     Depression     Fibromyalgia     primary; last assessed 11/27/17    GERD (gastroesophageal reflux disease)     GI problem     Headache, tension-type      Irritable bowel syndrome     Joint pain     Kidney stone     Lung nodule     last assessed 10/9/15    Migraine     Muscle pain     Peripheral neuropathy     PONV (postoperative nausea and vomiting)     Sjogren's syndrome (HCC)      Past Surgical History:   Procedure Laterality Date    BACK SURGERY      BARIATRIC SURGERY  8/9/2022    CARPAL TUNNEL RELEASE Bilateral     CHOLECYSTECTOMY      CYSTOSCOPY      GALLBLADDER SURGERY      GASTRECTOMY  08/09/2022    Sleeve    GASTRIC BYPASS LAPAROSCOPIC N/A 01/30/2024    Procedure: REVISION CONVERSION TO R-N-Y GASTRIC BYPASS W/ ROBOTICS AND INTRAOPERATIVE EGD; PARAESOPHAGEAL HERNIA REPAIR WITH MESH;  Surgeon: Felix Akins MD;  Location: [facility] OR;  Service: Bariatrics    HERNIA REPAIR  08/09/2022    Apparently done at time gastric sleeve. Still have major issues with it.    NECK SURGERY      NM TENDON SHEATH INCISION Right 01/16/2023    Procedure: THUMB TRIGGER FINGER RELEASE;  Surgeon: Gail Wylie MD;  Location: [facility];  Service: Orthopedics    SACROILIAC JOINT FUSION Left     SLEEVE GASTROPLASTY  08/9/2022    SPINAL FUSION      C4 and C5    ULNAR NERVE REPAIR Bilateral     UPPER GASTROINTESTINAL ENDOSCOPY       Social History   Social History     Substance and Sexual Activity   Alcohol Use Yes    Alcohol/week: 2.0 standard drinks of alcohol    Types: 2 Glasses of wine per week    Comment: Occasionally     Social History     Substance and Sexual Activity   Drug Use Never    Types: Marijuana    Comment: medicinal over 1 yr     Social History     Tobacco Use   Smoking Status Never   Smokeless Tobacco Never   Tobacco Comments    Never smoked.     Family History   Problem Relation Age of Onset    Diabetes Mother     Fibromyalgia Mother     Ovarian cancer Mother 33    Hypertension Mother     Thyroid disease Mother     Migraines Mother     Neuropathy Mother     Cancer Mother         has been removed    Arthritis Mother     COPD Mother     Depression Mother      No Known Problems Father     Throat cancer Maternal Grandmother     No Known Problems Maternal Grandfather     No Known Problems Paternal Grandmother     No Known Problems Paternal Grandfather     No Known Problems Daughter     Breast cancer Maternal Aunt     Dementia Maternal Aunt     No Known Problems Maternal Aunt     No Known Problems Maternal Aunt     No Known Problems Paternal Aunt     Colon cancer Cousin     Heart disease Cousin     Lupus Cousin     Arthritis Family     Heart disease Family         cardiac disorder    Neuropathy Family     Lupus Family         systemic erythematosus    No Known Problems Half-Sister     No Known Problems Half-Brother     No Known Problems Half-Brother     No Known Problems Half-Brother     Asthma Brother     Stroke Neg Hx          MEDICATIONS AND ALLERGIES     Current Outpatient Medications   Medication Instructions    albuterol (PROVENTIL HFA,VENTOLIN HFA) 90 mcg/act inhaler 2 puffs, Inhalation, Every 6 hours PRN    Botox 200 units SOLR Every three months for migraines    butalbital-acetaminophen-caffeine (FIORICET,ESGIC) -40 mg per tablet 1 tab q6 hours prn migraine. No more than 2-3 per week.    cholecalciferol (VITAMIN D3) 1,000 units tablet 1 capsule, Oral, Weekly, 3000 units daily    cholestyramine (QUESTRAN) 4 g, Oral, 2 times daily with meals    dicyclomine (BENTYL) 20 mg, Oral, 3 times daily PRN    famotidine (PEPCID) 40 mg, Oral, Daily at bedtime    fluticasone (FLONASE) 50 mcg/act nasal spray 1 spray in each nostril once daily    hydroxychloroquine (PLAQUENIL) 200 mg tablet Take 1 tablet (200 mg total) by mouth daily with breakfast for 14 days, THEN 1 tablet (200 mg total) 2 (two) times a day with meals.    lasmiditan succinate (Reyvow) 50 mg tablet One tab qhs at migraine onset.  Caution sedation.  Max 1 tab/24 hours.    meclizine (ANTIVERT) 12.5 mg, Oral, 3 times daily PRN    melatonin 3 mg No dose, route, or frequency recorded.    omeprazole (PRILOSEC) 20  "mg, Oral, Daily    ondansetron (ZOFRAN) 4 mg, Oral, Every 12 hours PRN    prochlorperazine (COMPAZINE) 10 mg, Oral, Every 6 hours PRN    tiZANidine (ZANAFLEX) 2 mg, Oral, Every 8 hours PRN    traZODone (DESYREL) 25 mg, Oral, Daily at bedtime    Trudhesa 0.725 MG/ACT AERS     venlafaxine (EFFEXOR-XR) 37.5 mg 24 hr capsule One capsule q.a.m. With food     Allergies   Allergen Reactions    Hydrocodone-Acetaminophen GI Intolerance     gi upset -pt okay if takes  zofran      Codeine GI Intolerance     GI issues    Mexiletine Rash    Oxycodone-Acetaminophen GI Intolerance     GI issues    Penicillins Other (See Comments) and GI Intolerance     GI issues,vaginitis    Pollen Extract-Tree Extract [Pollen Extract] Nasal Congestion       PHYSICAL EXAM      Objective   Blood pressure 116/76, temperature (!) 97.4 °F (36.3 °C), temperature source Tympanic, height 5' 2\" (1.575 m), weight 89.4 kg (197 lb). Body mass index is 36.03 kg/m².    General Appearance:   Obese. Alert, cooperative, no distress   HEENT:   Normocephalic, atraumatic, anicteric     Neck:   Supple, symmetrical, trachea midline   Lungs:   Equal chest rise, respirations unlabored    Heart:   Regular rate and rhythm   Abdomen:   Soft, non-tender, non-distended   Rectal:   Deferred    Extremities:   No cyanosis, clubbing or edema    Neuro:   No obvious focal deficits, moves extremities spontaneously    Skin:   No visible jaundice, rashes, or lesions      LABORATORY RESULTS     Appointment on 07/11/2024   Component Date Value    TSH 3RD GENERATON 07/11/2024 1.131     IGA 07/11/2024 332      XR elbow 3+ vw left    Result Date: 6/20/2024  Narrative: LEFT ELBOW INDICATION:   Bicipital tendinitis, unspecified shoulder. COMPARISON:  None. VIEWS:  XR ELBOW 3+ VW LEFT FINDINGS: There is no acute fracture or dislocation. There is no joint effusion. No significant degenerative changes. No lytic or blastic osseous lesion. Soft tissues are unremarkable.     Impression: No acute " osseous abnormality. Electronically signed: 06/20/2024 12:01 AM Pardeep Shore MD    XR elbow 3+ vw right    Result Date: 6/20/2024  Narrative: RIGHT ELBOW INDICATION:   Bicipital tendinitis, unspecified shoulder. COMPARISON:  None. VIEWS:  XR ELBOW 3+ VW RIGHT FINDINGS: There is no acute fracture or dislocation. There is no joint effusion. No significant degenerative changes. No lytic or blastic osseous lesion. Soft tissues are unremarkable.     Impression: No acute osseous abnormality. Electronically signed: 06/20/2024 12:00 AM Pardeep Shore MD      RADIOLOGY RESULTS: I have personally reviewed pertinent imaging studies.      Dominique Dickerson DO  Gastroenterology Fellow  [facility]  Division of Gastroenterology & Hepatology    ** Please Note: This note is constructed using a voice recognition dictation system. **

## 2024-07-11 NOTE — TELEPHONE ENCOUNTER
Folder Color- Red     Name of Form- [facility]     Form to be filled out by- Dr Dickerson     Form to be Faxed [phone]     Patient made aware of 10 business day policy.

## 2024-07-12 ENCOUNTER — PATIENT OUTREACH (OUTPATIENT)
Dept: INTERNAL MEDICINE CLINIC | Facility: CLINIC | Age: 49
End: 2024-07-12

## 2024-07-12 LAB
GLIADIN PEPTIDE IGA SER-ACNC: 0.6 U/ML
GLIADIN PEPTIDE IGA SER-ACNC: NEGATIVE
GLIADIN PEPTIDE IGG SER-ACNC: <0.4 U/ML
GLIADIN PEPTIDE IGG SER-ACNC: NEGATIVE
TTG IGA SER-ACNC: <0.5 U/ML
TTG IGA SER-ACNC: NEGATIVE
TTG IGG SER-ACNC: <0.8 U/ML
TTG IGG SER-ACNC: NEGATIVE

## 2024-07-12 NOTE — PROGRESS NOTES
[facility] received a new referral on 06/21/2024 in regard to pt identified through SDOH as possibly having concerns with housing. Its is also noted pt PHQ elevated at office of 15. Pt had discontinued Wellbutrin which was recommended by ENT. Pt agreed to trial Remeron.   [facility] contacted pt today and introduced self and role. Pt shared she receives OP MH services through a virtual therapist at [facility] bi monthly. Per pt the woman is a psychologist. Her provider does not prescribe. [facility] offered resources for OP MH services that offer therapy and psychiatry and pt declined. Pt would like to continue with her current therapist from [facility] and see if the Remeron helps her. Pt advised to contact [facility] if any OP MH resources are needed.   [facility] then inquired if pt has any concerns with housing. Pt denied same. Per pt she has rented the same apartment for 8 years. Pt today states maybe she mad a mistake on SDOH form. No additional concerns noted. [facility] will close referral.

## 2024-07-17 ENCOUNTER — OFFICE VISIT (OUTPATIENT)
Dept: OBGYN CLINIC | Facility: CLINIC | Age: 49
End: 2024-07-17
Payer: COMMERCIAL

## 2024-07-17 VITALS — BODY MASS INDEX: 34.91 KG/M2 | WEIGHT: 197 LBS | HEIGHT: 63 IN

## 2024-07-17 DIAGNOSIS — M18.11 ARTHRITIS OF CARPOMETACARPAL (CMC) JOINT OF RIGHT THUMB: Primary | ICD-10-CM

## 2024-07-17 DIAGNOSIS — G43.709 CHRONIC MIGRAINE WITHOUT AURA WITHOUT STATUS MIGRAINOSUS, NOT INTRACTABLE: ICD-10-CM

## 2024-07-17 DIAGNOSIS — M65.4 DE QUERVAIN'S TENOSYNOVITIS, LEFT: ICD-10-CM

## 2024-07-17 PROCEDURE — 20600 DRAIN/INJ JOINT/BURSA W/O US: CPT | Performed by: SURGERY

## 2024-07-17 PROCEDURE — 20550 NJX 1 TENDON SHEATH/LIGAMENT: CPT | Performed by: SURGERY

## 2024-07-17 PROCEDURE — 99214 OFFICE O/P EST MOD 30 MIN: CPT | Performed by: SURGERY

## 2024-07-17 RX ORDER — TRIAMCINOLONE ACETONIDE 40 MG/ML
20 INJECTION, SUSPENSION INTRA-ARTICULAR; INTRAMUSCULAR
Status: COMPLETED | OUTPATIENT
Start: 2024-07-17 | End: 2024-07-17

## 2024-07-17 RX ORDER — LIDOCAINE HYDROCHLORIDE 10 MG/ML
1 INJECTION, SOLUTION INFILTRATION; PERINEURAL
Status: COMPLETED | OUTPATIENT
Start: 2024-07-17 | End: 2024-07-17

## 2024-07-17 RX ORDER — DEXAMETHASONE SODIUM PHOSPHATE 10 MG/ML
40 INJECTION, SOLUTION INTRAMUSCULAR; INTRAVENOUS
Status: COMPLETED | OUTPATIENT
Start: 2024-07-17 | End: 2024-07-17

## 2024-07-17 RX ADMIN — LIDOCAINE HYDROCHLORIDE 1 ML: 10 INJECTION, SOLUTION INFILTRATION; PERINEURAL at 12:30

## 2024-07-17 RX ADMIN — TRIAMCINOLONE ACETONIDE 20 MG: 40 INJECTION, SUSPENSION INTRA-ARTICULAR; INTRAMUSCULAR at 12:30

## 2024-07-17 RX ADMIN — DEXAMETHASONE SODIUM PHOSPHATE 40 MG: 10 INJECTION, SOLUTION INTRAMUSCULAR; INTRAVENOUS at 12:30

## 2024-07-17 NOTE — PROGRESS NOTES
ASSESSMENT/PLAN:      49-year-old female here for her right thumb CMC arthritis and new onset of left de Quervain's tenosynovitis.  Due to acute exacerbation of right thumb CMC osteoarthritis, the decision was made to proceed localized cortisone injection into the right CMC joint, which she tolerated well.  A cortisone injection was provided for the left de Quervain's today.  Encouraged the patient to use heat on both areas.  Since the patient is going to therapy for her biceps tendinitis, the therapist could also address her de Quervain's. We can inject the DeQuervain up to 2x before entertaining surgery. The thumb cmc osteoarthritis can be injected safely every 3-4 months as needed.   Patient shows understanding and agrees with this plan of care.     The patient verbalized understanding of exam findings and treatment plan. We engaged in the shared decision-making process and treatment options were discussed at length with the patient. Surgical and conservative management discussed today along with risks and benefits.    Diagnoses and all orders for this visit:    Arthritis of carpometacarpal (CMC) joint of right thumb    De Quervain's tenosynovitis, left    Other orders  -     Small joint arthrocentesis  -     Hand/upper extremity injection        Follow Up:  Return in about 3 months (around 10/17/2024).      To Do Next Visit:  Re-evaluation of current issue    ____________________________________________________________________________________________________________________________________________      CHIEF COMPLAINT:  Chief Complaint   Patient presents with    Left Hand - Follow-up    Right Hand - Follow-up       SUBJECTIVE:  Jenny Olvera is a 49 y.o. year old RHD female who presents today for follow-up for her bilateral thumb pain.  Patient has treated in the past for CMC joint osteoarthritis with cortisone injections, 4/17/2024.  Patient reports today that she is having increased dorsal thumb pain that  does go into the wrist; left worse than right.  She is also being cared for by Dr. Li for biceps tendinitis.  She reports that she is supposed to start therapy for the bilateral biceps tendinitis today.  She notes she was caring for an injured friend's baby which involved a lot of heavy lifting, she believes this is what initiated her pain.        I have personally reviewed all the relevant PMH, PSH, SH, FH, Medications and allergies.     PAST MEDICAL HISTORY:  Past Medical History:   Diagnosis Date    Anxiety     Asthma     Claustrophobia     Cluster headache     CTS (carpal tunnel syndrome)     Depression     Fibromyalgia     primary; last assessed 11/27/17    GERD (gastroesophageal reflux disease)     GI problem     Headache, tension-type     Irritable bowel syndrome     Joint pain     Kidney stone     Lung nodule     last assessed 10/9/15    Migraine     Muscle pain     Peripheral neuropathy     PONV (postoperative nausea and vomiting)     Sjogren's syndrome (HCC)        PAST SURGICAL HISTORY:  Past Surgical History:   Procedure Laterality Date    BACK SURGERY      BARIATRIC SURGERY  8/9/2022    CARPAL TUNNEL RELEASE Bilateral     CHOLECYSTECTOMY      CYSTOSCOPY      GALLBLADDER SURGERY      GASTRECTOMY  08/09/2022    Sleeve    GASTRIC BYPASS LAPAROSCOPIC N/A 01/30/2024    Procedure: REVISION CONVERSION TO R-N-Y GASTRIC BYPASS W/ ROBOTICS AND INTRAOPERATIVE EGD; PARAESOPHAGEAL HERNIA REPAIR WITH MESH;  Surgeon: Felix Akins MD;  Location: [facility];  Service: Bariatrics    HERNIA REPAIR  08/09/2022    Apparently done at time gastric sleeve. Still have major issues with it.    NECK SURGERY      IA TENDON SHEATH INCISION Right 01/16/2023    Procedure: THUMB TRIGGER FINGER RELEASE;  Surgeon: Gail Wylie MD;  Location: [facility];  Service: Orthopedics    SACROILIAC JOINT FUSION Left     SLEEVE GASTROPLASTY  08/9/2022    SPINAL FUSION      C4 and C5    ULNAR NERVE REPAIR Bilateral     UPPER  GASTROINTESTINAL ENDOSCOPY         FAMILY HISTORY:  Family History   Problem Relation Age of Onset    Diabetes Mother     Fibromyalgia Mother     Ovarian cancer Mother 33    Hypertension Mother     Thyroid disease Mother     Migraines Mother     Neuropathy Mother     Cancer Mother         has been removed    Arthritis Mother     COPD Mother     Depression Mother     No Known Problems Father     Throat cancer Maternal Grandmother     No Known Problems Maternal Grandfather     No Known Problems Paternal Grandmother     No Known Problems Paternal Grandfather     No Known Problems Daughter     Breast cancer Maternal Aunt     Dementia Maternal Aunt     No Known Problems Maternal Aunt     No Known Problems Maternal Aunt     No Known Problems Paternal Aunt     Colon cancer Cousin     Heart disease Cousin     Lupus Cousin     Arthritis Family     Heart disease Family         cardiac disorder    Neuropathy Family     Lupus Family         systemic erythematosus    No Known Problems Half-Sister     No Known Problems Half-Brother     No Known Problems Half-Brother     No Known Problems Half-Brother     Asthma Brother     Stroke Neg Hx        SOCIAL HISTORY:  Social History     Tobacco Use    Smoking status: Never    Smokeless tobacco: Never    Tobacco comments:     Never smoked.   Vaping Use    Vaping status: Never Used   Substance Use Topics    Alcohol use: Yes     Alcohol/week: 2.0 standard drinks of alcohol     Types: 2 Glasses of wine per week     Comment: Occasionally    Drug use: Never     Types: Marijuana     Comment: medicinal over 1 yr       MEDICATIONS:    Current Outpatient Medications:     albuterol (PROVENTIL HFA,VENTOLIN HFA) 90 mcg/act inhaler, Inhale 2 puffs every 6 (six) hours as needed for wheezing or shortness of breath, Disp: 8 g, Rfl: 2    Botox 200 units SOLR, Every three months for migraines, Disp: , Rfl:     butalbital-acetaminophen-caffeine (FIORICET,ESGIC) -40 mg per tablet, 1 tab q6 hours prn  migraine. No more than 2-3 per week., Disp: 10 tablet, Rfl: 0    cholecalciferol (VITAMIN D3) 1,000 units tablet, Take 1 capsule by mouth once a week 3000 units daily, Disp: , Rfl:     cholestyramine (QUESTRAN) 4 GM/DOSE powder, Take 1 packet (4 g total) by mouth 2 (two) times a day with meals (Patient taking differently: Take 4 g by mouth 2 (two) times a day as needed), Disp: 378 g, Rfl: 2    fluticasone (FLONASE) 50 mcg/act nasal spray, 1 spray in each nostril once daily, Disp: 30 mL, Rfl: 0    hydroxychloroquine (PLAQUENIL) 200 mg tablet, Take 1 tablet (200 mg total) by mouth daily with breakfast for 14 days, THEN 1 tablet (200 mg total) 2 (two) times a day with meals., Disp: 194 tablet, Rfl: 0    lasmiditan succinate (Reyvow) 50 mg tablet, One tab qhs at migraine onset.  Caution sedation.  Max 1 tab/24 hours., Disp: 8 tablet, Rfl: 2    meclizine (ANTIVERT) 12.5 MG tablet, Take 1 tablet (12.5 mg total) by mouth 3 (three) times a day as needed for dizziness or nausea, Disp: 30 tablet, Rfl: 3    melatonin 3 mg, , Disp: , Rfl:     omeprazole (PriLOSEC) 20 mg delayed release capsule, Take 1 capsule (20 mg total) by mouth daily, Disp: 90 capsule, Rfl: 1    ondansetron (ZOFRAN) 4 mg tablet, Take 1 tablet (4 mg total) by mouth every 12 (twelve) hours as needed for nausea, Disp: 20 tablet, Rfl: 1    prochlorperazine (COMPAZINE) 10 mg tablet, Take 1 tablet (10 mg total) by mouth every 6 (six) hours as needed for nausea or vomiting, Disp: 30 tablet, Rfl: 0    tiZANidine (ZANAFLEX) 2 mg tablet, Take 1 tablet (2 mg total) by mouth every 8 (eight) hours as needed for muscle spasms, Disp: 90 tablet, Rfl: 1    traZODone (DESYREL) 50 mg tablet, Take 0.5 tablets (25 mg total) by mouth daily at bedtime, Disp: 15 tablet, Rfl: 0    Trudhesa 0.725 MG/ACT AERS, , Disp: , Rfl:     venlafaxine (EFFEXOR-XR) 37.5 mg 24 hr capsule, One capsule q.a.m. With food, Disp: 90 capsule, Rfl: 0    dicyclomine (BENTYL) 20 mg tablet, Take 1 tablet  (20 mg total) by mouth 3 (three) times a day as needed (migraine/cramps), Disp: 60 tablet, Rfl: 0    famotidine (PEPCID) 40 MG tablet, Take 1 tablet (40 mg total) by mouth daily at bedtime (Patient not taking: Reported on 4/17/2024), Disp: 30 tablet, Rfl: 3    ALLERGIES:  Allergies   Allergen Reactions    Hydrocodone-Acetaminophen GI Intolerance     gi upset -pt okay if takes  zofran      Codeine GI Intolerance     GI issues    Mexiletine Rash    Oxycodone-Acetaminophen GI Intolerance     GI issues    Penicillins Other (See Comments) and GI Intolerance     GI issues,vaginitis    Pollen Extract-Tree Extract [Pollen Extract] Nasal Congestion       REVIEW OF SYSTEMS:  Review of Systems   Constitutional:  Negative for chills, fever and unexpected weight change.   HENT:  Negative for hearing loss, nosebleeds and sore throat.    Eyes:  Negative for pain, redness and visual disturbance.   Respiratory:  Negative for cough, shortness of breath and wheezing.    Cardiovascular:  Negative for chest pain, palpitations and leg swelling.   Gastrointestinal:  Negative for abdominal pain and nausea.   Genitourinary:  Negative for dyspareunia, dysuria and frequency.   Skin:  Negative for rash and wound.   Neurological:  Negative for dizziness, numbness and headaches.   Psychiatric/Behavioral:  Negative for decreased concentration and suicidal ideas. The patient is not nervous/anxious.        VITALS:  There were no vitals filed for this visit.      _____________________________________________________  PHYSICAL EXAMINATION:  General: well developed and well nourished, alert, oriented times 3, and appears comfortable  Psychiatric: Normal  HEENT: Normocephalic, Atraumatic Trachea Midline, No torticollis  Pulmonary: No audible wheezing or respiratory distress   Cardiovascular: No pitting edema, 2+ radial pulse   Abdominal/GI: abdomen non tender, non distended   Skin: No masses, erythema, lacerations, fluctation,  "ulcerations  Neurovascular: Sensation Intact to the Median, Ulnar, Radial Nerve, Motor Intact to the Median, Ulnar, Radial Nerve, and Pulses Intact  Musculoskeletal: Normal, except as noted in detailed exam and in HPI.      MUSCULOSKELETAL EXAMINATION:    De Quervain's Tenosynovitis Exam:  left side    Positive tender to palpation over 1st dorsal extensor compartment   Negative palpable nodule  Negative crepitus over 1st dorsal extensor compartment   Positive Finkelstein's test    Positive pain with resisted abduction of the thumb     De Quervain's Tenosynovitis Exam:  right side    Positive tender to palpation over 1st dorsal extensor compartment   Negative palpable nodule  Negative crepitus over 1st dorsal extensor compartment   Positive Finkelstein's test    Positive pain with resisted abduction of the thumb   ___________________________________________________    STUDIES REVIEWED:  I have personally reviewed and interpreted  AP lateral and oblique radiographs of xrays of the right hand 3 views reviewed from 12/1/2021   which demonstrate mild cmc arthrosis         LABS REVIEWED:    HgA1c:   Lab Results   Component Value Date    HGBA1C 5.7 (H) 06/22/2022     BMP:   Lab Results   Component Value Date    GLUCOSE 88 02/20/2015    CALCIUM 8.3 (L) 01/31/2024     02/20/2015    K 4.0 01/31/2024    CO2 24 01/31/2024     01/31/2024    BUN 9 01/31/2024    CREATININE 0.52 (L) 01/31/2024             PROCEDURES PERFORMED:  Small joint arthrocentesis: R thumb CMC  [place] Protocol:  Consent: Verbal consent obtained.  Risks and benefits: risks, benefits and alternatives were discussed  Consent given by: patient  Time out: Immediately prior to procedure a \"time out\" was called to verify the correct patient, procedure, equipment, support staff and site/side marked as required.  Timeout called at: 7/17/2024 12:45 PM.  Patient understanding: patient states understanding of the procedure being performed  Site marked: " "the operative site was marked  Patient identity confirmed: verbally with patient  Supporting Documentation  Indications: pain   Procedure Details  Location: thumb - R thumb CMC  Preparation: Patient was prepped and draped in the usual sterile fashion  Needle size: 25 G  Ultrasound guidance: no  Approach: volar  Medications administered: 1 mL lidocaine 1 %; 20 mg triamcinolone acetonide 40 mg/mL    Patient tolerance: patient tolerated the procedure well with no immediate complications  Dressing:  Sterile dressing applied      Hand/upper extremity injection: L extensor compartment 1  Universal Protocol:  Consent: Verbal consent obtained.  Risks and benefits: risks, benefits and alternatives were discussed  Consent given by: patient  Time out: Immediately prior to procedure a \"time out\" was called to verify the correct patient, procedure, equipment, support staff and site/side marked as required.  Timeout called at: 7/17/2024 12:46 PM.  Patient understanding: patient states understanding of the procedure being performed  Site marked: the operative site was marked  Patient identity confirmed: verbally with patient  Supporting Documentation  Indications: tendon swelling and pain   Procedure Details  Condition:de Quervain's tenosynovitis Site: L extensor compartment 1   Preparation: Patient was prepped and draped in the usual sterile fashion  Needle size: 25 G  Ultrasound guidance: no  Approach: volar  Medications administered: 1 mL lidocaine 1 %; 40 mg dexamethasone 100 mg/10 mL  Patient tolerance: patient tolerated the procedure well with no immediate complications  Dressing:  Sterile dressing applied         _____________________________________________________      Scribe Attestation      I,:  Zoila Potter am acting as a scribe while in the presence of the attending physician.:       I,:  Gail Wylie MD personally performed the services described in this documentation    as scribed in my presence.:       "

## 2024-07-18 ENCOUNTER — PATIENT MESSAGE (OUTPATIENT)
Dept: NEUROLOGY | Facility: CLINIC | Age: 49
End: 2024-07-18

## 2024-07-18 RX ORDER — VENLAFAXINE HYDROCHLORIDE 37.5 MG/1
CAPSULE, EXTENDED RELEASE ORAL
Qty: 90 CAPSULE | Refills: 0 | Status: SHIPPED | OUTPATIENT
Start: 2024-07-18

## 2024-07-18 NOTE — TELEPHONE ENCOUNTER
Per LOV 12/14/23:    -     venlafaxine (EFFEXOR-XR) 37.5 mg 24 hr capsule; One capsule q.a.m. With food x7 days  -     venlafaxine (EFFEXOR-XR) 75 mg 24 hr capsule; After titration with lower dose, start 75 mg q.a.m. With food    Please cancel request below if not appropriate     Thank you!

## 2024-07-25 ENCOUNTER — TELEPHONE (OUTPATIENT)
Dept: INTERNAL MEDICINE CLINIC | Facility: CLINIC | Age: 49
End: 2024-07-25

## 2024-07-25 NOTE — TELEPHONE ENCOUNTER
Received call from patient requesting a different medication than Plaquenil. Patient stated the medication is giving her GI side effects. Patient stating it is giving her diarrhea and she is unable to leave the house. Patient stating she has to stop the medication a few days in advance prior to leaving her home for appointments.     Please review and advise.              3

## 2024-07-26 ENCOUNTER — ANNUAL EXAM (OUTPATIENT)
Dept: OBGYN CLINIC | Facility: CLINIC | Age: 49
End: 2024-07-26
Payer: COMMERCIAL

## 2024-07-26 VITALS
BODY MASS INDEX: 35.88 KG/M2 | WEIGHT: 195 LBS | SYSTOLIC BLOOD PRESSURE: 130 MMHG | DIASTOLIC BLOOD PRESSURE: 86 MMHG | HEIGHT: 62 IN

## 2024-07-26 DIAGNOSIS — N81.10 VAGINAL PROLAPSE: ICD-10-CM

## 2024-07-26 DIAGNOSIS — Z01.411 ENCOUNTER FOR GYNECOLOGICAL EXAMINATION WITH ABNORMAL FINDING: Primary | ICD-10-CM

## 2024-07-26 PROBLEM — Z46.89 ENCOUNTER FOR FITTING AND ADJUSTMENT OF PESSARY: Status: RESOLVED | Noted: 2023-09-18 | Resolved: 2024-07-26

## 2024-07-26 PROCEDURE — G0101 CA SCREEN;PELVIC/BREAST EXAM: HCPCS | Performed by: PHYSICIAN ASSISTANT

## 2024-07-26 NOTE — PROGRESS NOTES
"Assessment/Plan:      Diagnoses and all orders for this visit:    Encounter for gynecological examination with abnormal finding  -     Thinprep Tis and HPV mRNA E6/E7    Vaginal prolapse  -     Ambulatory Referral to Urogynecology; Future        Pap done.  Order for mammogram for 2025 already in Epic.  Referral to urogynecology placed; patient to call for appointment.  If no problems, patient to return in 1 year for routine gyn care.    Subjective:     Patient ID: Jenny Olvera is a 49 y.o. female.    Patient is here for yearly gyn exam.  Seen with her daughter present.  States she is doing well overall.  S/p bariatric surgery in January.  Currently has Mirena IUD; will be due for removal in 2027.  Does not get a period, only occasional spotting.  Denies cramping.  Patient has history of vaginal prolapse.  She was using a pessary until fall of 2023.  Would like referral to urogynecology for surgical correction consult.  Denies bowel changes, pelvic pain, bloating, abdominal pain, n/v, change in appetite, and thyroid disease.  Last Pap was in 2020.    Mammogram in May was benign.  Patient is performing self-breast exam.  Denies new masses, skin changes, nipple discharge, and pain/tenderness.        Review of Systems   Constitutional:  Negative for appetite change and unexpected weight change.   Cardiovascular:         No masses, skin changes, nipple discharge, and pain/tenderness.   Gastrointestinal:  Negative for abdominal distention, abdominal pain, constipation, diarrhea, nausea and vomiting.   Genitourinary:  Negative for difficulty urinating, dysuria, frequency, genital sores, hematuria, menstrual problem, pelvic pain, urgency, vaginal bleeding, vaginal discharge and vaginal pain.         Objective:  Visit Vitals  /86 (BP Location: Left arm, Patient Position: Sitting, Cuff Size: Standard)   Ht 5' 2\" (1.575 m)   Wt 88.5 kg (195 lb)   BMI 35.67 kg/m²   OB Status Implant   Smoking Status Never   BSA 1.89 m² "         Physical Exam  Vitals reviewed. Exam conducted with a chaperone present.   Constitutional:       Appearance: Normal appearance. She is well-developed. She is obese.   Neck:      Thyroid: No thyromegaly.   Pulmonary:      Effort: Pulmonary effort is normal.   Chest:   Breasts:     Breasts are symmetrical.      Right: Normal. No swelling, bleeding, inverted nipple, mass, nipple discharge, skin change or tenderness.      Left: Normal. No swelling, bleeding, inverted nipple, mass, nipple discharge, skin change or tenderness.   Abdominal:      General: There is no distension.      Palpations: Abdomen is soft.      Tenderness: There is no abdominal tenderness.   Genitourinary:     General: Normal vulva.      Pubic Area: No rash.       Labia:         Right: No rash, tenderness, lesion or injury.         Left: No rash, tenderness, lesion or injury.       Vagina: Prolapsed vaginal walls present. No vaginal discharge, erythema, tenderness or bleeding.      Cervix: Normal.      Uterus: Normal.       Adnexa: Right adnexa normal and left adnexa normal.        Right: No mass, tenderness or fullness.          Left: No mass, tenderness or fullness.              Comments: IUD strings present.  Musculoskeletal:      Cervical back: Neck supple.   Lymphadenopathy:      Cervical: No cervical adenopathy.      Upper Body:      Right upper body: No supraclavicular or axillary adenopathy.      Left upper body: No supraclavicular or axillary adenopathy.      Lower Body: No right inguinal adenopathy. No left inguinal adenopathy.   Skin:     General: Skin is warm and dry.   Neurological:      Mental Status: She is alert and oriented to person, place, and time.   Psychiatric:         Behavior: Behavior normal. Behavior is cooperative.         Thought Content: Thought content normal.         Judgment: Judgment normal.

## 2024-07-29 NOTE — TELEPHONE ENCOUNTER
Spoke with patient on the phone. Introduced self and role. Patient updated attempted to reach Dr. Cotto office without success. Patient stated she has stopped the Plaquenil due to GI side effect. Patient updated physician will not be at office until August. Patient expressed understanding and aware office will f/u with Dr. Cotto at that time. All questions/concerns answered at this time.

## 2024-07-31 LAB
CLINICAL INFO: NORMAL
CYTO CVX: NORMAL
CYTOLOGY CMNT CVX/VAG CYTO-IMP: NORMAL
DATE PREVIOUS BX: NORMAL
HPV E6+E7 MRNA CVX QL NAA+PROBE: NOT DETECTED
LMP START DATE: NORMAL
SL AMB PREV. PAP:: NORMAL
SPECIMEN SOURCE CVX/VAG CYTO: NORMAL

## 2024-08-15 ENCOUNTER — TELEPHONE (OUTPATIENT)
Dept: INTERNAL MEDICINE CLINIC | Facility: CLINIC | Age: 49
End: 2024-08-15

## 2024-08-15 NOTE — TELEPHONE ENCOUNTER
Folder Color- Red     Name of Form- [facility]     Form to be filled out by- Dr Dickerson     Form to be Faxed [phone]     Patient made aware of 10 business day policy.

## 2024-08-28 ENCOUNTER — OFFICE VISIT (OUTPATIENT)
Dept: OBGYN CLINIC | Facility: CLINIC | Age: 49
End: 2024-08-28
Payer: COMMERCIAL

## 2024-08-28 ENCOUNTER — PROCEDURE VISIT (OUTPATIENT)
Dept: NEUROLOGY | Facility: CLINIC | Age: 49
End: 2024-08-28
Payer: COMMERCIAL

## 2024-08-28 VITALS — TEMPERATURE: 98 F | DIASTOLIC BLOOD PRESSURE: 76 MMHG | SYSTOLIC BLOOD PRESSURE: 120 MMHG | HEART RATE: 62 BPM

## 2024-08-28 VITALS — WEIGHT: 195 LBS | HEIGHT: 62 IN | BODY MASS INDEX: 35.88 KG/M2

## 2024-08-28 DIAGNOSIS — M65.4 DE QUERVAIN'S TENOSYNOVITIS, RIGHT: ICD-10-CM

## 2024-08-28 DIAGNOSIS — G43.709 CHRONIC MIGRAINE WITHOUT AURA WITHOUT STATUS MIGRAINOSUS, NOT INTRACTABLE: Primary | ICD-10-CM

## 2024-08-28 DIAGNOSIS — F41.8 DEPRESSION WITH ANXIETY: ICD-10-CM

## 2024-08-28 DIAGNOSIS — M65.4 DE QUERVAIN'S TENOSYNOVITIS, LEFT: Primary | ICD-10-CM

## 2024-08-28 PROCEDURE — 64615 CHEMODENERV MUSC MIGRAINE: CPT | Performed by: PHYSICIAN ASSISTANT

## 2024-08-28 PROCEDURE — 99214 OFFICE O/P EST MOD 30 MIN: CPT | Performed by: SURGERY

## 2024-08-28 PROCEDURE — 20550 NJX 1 TENDON SHEATH/LIGAMENT: CPT | Performed by: SURGERY

## 2024-08-28 RX ORDER — LIDOCAINE HYDROCHLORIDE 10 MG/ML
1 INJECTION, SOLUTION INFILTRATION; PERINEURAL
Status: COMPLETED | OUTPATIENT
Start: 2024-08-28 | End: 2024-08-28

## 2024-08-28 RX ORDER — DEXAMETHASONE SODIUM PHOSPHATE 10 MG/ML
40 INJECTION, SOLUTION INTRAMUSCULAR; INTRAVENOUS
Status: COMPLETED | OUTPATIENT
Start: 2024-08-28 | End: 2024-08-28

## 2024-08-28 RX ORDER — TRAZODONE HYDROCHLORIDE 50 MG/1
TABLET, FILM COATED ORAL
Qty: 15 TABLET | Refills: 0 | Status: SHIPPED | OUTPATIENT
Start: 2024-08-28

## 2024-08-28 RX ADMIN — LIDOCAINE HYDROCHLORIDE 1 ML: 10 INJECTION, SOLUTION INFILTRATION; PERINEURAL at 13:00

## 2024-08-28 RX ADMIN — DEXAMETHASONE SODIUM PHOSPHATE 40 MG: 10 INJECTION, SOLUTION INTRAMUSCULAR; INTRAVENOUS at 13:00

## 2024-08-28 NOTE — PROGRESS NOTES
Universal Protocol   Consent: Verbal consent obtained. Written consent obtained.  Risks and benefits: risks, benefits and alternatives were discussed  Consent given by: patient  Patient understanding: patient states understanding of the procedure being performed  Patient consent: the patient's understanding of the procedure matches consent given  Procedure consent: procedure consent matches procedure scheduled      Chemodenervation     Date/Time  8/28/2024 10:00 AM     Performed by  Linda Ward PA-C   Authorized by  Linda Ward PA-C     Pre-procedure details      Prepped With: Alcohol     Procedure details      Position:  Upright   Botox      Botox Type:  Type A    Brand:  Botox    mL's of Botulinum Toxin:  200    Final Concentration per CC:  100 units    Needle Gauge:  30 G 2.5 inch   Procedures      Botox Procedures: chronic headache      Indications: migraines     Injection Location      Head / Face:  L superior trapezius, R superior trapezius, L superior cervical paraspinal, R superior cervical paraspinal, L , R , procerus, L temporalis, R temporalis, R frontalis, L frontalis, R medial occipitalis and L medial occipitalis    L  injection amount:  5 unit(s)    R  injection amount:  5 unit(s)    L lateral frontalis:  5 unit(s)    R lateral frontalis:  5 unit(s)    L medial frontalis:  5 unit(s)    R medial frontalis:  5 unit(s)    L temporalis injection amount:  20 unit(s)    R temporalis injection amount:  20 unit(s)    Procerus injection amount:  5 unit(s)    L medial occipitalis injection amount:  15 unit(s)    R medial occipitalis injection amount:  15 unit(s)    L superior cervical paraspinal injection amount:  10 unit(s)    R superior cervical paraspinal injection amount:  10 unit(s)    L superior trapezius injection amount:  15 unit(s)    R superior trapezius injection amount:  15 unit(s)   Total Units      Total units used:  200    Total units discarded:  0    Post-procedure details      Chemodenervation:  Chronic migraine    Facial Nerve Location::  Bilateral facial nerve    Patient tolerance of procedure:  Tolerated well, no immediate complications   Comments        Extra units medically necessary:  - 10 between both upper traps  - 15 between both middle traps  - 10 between bilateral occipitalis muscles  - 10 between both anterior temporalis muscles       Blood pressure 120/76, pulse 62, temperature 98 °F (36.7 °C), temperature source Temporal.    Will order Vyepti.

## 2024-08-28 NOTE — PROGRESS NOTES
ASSESSMENT/PLAN:      49 y.o. female with right thumb CMC arthritis, left De Quervain's tenosynovitis and a new issues of right De Quervain's tenosynovitis     A 2nd left De Quervain's CSI and an initial right wrist De Quervain's CSI was performed in the office without complication. Post injection protocol/expectations were reviewed. The right sided De Quervain's CSI may be repeated in 6 weeks time. Advised to wear the splints that were made by OT at night as the braces can cause daytime stiffness. She has an appointment scheduled for October, which is an appropriate follow up.     The patient verbalized understanding of exam findings and treatment plan. We engaged in the shared decision-making process and treatment options were discussed at length with the patient. Surgical and conservative management discussed today along with risks and benefits.    Diagnoses and all orders for this visit:    De Quervain's tenosynovitis, left  -     Hand/upper extremity injection: bilateral extensor compartment 1    De Quervain's tenosynovitis, right  -     Hand/upper extremity injection: bilateral extensor compartment 1      Follow Up:  Return for as scheduled .      To Do Next Visit:  Re-evaluation of current issue    ____________________________________________________________________________________________________________________________________________      CHIEF COMPLAINT:  Chief Complaint   Patient presents with    Left Hand - Follow-up       SUBJECTIVE:  Jenny Olvera is a 49 y.o. year old RHD female who presents to the office for a follow up regarding left De Quervain's tenosynovitis. She underwent a De Quervain's CSI on 7/17/24. She feels her left sided symptoms have improved approx. 50 percent. She notes pain to he radial aspect of both wrists. She is attending OT. Therapy did fabricate her splints for both wrists, which she will wear on an as needed basis. She will take Tylenol as needed for pain control.         I  have personally reviewed all the relevant PMH, PSH, SH, FH, Medications and allergies.     PAST MEDICAL HISTORY:  Past Medical History:   Diagnosis Date    Anxiety     Asthma     Claustrophobia     Cluster headache     CTS (carpal tunnel syndrome)     Depression     Fibromyalgia     primary; last assessed 11/27/17    GERD (gastroesophageal reflux disease)     GI problem     Headache, tension-type     Irritable bowel syndrome     Joint pain     Kidney stone     Lung nodule     last assessed 10/9/15    Migraine     Muscle pain     Peripheral neuropathy     PONV (postoperative nausea and vomiting)     Sjogren's syndrome (HCC)        PAST SURGICAL HISTORY:  Past Surgical History:   Procedure Laterality Date    BACK SURGERY      BARIATRIC SURGERY  8/9/2022    CARPAL TUNNEL RELEASE Bilateral     CHOLECYSTECTOMY      CYSTOSCOPY      GALLBLADDER SURGERY      GASTRECTOMY  08/09/2022    Sleeve    GASTRIC BYPASS LAPAROSCOPIC N/A 01/30/2024    Procedure: REVISION CONVERSION TO R-N-Y GASTRIC BYPASS W/ ROBOTICS AND INTRAOPERATIVE EGD; PARAESOPHAGEAL HERNIA REPAIR WITH MESH;  Surgeon: Felix Akins MD;  Location: [facility];  Service: Bariatrics    HERNIA REPAIR  08/09/2022    Apparently done at time gastric sleeve. Still have major issues with it.    NECK SURGERY      [place] TENDON SHEATH INCISION Right 01/16/2023    Procedure: THUMB TRIGGER FINGER RELEASE;  Surgeon: Gail Wylie MD;  [facility];  Service: Orthopedics    SACROILIAC JOINT FUSION Left     SLEEVE GASTROPLASTY  08/9/2022    SPINAL FUSION      C4 and C5    ULNAR NERVE REPAIR Bilateral     UPPER GASTROINTESTINAL ENDOSCOPY         FAMILY HISTORY:  Family History   Problem Relation Age of Onset    Diabetes Mother     Fibromyalgia Mother     Ovarian cancer Mother 33    Hypertension Mother     Thyroid disease Mother     Migraines Mother     Neuropathy Mother     Cancer Mother         has been removed    Arthritis Mother     COPD Mother     Depression  Mother     No Known Problems Father     Throat cancer Maternal Grandmother     No Known Problems Maternal Grandfather     No Known Problems Paternal Grandmother     No Known Problems Paternal Grandfather     No Known Problems Daughter     Breast cancer Maternal Aunt     Dementia Maternal Aunt     No Known Problems Maternal Aunt     No Known Problems Maternal Aunt     No Known Problems Paternal Aunt     Colon cancer Cousin     Heart disease Cousin     Lupus Cousin     Arthritis Family     Heart disease Family         cardiac disorder    Neuropathy Family     Lupus Family         systemic erythematosus    No Known Problems Half-Sister     No Known Problems Half-Brother     No Known Problems Half-Brother     No Known Problems Half-Brother     Asthma Brother     Stroke Neg Hx        SOCIAL HISTORY:  Social History     Tobacco Use    Smoking status: Never    Smokeless tobacco: Never    Tobacco comments:     Never smoked.   Vaping Use    Vaping status: Never Used   Substance Use Topics    Alcohol use: Yes     Alcohol/week: 2.0 standard drinks of alcohol     Types: 2 Glasses of wine per week     Comment: Occasionally    Drug use: Not Currently     Types: Marijuana     Comment: medicinal over 1 yr       MEDICATIONS:    Current Outpatient Medications:     albuterol (PROVENTIL HFA,VENTOLIN HFA) 90 mcg/act inhaler, Inhale 2 puffs every 6 (six) hours as needed for wheezing or shortness of breath, Disp: 8 g, Rfl: 2    Botox 200 units SOLR, Every three months for migraines, Disp: , Rfl:     butalbital-acetaminophen-caffeine (FIORICET,ESGIC) -40 mg per tablet, 1 tab q6 hours prn migraine. No more than 2-3 per week., Disp: 10 tablet, Rfl: 0    cholecalciferol (VITAMIN D3) 1,000 units tablet, Take 1 capsule by mouth once a week 3000 units daily, Disp: , Rfl:     cholestyramine (QUESTRAN) 4 GM/DOSE powder, Take 1 packet (4 g total) by mouth 2 (two) times a day with meals (Patient taking differently: Take 4 g by mouth 2 (two)  times a day as needed), Disp: 378 g, Rfl: 2    fluticasone (FLONASE) 50 mcg/act nasal spray, 1 spray in each nostril once daily, Disp: 30 mL, Rfl: 0    lasmiditan succinate (Reyvow) 50 mg tablet, One tab qhs at migraine onset.  Caution sedation.  Max 1 tab/24 hours., Disp: 8 tablet, Rfl: 2    meclizine (ANTIVERT) 12.5 MG tablet, Take 1 tablet (12.5 mg total) by mouth 3 (three) times a day as needed for dizziness or nausea, Disp: 30 tablet, Rfl: 3    Nerve Stimulator (Nerivio) ALIX, Start treatment within 60 minutes of migraine onset. Set a strong, yet comfortable intensity level in the first few minutes and maintain that level for 45 minutes., Disp: 1 each, Rfl: 2    omeprazole (PriLOSEC) 20 mg delayed release capsule, Take 1 capsule (20 mg total) by mouth daily, Disp: 90 capsule, Rfl: 1    ondansetron (ZOFRAN) 4 mg tablet, Take 1 tablet (4 mg total) by mouth every 12 (twelve) hours as needed for nausea, Disp: 20 tablet, Rfl: 1    prochlorperazine (COMPAZINE) 10 mg tablet, Take 1 tablet (10 mg total) by mouth every 6 (six) hours as needed for nausea or vomiting, Disp: 30 tablet, Rfl: 0    tiZANidine (ZANAFLEX) 2 mg tablet, Take 1 tablet (2 mg total) by mouth every 8 (eight) hours as needed for muscle spasms, Disp: 90 tablet, Rfl: 1    Trudhesa 0.725 MG/ACT AERS, , Disp: , Rfl:     venlafaxine (EFFEXOR-XR) 37.5 mg 24 hr capsule, ONE CAPSULE DAILY IN THE MORNING WITH FOOD, Disp: 90 capsule, Rfl: 0    dicyclomine (BENTYL) 20 mg tablet, Take 1 tablet (20 mg total) by mouth 3 (three) times a day as needed (migraine/cramps), Disp: 60 tablet, Rfl: 0    famotidine (PEPCID) 40 MG tablet, Take 1 tablet (40 mg total) by mouth daily at bedtime (Patient not taking: Reported on 4/17/2024), Disp: 30 tablet, Rfl: 3    hydroxychloroquine (PLAQUENIL) 200 mg tablet, Take 1 tablet (200 mg total) by mouth daily with breakfast for 14 days, THEN 1 tablet (200 mg total) 2 (two) times a day with meals. (Patient not taking: Reported on  7/26/2024), Disp: 194 tablet, Rfl: 0    melatonin 3 mg, , Disp: , Rfl:     traZODone (DESYREL) 50 mg tablet, Take 0.5 tablets (25 mg total) by mouth daily at bedtime, Disp: 15 tablet, Rfl: 0  No current facility-administered medications for this visit.    ALLERGIES:  Allergies   Allergen Reactions    Hydrocodone-Acetaminophen GI Intolerance     gi upset -pt okay if takes  zofran      Codeine GI Intolerance     GI issues    Mexiletine Rash    Oxycodone-Acetaminophen GI Intolerance     GI issues    Penicillins Other (See Comments) and GI Intolerance     GI issues,vaginitis    Pollen Extract-Tree Extract [Pollen Extract] Nasal Congestion       REVIEW OF SYSTEMS:  Review of Systems   Constitutional:  Negative for chills, fever and unexpected weight change.   HENT:  Negative for hearing loss, nosebleeds and sore throat.    Eyes:  Negative for pain, redness and visual disturbance.   Respiratory:  Negative for cough, shortness of breath and wheezing.    Cardiovascular:  Negative for chest pain, palpitations and leg swelling.   Gastrointestinal:  Negative for abdominal pain, nausea and vomiting.   Endocrine: Negative for polydipsia and polyuria.   Genitourinary:  Negative for difficulty urinating and hematuria.   Musculoskeletal:  Negative for arthralgias, joint swelling and myalgias.   Skin:  Negative for rash and wound.   Neurological:  Negative for dizziness, numbness and headaches.   Psychiatric/Behavioral:  Negative for decreased concentration, dysphoric mood and suicidal ideas. The patient is not nervous/anxious.        VITALS:  There were no vitals filed for this visit.      _____________________________________________________  PHYSICAL EXAMINATION:  General: well developed and well nourished, alert, oriented times 3, and appears comfortable  Psychiatric: Normal  HEENT: Normocephalic, Atraumatic Trachea Midline, No torticollis  Pulmonary: No audible wheezing or respiratory distress   Cardiovascular: No pitting  edema, 2+ radial pulse   Abdominal/GI: abdomen non tender, non distended   Skin: No masses, erythema, lacerations, fluctation, ulcerations  Neurovascular: Sensation Intact to the Median, Ulnar, Radial Nerve, Motor Intact to the Median, Ulnar, Radial Nerve, and Pulses Intact  Musculoskeletal: Normal, except as noted in detailed exam and in HPI.      MUSCULOSKELETAL EXAMINATION:    De Quervain's Tenosynovitis Exam:  Bilateral side    Positive tender to palpation over 1st dorsal extensor compartment   Negative palpable nodule  Negative crepitus over 1st dorsal extensor compartment   Positive Finkelstein's test    Positive pain with resisted abduction of the thumb    ___________________________________________________    STUDIES REVIEWED:  No new imaging to review     LABS REVIEWED:    HgA1c:   Lab Results   Component Value Date    HGBA1C 5.7 (H) 06/22/2022     BMP:   Lab Results   Component Value Date    GLUCOSE 88 02/20/2015    CALCIUM 8.3 (L) 01/31/2024     02/20/2015    K 4.0 01/31/2024    CO2 24 01/31/2024     01/31/2024    BUN 9 01/31/2024    CREATININE 0.52 (L) 01/31/2024             PROCEDURES PERFORMED:  Hand/upper extremity injection: bilateral extensor compartment 1  Universal Protocol:  procedure performed by consultantConsent: Verbal consent obtained. Written consent not obtained.  Risks and benefits: risks, benefits and alternatives were discussed  Consent given by: patient  Patient understanding: patient states understanding of the procedure being performed  Site marked: the operative site was marked  Patient identity confirmed: verbally with patient  Supporting Documentation  Indications: pain   Procedure Details  Condition:de Quervain's tenosynovitis Site: bilateral extensor compartment 1   Needle size: 25 G  Ultrasound guidance: no  Medications (Right): 1 mL lidocaine 1 %; 40 mg dexamethasone 100 mg/10 mLMedications (Left): 1 mL lidocaine 1 %; 40 mg dexamethasone 100 mg/10 mL   Patient  tolerance: patient tolerated the procedure well with no immediate complications  Dressing:  Sterile dressing applied              _____________________________________________________      Scribe Attestation      I,:  Radha Cleaning MA am acting as a scribe while in the presence of the attending physician.:       I,:  Gail Wylie MD personally performed the services described in this documentation    as scribed in my presence.:

## 2024-08-29 ENCOUNTER — OFFICE VISIT (OUTPATIENT)
Dept: OBGYN CLINIC | Facility: CLINIC | Age: 49
End: 2024-08-29

## 2024-08-29 VITALS
WEIGHT: 196 LBS | SYSTOLIC BLOOD PRESSURE: 130 MMHG | HEART RATE: 74 BPM | HEIGHT: 62 IN | BODY MASS INDEX: 36.07 KG/M2 | DIASTOLIC BLOOD PRESSURE: 80 MMHG

## 2024-08-29 DIAGNOSIS — N81.10 VAGINAL PROLAPSE: Primary | ICD-10-CM

## 2024-08-29 PROCEDURE — 99203 OFFICE O/P NEW LOW 30 MIN: CPT | Performed by: OBSTETRICS & GYNECOLOGY

## 2024-08-29 NOTE — PROGRESS NOTES
Urogynecology - New Patient Visit  Jenny Olvera   [id number]      HPI: 49 y.o. presents for symptoms of vaginal prolapse. Jenny reports a sensation of a bulge that started in . She reports that she has tried 2 or 3 pessaries in the past that have fallen out or not worked well. She last used a pessary in  of . Remainder of HPI is below.     Referred by gynecologist:  yes  History of prolapse surgery: no  Primary care doctor: yes    Prolapse symptoms  Bulge: yes   Pressure: yes  Rubbing on clothing: yes  Stenting for urine: no  Stenting for stool: no  Pessary use: Previously used 2-3 different types of pessaries, last in   Hormonal replacement therapy: no      Urinary symptoms  Urgency: no  Frequency: yes 7 or more/ day  Incontinence with stress (exercise, valsalva, laugh, sneeze, cough): yes, rarely  Incontinence with urge: no  #pads used/24 hours: 0  Liners/pads/diapers   Postural incontinence: no  Nocturia:yes 2-3 times / night  Dysuria: no  Hematuria:no  Incomplete emptying: yes  Slow stream:yes  Hesitancy: yes  Straining with urination: yes    Defecatory symptoms  Constipation:yes  Frequency: varies, goes 4 days to 2 weeks without going  Urgency: no  Fecal Incontinence: no  Gas incontinence:  yes  Loose stools:  yes, occasional   Bowel regimen:  no None currently    Gynecologic history  Pap history: Normal Year: 2024  Occasional spotting with IUD  IUD placed   Sexually active: yes  Dyspareunia: yes   OB History    Para Term  AB Living   2 2 2     2   SAB IAB Ectopic Multiple Live Births           2      # Outcome Date GA Lbr Oleg/2nd Weight Sex Type Anes PTL Lv   2 Term     M Vag-Spont   EDGARD   1 Term     F Vag-Spont   EDGARD       Past Medical History:   Diagnosis Date    Anxiety     Asthma     Claustrophobia     Cluster headache     CTS (carpal tunnel syndrome)     Depression     Fibromyalgia     primary; last assessed 17    GERD (gastroesophageal reflux disease)     GI  problem     Headache, tension-type     Irritable bowel syndrome     Joint pain     Kidney stone     Lung nodule     last assessed 10/9/15    Migraine     Muscle pain     Peripheral neuropathy     PONV (postoperative nausea and vomiting)     Sjogren's syndrome (HCC)        Past Surgical History:   Procedure Laterality Date    BACK SURGERY      BARIATRIC SURGERY  8/9/2022    CARPAL TUNNEL RELEASE Bilateral     CHOLECYSTECTOMY      CYSTOSCOPY      GALLBLADDER SURGERY      GASTRECTOMY  08/09/2022    Sleeve    GASTRIC BYPASS LAPAROSCOPIC N/A 01/30/2024    Procedure: REVISION CONVERSION TO R-N-Y GASTRIC BYPASS W/ ROBOTICS AND INTRAOPERATIVE EGD; PARAESOPHAGEAL HERNIA REPAIR WITH MESH;  Surgeon: Felix Akins MD;  Location[facility];  Service: Bariatrics    HERNIA REPAIR  08/09/2022    Apparently done at time gastric sleeve. Still have major issues with it.    NECK SURGERY      [place] TENDON SHEATH INCISION Right 01/16/2023    Procedure: THUMB TRIGGER FINGER RELEASE;  Surgeon: Gail Wylie MD;  [facility];  Service: Orthopedics    SACROILIAC JOINT FUSION Left     SLEEVE GASTROPLASTY  08/9/2022    SPINAL FUSION      C4 and C5    ULNAR NERVE REPAIR Bilateral     UPPER GASTROINTESTINAL ENDOSCOPY         Family History   Problem Relation Age of Onset    Diabetes Mother     Fibromyalgia Mother     Ovarian cancer Mother 33    Hypertension Mother     Thyroid disease Mother     Migraines Mother     Neuropathy Mother     Cancer Mother         has been removed    Arthritis Mother     COPD Mother     Depression Mother     No Known Problems Father     Throat cancer Maternal Grandmother     No Known Problems Maternal Grandfather     No Known Problems Paternal Grandmother     No Known Problems Paternal Grandfather     No Known Problems Daughter     Breast cancer Maternal Aunt     Dementia Maternal Aunt     No Known Problems Maternal Aunt     No Known Problems Maternal Aunt     No Known Problems Paternal Aunt      Colon cancer Cousin     Heart disease Cousin     Lupus Cousin     Arthritis Family     Heart disease Family         cardiac disorder    Neuropathy Family     Lupus Family         systemic erythematosus    No Known Problems Half-Sister     No Known Problems Half-Brother     No Known Problems Half-Brother     No Known Problems Half-Brother     Asthma Brother     Stroke Neg Hx        Social History     Socioeconomic History    Marital status:      Spouse name: Not on file    Number of children: Not on file    Years of education: Not on file    Highest education level: Not on file   Occupational History    Not on file   Tobacco Use    Smoking status: Never    Smokeless tobacco: Never    Tobacco comments:     Never smoked.   Vaping Use    Vaping status: Never Used   Substance and Sexual Activity    Alcohol use: Yes     Alcohol/week: 2.0 standard drinks of alcohol     Types: 2 Glasses of wine per week     Comment: Occasionally    Drug use: Not Currently     Types: Marijuana     Comment: medicinal over 1 yr    Sexual activity: Yes     Partners: Male     Birth control/protection: Condom Male, I.U.D.   Other Topics Concern    Not on file   Social History Narrative    Daily cola, tea    No preference on religous beliefs     Social Determinants of Health     Financial Resource Strain: Low Risk  (1/12/2024)    Overall Financial Resource Strain (CARDIA)     Difficulty of Paying Living Expenses: Not very hard   Food Insecurity: No Food Insecurity (1/12/2024)    Hunger Vital Sign     Worried About Running Out of Food in the Last Year: Never true     Ran Out of Food in the Last Year: Never true   Transportation Needs: No Transportation Needs (1/12/2024)    PRAPARE - Transportation     Lack of Transportation (Medical): No     Lack of Transportation (Non-Medical): No   Physical Activity: Inactive (9/8/2021)    Exercise Vital Sign     Days of Exercise per Week: 0 days     Minutes of Exercise per Session: 0 min   Stress: Stress  Concern Present (9/8/2021)    [place] [employer] - Occupational Stress Questionnaire     Feeling of Stress : Rather much   Social Connections: Socially Isolated (9/8/2021)    Social Connection and Isolation Panel [NHANES]     Frequency of Communication with Friends and Family: Three times a week     Frequency of Social Gatherings with Friends and Family: More than three times a week     Attends Rastafarian Services: Never     Active Member of Clubs or Organizations: No     Attends Club or Organization Meetings: Never     Marital Status:    Intimate Partner Violence: Not At Risk (9/8/2021)    Humiliation, Afraid, Rape, and Kick questionnaire     Fear of Current or Ex-Partner: No     Emotionally Abused: No     Physically Abused: No     Sexually Abused: No   Housing Stability: High Risk (6/21/2024)    Housing Stability Vital Sign     Unable to Pay for Housing in the Last Year: Yes     Number of Times Moved in the Last Year: 1     Homeless in the Last Year: No       Current Outpatient Medications on File Prior to Visit   Medication Sig Dispense Refill    albuterol (PROVENTIL HFA,VENTOLIN HFA) 90 mcg/act inhaler Inhale 2 puffs every 6 (six) hours as needed for wheezing or shortness of breath 8 g 2    Botox 200 units SOLR Every three months for migraines      butalbital-acetaminophen-caffeine (FIORICET,ESGIC) -40 mg per tablet 1 tab q6 hours prn migraine. No more than 2-3 per week. 10 tablet 0    cholecalciferol (VITAMIN D3) 1,000 units tablet Take 1 capsule by mouth once a week 3000 units daily      cholestyramine (QUESTRAN) 4 GM/DOSE powder Take 1 packet (4 g total) by mouth 2 (two) times a day with meals (Patient taking differently: Take 4 g by mouth 2 (two) times a day as needed) 378 g 2    dicyclomine (BENTYL) 20 mg tablet Take 1 tablet (20 mg total) by mouth 3 (three) times a day as needed (migraine/cramps) 60 tablet 0    famotidine (PEPCID) 40 MG tablet Take 1 tablet (40 mg total)  by mouth daily at bedtime (Patient not taking: Reported on 4/17/2024) 30 tablet 3    fluticasone (FLONASE) 50 mcg/act nasal spray 1 spray in each nostril once daily 30 mL 0    hydroxychloroquine (PLAQUENIL) 200 mg tablet Take 1 tablet (200 mg total) by mouth daily with breakfast for 14 days, THEN 1 tablet (200 mg total) 2 (two) times a day with meals. (Patient not taking: Reported on 7/26/2024) 194 tablet 0    lasmiditan succinate (Reyvow) 50 mg tablet One tab qhs at migraine onset.  Caution sedation.  Max 1 tab/24 hours. 8 tablet 2    meclizine (ANTIVERT) 12.5 MG tablet Take 1 tablet (12.5 mg total) by mouth 3 (three) times a day as needed for dizziness or nausea 30 tablet 3    melatonin 3 mg  (Patient not taking: Reported on 7/26/2024)      Nerve Stimulator (Nerivio) ALIX Start treatment within 60 minutes of migraine onset. Set a strong, yet comfortable intensity level in the first few minutes and maintain that level for 45 minutes. 1 each 2    omeprazole (PriLOSEC) 20 mg delayed release capsule Take 1 capsule (20 mg total) by mouth daily 90 capsule 1    ondansetron (ZOFRAN) 4 mg tablet Take 1 tablet (4 mg total) by mouth every 12 (twelve) hours as needed for nausea 20 tablet 1    prochlorperazine (COMPAZINE) 10 mg tablet Take 1 tablet (10 mg total) by mouth every 6 (six) hours as needed for nausea or vomiting 30 tablet 0    tiZANidine (ZANAFLEX) 2 mg tablet Take 1 tablet (2 mg total) by mouth every 8 (eight) hours as needed for muscle spasms 90 tablet 1    traZODone (DESYREL) 50 mg tablet TAKE HALF TABLETS BY MOUTH DAILY AT BEDTIME 15 tablet 0    Trudhesa 0.725 MG/ACT AERS       venlafaxine (EFFEXOR-XR) 37.5 mg 24 hr capsule ONE CAPSULE DAILY IN THE MORNING WITH FOOD 90 capsule 0    [DISCONTINUED] traZODone (DESYREL) 50 mg tablet Take 0.5 tablets (25 mg total) by mouth daily at bedtime 15 tablet 0     Current Facility-Administered Medications on File Prior to Visit   Medication Dose Route Frequency Provider Last  Rate Last Admin    [COMPLETED] Botulinum Toxin Type A SOLR 200 Units  200 Units Injection Once    200 Units at 08/28/24 1041    [COMPLETED] dexamethasone (DECADRON) injection 40 mg  40 mg Infiltration One-time injection    40 mg at 08/28/24 1300    [COMPLETED] dexamethasone (DECADRON) injection 40 mg  40 mg Infiltration One-time injection    40 mg at 08/28/24 1300    [COMPLETED] lidocaine (XYLOCAINE) 1 % injection 1 mL  1 mL Infiltration One-time injection    1 mL at 08/28/24 1300    [COMPLETED] lidocaine (XYLOCAINE) 1 % injection 1 mL  1 mL Infiltration One-time injection    1 mL at 08/28/24 1300       Allergies   Allergen Reactions    Hydrocodone-Acetaminophen GI Intolerance     gi upset -pt okay if takes  zofran      Codeine GI Intolerance     GI issues    Mexiletine Rash    Oxycodone-Acetaminophen GI Intolerance     GI issues    Penicillins Other (See Comments) and GI Intolerance     GI issues,vaginitis    Pollen Extract-Tree Extract [Pollen Extract] Nasal Congestion       Physical exam:  Physical Exam      Vulvovaginal atrophy:  no none  Urethral caruncle:  no none    POP-Q   Aa -2  Ba -2  C  +1  Ap -3  Bp -3  D  -8  GH 4  PB  3  TVL  10      Q-tip test: Resting degree of 0 with straining degree of 20    Q-tip with decreased resistance: no    Posterior wall relaxation: no     CST: Negative    Post void residual: Less than 150mL    Assessment:  49 y.o. with Prolapse Stage 3  apical prolapse       Plan:  At this time, Jenny wishes to discuss surgical management   Will place referral to fellow urogyn clinic to discuss surgical options     Radha Paige MD

## 2024-08-30 ENCOUNTER — TELEPHONE (OUTPATIENT)
Age: 49
End: 2024-08-30

## 2024-08-30 DIAGNOSIS — M65.4 DE QUERVAIN'S TENOSYNOVITIS, RIGHT: ICD-10-CM

## 2024-08-30 DIAGNOSIS — M65.4 DE QUERVAIN'S TENOSYNOVITIS, LEFT: Primary | ICD-10-CM

## 2024-08-30 NOTE — TELEPHONE ENCOUNTER
Caller: Self     Doctor: Dejan      Reason for call: Cody Godwin needs a new hand therapy script to continue treatment. Patient calling back with fax number, fax is [phone].     Call back#: [phone]

## 2024-08-30 NOTE — TELEPHONE ENCOUNTER
Caller: Self    Doctor: Dejan     Reason for call: Cody Godwin needs a new hand therapy script to continue treatment. Patient will callback with fax number    Call back#: [phone]

## 2024-09-16 ENCOUNTER — PATIENT MESSAGE (OUTPATIENT)
Dept: NEUROLOGY | Facility: CLINIC | Age: 49
End: 2024-09-16

## 2024-09-16 DIAGNOSIS — G43.709 CHRONIC MIGRAINE WITHOUT AURA WITHOUT STATUS MIGRAINOSUS, NOT INTRACTABLE: Primary | ICD-10-CM

## 2024-09-19 ENCOUNTER — PATIENT MESSAGE (OUTPATIENT)
Dept: NEUROLOGY | Facility: CLINIC | Age: 49
End: 2024-09-19

## 2024-09-19 DIAGNOSIS — G43.709 CHRONIC MIGRAINE WITHOUT AURA WITHOUT STATUS MIGRAINOSUS, NOT INTRACTABLE: ICD-10-CM

## 2024-09-19 RX ORDER — DEXAMETHASONE 2 MG/1
TABLET ORAL
Qty: 5 TABLET | Refills: 1 | Status: SHIPPED | OUTPATIENT
Start: 2024-09-19

## 2024-09-19 RX ORDER — DIVALPROEX SODIUM 250 MG/1
TABLET, DELAYED RELEASE ORAL
Qty: 12 TABLET | Refills: 1 | Status: SHIPPED | OUTPATIENT
Start: 2024-09-19

## 2024-09-19 NOTE — PATIENT COMMUNICATION
Pt calling to follow up on 2 Minutes message sent to MK for any recommendations for her current migraine.    Advised pt that we are still awaiting on a response from provider.    Pt verbalizes understanding.    Pt states that if she is not available to take phone call, we can call her caretaker. There is no updated consent form on file. Will send to pt so that she can fill out form.

## 2024-10-04 ENCOUNTER — TELEPHONE (OUTPATIENT)
Age: 49
End: 2024-10-04

## 2024-10-04 DIAGNOSIS — G43.709 CHRONIC MIGRAINE WITHOUT AURA WITHOUT STATUS MIGRAINOSUS, NOT INTRACTABLE: ICD-10-CM

## 2024-10-04 RX ORDER — DEXAMETHASONE 2 MG/1
TABLET ORAL
Qty: 5 TABLET | Refills: 0 | Status: CANCELLED | OUTPATIENT
Start: 2024-10-04

## 2024-10-04 NOTE — TELEPHONE ENCOUNTER
Pt called. She stated that her pharmacy contacted her and her insurance will not cover the Reyvow. It is recommended that she try Nurtec or sumatriptan. Pt stated that she has tried Nurtec in the past, and Reyvow is much more effective.    Called [facility] and spoke with the pharmacist. Was advised that Reyvow requires a prior auth. Routed to the clinical team to assist.

## 2024-10-08 RX ORDER — DEXAMETHASONE 2 MG/1
TABLET ORAL
Qty: 5 TABLET | Refills: 1 | Status: SHIPPED | OUTPATIENT
Start: 2024-10-08

## 2024-10-10 PROBLEM — M65.4 DE QUERVAIN'S TENOSYNOVITIS, BILATERAL: Status: ACTIVE | Noted: 2024-10-10

## 2024-10-10 PROBLEM — M79.89 FOOT SWELLING: Status: RESOLVED | Noted: 2018-06-07 | Resolved: 2024-10-10

## 2024-10-11 ENCOUNTER — OFFICE VISIT (OUTPATIENT)
Dept: BARIATRICS | Facility: CLINIC | Age: 49
End: 2024-10-11
Payer: COMMERCIAL

## 2024-10-11 VITALS
DIASTOLIC BLOOD PRESSURE: 80 MMHG | TEMPERATURE: 97.5 F | HEART RATE: 70 BPM | SYSTOLIC BLOOD PRESSURE: 102 MMHG | HEIGHT: 63 IN | WEIGHT: 197.5 LBS | OXYGEN SATURATION: 98 % | BODY MASS INDEX: 34.99 KG/M2

## 2024-10-11 DIAGNOSIS — E66.812 OBESITY, CLASS II, BMI 35-39.9: ICD-10-CM

## 2024-10-11 DIAGNOSIS — Z48.815 ENCOUNTER FOR SURGICAL AFTERCARE FOLLOWING SURGERY OF DIGESTIVE SYSTEM: Primary | ICD-10-CM

## 2024-10-11 DIAGNOSIS — Z98.84 BARIATRIC SURGERY STATUS: ICD-10-CM

## 2024-10-11 DIAGNOSIS — K91.2 POSTSURGICAL MALABSORPTION: ICD-10-CM

## 2024-10-11 DIAGNOSIS — F41.8 DEPRESSION WITH ANXIETY: ICD-10-CM

## 2024-10-11 DIAGNOSIS — M79.7 FIBROMYALGIA: ICD-10-CM

## 2024-10-11 DIAGNOSIS — G43.711 INTRACTABLE CHRONIC MIGRAINE WITHOUT AURA AND WITH STATUS MIGRAINOSUS: ICD-10-CM

## 2024-10-11 PROCEDURE — 99214 OFFICE O/P EST MOD 30 MIN: CPT | Performed by: PHYSICIAN ASSISTANT

## 2024-10-11 NOTE — TELEPHONE ENCOUNTER
PA approved through 10/7/25    Called [facility]. No answer. No option to leave a message

## 2024-10-11 NOTE — PROGRESS NOTES
Assessment/Plan:     Patient ID: Jenny Olvera is a 49 y.o. female.     Bariatric Surgery Status    status post robotic conversion from sleeve to  RNYGB and PEHR with mesh performed by Dr. Osmel Akins on 1/30/2024 .  s/p dilation of GJ with 12mm CRE balloon 5/2024. Overall doing well, only had 2 vomiting episodes since then when she ate broccoli. She reports that her symptoms are better.     Continued/Maintain healthy weight loss with good nutrition intakes.  Adequate hydration with at least 64oz. fluid intake.  Follow diet as discussed.  Follow vitamin and mineral recommendations as reviewed with you.  Exercise as tolerated.    Colonoscopy referral made: utd  Mammo: scheduled     Follow-up in 6 months. We kindly ask that your arrive 15 minutes before your scheduled appointment time with your provider to allow our staff to room you, get your vital signs and update your chart.    Get lab work done . Please call the office if you need a script.  It is recommended to check with your insurance BEFORE getting labs done to make sure they are covered by your policy.      Call our office if you have any problems with abdominal pain especially associated with fever, chills, nausea, vomiting or any other concerns.    All  Post-bariatric surgery patients should be aware that very small quantities of any alcohol can cause impairment and it is very possible not to feel the effect. The effect can be in the system for several hours.  It is also a stomach irritant.     It is advised to AVOID alcohol, Nonsteroidal antiinflammatory drugs (NSAIDS) and nicotine of all forms . Any of these can cause stomach irritation/pain.    Discussed the effects of alcohol on a bariatric patient and the increased impairment risk.     Keep up the good work!     Postsurgical Malabsorption   -At risk for malabsorption of vitamins/minerals secondary to malabsorption and restriction of intake from bariatric surgery  -Currently taking adequate postop  bariatric surgery vitamin supplementation  -Next set of bariatric labs ordered for approximately 2 weeks  -Patient received education about the importance of adhering to a lifelong supplementation regimen to avoid vitamin/mineral deficiencies      Diagnoses and all orders for this visit:    Encounter for surgical aftercare following surgery of digestive system  -     Vitamin D 25 hydroxy; Future  -     Zinc; Future  -     Vitamin B12; Future  -     Vitamin B1, whole blood; Future  -     Vitamin A; Future  -     PTH, intact; Future  -     CBC and Platelet; Future  -     Comprehensive metabolic panel; Future  -     Ferritin; Future  -     Folate; Future  -     TIBC Panel (incl. Iron, TIBC, % Iron Saturation); Future    Bariatric surgery status  -     Vitamin D 25 hydroxy; Future  -     Zinc; Future  -     Vitamin B12; Future  -     Vitamin B1, whole blood; Future  -     Vitamin A; Future  -     PTH, intact; Future  -     CBC and Platelet; Future  -     Comprehensive metabolic panel; Future  -     Ferritin; Future  -     Folate; Future  -     TIBC Panel (incl. Iron, TIBC, % Iron Saturation); Future    Postsurgical malabsorption  -     Vitamin D 25 hydroxy; Future  -     Zinc; Future  -     Vitamin B12; Future  -     Vitamin B1, whole blood; Future  -     Vitamin A; Future  -     PTH, intact; Future  -     CBC and Platelet; Future  -     Comprehensive metabolic panel; Future  -     Ferritin; Future  -     Folate; Future  -     TIBC Panel (incl. Iron, TIBC, % Iron Saturation); Future    Depression with anxiety  -     Vitamin D 25 hydroxy; Future  -     Zinc; Future  -     Vitamin B12; Future  -     Vitamin B1, whole blood; Future  -     Vitamin A; Future  -     PTH, intact; Future  -     CBC and Platelet; Future  -     Comprehensive metabolic panel; Future  -     Ferritin; Future  -     Folate; Future  -     TIBC Panel (incl. Iron, TIBC, % Iron Saturation); Future    Obesity, Class II, BMI 35-39.9  -     Vitamin D 25  hydroxy; Future  -     Zinc; Future  -     Vitamin B12; Future  -     Vitamin B1, whole blood; Future  -     Vitamin A; Future  -     PTH, intact; Future  -     CBC and Platelet; Future  -     Comprehensive metabolic panel; Future  -     Ferritin; Future  -     Folate; Future  -     TIBC Panel (incl. Iron, TIBC, % Iron Saturation); Future    Fibromyalgia  -     Vitamin D 25 hydroxy; Future  -     Zinc; Future  -     Vitamin B12; Future  -     Vitamin B1, whole blood; Future  -     Vitamin A; Future  -     PTH, intact; Future  -     CBC and Platelet; Future  -     Comprehensive metabolic panel; Future  -     Ferritin; Future  -     Folate; Future  -     TIBC Panel (incl. Iron, TIBC, % Iron Saturation); Future    Intractable chronic migraine without aura and with status migrainosus  -     Vitamin D 25 hydroxy; Future  -     Zinc; Future  -     Vitamin B12; Future  -     Vitamin B1, whole blood; Future  -     Vitamin A; Future  -     PTH, intact; Future  -     CBC and Platelet; Future  -     Comprehensive metabolic panel; Future  -     Ferritin; Future  -     Folate; Future  -     TIBC Panel (incl. Iron, TIBC, % Iron Saturation); Future         Subjective:      Patient ID: Jenny Olvera is a 49 y.o. female.    status post robotic RNYGB and PEHR w mesh done on 1/30/2024 by Dr. Osmel Akins, with dysphagia to solid foods with vomiting s/p EGD with dilation.     Current: 197  Current BMI is Body mass index is 35.32 kg/m².    Tolerating a regular diet-yes  Eating at least 60 grams of protein per day-yes  Drinking at least 64 ounces of fluid per day-yes  Sufficient exercise-no  Using NSAIDs regularly-no  Using nicotine-no  Using alcohol-occasional   Supplements:  MVI patch + calcium with d3 patch         The following portions of the patient's history were reviewed and updated as appropriate: allergies, current medications, past family history, past medical history, past social history, past surgical history and problem  "list.    Review of Systems   Constitutional: Negative.    Respiratory: Negative.     Cardiovascular: Negative.    Gastrointestinal: Negative.    Musculoskeletal:  Positive for arthralgias (chronic).   Neurological: Negative.    Psychiatric/Behavioral: Negative.           Objective:    /80 (BP Location: Left arm, Patient Position: Sitting, Cuff Size: Large)   Pulse 70   Temp 97.5 °F (36.4 °C) (Tympanic)   Ht 5' 2.7\" (1.593 m)   Wt 89.6 kg (197 lb 8 oz)   SpO2 98%   BMI 35.32 kg/m²      Physical Exam  Vitals and nursing note reviewed.   Constitutional:       Appearance: Normal appearance. She is obese.   HENT:      Head: Normocephalic and atraumatic.   Eyes:      Extraocular Movements: Extraocular movements intact.   Cardiovascular:      Rate and Rhythm: Normal rate.   Pulmonary:      Effort: Pulmonary effort is normal.   Musculoskeletal:         General: Normal range of motion.      Cervical back: Normal range of motion.   Skin:     General: Skin is warm and dry.   Neurological:      General: No focal deficit present.      Mental Status: She is alert and oriented to person, place, and time.   Psychiatric:         Mood and Affect: Mood normal.             "

## 2024-10-11 NOTE — PROGRESS NOTES
Date of surgery: 1/30/2024  Procedure: RNY w/ PEHR  Performing surgeon: Dr. Osmel Akins    Initial Weight - 263.6 lbs.  Current Weight - 197.5 lbs.  Carroll Weight - 195.8 lbs.  Total Body Weight Loss (EWL) - 66.1 lbs.  EWL% - 53%  TWB% - 25%

## 2024-10-14 ENCOUNTER — OFFICE VISIT (OUTPATIENT)
Dept: INTERNAL MEDICINE CLINIC | Facility: CLINIC | Age: 49
End: 2024-10-14

## 2024-10-14 ENCOUNTER — APPOINTMENT (OUTPATIENT)
Dept: LAB | Facility: CLINIC | Age: 49
End: 2024-10-14
Payer: COMMERCIAL

## 2024-10-14 VITALS
TEMPERATURE: 97.7 F | SYSTOLIC BLOOD PRESSURE: 113 MMHG | BODY MASS INDEX: 36.8 KG/M2 | DIASTOLIC BLOOD PRESSURE: 76 MMHG | WEIGHT: 200 LBS | HEART RATE: 88 BPM | HEIGHT: 62 IN

## 2024-10-14 DIAGNOSIS — M35.1 MIXED CONNECTIVE TISSUE DISEASE (HCC): ICD-10-CM

## 2024-10-14 DIAGNOSIS — Z98.84 BARIATRIC SURGERY STATUS: ICD-10-CM

## 2024-10-14 DIAGNOSIS — K21.9 GERD WITHOUT ESOPHAGITIS: Primary | ICD-10-CM

## 2024-10-14 DIAGNOSIS — Z48.815 ENCOUNTER FOR SURGICAL AFTERCARE FOLLOWING SURGERY OF DIGESTIVE SYSTEM: ICD-10-CM

## 2024-10-14 DIAGNOSIS — E66.812 OBESITY, CLASS II, BMI 35-39.9: ICD-10-CM

## 2024-10-14 DIAGNOSIS — F41.8 DEPRESSION WITH ANXIETY: ICD-10-CM

## 2024-10-14 DIAGNOSIS — M79.7 FIBROMYALGIA: ICD-10-CM

## 2024-10-14 DIAGNOSIS — E55.9 VITAMIN D DEFICIENCY: ICD-10-CM

## 2024-10-14 DIAGNOSIS — G43.711 INTRACTABLE CHRONIC MIGRAINE WITHOUT AURA AND WITH STATUS MIGRAINOSUS: ICD-10-CM

## 2024-10-14 DIAGNOSIS — R74.8 ELEVATED ALKALINE PHOSPHATASE LEVEL: Primary | ICD-10-CM

## 2024-10-14 DIAGNOSIS — K91.2 POSTSURGICAL MALABSORPTION: ICD-10-CM

## 2024-10-14 DIAGNOSIS — G43.709 CHRONIC MIGRAINE WITHOUT AURA WITHOUT STATUS MIGRAINOSUS, NOT INTRACTABLE: ICD-10-CM

## 2024-10-14 DIAGNOSIS — K58.2 IRRITABLE BOWEL SYNDROME WITH BOTH CONSTIPATION AND DIARRHEA: ICD-10-CM

## 2024-10-14 DIAGNOSIS — M35.00 SJOGREN'S SYNDROME, WITH UNSPECIFIED ORGAN INVOLVEMENT (HCC): ICD-10-CM

## 2024-10-14 DIAGNOSIS — H91.8X3 ASYMMETRICAL HEARING LOSS: ICD-10-CM

## 2024-10-14 DIAGNOSIS — M65.4 DE QUERVAIN'S TENOSYNOVITIS, BILATERAL: ICD-10-CM

## 2024-10-14 DIAGNOSIS — J45.20 MILD INTERMITTENT ASTHMA, UNSPECIFIED WHETHER COMPLICATED: ICD-10-CM

## 2024-10-14 DIAGNOSIS — E53.8 VITAMIN B12 DEFICIENCY: ICD-10-CM

## 2024-10-14 DIAGNOSIS — K21.9 GERD WITHOUT ESOPHAGITIS: ICD-10-CM

## 2024-10-14 LAB
25(OH)D3 SERPL-MCNC: 20.6 NG/ML (ref 30–100)
ALBUMIN SERPL BCG-MCNC: 4.1 G/DL (ref 3.5–5)
ALP SERPL-CCNC: 139 U/L (ref 34–104)
ALT SERPL W P-5'-P-CCNC: 15 U/L (ref 7–52)
ANION GAP SERPL CALCULATED.3IONS-SCNC: 8 MMOL/L (ref 4–13)
AST SERPL W P-5'-P-CCNC: 19 U/L (ref 13–39)
BILIRUB SERPL-MCNC: 0.59 MG/DL (ref 0.2–1)
BUN SERPL-MCNC: 13 MG/DL (ref 5–25)
CALCIUM SERPL-MCNC: 9.1 MG/DL (ref 8.4–10.2)
CHLORIDE SERPL-SCNC: 105 MMOL/L (ref 96–108)
CO2 SERPL-SCNC: 26 MMOL/L (ref 21–32)
CREAT SERPL-MCNC: 0.55 MG/DL (ref 0.6–1.3)
ERYTHROCYTE [DISTWIDTH] IN BLOOD BY AUTOMATED COUNT: 14.3 % (ref 11.6–15.1)
FERRITIN SERPL-MCNC: 21 NG/ML (ref 11–307)
FOLATE SERPL-MCNC: >22.3 NG/ML
GFR SERPL CREATININE-BSD FRML MDRD: 110 ML/MIN/1.73SQ M
GLUCOSE P FAST SERPL-MCNC: 83 MG/DL (ref 65–99)
HCT VFR BLD AUTO: 44.9 % (ref 34.8–46.1)
HGB BLD-MCNC: 14 G/DL (ref 11.5–15.4)
IRON SATN MFR SERPL: 31 % (ref 15–50)
IRON SERPL-MCNC: 102 UG/DL (ref 50–212)
MCH RBC QN AUTO: 26.2 PG (ref 26.8–34.3)
MCHC RBC AUTO-ENTMCNC: 31.2 G/DL (ref 31.4–37.4)
MCV RBC AUTO: 84 FL (ref 82–98)
PLATELET # BLD AUTO: 197 THOUSANDS/UL (ref 149–390)
PMV BLD AUTO: 11.3 FL (ref 8.9–12.7)
POTASSIUM SERPL-SCNC: 3.9 MMOL/L (ref 3.5–5.3)
PROT SERPL-MCNC: 6.8 G/DL (ref 6.4–8.4)
PTH-INTACT SERPL-MCNC: 45.1 PG/ML (ref 12–88)
RBC # BLD AUTO: 5.35 MILLION/UL (ref 3.81–5.12)
SODIUM SERPL-SCNC: 139 MMOL/L (ref 135–147)
TIBC SERPL-MCNC: 324 UG/DL (ref 250–450)
UIBC SERPL-MCNC: 222 UG/DL (ref 155–355)
VIT B12 SERPL-MCNC: 216 PG/ML (ref 180–914)
WBC # BLD AUTO: 6.85 THOUSAND/UL (ref 4.31–10.16)

## 2024-10-14 PROCEDURE — 82728 ASSAY OF FERRITIN: CPT

## 2024-10-14 PROCEDURE — 84590 ASSAY OF VITAMIN A: CPT

## 2024-10-14 PROCEDURE — 82746 ASSAY OF FOLIC ACID SERUM: CPT

## 2024-10-14 PROCEDURE — 36415 COLL VENOUS BLD VENIPUNCTURE: CPT

## 2024-10-14 PROCEDURE — 85027 COMPLETE CBC AUTOMATED: CPT

## 2024-10-14 PROCEDURE — G2211 COMPLEX E/M VISIT ADD ON: HCPCS | Performed by: STUDENT IN AN ORGANIZED HEALTH CARE EDUCATION/TRAINING PROGRAM

## 2024-10-14 PROCEDURE — 82525 ASSAY OF COPPER: CPT

## 2024-10-14 PROCEDURE — 83540 ASSAY OF IRON: CPT

## 2024-10-14 PROCEDURE — 84630 ASSAY OF ZINC: CPT

## 2024-10-14 PROCEDURE — 86430 RHEUMATOID FACTOR TEST QUAL: CPT

## 2024-10-14 PROCEDURE — 83550 IRON BINDING TEST: CPT

## 2024-10-14 PROCEDURE — 82607 VITAMIN B-12: CPT

## 2024-10-14 PROCEDURE — 83970 ASSAY OF PARATHORMONE: CPT

## 2024-10-14 PROCEDURE — 82306 VITAMIN D 25 HYDROXY: CPT

## 2024-10-14 PROCEDURE — 83918 ORGANIC ACIDS TOTAL QUANT: CPT

## 2024-10-14 PROCEDURE — 99214 OFFICE O/P EST MOD 30 MIN: CPT | Performed by: STUDENT IN AN ORGANIZED HEALTH CARE EDUCATION/TRAINING PROGRAM

## 2024-10-14 PROCEDURE — 80053 COMPREHEN METABOLIC PANEL: CPT

## 2024-10-14 PROCEDURE — 84425 ASSAY OF VITAMIN B-1: CPT

## 2024-10-14 RX ORDER — CYANOCOBALAMIN 1000 UG/ML
1000 INJECTION, SOLUTION INTRAMUSCULAR; SUBCUTANEOUS ONCE
Qty: 3 ML | Refills: 4 | Status: SHIPPED | OUTPATIENT
Start: 2024-10-14 | End: 2024-10-14

## 2024-10-14 RX ORDER — CYANOCOBALAMIN 1000 UG/ML
1000 INJECTION, SOLUTION INTRAMUSCULAR; SUBCUTANEOUS
Status: SHIPPED | OUTPATIENT
Start: 2024-10-18

## 2024-10-14 RX ORDER — DICYCLOMINE HCL 20 MG
20 TABLET ORAL 3 TIMES DAILY PRN
Qty: 60 TABLET | Refills: 0 | Status: SHIPPED | OUTPATIENT
Start: 2024-10-14 | End: 2024-11-13

## 2024-10-14 RX ORDER — CARBOXYMETHYLCELLULOSE SODIUM 5 MG/ML
1 SOLUTION/ DROPS OPHTHALMIC 3 TIMES DAILY PRN
Qty: 90 EACH | Refills: 3 | Status: SHIPPED | OUTPATIENT
Start: 2024-10-14 | End: 2025-01-12

## 2024-10-14 NOTE — ASSESSMENT & PLAN NOTE
Currently being followed by outpatient neurology on multiple medications stating compliance with all:  Botox, fioricet,decadro non, depakote yet scheduled cemented  prn, reyvow, Trudh yeah anything eitheresa, danielle yeah I can try forfaxine     Awaiting nerve stimulator due to cost.    Right sided migraine in office today. Knows triggers (bright lights dairy).     Follow-up with neurology

## 2024-10-14 NOTE — PATIENT INSTRUCTIONS
[id number]  RF  TIBC panel  Folate  Ferritin  CMP  CBC  PTH  Vitamin A  Vitamin B1  Vitamin B12  Zinc  Vitamin D  Copper   RF

## 2024-10-14 NOTE — PROGRESS NOTES
Ambulatory Visit  Name: Jenny Olvera      : 1975      MRN: [id number]  Encounter Provider: Kamala Edmond DO  Encounter Date: 10/14/2024   Encounter department: [facility]    Assessment & Plan  GERD without esophagitis  Resolved GERD s/p bariatric surgery revision in January.     D/c prilosec, transition to pepcid as need for rebound gerd         Mixed connective tissue disease (HCC)  Dx with undifferentiated mixed connective tissue disease in . Currently taking Plaquenil 200 mg daily per rheum Dr. Cotto given slightly elevated SSA.  Does have symptoms of dry eyes.  Will add refresh eyedrops.  Scheduled to see rheumatology later this month.    Orders:    carboxymethylcellulose 0.5 % SOLN; Administer 1 drop to both eyes 3 (three) times a day as needed for dry eyes    RF Screen w/ Reflex to Titer; Future    Irritable bowel syndrome with both constipation and diarrhea    Orders:    dicyclomine (BENTYL) 20 mg tablet; Take 1 tablet (20 mg total) by mouth 3 (three) times a day as needed (migraine/cramps)    Bariatric surgery status  s/p sleeve gastrectomy in  and then conversion to RNYGB .  Not currently receiving B12 per patient/taking oral or subcutaneous injections.  Following with bariatrics and getting multivitamin from them.  Requesting that B12 be ordered to be completed in office every month.  Patient has multiple nutritional labs ordered per bariatrics.  Adding on copper given patient does have history of bariatric surgery and has been complaining of hand numbness and tingling in the past.    Orders:    cyanocobalamin injection 1,000 mcg    cyanocobalamin 1,000 mcg/mL; Inject 1 mL (1,000 mcg total) into a muscle once for 1 dose    Copper Level; Future    Chronic migraine without aura without status migrainosus, not intractable  Currently being followed by outpatient neurology on multiple medications stating compliance with all:  Botox,  fioricet,decadro non, depakote yet scheduled cemented  prn, reyvow, Trudh yeah anything eitheresa, venla yeah I can try forfaxine     Awaiting nerve stimulator due to cost.    Right sided migraine in office today. Knows triggers (bright lights dairy).     Follow-up with neurology         Mild intermittent asthma, unspecified whether complicated  Continue on albuterol. No recent exacerbations.         De Quervain's tenosynovitis, bilateral  F/u with ortho next week.         Depression with anxiety  Currently on venlafaxine.  Denies any symptoms of depression or anxiety today.            History of Present Illness     49-year-old female with a past medical history of undifferentiated mixed connective tissue disease now on Plaquenil, fibromyalgia, anxiety depression, status post revision of bariatric surgery in January 2024 to gastric bypass, chronic migraine.  Here for general follow-up.  States no new complaints.  Improved acid reflux since surgery revision in January was told to continue on omeprazole for at least 6 months.  States overall doing fairly however continued to have chronic symptoms of numbness tingling in her legs as well as diffuse joint pain.  Also having dry eyes.  No chest pain shortness of breath at this point.  Continuing to have difficulty eating daily meals attempting to supplement dietary intake with protein supplementation.        History obtained from : patient  Review of Systems   Constitutional:  Positive for appetite change and fatigue. Negative for chills, fever and unexpected weight change.   Respiratory:  Negative for chest tightness and shortness of breath.    Cardiovascular:  Negative for chest pain.   Skin:  Negative for color change.   Neurological:  Positive for numbness. Negative for dizziness.   Psychiatric/Behavioral:  Positive for sleep disturbance. Negative for suicidal ideas. The patient is not nervous/anxious.    All other systems reviewed and are negative.    Medical  "History Reviewed by provider this encounter:  Meds           Objective     /76 (BP Location: Right arm, Patient Position: Sitting, Cuff Size: Large)   Pulse 88   Temp 97.7 °F (36.5 °C) (Temporal)   Ht 5' 2\" (1.575 m)   Wt 90.7 kg (200 lb)   BMI 36.58 kg/m²     Physical Exam  Vitals reviewed.   Constitutional:       Appearance: She is obese. She is not diaphoretic.   HENT:      Head: Normocephalic and atraumatic.      Mouth/Throat:      Mouth: Mucous membranes are moist.      Pharynx: Oropharynx is clear.   Cardiovascular:      Rate and Rhythm: Normal rate and regular rhythm.   Pulmonary:      Effort: Pulmonary effort is normal.      Breath sounds: Normal breath sounds. No wheezing, rhonchi or rales.   Abdominal:      General: Abdomen is flat.      Tenderness: There is no right CVA tenderness, guarding or rebound.   Musculoskeletal:      Cervical back: Neck supple.      Right lower leg: No edema.      Left lower leg: No edema.   Skin:     General: Skin is warm and dry.   Neurological:      Mental Status: She is alert and oriented to person, place, and time. Mental status is at baseline.   Psychiatric:         Mood and Affect: Mood normal.         Behavior: Behavior normal.         Thought Content: Thought content normal.         Judgment: Judgment normal.       Administrative Statements   I have spent a total time of 20 minutes in caring for this patient on the day of the visit/encounter including Diagnostic results, Prognosis, and Risks and benefits of tx options.  "

## 2024-10-14 NOTE — PROGRESS NOTES
[facility] - Outpatient Note  Jenny Olvera 49 y.o. female MRN: [id number]  Unit/Bed#:  Encounter: [id number]      ASSESSMENT AND PLAN:      49 y.o. female with medical history significant for GERD, HH, IBS-M, s/p cholecystectomy (1996), obesity s/p sleeve gastrectomy (8/2022) followed by conversion to RYGB (1/2024) with hiatal hernia repair, who presents for follow up visit.     It is difficult to ascertain whether she has IBS mixed type versus IBS-C. Differential diagnosis includes IBS-M, IBS-C with overflow diarrhea, or SIBO in the setting of history of RYGB. However, it appears that she has significant symptoms since 1996.  Per review of her CT scan 2023 she had significant stool burden especially in the left colon extending into the rectum which raises suspicion for uncontrolled constipation with overflow diarrhea.  She states she has taken IBgard in the past with improvement in her symptoms.  I recommended she take MiraLAX once daily in addition to a fiber supplement daily.  She can continue to take IBgard given prior relief.  She did not notice any improvement in symptoms with a low FODMAP diet.    - Continue Ibgard.  - Trial Miralax daily and fiber supplementation daily.   - Encouraged to take 20g of fiber daily.    FOLLOW-UP:  Return in about 3 months (around 1/16/2025).    VISIT DIAGNOSES AND ORDERS:      1. Irritable bowel syndrome with both constipation and diarrhea    2. Chronic constipation with overflow      No orders of the defined types were placed in this encounter.    ______________________________________________________________________    HPI:  Ms. Jenny Olvera is a 49 y.o. female with medical history significant for GERD, HH, IBS-M, s/p cholecystectomy (1996), obesity s/p sleeve gastrectomy (8/2022) followed by conversion to RYGB (1/2024) with hiatal hernia repair, who presents for follow up visit.     She was seen in the clinic with Dr. Dickerson in  7/2024 for evaluation of IBS with plan to trial low FODMAP diet and continue use of Bentyl for abdominal cramping.     Interval: She feels no difference in her symptoms on the low FODMAP diet. She reports this has been going on since 1996. She was initially having diarrhea which then became constipation and is now more diarrhea. She can go 3-4 days without having a bowel movement now.     Denies  heartburn, dysphagia, odynophagia, nausea, vomiting, BRBPR, melena, abdominal pain, change in bowel habits, unintentional weight loss.     EGD (5/2024): Type 1 hiatal hernia with bypass anatomy.   Colonoscopy(7/2023): hyperplastic polyp in sigmoid colon, repeat in 2033.      REVIEW OF SYSTEMS:  10 point ROS reviewed and negative except otherwise noted in the HPI above.     Historical Information   Past Medical History:   Diagnosis Date    Anxiety     Asthma     Claustrophobia     Cluster headache     CTS (carpal tunnel syndrome)     Depression     Fibromyalgia     primary; last assessed 11/27/17    GERD (gastroesophageal reflux disease)     GI problem     H/O gastric sleeve     Headache, tension-type     Irritable bowel syndrome     Joint pain     Kidney stone     Lung nodule     last assessed 10/9/15    Migraine     Muscle pain     Peripheral neuropathy     PONV (postoperative nausea and vomiting)     Sjogren's syndrome (HCC)      Past Surgical History:   Procedure Laterality Date    BACK SURGERY      BARIATRIC SURGERY  8/9/2022    CARPAL TUNNEL RELEASE Bilateral     CHOLECYSTECTOMY      CYSTOSCOPY      GALLBLADDER SURGERY      GASTRECTOMY  08/09/2022    Sleeve    GASTRIC BYPASS LAPAROSCOPIC N/A 01/30/2024    Procedure: REVISION CONVERSION TO R-N-Y GASTRIC BYPASS W/ ROBOTICS AND INTRAOPERATIVE EGD; PARAESOPHAGEAL HERNIA REPAIR WITH MESH;  Surgeon: Felix Akins MD;  Location: [facility];  Service: Bariatrics    HERNIA REPAIR  08/09/2022    Apparently done at time gastric sleeve. Still have major issues with it.    NECK  SURGERY      TX TENDON SHEATH INCISION Right 01/16/2023    Procedure: THUMB TRIGGER FINGER RELEASE;  Surgeon: Gail Wylie MD;  Location: [facility];  Service: Orthopedics    SACROILIAC JOINT FUSION Left     SLEEVE GASTROPLASTY  08/9/2022    SPINAL FUSION      C4 and C5    ULNAR NERVE REPAIR Bilateral     UPPER GASTROINTESTINAL ENDOSCOPY       Social History   Social History     Substance and Sexual Activity   Alcohol Use Yes    Alcohol/week: 2.0 standard drinks of alcohol    Types: 2 Glasses of wine per week    Comment: Occasionally     Social History     Substance and Sexual Activity   Drug Use Yes    Types: Marijuana    Comment: medicinal over 1 yr, capsules (has medial card)     Social History     Tobacco Use   Smoking Status Never   Smokeless Tobacco Never   Tobacco Comments    Never smoked.     Family History   Problem Relation Age of Onset    Diabetes Mother     Fibromyalgia Mother     Ovarian cancer Mother 33    Hypertension Mother     Thyroid disease Mother     Migraines Mother     Neuropathy Mother     Cancer Mother         has been removed    Arthritis Mother     COPD Mother     Depression Mother     No Known Problems Father     Throat cancer Maternal Grandmother     No Known Problems Maternal Grandfather     No Known Problems Paternal Grandmother     No Known Problems Paternal Grandfather     No Known Problems Daughter     Breast cancer Maternal Aunt     Dementia Maternal Aunt     No Known Problems Maternal Aunt     No Known Problems Maternal Aunt     No Known Problems Paternal Aunt     Colon cancer Cousin     Heart disease Cousin     Lupus Cousin     Arthritis Family     Heart disease Family         cardiac disorder    Neuropathy Family     Lupus Family         systemic erythematosus    No Known Problems Half-Sister     No Known Problems Half-Brother     No Known Problems Half-Brother     No Known Problems Half-Brother     Asthma Brother     Stroke Neg Hx        Meds/Allergies       Current  "Outpatient Medications:     albuterol (PROVENTIL HFA,VENTOLIN HFA) 90 mcg/act inhaler    Botox 200 units SOLR    butalbital-acetaminophen-caffeine (FIORICET,ESGIC) -40 mg per tablet    carboxymethylcellulose 0.5 % SOLN    cholecalciferol (VITAMIN D3) 1,000 units tablet    cholestyramine (QUESTRAN) 4 GM/DOSE powder    dexamethasone (DECADRON) 2 mg tablet    dicyclomine (BENTYL) 20 mg tablet    divalproex sodium (DEPAKOTE) 250 mg DR tablet    fluticasone (FLONASE) 50 mcg/act nasal spray    lasmiditan succinate (Reyvow) 50 mg tablet    meclizine (ANTIVERT) 12.5 MG tablet    melatonin 3 mg    Nerve Stimulator (Nerivio) ALIX    ondansetron (ZOFRAN) 4 mg tablet    polyethylene glycol (MIRALAX) 17 g packet    prochlorperazine (COMPAZINE) 10 mg tablet    tiZANidine (ZANAFLEX) 2 mg tablet    traZODone (DESYREL) 50 mg tablet    Trudhesa 0.725 MG/ACT AERS    venlafaxine (EFFEXOR-XR) 37.5 mg 24 hr capsule    Wheat Dextrin (Benefiber) POWD    cyanocobalamin 1,000 mcg/mL    hydroxychloroquine (PLAQUENIL) 200 mg tablet    Current Facility-Administered Medications:     [START ON 10/18/2024] cyanocobalamin injection 1,000 mcg, 1,000 mcg, Intramuscular, Q30 Days    Allergies   Allergen Reactions    Hydrocodone-Acetaminophen GI Intolerance     gi upset -pt okay if takes  zofran      Codeine GI Intolerance     GI issues    Mexiletine Rash    Oxycodone-Acetaminophen GI Intolerance     GI issues    Penicillins Other (See Comments) and GI Intolerance     GI issues,vaginitis    Pollen Extract-Tree Extract [Pollen Extract] Nasal Congestion       Objective     Blood pressure 115/70, temperature 98 °F (36.7 °C), temperature source Temporal, height 5' 2.5\" (1.588 m), weight 90.3 kg (199 lb).    PHYSICAL EXAM:    General: Well-appearing, NAD  Eyes:  no conjunctival icterus or pallor  Abdominal: Soft, non-tender, non-distended  Neuro: alert and oriented  Psych: Normal affect    Lab Results:   Lab Results   Component Value Date     " 02/20/2015    K 3.9 10/14/2024    CO2 26 10/14/2024     10/14/2024    BUN 13 10/14/2024    CREATININE 0.55 (L) 10/14/2024    GLUCOSE 88 02/20/2015     Lab Results   Component Value Date    WBC 6.85 10/14/2024    HGB 14.0 10/14/2024    HCT 44.9 10/14/2024    MCV 84 10/14/2024     10/14/2024     Lab Results   Component Value Date    TP 6.8 10/14/2024    AST 19 10/14/2024    ALT 15 10/14/2024    BILITOT 0.49 02/20/2015    BILIDIR 0.2 06/22/2022    INR 1.1 06/22/2022      Lab Results   Component Value Date    IRON 102 10/14/2024    FERRITIN 21 10/14/2024     Lab Results   Component Value Date    HDL 54 11/15/2023    TRIG 88 11/15/2023       Radiology Results:   I have reviewed the results of any radiology studies performed within the last 90 days.     Brittany Zavaleta D.O.  Fellow, PGY-6  Division of Gastroenterology & Hepatology  Available on [facility]  10/16/2024 11:19 AM

## 2024-10-14 NOTE — ASSESSMENT & PLAN NOTE
Resolved GERD s/p bariatric surgery revision in January.     D/c prilosec, transition to pepcid as need for rebound gerd

## 2024-10-15 PROBLEM — M35.00 SJOGREN'S DISEASE (HCC): Status: ACTIVE | Noted: 2024-10-15

## 2024-10-15 PROBLEM — M35.00 SJOGREN'S DISEASE (HCC): Status: RESOLVED | Noted: 2024-10-15 | Resolved: 2024-10-15

## 2024-10-15 LAB — RHEUMATOID FACT SER QL LA: NEGATIVE

## 2024-10-15 NOTE — ASSESSMENT & PLAN NOTE
Dx with undifferentiated mixed connective tissue disease in June. Currently taking Plaquenil 200 mg daily per rheum Dr. Cotto given slightly elevated SSA.  Does have symptoms of dry eyes.  Will add refresh eyedrops.  Scheduled to see rheumatology later this month.    Orders:    carboxymethylcellulose 0.5 % SOLN; Administer 1 drop to both eyes 3 (three) times a day as needed for dry eyes    RF Screen w/ Reflex to Titer; Future

## 2024-10-15 NOTE — ASSESSMENT & PLAN NOTE
s/p sleeve gastrectomy in August '22 and then conversion to RNYGB January '24.  Not currently receiving B12 per patient/taking oral or subcutaneous injections.  Following with bariatrics and getting multivitamin from them.  Requesting that B12 be ordered to be completed in office every month.  Patient has multiple nutritional labs ordered per bariatrics.  Adding on copper given patient does have history of bariatric surgery and has been complaining of hand numbness and tingling in the past.    Orders:    cyanocobalamin injection 1,000 mcg    cyanocobalamin 1,000 mcg/mL; Inject 1 mL (1,000 mcg total) into a muscle once for 1 dose    Copper Level; Future

## 2024-10-15 NOTE — ASSESSMENT & PLAN NOTE
Orders:    dicyclomine (BENTYL) 20 mg tablet; Take 1 tablet (20 mg total) by mouth 3 (three) times a day as needed (migraine/cramps)     "  Nephrology Daily Progress Note     Catalina Callahan  Date of Service: 8/22/2019        RECENT EVENTS / SUBJECTIVE:     Sitting in bedside chair  On TPN  Meropenem   Appears comfortable          PMHx, Social Hx , Medications and Allergies reviewed. ALLERGIES:   Allergen Reactions   â¢ Tape Rollene Meaw   (Environmental)] RASH     OBJECTIVE: Vital signs over past 48 Hours:  Vital Last Value 24 Hour Range   Temp 98.7 Â°F (37.1 Â°C) (08/21/19 2000) Temp  Min: 98.7 Â°F (37.1 Â°C)  Max: 98.7 Â°F (37.1 Â°C)   Pulse (!) 48 (08/22/19 0815) Pulse  Min: 42  Max: 125   Respiratory 18 (08/22/19 0800) Resp  Min: 18  Max: 33   Non-Invasive  Blood Pressure 156/88 (08/22/19 0800) BP  Min: 128/72  Max: 180/77   Arterial  Blood Pressure 153/80 (08/14/19 1400) No data recorded   Pulse Ox 100 % (08/22/19 0839) SpO2  Min: 98 %  Max: 100 %     Ht/Wt Today Admitted   Weight 61.8 kg 65.6 kg   Height  5' 8"" (172.7 cm)   BMI 20.72 22.32       Weight over past 48 Hours:  Patient Vitals for the past 48 hrs:   Weight   08/21/19 0410 63.5 kg   08/22/19 0700 61.8 kg     Weight change: -1.7 kg    Intake/Output this shift:  No intake/output data recorded. Intake/Output Last 3 Shifts:  I/O last 3 completed shifts:   In: 4019 [I.V.:2060]  Out: 1705 [Urine:575; Drains:1130]    Vent settings for last 24 hours:     Hemodynamic parameters for last 24 hours:   Visit Vitals  /88 (BP Location: [facility], Patient Position: Semi-Granados's)   Pulse (!) 48   Temp 98.7 Â°F (37.1 Â°C) (Oral)   Resp 18   Ht 5' 8\"" (1.727 m)   Wt 61.8 kg   SpO2 100%   BMI 20.72 kg/mÂ²          Medications / Infusions  Scheduled:   â¢ hydroCORTisone  25 mg Intravenous 2 times per day   â¢ meropenem (MERREM) IVPB  500 mg Intravenous 4 times per day   â¢ lidocaine  1 patch Transdermal Daily   â¢ fat emulsion  250 mL Intravenous Q24H   â¢ melatonin  3 mg Oral Nightly   â¢ guaifenesin  200 mg Oral Q4H   â¢ pregabalin  100 mg Oral 2 times per day   â¢ lactobacillus acidophilus  2 tablet Oral BID   â¢ " "lisinopril  5 mg Oral Daily   â¢ traZODone  200 mg Oral Nightly   â¢ metoCLOPramide  5 mg Intravenous 4 times per day   â¢ sodium chloride (PF)  10 mL Injection 3 times per day   â¢ pantoprazole  40 mg Intravenous Q12H   â¢ fluticasone-umeclidinium-vilanterol  1 puff Inhalation Daily Resp   â¢ sodium chloride (PF)  2 mL Injection 2 times per day        Continuous Infusions:   â¢ dexMEDETomidine (PRECEDEX) 400 mcg/100 mL in sodium chloride 0.9 % infusion Stopped (08/22/19 0650)   â¢ parenteral nutrition adult standard (61 to 76 kg) - For [facility] 83 mL/hr at 08/21/19 2155   â¢ dextrose 10 % infusion     â¢ lactated ringers infusion 75 mL/hr at 08/22/19 0410   â¢ implanted intrathecal pump     â¢ dextrose 5 % infusion     â¢ sodium chloride 0.9% infusion 10 mL/hr at 08/21/19 1409        PRN:   albuterol-ipratropium 2.5 mg/0.5 mg, acetaminophen, dextrose, HYDROcodone-homatropine, famotidine, baclofen, potassium phosphate/sodium phosphate, sodium chloride (PF), sodium chloride (PF), hydrALAZINE, ondansetron, potassium CHLORIDE, potassium CHLORIDE, potassium CHLORIDE 20 mEq/100mL IVPB, potassium CHLORIDE, potassium CHLORIDE, potassium CHLORIDE 20 mEq/100mL IVPB, magnesium sulfate, magnesium sulfate, magnesium sulfate, albuterol, dextrose, dextrose, glucagon, dextrose, sodium chloride (PF), sodium chloride, sodium chloride       Visit Vitals  /88 (BP Location: [facility], Patient Position: Semi-Granados's)   Pulse (!) 48   Temp 98.7 Â°F (37.1 Â°C) (Oral)   Resp 18   Ht 5' 8"" (1.727 m)   Wt 61.8 kg   SpO2 100%   BMI 20.72 kg/mÂ²       Physical Exam  Gen  NAD  HENT  NG tube in place  Neck  Supple  Cardiac   RRR  Resp  No IWOB on O2 via NC  ABD  Non tender, hypoactive BS  Ext  Mild edema  Neuro  Awake interactive        Laboratory Results     Recent Labs   Lab 08/22/19  0400 08/21/19  0539 08/20/19  0355 08/19/19  1115 08/19/19  0950 08/19/19  0335 08/18/19  0438 08/17/19  2322  08/17/19  0440   SODIUM 141 141 140 139  --  130* " 141 140  --  146*   POTASSIUM 3.9 3.9 3.8 4.2 4.0 3.1* 4.1 3.9   < > 3.2*   CHLORIDE 109* 107 107 107  --  102 111* 107  --  110*   CO2 25 27 26 25  --  20* 23 24  --  28   BUN 19 15 9 9  --  7 17 18  --  17   CREATININE 0.48* 0.54* 0.74 0.78  --  0.70 0.94 1.00  --  0.93   GLUCOSE 129* 173* 131* 100*  --  719* 79 83  --  88   CALCIUM 8.0* 8.6 8.6 8.2*  --  6.5* 8.6 8.7  --  9.0   MG 1.5* 1.8 1.7  --   --   --  1.4* 1.5*  --  2.1   PHOS 3.9  --  3.5  --   --   --   --   --   --  4.8*   GFRNA >90 >90 >90 >90  --  >90 >90 89  --  >90   GFRA >90 >90 >90 >90  --  >90 >90 >90  --  >90   ALBUMIN 2.0*  --  2.5* 2.5*  --   --  2.8*  --   --  2.9*   AST 26  --  15 12  --   --  17  --   --  35   GPT 23  --  16 19  --   --  25  --   --  33   ALKPT 87  --  103 91  --   --  90  --   --  97   BILIRUBIN 0.3  --  0.5 1.0  --   --  0.9  --   --  0.8    < > = values in this interval not displayed. Anemia  Recent Labs   Lab 08/22/19  0400 08/21/19  0539 08/20/19  1539 08/20/19  1120 08/20/19  0355   WBC 8.8 11.9* 18.1* 8.8 20.6*   HGB 8.5* 10.2* 10.6* 14.4 10.8*   HCT 27.2* 32.2* 32.9* 44.9 33.7*    234 218 129* 232     No results found     Mineral & Bone Disorder  Recent Labs   Lab 08/22/19  0400 08/21/19  0539 08/20/19  0355  08/18/19  0438  08/17/19  0440   CALCIUM 8.0* 8.6 8.6   < > 8.6   < > 9.0   PHOS 3.9  --  3.5  --   --   --  4.8*   CHAPIS  --   --  1.22  --  1.21  --  1.25   MG 1.5* 1.8 1.7  --  1.4*   < > 2.1    < > = values in this interval not displayed.       No results found for: PTH    Urine Panel  Lab Results   Component Value Date    UOSM 559 08/08/2019    UK 33.2 10/07/2012    UCL 60 10/07/2012    ELSA 34 10/07/2012    UKET NEGATIVE 08/18/2019    USPG 1.018 08/18/2019    UPROT 30 (A) 08/18/2019    UWBC NEGATIVE 08/18/2019    URBC TRACE (A) 08/18/2019    UBILI NEGATIVE 08/18/2019    UPH 5.5 08/18/2019    UROB 0.2 08/18/2019    UBACTR NONE SEEN 08/18/2019        Assessment / Plan     Acute Kidney Injury, acute tubular necrosis, 2/2 septic shock, non-oliguric, recovered     S/P Hypotension / Septic Shock, off pressors   -Hemodynamic optimization per critical care    Metabolic Acidosis of ARF (E.coli bacteremia)  Anemia / Thrombocytopenia of chronic liver disease   -S/p mass transfusion & IR embolization on 8/12  +UA, E.coli, antibiotics per ID  Hypomagnesemia      Recs:  Creatinine stable  On standard TPN, monitoring lytes, okay today  Non-oliguric Urine output 850 mL yesterday   Echo noted with LVEF improved 37% -> 64%  Renal adjust medications, avoid nephrotoxic agents / NSAIDs.  No Fleets  Labs am      Alessandra Johnston MD   8/22/2019  Nephrology

## 2024-10-16 ENCOUNTER — OFFICE VISIT (OUTPATIENT)
Dept: GASTROENTEROLOGY | Facility: CLINIC | Age: 49
End: 2024-10-16
Payer: COMMERCIAL

## 2024-10-16 VITALS
DIASTOLIC BLOOD PRESSURE: 70 MMHG | HEIGHT: 63 IN | BODY MASS INDEX: 35.26 KG/M2 | SYSTOLIC BLOOD PRESSURE: 115 MMHG | TEMPERATURE: 98 F | WEIGHT: 199 LBS

## 2024-10-16 DIAGNOSIS — K58.2 IRRITABLE BOWEL SYNDROME WITH BOTH CONSTIPATION AND DIARRHEA: Primary | ICD-10-CM

## 2024-10-16 DIAGNOSIS — K59.09 CHRONIC CONSTIPATION WITH OVERFLOW: ICD-10-CM

## 2024-10-16 PROCEDURE — 99214 OFFICE O/P EST MOD 30 MIN: CPT | Performed by: INTERNAL MEDICINE

## 2024-10-16 PROCEDURE — G2211 COMPLEX E/M VISIT ADD ON: HCPCS | Performed by: INTERNAL MEDICINE

## 2024-10-16 RX ORDER — WHEAT DEXTRIN 3 G/3.8 G
POWDER (GRAM) ORAL
Qty: 350 G | Refills: 3 | Status: SHIPPED | OUTPATIENT
Start: 2024-10-16

## 2024-10-16 RX ORDER — POLYETHYLENE GLYCOL 3350 17 G/17G
17 POWDER, FOR SOLUTION ORAL DAILY
Qty: 72 EACH | Refills: 4 | Status: SHIPPED | OUTPATIENT
Start: 2024-10-16

## 2024-10-17 ENCOUNTER — TELEPHONE (OUTPATIENT)
Dept: INTERNAL MEDICINE CLINIC | Facility: CLINIC | Age: 49
End: 2024-10-17

## 2024-10-17 ENCOUNTER — OFFICE VISIT (OUTPATIENT)
Dept: OBGYN CLINIC | Facility: CLINIC | Age: 49
End: 2024-10-17
Payer: COMMERCIAL

## 2024-10-17 VITALS — HEIGHT: 62 IN | WEIGHT: 197 LBS | BODY MASS INDEX: 36.25 KG/M2

## 2024-10-17 DIAGNOSIS — M18.12 ARTHRITIS OF CARPOMETACARPAL (CMC) JOINT OF LEFT THUMB: Primary | ICD-10-CM

## 2024-10-17 DIAGNOSIS — M65.4 DE QUERVAIN'S TENOSYNOVITIS, RIGHT: ICD-10-CM

## 2024-10-17 DIAGNOSIS — M18.11 ARTHRITIS OF CARPOMETACARPAL (CMC) JOINT OF RIGHT THUMB: ICD-10-CM

## 2024-10-17 DIAGNOSIS — M65.4 DE QUERVAIN'S TENOSYNOVITIS, LEFT: ICD-10-CM

## 2024-10-17 LAB
COPPER SERPL-MCNC: 95 UG/DL (ref 80–158)
VIT B1 BLD-SCNC: 79 NMOL/L (ref 66.5–200)
ZINC SERPL-MCNC: 57 UG/DL (ref 44–115)

## 2024-10-17 PROCEDURE — 99213 OFFICE O/P EST LOW 20 MIN: CPT | Performed by: SURGERY

## 2024-10-17 PROCEDURE — 20600 DRAIN/INJ JOINT/BURSA W/O US: CPT | Performed by: SURGERY

## 2024-10-17 RX ORDER — LIDOCAINE HYDROCHLORIDE 10 MG/ML
1 INJECTION, SOLUTION INFILTRATION; PERINEURAL
Status: COMPLETED | OUTPATIENT
Start: 2024-10-17 | End: 2024-10-17

## 2024-10-17 RX ORDER — TRIAMCINOLONE ACETONIDE 40 MG/ML
20 INJECTION, SUSPENSION INTRA-ARTICULAR; INTRAMUSCULAR
Status: COMPLETED | OUTPATIENT
Start: 2024-10-17 | End: 2024-10-17

## 2024-10-17 RX ORDER — FAMOTIDINE 20 MG/1
20 TABLET, FILM COATED ORAL 2 TIMES DAILY PRN
Qty: 180 TABLET | Refills: 3 | Status: SHIPPED | OUTPATIENT
Start: 2024-10-17 | End: 2025-10-12

## 2024-10-17 RX ORDER — BUPIVACAINE HYDROCHLORIDE 2.5 MG/ML
0.5 INJECTION, SOLUTION INFILTRATION; PERINEURAL
Status: COMPLETED | OUTPATIENT
Start: 2024-10-17 | End: 2024-10-17

## 2024-10-17 RX ADMIN — TRIAMCINOLONE ACETONIDE 20 MG: 40 INJECTION, SUSPENSION INTRA-ARTICULAR; INTRAMUSCULAR at 11:45

## 2024-10-17 RX ADMIN — BUPIVACAINE HYDROCHLORIDE 0.5 ML: 2.5 INJECTION, SOLUTION INFILTRATION; PERINEURAL at 11:45

## 2024-10-17 RX ADMIN — LIDOCAINE HYDROCHLORIDE 1 ML: 10 INJECTION, SOLUTION INFILTRATION; PERINEURAL at 11:45

## 2024-10-17 NOTE — PROGRESS NOTES
ASSESSMENT/PLAN:      49 y.o. female with right thumb CMC arthritis and bilateral De Quervain's tenosynovitis     On PE she is most tender over her thumb CMC joints. She vail shave mild tenderness to her 1st dorsal compartment bilaterally, but CMC tenderness seems to be the worst. She is having some more tenderness to her right 1st dorsal compartment and not so much to her left 1st dorsal compartment. It was discussed with Jenny that based on PE, she would likely benefit from bilateral thumb CMC CSI's rather then a 2nd right De Quervain's CSI. Due to acute exacerbation of chronic bilateral thumb CMC arthritis, the decision was made to proceed with repeat CSI's. Bilateral thumb CMC CSI's were performed in the office without complication. Post injection protocol/expectations were reviewed. We discussed the possibility of CMC capsular denervation vs CMC arthroplasty if symptoms were to worsen or fail to improve. She will continue OT at [facility], updated OT script was provided. Thumb CMC CSI's may be repeated on a 3 month basis as needed. A 2nd right De Quervain's CSI may be performed if the future if warranted. Follow up in the office on an as needed basis.     The patient verbalized understanding of exam findings and treatment plan. We engaged in the shared decision-making process and treatment options were discussed at length with the patient. Surgical and conservative management discussed today along with risks and benefits.    Diagnoses and all orders for this visit:    Arthritis of carpometacarpal (CMC) joint of left thumb  -     Small joint arthrocentesis: bilateral thumb CMC  -     Ambulatory Referral to PT/OT Hand Therapy; Future    Arthritis of carpometacarpal (CMC) joint of right thumb  -     Small joint arthrocentesis: bilateral thumb CMC  -     Ambulatory Referral to PT/OT Hand Therapy; Future    De Quervain's tenosynovitis, left  -     Ambulatory Referral to PT/OT Hand Therapy; Future    De Quervain's  tenosynovitis, right  -     Ambulatory Referral to PT/OT Hand Therapy; Future      Follow Up:  Return if symptoms worsen or fail to improve.      To Do Next Visit:  Re-evaluation of current issue    ____________________________________________________________________________________________________________________________________________      CHIEF COMPLAINT:  Chief Complaint   Patient presents with    Follow-up     Follow up of the left thumb. Injections today. Last 4/17/24       SUBJECTIVE:  Jenny Olvera is a 49 y.o. year old RHD female who presents to the office for a follow up regarding bilateral bilateral hand/wrist pain. She underwent a 2nd left De Quervain's CSI and a initial right De Quervain's CSI. She notes that the CSI's were beneficial for her. She has been attending OT at [facility]. She notes most of her pain is to the base of her thumbs. She notes occasional swelling to this area as well. She will use ice and take Tylenol on an as needed basis.        I have personally reviewed all the relevant PMH, PSH, SH, FH, Medications and allergies.     PAST MEDICAL HISTORY:  Past Medical History:   Diagnosis Date    Anxiety     Asthma     Claustrophobia     Cluster headache     CTS (carpal tunnel syndrome)     Depression     Fibromyalgia     primary; last assessed 11/27/17    GERD (gastroesophageal reflux disease)     GI problem     H/O gastric sleeve     Headache, tension-type     Irritable bowel syndrome     Joint pain     Kidney stone     Lung nodule     last assessed 10/9/15    Migraine     Muscle pain     Peripheral neuropathy     PONV (postoperative nausea and vomiting)     Sjogren's syndrome (HCC)        PAST SURGICAL HISTORY:  Past Surgical History:   Procedure Laterality Date    BACK SURGERY      BARIATRIC SURGERY  8/9/2022    CARPAL TUNNEL RELEASE Bilateral     CHOLECYSTECTOMY      CYSTOSCOPY      GALLBLADDER SURGERY      GASTRECTOMY  08/09/2022    Sleeve    GASTRIC BYPASS LAPAROSCOPIC N/A  01/30/2024    Procedure: REVISION CONVERSION TO R-N-Y GASTRIC BYPASS W/ ROBOTICS AND INTRAOPERATIVE EGD; PARAESOPHAGEAL HERNIA REPAIR WITH MESH;  Surgeon: Felix Akins MD;  Location: [facility] OR;  Service: Bariatrics    HERNIA REPAIR  08/09/2022    Apparently done at time gastric sleeve. Still have major issues with it.    NECK SURGERY      AK TENDON SHEATH INCISION Right 01/16/2023    Procedure: THUMB TRIGGER FINGER RELEASE;  Surgeon: Gail Wylie MD;  Location: [facility];  Service: Orthopedics    SACROILIAC JOINT FUSION Left     SLEEVE GASTROPLASTY  08/9/2022    SPINAL FUSION      C4 and C5    ULNAR NERVE REPAIR Bilateral     UPPER GASTROINTESTINAL ENDOSCOPY         FAMILY HISTORY:  Family History   Problem Relation Age of Onset    Diabetes Mother     Fibromyalgia Mother     Ovarian cancer Mother 33    Hypertension Mother     Thyroid disease Mother     Migraines Mother     Neuropathy Mother     Cancer Mother         has been removed    Arthritis Mother     COPD Mother     Depression Mother     No Known Problems Father     Throat cancer Maternal Grandmother     No Known Problems Maternal Grandfather     No Known Problems Paternal Grandmother     No Known Problems Paternal Grandfather     No Known Problems Daughter     Breast cancer Maternal Aunt     Dementia Maternal Aunt     No Known Problems Maternal Aunt     No Known Problems Maternal Aunt     No Known Problems Paternal Aunt     Colon cancer Cousin     Heart disease Cousin     Lupus Cousin     Arthritis Family     Heart disease Family         cardiac disorder    Neuropathy Family     Lupus Family         systemic erythematosus    No Known Problems Half-Sister     No Known Problems Half-Brother     No Known Problems Half-Brother     No Known Problems Half-Brother     Asthma Brother     Stroke Neg Hx        SOCIAL HISTORY:  Social History     Tobacco Use    Smoking status: Never    Smokeless tobacco: Never    Tobacco comments:     Never smoked.    Vaping Use    Vaping status: Never Used   Substance Use Topics    Alcohol use: Yes     Alcohol/week: 2.0 standard drinks of alcohol     Types: 2 Glasses of wine per week     Comment: Occasionally    Drug use: Yes     Types: Marijuana     Comment: medicinal over 1 yr, capsules (has medial card)       MEDICATIONS:    Current Outpatient Medications:     albuterol (PROVENTIL HFA,VENTOLIN HFA) 90 mcg/act inhaler, Inhale 2 puffs every 6 (six) hours as needed for wheezing or shortness of breath, Disp: 8 g, Rfl: 2    Botox 200 units SOLR, Every three months for migraines, Disp: , Rfl:     butalbital-acetaminophen-caffeine (FIORICET,ESGIC) -40 mg per tablet, 1 tab q6 hours prn migraine. No more than 2-3 per week., Disp: 10 tablet, Rfl: 0    carboxymethylcellulose 0.5 % SOLN, Administer 1 drop to both eyes 3 (three) times a day as needed for dry eyes, Disp: 90 each, Rfl: 3    cholecalciferol (VITAMIN D3) 1,000 units tablet, Take 1 capsule by mouth once a week 3000 units daily, Disp: , Rfl:     cholestyramine (QUESTRAN) 4 GM/DOSE powder, Take 1 packet (4 g total) by mouth 2 (two) times a day with meals (Patient taking differently: Take 4 g by mouth 2 (two) times a day as needed Only as needed), Disp: 378 g, Rfl: 2    dexamethasone (DECADRON) 2 mg tablet, 1 tab qam with food prn migraine., Disp: 5 tablet, Rfl: 1    dicyclomine (BENTYL) 20 mg tablet, Take 1 tablet (20 mg total) by mouth 3 (three) times a day as needed (migraine/cramps), Disp: 60 tablet, Rfl: 0    divalproex sodium (DEPAKOTE) 250 mg DR tablet, 2 tabs q12 h x 2 days, then 1 tab q12 h x 2 days, then stop. Repeat if needed., Disp: 12 tablet, Rfl: 1    fluticasone (FLONASE) 50 mcg/act nasal spray, 1 spray in each nostril once daily, Disp: 30 mL, Rfl: 0    lasmiditan succinate (Reyvow) 50 mg tablet, One tab qhs at migraine onset.  Caution sedation.  Max 1 tab/24 hours., Disp: 8 tablet, Rfl: 0    meclizine (ANTIVERT) 12.5 MG tablet, Take 1 tablet (12.5 mg  total) by mouth 3 (three) times a day as needed for dizziness or nausea, Disp: 30 tablet, Rfl: 3    melatonin 3 mg, , Disp: , Rfl:     Nerve Stimulator (Nerivio) ALIX, Start treatment within 60 minutes of migraine onset. Set a strong, yet comfortable intensity level in the first few minutes and maintain that level for 45 minutes., Disp: 1 each, Rfl: 2    ondansetron (ZOFRAN) 4 mg tablet, Take 1 tablet (4 mg total) by mouth every 12 (twelve) hours as needed for nausea, Disp: 20 tablet, Rfl: 1    polyethylene glycol (MIRALAX) 17 g packet, Take 17 g by mouth daily, Disp: 72 each, Rfl: 4    prochlorperazine (COMPAZINE) 10 mg tablet, Take 1 tablet (10 mg total) by mouth every 6 (six) hours as needed for nausea or vomiting, Disp: 30 tablet, Rfl: 0    tiZANidine (ZANAFLEX) 2 mg tablet, Take 1 tablet (2 mg total) by mouth every 8 (eight) hours as needed for muscle spasms, Disp: 90 tablet, Rfl: 1    traZODone (DESYREL) 50 mg tablet, TAKE HALF TABLETS BY MOUTH DAILY AT BEDTIME, Disp: 15 tablet, Rfl: 0    Trudhesa 0.725 MG/ACT AERS, , Disp: , Rfl:     venlafaxine (EFFEXOR-XR) 37.5 mg 24 hr capsule, ONE CAPSULE DAILY IN THE MORNING WITH FOOD, Disp: 90 capsule, Rfl: 0    Wheat Dextrin (Benefiber) POWD, Take by mouth Daily at 2am, Disp: 350 g, Rfl: 3    cyanocobalamin 1,000 mcg/mL, Inject 1 mL (1,000 mcg total) into a muscle once for 1 dose, Disp: 3 mL, Rfl: 4    hydroxychloroquine (PLAQUENIL) 200 mg tablet, Take 1 tablet (200 mg total) by mouth daily with breakfast for 14 days, THEN 1 tablet (200 mg total) 2 (two) times a day with meals., Disp: 194 tablet, Rfl: 0    Current Facility-Administered Medications:     [START ON 10/18/2024] cyanocobalamin injection 1,000 mcg, 1,000 mcg, Intramuscular, Q30 Days,     ALLERGIES:  Allergies   Allergen Reactions    Hydrocodone-Acetaminophen GI Intolerance     gi upset -pt okay if takes  zofran      Codeine GI Intolerance     GI issues    Mexiletine Rash    Oxycodone-Acetaminophen GI  Intolerance     GI issues    Penicillins Other (See Comments) and GI Intolerance     GI issues,vaginitis    Pollen Extract-Tree Extract [Pollen Extract] Nasal Congestion       REVIEW OF SYSTEMS:  Review of Systems   Constitutional:  Negative for chills, fever and unexpected weight change.   HENT:  Negative for hearing loss, nosebleeds and sore throat.    Eyes:  Negative for pain, redness and visual disturbance.   Respiratory:  Negative for cough, shortness of breath and wheezing.    Cardiovascular:  Negative for chest pain, palpitations and leg swelling.   Gastrointestinal:  Negative for abdominal pain, nausea and vomiting.   Endocrine: Negative for polydipsia and polyuria.   Genitourinary:  Negative for difficulty urinating and hematuria.   Musculoskeletal:  Positive for arthralgias. Negative for joint swelling and myalgias.   Skin:  Negative for rash and wound.   Neurological:  Negative for dizziness, numbness and headaches.   Psychiatric/Behavioral:  Negative for decreased concentration, dysphoric mood and suicidal ideas. The patient is not nervous/anxious.        VITALS:  Vitals:         _____________________________________________________  PHYSICAL EXAMINATION:  General: well developed and well nourished, alert, oriented times 3, and appears comfortable  Psychiatric: Normal  HEENT: Normocephalic, Atraumatic Trachea Midline, No torticollis  Pulmonary: No audible wheezing or respiratory distress   Cardiovascular: No pitting edema, 2+ radial pulse   Abdominal/GI: abdomen non tender, non distended   Skin: No masses, erythema, lacerations, fluctation, ulcerations  Neurovascular: Sensation Intact to the Median, Ulnar, Radial Nerve, Motor Intact to the Median, Ulnar, Radial Nerve, and Pulses Intact  Musculoskeletal: Normal, except as noted in detailed exam and in HPI.      MUSCULOSKELETAL EXAMINATION:    Bilateral CMC Exam:  No adduction contracture  No hyperextension deformity of MCP joint  Positive localized  tenderness over radial and dorsal aspect of thumb (CMC joint)  Grind test is Positive for pain and Negative for crepitus  Metacarpal load shift test positive   No triggering or tenderness over the A1 pulley    Mild 1st dorsal compartment tenderness, more so on the right side  Mild pain with resisted thumb extension on the right  Full digital extension   Full composite fist     ___________________________________________________    STUDIES REVIEWED:  I have personally reviewed and interpreted  AP lateral and oblique radiographs of bilateral hands which demonstrate CMC arthrosis.          LABS REVIEWED:    HgA1c:   Lab Results   Component Value Date    HGBA1C 5.7 (H) 06/22/2022     BMP:   Lab Results   Component Value Date    GLUCOSE 88 02/20/2015    CALCIUM 9.1 10/14/2024     02/20/2015    K 3.9 10/14/2024    CO2 26 10/14/2024     10/14/2024    BUN 13 10/14/2024    CREATININE 0.55 (L) 10/14/2024             PROCEDURES PERFORMED:  Small joint arthrocentesis: bilateral thumb CMC  [place] Protocol:  procedure performed by consultantConsent: Verbal consent obtained. Written consent not obtained.  Risks and benefits: risks, benefits and alternatives were discussed  Consent given by: patient  Patient understanding: patient states understanding of the procedure being performed  Site marked: the operative site was marked  Patient identity confirmed: verbally with patient  Supporting Documentation  Indications: pain   Procedure Details  Location: thumb - bilateral thumb CMC  Needle size: 25 G  Ultrasound guidance: no    Medications (Right): 0.5 mL bupivacaine 0.25 %; 1 mL lidocaine 1 %; 20 mg triamcinolone acetonide 40 mg/mLMedications (Left): 0.5 mL bupivacaine 0.25 %; 1 mL lidocaine 1 %; 20 mg triamcinolone acetonide 40 mg/mL   Patient tolerance: patient tolerated the procedure well with no immediate complications  Dressing:  Sterile dressing applied            _____________________________________________________      Scribe Attestation      I,:  Radha Cleaning MA am acting as a scribe while in the presence of the attending physician.:       I,:  Gail Wylie MD personally performed the services described in this documentation    as scribed in my presence.:

## 2024-10-17 NOTE — TELEPHONE ENCOUNTER
Called patient regarding labs and discussed abnormal Alk Phos. Given second time elevation, will obtain RUQ us and ggt out of abundance of caution which patient agrees to.    Kamala Edmond, DO  10/17/24

## 2024-10-18 LAB — VIT A SERPL-MCNC: 33.6 UG/DL (ref 20.1–62)

## 2024-10-20 LAB — METHYLMALONATE SERPL-SCNC: 176 NMOL/L (ref 0–378)

## 2024-10-21 ENCOUNTER — TELEPHONE (OUTPATIENT)
Dept: BARIATRICS | Facility: CLINIC | Age: 49
End: 2024-10-21

## 2024-10-21 ENCOUNTER — HOSPITAL ENCOUNTER (OUTPATIENT)
Dept: RADIOLOGY | Facility: HOSPITAL | Age: 49
Discharge: HOME | End: 2024-10-21
Payer: COMMERCIAL

## 2024-10-21 DIAGNOSIS — E55.9 VITAMIN D DEFICIENCY: ICD-10-CM

## 2024-10-21 DIAGNOSIS — K91.2 POSTSURGICAL MALABSORPTION: ICD-10-CM

## 2024-10-21 DIAGNOSIS — R74.8 ELEVATED ALKALINE PHOSPHATASE LEVEL: ICD-10-CM

## 2024-10-21 DIAGNOSIS — Z98.84 BARIATRIC SURGERY STATUS: Primary | ICD-10-CM

## 2024-10-21 DIAGNOSIS — E53.8 VITAMIN B12 DEFICIENCY: ICD-10-CM

## 2024-10-21 PROCEDURE — 76705 ECHO EXAM OF ABDOMEN: CPT

## 2024-10-21 RX ORDER — ERGOCALCIFEROL 1.25 MG/1
50000 CAPSULE, LIQUID FILLED ORAL 2 TIMES WEEKLY
Qty: 24 CAPSULE | Refills: 0 | Status: SHIPPED | OUTPATIENT
Start: 2024-10-21

## 2024-10-21 NOTE — TELEPHONE ENCOUNTER
----- Message from CATE Bolanos sent at 10/21/2024  1:20 PM EDT -----  Please call pt to let her know we have received her labs. They are all within limits EXCEPT FOR THE FOLLOWING:     - Your vitamin D level is very low which can affect your bone health.  Recommend that you take 50,000 IU of vitamin D2 twice per week for 12 weeks. This will be sent to your pharmacy to . In addition, you need to take 1200 to 1500 mg of calcium citrate per day, in divided doses of 500 to 600 mg each.  After 12 weeks, switch to a maintenance dose of 2000 IU vitamin D3 per day and continue to take calcium daily.      - vitamin B12 level is low but much improved. I would recommend adding an additional vitamin B12 1000 mcg once daily - preferably a sublingual form (place under the tongue) for better absorption. You can continue with B12 injections through your PCP office as well.     - repeat labs in 3 months.

## 2024-12-05 ENCOUNTER — PROCEDURE VISIT (OUTPATIENT)
Dept: NEUROLOGY | Facility: CLINIC | Age: 49
End: 2024-12-05
Payer: COMMERCIAL

## 2024-12-05 VITALS — SYSTOLIC BLOOD PRESSURE: 116 MMHG | HEART RATE: 75 BPM | DIASTOLIC BLOOD PRESSURE: 75 MMHG | TEMPERATURE: 98.6 F

## 2024-12-05 DIAGNOSIS — G43.709 CHRONIC MIGRAINE WITHOUT AURA WITHOUT STATUS MIGRAINOSUS, NOT INTRACTABLE: Primary | ICD-10-CM

## 2024-12-05 PROCEDURE — 64615 CHEMODENERV MUSC MIGRAINE: CPT | Performed by: PHYSICIAN ASSISTANT

## 2024-12-05 RX ORDER — DEXAMETHASONE 2 MG/1
TABLET ORAL
Qty: 5 TABLET | Refills: 2 | Status: SHIPPED | OUTPATIENT
Start: 2024-12-05

## 2024-12-05 RX ORDER — VENLAFAXINE HYDROCHLORIDE 37.5 MG/1
CAPSULE, EXTENDED RELEASE ORAL
Qty: 180 CAPSULE | Refills: 0 | Status: SHIPPED | OUTPATIENT
Start: 2024-12-05

## 2024-12-05 RX ORDER — BUTALBITAL, ACETAMINOPHEN AND CAFFEINE 50; 325; 40 MG/1; MG/1; MG/1
TABLET ORAL
Qty: 10 TABLET | Refills: 2 | Status: SHIPPED | OUTPATIENT
Start: 2024-12-05

## 2024-12-05 NOTE — PROGRESS NOTES
Universal Protocol   Consent: Verbal consent obtained. Written consent obtained.  Risks and benefits: risks, benefits and alternatives were discussed  Consent given by: patient  Patient understanding: patient states understanding of the procedure being performed  Patient consent: the patient's understanding of the procedure matches consent given  Procedure consent: procedure consent matches procedure scheduled      Chemodenervation     Date/Time  12/5/2024 11:00 AM     Performed by  Linda Ward PA-C   Authorized by  Linda Ward PA-C     Pre-procedure details      Prepped With: Alcohol     Procedure details      Position:  Upright   Botox      Botox Type:  Type A    Brand:  Botox    mL's of Botulinum Toxin:  200    Final Concentration per CC:  100 units    Needle Gauge:  30 G 2.5 inch   Procedures      Botox Procedures: chronic headache      Indications: migraines     Injection Location      Head / Face:  L superior trapezius, R superior trapezius, L superior cervical paraspinal, R superior cervical paraspinal, L , R , procerus, L temporalis, R temporalis, R frontalis, L frontalis, R medial occipitalis and L medial occipitalis    L  injection amount:  5 unit(s)    R  injection amount:  5 unit(s)    L lateral frontalis:  5 unit(s)    R lateral frontalis:  5 unit(s)    L medial frontalis:  5 unit(s)    R medial frontalis:  5 unit(s)    L temporalis injection amount:  20 unit(s)    R temporalis injection amount:  20 unit(s)    Procerus injection amount:  5 unit(s)    L medial occipitalis injection amount:  15 unit(s)    R medial occipitalis injection amount:  15 unit(s)    L superior cervical paraspinal injection amount:  10 unit(s)    R superior cervical paraspinal injection amount:  10 unit(s)    L superior trapezius injection amount:  15 unit(s)    R superior trapezius injection amount:  15 unit(s)   Total Units      Total units used:  200    Total units discarded:  0    Post-procedure details      Chemodenervation:  Chronic migraine    Facial Nerve Location::  Bilateral facial nerve    Patient tolerance of procedure:  Tolerated well, no immediate complications   Comments       Extra units medically necessary:  - 10 between both upper traps  - 15 between both middle traps  - 10 between bilateral occipitalis muscles  - 10 between both anterior temporalis muscles       Blood pressure 116/75, pulse 75, temperature 98.6 °F (37 °C), temperature source Temporal.    Increase effexor- try 37.5mg BID, s/e reviewed. Pt cannot take 75 mg all at once, caused vertigo/ dizzy sxs.  Trial ajovy.  Consider vyepti in future, right now denied by insurance.  Try to catch migraine at onset with one or multiple of the PRN meds as rx today, could be triggered by today's botox.  S/e of all meds reviewed.

## 2024-12-06 ENCOUNTER — OFFICE VISIT (OUTPATIENT)
Dept: OBGYN CLINIC | Facility: CLINIC | Age: 49
End: 2024-12-06

## 2024-12-06 ENCOUNTER — TELEPHONE (OUTPATIENT)
Age: 49
End: 2024-12-06

## 2024-12-06 VITALS
WEIGHT: 202 LBS | DIASTOLIC BLOOD PRESSURE: 84 MMHG | SYSTOLIC BLOOD PRESSURE: 122 MMHG | HEART RATE: 78 BPM | BODY MASS INDEX: 37.17 KG/M2 | HEIGHT: 62 IN

## 2024-12-06 DIAGNOSIS — N81.2 INCOMPLETE UTEROVAGINAL PROLAPSE: Primary | ICD-10-CM

## 2024-12-06 PROCEDURE — 99215 OFFICE O/P EST HI 40 MIN: CPT | Performed by: OBSTETRICS & GYNECOLOGY

## 2024-12-06 NOTE — PROGRESS NOTES
"Urogynecology - New Patient Visit  Jenny Olvera   [id number]    Icelandic-speaking: no Phone  used: no    Chief complaint: prolapse    Referred by gynecologist:  yes  History of prolapse surgery: no  Primary care doctor: yes    HPI: 49 y.o. presents for symptoms of POP. Patient was seen with Dr. Paige in August and diagnosed with Stage 3 apical POP. Patient states she feels a daily bulge and can feel this bulge beyond the vaginal introitus when she sits on the toilet. She denies urinary incontinence and only has very rare ABDIRAHMAN episodes. Patient denies history of ABDIRAHMAN prior to prolapse symptoms.    Of note, patient has surgical history significant for cholecystectomy, gastric sleeve followed by gastric revision RNY bypass in 2024 with Dr. Garcia. On review of operative report, \"extensive intra-abdominal adhesions\" were noted.     Prolapse symptoms  Bulge: yes   Pressure: yes  Rubbing on clothing: yes  Stenting for urine: no  Stenting for stool: no  Pessary use: Previously used 2-3 different types of pessaries, last in   Hormonal replacement therapy: no      Urinary symptoms  Urgency: no  Frequency: yes 7 or more/ day  Incontinence with stress (exercise, valsalva, laugh, sneeze, cough): yes, rarely  Incontinence with urge: no  #pads used/24 hours: 0  Liners/pads/diapers   Postural incontinence: no  Nocturia:yes 2-3 times / night  Dysuria: no  Hematuria:no  Incomplete emptying: yes  Slow stream:yes  Hesitancy: yes  Straining with urination: yes    Defecatory symptoms  Constipation:yes  Frequency: varies, goes 4 days to 2 weeks without going  Urgency: no  Fecal Incontinence: no  Gas incontinence:  yes  Loose stools:  yes, occasional   Bowel regimen:  no None currently    Gynecologic history  Pap history: Normal Year: 2024  Contraception: Mirena IUD placed  - Occasional spotting with IUD  Sexually active: yes  Dyspareunia: yes     OB History    Para Term  AB Living   2 2 2   " 2   SAB IAB Ectopic Multiple Live Births       2      # Outcome Date GA Lbr Oleg/2nd Weight Sex Type Anes PTL Lv   2 Term     M Vag-Spont   EDGARD   1 Term     F Vag-Spont   EDGARD       Past Medical History:   Diagnosis Date    Anxiety     Asthma     Claustrophobia     Cluster headache     CTS (carpal tunnel syndrome)     Depression     Fibromyalgia     primary; last assessed 11/27/17    GERD (gastroesophageal reflux disease)     GI problem     H/O gastric sleeve     Headache, tension-type     Irritable bowel syndrome     Joint pain     Kidney stone     Lung nodule     last assessed 10/9/15    Migraine     Muscle pain     Peripheral neuropathy     PONV (postoperative nausea and vomiting)     Sjogren's syndrome (HCC)        Past Surgical History:   Procedure Laterality Date    BACK SURGERY      BARIATRIC SURGERY  8/9/2022    CARPAL TUNNEL RELEASE Bilateral     CHOLECYSTECTOMY      CYSTOSCOPY      GALLBLADDER SURGERY      GASTRECTOMY  08/09/2022    Sleeve    GASTRIC BYPASS LAPAROSCOPIC N/A 01/30/2024    Procedure: REVISION CONVERSION TO R-N-Y GASTRIC BYPASS W/ ROBOTICS AND INTRAOPERATIVE EGD; PARAESOPHAGEAL HERNIA REPAIR WITH MESH;  Surgeon: Felix Akins MD;  Location: [facility];  Service: Bariatrics    HERNIA REPAIR  08/09/2022    Apparently done at time gastric sleeve. Still have major issues with it.    NECK SURGERY      NE TENDON SHEATH INCISION Right 01/16/2023    Procedure: THUMB TRIGGER FINGER RELEASE;  Surgeon: Gail Wylie MD;  Location: [facility];  Service: Orthopedics    SACROILIAC JOINT FUSION Left     SLEEVE GASTROPLASTY  08/9/2022    SPINAL FUSION      C4 and C5    ULNAR NERVE REPAIR Bilateral     UPPER GASTROINTESTINAL ENDOSCOPY         Family History   Problem Relation Age of Onset    Diabetes Mother     Fibromyalgia Mother     Ovarian cancer Mother 33    Hypertension Mother     Thyroid disease Mother     Migraines Mother     Neuropathy Mother     Cancer Mother         has been removed     Arthritis Mother     COPD Mother     Depression Mother     No Known Problems Father     Throat cancer Maternal Grandmother     No Known Problems Maternal Grandfather     No Known Problems Paternal Grandmother     No Known Problems Paternal Grandfather     No Known Problems Daughter     Breast cancer Maternal Aunt     Dementia Maternal Aunt     No Known Problems Maternal Aunt     No Known Problems Maternal Aunt     No Known Problems Paternal Aunt     Colon cancer Cousin     Heart disease Cousin     Lupus Cousin     Arthritis Family     Heart disease Family         cardiac disorder    Neuropathy Family     Lupus Family         systemic erythematosus    No Known Problems Half-Sister     No Known Problems Half-Brother     No Known Problems Half-Brother     No Known Problems Half-Brother     Asthma Brother     Stroke Neg Hx        Social History     Socioeconomic History    Marital status:      Spouse name: Not on file    Number of children: Not on file    Years of education: Not on file    Highest education level: Not on file   Occupational History    Not on file   Tobacco Use    Smoking status: Never    Smokeless tobacco: Never    Tobacco comments:     Never smoked.   Vaping Use    Vaping status: Never Used   Substance and Sexual Activity    Alcohol use: Yes     Alcohol/week: 2.0 standard drinks of alcohol     Types: 2 Glasses of wine per week     Comment: Occasionally    Drug use: Yes     Types: Marijuana     Comment: medicinal over 1 yr, capsules (has medial card)    Sexual activity: Yes     Partners: Male     Birth control/protection: Abstinence, Condom Male, I.U.D.   Other Topics Concern    Not on file   Social History Narrative    Daily cola, tea    No preference on religous beliefs     Social Drivers of Health     Financial Resource Strain: Low Risk  (8/29/2024)    Overall Financial Resource Strain (CARDIA)     Difficulty of Paying Living Expenses: Not very hard   Food Insecurity: No Food Insecurity  (8/29/2024)    Nursing - Inadequate Food Risk Classification     Worried About Running Out of Food in the Last Year: Never true     Ran Out of Food in the Last Year: Never true     Ran Out of Food in the Last Year: Not on file   Transportation Needs: No Transportation Needs (8/29/2024)    PRAPARE - Transportation     Lack of Transportation (Medical): No     Lack of Transportation (Non-Medical): No   Physical Activity: Inactive (9/8/2021)    Exercise Vital Sign     Days of Exercise per Week: 0 days     Minutes of Exercise per Session: 0 min   Stress: Stress Concern Present (9/8/2021)    [employer] - Occupational Stress Questionnaire     Feeling of Stress : Rather much   Social Connections: Socially Isolated (9/8/2021)    Social Connection and Isolation Panel [NHANES]     Frequency of Communication with Friends and Family: Three times a week     Frequency of Social Gatherings with Friends and Family: More than three times a week     Attends Congregation Services: Never     Active Member of Clubs or Organizations: No     Attends Club or Organization Meetings: Never     Marital Status:    Intimate Partner Violence: Not At Risk (9/8/2021)    Humiliation, Afraid, Rape, and Kick questionnaire     Fear of Current or Ex-Partner: No     Emotionally Abused: No     Physically Abused: No     Sexually Abused: No   Housing Stability: High Risk (8/29/2024)    Housing Stability Vital Sign     Unable to Pay for Housing in the Last Year: Yes     Number of Times Moved in the Last Year: 0     Homeless in the Last Year: No       Current Outpatient Medications on File Prior to Visit   Medication Sig Dispense Refill    albuterol (PROVENTIL HFA,VENTOLIN HFA) 90 mcg/act inhaler Inhale 2 puffs every 6 (six) hours as needed for wheezing or shortness of breath 8 g 2    Botox 200 units SOLR Every three months for migraines      butalbital-acetaminophen-caffeine (FIORICET,ESGIC) -40 mg per tablet 1 tab q6  hours prn migraine. No more than 2-3 per week. 10 tablet 2    carboxymethylcellulose 0.5 % SOLN Administer 1 drop to both eyes 3 (three) times a day as needed for dry eyes 90 each 3    cholecalciferol (VITAMIN D3) 1,000 units tablet Take 1 capsule by mouth once a week 3000 units daily      cholestyramine (QUESTRAN) 4 GM/DOSE powder Take 1 packet (4 g total) by mouth 2 (two) times a day with meals (Patient taking differently: Take 4 g by mouth 2 (two) times a day as needed Only as needed) 378 g 2    cyanocobalamin 1,000 mcg/mL Inject 1 mL (1,000 mcg total) into a muscle once for 1 dose 3 mL 4    dexamethasone (DECADRON) 2 mg tablet 1 tab qam with food prn migraine. 5 tablet 2    dicyclomine (BENTYL) 20 mg tablet Take 1 tablet (20 mg total) by mouth 3 (three) times a day as needed (migraine/cramps) 60 tablet 0    divalproex sodium (DEPAKOTE) 250 mg DR tablet 2 tabs q12 h x 2 days, then 1 tab q12 h x 2 days, then stop. Repeat if needed. 12 tablet 1    ergocalciferol (VITAMIN D2) 50,000 units Take 1 capsule (50,000 Units total) by mouth 2 (two) times a week 24 capsule 0    famotidine (PEPCID) 20 mg tablet Take 1 tablet (20 mg total) by mouth 2 (two) times a day as needed for heartburn (take as needed for symptoms of heart burn as discontinue ppi) 180 tablet 3    fluticasone (FLONASE) 50 mcg/act nasal spray 1 spray in each nostril once daily 30 mL 0    fremanezumab-vfrm (Ajovy) 225 MG/1.5ML auto-injector Inject 1.5 mL (225 mg total) under the skin every 30 (thirty) days 1.5 mL 11    hydroxychloroquine (PLAQUENIL) 200 mg tablet Take 1 tablet (200 mg total) by mouth daily with breakfast for 14 days, THEN 1 tablet (200 mg total) 2 (two) times a day with meals. 194 tablet 0    lasmiditan succinate (Reyvow) 100 mg tablet One tab qhs at migraine onset.  Caution sedation.  Max 1 tab/24 hours. 12 tablet 0    meclizine (ANTIVERT) 12.5 MG tablet Take 1 tablet (12.5 mg total) by mouth 3 (three) times a day as needed for dizziness  or nausea 30 tablet 3    melatonin 3 mg       Nerve Stimulator (Nerivio) ALIX Start treatment within 60 minutes of migraine onset. Set a strong, yet comfortable intensity level in the first few minutes and maintain that level for 45 minutes. 1 each 2    ondansetron (ZOFRAN) 4 mg tablet Take 1 tablet (4 mg total) by mouth every 12 (twelve) hours as needed for nausea 20 tablet 1    polyethylene glycol (MIRALAX) 17 g packet Take 17 g by mouth daily 72 each 4    prochlorperazine (COMPAZINE) 10 mg tablet Take 1 tablet (10 mg total) by mouth every 6 (six) hours as needed for nausea or vomiting 30 tablet 0    tiZANidine (ZANAFLEX) 2 mg tablet Take 1 tablet (2 mg total) by mouth every 8 (eight) hours as needed for muscle spasms 90 tablet 1    traZODone (DESYREL) 50 mg tablet TAKE HALF TABLETS BY MOUTH DAILY AT BEDTIME 15 tablet 0    Trudhesa 0.725 MG/ACT AERS       venlafaxine (EFFEXOR-XR) 37.5 mg 24 hr capsule 1 cap  capsule 0    Wheat Dextrin (Benefiber) POWD Take by mouth Daily at 2am 350 g 3    [DISCONTINUED] butalbital-acetaminophen-caffeine (FIORICET,ESGIC) -40 mg per tablet 1 tab q6 hours prn migraine. No more than 2-3 per week. 10 tablet 0    [DISCONTINUED] dexamethasone (DECADRON) 2 mg tablet 1 tab qam with food prn migraine. 5 tablet 1    [DISCONTINUED] lasmiditan succinate (Reyvow) 50 mg tablet One tab qhs at migraine onset.  Caution sedation.  Max 1 tab/24 hours. 8 tablet 0    [DISCONTINUED] venlafaxine (EFFEXOR-XR) 37.5 mg 24 hr capsule ONE CAPSULE DAILY IN THE MORNING WITH FOOD 90 capsule 0     Current Facility-Administered Medications on File Prior to Visit   Medication Dose Route Frequency Provider Last Rate Last Admin    [COMPLETED] Botulinum Toxin Type A SOLR 200 Units  200 Units Injection Once    200 Units at 12/05/24 1111    cyanocobalamin injection 1,000 mcg  1,000 mcg Intramuscular Q30 Days            Allergies   Allergen Reactions    Hydrocodone-Acetaminophen GI Intolerance     gi upset  "-pt okay if takes  zofran      Codeine GI Intolerance     GI issues    Mexiletine Rash    Oxycodone-Acetaminophen GI Intolerance     GI issues    Penicillins Other (See Comments) and GI Intolerance     GI issues,vaginitis    Pollen Extract-Tree Extract [Pollen Extract] Nasal Congestion       Pertinent items are noted in HPI.    Physical exam:  /84 (BP Location: Left arm, Patient Position: Sitting)   Pulse 78   Ht 5' 2\" (1.575 m)   Wt 91.6 kg (202 lb)   BMI 36.95 kg/m²   Cardiovascular: regular rate and rhythm  Lungs:  clear to auscultation bilaterally  Abdomen: soft, non-tender, without masses or organomegaly  Pelvic: BUS normal. Introitus normal. Normal appearing vaginal epithelium. No vaginal atrophy. No urethral caruncle. Primary apical descent. Normal appearing cervix. IUD strings visualized.  Extremities: No edema and No calf tenderness  Neurologic:  alert, oriented, normal speech, no focal findings or movement disorder noted    POP-Q   Aa -2  Ba -2  C  +1  Ap -3  Bp -3  D  -1  GH 4  PB  3  TVL  10     Kegel strength: 3/5     Q-tip test: Resting degree of 0 with straining degree of 20     Q-tip with decreased resistance: no     Posterior wall relaxation: no      CST: Negative     Post void residual: 11cc      Posterior wall relaxation: yes       Assessment:  49 y.o. with stage 3 apical uterovaginal prolapse    Plan:  Surgery: yes Procedure: EUA, VEC with Enplace, anterior and posterior colporrhaphy, cystoscopy    Counseled patient on conservative vs surgical management of prolapse. Patient declines pessary as primary form of management. Discussed  Discussed Enplace vs lap USVS vs robotic sacralcolpopexy. Discussed robotic sacralcolpopexy is gold standard surgery with lowest rate of recurrence, however, is associated with increased morbidity as it requires hysterectomy and intra-abdominal approach. Counseled would not recommend abdominal approach as patient has known intra-abdominal adhesive disease " increasing her risk for intra-op injury. Discussed use of anchors in sacrospinous ligament are used to suspend permanent suture through the cervix to re-suspend to anatomical position. Anterior and posterior colporrhaphy to address element of distension of the vagina.     Discussed pv sling placement as a preventative measure for ABDIRAHMAN once prolapse is corrected. However, as patient has no urethral hypermobility on exam and very rare episodes of ABDIRAHMAN, will defer pv sling placement at this time. She is aware there is risk of ABDIRAHMAN once prolapse is corrected and may need an additional procedure in the future.     We discussed that unforeseen events can occur during or after surgery such as anesthesia complications, heart attack, stroke or excessive blood loss that could lead to permanent disability or death.  We reviewed risks of possible blood tranfusions, including allergic reaction, chest pain, shortness of breath, and infectious risk (HIV/hepatitis). Alternatives to recommended procedure, including Kegel exercises, timed voiding, pessary, etc were also discussed along with their risks and benefits.  She was given an opportunity to ask questions which were answered to her satisfaction.   She understood the information presented to her and wished to proceed with the recommended procedure. Written informed consent was obtained.    Continue daily Kegel exercises    Follow up for preoperative visit once surgery scheduled  Preoperative visit: yes  Preoperative clearance: yes    Over 50% of time spent on counseling and coordination of care.    Richelle Del Angel MD

## 2024-12-09 ENCOUNTER — TELEPHONE (OUTPATIENT)
Dept: GYNECOLOGY | Facility: CLINIC | Age: 49
End: 2024-12-09

## 2024-12-09 NOTE — TELEPHONE ENCOUNTER
----- Message from Richelle Del Angel MD sent at 2024  2:29 PM EST -----  Regarding: urogyn surgery scheduling  [facility] GYN Department  Surgery Scheduling Sheet    Patient Name: Jenny Olvera  : 1975    Provider: Richelle Del Angel MD     Needed: no; Language: N/A    Procedure: exam under anesthesia, Vaginal Extraperitoneal Colposuspension with Enplace, anterior and posterior colporrhaphy, cystoscopy    Diagnosis: incomplete uterovaginal prolapse    Special Needs or Equipment: none    Anesthesia: choice    Length of stay: outpatient  Does patient have comorbid conditions that will require close perioperative monitoring prior to safe discharge: no    The patient has comorbid conditions that will require close perioperative monitoring prior to safe discharge, including N/A.   This may require acute care beyond the usual and routine recovery period. As such, inpatient admission post-operatively is expected and appropriate, and anticipated hospital length of stay will be >2 midnights.    Pre-Admission Testing Needed: yes   Labs that should be ordered: cbc, hgb A1C, and BMP    Order PAT that is recommended in prep for procedure?:     Medical Clearance Needed: yes; Provider: PCP    MA Form Signed (tubals/hysterectomy): Not Indicated    Surgical Drink Given: no     How many days out of work: 1 week(s)     How many days no driving: 3 day(s)       Is pre op appt needed?  yes  Interval for post op appt: 2 week(s)       For Surgical Scheduler:     Surgery Scheduled On:  [place]: [facility]    Pre-op Appt:   Post op Appt:  Consult/Medical clearance appt:

## 2024-12-10 ENCOUNTER — TELEPHONE (OUTPATIENT)
Age: 49
End: 2024-12-10

## 2024-12-10 NOTE — TELEPHONE ENCOUNTER
[facility] called and states that zurdo ZIMMERMAN  [id number]  [employer]  ID-[id number]  [phone]    Zurdo ZIMMERMAN completed on [facility]  Key: [id number]

## 2024-12-12 ENCOUNTER — OFFICE VISIT (OUTPATIENT)
Dept: BARIATRICS | Facility: CLINIC | Age: 49
End: 2024-12-12
Payer: COMMERCIAL

## 2024-12-12 VITALS
BODY MASS INDEX: 34.91 KG/M2 | HEIGHT: 63 IN | HEART RATE: 78 BPM | TEMPERATURE: 97.7 F | DIASTOLIC BLOOD PRESSURE: 68 MMHG | SYSTOLIC BLOOD PRESSURE: 98 MMHG | WEIGHT: 197 LBS

## 2024-12-12 DIAGNOSIS — K21.9 GERD WITHOUT ESOPHAGITIS: Primary | ICD-10-CM

## 2024-12-12 PROCEDURE — 99213 OFFICE O/P EST LOW 20 MIN: CPT | Performed by: STUDENT IN AN ORGANIZED HEALTH CARE EDUCATION/TRAINING PROGRAM

## 2024-12-12 NOTE — TELEPHONE ENCOUNTER
Petra approved from 12/10/24-12/10/25    Attempted to call pharm, continuous ringing.  Will try again later.

## 2024-12-12 NOTE — PROGRESS NOTES
OFFICE VISIT - BARIATRIC SURGERY  Jenny Olvera 49 y.o. female MRN: [id number]  Unit/Bed#:  Encounter: [id number]      HPI:  Jenny Olvera is a 49 y.o. female status post robotic conversion from sleeve to  RNYGB and PEHR with mesh performed by Dr. Osmel Akins on 1/30/2024 for heart burn s/p dilation of GJ with 12mm CRE balloon for dysphagia 5/15/24.    Subjective     Patient had heartburn before the sleeve which was worse after the sleeve. Her sx improved after the conversion to RGYB and PEHR, however progressively worsened the last two months.    She had EGD and dilation back on 1/30 for dysphagia. Her dysphagia has improved since then. She is taking omeprazole 40 AM and famotidine at night.    Reflux is positional. Worse when laying down and present when she wakes up. No regurgitation of food or pain with swallowing.    EGD 5/15/24 - Small 1 cm HH. Mild narrowing. GJ dilated to 12 mm.  CT 11/19/24 - There is mucosal wall thickening of the visualized esophagus postsurgical   UGI 1/31/2024 - nl    Review of Systems   Constitutional:  Negative for chills and fever.   HENT:  Negative for sore throat.    Eyes:  Negative for visual disturbance.   Respiratory:  Negative for cough and shortness of breath.    Gastrointestinal:  Negative for abdominal pain and vomiting.        Reflux   Musculoskeletal:  Negative for arthralgias.   Skin:  Negative for color change and rash.   Neurological:  Negative for seizures and syncope.   All other systems reviewed and are negative.      Historical Information   Past Medical History:   Diagnosis Date    Anxiety     Asthma     Claustrophobia     Cluster headache     CTS (carpal tunnel syndrome)     Depression     Fibromyalgia     primary; last assessed 11/27/17    GERD (gastroesophageal reflux disease)     GI problem     H/O gastric sleeve     Headache, tension-type     Irritable bowel syndrome     Joint pain     Kidney stone     Lung nodule     last assessed 10/9/15    Migraine     Muscle  "pain     Peripheral neuropathy     PONV (postoperative nausea and vomiting)     Sjogren's syndrome (HCC)      Past Surgical History:   Procedure Laterality Date    BACK SURGERY      BARIATRIC SURGERY  8/9/2022    CARPAL TUNNEL RELEASE Bilateral     CHOLECYSTECTOMY      CYSTOSCOPY      GALLBLADDER SURGERY      GASTRECTOMY  08/09/2022    Sleeve    GASTRIC BYPASS LAPAROSCOPIC N/A 01/30/2024    Procedure: REVISION CONVERSION TO R-N-Y GASTRIC BYPASS W/ ROBOTICS AND INTRAOPERATIVE EGD; PARAESOPHAGEAL HERNIA REPAIR WITH MESH;  Surgeon: Felix Akins MD;  Location: [facility];  Service: Bariatrics    HERNIA REPAIR  08/09/2022    Apparently done at time gastric sleeve. Still have major issues with it.    NECK SURGERY      ME TENDON SHEATH INCISION Right 01/16/2023    Procedure: THUMB TRIGGER FINGER RELEASE;  Surgeon: Gail Wylie MD;  [facility]: [facility];  Service: Orthopedics    SACROILIAC JOINT FUSION Left     SLEEVE GASTROPLASTY  08/9/2022    SPINAL FUSION      C4 and C5    ULNAR NERVE REPAIR Bilateral     UPPER GASTROINTESTINAL ENDOSCOPY       Social History   Social History     Substance and Sexual Activity   Alcohol Use Yes    Alcohol/week: 2.0 standard drinks of alcohol    Types: 2 Glasses of wine per week    Comment: Occasionally     Social History     Substance and Sexual Activity   Drug Use Yes    Types: Marijuana    Comment: medicinal over 1 yr, capsules (has medial card)     Social History     Tobacco Use   Smoking Status Never   Smokeless Tobacco Never   Tobacco Comments    Never smoked.       Objective       Current Vitals:   Temp Source: Tympanic (12/12/24 0939)  Height: 5' 2.7\" (159.3 cm) (12/12/24 0939)  Weight - Scale: 89.4 kg (197 lb) (12/12/24 0939)    Invasive Devices       None                   Physical Exam  Vitals reviewed.   Constitutional:       Appearance: Normal appearance.   HENT:      Head: Atraumatic.      Nose: Nose normal.   Cardiovascular:      Rate and Rhythm: Normal rate. "   Pulmonary:      Effort: Pulmonary effort is normal.   Musculoskeletal:         General: Normal range of motion.   Neurological:      General: No focal deficit present.   Psychiatric:         Behavior: Behavior normal.           Pathology, and Other Studies: Results Review Statement: I personally reviewed the following image studies/reports in PACS and discussed pertinent findings with Radiology:  . My interpretation of the radiology images/reports is:  .      Workup includes:     UGI    EGD     DATE OF SERVICE:  5/15/24     PHYSICIAN(S):  Attending:   Felix Akins MD      Fellow:   No Staff Documented         INDICATION:  Bariatric surgery status     POST-OP DIAGNOSIS:  See the impression below.     PREPROCEDURE:  Informed consent was obtained for the procedure, including sedation.  Risks of perforation, hemorrhage, adverse drug reaction and aspiration were discussed. The patient was placed in the left lateral decubitus position.     Patient was explained about the risks and benefits of the procedure. Risks including but not limited to bleeding, infection, and perforation were explained in detail. Also explained about less than 100% sensitivity with the exam and other alternatives.     PROCEDURE: EGD     DETAILS OF PROCEDURE:   Patient was taken to the procedure room where a time out was performed to confirm correct patient and correct procedure. The patient underwent monitored anesthesia care, which was administered by an anesthesia professional. The patient's blood pressure, heart rate, level of consciousness, respirations, oxygen, ECG and ETCO2 were monitored throughout the procedure. The scope was introduced through the mouth and advanced to the second part of the duodenum. Retroflexion was performed in the fundus. The patient experienced no blood loss. The procedure was not difficult. The patient tolerated the procedure well. There were no apparent adverse events.      ANESTHESIA INFORMATION:  ASA:  III  Anesthesia Type: General     MEDICATIONS:  No administrations occurring from 1254 to 1300 on 05/15/24         FINDINGS:  Regular Z-line 35 cm from the incisors  Small sliding hiatal hernia (type I hiatal hernia) - GE junction 35 cm from the incisors, diaphragmatic impression 36 cm from the incisors  Previous Isela-en-Y gastric bypass. Mild narrowing of the GJ at 10 mm, GJ serially dilated to 12 mm with CRE balloon        SPECIMENS:  * No specimens in log *        IMPRESSION:  Small type I hiatal hernia  Previous Isela-en-Y gastric bypass        RECOMMENDATION:  There is no recommended follow-up for this procedure.     Pathology from EGD   No H. Pylori  --------------------------------------------------------------------    Assessment/PLAN:    Jenny Olvera is a 49 y.o. female status post robotic conversion from sleeve to  RNYGB and PEHR with mesh performed by Dr. Osmel Akins on 1/30/2024 for heart burn s/p dilation of GJ with 12mm CRE balloon for dysphagia 5/15/24.    She presents with 2 month of worsening reflux sx which is improved on PPI. No dysphagia. HH seen on EGD from 5/1/24 and on CT scan. CT with evidence of mucosal thickening.    Will repeat an EGD +/- dilation to make sure it's not an anastomotic issue and eval for esophagitis. She'll need biopsy for H. Pylori.    If normal, she'll need UGI and pH testing to characterize her reflux.          Kris Lew  Bariatric Surgery  12/12/2024  9:47 AM

## 2024-12-12 NOTE — PROGRESS NOTES
Date of surgery: 1/30/2024  Procedure: RNY w/ PEHR  Performing surgeon: Dr. Osmel Akins    Initial Weight - 263.6 lbs.  Current Weight - 197.0 lbs.  Carroll Weight - 195.8 lbs.  Total Body Weight Loss (EWL) - 66.6 lbs.  EWL% - 54%  TWB% - 25%

## 2024-12-16 ENCOUNTER — OFFICE VISIT (OUTPATIENT)
Dept: OBGYN CLINIC | Facility: HOSPITAL | Age: 49
End: 2024-12-16
Payer: COMMERCIAL

## 2024-12-16 VITALS — BODY MASS INDEX: 36.25 KG/M2 | HEIGHT: 62 IN | WEIGHT: 197 LBS

## 2024-12-16 DIAGNOSIS — M65.4 DE QUERVAIN'S TENOSYNOVITIS, LEFT: Primary | ICD-10-CM

## 2024-12-16 PROCEDURE — 99214 OFFICE O/P EST MOD 30 MIN: CPT | Performed by: SURGERY

## 2024-12-16 RX ORDER — SODIUM CHLORIDE 9 MG/ML
75 INJECTION, SOLUTION INTRAVENOUS ONCE
OUTPATIENT
Start: 2024-12-16

## 2024-12-16 RX ORDER — CEFAZOLIN SODIUM 2 G/50ML
2000 SOLUTION INTRAVENOUS ONCE
OUTPATIENT
Start: 2024-12-16 | End: 2024-12-16

## 2024-12-16 RX ORDER — CHLORHEXIDINE GLUCONATE ORAL RINSE 1.2 MG/ML
15 SOLUTION DENTAL ONCE
OUTPATIENT
Start: 2024-12-16 | End: 2024-12-16

## 2024-12-16 NOTE — PROGRESS NOTES
ASSESSMENT/PLAN:      49-year-old female with bilateral de quervain's    The patient has undergone 2 injections for the left and one for the right. We discussed continued non operative versus surgical intervention. The patient elected to proceed with left de quervain's release. Risks of the surgery are inclusive of but not limited to bleeding, infection, nerve injury, blood clot, worsening of symptoms, not achieving the anticipated results, persistent stiffness, weakness and the need for additional surgery. The patient verbally stated they understood those risks and would like to proceed with the surgery. Surgical consent was signed in the office today. Discussed this will not help with her thumb CMC pain.      The patient verbalized understanding of exam findings and treatment plan. We engaged in the shared decision-making process and treatment options were discussed at length with the patient. Surgical and conservative management discussed today along with risks and benefits.    Diagnoses and all orders for this visit:    De Quervain's tenosynovitis, left  -     Case request operating room: RELEASE DEQUERVAINS; Standing  -     Case request operating room: RELEASE DEQUERVAINS    Other orders  -     Nursing Communication Warmimg Interventions Implemented; Standing  -     Nursing Communication CHG bath, have staff wash entire body (neck down) per pre-op bathing protocol. Routine, evening prior to, and day of surgery.; Standing  -     Nursing Communication Swab both nares with Povidone-Iodine solution, EXCLUDE if patient has shellfish/Iodine allergy, and replace with nasal alcohol swabstick. Routine, day of surgery, on call to OR; Standing  -     chlorhexidine (PERIDEX) 0.12 % oral rinse 15 mL  -     Void on call to OR; Standing  -     Insert peripheral IV; Standing  -     Diet NPO; Sips with meds; Standing  -     sodium chloride 0.9 % infusion  -     ceFAZolin (ANCEF) IVPB (premix in dextrose) 2,000 mg 50 mL  -      Apply Sequential Compression Device; Standing          De Quervain Release:   The anatomy and physiology of de Quervain's tenosynovitis was discussed with the patient today in the office.  Edema and increased contact pressure within the first dorsal extensor compartment at the radial styloid can cause pain, crepitation, and limitation of function.  Treatment options include resting thumb spica splints to decrease edema, oral anti-inflammatory medications, home or formal therapy exercises, up to 2 steroid injections within the first dorsal extensor compartment, or surgical release.  While majority of patients do respond to conservative treatment, up to 20% may require surgical release. The patient has elected to undergo a release of the first dorsal extensor compartment (de Quervain's).  A small incision will be made over the radial styloid of the wrist, the tendon sheath holding the abductor pollicis longus and extensor pollicis brevis will be released.  In the postoperative period, light activities are allowed immediately, driving is allowed when narcotic medication has stopped, and the incision may get wet after 2 days.  Heavy activities (lifting more than approximately 10 pounds) will be allowed after the follow up appointment in 1-2 weeks.  While the pain and discomfort within the wrist typically improves rapidly, some residual discomfort may be present for up to 6 weeks.  The patient may notice temporary numbness within the superficial sensory branch of the radial nerve secondary to a traction neuropraxia.  This is often a self-limiting condition.  The patient has an understanding of the above mentioned discussion.  Approximate success rate is 98%.The risks and benefits of the procedure were explained to the patient, which include, but are not limited to: Bleeding, infection, recurrence, pain, scar, damage to tendons, damage to nerves, and damage to blood vessels, failure to give desired results and  complications related to anesthesia.  These risks, along with alternative conservative treatment options, and postoperative protocols were voiced back and understood by the patient.  All questions were answered to the patient's satisfaction.  The patient agrees to comply with a standard postoperative protocol, and is willing to proceed.  Education was provided via written and auditory forms.  There were no barriers to learning. Written handouts regarding wound care, incision and scar care, and general preoperative information was provided to the patient.  Prior to surgery, the patient may be requested to stop all anti-inflammatory medications.  Prophylactic aspirin, Plavix, and Coumadin may be allowed to be continued.  Medications including vitamin E., ginkgo, and fish oil are requested to be stopped approximately one week prior to surgery.  Hypertensive medications and beta blockers, if taken, should be continued.    Follow Up:  Return for After surgery.      To Do Next Visit:  Sutures out    ____________________________________________________________________________________________________________________________________________      CHIEF COMPLAINT:  Chief Complaint   Patient presents with    Follow-up     Patient would like to get B/L CMC injection        SUBJECTIVE:  Jenny Olvera is a 49 y.o. year old RHD female who presents to the office today for follow up evaluation bilateral wrist pain. The patient underwent bilateral thumb CMC injections at her last visit which she states provided her with good relief. She states her left is worse than her right. She notes pain to the radial aspect of both wrists. She notes increased pain with lifting. She sill take Tylenol as needed for pain.        I have personally reviewed all the relevant PMH, PSH, SH, FH, Medications and allergies.     PAST MEDICAL HISTORY:  Past Medical History:   Diagnosis Date    Anxiety     Asthma     Claustrophobia     Cluster headache      CTS (carpal tunnel syndrome)     Depression     Fibromyalgia     primary; last assessed 11/27/17    GERD (gastroesophageal reflux disease)     GI problem     H/O gastric sleeve     Headache, tension-type     Irritable bowel syndrome     Joint pain     Kidney stone     Lung nodule     last assessed 10/9/15    Migraine     Muscle pain     Peripheral neuropathy     PONV (postoperative nausea and vomiting)     Sjogren's syndrome (HCC)        PAST SURGICAL HISTORY:  Past Surgical History:   Procedure Laterality Date    BACK SURGERY      BARIATRIC SURGERY  8/9/2022    CARPAL TUNNEL RELEASE Bilateral     CHOLECYSTECTOMY      CYSTOSCOPY      GALLBLADDER SURGERY      GASTRECTOMY  08/09/2022    Sleeve    GASTRIC BYPASS LAPAROSCOPIC N/A 01/30/2024    Procedure: REVISION CONVERSION TO R-N-Y GASTRIC BYPASS W/ ROBOTICS AND INTRAOPERATIVE EGD; PARAESOPHAGEAL HERNIA REPAIR WITH MESH;  Surgeon: Felix Akins MD;  Location: [facility];  Service: Bariatrics    HERNIA REPAIR  08/09/2022    Apparently done at time gastric sleeve. Still have major issues with it.    NECK SURGERY      HI TENDON SHEATH INCISION Right 01/16/2023    Procedure: THUMB TRIGGER FINGER RELEASE;  Surgeon: Gail Wylie MD;  [facility];  Service: Orthopedics    SACROILIAC JOINT FUSION Left     SLEEVE GASTROPLASTY  08/9/2022    SPINAL FUSION      C4 and C5    ULNAR NERVE REPAIR Bilateral     UPPER GASTROINTESTINAL ENDOSCOPY         FAMILY HISTORY:  Family History   Problem Relation Age of Onset    Diabetes Mother     Fibromyalgia Mother     Ovarian cancer Mother 33    Hypertension Mother     Thyroid disease Mother     Migraines Mother     Neuropathy Mother     Cancer Mother         has been removed    Arthritis Mother     COPD Mother     Depression Mother     No Known Problems Father     Throat cancer Maternal Grandmother     No Known Problems Maternal Grandfather     No Known Problems Paternal Grandmother     No Known Problems Paternal  Grandfather     No Known Problems Daughter     Breast cancer Maternal Aunt     Dementia Maternal Aunt     No Known Problems Maternal Aunt     No Known Problems Maternal Aunt     No Known Problems Paternal Aunt     Colon cancer Cousin     Heart disease Cousin     Lupus Cousin     Arthritis Family     Heart disease Family         cardiac disorder    Neuropathy Family     Lupus Family         systemic erythematosus    No Known Problems Half-Sister     No Known Problems Half-Brother     No Known Problems Half-Brother     No Known Problems Half-Brother     Asthma Brother     Stroke Neg Hx        SOCIAL HISTORY:  Social History     Tobacco Use    Smoking status: Never    Smokeless tobacco: Never    Tobacco comments:     Never smoked.   Vaping Use    Vaping status: Never Used   Substance Use Topics    Alcohol use: Yes     Alcohol/week: 2.0 standard drinks of alcohol     Types: 2 Glasses of wine per week     Comment: Occasionally    Drug use: Yes     Types: Marijuana     Comment: medicinal over 1 yr, capsules (has medial card)       MEDICATIONS:    Current Outpatient Medications:     Botox 200 units SOLR, Every three months for migraines, Disp: , Rfl:     butalbital-acetaminophen-caffeine (FIORICET,ESGIC) -40 mg per tablet, 1 tab q6 hours prn migraine. No more than 2-3 per week., Disp: 10 tablet, Rfl: 2    cholecalciferol (VITAMIN D3) 1,000 units tablet, Take 1 capsule by mouth once a week 3000 units daily, Disp: , Rfl:     cholestyramine (QUESTRAN) 4 GM/DOSE powder, Take 1 packet (4 g total) by mouth 2 (two) times a day with meals, Disp: 378 g, Rfl: 2    dexamethasone (DECADRON) 2 mg tablet, 1 tab qam with food prn migraine., Disp: 5 tablet, Rfl: 2    divalproex sodium (DEPAKOTE) 250 mg DR tablet, 2 tabs q12 h x 2 days, then 1 tab q12 h x 2 days, then stop. Repeat if needed., Disp: 12 tablet, Rfl: 1    ergocalciferol (VITAMIN D2) 50,000 units, Take 1 capsule (50,000 Units total) by mouth 2 (two) times a week, Disp:  24 capsule, Rfl: 0    famotidine (PEPCID) 20 mg tablet, Take 1 tablet (20 mg total) by mouth 2 (two) times a day as needed for heartburn (take as needed for symptoms of heart burn as discontinue ppi), Disp: 180 tablet, Rfl: 3    fluticasone (FLONASE) 50 mcg/act nasal spray, 1 spray in each nostril once daily, Disp: 30 mL, Rfl: 0    lasmiditan succinate (Reyvow) 100 mg tablet, One tab qhs at migraine onset.  Caution sedation.  Max 1 tab/24 hours., Disp: 12 tablet, Rfl: 0    meclizine (ANTIVERT) 12.5 MG tablet, Take 1 tablet (12.5 mg total) by mouth 3 (three) times a day as needed for dizziness or nausea, Disp: 30 tablet, Rfl: 3    melatonin 3 mg, , Disp: , Rfl:     Nerve Stimulator (Nerivio) ALIX, Start treatment within 60 minutes of migraine onset. Set a strong, yet comfortable intensity level in the first few minutes and maintain that level for 45 minutes., Disp: 1 each, Rfl: 2    ondansetron (ZOFRAN) 4 mg tablet, Take 1 tablet (4 mg total) by mouth every 12 (twelve) hours as needed for nausea, Disp: 20 tablet, Rfl: 1    polyethylene glycol (MIRALAX) 17 g packet, Take 17 g by mouth daily, Disp: 72 each, Rfl: 4    prochlorperazine (COMPAZINE) 10 mg tablet, Take 1 tablet (10 mg total) by mouth every 6 (six) hours as needed for nausea or vomiting, Disp: 30 tablet, Rfl: 0    tiZANidine (ZANAFLEX) 2 mg tablet, Take 1 tablet (2 mg total) by mouth every 8 (eight) hours as needed for muscle spasms, Disp: 90 tablet, Rfl: 1    traZODone (DESYREL) 50 mg tablet, TAKE HALF TABLETS BY MOUTH DAILY AT BEDTIME, Disp: 15 tablet, Rfl: 0    Trudhesa 0.725 MG/ACT AERS, , Disp: , Rfl:     venlafaxine (EFFEXOR-XR) 37.5 mg 24 hr capsule, 1 cap BID, Disp: 180 capsule, Rfl: 0    albuterol (PROVENTIL HFA,VENTOLIN HFA) 90 mcg/act inhaler, Inhale 2 puffs every 6 (six) hours as needed for wheezing or shortness of breath, Disp: 8 g, Rfl: 2    carboxymethylcellulose 0.5 % SOLN, Administer 1 drop to both eyes 3 (three) times a day as needed for  dry eyes (Patient not taking: Reported on 12/6/2024), Disp: 90 each, Rfl: 3    cyanocobalamin 1,000 mcg/mL, Inject 1 mL (1,000 mcg total) into a muscle once for 1 dose (Patient not taking: Reported on 12/12/2024), Disp: 3 mL, Rfl: 4    dicyclomine (BENTYL) 20 mg tablet, Take 1 tablet (20 mg total) by mouth 3 (three) times a day as needed (migraine/cramps), Disp: 60 tablet, Rfl: 0    fremanezumab-vfrm (Ajovy) 225 MG/1.5ML auto-injector, Inject 1.5 mL (225 mg total) under the skin every 30 (thirty) days (Patient not taking: Reported on 12/6/2024), Disp: 1.5 mL, Rfl: 11    hydroxychloroquine (PLAQUENIL) 200 mg tablet, Take 1 tablet (200 mg total) by mouth daily with breakfast for 14 days, THEN 1 tablet (200 mg total) 2 (two) times a day with meals., Disp: 194 tablet, Rfl: 0    Wheat Dextrin (Benefiber) POWD, Take by mouth Daily at 2am (Patient not taking: Reported on 12/6/2024), Disp: 350 g, Rfl: 3    Current Facility-Administered Medications:     cyanocobalamin injection 1,000 mcg, 1,000 mcg, Intramuscular, Q30 Days,     ALLERGIES:  Allergies   Allergen Reactions    Hydrocodone-Acetaminophen GI Intolerance     gi upset -pt okay if takes  zofran      Codeine GI Intolerance     GI issues    Mexiletine Rash    Oxycodone-Acetaminophen GI Intolerance     GI issues    Penicillins Other (See Comments) and GI Intolerance     GI issues,vaginitis    Pollen Extract-Tree Extract [Pollen Extract] Nasal Congestion       REVIEW OF SYSTEMS:  Review of Systems   Constitutional:  Negative for chills and fever.   HENT:  Negative for drooling and sneezing.    Eyes:  Negative for redness.   Respiratory:  Negative for cough and wheezing.    Gastrointestinal:  Negative for nausea and vomiting.   Musculoskeletal:  Positive for arthralgias. Negative for joint swelling and myalgias.   Neurological:  Negative for weakness and numbness.   Psychiatric/Behavioral:  Negative for behavioral problems. The patient is not nervous/anxious.         VITALS:  There were no vitals filed for this visit.      _____________________________________________________  PHYSICAL EXAMINATION:  General: well developed and well nourished, alert, oriented times 3, and appears comfortable  Psychiatric: Normal  HEENT: Normocephalic, Atraumatic Trachea Midline, No torticollis  Pulmonary: No audible wheezing or respiratory distress   Cardiovascular: No pitting edema, 2+ radial pulse   Abdominal/GI: abdomen non tender, non distended   Skin: No masses, erythema, lacerations, fluctation, ulcerations  Neurovascular: Sensation Intact to the Median, Ulnar, Radial Nerve, Motor Intact to the Median, Ulnar, Radial Nerve, and Pulses Intact  Musculoskeletal: Normal, except as noted in detailed exam and in HPI.      MUSCULOSKELETAL EXAMINATION:  Bilateral wrist  TTP 1st dorsal compartment  + finkelstein  Mild TTP thumb CMC  Full fist     ___________________________________________________    STUDIES REVIEWED:  No new images reviewed.    LABS REVIEWED:    HgA1c:   Lab Results   Component Value Date    HGBA1C 5.7 (H) 06/22/2022     BMP:   Lab Results   Component Value Date    GLUCOSE 88 02/20/2015    CALCIUM 9.1 10/14/2024     02/20/2015    K 3.9 10/14/2024    CO2 26 10/14/2024     10/14/2024    BUN 13 10/14/2024    CREATININE 0.55 (L) 10/14/2024             PROCEDURES PERFORMED:  Procedures  No Procedures performed today    _____________________________________________________      Scribe Attestation      I,:  Chery Cleaning MA am acting as a scribe while in the presence of the attending physician.:       I,:  aGil Wylie MD personally performed the services described in this documentation    as scribed in my presence.:

## 2024-12-16 NOTE — H&P (VIEW-ONLY)
ASSESSMENT/PLAN:      49-year-old female with bilateral de quervain's    The patient has undergone 2 injections for the left and one for the right. We discussed continued non operative versus surgical intervention. The patient elected to proceed with left de quervain's release. Risks of the surgery are inclusive of but not limited to bleeding, infection, nerve injury, blood clot, worsening of symptoms, not achieving the anticipated results, persistent stiffness, weakness and the need for additional surgery. The patient verbally stated they understood those risks and would like to proceed with the surgery. Surgical consent was signed in the office today. Discussed this will not help with her thumb CMC pain.      The patient verbalized understanding of exam findings and treatment plan. We engaged in the shared decision-making process and treatment options were discussed at length with the patient. Surgical and conservative management discussed today along with risks and benefits.    Diagnoses and all orders for this visit:    De Quervain's tenosynovitis, left  -     Case request operating room: RELEASE DEQUERVAINS; Standing  -     Case request operating room: RELEASE DEQUERVAINS    Other orders  -     Nursing Communication Warmimg Interventions Implemented; Standing  -     Nursing Communication CHG bath, have staff wash entire body (neck down) per pre-op bathing protocol. Routine, evening prior to, and day of surgery.; Standing  -     Nursing Communication Swab both nares with Povidone-Iodine solution, EXCLUDE if patient has shellfish/Iodine allergy, and replace with nasal alcohol swabstick. Routine, day of surgery, on call to OR; Standing  -     chlorhexidine (PERIDEX) 0.12 % oral rinse 15 mL  -     Void on call to OR; Standing  -     Insert peripheral IV; Standing  -     Diet NPO; Sips with meds; Standing  -     sodium chloride 0.9 % infusion  -     ceFAZolin (ANCEF) IVPB (premix in dextrose) 2,000 mg 50 mL  -      Apply Sequential Compression Device; Standing          De Quervain Release:   The anatomy and physiology of de Quervain's tenosynovitis was discussed with the patient today in the office.  Edema and increased contact pressure within the first dorsal extensor compartment at the radial styloid can cause pain, crepitation, and limitation of function.  Treatment options include resting thumb spica splints to decrease edema, oral anti-inflammatory medications, home or formal therapy exercises, up to 2 steroid injections within the first dorsal extensor compartment, or surgical release.  While majority of patients do respond to conservative treatment, up to 20% may require surgical release. The patient has elected to undergo a release of the first dorsal extensor compartment (de Quervain's).  A small incision will be made over the radial styloid of the wrist, the tendon sheath holding the abductor pollicis longus and extensor pollicis brevis will be released.  In the postoperative period, light activities are allowed immediately, driving is allowed when narcotic medication has stopped, and the incision may get wet after 2 days.  Heavy activities (lifting more than approximately 10 pounds) will be allowed after the follow up appointment in 1-2 weeks.  While the pain and discomfort within the wrist typically improves rapidly, some residual discomfort may be present for up to 6 weeks.  The patient may notice temporary numbness within the superficial sensory branch of the radial nerve secondary to a traction neuropraxia.  This is often a self-limiting condition.  The patient has an understanding of the above mentioned discussion.  Approximate success rate is 98%.The risks and benefits of the procedure were explained to the patient, which include, but are not limited to: Bleeding, infection, recurrence, pain, scar, damage to tendons, damage to nerves, and damage to blood vessels, failure to give desired results and  complications related to anesthesia.  These risks, along with alternative conservative treatment options, and postoperative protocols were voiced back and understood by the patient.  All questions were answered to the patient's satisfaction.  The patient agrees to comply with a standard postoperative protocol, and is willing to proceed.  Education was provided via written and auditory forms.  There were no barriers to learning. Written handouts regarding wound care, incision and scar care, and general preoperative information was provided to the patient.  Prior to surgery, the patient may be requested to stop all anti-inflammatory medications.  Prophylactic aspirin, Plavix, and Coumadin may be allowed to be continued.  Medications including vitamin E., ginkgo, and fish oil are requested to be stopped approximately one week prior to surgery.  Hypertensive medications and beta blockers, if taken, should be continued.    Follow Up:  Return for After surgery.      To Do Next Visit:  Sutures out    ____________________________________________________________________________________________________________________________________________      CHIEF COMPLAINT:  Chief Complaint   Patient presents with    Follow-up     Patient would like to get B/L CMC injection        SUBJECTIVE:  Jenny Olvera is a 49 y.o. year old RHD female who presents to the office today for follow up evaluation bilateral wrist pain. The patient underwent bilateral thumb CMC injections at her last visit which she states provided her with good relief. She states her left is worse than her right. She notes pain to the radial aspect of both wrists. She notes increased pain with lifting. She sill take Tylenol as needed for pain.        I have personally reviewed all the relevant PMH, PSH, SH, FH, Medications and allergies.     PAST MEDICAL HISTORY:  Past Medical History:   Diagnosis Date    Anxiety     Asthma     Claustrophobia     Cluster headache      CTS (carpal tunnel syndrome)     Depression     Fibromyalgia     primary; last assessed 11/27/17    GERD (gastroesophageal reflux disease)     GI problem     H/O gastric sleeve     Headache, tension-type     Irritable bowel syndrome     Joint pain     Kidney stone     Lung nodule     last assessed 10/9/15    Migraine     Muscle pain     Peripheral neuropathy     PONV (postoperative nausea and vomiting)     Sjogren's syndrome (HCC)        PAST SURGICAL HISTORY:  Past Surgical History:   Procedure Laterality Date    BACK SURGERY      BARIATRIC SURGERY  8/9/2022    CARPAL TUNNEL RELEASE Bilateral     CHOLECYSTECTOMY      CYSTOSCOPY      GALLBLADDER SURGERY      GASTRECTOMY  08/09/2022    Sleeve    GASTRIC BYPASS LAPAROSCOPIC N/A 01/30/2024    Procedure: REVISION CONVERSION TO R-N-Y GASTRIC BYPASS W/ ROBOTICS AND INTRAOPERATIVE EGD; PARAESOPHAGEAL HERNIA REPAIR WITH MESH;  Surgeon: Felix Akins MD;  Location: [facility];  Service: Bariatrics    HERNIA REPAIR  08/09/2022    Apparently done at time gastric sleeve. Still have major issues with it.    NECK SURGERY      OR TENDON SHEATH INCISION Right 01/16/2023    Procedure: THUMB TRIGGER FINGER RELEASE;  Surgeon: Gail Wylie MD;  Location: [facility];  Service: Orthopedics    SACROILIAC JOINT FUSION Left     SLEEVE GASTROPLASTY  08/9/2022    SPINAL FUSION      C4 and C5    ULNAR NERVE REPAIR Bilateral     UPPER GASTROINTESTINAL ENDOSCOPY         FAMILY HISTORY:  Family History   Problem Relation Age of Onset    Diabetes Mother     Fibromyalgia Mother     Ovarian cancer Mother 33    Hypertension Mother     Thyroid disease Mother     Migraines Mother     Neuropathy Mother     Cancer Mother         has been removed    Arthritis Mother     COPD Mother     Depression Mother     No Known Problems Father     Throat cancer Maternal Grandmother     No Known Problems Maternal Grandfather     No Known Problems Paternal Grandmother     No Known Problems Paternal  Grandfather     No Known Problems Daughter     Breast cancer Maternal Aunt     Dementia Maternal Aunt     No Known Problems Maternal Aunt     No Known Problems Maternal Aunt     No Known Problems Paternal Aunt     Colon cancer Cousin     Heart disease Cousin     Lupus Cousin     Arthritis Family     Heart disease Family         cardiac disorder    Neuropathy Family     Lupus Family         systemic erythematosus    No Known Problems Half-Sister     No Known Problems Half-Brother     No Known Problems Half-Brother     No Known Problems Half-Brother     Asthma Brother     Stroke Neg Hx        SOCIAL HISTORY:  Social History     Tobacco Use    Smoking status: Never    Smokeless tobacco: Never    Tobacco comments:     Never smoked.   Vaping Use    Vaping status: Never Used   Substance Use Topics    Alcohol use: Yes     Alcohol/week: 2.0 standard drinks of alcohol     Types: 2 Glasses of wine per week     Comment: Occasionally    Drug use: Yes     Types: Marijuana     Comment: medicinal over 1 yr, capsules (has medial card)       MEDICATIONS:    Current Outpatient Medications:     Botox 200 units SOLR, Every three months for migraines, Disp: , Rfl:     butalbital-acetaminophen-caffeine (FIORICET,ESGIC) -40 mg per tablet, 1 tab q6 hours prn migraine. No more than 2-3 per week., Disp: 10 tablet, Rfl: 2    cholecalciferol (VITAMIN D3) 1,000 units tablet, Take 1 capsule by mouth once a week 3000 units daily, Disp: , Rfl:     cholestyramine (QUESTRAN) 4 GM/DOSE powder, Take 1 packet (4 g total) by mouth 2 (two) times a day with meals, Disp: 378 g, Rfl: 2    dexamethasone (DECADRON) 2 mg tablet, 1 tab qam with food prn migraine., Disp: 5 tablet, Rfl: 2    divalproex sodium (DEPAKOTE) 250 mg DR tablet, 2 tabs q12 h x 2 days, then 1 tab q12 h x 2 days, then stop. Repeat if needed., Disp: 12 tablet, Rfl: 1    ergocalciferol (VITAMIN D2) 50,000 units, Take 1 capsule (50,000 Units total) by mouth 2 (two) times a week, Disp:  24 capsule, Rfl: 0    famotidine (PEPCID) 20 mg tablet, Take 1 tablet (20 mg total) by mouth 2 (two) times a day as needed for heartburn (take as needed for symptoms of heart burn as discontinue ppi), Disp: 180 tablet, Rfl: 3    fluticasone (FLONASE) 50 mcg/act nasal spray, 1 spray in each nostril once daily, Disp: 30 mL, Rfl: 0    lasmiditan succinate (Reyvow) 100 mg tablet, One tab qhs at migraine onset.  Caution sedation.  Max 1 tab/24 hours., Disp: 12 tablet, Rfl: 0    meclizine (ANTIVERT) 12.5 MG tablet, Take 1 tablet (12.5 mg total) by mouth 3 (three) times a day as needed for dizziness or nausea, Disp: 30 tablet, Rfl: 3    melatonin 3 mg, , Disp: , Rfl:     Nerve Stimulator (Nerivio) ALIX, Start treatment within 60 minutes of migraine onset. Set a strong, yet comfortable intensity level in the first few minutes and maintain that level for 45 minutes., Disp: 1 each, Rfl: 2    ondansetron (ZOFRAN) 4 mg tablet, Take 1 tablet (4 mg total) by mouth every 12 (twelve) hours as needed for nausea, Disp: 20 tablet, Rfl: 1    polyethylene glycol (MIRALAX) 17 g packet, Take 17 g by mouth daily, Disp: 72 each, Rfl: 4    prochlorperazine (COMPAZINE) 10 mg tablet, Take 1 tablet (10 mg total) by mouth every 6 (six) hours as needed for nausea or vomiting, Disp: 30 tablet, Rfl: 0    tiZANidine (ZANAFLEX) 2 mg tablet, Take 1 tablet (2 mg total) by mouth every 8 (eight) hours as needed for muscle spasms, Disp: 90 tablet, Rfl: 1    traZODone (DESYREL) 50 mg tablet, TAKE HALF TABLETS BY MOUTH DAILY AT BEDTIME, Disp: 15 tablet, Rfl: 0    Trudhesa 0.725 MG/ACT AERS, , Disp: , Rfl:     venlafaxine (EFFEXOR-XR) 37.5 mg 24 hr capsule, 1 cap BID, Disp: 180 capsule, Rfl: 0    albuterol (PROVENTIL HFA,VENTOLIN HFA) 90 mcg/act inhaler, Inhale 2 puffs every 6 (six) hours as needed for wheezing or shortness of breath, Disp: 8 g, Rfl: 2    carboxymethylcellulose 0.5 % SOLN, Administer 1 drop to both eyes 3 (three) times a day as needed for  dry eyes (Patient not taking: Reported on 12/6/2024), Disp: 90 each, Rfl: 3    cyanocobalamin 1,000 mcg/mL, Inject 1 mL (1,000 mcg total) into a muscle once for 1 dose (Patient not taking: Reported on 12/12/2024), Disp: 3 mL, Rfl: 4    dicyclomine (BENTYL) 20 mg tablet, Take 1 tablet (20 mg total) by mouth 3 (three) times a day as needed (migraine/cramps), Disp: 60 tablet, Rfl: 0    fremanezumab-vfrm (Ajovy) 225 MG/1.5ML auto-injector, Inject 1.5 mL (225 mg total) under the skin every 30 (thirty) days (Patient not taking: Reported on 12/6/2024), Disp: 1.5 mL, Rfl: 11    hydroxychloroquine (PLAQUENIL) 200 mg tablet, Take 1 tablet (200 mg total) by mouth daily with breakfast for 14 days, THEN 1 tablet (200 mg total) 2 (two) times a day with meals., Disp: 194 tablet, Rfl: 0    Wheat Dextrin (Benefiber) POWD, Take by mouth Daily at 2am (Patient not taking: Reported on 12/6/2024), Disp: 350 g, Rfl: 3    Current Facility-Administered Medications:     cyanocobalamin injection 1,000 mcg, 1,000 mcg, Intramuscular, Q30 Days,     ALLERGIES:  Allergies   Allergen Reactions    Hydrocodone-Acetaminophen GI Intolerance     gi upset -pt okay if takes  zofran      Codeine GI Intolerance     GI issues    Mexiletine Rash    Oxycodone-Acetaminophen GI Intolerance     GI issues    Penicillins Other (See Comments) and GI Intolerance     GI issues,vaginitis    Pollen Extract-Tree Extract [Pollen Extract] Nasal Congestion       REVIEW OF SYSTEMS:  Review of Systems   Constitutional:  Negative for chills and fever.   HENT:  Negative for drooling and sneezing.    Eyes:  Negative for redness.   Respiratory:  Negative for cough and wheezing.    Gastrointestinal:  Negative for nausea and vomiting.   Musculoskeletal:  Positive for arthralgias. Negative for joint swelling and myalgias.   Neurological:  Negative for weakness and numbness.   Psychiatric/Behavioral:  Negative for behavioral problems. The patient is not nervous/anxious.         VITALS:  There were no vitals filed for this visit.      _____________________________________________________  PHYSICAL EXAMINATION:  General: well developed and well nourished, alert, oriented times 3, and appears comfortable  Psychiatric: Normal  HEENT: Normocephalic, Atraumatic Trachea Midline, No torticollis  Pulmonary: No audible wheezing or respiratory distress   Cardiovascular: No pitting edema, 2+ radial pulse   Abdominal/GI: abdomen non tender, non distended   Skin: No masses, erythema, lacerations, fluctation, ulcerations  Neurovascular: Sensation Intact to the Median, Ulnar, Radial Nerve, Motor Intact to the Median, Ulnar, Radial Nerve, and Pulses Intact  Musculoskeletal: Normal, except as noted in detailed exam and in HPI.      MUSCULOSKELETAL EXAMINATION:  Bilateral wrist  TTP 1st dorsal compartment  + finkelstein  Mild TTP thumb CMC  Full fist     ___________________________________________________    STUDIES REVIEWED:  No new images reviewed.    LABS REVIEWED:    HgA1c:   Lab Results   Component Value Date    HGBA1C 5.7 (H) 06/22/2022     BMP:   Lab Results   Component Value Date    GLUCOSE 88 02/20/2015    CALCIUM 9.1 10/14/2024     02/20/2015    K 3.9 10/14/2024    CO2 26 10/14/2024     10/14/2024    BUN 13 10/14/2024    CREATININE 0.55 (L) 10/14/2024             PROCEDURES PERFORMED:  Procedures  No Procedures performed today    _____________________________________________________      Scribe Attestation      I,:  Chery Cleaning MA am acting as a scribe while in the presence of the attending physician.:       I,:  Gial Wylie MD personally performed the services described in this documentation    as scribed in my presence.:

## 2024-12-16 NOTE — H&P
ASSESSMENT/PLAN:      49-year-old female with bilateral de quervain's    The patient has undergone 2 injections for the left and one for the right. We discussed continued non operative versus surgical intervention. The patient elected to proceed with left de quervain's release. Risks of the surgery are inclusive of but not limited to bleeding, infection, nerve injury, blood clot, worsening of symptoms, not achieving the anticipated results, persistent stiffness, weakness and the need for additional surgery. The patient verbally stated they understood those risks and would like to proceed with the surgery. Surgical consent was signed in the office today. Discussed this will not help with her thumb CMC pain.      The patient verbalized understanding of exam findings and treatment plan. We engaged in the shared decision-making process and treatment options were discussed at length with the patient. Surgical and conservative management discussed today along with risks and benefits.    Diagnoses and all orders for this visit:    De Quervain's tenosynovitis, left  -     Case request operating room: RELEASE DEQUERVAINS; Standing  -     Case request operating room: RELEASE DEQUERVAINS    Other orders  -     Nursing Communication Warmimg Interventions Implemented; Standing  -     Nursing Communication CHG bath, have staff wash entire body (neck down) per pre-op bathing protocol. Routine, evening prior to, and day of surgery.; Standing  -     Nursing Communication Swab both nares with Povidone-Iodine solution, EXCLUDE if patient has shellfish/Iodine allergy, and replace with nasal alcohol swabstick. Routine, day of surgery, on call to OR; Standing  -     chlorhexidine (PERIDEX) 0.12 % oral rinse 15 mL  -     Void on call to OR; Standing  -     Insert peripheral IV; Standing  -     Diet NPO; Sips with meds; Standing  -     sodium chloride 0.9 % infusion  -     ceFAZolin (ANCEF) IVPB (premix in dextrose) 2,000 mg 50 mL  -      Apply Sequential Compression Device; Standing          De Quervain Release:   The anatomy and physiology of de Quervain's tenosynovitis was discussed with the patient today in the office.  Edema and increased contact pressure within the first dorsal extensor compartment at the radial styloid can cause pain, crepitation, and limitation of function.  Treatment options include resting thumb spica splints to decrease edema, oral anti-inflammatory medications, home or formal therapy exercises, up to 2 steroid injections within the first dorsal extensor compartment, or surgical release.  While majority of patients do respond to conservative treatment, up to 20% may require surgical release. The patient has elected to undergo a release of the first dorsal extensor compartment (de Quervain's).  A small incision will be made over the radial styloid of the wrist, the tendon sheath holding the abductor pollicis longus and extensor pollicis brevis will be released.  In the postoperative period, light activities are allowed immediately, driving is allowed when narcotic medication has stopped, and the incision may get wet after 2 days.  Heavy activities (lifting more than approximately 10 pounds) will be allowed after the follow up appointment in 1-2 weeks.  While the pain and discomfort within the wrist typically improves rapidly, some residual discomfort may be present for up to 6 weeks.  The patient may notice temporary numbness within the superficial sensory branch of the radial nerve secondary to a traction neuropraxia.  This is often a self-limiting condition.  The patient has an understanding of the above mentioned discussion.  Approximate success rate is 98%.The risks and benefits of the procedure were explained to the patient, which include, but are not limited to: Bleeding, infection, recurrence, pain, scar, damage to tendons, damage to nerves, and damage to blood vessels, failure to give desired results and  complications related to anesthesia.  These risks, along with alternative conservative treatment options, and postoperative protocols were voiced back and understood by the patient.  All questions were answered to the patient's satisfaction.  The patient agrees to comply with a standard postoperative protocol, and is willing to proceed.  Education was provided via written and auditory forms.  There were no barriers to learning. Written handouts regarding wound care, incision and scar care, and general preoperative information was provided to the patient.  Prior to surgery, the patient may be requested to stop all anti-inflammatory medications.  Prophylactic aspirin, Plavix, and Coumadin may be allowed to be continued.  Medications including vitamin E., ginkgo, and fish oil are requested to be stopped approximately one week prior to surgery.  Hypertensive medications and beta blockers, if taken, should be continued.    Follow Up:  Return for After surgery.      To Do Next Visit:  Sutures out    ____________________________________________________________________________________________________________________________________________      CHIEF COMPLAINT:  Chief Complaint   Patient presents with    Follow-up     Patient would like to get B/L CMC injection        SUBJECTIVE:  Jenny Olvera is a 49 y.o. year old RHD female who presents to the office today for follow up evaluation bilateral wrist pain. The patient underwent bilateral thumb CMC injections at her last visit which she states provided her with good relief. She states her left is worse than her right. She notes pain to the radial aspect of both wrists. She notes increased pain with lifting. She sill take Tylenol as needed for pain.        I have personally reviewed all the relevant PMH, PSH, SH, FH, Medications and allergies.     PAST MEDICAL HISTORY:  Past Medical History:   Diagnosis Date    Anxiety     Asthma     Claustrophobia     Cluster headache      CTS (carpal tunnel syndrome)     Depression     Fibromyalgia     primary; last assessed 11/27/17    GERD (gastroesophageal reflux disease)     GI problem     H/O gastric sleeve     Headache, tension-type     Irritable bowel syndrome     Joint pain     Kidney stone     Lung nodule     last assessed 10/9/15    Migraine     Muscle pain     Peripheral neuropathy     PONV (postoperative nausea and vomiting)     Sjogren's syndrome (HCC)        PAST SURGICAL HISTORY:  Past Surgical History:   Procedure Laterality Date    BACK SURGERY      BARIATRIC SURGERY  8/9/2022    CARPAL TUNNEL RELEASE Bilateral     CHOLECYSTECTOMY      CYSTOSCOPY      GALLBLADDER SURGERY      GASTRECTOMY  08/09/2022    Sleeve    GASTRIC BYPASS LAPAROSCOPIC N/A 01/30/2024    Procedure: REVISION CONVERSION TO R-N-Y GASTRIC BYPASS W/ ROBOTICS AND INTRAOPERATIVE EGD; PARAESOPHAGEAL HERNIA REPAIR WITH MESH;  Surgeon: Felix Akins MD;  Location: [facility];  Service: Bariatrics    HERNIA REPAIR  08/09/2022    Apparently done at time gastric sleeve. Still have major issues with it.    NECK SURGERY      [place] TENDON SHEATH INCISION Right 01/16/2023    Procedure: THUMB TRIGGER FINGER RELEASE;  Surgeon: Gail Wylie MD;  [facility];  Service: Orthopedics    SACROILIAC JOINT FUSION Left     SLEEVE GASTROPLASTY  08/9/2022    SPINAL FUSION      C4 and C5    ULNAR NERVE REPAIR Bilateral     UPPER GASTROINTESTINAL ENDOSCOPY         FAMILY HISTORY:  Family History   Problem Relation Age of Onset    Diabetes Mother     Fibromyalgia Mother     Ovarian cancer Mother 33    Hypertension Mother     Thyroid disease Mother     Migraines Mother     Neuropathy Mother     Cancer Mother         has been removed    Arthritis Mother     COPD Mother     Depression Mother     No Known Problems Father     Throat cancer Maternal Grandmother     No Known Problems Maternal Grandfather     No Known Problems Paternal Grandmother     No Known Problems Paternal  Grandfather     No Known Problems Daughter     Breast cancer Maternal Aunt     Dementia Maternal Aunt     No Known Problems Maternal Aunt     No Known Problems Maternal Aunt     No Known Problems Paternal Aunt     Colon cancer Cousin     Heart disease Cousin     Lupus Cousin     Arthritis Family     Heart disease Family         cardiac disorder    Neuropathy Family     Lupus Family         systemic erythematosus    No Known Problems Half-Sister     No Known Problems Half-Brother     No Known Problems Half-Brother     No Known Problems Half-Brother     Asthma Brother     Stroke Neg Hx        SOCIAL HISTORY:  Social History     Tobacco Use    Smoking status: Never    Smokeless tobacco: Never    Tobacco comments:     Never smoked.   Vaping Use    Vaping status: Never Used   Substance Use Topics    Alcohol use: Yes     Alcohol/week: 2.0 standard drinks of alcohol     Types: 2 Glasses of wine per week     Comment: Occasionally    Drug use: Yes     Types: Marijuana     Comment: medicinal over 1 yr, capsules (has medial card)       MEDICATIONS:    Current Outpatient Medications:     Botox 200 units SOLR, Every three months for migraines, Disp: , Rfl:     butalbital-acetaminophen-caffeine (FIORICET,ESGIC) -40 mg per tablet, 1 tab q6 hours prn migraine. No more than 2-3 per week., Disp: 10 tablet, Rfl: 2    cholecalciferol (VITAMIN D3) 1,000 units tablet, Take 1 capsule by mouth once a week 3000 units daily, Disp: , Rfl:     cholestyramine (QUESTRAN) 4 GM/DOSE powder, Take 1 packet (4 g total) by mouth 2 (two) times a day with meals, Disp: 378 g, Rfl: 2    dexamethasone (DECADRON) 2 mg tablet, 1 tab qam with food prn migraine., Disp: 5 tablet, Rfl: 2    divalproex sodium (DEPAKOTE) 250 mg DR tablet, 2 tabs q12 h x 2 days, then 1 tab q12 h x 2 days, then stop. Repeat if needed., Disp: 12 tablet, Rfl: 1    ergocalciferol (VITAMIN D2) 50,000 units, Take 1 capsule (50,000 Units total) by mouth 2 (two) times a week, Disp:  24 capsule, Rfl: 0    famotidine (PEPCID) 20 mg tablet, Take 1 tablet (20 mg total) by mouth 2 (two) times a day as needed for heartburn (take as needed for symptoms of heart burn as discontinue ppi), Disp: 180 tablet, Rfl: 3    fluticasone (FLONASE) 50 mcg/act nasal spray, 1 spray in each nostril once daily, Disp: 30 mL, Rfl: 0    lasmiditan succinate (Reyvow) 100 mg tablet, One tab qhs at migraine onset.  Caution sedation.  Max 1 tab/24 hours., Disp: 12 tablet, Rfl: 0    meclizine (ANTIVERT) 12.5 MG tablet, Take 1 tablet (12.5 mg total) by mouth 3 (three) times a day as needed for dizziness or nausea, Disp: 30 tablet, Rfl: 3    melatonin 3 mg, , Disp: , Rfl:     Nerve Stimulator (Nerivio) ALIX, Start treatment within 60 minutes of migraine onset. Set a strong, yet comfortable intensity level in the first few minutes and maintain that level for 45 minutes., Disp: 1 each, Rfl: 2    ondansetron (ZOFRAN) 4 mg tablet, Take 1 tablet (4 mg total) by mouth every 12 (twelve) hours as needed for nausea, Disp: 20 tablet, Rfl: 1    polyethylene glycol (MIRALAX) 17 g packet, Take 17 g by mouth daily, Disp: 72 each, Rfl: 4    prochlorperazine (COMPAZINE) 10 mg tablet, Take 1 tablet (10 mg total) by mouth every 6 (six) hours as needed for nausea or vomiting, Disp: 30 tablet, Rfl: 0    tiZANidine (ZANAFLEX) 2 mg tablet, Take 1 tablet (2 mg total) by mouth every 8 (eight) hours as needed for muscle spasms, Disp: 90 tablet, Rfl: 1    traZODone (DESYREL) 50 mg tablet, TAKE HALF TABLETS BY MOUTH DAILY AT BEDTIME, Disp: 15 tablet, Rfl: 0    Trudhesa 0.725 MG/ACT AERS, , Disp: , Rfl:     venlafaxine (EFFEXOR-XR) 37.5 mg 24 hr capsule, 1 cap BID, Disp: 180 capsule, Rfl: 0    albuterol (PROVENTIL HFA,VENTOLIN HFA) 90 mcg/act inhaler, Inhale 2 puffs every 6 (six) hours as needed for wheezing or shortness of breath, Disp: 8 g, Rfl: 2    carboxymethylcellulose 0.5 % SOLN, Administer 1 drop to both eyes 3 (three) times a day as needed for  dry eyes (Patient not taking: Reported on 12/6/2024), Disp: 90 each, Rfl: 3    cyanocobalamin 1,000 mcg/mL, Inject 1 mL (1,000 mcg total) into a muscle once for 1 dose (Patient not taking: Reported on 12/12/2024), Disp: 3 mL, Rfl: 4    dicyclomine (BENTYL) 20 mg tablet, Take 1 tablet (20 mg total) by mouth 3 (three) times a day as needed (migraine/cramps), Disp: 60 tablet, Rfl: 0    fremanezumab-vfrm (Ajovy) 225 MG/1.5ML auto-injector, Inject 1.5 mL (225 mg total) under the skin every 30 (thirty) days (Patient not taking: Reported on 12/6/2024), Disp: 1.5 mL, Rfl: 11    hydroxychloroquine (PLAQUENIL) 200 mg tablet, Take 1 tablet (200 mg total) by mouth daily with breakfast for 14 days, THEN 1 tablet (200 mg total) 2 (two) times a day with meals., Disp: 194 tablet, Rfl: 0    Wheat Dextrin (Benefiber) POWD, Take by mouth Daily at 2am (Patient not taking: Reported on 12/6/2024), Disp: 350 g, Rfl: 3    Current Facility-Administered Medications:     cyanocobalamin injection 1,000 mcg, 1,000 mcg, Intramuscular, Q30 Days,     ALLERGIES:  Allergies   Allergen Reactions    Hydrocodone-Acetaminophen GI Intolerance     gi upset -pt okay if takes  zofran      Codeine GI Intolerance     GI issues    Mexiletine Rash    Oxycodone-Acetaminophen GI Intolerance     GI issues    Penicillins Other (See Comments) and GI Intolerance     GI issues,vaginitis    Pollen Extract-Tree Extract [Pollen Extract] Nasal Congestion       REVIEW OF SYSTEMS:  Review of Systems   Constitutional:  Negative for chills and fever.   HENT:  Negative for drooling and sneezing.    Eyes:  Negative for redness.   Respiratory:  Negative for cough and wheezing.    Gastrointestinal:  Negative for nausea and vomiting.   Musculoskeletal:  Positive for arthralgias. Negative for joint swelling and myalgias.   Neurological:  Negative for weakness and numbness.   Psychiatric/Behavioral:  Negative for behavioral problems. The patient is not nervous/anxious.         VITALS:  There were no vitals filed for this visit.      _____________________________________________________  PHYSICAL EXAMINATION:  General: well developed and well nourished, alert, oriented times 3, and appears comfortable  Psychiatric: Normal  HEENT: Normocephalic, Atraumatic Trachea Midline, No torticollis  Pulmonary: No audible wheezing or respiratory distress   Cardiovascular: No pitting edema, 2+ radial pulse   Abdominal/GI: abdomen non tender, non distended   Skin: No masses, erythema, lacerations, fluctation, ulcerations  Neurovascular: Sensation Intact to the Median, Ulnar, Radial Nerve, Motor Intact to the Median, Ulnar, Radial Nerve, and Pulses Intact  Musculoskeletal: Normal, except as noted in detailed exam and in HPI.      MUSCULOSKELETAL EXAMINATION:  Bilateral wrist  TTP 1st dorsal compartment  + finkelstein  Mild TTP thumb CMC  Full fist     ___________________________________________________    STUDIES REVIEWED:  No new images reviewed.    LABS REVIEWED:    HgA1c:   Lab Results   Component Value Date    HGBA1C 5.7 (H) 06/22/2022     BMP:   Lab Results   Component Value Date    GLUCOSE 88 02/20/2015    CALCIUM 9.1 10/14/2024     02/20/2015    K 3.9 10/14/2024    CO2 26 10/14/2024     10/14/2024    BUN 13 10/14/2024    CREATININE 0.55 (L) 10/14/2024             PROCEDURES PERFORMED:  Procedures  No Procedures performed today    _____________________________________________________      Scribe Attestation      I,:  Chery Cleaning MA am acting as a scribe while in the presence of the attending physician.:       I,:  Gail Wylie MD personally performed the services described in this documentation    as scribed in my presence.:

## 2024-12-17 ENCOUNTER — OFFICE VISIT (OUTPATIENT)
Dept: OBGYN CLINIC | Facility: CLINIC | Age: 49
End: 2024-12-17
Payer: COMMERCIAL

## 2024-12-17 VITALS
DIASTOLIC BLOOD PRESSURE: 86 MMHG | WEIGHT: 197 LBS | BODY MASS INDEX: 36.25 KG/M2 | HEIGHT: 62 IN | HEART RATE: 78 BPM | SYSTOLIC BLOOD PRESSURE: 133 MMHG

## 2024-12-17 DIAGNOSIS — M25.552 BILATERAL HIP PAIN: Primary | ICD-10-CM

## 2024-12-17 DIAGNOSIS — M25.551 BILATERAL HIP PAIN: Primary | ICD-10-CM

## 2024-12-17 PROCEDURE — 20610 DRAIN/INJ JOINT/BURSA W/O US: CPT | Performed by: PHYSICAL MEDICINE & REHABILITATION

## 2024-12-17 PROCEDURE — 99213 OFFICE O/P EST LOW 20 MIN: CPT | Performed by: PHYSICAL MEDICINE & REHABILITATION

## 2024-12-17 RX ORDER — ROPIVACAINE HYDROCHLORIDE 5 MG/ML
10 INJECTION, SOLUTION EPIDURAL; INFILTRATION; PERINEURAL
Status: COMPLETED | OUTPATIENT
Start: 2024-12-17 | End: 2024-12-17

## 2024-12-17 RX ORDER — TRIAMCINOLONE ACETONIDE 40 MG/ML
80 INJECTION, SUSPENSION INTRA-ARTICULAR; INTRAMUSCULAR
Status: COMPLETED | OUTPATIENT
Start: 2024-12-17 | End: 2024-12-17

## 2024-12-17 RX ADMIN — TRIAMCINOLONE ACETONIDE 80 MG: 40 INJECTION, SUSPENSION INTRA-ARTICULAR; INTRAMUSCULAR at 11:00

## 2024-12-17 RX ADMIN — ROPIVACAINE HYDROCHLORIDE 10 ML: 5 INJECTION, SOLUTION EPIDURAL; INFILTRATION; PERINEURAL at 11:00

## 2024-12-17 NOTE — PROGRESS NOTES
1. Bilateral hip pain  Large joint arthrocentesis: L greater trochanteric bursa    Ambulatory referral to Physical Therapy        Orders Placed This Encounter   Procedures    Large joint arthrocentesis: L greater trochanteric bursa    Ambulatory referral to Physical Therapy        Impression:  Patient is here in follow up of bilateral hip/pelvis pain likely multifactorial and secondary to bilateral SI joint dysfunction leading to greater trochanteric pain syndrome.  Treatment has included left greater trochanteric injection/tenotomy, Voltaren gel and physical therapy.  Today we repeated this injection.  She would benefit from restarting physical therapy.  I will see her back if needed.     Imaging Studies (I personally reviewed images in PACS and report):  Lumbar x-rays most recent to this encounter reviewed.  These images show fusion of the left sacroiliac joint.  Mild L5-S1 disc space narrowing.  Mild lower lumbar facet hypertrophy.  There are surgical staples noted.     Bilateral hip/pelvis x-rays show bilateral acetabular over coverage and loss of the femoral head and neck sphericity which is consistent with mixed type femoral acetabular impingement.  No acute osseous abnormalities.     No follow-ups on file.    Patient is in agreement with the above plan.    HPI:  Jenny Olvera is a 49 y.o. female  who presents in follow up.  Here for   Chief Complaint   Patient presents with    Left Hip - Pain, Follow-up       Since last visit: See above. Left lateral hip pain returned last week.  She might have over done it on the stepper and the maxiclimber.  She cannot sleep on the left side. She has trouble putting on socks and shoes.    Following history reviewed and updated:  Past Medical History:   Diagnosis Date    Anxiety     Asthma     Claustrophobia     Cluster headache     CTS (carpal tunnel syndrome)     Depression     Fibromyalgia     primary; last assessed 11/27/17    GERD (gastroesophageal reflux disease)      GI problem     H/O gastric sleeve     Headache, tension-type     Irritable bowel syndrome     Joint pain     Kidney stone     Lung nodule     last assessed 10/9/15    Migraine     Muscle pain     Peripheral neuropathy     PONV (postoperative nausea and vomiting)     Sjogren's syndrome (HCC)      Past Surgical History:   Procedure Laterality Date    BACK SURGERY      BARIATRIC SURGERY  8/9/2022    CARPAL TUNNEL RELEASE Bilateral     CHOLECYSTECTOMY      CYSTOSCOPY      GALLBLADDER SURGERY      GASTRECTOMY  08/09/2022    Sleeve    GASTRIC BYPASS LAPAROSCOPIC N/A 01/30/2024    Procedure: REVISION CONVERSION TO R-N-Y GASTRIC BYPASS W/ ROBOTICS AND INTRAOPERATIVE EGD; PARAESOPHAGEAL HERNIA REPAIR WITH MESH;  Surgeon: Felix Akins MD;  Location[facility];  Service: Bariatrics    HERNIA REPAIR  08/09/2022    Apparently done at time gastric sleeve. Still have major issues with it.    NECK SURGERY      [place] TENDON SHEATH INCISION Right 01/16/2023    Procedure: THUMB TRIGGER FINGER RELEASE;  Surgeon: Gail Wylie MD;  [facility];  Service: Orthopedics    SACROILIAC JOINT FUSION Left     SLEEVE GASTROPLASTY  08/9/2022    SPINAL FUSION      C4 and C5    ULNAR NERVE REPAIR Bilateral     UPPER GASTROINTESTINAL ENDOSCOPY       Social History   Social History     Substance and Sexual Activity   Alcohol Use Yes    Alcohol/week: 2.0 standard drinks of alcohol    Types: 2 Glasses of wine per week    Comment: Occasionally     Social History     Substance and Sexual Activity   Drug Use Yes    Types: Marijuana    Comment: medicinal over 1 yr, capsules (has medial card)     Social History     Tobacco Use   Smoking Status Never   Smokeless Tobacco Never   Tobacco Comments    Never smoked.     Family History   Problem Relation Age of Onset    Diabetes Mother     Fibromyalgia Mother     Ovarian cancer Mother 33    Hypertension Mother     Thyroid disease Mother     Migraines Mother     Neuropathy Mother     Cancer  "Mother         has been removed    Arthritis Mother     COPD Mother     Depression Mother     No Known Problems Father     Throat cancer Maternal Grandmother     No Known Problems Maternal Grandfather     No Known Problems Paternal Grandmother     No Known Problems Paternal Grandfather     No Known Problems Daughter     Breast cancer Maternal Aunt     Dementia Maternal Aunt     No Known Problems Maternal Aunt     No Known Problems Maternal Aunt     No Known Problems Paternal Aunt     Colon cancer Cousin     Heart disease Cousin     Lupus Cousin     Arthritis Family     Heart disease Family         cardiac disorder    Neuropathy Family     Lupus Family         systemic erythematosus    No Known Problems Half-Sister     No Known Problems Half-Brother     No Known Problems Half-Brother     No Known Problems Half-Brother     Asthma Brother     Stroke Neg Hx      Allergies   Allergen Reactions    Hydrocodone-Acetaminophen GI Intolerance     gi upset -pt okay if takes  zofran      Codeine GI Intolerance     GI issues    Mexiletine Rash    Oxycodone-Acetaminophen GI Intolerance     GI issues    Penicillins Other (See Comments) and GI Intolerance     GI issues,vaginitis    Pollen Extract-Tree Extract [Pollen Extract] Nasal Congestion        Constitutional:  /86   Pulse 78   Ht 5' 2.01\" (1.575 m)   Wt 89.4 kg (197 lb)   BMI 36.02 kg/m²    General: NAD.  Eyes: Clear sclerae.  ENT: No inflammation, lesion, or mass of lips.  No tracheal deviation.  Musculoskeletal: As mentioned below.  Integumentary: No visible rashes or skin lesions.  Pulmonary/Chest: Effort normal. No respiratory distress.   Neuro: CN's grossly intact, COX.  Psych: Normal affect and judgement.  Vascular: WWP.    Left Hip Exam     Tenderness   The patient is experiencing tenderness in the greater trochanter.    Tests   SALLIE: positive  Jorje: positive    Other   Erythema: absent  Scars: absent  Sensation: normal  Pulse: present             Large joint " arthrocentesis: L greater trochanteric bursa  Universal Protocol:  Procedure performed by: (Chaperone present)  Consent: Verbal consent obtained. Written consent not obtained.  Risks and benefits: risks, benefits and alternatives were discussed  Consent given by: patient  Timeout called at: 12/17/2024 11:04 AM.  Patient understanding: patient states understanding of the procedure being performed  Site marked: the operative site was marked  Radiology Images displayed and confirmed. If images not available, report reviewed: imaging studies available  Patient identity confirmed: verbally with patient  Supporting Documentation  Indications: pain   Procedure Details  Location: hip - L greater trochanteric bursa  Needle size: 20 G (20G 3.5'')  Ultrasound guidance: no  Approach: Lateral to medial approach.  Medications administered: 80 mg triamcinolone acetonide 40 mg/mL; 10 mL ropivacaine 0.5 %    Patient tolerance: patient tolerated the procedure well with no immediate complications  Dressing:  Sterile dressing applied    A modified tenotomy was performed by redirecting the needle in three directions and dispersing the medication equally in all three directions.    There was little to no resistance encountered during the injection.    Risks of this procedure include:    - Risk of bleeding since a needle is involved.  - Risk of infection (1/10,000 chance as per recent studies).  Signs/symptoms were discussed and they would prompt an urgent evaluation at an emergency department.  - Risk of pigmentation or skin dimpling in the skin (2-3% chance as per recent studies) from the steroid.  - Risk of increased pain from steroid flare (1% chance as per recent studies) that typically lasts 24-48 hours.  - Risk of increased blood sugars from the steroid medication that can last for a few weeks.  If the patient is a diabetic or pre-diabetic, they were encouraged to closely monitor their blood sugars and discuss with PCP if elevated  more than usual or if having symptoms.    The benefits outweigh the risks and so the procedure was completed.

## 2024-12-19 ENCOUNTER — PREP FOR PROCEDURE (OUTPATIENT)
Dept: BARIATRICS | Facility: CLINIC | Age: 49
End: 2024-12-19

## 2024-12-19 DIAGNOSIS — Z98.84 BARIATRIC SURGERY STATUS: Primary | ICD-10-CM

## 2024-12-20 ENCOUNTER — ANESTHESIA (OUTPATIENT)
Age: 49
End: 2024-12-20

## 2024-12-20 ENCOUNTER — ANESTHESIA EVENT (OUTPATIENT)
Age: 49
End: 2024-12-20

## 2024-12-20 RX ORDER — DIPHENOXYLATE HYDROCHLORIDE AND ATROPINE SULFATE 2.5; .025 MG/1; MG/1
1 TABLET ORAL DAILY
COMMUNITY

## 2024-12-20 NOTE — PRE-PROCEDURE INSTRUCTIONS
Pre-Surgery Instructions:   Medication Instructions    Botox 200 units SOLR Instructions provided by MD grovesbital-acetaminophen-caffeine (FIORICET,ESGIC) -40 mg per tablet Uses PRN- OK to take day of surgery    cholecalciferol (VITAMIN D3) 1,000 units tablet Stop taking 7 days prior to surgery.    dexamethasone (DECADRON) 2 mg tablet Instructions provided by MD    dicyclomine (BENTYL) 20 mg tablet Uses PRN- OK to take day of surgery    divalproex sodium (DEPAKOTE) 250 mg DR tablet Uses PRN- OK to take day of surgery    ergocalciferol (VITAMIN D2) 50,000 units Instructions provided by MD    famotidine (PEPCID) 20 mg tablet Take night before surgery    fluticasone (FLONASE) 50 mcg/act nasal spray Take day of surgery.    fremanezumab-vfrm (Ajovy) 225 MG/1.5ML auto-injector Instructions provided by MD    hydroxychloroquine (PLAQUENIL) 200 mg tablet Instructions provided by MD    lasmiditan succinate (Reyvow) 100 mg tablet Uses PRN- OK to take day of surgery    meclizine (ANTIVERT) 12.5 MG tablet Uses PRN- OK to take day of surgery    multivitamin (THERAGRAN) TABS Stop taking 7 days prior to surgery.    Nerve Stimulator (Nerivio) ALIX Hold day of surgery.    ondansetron (ZOFRAN) 4 mg tablet Uses PRN- OK to take day of surgery    polyethylene glycol (MIRALAX) 17 g packet Hold day of surgery.    prochlorperazine (COMPAZINE) 10 mg tablet Uses PRN- OK to take day of surgery    traZODone (DESYREL) 50 mg tablet Take night before surgery    venlafaxine (EFFEXOR-XR) 37.5 mg 24 hr capsule Take day of surgery.   Medication instructions for day surgery reviewed. Please use only a sip of water to take your instructed medications. Avoid all over the counter vitamins, supplements and NSAIDS for one week prior to surgery per anesthesia guidelines. Tylenol is ok to take as needed.     You will receive a call one business day prior to surgery with an arrival time and hospital directions. If your surgery is scheduled on a  Monday, the hospital will be calling you on the Friday prior to your surgery. If you have not heard from anyone by 8pm, please call the hospital supervisor through the hospital  at [phone]. (Benoit [phone] or Brandon [phone]).    Do not eat or drink anything after midnight the night before your surgery, including candy, mints, lifesavers, or chewing gum. Do not drink alcohol 24hrs before your surgery. Try not to smoke at least 24hrs before your surgery.       Follow the pre surgery showering instructions as listed in the “My Surgical Experience Booklet” or otherwise provided by your surgeon's office. Do not use a blade to shave the surgical area 1 week before surgery. It is okay to use a clean electric clippers up to 24 hours before surgery. Do not apply any lotions, creams, including makeup, cologne, deodorant, or perfumes after showering on the day of your surgery. Do not use dry shampoo, hair spray, hair gel, or any type of hair products.     No contact lenses, eye make-up, or artificial eyelashes. Remove nail polish, including gel polish, and any artificial, gel, or acrylic nails if possible. Remove all jewelry including rings and body piercing jewelry.     Wear causal clothing that is easy to take on and off. Consider your type of surgery.    Keep any valuables, jewelry, piercings at home. Please bring any specially ordered equipment (sling, braces) if indicated.    Arrange for a responsible person to drive you to and from the hospital on the day of your surgery. Please confirm the visitor policy for the day of your procedure when you receive your phone call with an arrival time.     Call the surgeon's office with any new illnesses, exposures, or additional questions prior to surgery.    Please reference your “My Surgical Experience Booklet” for additional information to prepare for your upcoming surgery.

## 2024-12-24 ENCOUNTER — EVALUATION (OUTPATIENT)
Dept: PHYSICAL THERAPY | Facility: REHABILITATION | Age: 49
End: 2024-12-24
Payer: COMMERCIAL

## 2024-12-24 DIAGNOSIS — M25.551 BILATERAL HIP PAIN: Primary | ICD-10-CM

## 2024-12-24 DIAGNOSIS — M25.552 BILATERAL HIP PAIN: Primary | ICD-10-CM

## 2024-12-24 PROCEDURE — 97112 NEUROMUSCULAR REEDUCATION: CPT | Performed by: PHYSICAL THERAPIST

## 2024-12-24 PROCEDURE — 97162 PT EVAL MOD COMPLEX 30 MIN: CPT | Performed by: PHYSICAL THERAPIST

## 2024-12-24 NOTE — PROGRESS NOTES
PT Evaluation     Today's date: 2024  Patient name: Jenny Olvera  : 1975  MRN: [id number]  Referring provider: Shantal Grover DO  Dx: No diagnosis found.               Assessment  Impairments: abnormal coordination, abnormal gait, abnormal muscle firing, abnormal muscle tone, abnormal or restricted ROM, abnormal movement, activity intolerance, impaired balance, impaired physical strength, lacks appropriate home exercise program, pain with function, weight-bearing intolerance, poor posture , poor body mechanics, participation limitations, activity limitations and endurance  Symptom irritability: moderate    Assessment details: Jenny Olvera is a 49 year-old female who presents to physical therapy with a chief complaint of bilateral hip pain. The patient presents with movement impairments including poor hip motor control, decreased lower quarter strength, poor movement coordination, and limited hip mobility. At this time, the patient would benefit from skilled PT to address her current deficits, improve her QOL, and restore her PLOF.     Goals  Impairment Based Goals:  1. Decreased pain by 50% in 4 weeks.  2. Improve ROM to WFL by discharge.   3. Improve strength by 1/2 measure in 6 weeks.   4. Improve FOTO greater than predicted outcome score by discharge.      Functional Based Goals: To be met upon discharge  1.   2. Patient will be fully independent with HEP by discharge  3. Patient will be able to manage symptoms independently.       Plan  Patient would benefit from: skilled physical therapy    Planned therapy interventions: abdominal trunk stabilization, activity modification, balance/weight bearing training, balance, behavior modification, body mechanics training, breathing training, compression, coordination, graded activity, gait training, graded exercise, graded motor, home exercise program, therapeutic exercise, therapeutic activities, stretching, strengthening, self care, patient/caregiver  education, nerve gliding, neuromuscular re-education, motor coordination training, Page taping, massage, manual therapy, kinesiology taping, joint mobilization and IASTM    Frequency: 2x week  Plan of Care beginning date: 12/24/2024  Plan of Care expiration date: 2/18/2025  Treatment plan discussed with: patient        Subjective    Objective           POC expires Unit limit Auth Expiration date PT/OT + Visit Limit?               Visit/Unit Tracking  AUTH Status:  Date          Used          Remaining                Precautions:       Manuals                                                                 Neuro Re-Ed                                                                                                        Ther Ex                                                                                                                     Ther Activity                                       Gait Training                                       Modalities
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
## 2024-12-27 ENCOUNTER — OFFICE VISIT (OUTPATIENT)
Dept: PHYSICAL THERAPY | Facility: REHABILITATION | Age: 49
End: 2024-12-27
Payer: COMMERCIAL

## 2024-12-27 DIAGNOSIS — M25.552 BILATERAL HIP PAIN: Primary | ICD-10-CM

## 2024-12-27 DIAGNOSIS — M25.551 BILATERAL HIP PAIN: Primary | ICD-10-CM

## 2024-12-27 PROCEDURE — 97112 NEUROMUSCULAR REEDUCATION: CPT

## 2024-12-27 PROCEDURE — 97110 THERAPEUTIC EXERCISES: CPT

## 2024-12-27 NOTE — PROGRESS NOTES
Daily Note     Today's date: 2024  Patient name: Jenny Olvera  : 1975  MRN: [id number]  Referring provider: Shantal Grover DO  Dx:   Encounter Diagnosis     ICD-10-CM    1. Bilateral hip pain  M25.551     M25.552           Start Time: 1045  Stop Time: 1123  Total time in clinic (min): 38 minutes    Subjective: Pt notes that her hip was feeling sore after the evaluation but this went away with rest. She has been trying to do her exercises at home. She continues to have more pain in her L hip compared to her R side.       Objective: See treatment diary below      Assessment: Tolerated treatment well. Patient would benefit from continued PT. Pt was introduced to further functional LE strength and core stability exercises and responded well without excessive increase in pain or soreness. She was most challenged with SL hip abd and STS due to decreased endurance and BLE strength. She noted no increase in L hip soreness following tx session, assess response to tx session NV. 1:1 with Orion Echeverria DPT entirety of tx.      Plan: Continue per plan of care.        POC expires PT/OT + Visit Limit?   3/4/2025  BOMN       Visit/Unit Tracking  AUTH Status:  Date        [facility]  Used 1 2        Remaining  2 1              Precautions:       Manuals                                                                Neuro Re-Ed             Patient Ed 15'            Pallof Press  20x, 8#           Clamshell  2x10 ea           Bridge  2x10           Bird Dog             Supine Hip Abd  3x8, MTB           SL Hip Abduction  2x8                        Ther Ex             Warm Up  NS 7' lvl 5           ASLR with Core Brace  2x10 ea           Standing TA Ball Press with Arm Raises  2x5 w/ march           STS  3x6-8 HHA                                                               Ther Activity                                       Gait Training                                       Modalities

## 2024-12-30 ENCOUNTER — OFFICE VISIT (OUTPATIENT)
Dept: PHYSICAL THERAPY | Facility: REHABILITATION | Age: 49
End: 2024-12-30
Payer: COMMERCIAL

## 2024-12-30 DIAGNOSIS — M25.551 BILATERAL HIP PAIN: Primary | ICD-10-CM

## 2024-12-30 DIAGNOSIS — M25.552 BILATERAL HIP PAIN: Primary | ICD-10-CM

## 2024-12-30 PROCEDURE — 97112 NEUROMUSCULAR REEDUCATION: CPT | Performed by: PHYSICAL THERAPIST

## 2024-12-30 PROCEDURE — 97110 THERAPEUTIC EXERCISES: CPT | Performed by: PHYSICAL THERAPIST

## 2024-12-30 NOTE — PROGRESS NOTES
Daily Note     Today's date: 2024  Patient name: Jenny Olvera  : 1975  MRN: [id number]  Referring provider: Shantal Grover DO  Dx:   Encounter Diagnosis     ICD-10-CM    1. Bilateral hip pain  M25.551     M25.552                      Subjective: Felt pretty good after the first treatment session.      Objective: See treatment diary below      Assessment: Tolerated treatment well. Patient continues to be appropriately challenged at current therapeutic volume and intensity. Patient would benefit from continued PT      Plan: Continue per plan of care.        POC expires PT/OT + Visit Limit?   3/4/2025  BOMN       Visit/Unit Tracking  AUTH Status:  Date       [facility]  Used 1 2 3       Remaining  2 1              Precautions:       Manuals                                                               Neuro Re-Ed             Patient Ed 15'            Pallof Press  20x, 8# 20x, 8#          Clamshell  2x10 ea 2x10 ea          Bridge  2x10 2x10          Bird Dog             Supine Hip Abd  3x8, MTB 3x8 MTB          SL Hip Abduction  2x8 2x8                       Ther Ex             Warm Up  NS 7' lvl 5 NS 7' L5          ASLR with Core Brace  2x10 ea 2x10 ea          Standing TA Ball Press with Arm Raises  2x5 w/ march 2x5 w/ march          STS  3x6-8 HHA 3x6 HHA          Suitcase Carry    15# 3 laps B                                                 Ther Activity                                       Gait Training                                       Modalities

## 2025-01-02 ENCOUNTER — APPOINTMENT (OUTPATIENT)
Dept: PHYSICAL THERAPY | Facility: REHABILITATION | Age: 50
End: 2025-01-02
Payer: COMMERCIAL

## 2025-01-03 ENCOUNTER — HOSPITAL ENCOUNTER (OUTPATIENT)
Age: 50
Setting detail: OUTPATIENT SURGERY
Discharge: HOME | End: 2025-01-03
Attending: SURGERY | Admitting: SURGERY
Payer: COMMERCIAL

## 2025-01-03 ENCOUNTER — ANESTHESIA EVENT (OUTPATIENT)
Age: 50
End: 2025-01-03
Payer: COMMERCIAL

## 2025-01-03 ENCOUNTER — ANESTHESIA (OUTPATIENT)
Age: 50
End: 2025-01-03
Payer: COMMERCIAL

## 2025-01-03 VITALS
HEIGHT: 62 IN | RESPIRATION RATE: 16 BRPM | OXYGEN SATURATION: 97 % | DIASTOLIC BLOOD PRESSURE: 64 MMHG | TEMPERATURE: 96.6 F | HEART RATE: 63 BPM | SYSTOLIC BLOOD PRESSURE: 110 MMHG | WEIGHT: 198.4 LBS | BODY MASS INDEX: 36.51 KG/M2

## 2025-01-03 DIAGNOSIS — Z47.89 AFTERCARE FOLLOWING SURGERY OF THE MUSCULOSKELETAL SYSTEM: ICD-10-CM

## 2025-01-03 DIAGNOSIS — M65.4 DE QUERVAIN'S TENOSYNOVITIS, LEFT: Primary | ICD-10-CM

## 2025-01-03 LAB
EXT PREGNANCY TEST URINE: NEGATIVE
EXT. CONTROL: NORMAL

## 2025-01-03 PROCEDURE — 25000 INCISION OF TENDON SHEATH: CPT | Performed by: SURGERY

## 2025-01-03 PROCEDURE — 25000 INCISION OF TENDON SHEATH: CPT | Performed by: PHYSICIAN ASSISTANT

## 2025-01-03 PROCEDURE — 81025 URINE PREGNANCY TEST: CPT | Performed by: SURGERY

## 2025-01-03 RX ORDER — MIDAZOLAM HYDROCHLORIDE 2 MG/2ML
INJECTION, SOLUTION INTRAMUSCULAR; INTRAVENOUS AS NEEDED
Status: DISCONTINUED | OUTPATIENT
Start: 2025-01-03 | End: 2025-01-03

## 2025-01-03 RX ORDER — CEFAZOLIN SODIUM 2 G/50ML
2000 SOLUTION INTRAVENOUS ONCE
Status: COMPLETED | OUTPATIENT
Start: 2025-01-03 | End: 2025-01-03

## 2025-01-03 RX ORDER — SODIUM CHLORIDE, SODIUM LACTATE, POTASSIUM CHLORIDE, CALCIUM CHLORIDE 600; 310; 30; 20 MG/100ML; MG/100ML; MG/100ML; MG/100ML
INJECTION, SOLUTION INTRAVENOUS CONTINUOUS PRN
Status: DISCONTINUED | OUTPATIENT
Start: 2025-01-03 | End: 2025-01-03

## 2025-01-03 RX ORDER — CHLORHEXIDINE GLUCONATE ORAL RINSE 1.2 MG/ML
15 SOLUTION DENTAL ONCE
Status: COMPLETED | OUTPATIENT
Start: 2025-01-03 | End: 2025-01-03

## 2025-01-03 RX ORDER — PROPOFOL 10 MG/ML
INJECTION, EMULSION INTRAVENOUS AS NEEDED
Status: DISCONTINUED | OUTPATIENT
Start: 2025-01-03 | End: 2025-01-03

## 2025-01-03 RX ORDER — HYDROCODONE BITARTRATE AND ACETAMINOPHEN 5; 325 MG/1; MG/1
1 TABLET ORAL EVERY 6 HOURS PRN
Refills: 0 | Status: DISCONTINUED | OUTPATIENT
Start: 2025-01-03 | End: 2025-01-03 | Stop reason: HOSPADM

## 2025-01-03 RX ORDER — FENTANYL CITRATE 50 UG/ML
INJECTION, SOLUTION INTRAMUSCULAR; INTRAVENOUS AS NEEDED
Status: DISCONTINUED | OUTPATIENT
Start: 2025-01-03 | End: 2025-01-03

## 2025-01-03 RX ORDER — FENTANYL CITRATE/PF 50 MCG/ML
25 SYRINGE (ML) INJECTION
Status: DISCONTINUED | OUTPATIENT
Start: 2025-01-03 | End: 2025-01-03 | Stop reason: HOSPADM

## 2025-01-03 RX ORDER — DIPHENHYDRAMINE HYDROCHLORIDE 50 MG/ML
INJECTION INTRAMUSCULAR; INTRAVENOUS AS NEEDED
Status: DISCONTINUED | OUTPATIENT
Start: 2025-01-03 | End: 2025-01-03

## 2025-01-03 RX ORDER — ONDANSETRON 2 MG/ML
4 INJECTION INTRAMUSCULAR; INTRAVENOUS ONCE AS NEEDED
Status: DISCONTINUED | OUTPATIENT
Start: 2025-01-03 | End: 2025-01-03 | Stop reason: HOSPADM

## 2025-01-03 RX ORDER — PROPOFOL 10 MG/ML
INJECTION, EMULSION INTRAVENOUS CONTINUOUS PRN
Status: DISCONTINUED | OUTPATIENT
Start: 2025-01-03 | End: 2025-01-03

## 2025-01-03 RX ORDER — ONDANSETRON 4 MG/1
4 TABLET, FILM COATED ORAL EVERY 8 HOURS PRN
Qty: 12 TABLET | Refills: 0 | Status: SHIPPED | OUTPATIENT
Start: 2025-01-03

## 2025-01-03 RX ORDER — SODIUM CHLORIDE 9 MG/ML
75 INJECTION, SOLUTION INTRAVENOUS ONCE
Status: COMPLETED | OUTPATIENT
Start: 2025-01-03 | End: 2025-01-03

## 2025-01-03 RX ORDER — HYDROCODONE BITARTRATE AND ACETAMINOPHEN 5; 325 MG/1; MG/1
1 TABLET ORAL EVERY 6 HOURS PRN
Qty: 5 TABLET | Refills: 0 | Status: SHIPPED | OUTPATIENT
Start: 2025-01-03 | End: 2025-01-13

## 2025-01-03 RX ADMIN — CEFAZOLIN SODIUM 2000 MG: 2 SOLUTION INTRAVENOUS at 10:13

## 2025-01-03 RX ADMIN — FENTANYL CITRATE 100 MCG: 50 INJECTION INTRAMUSCULAR; INTRAVENOUS at 10:11

## 2025-01-03 RX ADMIN — SODIUM CHLORIDE 75 ML/HR: 0.9 INJECTION, SOLUTION INTRAVENOUS at 08:25

## 2025-01-03 RX ADMIN — CHLORHEXIDINE GLUCONATE 15 ML: 1.2 SOLUTION ORAL at 08:25

## 2025-01-03 RX ADMIN — PROPOFOL 50 MG: 10 INJECTION, EMULSION INTRAVENOUS at 10:16

## 2025-01-03 RX ADMIN — DIPHENHYDRAMINE HYDROCHLORIDE 12.5 MG: 50 INJECTION INTRAMUSCULAR; INTRAVENOUS at 10:43

## 2025-01-03 RX ADMIN — SODIUM CHLORIDE, SODIUM LACTATE, POTASSIUM CHLORIDE, AND CALCIUM CHLORIDE: .6; .31; .03; .02 INJECTION, SOLUTION INTRAVENOUS at 10:03

## 2025-01-03 RX ADMIN — MIDAZOLAM 2 MG: 1 INJECTION INTRAMUSCULAR; INTRAVENOUS at 10:11

## 2025-01-03 RX ADMIN — PROPOFOL 80 MCG/KG/MIN: 10 INJECTION, EMULSION INTRAVENOUS at 10:16

## 2025-01-03 NOTE — DISCHARGE INSTR - AVS FIRST PAGE
Post Operative Instructions    You have had surgery on your arm today, please read and follow the information below:  Elevate your hand above your elbow during the next 24-48 hours to help with swelling.  Place your hand and arm over your head with motion at your shoulder three times a day.  Do not apply any cream/ointment/oil to your incisions including antibiotics.  Do not soak your hands in standing water (dishwater, tubs, Jacuzzi's, pools, etc.) until given permission (typically 2-3 weeks after injury)    Call the office if you notice any:  Increased numbness or tingling of your hand or fingers that is not relieved with elevation.  Increasing pain that is not controlled with medication.  Difficulty chewing, breathing, swallowing.  Chest pains or shortness of breath.  Fever over 101.4 degrees.    Bandage: Please keep bandages clean and dry. Remove bandage after 7 days. Once bandages are removed you may wash hands with soap and water. Short showers are okay as well, but please avoid soaking the hand as described above (ie no pools, baths, dirty dish water, hot tubs, ocean/lake water, etc). Sutures will be removed in the office at your first follow up visit, please do not remove them yourself.    Please do NOT put any topical agents on the surgical wound including neosporin, peroxide, tea tree oil, vitamin E, etc. as these can delay wound healing.    Motion: Move fingers into a fist 5 times a day, DO NOT move any splinted fingers.    Weight bearing status: Avoid heavy lifting (>5 pounds) with the extremity that was operated on until follow up appointment. Normal activities of daily living are OK.    Ice: Ice for 10 minutes every hour as needed for swelling x 24 hours.    Sling: No sling necessary.    Pain medication:   Naproxen 220 mg two times a day (this is over the counter!)  *do not take this medication if you were told by your doctor that you cannot take anti-inflammatories or NSAIDS  Tylenol Extended Release  650 mg every 8 hours (this is over the counter!)   Norco/Hydrocodone one tab every 6 hours ONLY AS NEEDED for severe pain        Follow-up Appointment: 10-14 days with Dr Wylie        Please call the office if you have any questions or concerns regarding your post-operative care.

## 2025-01-03 NOTE — INTERVAL H&P NOTE
H&P reviewed. After examining the patient I find no changes in the patients condition since the H&P had been written.
H&P reviewed. After examining the patient I find no changes in the patients condition since the H&P had been written.
Principal Discharge DX:	Acute swimmer's ear of right side

## 2025-01-03 NOTE — ANESTHESIA PREPROCEDURE EVALUATION
Procedure:  RELEASE DEQUERVAINS (Left: Hand)    Relevant Problems   CARDIO   (+) Borderline hyperlipidemia   (+) Chronic migraine without aura without status migrainosus, not intractable   (+) Intractable chronic migraine without aura and with status migrainosus   (+) Intractable chronic migraine without aura and without status migrainosus      GI/HEPATIC   (+) GERD without esophagitis      /RENAL   (+) Nephrolithiasis      MUSCULOSKELETAL   (+) Fibromyalgia   (+) Osteoarthritis of carpometacarpal (CMC) joint of right thumb   (+) Trapezius muscle spasm      NEURO/PSYCH   (+) Chronic migraine without aura without status migrainosus, not intractable   (+) Depression with anxiety   (+) Fibromyalgia   (+) Intractable chronic migraine without aura and with status migrainosus   (+) Intractable chronic migraine without aura and without status migrainosus   (+) Post traumatic stress disorder (PTSD)      PULMONARY   (+) Mild intermittent asthma      Ear/Nose/Throat   (+) Allergic rhinitis      Obstetrics/Gynecology   (+) Abnormal Pap smear of cervix   (+) Genital prolapse      Neurology/Sleep   (+) Saccadic eye movements   (+) Sleep-disordered breathing      Rheumatology   (+) Rotator cuff tendinitis, right      Surgery/Wound/Pain   (+) Bariatric surgery status      Orthopedic/Musculoskeletal   (+) Bilateral hip pain   (+) Cervical radiculopathy   (+) Cervicalgia   (+) De Quervain's tenosynovitis, bilateral      FEN/Gastrointestinal   (+) Colon, diverticulosis   (+) Irritable bowel syndrome with both constipation and diarrhea      Other   (+) Class 2 obesity with alveolar hypoventilation, serious comorbidity, and body mass index (BMI) of 35.0 to 35.9 in adult (HCC)   (+) Tinnitus aurium, bilateral   (+) Vertigo      Lab Results   Component Value Date     02/20/2015    SODIUM 139 10/14/2024    K 3.9 10/14/2024     10/14/2024    CO2 26 10/14/2024    ANIONGAP 5 02/20/2015    AGAP 8 10/14/2024    BUN 13 10/14/2024     CREATININE 0.55 (L) 10/14/2024    GLUC 119 01/31/2024    GLUF 83 10/14/2024    CALCIUM 9.1 10/14/2024    AST 19 10/14/2024    ALT 15 10/14/2024    ALKPHOS 139 (H) 10/14/2024    PROT 7.8 02/20/2015    TP 6.8 10/14/2024    BILITOT 0.49 02/20/2015    TBILI 0.59 10/14/2024    EGFR 110 10/14/2024     Lab Results   Component Value Date    WBC 6.85 10/14/2024    HGB 14.0 10/14/2024    HCT 44.9 10/14/2024    MCV 84 10/14/2024     10/14/2024         Physical Exam    Airway    Mallampati score: II  TM Distance: >3 FB  Neck ROM: full     Dental       Cardiovascular      Pulmonary      Other Findings  post-pubertal.      Anesthesia Plan  ASA Score- 2     Anesthesia Type- IV sedation with anesthesia with ASA Monitors.         Additional Monitors:     Airway Plan:            Plan Factors-Exercise tolerance (METS): >4 METS.    Chart reviewed. EKG reviewed. Imaging results reviewed. Existing labs reviewed. Patient summary reviewed.                  Induction- intravenous.    Postoperative Plan-         Informed Consent- Anesthetic plan and risks discussed with patient.  I personally reviewed this patient with the CRNA. Discussed and agreed on the Anesthesia Plan with the CRNA..

## 2025-01-03 NOTE — OP NOTE
OPERATIVE REPORT  PATIENT NAME: Jenny Olvera  :  1975  MRN: [id number]  Pt Location: [facility]    SURGERY DATE: 25    Surgeons and Role:     * Gail Wylie MD - Primary     * Makenzie Patiño PA-C - Assisting    Pre-Op Diagnosis:  De Quervain's tenosynovitis, left [M65.4]    Post-Op Diagnosis Codes:     * De Quervain's tenosynovitis, left [M65.4]    Procedure(s):  RELEASE DEQUERVAINS (Left)    Specimen(s):  No specimens collected during this procedure.    Estimated Blood Loss:   Minimal    Anesthesia Type:   Conscious Sedation     IMPLANTS:  * No implants in log *    PERIOPERATIVE ANTIBIOTICS:    cefazolin, 2 grams    Tourniquet Time: 7 min  at 250 mmHg          Operative Indications:  The patient has a history of de Quervain stenosis tenosynovitis  left that was recalcitrant to conservative management.  The decision was made to bring the patient to the operating room for de Quervain release  left.  Risks of the procedure were explained which include, but are not limited to bleeding; infection; damage to nerves, arteries,veins, tendons; scar; pain; need for reoperation; failure to give desired result; and risks of anaesthesia.  All questions were answered to satisfaction and they were willing to proceed.         Operative Findings:  Hypertrophy extensor retinaculum with separate subsheath of the EPB    Complications:   None    Procedure and Technique:  After the patient, site, and procedure were identified, the patient was brought into the operating room in a supine position.  Local anaesthesia and sedation were provided.  A well padded tourniquet was applied to the extremity, set at 250 mmHg.  The  left upper extremity was then prepped and drapped in a normal, sterile, orthopedic fashion.                A longitudinal  incision was made in line with the first dorsal compartment.  Dissection was carried down under loupe magnification.  Skin and subcutaneous tissues were sharply incised. Dissection  was  carried down under loupe magnification. The superficial nerve and its branches were identified, mobilized and a neurolysis was performed. The nerve branches were noted to  Be  adherent to the underlying thickened first dorsal compartment and they were mobilized.               Next the first dorsal compartment was exposed and noted to  be significantly thickened . The  first dorsal compartment was then incised. The APL and the EPB tendons were released, the EPB tendon was noted to  have a separate subsheath. Tenosynovitis was  noted. Tenosynovectomy of the APL and EPB tendons was then carried out. Next, the EPB was mobilized as was the APL. The the central ridge was  removed . The wrist was then put through a range of motion and the tendons were observed to glide well . The integrity of the tendons was verified, no subluxation was noted.       At the completion of the procedure, hemostasis was obtained with cautery and direct pressure.  The wounds were copiously irrigated with sterile solution.  The wounds were closed with Monocryl and Steri-strips.  Sterile dressings were applied, including Xeroform, gauze, tweeners, webril, ACE.  Please note, all sponge, needle, and instrument counts were correct prior to closure.  Loupe magnification was utilized.  The patient tolerated the procedure well.     I was present for the entire procedure., A qualified resident physician was not available., and A physician assistant was required during the procedure for retraction, tissue handling, dissection and suturing.    Patient Disposition:  PACU     SIGNATURE: Gail Wylie MD  DATE: 01/03/25  TIME: 11:05 AM

## 2025-01-03 NOTE — ANESTHESIA POSTPROCEDURE EVALUATION
Post-Op Assessment Note    CV Status:  Stable  Pain Score: 0    Pain management: adequate       Mental Status:  Alert and awake   Hydration Status:  Euvolemic   PONV Controlled:  Controlled   Airway Patency:  Patent     Post Op Vitals Reviewed: Yes    No anethesia notable event occurred.    Staff: Anesthesiologist, CRNA           Last Filed PACU Vitals:  Vitals Value Taken Time   Temp 96.9    Pulse 81    /64    Resp 16    SpO2 99

## 2025-01-03 NOTE — ANESTHESIA POSTPROCEDURE EVALUATION
Post-Op Assessment Note    CV Status:  Stable    Pain management: adequate       Mental Status:  Alert and awake   Hydration Status:  Euvolemic   PONV Controlled:  Controlled   Airway Patency:  Patent     Post Op Vitals Reviewed: Yes    No anethesia notable event occurred.    Staff: Anesthesiologist           Last Filed PACU Vitals:  Vitals Value Taken Time   Temp 96.6 °F (35.9 °C) 01/03/25 1105   Pulse 63 01/03/25 1105   /64 01/03/25 1105   Resp 16 01/03/25 1105   SpO2 97 % 01/03/25 1105       Modified Nidia:     Vitals Value Taken Time   Activity 2 01/03/25 1105   Respiration 2 01/03/25 1105   Circulation 2 01/03/25 1105   Consciousness 2 01/03/25 1105   Oxygen Saturation 2 01/03/25 1105     Modified Nidia Score: 10

## 2025-01-06 DIAGNOSIS — M65.4 DE QUERVAIN'S TENOSYNOVITIS, LEFT: Primary | ICD-10-CM

## 2025-01-06 RX ORDER — TRAMADOL HYDROCHLORIDE 50 MG/1
50 TABLET ORAL EVERY 6 HOURS PRN
Qty: 8 TABLET | Refills: 0 | Status: SHIPPED | OUTPATIENT
Start: 2025-01-06

## 2025-01-13 ENCOUNTER — OFFICE VISIT (OUTPATIENT)
Dept: OBGYN CLINIC | Facility: HOSPITAL | Age: 50
End: 2025-01-13

## 2025-01-13 VITALS — HEIGHT: 62 IN | WEIGHT: 198 LBS | BODY MASS INDEX: 36.44 KG/M2

## 2025-01-13 DIAGNOSIS — M65.4 DE QUERVAIN'S TENOSYNOVITIS, LEFT: Primary | ICD-10-CM

## 2025-01-13 PROCEDURE — 99024 POSTOP FOLLOW-UP VISIT: CPT | Performed by: SURGERY

## 2025-01-13 NOTE — PROGRESS NOTES
Assessment/Plan:  Patient ID: Jenny Olvera 49 y.o. female   Surgery: Release Dequervains - Left  Date of Surgery: 1/3/2025    Resume activities as tolerated and activity modification    Follow Up:  PRN    To Do Next Visit:         CHIEF COMPLAINT:  No chief complaint on file.        SUBJECTIVE:  Jenny Olvera is a 49 y.o. year old female who presents for follow up after Release Dequervains - Left. Today patient has stiffness.       PHYSICAL EXAMINATION:  General: well developed and well nourished, alert, oriented times 3, and appears comfortable  Psychiatric: Normal    MUSCULOSKELETAL EXAMINATION:  Incision: Clean, dry, intact  Surgery Site: normal, no evidence of infection   Range of Motion: As expected and Limited due to stiffness  Neurovascular status: Neuro intact, good cap refill  Activity Restrictions: No restrictions  Steri's off        STUDIES REVIEWED:  No new imaging to review    LABS REVIEWED:    HgA1c:   Lab Results   Component Value Date    HGBA1C 5.7 (H) 06/22/2022     BMP:   Lab Results   Component Value Date    GLUCOSE 88 02/20/2015    CALCIUM 9.1 10/14/2024     02/20/2015    K 3.9 10/14/2024    CO2 26 10/14/2024     10/14/2024    BUN 13 10/14/2024    CREATININE 0.55 (L) 10/14/2024           PROCEDURES PERFORMED:  Procedures  No Procedures performed today    Scribe Attestation      I,:   am acting as a scribe while in the presence of the attending physician.:       I,:   personally performed the services described in this documentation    as scribed in my presence.:

## 2025-01-16 ENCOUNTER — APPOINTMENT (OUTPATIENT)
Dept: PHYSICAL THERAPY | Facility: REHABILITATION | Age: 50
End: 2025-01-16
Payer: COMMERCIAL

## 2025-01-20 ENCOUNTER — APPOINTMENT (OUTPATIENT)
Dept: PHYSICAL THERAPY | Facility: REHABILITATION | Age: 50
End: 2025-01-20
Payer: COMMERCIAL

## 2025-01-22 ENCOUNTER — OFFICE VISIT (OUTPATIENT)
Dept: PHYSICAL THERAPY | Facility: REHABILITATION | Age: 50
End: 2025-01-22
Payer: COMMERCIAL

## 2025-01-22 DIAGNOSIS — M25.551 BILATERAL HIP PAIN: Primary | ICD-10-CM

## 2025-01-22 DIAGNOSIS — M25.552 BILATERAL HIP PAIN: Primary | ICD-10-CM

## 2025-01-22 PROCEDURE — 97164 PT RE-EVAL EST PLAN CARE: CPT | Performed by: PHYSICAL THERAPIST

## 2025-01-22 PROCEDURE — 97112 NEUROMUSCULAR REEDUCATION: CPT | Performed by: PHYSICAL THERAPIST

## 2025-01-22 PROCEDURE — 97110 THERAPEUTIC EXERCISES: CPT | Performed by: PHYSICAL THERAPIST
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
## 2025-01-23 ENCOUNTER — TELEPHONE (OUTPATIENT)
Dept: BARIATRICS | Facility: CLINIC | Age: 50
End: 2025-01-23

## 2025-01-27 ENCOUNTER — APPOINTMENT (OUTPATIENT)
Dept: PHYSICAL THERAPY | Facility: REHABILITATION | Age: 50
End: 2025-01-27
Payer: COMMERCIAL

## 2025-01-27 RX ORDER — SODIUM CHLORIDE 9 MG/ML
125 INJECTION, SOLUTION INTRAVENOUS CONTINUOUS
Status: CANCELLED | OUTPATIENT
Start: 2025-01-27

## 2025-01-29 ENCOUNTER — HOSPITAL ENCOUNTER (OUTPATIENT)
Dept: GASTROENTEROLOGY | Facility: HOSPITAL | Age: 50
Setting detail: OUTPATIENT SURGERY
Discharge: HOME | End: 2025-01-29
Attending: SURGERY
Payer: COMMERCIAL

## 2025-01-29 ENCOUNTER — ANESTHESIA EVENT (OUTPATIENT)
Dept: GASTROENTEROLOGY | Facility: HOSPITAL | Age: 50
End: 2025-01-29
Payer: COMMERCIAL

## 2025-01-29 ENCOUNTER — ANESTHESIA (OUTPATIENT)
Dept: GASTROENTEROLOGY | Facility: HOSPITAL | Age: 50
End: 2025-01-29
Payer: COMMERCIAL

## 2025-01-29 VITALS
RESPIRATION RATE: 16 BRPM | SYSTOLIC BLOOD PRESSURE: 131 MMHG | TEMPERATURE: 98.6 F | WEIGHT: 198 LBS | DIASTOLIC BLOOD PRESSURE: 85 MMHG | BODY MASS INDEX: 36.21 KG/M2 | OXYGEN SATURATION: 97 % | HEART RATE: 65 BPM

## 2025-01-29 DIAGNOSIS — Z98.84 H/O BARIATRIC SURGERY: Primary | ICD-10-CM

## 2025-01-29 DIAGNOSIS — Z98.84 BARIATRIC SURGERY STATUS: Primary | ICD-10-CM

## 2025-01-29 DIAGNOSIS — Z98.84 BARIATRIC SURGERY STATUS: ICD-10-CM

## 2025-01-29 PROCEDURE — 43239 EGD BIOPSY SINGLE/MULTIPLE: CPT | Performed by: SURGERY

## 2025-01-29 PROCEDURE — 88305 TISSUE EXAM BY PATHOLOGIST: CPT | Performed by: PATHOLOGY

## 2025-01-29 RX ORDER — PROPOFOL 10 MG/ML
INJECTION, EMULSION INTRAVENOUS AS NEEDED
Status: DISCONTINUED | OUTPATIENT
Start: 2025-01-29 | End: 2025-01-29

## 2025-01-29 RX ORDER — SODIUM CHLORIDE 9 MG/ML
125 INJECTION, SOLUTION INTRAVENOUS CONTINUOUS
Status: DISCONTINUED | OUTPATIENT
Start: 2025-01-29 | End: 2025-02-02 | Stop reason: HOSPADM

## 2025-01-29 RX ORDER — SODIUM CHLORIDE, SODIUM LACTATE, POTASSIUM CHLORIDE, CALCIUM CHLORIDE 600; 310; 30; 20 MG/100ML; MG/100ML; MG/100ML; MG/100ML
INJECTION, SOLUTION INTRAVENOUS CONTINUOUS PRN
Status: DISCONTINUED | OUTPATIENT
Start: 2025-01-29 | End: 2025-01-29

## 2025-01-29 RX ORDER — LIDOCAINE HYDROCHLORIDE 20 MG/ML
INJECTION, SOLUTION EPIDURAL; INFILTRATION; INTRACAUDAL; PERINEURAL AS NEEDED
Status: DISCONTINUED | OUTPATIENT
Start: 2025-01-29 | End: 2025-01-29

## 2025-01-29 RX ADMIN — LIDOCAINE HYDROCHLORIDE 100 MG: 20 INJECTION, SOLUTION EPIDURAL; INFILTRATION; INTRACAUDAL at 11:08

## 2025-01-29 RX ADMIN — PROPOFOL 140 MG: 10 INJECTION, EMULSION INTRAVENOUS at 11:08

## 2025-01-29 RX ADMIN — PROPOFOL 40 MG: 10 INJECTION, EMULSION INTRAVENOUS at 11:09

## 2025-01-29 RX ADMIN — SODIUM CHLORIDE, SODIUM LACTATE, POTASSIUM CHLORIDE, AND CALCIUM CHLORIDE: .6; .31; .03; .02 INJECTION, SOLUTION INTRAVENOUS at 11:04

## 2025-01-29 NOTE — ANESTHESIA PREPROCEDURE EVALUATION
Procedure:  EGD    Relevant Problems   ANESTHESIA (within normal limits)      CARDIO   (+) Borderline hyperlipidemia   (+) Chronic migraine without aura without status migrainosus, not intractable   (+) Intractable chronic migraine without aura and with status migrainosus   (+) Intractable chronic migraine without aura and without status migrainosus      GI/HEPATIC   (+) GERD without esophagitis   (+) H/O bariatric surgery      /RENAL   (+) Nephrolithiasis      MUSCULOSKELETAL   (+) Fibromyalgia   (+) Osteoarthritis of carpometacarpal (CMC) joint of right thumb   (+) Trapezius muscle spasm      NEURO/PSYCH   (+) Chronic migraine without aura without status migrainosus, not intractable   (+) Depression with anxiety   (+) Fibromyalgia   (+) Intractable chronic migraine without aura and with status migrainosus   (+) Intractable chronic migraine without aura and without status migrainosus   (+) Post traumatic stress disorder (PTSD)      PULMONARY   (+) Mild intermittent asthma        Physical Exam    Airway    Mallampati score: I  TM Distance: >3 FB  Neck ROM: full     Dental   No notable dental hx     Cardiovascular  Cardiovascular exam normal    Pulmonary  Pulmonary exam normal     Other Findings        Anesthesia Plan  ASA Score- 2     Anesthesia Type- IV sedation with anesthesia with ASA Monitors.         Additional Monitors:     Airway Plan:            Plan Factors-    Chart reviewed.   Existing labs reviewed. Patient summary reviewed.                  Induction- intravenous.    Postoperative Plan-         Informed Consent- Anesthetic plan and risks discussed with patient.        NPO Status:  Vitals Value Taken Time   Date of last liquid 01/28/25 01/29/25 0958   Time of last liquid 2340 01/29/25 0958   Date of last solid 01/28/25 01/29/25 0958   Time of last solid 2230 01/29/25 0958

## 2025-01-29 NOTE — ANESTHESIA POSTPROCEDURE EVALUATION
Post-Op Assessment Note    CV Status:  Stable    Pain management: adequate       Mental Status:  Alert and awake   Hydration Status:  Euvolemic   PONV Controlled:  Controlled   Airway Patency:  Patent     Post Op Vitals Reviewed: Yes    No anethesia notable event occurred.    Staff: Anesthesiologist           Last Filed PACU Vitals:  Vitals Value Taken Time   Temp     Pulse 65 01/29/25 1129   /85 01/29/25 1129   Resp 16 01/29/25 1129   SpO2 97 % 01/29/25 1129       Modified Nidia:     Vitals Value Taken Time   Activity 2 01/29/25 1129   Respiration 2 01/29/25 1129   Circulation 2 01/29/25 1129   Consciousness 2 01/29/25 1129   Oxygen Saturation 2 01/29/25 1129     Modified Nidia Score: 10

## 2025-01-29 NOTE — INTERVAL H&P NOTE
H&P reviewed. After examining the patient I find no changes in the patients condition since the H&P had been written.    Vitals:    01/29/25 0958   BP: 140/85   Pulse: 75   Resp: 16   Temp: 98.6 °F (37 °C)   SpO2: 100%

## 2025-01-31 ENCOUNTER — TELEPHONE (OUTPATIENT)
Dept: GASTROENTEROLOGY | Facility: HOSPITAL | Age: 50
End: 2025-01-31

## 2025-01-31 ENCOUNTER — APPOINTMENT (OUTPATIENT)
Dept: PHYSICAL THERAPY | Facility: REHABILITATION | Age: 50
End: 2025-01-31
Payer: COMMERCIAL

## 2025-02-03 PROCEDURE — 88305 TISSUE EXAM BY PATHOLOGIST: CPT | Performed by: PATHOLOGY

## 2025-02-04 ENCOUNTER — HOSPITAL ENCOUNTER (OUTPATIENT)
Dept: RADIOLOGY | Facility: HOSPITAL | Age: 50
Discharge: HOME | End: 2025-02-04
Attending: STUDENT IN AN ORGANIZED HEALTH CARE EDUCATION/TRAINING PROGRAM
Payer: COMMERCIAL

## 2025-02-04 DIAGNOSIS — Z98.84 BARIATRIC SURGERY STATUS: ICD-10-CM

## 2025-02-04 PROCEDURE — 74240 X-RAY XM UPR GI TRC 1CNTRST: CPT

## 2025-02-05 ENCOUNTER — OFFICE VISIT (OUTPATIENT)
Dept: PHYSICAL THERAPY | Facility: REHABILITATION | Age: 50
End: 2025-02-05
Payer: COMMERCIAL

## 2025-02-05 DIAGNOSIS — M25.551 BILATERAL HIP PAIN: Primary | ICD-10-CM

## 2025-02-05 DIAGNOSIS — M25.552 BILATERAL HIP PAIN: Primary | ICD-10-CM

## 2025-02-05 PROCEDURE — 97110 THERAPEUTIC EXERCISES: CPT | Performed by: PHYSICAL THERAPIST

## 2025-02-05 PROCEDURE — 97112 NEUROMUSCULAR REEDUCATION: CPT | Performed by: PHYSICAL THERAPIST
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
## 2025-02-06 ENCOUNTER — ANESTHESIA EVENT (OUTPATIENT)
Dept: PERIOP | Facility: HOSPITAL | Age: 50
End: 2025-02-06
Payer: COMMERCIAL

## 2025-02-06 ENCOUNTER — OFFICE VISIT (OUTPATIENT)
Dept: INTERNAL MEDICINE CLINIC | Facility: CLINIC | Age: 50
End: 2025-02-06

## 2025-02-06 VITALS
TEMPERATURE: 98.1 F | WEIGHT: 200 LBS | BODY MASS INDEX: 36.58 KG/M2 | HEART RATE: 78 BPM | SYSTOLIC BLOOD PRESSURE: 106 MMHG | DIASTOLIC BLOOD PRESSURE: 72 MMHG

## 2025-02-06 DIAGNOSIS — M35.1 MIXED CONNECTIVE TISSUE DISEASE (HCC): ICD-10-CM

## 2025-02-06 DIAGNOSIS — Z23 ENCOUNTER FOR IMMUNIZATION: Primary | ICD-10-CM

## 2025-02-06 DIAGNOSIS — Z98.84 BARIATRIC SURGERY STATUS: ICD-10-CM

## 2025-02-06 DIAGNOSIS — E53.8 VITAMIN B12 DEFICIENCY: ICD-10-CM

## 2025-02-06 DIAGNOSIS — Z01.818 PRE-OP EXAMINATION: ICD-10-CM

## 2025-02-06 PROCEDURE — 99214 OFFICE O/P EST MOD 30 MIN: CPT | Performed by: INTERNAL MEDICINE

## 2025-02-06 PROCEDURE — G2211 COMPLEX E/M VISIT ADD ON: HCPCS | Performed by: INTERNAL MEDICINE

## 2025-02-06 RX ORDER — CYANOCOBALAMIN 1000 UG/ML
1000 INJECTION, SOLUTION INTRAMUSCULAR; SUBCUTANEOUS
Status: SHIPPED | OUTPATIENT
Start: 2025-02-10 | End: 2026-02-05

## 2025-02-06 RX ORDER — CYANOCOBALAMIN 1000 UG/ML
1000 INJECTION, SOLUTION INTRAMUSCULAR; SUBCUTANEOUS ONCE
Qty: 3 ML | Refills: 4 | Status: SHIPPED | OUTPATIENT
Start: 2025-02-06 | End: 2025-02-11 | Stop reason: SDUPTHER

## 2025-02-06 RX ORDER — CARBOXYMETHYLCELLULOSE SODIUM 5 MG/ML
1 SOLUTION/ DROPS OPHTHALMIC 3 TIMES DAILY PRN
Qty: 90 EACH | Refills: 3 | Status: SHIPPED | OUTPATIENT
Start: 2025-02-06 | End: 2025-05-07

## 2025-02-06 NOTE — PROGRESS NOTES
Pre-operative Clearance  Name: Jenny Olvera      : 1975      MRN: [id number]  Encounter Provider: Kamala Edmond DO  Encounter Date: 2025   Encounter department: [facility]    Assessment & Plan  Encounter for immunization    Orders:    influenza vaccine, recombinant, PF, 0.5 mL IM (Flublok)    Mixed connective tissue disease (HCC)    Requesting new referral to Rheumatology / second opinion. Previously seen by Dr. Cotto with dx Sjogren's, currently on Plaquenil.  Given on plaquenil, will need yearly eye exam to screen for retinal issues. Pt has yet to have eye exam.     Orders:    carboxymethylcellulose 0.5 % SOLN; Administer 1 drop to both eyes 3 (three) times a day as needed for dry eyes    Ambulatory Referral to Rheumatology; Future    Ambulatory Referral to Ophthalmology; Future    Bariatric surgery status  Currently taking multivitamin and calcium / vitamin D patches obtained through [employer]. Attempted home injection of b12. As patient attempts to take few oral medications as does not tolerate will, will start giving office b12 injections.     Orders:    cyanocobalamin 1,000 mcg/mL; Inject 1 mL (1,000 mcg total) into a muscle once for 1 dose    cyanocobalamin injection 1,000 mcg    Pre-op examination  As per HPI.        Vitamin B12 deficiency    Orders:    cyanocobalamin injection 1,000 mcg    Pre-operative Clearance:     Revised Cardiac Risk Index:  RCI RISK CLASS I (0 risk factors, risk of major cardiac complications approximately 0.5%)    Clearance:  Patient is medically optimized (CLEARED) for proposed surgery without any additional cardiac testing.      Medication Instructions:   - Avoid herbs or non-directed vitamins one week prior to surgery    - May take tylenol for pain up until the night before surgery    - 5HT3 Antagonist (ie, zofran):  Continue to take this medication on your normal schedule.   - Alpha Adrenergic Antagonist (ie, trazodone,  viibryd, trintellix): Continue to take this medication on your normal schedule.  - Alpha-2 Adrenergic Agonist (ie, clonidine, tizanidine, methyldopa): Continue to take this medication on your normal schedule.  - Antidepressants: Continue to take this medication on your normal schedule.  - Antiepileptic meds: Continue to take this medication on your normal schedule.  - Antipsychotic meds: Continue to take this medication on your normal schedule.  - Corticosteroids: Continue to take this medication on your normal schedule.  - H2 Blocker: Continue to take this medication on your normal schedule.  - Hyperlipidemia meds: Continue to take this medication on your normal schedule.  - Opioids: Continue to take this medication on your normal schedule.    BMI Counseling: Body mass index is 36.58 kg/m². The BMI is above normal. Nutrition recommendations include decreasing portion sizes and encouraging healthy choices of fruits and vegetables. Exercise recommendations include moderate physical activity 150 minutes/week. No pharmacotherapy was ordered. Patient referred to PCP. Rationale for BMI follow-up plan is due to patient being overweight or obese.       History of Present Illness     49 YOF pmhx  s/p sleeve gastrectomy in August '22 and then conversion to RNYGB, obesity, depression, chronic migraines, vaginal prolapse, ibs-mixed, MCTD on Plaquenil here for pre-op exam for colpopexy on 2/20 and general follow-up. No complaints today.      Pre-op Exam  Surgery: COLPOPEXY VAGINAL EXTRAPERITONEAL (VEC) WITH  EXAM UNDER ANESTHESIA  Anticipated Date of Surgery: 2/20/25  Surgeon: Tom Hampton MD    Previous history of bleeding disorders or clots?: No  Previous Anesthesia reaction?: No  Prolonged steroid use in the last 6 months?: No    Assessment of Cardiac Risk:   - Unstable or severe angina or MI in the last 6 weeks or history of stent placement in the last year?: No   - Decompensated heart failure (e.g. New onset heart  failure, NYHA  Class IV heart failure, or worsening existing heart failure)?: No  - Significant arrhythmias such as high grade AV block, symptomatic ventricular arrhythmia, newly recognized ventricular tachycardia, supraventricular tachycardia with resting heart rate >100, or symptomatic bradycardia?: No  - Severe heart valve disease including aortic stenosis or symptomatic mitral stenosis?: No      Pre-operative Risk Factors:  Elevated-risk surgery: No    History of cerebrovascular disease: No    History of ischemic heart disease: No  Pre-operative treatment with insulin: No  Pre-operative creatinine >2 mg/dL: No    History of congestive heart failure: No    Review of Systems   Constitutional:  Negative for chills and fever.   HENT:  Negative for ear pain and sore throat.    Eyes:  Negative for pain and visual disturbance.   Respiratory:  Negative for cough and shortness of breath.    Cardiovascular:  Negative for chest pain and palpitations.   Gastrointestinal:  Negative for abdominal pain and vomiting.   Genitourinary:  Negative for dysuria and hematuria.   Musculoskeletal:  Negative for arthralgias and back pain.   Skin:  Negative for color change and rash.   Neurological:  Negative for seizures and syncope.   All other systems reviewed and are negative.    Past Medical History   Past Medical History:   Diagnosis Date    Anxiety     Asthma     Claustrophobia     Cluster headache     CTS (carpal tunnel syndrome)     Depression     Fibromyalgia     primary; last assessed 11/27/17    GERD (gastroesophageal reflux disease)     GI problem     H/O gastric sleeve     Headache, tension-type     Irritable bowel syndrome     Joint pain     Kidney stone     Lung nodule     last assessed 10/9/15    Migraine     Muscle pain     Peripheral neuropathy     PONV (postoperative nausea and vomiting)     Sjogren's syndrome (HCC)      Past Surgical History:   Procedure Laterality Date    BACK SURGERY      BARIATRIC SURGERY  8/9/2022     CARPAL TUNNEL RELEASE Bilateral     CHOLECYSTECTOMY      COLONOSCOPY      CYSTOSCOPY      GALLBLADDER SURGERY      GASTRECTOMY  08/09/2022    Sleeve    GASTRIC BYPASS LAPAROSCOPIC N/A 01/30/2024    Procedure: REVISION CONVERSION TO R-N-Y GASTRIC BYPASS W/ ROBOTICS AND INTRAOPERATIVE EGD; PARAESOPHAGEAL HERNIA REPAIR WITH MESH;  Surgeon: Felix Akins MD;  Location: [facility];  Service: Bariatrics    HERNIA REPAIR  08/09/2022    Apparently done at time gastric sleeve. Still have major issues with it.    NECK SURGERY      C4C5    MT INCISION EXTENSOR TENDON SHEATH WRIST Left 1/3/2025    Procedure: RELEASE DEQUERVAINS;  Surgeon: Gail Wylie MD;  Location: [facility];  Service: Orthopedics    MT TENDON SHEATH INCISION Right 01/16/2023    Procedure: THUMB TRIGGER FINGER RELEASE;  Surgeon: Gail Wylie MD;  Location: [facility];  Service: Orthopedics    SACROILIAC JOINT FUSION Left     SLEEVE GASTROPLASTY  08/9/2022    SPINAL FUSION      C4 and C5    ULNAR NERVE REPAIR Bilateral     UPPER GASTROINTESTINAL ENDOSCOPY       Family History   Problem Relation Age of Onset    Diabetes Mother     Fibromyalgia Mother     Ovarian cancer Mother 33    Hypertension Mother     Thyroid disease Mother     Migraines Mother     Neuropathy Mother     Cancer Mother         has been removed    Arthritis Mother     COPD Mother     Depression Mother     No Known Problems Father     Throat cancer Maternal Grandmother     No Known Problems Maternal Grandfather     No Known Problems Paternal Grandmother     No Known Problems Paternal Grandfather     No Known Problems Daughter     Breast cancer Maternal Aunt     Dementia Maternal Aunt     No Known Problems Maternal Aunt     No Known Problems Maternal Aunt     No Known Problems Paternal Aunt     Colon cancer Cousin     Heart disease Cousin     Lupus Cousin     Arthritis Family     Heart disease Family         cardiac disorder    Neuropathy Family     Lupus Family          systemic erythematosus    No Known Problems Half-Sister     No Known Problems Half-Brother     No Known Problems Half-Brother     No Known Problems Half-Brother     Asthma Brother     Stroke Neg Hx      Social History     Tobacco Use    Smoking status: Never    Smokeless tobacco: Never    Tobacco comments:     Never smoked.   Vaping Use    Vaping status: Never Used   Substance and Sexual Activity    Alcohol use: Yes     Alcohol/week: 2.0 standard drinks of alcohol     Types: 2 Glasses of wine per week     Comment: Occasionally    Drug use: Yes     Types: Marijuana     Comment: medicinal over 1 yr, capsules (has medial card)    Sexual activity: Not Currently     Partners: Male     Birth control/protection: Abstinence, Condom Male, I.U.D.     Current Outpatient Medications on File Prior to Visit   Medication Sig    albuterol (PROVENTIL HFA,VENTOLIN HFA) 90 mcg/act inhaler Inhale 2 puffs every 6 (six) hours as needed for wheezing or shortness of breath    Botox 200 units SOLR Every three months for migraines    butalbital-acetaminophen-caffeine (FIORICET,ESGIC) -40 mg per tablet 1 tab q6 hours prn migraine. No more than 2-3 per week.    carboxymethylcellulose 0.5 % SOLN Administer 1 drop to both eyes 3 (three) times a day as needed for dry eyes    cholecalciferol (VITAMIN D3) 1,000 units tablet Take 1 capsule by mouth once a week 3000 units daily    cholestyramine (QUESTRAN) 4 GM/DOSE powder Take 1 packet (4 g total) by mouth 2 (two) times a day with meals    cyanocobalamin 1,000 mcg/mL Inject 1 mL (1,000 mcg total) into a muscle once for 1 dose (Patient not taking: Reported on 12/12/2024)    dexamethasone (DECADRON) 2 mg tablet 1 tab qam with food prn migraine.    dicyclomine (BENTYL) 20 mg tablet Take 1 tablet (20 mg total) by mouth 3 (three) times a day as needed (migraine/cramps)    divalproex sodium (DEPAKOTE) 250 mg DR tablet 2 tabs q12 h x 2 days, then 1 tab q12 h x 2 days, then stop. Repeat if needed.     ergocalciferol (VITAMIN D2) 50,000 units Take 1 capsule (50,000 Units total) by mouth 2 (two) times a week    famotidine (PEPCID) 20 mg tablet Take 1 tablet (20 mg total) by mouth 2 (two) times a day as needed for heartburn (take as needed for symptoms of heart burn as discontinue ppi)    fluticasone (FLONASE) 50 mcg/act nasal spray 1 spray in each nostril once daily    fremanezumab-vfrm (Ajovy) 225 MG/1.5ML auto-injector Inject 1.5 mL (225 mg total) under the skin every 30 (thirty) days    hydroxychloroquine (PLAQUENIL) 200 mg tablet Take 1 tablet (200 mg total) by mouth daily with breakfast for 14 days, THEN 1 tablet (200 mg total) 2 (two) times a day with meals.    lasmiditan succinate (Reyvow) 100 mg tablet One tab qhs at migraine onset.  Caution sedation.  Max 1 tab/24 hours.    meclizine (ANTIVERT) 12.5 MG tablet Take 1 tablet (12.5 mg total) by mouth 3 (three) times a day as needed for dizziness or nausea    melatonin 3 mg     multivitamin (THERAGRAN) TABS Take 1 tablet by mouth daily (Patient not taking: Reported on 1/29/2025)    Nerve Stimulator (Nerivio) ALIX Start treatment within 60 minutes of migraine onset. Set a strong, yet comfortable intensity level in the first few minutes and maintain that level for 45 minutes.    ondansetron (ZOFRAN) 4 mg tablet Take 1 tablet (4 mg total) by mouth every 12 (twelve) hours as needed for nausea    ondansetron (ZOFRAN) 4 mg tablet Take 1 tablet (4 mg total) by mouth every 8 (eight) hours as needed for nausea or vomiting    polyethylene glycol (MIRALAX) 17 g packet Take 17 g by mouth daily    prochlorperazine (COMPAZINE) 10 mg tablet Take 1 tablet (10 mg total) by mouth every 6 (six) hours as needed for nausea or vomiting    tiZANidine (ZANAFLEX) 2 mg tablet Take 1 tablet (2 mg total) by mouth every 8 (eight) hours as needed for muscle spasms    traMADol (Ultram) 50 mg tablet Take 1 tablet (50 mg total) by mouth every 6 (six) hours as needed for moderate pain     traZODone (DESYREL) 50 mg tablet TAKE HALF TABLETS BY MOUTH DAILY AT BEDTIME    Trudhesa 0.725 MG/ACT AERS     venlafaxine (EFFEXOR-XR) 37.5 mg 24 hr capsule 1 cap BID    Wheat Dextrin (Benefiber) POWD Take by mouth Daily at 2am (Patient not taking: Reported on 1/13/2025)     Allergies   Allergen Reactions    Hydrocodone-Acetaminophen GI Intolerance     gi upset -pt okay if takes  zofran      Codeine GI Intolerance     GI issues    Mexiletine Rash    Oxycodone-Acetaminophen GI Intolerance     GI issues    Penicillins Other (See Comments) and GI Intolerance     GI issues,vaginitis    Pollen Extract-Tree Extract [Pollen Extract] Nasal Congestion     Objective   /72 (BP Location: Right arm, Patient Position: Sitting, Cuff Size: Large)   Pulse 78   Temp 98.1 °F (36.7 °C) (Temporal)   Wt 90.7 kg (200 lb)   BMI 36.58 kg/m²     Physical Exam  Vitals and nursing note reviewed.   Constitutional:       General: She is not in acute distress.     Appearance: She is well-developed. She is obese. She is not ill-appearing or diaphoretic.   HENT:      Head: Normocephalic and atraumatic.   Eyes:      Conjunctiva/sclera: Conjunctivae normal.   Cardiovascular:      Rate and Rhythm: Normal rate and regular rhythm.      Heart sounds: No murmur heard.  Pulmonary:      Effort: Pulmonary effort is normal. No respiratory distress.      Breath sounds: Normal breath sounds.   Abdominal:      Palpations: Abdomen is soft.      Tenderness: There is no abdominal tenderness.   Musculoskeletal:         General: No swelling.      Cervical back: Neck supple.      Right lower leg: No edema.      Left lower leg: No edema.   Skin:     General: Skin is warm and dry.      Capillary Refill: Capillary refill takes less than 2 seconds.   Neurological:      Mental Status: She is alert and oriented to person, place, and time. Mental status is at baseline.   Psychiatric:         Mood and Affect: Mood normal.         Behavior: Behavior normal.          Thought Content: Thought content normal.         Judgment: Judgment normal.           Kamala Edmond, DO

## 2025-02-06 NOTE — PATIENT INSTRUCTIONS
Dr. De León Rheumatologist --[phone]  Nursing appointment request for B12 shots once monthly  Late Dumping syndrome

## 2025-02-10 ENCOUNTER — HOSPITAL ENCOUNTER (OUTPATIENT)
Dept: GASTROENTEROLOGY | Facility: HOSPITAL | Age: 50
Discharge: HOME | End: 2025-02-10
Attending: STUDENT IN AN ORGANIZED HEALTH CARE EDUCATION/TRAINING PROGRAM
Payer: COMMERCIAL

## 2025-02-10 ENCOUNTER — APPOINTMENT (OUTPATIENT)
Dept: PHYSICAL THERAPY | Facility: REHABILITATION | Age: 50
End: 2025-02-10
Payer: COMMERCIAL

## 2025-02-10 VITALS
SYSTOLIC BLOOD PRESSURE: 124 MMHG | RESPIRATION RATE: 16 BRPM | HEART RATE: 70 BPM | DIASTOLIC BLOOD PRESSURE: 84 MMHG | TEMPERATURE: 98.2 F | OXYGEN SATURATION: 98 %

## 2025-02-10 DIAGNOSIS — Z98.84 BARIATRIC SURGERY STATUS: ICD-10-CM

## 2025-02-10 PROCEDURE — 91010 ESOPHAGUS MOTILITY STUDY: CPT

## 2025-02-10 PROCEDURE — 91038 ESOPH IMPED FUNCT TEST > 1HR: CPT

## 2025-02-10 NOTE — PERIOPERATIVE NURSING NOTE
Patient brought in the room and explained the esophageal manometry procedure. After the confirmation of allergies, Lidocaine 2% viscous solution given via right/ left nostrils and  a transnasal insertion of the High Resolution esophageal manometry catheter was inserted via left nostril. Patient given water to drink during the insertion and once the catheter inserted pressure bands of both Upper esophageal sphincter  (UES) and Lower esophageal sphincter ( LES) were observed on the color contour. Patient instructed to take a deep breath to verify placement of the catheter, diaphragmatic pinch noted on inspiration. Catheter was secured to left cheek. Patient was assisted to supine position .Patient was instructed to relax  while acclimating the catheter for about 5 minutes. A 30 second baseline resting pressure was obtained to identify the UES and LES followed by a series of 10 liquid swallows using 5 cc room temperature normal saline to assess esophageal motility and bolus transit. Patient administered 6 viscous swallows using 5 cc viscous solution, 1 multiple rapid drink swallow using 2 cc room temperature normal saline given a total of 5 drinks. Patient instructed to sit up at the edge of the stretcher and given 5 upright liquid swallows using 5 cc room temperature normal saline and 1 rapid drink challenge using 50 cc room temperature water. At the end of the procedure the high resolution esophageal manometry catheter was removed from the nostril intact.  sensor PH probe inserted via left nostril and secured. Zephr recorder teachback performed and patient verbalized understanding. Patient instructed to return next day to have probe remove. Discharge instructions given and patient ambulated out of room in stable condition.

## 2025-02-11 ENCOUNTER — CLINICAL SUPPORT (OUTPATIENT)
Dept: INTERNAL MEDICINE CLINIC | Facility: CLINIC | Age: 50
End: 2025-02-11

## 2025-02-11 ENCOUNTER — APPOINTMENT (OUTPATIENT)
Dept: PHYSICAL THERAPY | Facility: REHABILITATION | Age: 50
End: 2025-02-11
Payer: COMMERCIAL

## 2025-02-11 DIAGNOSIS — Z23 ENCOUNTER FOR IMMUNIZATION: Primary | ICD-10-CM

## 2025-02-11 DIAGNOSIS — Z98.84 BARIATRIC SURGERY STATUS: ICD-10-CM

## 2025-02-11 PROCEDURE — G0008 ADMIN INFLUENZA VIRUS VAC: HCPCS

## 2025-02-11 PROCEDURE — 96372 THER/PROPH/DIAG INJ SC/IM: CPT | Performed by: STUDENT IN AN ORGANIZED HEALTH CARE EDUCATION/TRAINING PROGRAM

## 2025-02-11 PROCEDURE — 90673 RIV3 VACCINE NO PRESERV IM: CPT

## 2025-02-11 RX ORDER — CYANOCOBALAMIN 1000 UG/ML
1000 INJECTION, SOLUTION INTRAMUSCULAR; SUBCUTANEOUS
Qty: 3 ML | Refills: 3 | Status: SHIPPED | OUTPATIENT
Start: 2025-02-11 | End: 2026-02-11

## 2025-02-11 RX ADMIN — CYANOCOBALAMIN 1000 MCG: 1000 INJECTION, SOLUTION INTRAMUSCULAR; SUBCUTANEOUS at 10:05

## 2025-02-11 NOTE — PROGRESS NOTES
Patient was seen on nurse schedule today for flublok vaccine administered in right deltoid, and b12 injection administered in left deltoid. Patient tolerated well.

## 2025-02-13 PROCEDURE — 91038 ESOPH IMPED FUNCT TEST > 1HR: CPT | Performed by: INTERNAL MEDICINE

## 2025-02-13 PROCEDURE — 91010 ESOPHAGUS MOTILITY STUDY: CPT | Performed by: INTERNAL MEDICINE

## 2025-02-14 ENCOUNTER — OFFICE VISIT (OUTPATIENT)
Dept: OBGYN CLINIC | Facility: CLINIC | Age: 50
End: 2025-02-14

## 2025-02-14 ENCOUNTER — APPOINTMENT (OUTPATIENT)
Dept: LAB | Facility: CLINIC | Age: 50
End: 2025-02-14
Payer: COMMERCIAL

## 2025-02-14 ENCOUNTER — OFFICE VISIT (OUTPATIENT)
Dept: PHYSICAL THERAPY | Facility: REHABILITATION | Age: 50
End: 2025-02-14
Payer: COMMERCIAL

## 2025-02-14 VITALS — WEIGHT: 198.6 LBS | BODY MASS INDEX: 36.32 KG/M2 | DIASTOLIC BLOOD PRESSURE: 82 MMHG | SYSTOLIC BLOOD PRESSURE: 120 MMHG

## 2025-02-14 DIAGNOSIS — E55.9 VITAMIN D DEFICIENCY: ICD-10-CM

## 2025-02-14 DIAGNOSIS — Z01.818 PREOPERATIVE EXAM FOR GYNECOLOGIC SURGERY: Primary | ICD-10-CM

## 2025-02-14 DIAGNOSIS — K91.2 POSTSURGICAL MALABSORPTION: ICD-10-CM

## 2025-02-14 DIAGNOSIS — M25.552 BILATERAL HIP PAIN: Primary | ICD-10-CM

## 2025-02-14 DIAGNOSIS — Z01.818 PREOPERATIVE EXAM FOR GYNECOLOGIC SURGERY: ICD-10-CM

## 2025-02-14 DIAGNOSIS — E53.8 VITAMIN B12 DEFICIENCY: ICD-10-CM

## 2025-02-14 DIAGNOSIS — Z98.84 BARIATRIC SURGERY STATUS: ICD-10-CM

## 2025-02-14 DIAGNOSIS — M25.551 BILATERAL HIP PAIN: Primary | ICD-10-CM

## 2025-02-14 DIAGNOSIS — R74.8 ELEVATED ALKALINE PHOSPHATASE LEVEL: ICD-10-CM

## 2025-02-14 LAB
25(OH)D3 SERPL-MCNC: 22.1 NG/ML (ref 30–100)
ALBUMIN SERPL BCG-MCNC: 4.2 G/DL (ref 3.5–5)
ALP SERPL-CCNC: 140 U/L (ref 34–104)
ALT SERPL W P-5'-P-CCNC: 15 U/L (ref 7–52)
ANION GAP SERPL CALCULATED.3IONS-SCNC: 8 MMOL/L (ref 4–13)
AST SERPL W P-5'-P-CCNC: 18 U/L (ref 13–39)
BILIRUB SERPL-MCNC: 0.52 MG/DL (ref 0.2–1)
BUN SERPL-MCNC: 13 MG/DL (ref 5–25)
CALCIUM SERPL-MCNC: 9.4 MG/DL (ref 8.4–10.2)
CHLORIDE SERPL-SCNC: 105 MMOL/L (ref 96–108)
CO2 SERPL-SCNC: 29 MMOL/L (ref 21–32)
CREAT SERPL-MCNC: 0.62 MG/DL (ref 0.6–1.3)
ERYTHROCYTE [DISTWIDTH] IN BLOOD BY AUTOMATED COUNT: 14 % (ref 11.6–15.1)
GFR SERPL CREATININE-BSD FRML MDRD: 106 ML/MIN/1.73SQ M
GGT SERPL-CCNC: 17 U/L (ref 9–64)
GLUCOSE P FAST SERPL-MCNC: 92 MG/DL (ref 65–99)
HCT VFR BLD AUTO: 43.3 % (ref 34.8–46.1)
HGB BLD-MCNC: 13.5 G/DL (ref 11.5–15.4)
MCH RBC QN AUTO: 26.6 PG (ref 26.8–34.3)
MCHC RBC AUTO-ENTMCNC: 31.2 G/DL (ref 31.4–37.4)
MCV RBC AUTO: 85 FL (ref 82–98)
PLATELET # BLD AUTO: 256 THOUSANDS/UL (ref 149–390)
PMV BLD AUTO: 10.1 FL (ref 8.9–12.7)
POTASSIUM SERPL-SCNC: 3.9 MMOL/L (ref 3.5–5.3)
PROT SERPL-MCNC: 6.6 G/DL (ref 6.4–8.4)
RBC # BLD AUTO: 5.07 MILLION/UL (ref 3.81–5.12)
SODIUM SERPL-SCNC: 142 MMOL/L (ref 135–147)
VIT B12 SERPL-MCNC: 453 PG/ML (ref 180–914)
WBC # BLD AUTO: 6.76 THOUSAND/UL (ref 4.31–10.16)

## 2025-02-14 PROCEDURE — 99213 OFFICE O/P EST LOW 20 MIN: CPT | Performed by: OBSTETRICS & GYNECOLOGY

## 2025-02-14 PROCEDURE — 97110 THERAPEUTIC EXERCISES: CPT | Performed by: PHYSICAL THERAPIST

## 2025-02-14 PROCEDURE — 36415 COLL VENOUS BLD VENIPUNCTURE: CPT

## 2025-02-14 PROCEDURE — 85027 COMPLETE CBC AUTOMATED: CPT

## 2025-02-14 PROCEDURE — 83918 ORGANIC ACIDS TOTAL QUANT: CPT

## 2025-02-14 PROCEDURE — 82607 VITAMIN B-12: CPT

## 2025-02-14 PROCEDURE — 82977 ASSAY OF GGT: CPT

## 2025-02-14 PROCEDURE — 82306 VITAMIN D 25 HYDROXY: CPT

## 2025-02-14 PROCEDURE — 80053 COMPREHEN METABOLIC PANEL: CPT

## 2025-02-14 PROCEDURE — 97112 NEUROMUSCULAR REEDUCATION: CPT | Performed by: PHYSICAL THERAPIST

## 2025-02-14 NOTE — PROGRESS NOTES
PT Discharge     Today's date: 2025  Patient name: Jenny Olvera  : 1975  MRN: [id number]  Referring provider: Shantal Grover DO  Dx:   Encounter Diagnosis     ICD-10-CM    1. Bilateral hip pain  M25.551     M25.552                      Subjective: Overall I feel that I have improved. I do still get some pain on the outside of the left hip from time to time, and sometimes will want to give on me, but it has not in a while.     Current Pain Ratin/10      Objective: See treatment diary below    LQ Strength:   Hip Flexion: 5/5 B   Hip ER: R 5/5   L 4/5 with pain   Hip IR: R 5/5   L 5/5   Hip Extension: 5/5 B   Knee Extension: 5/5 B   Knee Flexion: 5/5 B     Palpation: Diffuse lateral hip tenderness    Gait: Mild antalgia and favoring of LLE      Assessment: Jenny was able to make good improvements with physical therapy for her left hip pain. At this time, the patient is going to be having a surgical procedure, and putting PT on hold. Pt discussed HEP that patient can continue for self management, and to contact office if she needs consultation in the future.       Goals - MET  Impairment Based Goals:  1. Decreased pain by 50% in 4 weeks.  2. Improve ROM to WFL by discharge.   3. Improve strength by 1/2 measure in 6 weeks.   4. Improve FOTO greater than predicted outcome score by discharge.      Functional Based Goals: To be met upon discharge  1. Patient will be able to return to her goal of exercising independently.   2. Patient will be fully independent with HEP by discharge  3. Patient will be able to manage symptoms independently.       Plan: Discharge.        POC expires PT/OT + Visit Limit?   3/4/2025  BOMN       Visit/Unit Tracking  AUTH Status:  Date       [facility]  Used 1 2 3       Remaining  2 1              Precautions:       Manuals                                                Neuro Re-Ed          Patient Ed   15'       Pallof Press 2 sidesteps 2x8 B  "8.5# 2 sidesteps 2x8 9#        Clamshell          Supine Hip Er Band around feet 10x gtb 5\" holds 10x gtb 5\" holds        Bird Dog          Supine Hip Abd          SL Hip Abduction Standing on foam 3x8 B Standing on foam 3x8 B                  Ther Ex          Warm Up NS L5 8' NS L5 8'  L5 10'        ASLR with Core Brace 3x8 3# ankle weights B 3x8 3# ankle weights B        Standing TA Ball Press with Arm Raises          STS  3x8 8#        Suitcase Carry           Bridge 3x10 3x10 10# weight                            Ther Activity                              Gait Training                              Modalities                                           "

## 2025-02-14 NOTE — PROGRESS NOTES
"H&P Exam  Jenny Olvera 49 y.o. female MRN: [id number]  Unit/Bed#:  Encounter: [id number]      HPI:  Jenny Olvera is a 49 y.o.  female with incomplete uterovaginal prolapse who will be undergoing EUA, VEC with Enplace, anterior and posterior colporrhaphy, cystoscopy  on 25. Patient feeling well overall. No changes to health history since LOV.     Blood use during admission if needed: yes  CPR during admission if needed: yes    From 24 HPI for reference:  HPI: 49 y.o. presents for symptoms of POP. Patient was seen with Dr. Paige in August and diagnosed with Stage 3 apical POP. Patient states she feels a daily bulge and can feel this bulge beyond the vaginal introitus when she sits on the toilet. She denies urinary incontinence and only has very rare ABDIRAHMAN episodes. Patient denies history of ABDIRAHMAN prior to prolapse symptoms.     Of note, patient has surgical history significant for cholecystectomy, gastric sleeve followed by gastric revision RNY bypass in 2024 with Dr. Garcia. On review of operative report, \"extensive intra-abdominal adhesions\" were noted.      Prolapse symptoms  Bulge: yes   Pressure: yes  Rubbing on clothing: yes  Stenting for urine: no  Stenting for stool: no  Pessary use: Previously used 2-3 different types of pessaries, last in   Hormonal replacement therapy: no       Urinary symptoms  Urgency: no  Frequency: yes 7 or more/ day  Incontinence with stress (exercise, valsalva, laugh, sneeze, cough): yes, rarely  Incontinence with urge: no  #pads used/24 hours: 0  Liners/pads/diapers   Postural incontinence: no  Nocturia:yes 2-3 times / night  Dysuria: no  Hematuria:no  Incomplete emptying: yes  Slow stream:yes  Hesitancy: yes  Straining with urination: yes     Defecatory symptoms  Constipation:yes  Frequency: varies, goes 4 days to 2 weeks without going  Urgency: no  Fecal Incontinence: no  Gas incontinence:  yes  Loose stools:  yes, occasional   Bowel regimen:  no " None currently     Gynecologic history  Pap history: Normal Year: 2024  Contraception: Mirena IUD placed 2019 - Occasional spotting with IUD  Sexually active: yes  Dyspareunia: yes     OB History    Para Term  AB Living   2 2 2   2   SAB IAB Ectopic Multiple Live Births       2      # Outcome Date GA Lbr Oleg/2nd Weight Sex Type Anes PTL Lv   2 Term     M Vag-Spont   EDGARD   1 Term     F Vag-Spont   EDGARD       Review of Systems   Constitutional:  Negative for chills and fever.   HENT: Negative.     Eyes:  Negative for visual disturbance.   Respiratory:  Negative for shortness of breath.    Cardiovascular:  Negative for chest pain.   Gastrointestinal:  Negative for abdominal pain, constipation, diarrhea, nausea and vomiting.   Genitourinary:  Negative for dysuria, hematuria, vaginal bleeding, vaginal discharge and vaginal pain.   Neurological:  Negative for headaches.   Psychiatric/Behavioral: Negative.       Historical Information   Past Medical History:   Diagnosis Date    Anxiety     Asthma     Claustrophobia     Cluster headache     CTS (carpal tunnel syndrome)     Depression     Fibromyalgia     primary; last assessed 17    GERD (gastroesophageal reflux disease)     GI problem     H/O gastric sleeve     Headache, tension-type     Irritable bowel syndrome     Joint pain     Kidney stone     Lung nodule     last assessed 10/9/15    Migraine     Muscle pain     Peripheral neuropathy     PONV (postoperative nausea and vomiting)     Sjogren's syndrome (HCC)      Past Surgical History:   Procedure Laterality Date    BACK SURGERY      BARIATRIC SURGERY  2022    CARPAL TUNNEL RELEASE Bilateral     CHOLECYSTECTOMY      COLONOSCOPY      CYSTOSCOPY      GALLBLADDER SURGERY      GASTRECTOMY  2022    Sleeve    GASTRIC BYPASS LAPAROSCOPIC N/A 2024    Procedure: REVISION CONVERSION TO R-N-Y GASTRIC BYPASS W/ ROBOTICS AND INTRAOPERATIVE EGD; PARAESOPHAGEAL HERNIA REPAIR WITH MESH;  Surgeon:  Felix Akins MD;  Location: [facility];  Service: Bariatrics    HERNIA REPAIR  08/09/2022    Apparently done at time gastric sleeve. Still have major issues with it.    NECK SURGERY      C4C5    VT INCISION EXTENSOR TENDON SHEATH WRIST Left 1/3/2025    Procedure: RELEASE DEQUERVAINS;  Surgeon: Gail Wylie MD;  Location:  MAIN OR;  Service: Orthopedics    VT TENDON SHEATH INCISION Right 01/16/2023    Procedure: THUMB TRIGGER FINGER RELEASE;  Surgeon: Gail Wylie MD;  Location: [facility];  Service: Orthopedics    SACROILIAC JOINT FUSION Left     SLEEVE GASTROPLASTY  08/9/2022    SPINAL FUSION      C4 and C5    ULNAR NERVE REPAIR Bilateral     UPPER GASTROINTESTINAL ENDOSCOPY       Social History   Social History     Substance and Sexual Activity   Alcohol Use Yes    Alcohol/week: 2.0 standard drinks of alcohol    Types: 2 Glasses of wine per week    Comment: Occasionally     Social History     Substance and Sexual Activity   Drug Use Yes    Types: Marijuana    Comment: medicinal over 1 yr, capsules (has medial card)     Social History     Tobacco Use   Smoking Status Never   Smokeless Tobacco Never   Tobacco Comments    Never smoked.       Meds/Allergies    Not in a hospital admission.     Allergies   Allergen Reactions    Hydrocodone-Acetaminophen GI Intolerance     gi upset -pt okay if takes  zofran      Codeine GI Intolerance     GI issues    Mexiletine Rash    Oxycodone-Acetaminophen GI Intolerance     GI issues    Penicillins Other (See Comments) and GI Intolerance     GI issues,vaginitis    Pollen Extract-Tree Extract [Pollen Extract] Nasal Congestion       OBJECTIVE:    Vitals:   Vitals:    02/14/25 0828   BP: 120/82        Physical Exam  Constitutional:       General: She is not in acute distress.     Appearance: Normal appearance. She is well-developed. She is not ill-appearing, toxic-appearing or diaphoretic.   HENT:      Head: Normocephalic and atraumatic.   Eyes:      General: No  scleral icterus.     Conjunctiva/sclera: Conjunctivae normal.   Cardiovascular:      Rate and Rhythm: Normal rate.   Pulmonary:      Effort: Pulmonary effort is normal. No respiratory distress.   Abdominal:      Palpations: Abdomen is soft.      Tenderness: There is no abdominal tenderness.   Skin:     General: Skin is warm and dry.   Neurological:      General: No focal deficit present.      Mental Status: She is alert and oriented to person, place, and time.   Psychiatric:         Mood and Affect: Mood normal.         Behavior: Behavior normal.     From 24 exam for reference:   POP-Q   Aa -2  Ba -2  C  +1  Ap -3  Bp -3  D  -1  GH 4  PB  3  TVL  10     Kegel strength: 3/5  Q-tip test: Resting degree of 0 with straining degree of 20  Q-tip with decreased resistance: no  Posterior wall relaxation: no   CST: Negative  Post void residual: 11cc        Posterior wall relaxation: yes     Assessment & Plan     ASSESSMENT:  50yo  female who is scheduled for EUA, VEC with Enplace, anterior and posterior colporrhaphy, cystoscopy on 25.    PLAN:    - Has seen PCP. Preop labs reordered -will complete  - Reviewed preoperative and postoperative instructions, surgical booklet provided  - Hibiclens provided, reviewed surgical wash instructions  - NPO after midnight  - RTC 2 weeks postop    Richelle Del Angel MD  2025  8:09 AM

## 2025-02-15 ENCOUNTER — RESULTS FOLLOW-UP (OUTPATIENT)
Dept: LABOR AND DELIVERY | Facility: HOSPITAL | Age: 50
End: 2025-02-15

## 2025-02-17 RX ORDER — OMEPRAZOLE 20 MG/1
20 TABLET, DELAYED RELEASE ORAL DAILY
COMMUNITY

## 2025-02-17 NOTE — PRE-PROCEDURE INSTRUCTIONS
Pre-Surgery Instructions:   Medication Instructions    albuterol (PROVENTIL HFA,VENTOLIN HFA) 90 mcg/act inhaler Uses PRN- OK to take day of surgery    Botox 200 units SOLR Instructions provided by MD    butalbital-acetaminophen-caffeine (FIORICET,ESGIC) -40 mg per tablet Instructions provided by MD    cholecalciferol (VITAMIN D3) 1,000 units tablet Hold day of surgery.    cholestyramine (QUESTRAN) 4 GM/DOSE powder Hold day of surgery.    cyanocobalamin 1,000 mcg/mL Instructions provided by MD    dexamethasone (DECADRON) 2 mg tablet Uses PRN- OK to take day of surgery    dicyclomine (BENTYL) 20 mg tablet Hold day of surgery.    divalproex sodium (DEPAKOTE) 250 mg DR tablet Uses PRN- OK to take day of surgery    ergocalciferol (VITAMIN D2) 50,000 units Instructions provided by MD    famotidine (PEPCID) 20 mg tablet Uses PRN- OK to take day of surgery    fluticasone (FLONASE) 50 mcg/act nasal spray Uses PRN- OK to take day of surgery    fremanezumab-vfrm (Ajovy) 225 MG/1.5ML auto-injector Instructions provided by MD    hydroxychloroquine (PLAQUENIL) 200 mg tablet Hold day of surgery.    lasmiditan succinate (Reyvow) 100 mg tablet Instructions provided by MD    meclizine (ANTIVERT) 12.5 MG tablet Uses PRN- OK to take day of surgery    melatonin 3 mg Take night before surgery    Nerve Stimulator (Nerivio) ALIX Hold day of surgery.    ondansetron (ZOFRAN) 4 mg tablet Uses PRN- OK to take day of surgery    polyethylene glycol (MIRALAX) 17 g packet Hold day of surgery.    prochlorperazine (COMPAZINE) 10 mg tablet Uses PRN- OK to take day of surgery    tiZANidine (ZANAFLEX) 2 mg tablet Uses PRN- OK to take day of surgery    traZODone (DESYREL) 50 mg tablet Take night before surgery    Trudhesa 0.725 MG/ACT AERS Instructions provided by MD    venlafaxine (EFFEXOR-XR) 37.5 mg 24 hr capsule Take day of surgery.    Wheat Dextrin (Benefiber) POWD Hold day of surgery.    Medication instructions for day of surgery reviewed.  Please take all instructed medications with only a sip of water.       You will receive a call one business day prior to surgery with an arrival time and hospital directions. If your surgery is scheduled on a Monday, the hospital will be calling you on the Friday prior to your surgery. If you have not heard from anyone by 8pm, please call the hospital supervisor through the hospital  at [phone]. ([place] [phone] or [place] [phone]).    Do not eat or drink anything after midnight the night before your surgery, including candy, mints, lifesavers, or chewing gum. Do not drink alcohol 24hrs before your surgery. Try not to smoke at least 24hrs before your surgery.       Follow the pre surgery showering instructions as listed in the “My Surgical Experience Booklet” or otherwise provided by your surgeon's office. Do not use a blade to shave the surgical area 1 week before surgery. It is okay to use a clean electric clippers up to 24 hours before surgery. Do not apply any lotions, creams, including makeup, cologne, deodorant, or perfumes after showering on the day of your surgery. Do not use dry shampoo, hair spray, hair gel, or any type of hair products.     No contact lenses, eye make-up, or artificial eyelashes. Remove nail polish, including gel polish, and any artificial, gel, or acrylic nails if possible. Remove all jewelry including rings and body piercing jewelry.     Wear causal clothing that is easy to take on and off. Consider your type of surgery.    Keep any valuables, jewelry, piercings at home. Please bring any specially ordered equipment (sling, braces) if indicated.    Arrange for a responsible person to drive you to and from the hospital on the day of your surgery. Please confirm the visitor policy for the day of your procedure when you receive your phone call with an arrival time.     Call the surgeon's office with any new illnesses, exposures, or additional questions prior to  surgery.    Please reference your “My Surgical Experience Booklet” for additional information to prepare for your upcoming surgery.

## 2025-02-18 ENCOUNTER — RESULTS FOLLOW-UP (OUTPATIENT)
Dept: OTHER | Facility: HOSPITAL | Age: 50
End: 2025-02-18

## 2025-02-18 PROBLEM — N81.2 INCOMPLETE UTEROVAGINAL PROLAPSE: Status: ACTIVE | Noted: 2025-02-18

## 2025-02-18 NOTE — DISCHARGE INSTR - AVS FIRST PAGE
Post-Urogynecologic Surgery Discharge Instructions:  1. Nothing in the vagina for six weeks  2. You may take stairs one at a time, touching each step with both feet for the first few days, then as tolerated.  3. Call the office for fever greater than 100.4'F, heavy vaginal bleeding, or increasing pain.  4. Activity as tolerated.  5. Please remove the vaginal packing in the morning on POD#3, Gael  6. Please take the following for postoperative bowel regimen: colace 100 mg twice daily, miralax 17g daily, milk of magnesia as needed  7. Do not use topical estrogen until six weeks postoperatively    Post Operative Pain Management:  If you have cramping or mild pain you may take 600 mg Ibuprofen every 6 hours to relieve.     If you continue to have residual mild pain not entirely relieved by Ibuprofen then you may take 650 mg of tylenol every 6 hours.       If you have any questions regarding your prescriptions please call your doctor.    Apical Vaginal Repair (EnPlace System)    WHAT YOU NEED TO KNOW:   An apical vaginal repair is a procedure to lift the cervix, vagina  and surrounding structures to the normal anatomical position. This can help prevent you from having a bulge sensation in the vagina. The procedure is also called an EnPlace procedure.       **** VAGINAL PACKING WAS PLACED IN THE VAGINA- This packing will need to be removed at home on POD #3 ****      DISCHARGE INSTRUCTIONS:   Medicines:   Pain medicine:    You may need medicine to take away or decrease pain.     Learn how to take your medicine. Ask what medicine and how much you should take. Be sure you know how, when, and how often to take it.    Do not wait until the pain is severe before you take your medicine. Tell caregivers if your pain does not decrease.    Pain medicine can make you dizzy or sleepy. Prevent falls by calling someone when you get out of bed or if you need help.    Antibiotics:  This medicine is given to fight or prevent an  infection caused by bacteria. Always take your antibiotics exactly as ordered by your healthcare provider. Do not stop taking your medicine unless directed by your healthcare provider. Never save antibiotics or take leftover antibiotics that were given to you for another illness.    Take your medicine as directed.  Contact your healthcare provider if you think your medicine is not helping or if you have side effects. Tell him or her if you are allergic to any medicine. Keep a list of the medicines, vitamins, and herbs you take. Include the amounts, and when and why you take them. Bring the list or the pill bottles to follow-up visits. Carry your medicine list with you in case of an emergency.  Follow up with your healthcare provider as directed:  Write down your questions so you remember to ask them during your visits.   Self-care:   Vaginal packing: Vaginal packing was placed into the vagina. You will remove this packing on POD #3 early in the morning. (This is the 3rd day after your surgery). The office will call to remind you to remove this packing.     Sex:  Do not have sex until your healthcare provider says it is okay.    Kegel exercises:  To do kegel exercises, squeeze your pelvic floor muscles for 5 to 10 seconds, then release. Regular kegel exercises will help your pelvic floor muscles become stronger. This will help prevent you from leaking urine. Ask your healthcare provider when to start these exercises and how often to do them.    Sanitary pad:  Change your sanitary pad regularly. Keep track of how often you change the pad.    Luna catheter:  Keep the bag below your waist. This will help prevent infection and other problems caused by urine flowing back into your bladder. Do not pull on the catheter because this can cause pain and bleeding, and the catheter could come out. Keep the catheter tubing free of kinks so your urine will flow into the bag. Your healthcare provider will remove the catheter as  soon as possible, to help prevent infection.    Wound care:  When you are allowed to bathe or shower, carefully wash your vaginal area with soap and water.     Do not put pressure on your abdomen:  This will help prevent damage to your surgery area. Do not strain, lift heavy objects, or stand for a very long time. Do not perform strenuous exercises, such as running and weight lifting.    Activity:  You may need to start walking within a few days after your procedure. Ask your healthcare provider when to start and how long you should walk. Ask about any other exercises that may be right for you.    Support socks:  You may need to wear support socks. These are tight socks that help increase the circulation in your legs until you are more active. This helps prevent blood clots.  Contact your healthcare provider if:   You soak a sanitary pad with blood every hour for 4 hours.    You have vaginal pain that does not go away even after you take pain medicine.    You have pus or a foul-smelling discharge from your genital area.     You see blood in your urine.    You have pain during sex.    You have a fever, chills, a cough, or feel weak and achy.    You have nausea and vomiting.    You have questions or concerns about your condition or care.  Seek care immediately or call 911 if:   You feel something is bulging out into your vagina or rectum and not going back in.    You cannot urinate.    Your arm or leg feels warm, tender, and painful. It may look swollen and red.    You suddenly feel lightheaded and short of breath.    You have chest pain. You may have more pain when you take a deep breath or cough. You may cough up blood.  © 2017 [employer] Information is for End User's use only and may not be sold, redistributed or otherwise used for commercial purposes. All illustrations and images included in CareNotes® are the copyrighted property of [employer] or [employer].  The above  information is an  only. It is not intended as medical advice for individual conditions or treatments. Talk to your doctor, nurse or pharmacist before following any medical regimen to see if it is safe and effective for you.

## 2025-02-19 ENCOUNTER — RESULTS FOLLOW-UP (OUTPATIENT)
Dept: BARIATRICS | Facility: CLINIC | Age: 50
End: 2025-02-19

## 2025-02-19 DIAGNOSIS — E55.9 VITAMIN D DEFICIENCY: ICD-10-CM

## 2025-02-19 DIAGNOSIS — Z98.84 BARIATRIC SURGERY STATUS: Primary | ICD-10-CM

## 2025-02-19 DIAGNOSIS — K91.2 POSTSURGICAL MALABSORPTION: ICD-10-CM

## 2025-02-19 LAB — METHYLMALONATE SERPL-SCNC: 110 NMOL/L (ref 0–378)

## 2025-02-19 RX ORDER — ERGOCALCIFEROL 1.25 MG/1
50000 CAPSULE, LIQUID FILLED ORAL 2 TIMES WEEKLY
Qty: 32 CAPSULE | Refills: 0 | Status: SHIPPED | OUTPATIENT
Start: 2025-02-20

## 2025-02-20 ENCOUNTER — HOSPITAL ENCOUNTER (OUTPATIENT)
Facility: HOSPITAL | Age: 50
Setting detail: OUTPATIENT SURGERY
Discharge: HOME | End: 2025-02-20
Attending: STUDENT IN AN ORGANIZED HEALTH CARE EDUCATION/TRAINING PROGRAM | Admitting: STUDENT IN AN ORGANIZED HEALTH CARE EDUCATION/TRAINING PROGRAM
Payer: COMMERCIAL

## 2025-02-20 ENCOUNTER — ANESTHESIA (OUTPATIENT)
Dept: PERIOP | Facility: HOSPITAL | Age: 50
End: 2025-02-20
Payer: COMMERCIAL

## 2025-02-20 VITALS
RESPIRATION RATE: 16 BRPM | OXYGEN SATURATION: 93 % | WEIGHT: 201.06 LBS | HEART RATE: 97 BPM | BODY MASS INDEX: 36.77 KG/M2 | DIASTOLIC BLOOD PRESSURE: 65 MMHG | SYSTOLIC BLOOD PRESSURE: 125 MMHG | TEMPERATURE: 98 F

## 2025-02-20 DIAGNOSIS — G89.18 POSTOPERATIVE PAIN: Primary | ICD-10-CM

## 2025-02-20 DIAGNOSIS — N81.2 UTEROVAGINAL PROLAPSE, INCOMPLETE: ICD-10-CM

## 2025-02-20 DIAGNOSIS — K59.09 OTHER CONSTIPATION: ICD-10-CM

## 2025-02-20 LAB
EXT PREGNANCY TEST URINE: NEGATIVE
EXT. CONTROL: NORMAL

## 2025-02-20 PROCEDURE — 57260 CMBN ANT PST COLPRHY: CPT | Performed by: STUDENT IN AN ORGANIZED HEALTH CARE EDUCATION/TRAINING PROGRAM

## 2025-02-20 PROCEDURE — 81025 URINE PREGNANCY TEST: CPT | Performed by: ANESTHESIOLOGY

## 2025-02-20 PROCEDURE — 57282 COLPOPEXY EXTRAPERITONEAL: CPT | Performed by: STUDENT IN AN ORGANIZED HEALTH CARE EDUCATION/TRAINING PROGRAM

## 2025-02-20 DEVICE — IMPLANTABLE DEVICE
Type: IMPLANTABLE DEVICE | Site: PELVIS | Status: FUNCTIONAL
Brand: ENPLACE

## 2025-02-20 RX ORDER — ACETAMINOPHEN 325 MG/1
975 TABLET ORAL ONCE
Status: COMPLETED | OUTPATIENT
Start: 2025-02-20 | End: 2025-02-20

## 2025-02-20 RX ORDER — SODIUM CHLORIDE, SODIUM LACTATE, POTASSIUM CHLORIDE, CALCIUM CHLORIDE 600; 310; 30; 20 MG/100ML; MG/100ML; MG/100ML; MG/100ML
INJECTION, SOLUTION INTRAVENOUS CONTINUOUS PRN
Status: DISCONTINUED | OUTPATIENT
Start: 2025-02-20 | End: 2025-02-20

## 2025-02-20 RX ORDER — IBUPROFEN 600 MG/1
600 TABLET, FILM COATED ORAL EVERY 6 HOURS PRN
Status: DISCONTINUED | OUTPATIENT
Start: 2025-02-20 | End: 2025-02-20 | Stop reason: HOSPADM

## 2025-02-20 RX ORDER — DEXAMETHASONE SODIUM PHOSPHATE 10 MG/ML
INJECTION, SOLUTION INTRAMUSCULAR; INTRAVENOUS AS NEEDED
Status: DISCONTINUED | OUTPATIENT
Start: 2025-02-20 | End: 2025-02-20

## 2025-02-20 RX ORDER — ONDANSETRON 2 MG/ML
4 INJECTION INTRAMUSCULAR; INTRAVENOUS EVERY 6 HOURS PRN
Status: DISCONTINUED | OUTPATIENT
Start: 2025-02-20 | End: 2025-02-20 | Stop reason: HOSPADM

## 2025-02-20 RX ORDER — SODIUM CHLORIDE 9 MG/ML
INJECTION INTRAVENOUS AS NEEDED
Status: DISCONTINUED | OUTPATIENT
Start: 2025-02-20 | End: 2025-02-20 | Stop reason: HOSPADM

## 2025-02-20 RX ORDER — PHENAZOPYRIDINE HYDROCHLORIDE 100 MG/1
100 TABLET, FILM COATED ORAL ONCE
Status: COMPLETED | OUTPATIENT
Start: 2025-02-20 | End: 2025-02-20

## 2025-02-20 RX ORDER — LIDOCAINE HYDROCHLORIDE 20 MG/ML
INJECTION, SOLUTION EPIDURAL; INFILTRATION; INTRACAUDAL; PERINEURAL AS NEEDED
Status: DISCONTINUED | OUTPATIENT
Start: 2025-02-20 | End: 2025-02-20

## 2025-02-20 RX ORDER — MAGNESIUM HYDROXIDE 1200 MG/15ML
LIQUID ORAL AS NEEDED
Status: DISCONTINUED | OUTPATIENT
Start: 2025-02-20 | End: 2025-02-20 | Stop reason: HOSPADM

## 2025-02-20 RX ORDER — ACETAMINOPHEN 325 MG/1
975 TABLET ORAL EVERY 6 HOURS PRN
Status: DISCONTINUED | OUTPATIENT
Start: 2025-02-20 | End: 2025-02-20 | Stop reason: HOSPADM

## 2025-02-20 RX ORDER — FENTANYL CITRATE 50 UG/ML
INJECTION, SOLUTION INTRAMUSCULAR; INTRAVENOUS AS NEEDED
Status: DISCONTINUED | OUTPATIENT
Start: 2025-02-20 | End: 2025-02-20

## 2025-02-20 RX ORDER — FENTANYL CITRATE/PF 50 MCG/ML
25 SYRINGE (ML) INJECTION
Status: DISCONTINUED | OUTPATIENT
Start: 2025-02-20 | End: 2025-02-20 | Stop reason: HOSPADM

## 2025-02-20 RX ORDER — HYDROMORPHONE HCL/PF 1 MG/ML
0.5 SYRINGE (ML) INJECTION
Status: DISCONTINUED | OUTPATIENT
Start: 2025-02-20 | End: 2025-02-20 | Stop reason: HOSPADM

## 2025-02-20 RX ORDER — BUPIVACAINE HYDROCHLORIDE 2.5 MG/ML
INJECTION, SOLUTION EPIDURAL; INFILTRATION; INTRACAUDAL AS NEEDED
Status: DISCONTINUED | OUTPATIENT
Start: 2025-02-20 | End: 2025-02-20 | Stop reason: HOSPADM

## 2025-02-20 RX ORDER — EPHEDRINE SULFATE 50 MG/ML
INJECTION INTRAVENOUS AS NEEDED
Status: DISCONTINUED | OUTPATIENT
Start: 2025-02-20 | End: 2025-02-20

## 2025-02-20 RX ORDER — GLYCOPYRROLATE 0.2 MG/ML
INJECTION INTRAMUSCULAR; INTRAVENOUS AS NEEDED
Status: DISCONTINUED | OUTPATIENT
Start: 2025-02-20 | End: 2025-02-20

## 2025-02-20 RX ORDER — SODIUM CHLORIDE, SODIUM LACTATE, POTASSIUM CHLORIDE, CALCIUM CHLORIDE 600; 310; 30; 20 MG/100ML; MG/100ML; MG/100ML; MG/100ML
125 INJECTION, SOLUTION INTRAVENOUS CONTINUOUS
Status: DISCONTINUED | OUTPATIENT
Start: 2025-02-20 | End: 2025-02-20 | Stop reason: HOSPADM

## 2025-02-20 RX ORDER — LIDOCAINE HYDROCHLORIDE AND EPINEPHRINE 10; 10 MG/ML; UG/ML
INJECTION, SOLUTION INFILTRATION; PERINEURAL AS NEEDED
Status: DISCONTINUED | OUTPATIENT
Start: 2025-02-20 | End: 2025-02-20 | Stop reason: HOSPADM

## 2025-02-20 RX ORDER — ONDANSETRON 2 MG/ML
4 INJECTION INTRAMUSCULAR; INTRAVENOUS ONCE AS NEEDED
Status: DISCONTINUED | OUTPATIENT
Start: 2025-02-20 | End: 2025-02-20 | Stop reason: HOSPADM

## 2025-02-20 RX ORDER — PROPOFOL 10 MG/ML
INJECTION, EMULSION INTRAVENOUS AS NEEDED
Status: DISCONTINUED | OUTPATIENT
Start: 2025-02-20 | End: 2025-02-20

## 2025-02-20 RX ORDER — CEFAZOLIN SODIUM 2 G/50ML
2000 SOLUTION INTRAVENOUS ONCE
Status: COMPLETED | OUTPATIENT
Start: 2025-02-20 | End: 2025-02-20

## 2025-02-20 RX ORDER — POLYETHYLENE GLYCOL 3350 17 G/17G
17 POWDER, FOR SOLUTION ORAL DAILY
Qty: 510 G | Refills: 0 | Status: SHIPPED | OUTPATIENT
Start: 2025-02-20

## 2025-02-20 RX ORDER — MEPERIDINE HYDROCHLORIDE 25 MG/ML
12.5 INJECTION INTRAMUSCULAR; INTRAVENOUS; SUBCUTANEOUS
Status: DISCONTINUED | OUTPATIENT
Start: 2025-02-20 | End: 2025-02-20 | Stop reason: HOSPADM

## 2025-02-20 RX ORDER — METHOCARBAMOL 500 MG/1
500 TABLET, FILM COATED ORAL 4 TIMES DAILY PRN
Qty: 30 TABLET | Refills: 0 | Status: SHIPPED | OUTPATIENT
Start: 2025-02-20

## 2025-02-20 RX ORDER — MIDAZOLAM HYDROCHLORIDE 2 MG/2ML
INJECTION, SOLUTION INTRAMUSCULAR; INTRAVENOUS AS NEEDED
Status: DISCONTINUED | OUTPATIENT
Start: 2025-02-20 | End: 2025-02-20

## 2025-02-20 RX ORDER — ONDANSETRON 2 MG/ML
INJECTION INTRAMUSCULAR; INTRAVENOUS AS NEEDED
Status: DISCONTINUED | OUTPATIENT
Start: 2025-02-20 | End: 2025-02-20

## 2025-02-20 RX ADMIN — PHENAZOPYRIDINE 100 MG: 100 TABLET ORAL at 10:20

## 2025-02-20 RX ADMIN — DEXMEDETOMIDINE HYDROCHLORIDE 4 MCG: 100 INJECTION, SOLUTION INTRAVENOUS at 13:18

## 2025-02-20 RX ADMIN — DEXMEDETOMIDINE HYDROCHLORIDE 8 MCG: 100 INJECTION, SOLUTION INTRAVENOUS at 13:47

## 2025-02-20 RX ADMIN — MIDAZOLAM 2 MG: 1 INJECTION INTRAMUSCULAR; INTRAVENOUS at 12:57

## 2025-02-20 RX ADMIN — DEXMEDETOMIDINE HYDROCHLORIDE 4 MCG: 100 INJECTION, SOLUTION INTRAVENOUS at 13:15

## 2025-02-20 RX ADMIN — GLYCOPYRROLATE 0.1 MG: 0.2 INJECTION, SOLUTION INTRAMUSCULAR; INTRAVENOUS at 13:05

## 2025-02-20 RX ADMIN — EPHEDRINE SULFATE 10 MG: 50 INJECTION INTRAVENOUS at 13:56

## 2025-02-20 RX ADMIN — FENTANYL CITRATE 25 MCG: 50 INJECTION INTRAMUSCULAR; INTRAVENOUS at 15:29

## 2025-02-20 RX ADMIN — FENTANYL CITRATE 25 MCG: 50 INJECTION INTRAMUSCULAR; INTRAVENOUS at 14:25

## 2025-02-20 RX ADMIN — DEXAMETHASONE SODIUM PHOSPHATE 10 MG: 10 INJECTION INTRAMUSCULAR; INTRAVENOUS at 13:03

## 2025-02-20 RX ADMIN — EPHEDRINE SULFATE 10 MG: 50 INJECTION INTRAVENOUS at 13:32

## 2025-02-20 RX ADMIN — ONDANSETRON 4 MG: 2 INJECTION INTRAMUSCULAR; INTRAVENOUS at 12:57

## 2025-02-20 RX ADMIN — FENTANYL CITRATE 50 MCG: 50 INJECTION INTRAMUSCULAR; INTRAVENOUS at 13:03

## 2025-02-20 RX ADMIN — EPHEDRINE SULFATE 10 MG: 50 INJECTION INTRAVENOUS at 13:38

## 2025-02-20 RX ADMIN — FENTANYL CITRATE 25 MCG: 50 INJECTION INTRAMUSCULAR; INTRAVENOUS at 13:47

## 2025-02-20 RX ADMIN — ACETAMINOPHEN 975 MG: 325 TABLET, FILM COATED ORAL at 10:20

## 2025-02-20 RX ADMIN — FENTANYL CITRATE 25 MCG: 50 INJECTION INTRAMUSCULAR; INTRAVENOUS at 13:20

## 2025-02-20 RX ADMIN — FENTANYL CITRATE 50 MCG: 50 INJECTION INTRAMUSCULAR; INTRAVENOUS at 14:52

## 2025-02-20 RX ADMIN — FENTANYL CITRATE 25 MCG: 50 INJECTION INTRAMUSCULAR; INTRAVENOUS at 13:28

## 2025-02-20 RX ADMIN — EPHEDRINE SULFATE 5 MG: 50 INJECTION INTRAVENOUS at 13:23

## 2025-02-20 RX ADMIN — SODIUM CHLORIDE, SODIUM LACTATE, POTASSIUM CHLORIDE, AND CALCIUM CHLORIDE: .6; .31; .03; .02 INJECTION, SOLUTION INTRAVENOUS at 14:52

## 2025-02-20 RX ADMIN — FENTANYL CITRATE 25 MCG: 50 INJECTION INTRAMUSCULAR; INTRAVENOUS at 15:37

## 2025-02-20 RX ADMIN — LIDOCAINE HYDROCHLORIDE 100 MG: 20 INJECTION, SOLUTION EPIDURAL; INFILTRATION; INTRACAUDAL at 13:03

## 2025-02-20 RX ADMIN — CEFAZOLIN SODIUM 2000 MG: 2 SOLUTION INTRAVENOUS at 13:05

## 2025-02-20 RX ADMIN — SODIUM CHLORIDE, SODIUM LACTATE, POTASSIUM CHLORIDE, AND CALCIUM CHLORIDE 125 ML/HR: .6; .31; .03; .02 INJECTION, SOLUTION INTRAVENOUS at 10:27

## 2025-02-20 RX ADMIN — PROPOFOL 200 MG: 10 INJECTION, EMULSION INTRAVENOUS at 13:03

## 2025-02-20 RX ADMIN — SODIUM CHLORIDE, SODIUM LACTATE, POTASSIUM CHLORIDE, AND CALCIUM CHLORIDE: .6; .31; .03; .02 INJECTION, SOLUTION INTRAVENOUS at 12:57

## 2025-02-20 NOTE — ANESTHESIA POSTPROCEDURE EVALUATION
Post-Op Assessment Note    CV Status:  Stable  Pain Score: 0    Pain management: adequate       Mental Status:  Alert and awake   Hydration Status:  Euvolemic   PONV Controlled:  Controlled   Airway Patency:  Patent     Post Op Vitals Reviewed: Yes    No anethesia notable event occurred.    Staff: CRNA           Last Filed PACU Vitals:  Vitals Value Taken Time   Temp 98.4    Pulse 90    /73    Resp 14    SpO2 94%

## 2025-02-20 NOTE — OP NOTE
OPERATIVE REPORT  PATIENT NAME: Jenny Olvera    :  1975  MRN: [id number]  Pt Location: [facility] ROOM 04    SURGERY DATE: 2025    Surgeons and Role:     * Tom Hampton MD - Primary     * Richelle Del Angel MD - Assisting    Preop Diagnosis:  Uterovaginal prolapse, incomplete [N81.2]    Post-Op Diagnosis Codes:     * Uterovaginal prolapse, incomplete [N81.2]    Procedure(s):  COLPOPEXY VAGINAL EXTRAPERITONEAL (VEC) W/  EUA  COLPORRHAPHY ANTERIOR & POSTERIOR  CYSTO    Specimen(s):  * No specimens in log *    Estimated Blood Loss:   Minimal    Drains:  Urethral Catheter Non-latex 18 Fr. (Active)   Number of days: 0       Anesthesia Type:   General    Operative Indications:  Uterovaginal prolapse, incomplete [N81.2]    Operative Findings:  Normal vaginal mucosa   Moderately reduced outlet  Incomplete uterovaginal prolapse with C at +1.5 with persistent cystocele and rectocele  Harrold placement at the 20 yard line on the right and the 40 yard line on the left.  Cystoscopy with normal bladder wall mucosa without suture or defects through the bladder lumen. Efflux visualized from bilateral ureteral orifices.    Complications:   None    Procedure and Technique:  Appropriate preoperative antibiotics chosen per ACOG guidelines were given.  Bilateral SCDs were placed in the lower extremities for DVT prevention prior to the institution of anesthesia.    The patient was identified in the holding area by the operating room staff and attending physician. She was taken to the operating room where anesthesia was instituted without complications. She was placed in the dorsal lithotomy position with the legs in Alcides stirrups with care taken to avoid excessive flexion or extension of her lower extremities. The patient was prepped and draped in the usual sterile fashion. A Luna catheter was inserted.    A finger was placed in the lateral vagina, with careful palpation of the ischial spines and sacrospinous ligaments  bilaterally.  The right finger sleeve was then applied to the surgeon's index finger.  These landmarks were then again palpated with the finger sleeve in place.  Location along the sacrospinous ligament approximately in the middle 1/3 of the ligament as well as the lower 1/3 of the ligament was carefully palpated, and the trocar device loaded with the 0 Prolene suture was brought into the field. The trocar device was inserted into the finger sleeve port and the device was used to place the 0 Prolene suture at this location while the surgeons finger was firmly pressed against the ligament, approximately 2 cm medial to the ischial spine using the sacrum as a guide.  Once the suture was anchored in place, which was confirmed by tenting of the ligament with gentle tugging, all instruments were removed from the vagina.  Rectal exam confirmed proper location as well. She had the exact same procedure performed on the contralateral side. Next local was injected into the cervico-vaginal junction and a 3 cm anterior vaginal epithelial incision was made along the anterior cervico-vaginal junction, until the cervical stroma was encountered. Next, one strand of the Prolene suture was passed backwards using a large bowser needle through its entering point in the vaginal wall and medially through the cervical stromal tissue. The second strand on the same side was passed caudally and proximal to the cervical os using the same technique. The exact same procedure was performed on the contralateral side.     Attention was turned to the anterior wall where a persistent cystocele was noted. Two Allis clamps were applied horizontally along the vaginal incision to grasp the dependent portion of the cystocele for traction. The epithelium was further  from the vaginal muscularis sharply with tenotomy and metzenbaum scissors and bluntly with peanut sponges and a raytec. At this time one of the right prolone sutures was accidentally  cut with the tenotomy scissors.The vaginal wall was then plicated in a vertical imbricating fashion using 2-0 PDS. We then began closure of the anterior vaginal epithelial incision with a 2-0 Vicryl sutures in a running locked stitch from lateral to medial with two separate sutures from each vaginal apex. The incision was intentionally left open after the first few throws from each side. The EnPlace prolene sutures on the left were then tied down separately, until proper apical support was obtained and then they were tied to the single right suture together pushing the prolapse up to the anatomic position of the vaginal apex. The 2-0 PDS stitches were then tied down. We then finished closure of the anterior vaginal epithelial incision with the two 2-0 Vicryl sutures in a running locked stitch from lateral to medial. The incision was inspected and noted to be hemostatic.      Attention was then turned to the posterior compartment where 2 Allis clamps were placed in the posterior fourchette over the mucocutaneous border to reduce the moderately relaxed vaginal outlet. Dilute Marcaine solution was injected into the perineum. A mirlande-shaped incision was made extending from the vaginal mucosa onto the perineum. The overlying skin was removed en bloc. We then began closure of the posterior colporrhaphy with a 2-0 Vicryl suture in a running locked stitch. We reapproximated the perineal body with 2, 0-Vicryl interrupted sutures. We closed the remainder of the colporrhaphy to the level of the hymen. We closed the perineal skin with 2-0 Vicryl in a subcuticular fashion.    The Luna catheter was removed and bladder was filled with 300cc of NS. A cystoscopy was performed with above findings. The bladder was drained and the Luna catheter was replaced.    No bladder, ureteral, viscus, or solid organ injury were noted at the end of the procedure. We completed the procedure. Vaginal packing was placed in the vagina. There were  no complications.The sponge, needle and instrument count were correct x2. The patient tolerated the procedure well and went to the recovery room in stable condition and she is stable at the moment of this dictation.    I was present for the entire procedure.    Patient Disposition:  PACU       SIGNATURE: Tom Hampton MD  DATE: February 20, 2025  TIME: 3:03 PM

## 2025-02-20 NOTE — ANESTHESIA PREPROCEDURE EVALUATION
Procedure:  COLPOPEXY VAGINAL EXTRAPERITONEAL (VEC) W/  EUA (Vagina )  COLPORRHAPHY ANTERIOR & POSTERIOR (Vagina )  CYSTO (Bladder)    Relevant Problems   ANESTHESIA (within normal limits)      CARDIO   (+) Borderline hyperlipidemia   (+) Chronic migraine without aura without status migrainosus, not intractable   (+) Intractable chronic migraine without aura and with status migrainosus   (+) Intractable chronic migraine without aura and without status migrainosus      ENDO (within normal limits)      GI/HEPATIC   (+) GERD without esophagitis   (+) H/O bariatric surgery      /RENAL   (+) Nephrolithiasis      GYN (within normal limits)      HEMATOLOGY (within normal limits)      MUSCULOSKELETAL   (+) Fibromyalgia   (+) Osteoarthritis of carpometacarpal (CMC) joint of right thumb      NEURO/PSYCH   (+) Chronic migraine without aura without status migrainosus, not intractable   (+) Depression with anxiety   (+) Fibromyalgia   (+) Intractable chronic migraine without aura and with status migrainosus   (+) Intractable chronic migraine without aura and without status migrainosus   (+) Post traumatic stress disorder (PTSD)      PULMONARY   (+) Mild intermittent asthma        Physical Exam    Airway    Mallampati score: II    Neck ROM: full     Dental   No notable dental hx     Cardiovascular  Rhythm: regular, Rate: normal, Cardiovascular exam normal    Pulmonary  Pulmonary exam normal Breath sounds clear to auscultation    Other Findings  post-pubertal.      Anesthesia Plan  ASA Score- 2     Anesthesia Type- general with ASA Monitors.         Additional Monitors:     Airway Plan: ETT and LMA.    Comment: Normal EKG x 2 years ago with normal Echo.       Plan Factors-Exercise tolerance (METS): >4 METS.    Chart reviewed. EKG reviewed.  Existing labs reviewed. Patient summary reviewed.    Patient is not a current smoker.              Induction- intravenous.    Postoperative Plan- Plan for postoperative opioid use.          Informed Consent- Anesthetic plan and risks discussed with patient.        NPO Status:  No vitals data found for the desired time range.

## 2025-02-20 NOTE — ANESTHESIA POSTPROCEDURE EVALUATION
Post-Op Assessment Note            No anethesia notable event occurred.            Last Filed PACU Vitals:  Vitals Value Taken Time   Temp 98 °F (36.7 °C) 02/20/25 1545   Pulse 94 02/20/25 1558   /78 02/20/25 1550   Resp 18 02/20/25 1545   SpO2 93 % 02/20/25 1557   Vitals shown include unfiled device data.    Modified Nidia:     Vitals Value Taken Time   Activity 2 02/20/25 1545   Respiration 2 02/20/25 1545   Circulation 2 02/20/25 1545   Consciousness 2 02/20/25 1545   Oxygen Saturation 2 02/20/25 1545     Modified Nidia Score: 10

## 2025-02-24 ENCOUNTER — TELEPHONE (OUTPATIENT)
Dept: OTHER | Facility: HOSPITAL | Age: 50
End: 2025-02-24

## 2025-02-24 NOTE — TELEPHONE ENCOUNTER
Called patient. POD#4 s/p Enplace, a/p repair, cysto. Doing well overall. Packing was removed yesterday as instructed. Pain controlled on Tylenol and Robaxin. Emptying bladder without issues. All questions addressed. Postop scheduled.

## 2025-03-07 ENCOUNTER — OFFICE VISIT (OUTPATIENT)
Dept: OBGYN CLINIC | Facility: CLINIC | Age: 50
End: 2025-03-07

## 2025-03-07 VITALS — BODY MASS INDEX: 36.87 KG/M2 | WEIGHT: 201.6 LBS | DIASTOLIC BLOOD PRESSURE: 90 MMHG | SYSTOLIC BLOOD PRESSURE: 136 MMHG

## 2025-03-07 DIAGNOSIS — N81.2 INCOMPLETE UTEROVAGINAL PROLAPSE: ICD-10-CM

## 2025-03-07 DIAGNOSIS — Z48.89 POSTOPERATIVE VISIT: Primary | ICD-10-CM

## 2025-03-07 PROCEDURE — 99024 POSTOP FOLLOW-UP VISIT: CPT | Performed by: STUDENT IN AN ORGANIZED HEALTH CARE EDUCATION/TRAINING PROGRAM

## 2025-03-07 NOTE — PROGRESS NOTES
OB/GYN VISIT  Jenny Olvera  3/7/2025  10:53 AM    ASSESSMENT / PLAN:    Jenny Olvera is a 49 y.o.  female presenting for 2wk post op s/p VEC w/ enplace, anterior and posterior colporrhaphy, cystoscopy.    Doing well.   Continue pelvic rest. Avoid heavy lifting or strenuous exercise. May walk on the treadmill.   Continue PO tylenol and robaxin PRN  To follow up in 4-5 weeks.    SUBJECTIVE:    Jenny Olvera is a 49 y.o.  female presenting for 2wk post op s/p VEC w/ enplace, anterior and posterior colporrhaphy, cystoscopy.Doing well overall. Pain is improved. At worst 4/10. Pain improves with tylenol and robaxin. Denies any POP. Noted a smell and vaginal discharge after surgery however started to use a wash only on the outside which made the smell go away. Denies any ABDIRAHMAN or OAB symptoms. Notes episode of bleeding yesterday but that has resolved. No bleeding prior to yesterday.    Past Medical History:   Diagnosis Date    Anxiety     Asthma     Claustrophobia     Cluster headache     CTS (carpal tunnel syndrome)     Depression     Fibromyalgia     primary; last assessed 17    GERD (gastroesophageal reflux disease)     GI problem     H/O gastric sleeve     Headache, tension-type     Irritable bowel syndrome     Joint pain     Kidney stone     Lung nodule     last assessed 10/9/15    Migraine     Muscle pain     Peripheral neuropathy     PONV (postoperative nausea and vomiting)     Sjogren's syndrome (HCC)        Past Surgical History:   Procedure Laterality Date    BACK SURGERY      BARIATRIC SURGERY  2022    CARPAL TUNNEL RELEASE Bilateral     CHOLECYSTECTOMY      COLONOSCOPY      CYSTOSCOPY      GALLBLADDER SURGERY      GASTRECTOMY  2022    Sleeve    GASTRIC BYPASS LAPAROSCOPIC N/A 2024    Procedure: REVISION CONVERSION TO R-N-Y GASTRIC BYPASS W/ ROBOTICS AND INTRAOPERATIVE EGD; PARAESOPHAGEAL HERNIA REPAIR WITH MESH;  Surgeon: Felix Akins MD;  Location: [facility];   Service: Bariatrics    HERNIA REPAIR  08/09/2022    Apparently done at time gastric sleeve. Still have major issues with it.    NECK SURGERY      C4C5    MA CMBND ANTERPOST COLPORRAPHY W/CYSTO N/A 2/20/2025    Procedure: COLPORRHAPHY ANTERIOR & POSTERIOR;  Surgeon: Tom Hampton MD;  [facility]: [facility];  Service: Gynecology    MA COLPOPEXY VAGINAL EXTRAPERITONEAL APPROACH N/A 2/20/2025    Procedure: COLPOPEXY VAGINAL EXTRAPERITONEAL (VEC) W/  EUA;  Surgeon: Tom Hampton MD;  [facility];  Service: Gynecology    MA CYSTOURETHROSCOPY N/A 2/20/2025    Procedure: CYSTO;  Surgeon: Tom Hampton MD;  Location: [facility];  Service: Gynecology    MA INCISION EXTENSOR TENDON SHEATH WRIST Left 1/3/2025    Procedure: RELEASE DEQUERVAINS;  Surgeon: Gail Wylie MD;  [facility];  Service: Orthopedics    MA TENDON SHEATH INCISION Right 01/16/2023    Procedure: THUMB TRIGGER FINGER RELEASE;  Surgeon: Gail Wylie MD;  [facility];  Service: Orthopedics    SACROILIAC JOINT FUSION Left     SLEEVE GASTROPLASTY  08/9/2022    SPINAL FUSION      C4 and C5    ULNAR NERVE REPAIR Bilateral     UPPER GASTROINTESTINAL ENDOSCOPY         OBJECTIVE:    Vitals:  Blood pressure 136/90, weight 91.4 kg (201 lb 9.6 oz).Body mass index is 36.87 kg/m².    Physical Exam:    Physical Exam  Constitutional:       Appearance: Normal appearance.   Genitourinary:      Genitourinary Comments: No POP. Incisions healing well. Clear/brown non-malodorous vaginal discharge. Incisions healing well. Intact. Without erythema or fluctuance. Appropriately tender.   Cardiovascular:      Rate and Rhythm: Normal rate.   Pulmonary:      Effort: Pulmonary effort is normal.   Abdominal:      Palpations: Abdomen is soft.   Neurological:      Mental Status: She is alert and oriented to person, place, and time.   Skin:     General: Skin is warm and dry.   Psychiatric:         Mood and Affect: Mood normal.         Behavior:  Behavior normal.         Thought Content: Thought content normal.         Judgment: Judgment normal.             Tom Hampton MD  3/7/2025  10:53 AM

## 2025-03-10 ENCOUNTER — CLINICAL SUPPORT (OUTPATIENT)
Dept: INTERNAL MEDICINE CLINIC | Facility: CLINIC | Age: 50
End: 2025-03-10

## 2025-03-10 DIAGNOSIS — E53.8 VITAMIN B12 DEFICIENCY: Primary | ICD-10-CM

## 2025-03-10 PROCEDURE — 96372 THER/PROPH/DIAG INJ SC/IM: CPT | Performed by: INTERNAL MEDICINE

## 2025-03-10 RX ADMIN — CYANOCOBALAMIN 1000 MCG: 1000 INJECTION, SOLUTION INTRAMUSCULAR; SUBCUTANEOUS at 10:40

## 2025-03-10 NOTE — PROGRESS NOTES
Patient seen on nurse schedule today for b12 injection. Administered in patients right deltoid, patient tolerated well. Patient left 1 additional vial here in drawer for next dose.

## 2025-03-12 ENCOUNTER — PROCEDURE VISIT (OUTPATIENT)
Dept: NEUROLOGY | Facility: CLINIC | Age: 50
End: 2025-03-12
Payer: COMMERCIAL

## 2025-03-12 VITALS
HEIGHT: 62 IN | BODY MASS INDEX: 36.99 KG/M2 | OXYGEN SATURATION: 98 % | WEIGHT: 201 LBS | SYSTOLIC BLOOD PRESSURE: 114 MMHG | DIASTOLIC BLOOD PRESSURE: 76 MMHG | TEMPERATURE: 97.3 F | HEART RATE: 73 BPM

## 2025-03-12 DIAGNOSIS — G43.709 CHRONIC MIGRAINE WITHOUT AURA WITHOUT STATUS MIGRAINOSUS, NOT INTRACTABLE: Primary | ICD-10-CM

## 2025-03-12 PROCEDURE — 64615 CHEMODENERV MUSC MIGRAINE: CPT | Performed by: PHYSICIAN ASSISTANT

## 2025-03-12 NOTE — PROGRESS NOTES
Universal Protocol   Consent: Verbal consent obtained. Written consent obtained.  Risks and benefits: risks, benefits and alternatives were discussed  Consent given by: patient  Patient understanding: patient states understanding of the procedure being performed  Patient consent: the patient's understanding of the procedure matches consent given  Procedure consent: procedure consent matches procedure scheduled      Chemodenervation     Date/Time  3/12/2025 1:00 PM     Performed by  Linda Ward PA-C   Authorized by  Linda Ward PA-C     Pre-procedure details      Prepped With: Alcohol     Procedure details      Position:  Upright   Botox      Botox Type:  Type A    Brand:  Botox    mL's of Botulinum Toxin:  200    Final Concentration per CC:  100 units    Needle Gauge:  30 G 2.5 inch   Procedures      Botox Procedures: chronic headache      Indications: migraines     Injection Location      Head / Face:  L superior trapezius, R superior trapezius, L superior cervical paraspinal, R superior cervical paraspinal, L , R , procerus, L temporalis, R temporalis, R frontalis, L frontalis, R medial occipitalis and L medial occipitalis    L  injection amount:  5 unit(s)    R  injection amount:  5 unit(s)    L lateral frontalis:  5 unit(s)    R lateral frontalis:  5 unit(s)    L medial frontalis:  5 unit(s)    R medial frontalis:  5 unit(s)    L temporalis injection amount:  20 unit(s)    R temporalis injection amount:  20 unit(s)    Procerus injection amount:  5 unit(s)    L medial occipitalis injection amount:  15 unit(s)    R medial occipitalis injection amount:  15 unit(s)    L superior cervical paraspinal injection amount:  10 unit(s)    R superior cervical paraspinal injection amount:  10 unit(s)    L superior trapezius injection amount:  15 unit(s)    R superior trapezius injection amount:  15 unit(s)   Total Units      Total units used:  200    Total units discarded:  0  "  Post-procedure details      Chemodenervation:  Chronic migraine    Facial Nerve Location::  Bilateral facial nerve    Patient tolerance of procedure:  Tolerated well, no immediate complications   Comments       Extra units medically necessary:  - 10 between both upper traps  - 15 between both middle traps  - 10 between bilateral occipitalis muscles  - 10 between both anterior temporalis muscles       Blood pressure 114/76, pulse 73, temperature (!) 97.3 °F (36.3 °C), temperature source Temporal, height 5' 2\" (1.575 m), weight 91.2 kg (201 lb), SpO2 98%.    3 ajovy inj's so far, she thinks it is helping.  No s/e.    "

## 2025-03-21 ENCOUNTER — TELEPHONE (OUTPATIENT)
Age: 50
End: 2025-03-21

## 2025-03-21 NOTE — TELEPHONE ENCOUNTER
Patient called to see if Airam Akins had any sooner availability today as she was under the weather. Advised as of right now he did not. Patient requested to reschedule her appointment. Appointment has been rescheduled for 3/27/25 at 3:30 PM accordingly.

## 2025-03-27 ENCOUNTER — OFFICE VISIT (OUTPATIENT)
Dept: BARIATRICS | Facility: CLINIC | Age: 50
End: 2025-03-27
Payer: COMMERCIAL

## 2025-03-27 VITALS
HEART RATE: 84 BPM | BODY MASS INDEX: 36.32 KG/M2 | TEMPERATURE: 97.6 F | HEIGHT: 63 IN | SYSTOLIC BLOOD PRESSURE: 128 MMHG | DIASTOLIC BLOOD PRESSURE: 80 MMHG | WEIGHT: 205 LBS

## 2025-03-27 DIAGNOSIS — K21.9 HIATAL HERNIA WITH GERD WITHOUT ESOPHAGITIS: Primary | ICD-10-CM

## 2025-03-27 DIAGNOSIS — K44.9 HIATAL HERNIA WITH GERD WITHOUT ESOPHAGITIS: Primary | ICD-10-CM

## 2025-03-27 PROCEDURE — 99213 OFFICE O/P EST LOW 20 MIN: CPT | Performed by: SURGERY

## 2025-03-27 NOTE — PROGRESS NOTES
OFFICE VISIT - BARIATRIC SURGERY  Jenny Olvera 49 y.o. female MRN: [id number]  Unit/Bed#:  Encounter: [id number]      HPI:  Jenny Olvera is a 49 y.o. female status post robotic conversion from sleeve to  RNYGB and PEHR with mesh performed by Dr. Osmel Akins on 1/30/2024 for heart burn s/p dilation of GJ with 12mm CRE balloon for dysphagia 5/15/24. She presents to clinic today for EGD review.    Subjective     Patient had heartburn before the sleeve which was worse after the sleeve. Her sx improved after the conversion to RGYB and PEHR, however progressively worsened after that.    She states that now her main symptoms are acid reflux. She takes Omeprazole 20mg twice daily in addition to Pepcid. Despite this she still has symptoms, especially at night.        Review of Systems   Constitutional:  Negative for chills and fever.   HENT:  Negative for sore throat.    Eyes:  Negative for visual disturbance.   Respiratory:  Negative for cough and shortness of breath.    Gastrointestinal:  Negative for abdominal pain and vomiting.        Reflux   Musculoskeletal:  Negative for arthralgias.   Skin:  Negative for color change and rash.   Neurological:  Negative for seizures and syncope.   All other systems reviewed and are negative.      Historical Information   Past Medical History:   Diagnosis Date    Anxiety     Asthma     Claustrophobia     Cluster headache     CTS (carpal tunnel syndrome)     Depression     Fibromyalgia     primary; last assessed 11/27/17    GERD (gastroesophageal reflux disease)     GI problem     H/O gastric sleeve     Headache, tension-type     Irritable bowel syndrome     Joint pain     Kidney stone     Lung nodule     last assessed 10/9/15    Migraine     Muscle pain     Peripheral neuropathy     PONV (postoperative nausea and vomiting)     Sjogren's syndrome (HCC)      Past Surgical History:   Procedure Laterality Date    BACK SURGERY      BARIATRIC SURGERY  8/9/2022    CARPAL TUNNEL RELEASE  Bilateral     CHOLECYSTECTOMY      COLONOSCOPY      CYSTOSCOPY      GALLBLADDER SURGERY      GASTRECTOMY  08/09/2022    Sleeve    GASTRIC BYPASS LAPAROSCOPIC N/A 01/30/2024    Procedure: REVISION CONVERSION TO R-N-Y GASTRIC BYPASS W/ ROBOTICS AND INTRAOPERATIVE EGD; PARAESOPHAGEAL HERNIA REPAIR WITH MESH;  Surgeon: Felix Akins MD;  Location[facility];  Service: Bariatrics    HERNIA REPAIR  08/09/2022    Apparently done at time gastric sleeve. Still have major issues with it.    NECK SURGERY      C4C5    MN CMBND ANTERPOST COLPORRAPHY W/CYSTO N/A 2/20/2025    Procedure: COLPORRHAPHY ANTERIOR & POSTERIOR;  Surgeon: Tom Hampton MD;  [facility];  Service: Gynecology    MN COLPOPEXY VAGINAL EXTRAPERITONEAL APPROACH N/A 2/20/2025    Procedure: COLPOPEXY VAGINAL EXTRAPERITONEAL (VEC) W/  EUA;  Surgeon: Tom Hampton MD;  [facility];  Service: Gynecology    MN CYSTOURETHROSCOPY N/A 2/20/2025    Procedure: CYSTO;  Surgeon: Tom Hampton MD;  [facility];  Service: Gynecology    MN INCISION EXTENSOR TENDON SHEATH WRIST Left 1/3/2025    Procedure: RELEASE DEQUERVAINS;  Surgeon: Gail Wylie MD;  [facility];  Service: Orthopedics    MN TENDON SHEATH INCISION Right 01/16/2023    Procedure: THUMB TRIGGER FINGER RELEASE;  Surgeon: Gail Wylie MD;  [facility];  Service: Orthopedics    SACROILIAC JOINT FUSION Left     SLEEVE GASTROPLASTY  08/9/2022    SPINAL FUSION      C4 and C5    ULNAR NERVE REPAIR Bilateral     UPPER GASTROINTESTINAL ENDOSCOPY       Social History   Social History     Substance and Sexual Activity   Alcohol Use Yes    Alcohol/week: 2.0 standard drinks of alcohol    Types: 2 Glasses of wine per week    Comment: Occasionally     Social History     Substance and Sexual Activity   Drug Use Yes    Types: Marijuana    Comment: medicinal 1 week ago  capsules (has medial card)     Social History     Tobacco Use   Smoking Status Never  "  Smokeless Tobacco Never   Tobacco Comments    Never smoked.       Objective       Current Vitals:   Blood Pressure: 128/80 (03/27/25 1535)  Pulse: 84 (03/27/25 1535)  Temperature: 97.6 °F (36.4 °C) (03/27/25 1535)  Temp Source: Tympanic (03/27/25 1535)  Height: 5' 2.7\" (159.3 cm) (03/27/25 1535)  Weight - Scale: 93 kg (205 lb) (03/27/25 1535)    Invasive Devices       None                   Physical Exam  Vitals reviewed.   Constitutional:       Appearance: Normal appearance.   HENT:      Head: Atraumatic.      Nose: Nose normal.   Cardiovascular:      Rate and Rhythm: Normal rate.   Pulmonary:      Effort: Pulmonary effort is normal.   Musculoskeletal:         General: Normal range of motion.   Neurological:      General: No focal deficit present.   Psychiatric:         Behavior: Behavior normal.           Pathology, and Other Studies: Results Review Statement: I personally reviewed the following image studies/reports in PACS and discussed pertinent findings with Radiology:  . My interpretation of the radiology images/reports is:  .      Workup includes:     UGI  UPPER GI SERIES - SINGLE CONTRAST     INDICATION: Z98.84: Bariatric surgery status.     COMPARISON: Upper GI series 1/31/2024. EGD report 1/29/2025. CT abdomen pelvis 8/30/2023.     TECHNIQUE: The patient was given barium by mouth and images of the esophagus, stomach, and small bowel were obtained.     IMAGES: 93     FLUOROSCOPY TIME: 1 minute 40 seconds     FINDINGS:     The esophagus is normal in caliber. Esophageal motility is normal and emptying of contrast from the esophagus is prompt. There is no evidence of discrete mucosal mass, ulceration or fold thickening identified.     Expected postsurgical changes from Isela-en-Y gastric bypass are noted. Gastric pouch demonstrates an unremarkable appearance. No evidence of discrete ulcer or mass within the limitations of single contrast technique. Contrast promptly passes beyond the   gastrojejunostomy.   "   A small transit sliding-type hiatal hernia is evident when the patient is prone. Mild gastroesophageal reflux was also observed while the patient was prone.     IMPRESSION:     1. Small transient sliding-type hiatal hernia and mild gastroesophageal reflux, both only evident when the patient is prone.     2. Postsurgical changes in keeping with prior Isela-en-Y gastric bypass.  EGD     DATE OF SERVICE:  1/29/25     PHYSICIAN(S):  Attending:   Felix Akins MD      Fellow:   Kris Lew MD         INDICATION:  Bariatric surgery status     POST-OP DIAGNOSIS:  See the impression below.     PREPROCEDURE:  Informed consent was obtained for the procedure, including sedation.  Risks of perforation, hemorrhage, adverse drug reaction and aspiration were discussed. The patient was placed in the left lateral decubitus position.     Patient was explained about the risks and benefits of the procedure. Risks including but not limited to bleeding, infection, and perforation were explained in detail. Also explained about less than 100% sensitivity with the exam and other alternatives.     PROCEDURE: EGD     DETAILS OF PROCEDURE:   Patient was taken to the procedure room where a time out was performed to confirm correct patient and correct procedure. The patient underwent monitored anesthesia care, which was administered by an anesthesia professional. The patient's blood pressure, heart rate, level of consciousness, respirations, oxygen, ECG and ETCO2 were monitored throughout the procedure. The scope was introduced through the mouth and advanced to the second part of the duodenum. Retroflexion was performed in the fundus. The patient experienced no blood loss. The procedure was not difficult. The patient tolerated the procedure well. There were no apparent adverse events.      ANESTHESIA INFORMATION:  ASA: II  Anesthesia Type: IV Sedation with Anesthesia     MEDICATIONS:  No administrations occurring from 1105 to 1109 on  01/29/25         FINDINGS:  Regular Z-line 35 cm from the incisors  Small sliding hiatal hernia (type I hiatal hernia) - GE junction 35 cm from the incisors, diaphragmatic impression 37 cm from the incisors  The esophagus, gastric pouch, gastric suture site and gastrojejunostomy appeared normal.  Performed single forceps biopsy in the gastric pouch to rule out H. pylori    Pathology from EGD   No H. Pylori    Esophageal manometry/24 hour pH  HPI/Indication: 49-year-old female currently following with gastroenterology as well as bariatric surgery.  Prior history of significant GERD.  In the past, patient had sleeve gastrectomy (8/2022) followed by conversion to Isela-en-Y gastric bypass (1/2024) with hiatal hernia repair.  Patient now with complaints of ongoing GERD and dysphagia. In 5/2024, patient did have EGD which revealed mild narrowing at the GJ junction at 10 mm that was dilated to 12 mm with CRE balloon. Most recent EGD 1/29/2025 which revealed a 2 cm hiatal hernia but otherwise normal-appearing examination. FL UGI completed 2/2025 showed small transient sliding-type hiatal hernia and mild gastroesophageal reflux, evident only when patient in prone position.     Manometry Findings: Out of the 10 completed liquid swallows, patient with 3 weakened and 1 failed swallow.  Additional swallows completed with intact contractile pattern and normal distal latency.  Median IRP 2 mmHg and mean  mmHg.s.cm.  3.3 cm hiatal hernia appreciated.     Impedence Findings: 60% of the liquid swallows and 28.6% of the viscous swallows completely cleared.     [place] Classification: Normal esophageal motility with     24 Hour pH Study (Completed off PPI x5 days):  Acid exposure time in upright position: 1.2%  Acid exposure time in recumbent position: 0.3%  DeMeester Score: 3.8  45 episodes of reflux overall   31 episodes in the upright position - 2 were acid and 29 weakly acid related  14 episodes in the recumbent position - 5  "were acid and 9 weakly acid related  Symptom correlation: No significant symptom correlation  \"Heartburn/reflux\" with symptom index of 15.2% and probability of 0%  \"Belching\" with symptom index of 0% and probability of 0%  \"Air bubbles, gurgles\" with symptom index of 0% and probability of 0%  \"Some check pain with heart burn\" with symptom index of 0% and probability of 0%     Overall pH Study Impression: Findings overall no consistent with pathologic reflux  --------------------------------------------------------------------    Assessment/PLAN:    Jenny Olvera is a 49 y.o. female status post robotic conversion from sleeve to  RNYGB and PEHR with mesh performed by Dr. Osmel Akins on 1/30/2024 for heart burn s/p dilation of GJ with 12mm CRE balloon for dysphagia 5/15/24.    Discussed with her that her workup to this point is significant for a hiatal hernia which is probably around 2-3 cm in size. 24 hr study was negative for pathologic reflux. Given her symptoms of continued reflux, will plan to obtain a CT of the chest for further evaluation.    Plan:  - CT chest  - Follow up in clinic after to discuss next steps          Jarrod Rosa MD  Bariatric Surgery  3/27/2025  4:01 PM  "

## 2025-03-27 NOTE — PROGRESS NOTES
Date of surgery: 1/30/2024  Procedure: RNY   Performing surgeon: Dr. Osmel Akins    Initial Weight - 263 lb  Current Weight -205 lb  Carroll Weight - 191 lb  Total Body Weight Loss (EWL)- 58.6%  EWL% - 47%  TWB % -22%

## 2025-03-29 DIAGNOSIS — G43.709 CHRONIC MIGRAINE WITHOUT AURA WITHOUT STATUS MIGRAINOSUS, NOT INTRACTABLE: ICD-10-CM

## 2025-03-31 RX ORDER — BUTALBITAL, ACETAMINOPHEN AND CAFFEINE 50; 325; 40 MG/1; MG/1; MG/1
TABLET ORAL
Qty: 10 TABLET | Refills: 0 | Status: SHIPPED | OUTPATIENT
Start: 2025-03-31

## 2025-03-31 NOTE — TELEPHONE ENCOUNTER
Medication: Fiorcet    PDMP  01/06/2025 01/06/2025 traMADol HCL (Tablet) 8.0 2 50 MG 40.0 NIRAJ PERSON [facility] Medicare 0 / 0 [place]   1 [id number] 01/03/2025 01/03/2025 HYDROcodone BITARTRATE 5 MG ORAL TABLET/ACETAMINOPHEN 325 MG (Tablet) 5.0 2 325 MG-5 MG 12.50 DUTCH MEZA [facility] Medicare 0 / 0 PA   1 [id number] 12/13/2024 12/05/2024 Reyvow (Tablet) 8.0 8 100 MG NA [facility] Medicare 0 / 0 PA   1 [id number] 12/05/2024 12/05/2024 Butalbital-acetaminophen-caffeine (Tablet) 10.0 30 325 MG-50 MG-40 MG NA VISHAL WHITAKER [facility] Medicare 0 / 2 PA   Active agreement on file -No

## 2025-04-01 ENCOUNTER — OFFICE VISIT (OUTPATIENT)
Dept: BARIATRICS | Facility: CLINIC | Age: 50
End: 2025-04-01
Payer: COMMERCIAL

## 2025-04-01 VITALS
WEIGHT: 204 LBS | HEART RATE: 78 BPM | HEIGHT: 63 IN | DIASTOLIC BLOOD PRESSURE: 80 MMHG | BODY MASS INDEX: 36.14 KG/M2 | TEMPERATURE: 97.6 F | SYSTOLIC BLOOD PRESSURE: 130 MMHG

## 2025-04-01 DIAGNOSIS — Z98.84 BARIATRIC SURGERY STATUS: ICD-10-CM

## 2025-04-01 DIAGNOSIS — K91.2 POSTSURGICAL MALABSORPTION: ICD-10-CM

## 2025-04-01 DIAGNOSIS — Z48.815 ENCOUNTER FOR SURGICAL AFTERCARE FOLLOWING SURGERY OF DIGESTIVE SYSTEM: Primary | ICD-10-CM

## 2025-04-01 DIAGNOSIS — K44.9 HIATAL HERNIA: ICD-10-CM

## 2025-04-01 DIAGNOSIS — E66.812 OBESITY, CLASS II, BMI 35-39.9: ICD-10-CM

## 2025-04-01 DIAGNOSIS — G43.709 CHRONIC MIGRAINE WITHOUT AURA WITHOUT STATUS MIGRAINOSUS, NOT INTRACTABLE: ICD-10-CM

## 2025-04-01 PROCEDURE — 99214 OFFICE O/P EST MOD 30 MIN: CPT | Performed by: PHYSICIAN ASSISTANT

## 2025-04-01 RX ORDER — TIRZEPATIDE 2.5 MG/.5ML
2.5 INJECTION, SOLUTION SUBCUTANEOUS WEEKLY
Qty: 2 ML | Refills: 0 | Status: SHIPPED | OUTPATIENT
Start: 2025-04-01 | End: 2025-04-29

## 2025-04-01 NOTE — PROGRESS NOTES
Date of surgery:1/30/2024  Procedure:  RNY   Performing surgeon: Dr. Osmel Akins .    Initial Weight - 263 lb  Current Weight -204 lb  Carroll Weight - 192 lb   Total Body Weight Loss (EWL)- 59%  EWL% - 48%  TWB % -23%

## 2025-04-01 NOTE — PROGRESS NOTES
Assessment/Plan:     Patient ID: Jenny Olvera is a 49 y.o. female.     Bariatric Surgery Status    status post robotic conversion from sleeve to RNYGB and PEHR with mesh performed by Dr. Osmel Akins on 1/30/2024 for heart burn s/p dilation of GJ with 12mm CRE balloon for dysphagia 5/15/24   She states that now her main symptoms are acid reflux. She takes Omeprazole 20mg twice daily in addition to Pepcid. Despite this she still has symptoms, especially at night.     She was recently seen by Dr Osmel Akins, workup to this point is significant for a hiatal hernia which is probably around 2-3 cm in size. 24 hr study was negative for pathologic reflux. Given her symptoms of continued reflux, will plan to obtain a CT of the chest for further evaluation. Has follow up scheduled with Dr Osmel Akins next month     Patient looking to lose more weight , has gained about 7 lbs since her last visit. States she cannot exercise at this time due to recent surgery for prolapse. She would be interested in GLP-1 injections to help get back on track and lose some more weight. Reviewed recent EGD with patient and biopsies.     Patient denies personal and family history of MEN2 tumors and medullary thyroid/thyroid carcinoma. Patient denies personal history of pancreatitis.         Side effects: Abdominal pain, constipation, diarrhea, nausea, vomiting, fatigue, sinusitis, alopecia, UTI, diabetic retinopathy, hypotension, dizziness, indigestion  Precaution: Denies Acute pancreatitis and cholecystitis, suicidal ideations  Contraindication: Denies Personal/family history of medullary thyroid cancer, MEN syndrome type II     I have sent Zebound to your pharmacy. The prior authorization process will been done through our prior authorization team and can take up to 3-4 weeks to process through the insurance.     - Start Zepbound 2.5 mg subcutaneously weekly. After you have taken the second pen, please give me an update, as we will likely increase the  dose the next month if you are tolerating it well.     - Side effects of Zepbound include nausea, vomiting, diarrhea, or constipation. Keep an eye on your heart rate while on Zepbound. If you resting heart rate is greater than 100 beats per minutes, please notify me. If you develop severe abdominal pain, stop Zepbound and go to the emergency room, as that could be a sign of pancreatitis.  - routine thyroid CA     - Please notify me if you have surgery, upper endoscopy, or colonoscopy scheduled, as we typically hold Zepbound for one week prior to the procedure.     - Zepbound can reduce the effectiveness of oral hormonal birth control (birth control pills). Recommend a barrier backup method such as condoms to prevent pregnancy.        Patient understands the risk and benefits of using this medication as well as the side effects.  Medication management contract signed.      Initial weight loss goal of 5-10% weight loss for improved health as studies have shown this has proven to decrease risk of obesity related conditions and co-morbid conditions and/or prevent weight-related complications. Explained the importance of making lifestyle modifications changes in conjunction with weight loss medications.         Recommended to not skip meals.  Spoke about practicing mindful eating with frequent meals and portion control.  Recommend to avoid snacking at bedtime especially with a high carbohydrate load and to be consistent with protein intake.    Recommend adequate hydration which is at least half your body weight in ounces unless medically contraindicated (ie. Systolic heart failure) with also considering GI losses and medication induced fluid loss if applicable. Recommend incorporating resistance training and/or strength training to help improve metabolic rate.     Continued/Maintain healthy weight loss with good nutrition intakes.  Adequate hydration with at least 64oz. fluid intake.  Follow diet as discussed.  Follow  vitamin and mineral recommendations as reviewed with you.  Exercise as tolerated.    Colonoscopy referral made: tati  Mammo: scheduled     Follow-up in 3 months. We kindly ask that your arrive 15 minutes before your scheduled appointment time with your provider to allow our staff to room you, get your vital signs and update your chart.    Get lab work done prior to annual visit. Please call the office if you need a script.  It is recommended to check with your insurance BEFORE getting labs done to make sure they are covered by your policy.      Call our office if you have any problems with abdominal pain especially associated with fever, chills, nausea, vomiting or any other concerns.    All  Post-bariatric surgery patients should be aware that very small quantities of any alcohol can cause impairment and it is very possible not to feel the effect. The effect can be in the system for several hours.  It is also a stomach irritant.     It is advised to AVOID alcohol, Nonsteroidal antiinflammatory drugs (NSAIDS) and nicotine of all forms . Any of these can cause stomach irritation/pain.    Discussed the effects of alcohol on a bariatric patient and the increased impairment risk.     Keep up the good work!     Postsurgical Malabsorption   -At risk for malabsorption of vitamins/minerals secondary to malabsorption and restriction of intake from bariatric surgery  -Currently taking adequate postop bariatric surgery vitamin supplementation  -Next set of bariatric labs ordered for approximately 3 months  -Patient received education about the importance of adhering to a lifelong supplementation regimen to avoid vitamin/mineral deficiencies      Diagnoses and all orders for this visit:    Encounter for surgical aftercare following surgery of digestive system    Bariatric surgery status    Postsurgical malabsorption    Obesity, Class II, BMI 35-39.9  -     tirzepatide (Zepbound) 2.5 mg/0.5 mL auto-injector; Inject 0.5 mL (2.5 mg  "total) under the skin once a week for 28 days    Chronic migraine without aura without status migrainosus, not intractable    Hiatal hernia         Subjective:      Patient ID: Jenny Olvera is a 49 y.o. female.    status post robotic conversion from sleeve to RNYGB and PEHR with mesh performed by Dr. Osmel Akins on 1/30/2024 for heart burn s/p dilation of GJ with 12mm CRE balloon for dysphagia 5/15/24   ite this she still has symptoms, especially at night.     Initial:263  Current:204  EWL: (Weight loss is ahead of schedule at this post surgical period.)  Carroll: 192  Current BMI is Body mass index is 36.48 kg/m².    Tolerating a regular diet-yes  Eating at least 60 grams of protein per day-mostly yes   Drinking at least 64 ounces of fluid per day-yes  Sufficient exercise-not currently   Using nicotine-no  Using alcohol-occasional   Supplements:  vitamin patches MVI + calcium/vit D      The following portions of the patient's history were reviewed and updated as appropriate: allergies, current medications, past family history, past medical history, past social history, past surgical history and problem list.    Review of Systems   Constitutional: Negative.    Respiratory: Negative.     Cardiovascular: Negative.    Gastrointestinal: Negative.    Skin:  Positive for rash (underneath abdominal skin folds).   Neurological: Negative.    Psychiatric/Behavioral: Negative.           Objective:    /80 (Patient Position: Sitting, Cuff Size: Standard)   Pulse 78   Temp 97.6 °F (36.4 °C) (Tympanic)   Ht 5' 2.7\" (1.593 m)   Wt 92.5 kg (204 lb)   LMP  (LMP Unknown) Comment: pt states she doesnt get periods while on IUD  BMI 36.48 kg/m²      Physical Exam  Vitals and nursing note reviewed.   Constitutional:       Appearance: Normal appearance. She is obese.   HENT:      Head: Normocephalic and atraumatic.   Eyes:      Extraocular Movements: Extraocular movements intact.   Cardiovascular:      Rate and Rhythm: Normal " rate.   Pulmonary:      Effort: Pulmonary effort is normal.   Musculoskeletal:         General: Normal range of motion.      Cervical back: Normal range of motion.   Skin:     General: Skin is warm and dry.   Neurological:      General: No focal deficit present.      Mental Status: She is alert and oriented to person, place, and time.   Psychiatric:         Mood and Affect: Mood normal.              PAST SURGICAL HISTORY:  No significant past surgical history      PAST SURGICAL HISTORY:  No significant past surgical history

## 2025-04-02 ENCOUNTER — HOSPITAL ENCOUNTER (OUTPATIENT)
Dept: RADIOLOGY | Facility: HOSPITAL | Age: 50
Discharge: HOME | End: 2025-04-02
Attending: STUDENT IN AN ORGANIZED HEALTH CARE EDUCATION/TRAINING PROGRAM
Payer: COMMERCIAL

## 2025-04-02 DIAGNOSIS — K44.9 HIATAL HERNIA WITH GERD WITHOUT ESOPHAGITIS: ICD-10-CM

## 2025-04-02 DIAGNOSIS — K21.9 HIATAL HERNIA WITH GERD WITHOUT ESOPHAGITIS: ICD-10-CM

## 2025-04-02 PROCEDURE — 71260 CT THORAX DX C+: CPT

## 2025-04-02 RX ADMIN — IOHEXOL 50 ML: 350 INJECTION, SOLUTION INTRAVENOUS at 13:14

## 2025-04-03 ENCOUNTER — TELEPHONE (OUTPATIENT)
Age: 50
End: 2025-04-03

## 2025-04-03 NOTE — TELEPHONE ENCOUNTER
PA for Zepbound 2.5mg SUBMITTED to [facility]    Via     [x]CMM-KEY: [id number]  []Surescripts-Case ID #   []Availity-Auth ID # NDC #   []Faxed to plan   []Other website   []Phone call Case ID #     []PA sent as URGENT    All office notes, labs and other pertaining documents and studies sent. Clinical questions answered. Awaiting determination from insurance company.     Turnaround time for your insurance to make a decision on your Prior Authorization can take 7-21 business days.

## 2025-04-07 NOTE — TELEPHONE ENCOUNTER
PA for Zepbound 2.5mg was DENIED    Reason: Non Formulary          Message sent to office clinical pool: NO    Denial letter scanned into Media Yes    Appeal started No (Provider will need to decide if appeal is warranted and send clinical documentation to Prior Authorization Team for initiation.)    Prior Auth will be submitted through secondary insurance.     **Please follow up with your patient regarding denial and next steps**

## 2025-04-07 NOTE — TELEPHONE ENCOUNTER
PA for Zepbound 2.5mg was SUBMITTED to [employer] (Secondary)    via    [x]CMM-KEY: [id number]  []Surescripts-Case ID #   []Availity-Auth ID # NDC #   []Faxed to plan   []Other website   []Phone call Case ID #     []PA sent as URGENT    All office notes, labs and other pertaining documents and studies sent. Clinical questions answered. Awaiting determination from insurance company.     Turnaround time for your insurance to make a decision on your Prior Authorization can take 7-21 business days.

## 2025-04-08 NOTE — TELEPHONE ENCOUNTER
PA for Zepbound 2.5mg was APPROVED     Date(s) approved 4/7/25 - 10/7/25    Case #    Patient advised by          [x]Eversighthart Message  []Phone call   []LMOM  []L/M to call office as no active Communication consent on file  []Unable to leave detailed message as VM not approved on Communication consent       Pharmacy advised by    []Fax  [x]Phone call  []Secure Chat    Specialty Pharmacy    []     Approval letter scanned into Media Yes

## 2025-04-10 ENCOUNTER — CLINICAL SUPPORT (OUTPATIENT)
Dept: INTERNAL MEDICINE CLINIC | Facility: CLINIC | Age: 50
End: 2025-04-10

## 2025-04-10 DIAGNOSIS — E53.8 VITAMIN B12 DEFICIENCY: Primary | ICD-10-CM

## 2025-04-10 RX ADMIN — CYANOCOBALAMIN 1000 MCG: 1000 INJECTION, SOLUTION INTRAMUSCULAR; SUBCUTANEOUS at 09:56

## 2025-04-10 NOTE — PROGRESS NOTES
Patient seen on nurse schedule today for b12 injection. Administered in patients LEFT deltoid, patient tolerated well. Patient left 3 additional vial here in drawer for next dose.

## 2025-04-11 ENCOUNTER — OFFICE VISIT (OUTPATIENT)
Dept: OBGYN CLINIC | Facility: CLINIC | Age: 50
End: 2025-04-11

## 2025-04-11 VITALS — DIASTOLIC BLOOD PRESSURE: 80 MMHG | BODY MASS INDEX: 36.88 KG/M2 | SYSTOLIC BLOOD PRESSURE: 128 MMHG | WEIGHT: 206.2 LBS

## 2025-04-11 DIAGNOSIS — Z48.89 POSTOPERATIVE VISIT: Primary | ICD-10-CM

## 2025-04-11 DIAGNOSIS — N81.2 INCOMPLETE UTEROVAGINAL PROLAPSE: ICD-10-CM

## 2025-04-11 PROCEDURE — 99024 POSTOP FOLLOW-UP VISIT: CPT | Performed by: STUDENT IN AN ORGANIZED HEALTH CARE EDUCATION/TRAINING PROGRAM

## 2025-04-11 NOTE — PROGRESS NOTES
OB/GYN VISIT  Jenny Olvera  2025  11:42 AM    ASSESSMENT / PLAN:    Jenny Olvera is a 49 y.o.  female presenting for 7wk post op s/p VEC w/ enplace, anterior and posterior colporrhaphy, cystoscopy.    Doing well.   Incisions well healed. May resume all normal and pelvic activities.   Discussed OAB (dry) medication management but not interested at this time. Advised to call if changes her mind.   Discussed risk reducing strategies for POP recurrence including avoidance of heavy lifting, chronic straining including constipation by taking daily fiber supplement, patient states she should be taking Miralax daily due to IBS, as well as weight loss. We discussed importance of weight management and weight loss and discuss healthy eating habits as well as exercise recommendations including 150min/week of moderate intensity exercise or 75min/week of vigorous exercise.    Cervical cancer screening  -24 neg    Breast cancer screening  -Obtains yearly, has scheduled appointment    Colon cancer screening  -2023 colonoscopy      SUBJECTIVE:    Jenny Olvera is a 49 y.o.  female presenting for 7wk post op s/p VEC w/ enplace, anterior and posterior colporrhaphy, cystoscopy. Doing well. Denies any vaginal or pelvic pain. She has a history of left hip pain for which she was going to PT for and notes that her hip pain is worse post op. She would like to resume PT. She denies any POP or ABDIRAHMAN symptoms. Denies any abnormal discharge or bleeding. She notes OAB (dry symptoms) but is not interested in medication management at this time.    Past Medical History:   Diagnosis Date    Anxiety     Asthma     Claustrophobia     Cluster headache     CTS (carpal tunnel syndrome)     Depression     Fibromyalgia     primary; last assessed 17    GERD (gastroesophageal reflux disease)     GI problem     H/O gastric sleeve     Headache, tension-type     Irritable bowel syndrome     Joint pain     Kidney stone      Lung nodule     last assessed 10/9/15    Migraine     Muscle pain     Peripheral neuropathy     PONV (postoperative nausea and vomiting)     Sjogren's syndrome (HCC)        Past Surgical History:   Procedure Laterality Date    BACK SURGERY      BARIATRIC SURGERY  8/9/2022    CARPAL TUNNEL RELEASE Bilateral     CHOLECYSTECTOMY      COLONOSCOPY      CYSTOSCOPY      GALLBLADDER SURGERY      GASTRECTOMY  08/09/2022    Sleeve    GASTRIC BYPASS LAPAROSCOPIC N/A 01/30/2024    Procedure: REVISION CONVERSION TO R-N-Y GASTRIC BYPASS W/ ROBOTICS AND INTRAOPERATIVE EGD; PARAESOPHAGEAL HERNIA REPAIR WITH MESH;  Surgeon: Felix Akins MD;  Location: [facility];  Service: Bariatrics    HERNIA REPAIR  08/09/2022    Apparently done at time gastric sleeve. Still have major issues with it.    NECK SURGERY      C4C5    MN CMBND ANTERPOST COLPORRAPHY W/CYSTO N/A 2/20/2025    Procedure: COLPORRHAPHY ANTERIOR & POSTERIOR;  Surgeon: Tom Hampton MD;  Location: [facility];  Service: Gynecology    MN COLPOPEXY VAGINAL EXTRAPERITONEAL APPROACH N/A 2/20/2025    Procedure: COLPOPEXY VAGINAL EXTRAPERITONEAL (VEC) W/  EUA;  Surgeon: Tom Hampton MD;  Location: [facility];  Service: Gynecology    MN CYSTOURETHROSCOPY N/A 2/20/2025    Procedure: CYSTO;  Surgeon: Tom Hampton MD;  Location: [facility];  Service: Gynecology    MN INCISION EXTENSOR TENDON SHEATH WRIST Left 1/3/2025    Procedure: RELEASE DEQUERVAINS;  Surgeon: Gail Wylie MD;  Location: [facility];  Service: Orthopedics    MN TENDON SHEATH INCISION Right 01/16/2023    Procedure: THUMB TRIGGER FINGER RELEASE;  Surgeon: Gail Wylie MD;  [facility];  Service: Orthopedics    SACROILIAC JOINT FUSION Left     SLEEVE GASTROPLASTY  08/9/2022    SPINAL FUSION      C4 and C5    ULNAR NERVE REPAIR Bilateral     UPPER GASTROINTESTINAL ENDOSCOPY         OBJECTIVE:    Vitals:  Blood pressure 128/80, weight 93.5 kg (206 lb 3.2 oz).Body mass index is 36.88  kg/m².    Physical Exam:    Physical Exam  Constitutional:       Appearance: Normal appearance.   Genitourinary:      Genitourinary Comments: No POP. Incisions well healed. No evidence on infection.   Cardiovascular:      Rate and Rhythm: Normal rate.   Pulmonary:      Effort: Pulmonary effort is normal.   Abdominal:      Palpations: Abdomen is soft.   Neurological:      Mental Status: She is alert and oriented to person, place, and time.   Skin:     General: Skin is warm and dry.   Psychiatric:         Mood and Affect: Mood normal.         Behavior: Behavior normal.         Thought Content: Thought content normal.         Judgment: Judgment normal.         Tom Hampton MD  4/11/2025  11:42 AM

## 2025-04-16 ENCOUNTER — LAB (OUTPATIENT)
Dept: LAB | Facility: CLINIC | Age: 50
End: 2025-04-16
Attending: INTERNAL MEDICINE
Payer: COMMERCIAL

## 2025-04-16 ENCOUNTER — TELEPHONE (OUTPATIENT)
Dept: RHEUMATOLOGY | Facility: CLINIC | Age: 50
End: 2025-04-16

## 2025-04-16 ENCOUNTER — CONSULT (OUTPATIENT)
Dept: RHEUMATOLOGY | Facility: CLINIC | Age: 50
End: 2025-04-16
Payer: COMMERCIAL

## 2025-04-16 VITALS
HEART RATE: 83 BPM | HEIGHT: 63 IN | BODY MASS INDEX: 36.14 KG/M2 | OXYGEN SATURATION: 99 % | SYSTOLIC BLOOD PRESSURE: 124 MMHG | WEIGHT: 204 LBS | DIASTOLIC BLOOD PRESSURE: 76 MMHG

## 2025-04-16 DIAGNOSIS — Z11.59 NEED FOR HEPATITIS C SCREENING TEST: ICD-10-CM

## 2025-04-16 DIAGNOSIS — E55.9 VITAMIN D DEFICIENCY: ICD-10-CM

## 2025-04-16 DIAGNOSIS — R76.8 SS-A ANTIBODY POSITIVE: ICD-10-CM

## 2025-04-16 DIAGNOSIS — M79.7 FIBROMYALGIA: ICD-10-CM

## 2025-04-16 DIAGNOSIS — Z79.899 LONG-TERM USE OF PLAQUENIL: ICD-10-CM

## 2025-04-16 DIAGNOSIS — R76.8 POSITIVE ANA (ANTINUCLEAR ANTIBODY): Primary | ICD-10-CM

## 2025-04-16 DIAGNOSIS — R76.8 POSITIVE ANA (ANTINUCLEAR ANTIBODY): ICD-10-CM

## 2025-04-16 DIAGNOSIS — E61.1 IRON DEFICIENCY: ICD-10-CM

## 2025-04-16 LAB
BACTERIA UR QL AUTO: ABNORMAL /HPF
BILIRUB UR QL STRIP: NEGATIVE
C3 SERPL-MCNC: 127 MG/DL (ref 87–200)
C4 SERPL-MCNC: 24 MG/DL (ref 19–52)
CK SERPL-CCNC: 89 U/L (ref 26–192)
CLARITY UR: CLEAR
COLOR UR: YELLOW
CREAT UR-MCNC: 106.2 MG/DL
CRP SERPL QL: 1.2 MG/L
ERYTHROCYTE [SEDIMENTATION RATE] IN BLOOD: 9 MM/HOUR (ref 0–19)
GLUCOSE UR STRIP-MCNC: NEGATIVE MG/DL
HGB UR QL STRIP.AUTO: ABNORMAL
KETONES UR STRIP-MCNC: NEGATIVE MG/DL
LEUKOCYTE ESTERASE UR QL STRIP: ABNORMAL
NITRITE UR QL STRIP: NEGATIVE
NON-SQ EPI CELLS URNS QL MICRO: ABNORMAL /HPF
PH UR STRIP.AUTO: 6 [PH]
PROT UR STRIP-MCNC: NEGATIVE MG/DL
PROT UR-MCNC: 9.7 MG/DL
PROT/CREAT UR: 0.1 MG/G{CREAT}
RBC #/AREA URNS AUTO: ABNORMAL /HPF
RHEUMATOID FACT SERPL-ACNC: 10 IU/ML
SP GR UR STRIP.AUTO: 1.02 (ref 1–1.03)
UROBILINOGEN UR STRIP-ACNC: <2 MG/DL
WBC #/AREA URNS AUTO: ABNORMAL /HPF

## 2025-04-16 PROCEDURE — 86800 THYROGLOBULIN ANTIBODY: CPT

## 2025-04-16 PROCEDURE — 86200 CCP ANTIBODY: CPT

## 2025-04-16 PROCEDURE — 86039 ANTINUCLEAR ANTIBODIES (ANA): CPT

## 2025-04-16 PROCEDURE — 85652 RBC SED RATE AUTOMATED: CPT

## 2025-04-16 PROCEDURE — 86038 ANTINUCLEAR ANTIBODIES: CPT

## 2025-04-16 PROCEDURE — 86146 BETA-2 GLYCOPROTEIN ANTIBODY: CPT

## 2025-04-16 PROCEDURE — 86225 DNA ANTIBODY NATIVE: CPT

## 2025-04-16 PROCEDURE — 87340 HEPATITIS B SURFACE AG IA: CPT

## 2025-04-16 PROCEDURE — 84156 ASSAY OF PROTEIN URINE: CPT

## 2025-04-16 PROCEDURE — 86147 CARDIOLIPIN ANTIBODY EA IG: CPT

## 2025-04-16 PROCEDURE — 85670 THROMBIN TIME PLASMA: CPT

## 2025-04-16 PROCEDURE — 86704 HEP B CORE ANTIBODY TOTAL: CPT

## 2025-04-16 PROCEDURE — 86803 HEPATITIS C AB TEST: CPT

## 2025-04-16 PROCEDURE — 86140 C-REACTIVE PROTEIN: CPT

## 2025-04-16 PROCEDURE — 82550 ASSAY OF CK (CPK): CPT

## 2025-04-16 PROCEDURE — 85732 THROMBOPLASTIN TIME PARTIAL: CPT

## 2025-04-16 PROCEDURE — 86376 MICROSOMAL ANTIBODY EACH: CPT

## 2025-04-16 PROCEDURE — 82570 ASSAY OF URINE CREATININE: CPT

## 2025-04-16 PROCEDURE — 85705 THROMBOPLASTIN INHIBITION: CPT

## 2025-04-16 PROCEDURE — 36415 COLL VENOUS BLD VENIPUNCTURE: CPT

## 2025-04-16 PROCEDURE — 86431 RHEUMATOID FACTOR QUANT: CPT

## 2025-04-16 PROCEDURE — 81001 URINALYSIS AUTO W/SCOPE: CPT

## 2025-04-16 PROCEDURE — 99205 OFFICE O/P NEW HI 60 MIN: CPT | Performed by: INTERNAL MEDICINE

## 2025-04-16 PROCEDURE — 86235 NUCLEAR ANTIGEN ANTIBODY: CPT

## 2025-04-16 PROCEDURE — 86160 COMPLEMENT ANTIGEN: CPT

## 2025-04-16 PROCEDURE — 86705 HEP B CORE ANTIBODY IGM: CPT

## 2025-04-16 PROCEDURE — 85613 RUSSELL VIPER VENOM DILUTED: CPT

## 2025-04-16 RX ORDER — POLYETHYLENE GLYCOL 3350 17 G/17G
POWDER, FOR SOLUTION ORAL
COMMUNITY
Start: 2025-02-20
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
## 2025-04-17 ENCOUNTER — OFFICE VISIT (OUTPATIENT)
Dept: CARDIOLOGY CLINIC | Facility: CLINIC | Age: 50
End: 2025-04-17
Payer: COMMERCIAL

## 2025-04-17 ENCOUNTER — OFFICE VISIT (OUTPATIENT)
Dept: OBGYN CLINIC | Facility: CLINIC | Age: 50
End: 2025-04-17
Payer: COMMERCIAL

## 2025-04-17 VITALS
BODY MASS INDEX: 36.11 KG/M2 | HEIGHT: 63 IN | SYSTOLIC BLOOD PRESSURE: 116 MMHG | WEIGHT: 203.8 LBS | DIASTOLIC BLOOD PRESSURE: 70 MMHG | HEART RATE: 79 BPM

## 2025-04-17 VITALS — WEIGHT: 204.2 LBS | BODY MASS INDEX: 36.18 KG/M2 | HEIGHT: 63 IN

## 2025-04-17 DIAGNOSIS — M65.4 DE QUERVAIN'S TENOSYNOVITIS, RIGHT: ICD-10-CM

## 2025-04-17 DIAGNOSIS — I25.10 CORONARY ARTERY CALCIFICATION: Primary | ICD-10-CM

## 2025-04-17 DIAGNOSIS — R01.1 HEART MURMUR: ICD-10-CM

## 2025-04-17 DIAGNOSIS — Z13.1 SCREENING FOR DIABETES MELLITUS: ICD-10-CM

## 2025-04-17 DIAGNOSIS — M18.0 ARTHRITIS OF CARPOMETACARPAL (CMC) JOINT OF BOTH THUMBS: Primary | ICD-10-CM

## 2025-04-17 DIAGNOSIS — E66.812 CLASS 2 OBESITY WITH ALVEOLAR HYPOVENTILATION, SERIOUS COMORBIDITY, AND BODY MASS INDEX (BMI) OF 35.0 TO 35.9 IN ADULT (HCC): ICD-10-CM

## 2025-04-17 DIAGNOSIS — E66.2 CLASS 2 OBESITY WITH ALVEOLAR HYPOVENTILATION, SERIOUS COMORBIDITY, AND BODY MASS INDEX (BMI) OF 35.0 TO 35.9 IN ADULT (HCC): ICD-10-CM

## 2025-04-17 LAB
ATRIAL RATE: 79 BPM
B2 GLYCOPROT1 IGA SERPL IA-ACNC: 2.2
B2 GLYCOPROT1 IGG SERPL IA-ACNC: 0.9
B2 GLYCOPROT1 IGM SERPL IA-ACNC: <2.4
CARDIOLIPIN IGA SER IA-ACNC: 8.7
CARDIOLIPIN IGG SER IA-ACNC: 13
CARDIOLIPIN IGM SER IA-ACNC: 2.5
CCP AB SER IA-ACNC: 1.5 (ref ?–10)
CENTROMERE B AB SER-ACNC: <0.7 U/ML (ref ?–10)
DSDNA IGG SERPL IA-ACNC: 4.1 IU/ML (ref ?–15)
ENA JO1 AB SER IA-ACNC: <0.5 U/ML (ref ?–10)
ENA SCL70 IGG SER IA-ACNC: 0.7 U/ML (ref ?–10)
ENA SM IGG SER IA-ACNC: 1.1 U/ML (ref ?–10)
ENA SS-A AB SER IA-ACNC: 1.6 U/ML (ref ?–10)
ENA SS-B IGG SER IA-ACNC: 0.6 U/ML (ref ?–10)
HBV CORE AB SER QL: NORMAL
HBV CORE IGM SER QL: NORMAL
HBV SURFACE AG SER QL: NORMAL
HCV AB SER QL: NORMAL
P AXIS: 35 DEGREES
PR INTERVAL: 164 MS
QRS AXIS: 47 DEGREES
QRSD INTERVAL: 82 MS
QT INTERVAL: 364 MS
QTC INTERVAL: 417 MS
T WAVE AXIS: 17 DEGREES
THYROGLOB AB SERPL-ACNC: <1 IU/ML (ref 0–0.9)
THYROPEROXIDASE AB SERPL-ACNC: 19 IU/ML (ref 0–34)
U1 SNRNP IGG SER IA-ACNC: 1.8 U/ML (ref ?–10)
VENTRICULAR RATE: 79 BPM

## 2025-04-17 PROCEDURE — 99213 OFFICE O/P EST LOW 20 MIN: CPT | Performed by: SURGERY

## 2025-04-17 PROCEDURE — 99214 OFFICE O/P EST MOD 30 MIN: CPT | Performed by: INTERNAL MEDICINE

## 2025-04-17 PROCEDURE — 20600 DRAIN/INJ JOINT/BURSA W/O US: CPT | Performed by: SURGERY

## 2025-04-17 PROCEDURE — 93000 ELECTROCARDIOGRAM COMPLETE: CPT | Performed by: INTERNAL MEDICINE

## 2025-04-17 RX ORDER — BUPIVACAINE HYDROCHLORIDE 2.5 MG/ML
0.5 INJECTION, SOLUTION INFILTRATION; PERINEURAL
Status: COMPLETED | OUTPATIENT
Start: 2025-04-17 | End: 2025-04-17

## 2025-04-17 RX ORDER — TRIAMCINOLONE ACETONIDE 40 MG/ML
20 INJECTION, SUSPENSION INTRA-ARTICULAR; INTRAMUSCULAR
Status: COMPLETED | OUTPATIENT
Start: 2025-04-17 | End: 2025-04-17

## 2025-04-17 RX ADMIN — TRIAMCINOLONE ACETONIDE 20 MG: 40 INJECTION, SUSPENSION INTRA-ARTICULAR; INTRAMUSCULAR at 13:30

## 2025-04-17 RX ADMIN — BUPIVACAINE HYDROCHLORIDE 0.5 ML: 2.5 INJECTION, SOLUTION INFILTRATION; PERINEURAL at 13:30

## 2025-04-17 NOTE — PROGRESS NOTES
Gail Wylie M.D.  Attending, Orthopaedic Surgery  Hand, Wrist, and Elbow Surgery  [facility]      ORTHOPAEDIC HAND, WRIST, AND ELBOW OFFICE  VISIT       ASSESSMENT/PLAN:        Assessment & Plan  Arthritis of carpometacarpal (CMC) joint of both thumbs  Patient continues to get good relief of pain with periodic injections  Due to acute exacerbation of chronic CMC arthritis the decision was made to proceed with repeat injection today   Continue adjunctive treatment with heat, anti-inflammatories, and supportive bracing   Follow up as needed   Orders:    Small joint arthrocentesis    De Quervain's tenosynovitis, right  Not currently symptomatic   Advised that as long as it does not bother her we do not have to proceed with surgery                 The patient verbalized understanding of exam findings and treatment plan. We engaged in the shared decision-making process and treatment options were discussed at length with the patient. Surgical and conservative management discussed today along with risks and benefits.      Follow Up:  Return if symptoms worsen or fail to improve.    To Do Next Visit:  Re-evaluation of current issue        ____________________________________________________________________________________________________________________________________________      CHIEF COMPLAINT:  No chief complaint on file.      SUBJECTIVE:  Jenny Olvera is a 49 y.o. year old female who presents for evaluation of the bilateral thumbs. Pain is located at the CMC joints primarily, worse with pinching and gripping. She did get injections back in October which helped her pain significantly up until recently. She has right deQuervain's as well which is currently not symptomatic      I have personally reviewed all the relevant PMH, PSH, SH, FH, Medications and allergies      PAST MEDICAL HISTORY:  Past Medical History:   Diagnosis Date    Anxiety     Asthma     Claustrophobia     Cluster headache      CTS (carpal tunnel syndrome)     Depression     Fibromyalgia     primary; last assessed 11/27/17    GERD (gastroesophageal reflux disease)     GI problem     H/O gastric sleeve     Headache, tension-type     Irritable bowel syndrome     Joint pain     Kidney stone     Lung nodule     last assessed 10/9/15    Migraine     Muscle pain     Peripheral neuropathy     PONV (postoperative nausea and vomiting)     Sjogren's syndrome (HCC)        PAST SURGICAL HISTORY:  Past Surgical History:   Procedure Laterality Date    BACK SURGERY      BARIATRIC SURGERY  8/9/2022    CARPAL TUNNEL RELEASE Bilateral     CHOLECYSTECTOMY      COLONOSCOPY      CYSTOSCOPY      GALLBLADDER SURGERY      GASTRECTOMY  08/09/2022    Sleeve    GASTRIC BYPASS LAPAROSCOPIC N/A 01/30/2024    Procedure: REVISION CONVERSION TO R-N-Y GASTRIC BYPASS W/ ROBOTICS AND INTRAOPERATIVE EGD; PARAESOPHAGEAL HERNIA REPAIR WITH MESH;  Surgeon: Felix Akins MD;  Location: [facility];  Service: Bariatrics    HERNIA REPAIR  08/09/2022    Apparently done at time gastric sleeve. Still have major issues with it.    NECK SURGERY      C4C5    NE CMBND ANTERPOST COLPORRAPHY W/CYSTO N/A 2/20/2025    Procedure: COLPORRHAPHY ANTERIOR & POSTERIOR;  Surgeon: Tom Hampton MD;  Location[facility];  Service: Gynecology    NE COLPOPEXY VAGINAL EXTRAPERITONEAL APPROACH N/A 2/20/2025    Procedure: COLPOPEXY VAGINAL EXTRAPERITONEAL (VEC) W/  EUA;  Surgeon: Tom Hampton MD;  [facility];  Service: Gynecology    NE CYSTOURETHROSCOPY N/A 2/20/2025    Procedure: CYSTO;  Surgeon: Tom Hampton MD;  [facility];  Service: Gynecology    NE INCISION EXTENSOR TENDON SHEATH WRIST Left 1/3/2025    Procedure: RELEASE DEQUERVAINS;  Surgeon: Gail Wylie MD;  [facility];  Service: Orthopedics    NE TENDON SHEATH INCISION Right 01/16/2023    Procedure: THUMB TRIGGER FINGER RELEASE;  Surgeon: Gail Wylie MD;  [facility];   Service: Orthopedics    SACROILIAC JOINT FUSION Left     SLEEVE GASTROPLASTY  08/9/2022    SPINAL FUSION      C4 and C5    ULNAR NERVE REPAIR Bilateral     UPPER GASTROINTESTINAL ENDOSCOPY         FAMILY HISTORY:  Family History   Problem Relation Age of Onset    Diabetes Mother     Fibromyalgia Mother     Ovarian cancer Mother 33    Hypertension Mother     Thyroid disease Mother     Migraines Mother     Neuropathy Mother     Cancer Mother         has been removed    Arthritis Mother     COPD Mother     Depression Mother     No Known Problems Father     Throat cancer Maternal Grandmother     No Known Problems Maternal Grandfather     No Known Problems Paternal Grandmother     No Known Problems Paternal Grandfather     No Known Problems Daughter     Breast cancer Maternal Aunt     Dementia Maternal Aunt     No Known Problems Maternal Aunt     No Known Problems Maternal Aunt     No Known Problems Paternal Aunt     Colon cancer Cousin     Heart disease Cousin     Lupus Cousin     Arthritis Family     Heart disease Family         cardiac disorder    Neuropathy Family     Lupus Family         systemic erythematosus    No Known Problems Half-Sister     No Known Problems Half-Brother     No Known Problems Half-Brother     No Known Problems Half-Brother     Asthma Brother     Stroke Neg Hx        SOCIAL HISTORY:  Social History     Tobacco Use    Smoking status: Never    Smokeless tobacco: Never    Tobacco comments:     Never smoked.   Vaping Use    Vaping status: Never Used   Substance Use Topics    Alcohol use: Yes     Alcohol/week: 2.0 standard drinks of alcohol     Types: 2 Glasses of wine per week     Comment: Occasionally    Drug use: Yes     Types: Marijuana     Comment: medicinal 1 week ago  capsules (has medial card)       MEDICATIONS:    Current Outpatient Medications:     albuterol (PROVENTIL HFA,VENTOLIN HFA) 90 mcg/act inhaler, Inhale 2 puffs every 6 (six) hours as needed for wheezing or shortness of breath,  Disp: 8 g, Rfl: 2    Botox 200 units SOLR, Every three months for migraines, Disp: , Rfl:     butalbital-acetaminophen-caffeine (FIORICET,ESGIC) -40 mg per tablet, 1 tab q6 hours prn migraine. No more than 2-3 per week., Disp: 10 tablet, Rfl: 0    cholecalciferol (VITAMIN D3) 1,000 units tablet, Take 1 capsule by mouth once a week 3000 units daily, Disp: , Rfl:     cholestyramine (QUESTRAN) 4 GM/DOSE powder, Take 1 packet (4 g total) by mouth 2 (two) times a day with meals, Disp: 378 g, Rfl: 2    cyanocobalamin 1,000 mcg/mL, Inject 1 mL (1,000 mcg total) into a muscle every 30 (thirty) days, Disp: 3 mL, Rfl: 3    dexamethasone (DECADRON) 2 mg tablet, 1 tab qam with food prn migraine., Disp: 5 tablet, Rfl: 2    divalproex sodium (DEPAKOTE) 250 mg DR tablet, 2 tabs q12 h x 2 days, then 1 tab q12 h x 2 days, then stop. Repeat if needed., Disp: 12 tablet, Rfl: 1    ergocalciferol (VITAMIN D2) 50,000 units, Take 1 capsule (50,000 Units total) by mouth 2 (two) times a week, Disp: 32 capsule, Rfl: 0    famotidine (PEPCID) 20 mg tablet, Take 1 tablet (20 mg total) by mouth 2 (two) times a day as needed for heartburn (take as needed for symptoms of heart burn as discontinue ppi), Disp: 180 tablet, Rfl: 3    fluticasone (FLONASE) 50 mcg/act nasal spray, 1 spray in each nostril once daily, Disp: 30 mL, Rfl: 0    fremanezumab-vfrm (Ajovy) 225 MG/1.5ML auto-injector, Inject 1.5 mL (225 mg total) under the skin every 30 (thirty) days, Disp: 1.5 mL, Rfl: 11    lasmiditan succinate (Reyvow) 100 mg tablet, One tab qhs at migraine onset.  Caution sedation.  Max 1 tab/24 hours., Disp: 12 tablet, Rfl: 0    meclizine (ANTIVERT) 12.5 MG tablet, Take 1 tablet (12.5 mg total) by mouth 3 (three) times a day as needed for dizziness or nausea, Disp: 30 tablet, Rfl: 3    melatonin 3 mg, , Disp: , Rfl:     methocarbamol (ROBAXIN) 500 mg tablet, Take 1 tablet (500 mg total) by mouth 4 (four) times a day as needed for muscle spasms, Disp:  30 tablet, Rfl: 0    omeprazole (PriLOSEC OTC) 20 MG tablet, Take 20 mg by mouth daily, Disp: , Rfl:     ondansetron (ZOFRAN) 4 mg tablet, Take 1 tablet (4 mg total) by mouth every 12 (twelve) hours as needed for nausea, Disp: 20 tablet, Rfl: 1    polyethylene glycol (GLYCOLAX) 17 GM/SCOOP powder, , Disp: , Rfl:     polyethylene glycol (MIRALAX) 17 g packet, Take 17 g by mouth daily, Disp: 72 each, Rfl: 4    polyethylene glycol (MIRALAX) 17 g packet, Take 17 g by mouth daily, Disp: 510 g, Rfl: 0    prochlorperazine (COMPAZINE) 10 mg tablet, Take 1 tablet (10 mg total) by mouth every 6 (six) hours as needed for nausea or vomiting, Disp: 30 tablet, Rfl: 0    tirzepatide (Zepbound) 2.5 mg/0.5 mL auto-injector, Inject 0.5 mL (2.5 mg total) under the skin once a week for 28 days, Disp: 2 mL, Rfl: 0    tiZANidine (ZANAFLEX) 2 mg tablet, Take 1 tablet (2 mg total) by mouth every 8 (eight) hours as needed for muscle spasms, Disp: 90 tablet, Rfl: 1    traZODone (DESYREL) 50 mg tablet, TAKE HALF TABLETS BY MOUTH DAILY AT BEDTIME, Disp: 15 tablet, Rfl: 0    Trudhesa 0.725 MG/ACT AERS, if needed, Disp: , Rfl:     venlafaxine (EFFEXOR-XR) 37.5 mg 24 hr capsule, 1 cap BID, Disp: 180 capsule, Rfl: 0    dicyclomine (BENTYL) 20 mg tablet, Take 1 tablet (20 mg total) by mouth 3 (three) times a day as needed (migraine/cramps), Disp: 60 tablet, Rfl: 0    Nerve Stimulator (Nerivio) ALIX, Start treatment within 60 minutes of migraine onset. Set a strong, yet comfortable intensity level in the first few minutes and maintain that level for 45 minutes. (Patient not taking: Reported on 3/7/2025), Disp: 1 each, Rfl: 2    Wheat Dextrin (Benefiber) POWD, Take by mouth Daily at 2am (Patient not taking: Reported on 3/7/2025), Disp: 350 g, Rfl: 3    Current Facility-Administered Medications:     cyanocobalamin injection 1,000 mcg, 1,000 mcg, Intramuscular, Q30 Days, , 1,000 mcg at 04/10/25 0956    cyanocobalamin injection 1,000 mcg, 1,000 mcg,  Intramuscular, Q30 Days, , 1,000 mcg at 03/10/25 1040    ALLERGIES:  Allergies   Allergen Reactions    Hydrocodone-Acetaminophen GI Intolerance     gi upset -pt okay if takes  zofran      Codeine GI Intolerance     GI issues    Mexiletine Rash    Oxycodone-Acetaminophen GI Intolerance     GI issues    Penicillins Other (See Comments) and GI Intolerance     GI issues,vaginitis    Pollen Extract-Tree Extract [Pollen Extract] Nasal Congestion           REVIEW OF SYSTEMS:  Review of Systems   Constitutional:  Negative for chills and fever.   HENT:  Negative for ear pain and sore throat.    Eyes:  Negative for pain and visual disturbance.   Respiratory:  Negative for cough and shortness of breath.    Cardiovascular:  Negative for chest pain and palpitations.   Gastrointestinal:  Negative for abdominal pain and vomiting.   Genitourinary:  Negative for dysuria and hematuria.   Musculoskeletal:  Positive for arthralgias. Negative for back pain.   Skin:  Negative for color change and rash.   Neurological:  Negative for seizures and syncope.   All other systems reviewed and are negative.      VITALS:  There were no vitals filed for this visit.    LABS:      _____________________________________________________  PHYSICAL EXAMINATION:  General: well developed and well nourished, alert, oriented times 3, and appears comfortable  Psychiatric: Normal  HEENT: Normocephalic, Atraumatic Trachea Midline, No torticollis  Pulmonary: No audible wheezing or respiratory distress   Abdomen/GI: Non tender, non distended   Cardiovascular: No pitting edema, 2+ radial pulse   Skin: No masses, erythema, lacerations, fluctation, ulcerations  Neurovascular: Sensation Intact to the Median, Ulnar, Radial Nerve, Motor Intact to the Median, Ulnar, Radial Nerve, and Pulses Intact  Musculoskeletal: Normal, except as noted in detailed exam and in HPI.      MUSCULOSKELETAL EXAMINATION:  Bilateral CMC Exam:  No adduction contracture  No hyperextension  "deformity of MCP joint  Positive localized tenderness over radial and dorsal aspect of thumb (CMC joint)  Grind test is Positive for pain and Negative for crepitus  Metacarpal load shift test positive  No triggering or tenderness over the A1 pulley  Mild pain with Finkelstein’s maneuver       ___________________________________________________  STUDIES REVIEWED:  No new imaging to review      LABS REVIEWED:    HgA1c:   Lab Results   Component Value Date    HGBA1C 5.7 (H) 06/22/2022     BMP:   Lab Results   Component Value Date    GLUCOSE 88 02/20/2015    CALCIUM 9.4 02/14/2025     02/20/2015    K 3.9 02/14/2025    CO2 29 02/14/2025     02/14/2025    BUN 13 02/14/2025    CREATININE 0.62 02/14/2025               PROCEDURES PERFORMED:  Small joint arthrocentesis: bilateral thumb CMC  [place] Protocol:  procedure performed by consultantConsent: Verbal consent obtained.  Risks and benefits: risks, benefits and alternatives were discussed  Consent given by: patient  Time out: Immediately prior to procedure a \"time out\" was called to verify the correct patient, procedure, equipment, support staff and site/side marked as required.  Patient understanding: patient states understanding of the procedure being performed  Site marked: the operative site was marked  Required items: required blood products, implants, devices, and special equipment available  Patient identity confirmed: verbally with patient  Supporting Documentation  Indications: pain   Procedure Details  Location: thumb - bilateral thumb CMC  Needle size: 25 G  Ultrasound guidance: no  Approach: dorsal    Medications (Right): 0.5 mL bupivacaine 0.25 %; 20 mg triamcinolone acetonide 40 mg/mLMedications (Left): 0.5 mL bupivacaine 0.25 %; 20 mg triamcinolone acetonide 40 mg/mL   Patient tolerance: patient tolerated the procedure well with no immediate complications  Dressing:  Sterile dressing " applied            _____________________________________________________      Scribe Attestation      I,:  Makenzie Patiño PA-C am acting as a scribe while in the presence of the attending physician.:       I,:  Gail Wylie MD personally performed the services described in this documentation    as scribed in my presence.:

## 2025-04-17 NOTE — LETTER
April 17, 2025     Kamala Edmond DO  [address] [facility] [address]    Patient: Jenny Olvera   YOB: 1975   Date of Visit: 4/17/2025       Dear Dr. Kamala Edmond DO:    Thank you for referring Jenny Olvera to me for evaluation. Below are my notes for this consultation.    If you have questions, please do not hesitate to call me. I look forward to following your patient along with you.         Sincerely,        Ximena Spann MD        CC: No Recipients    Ximena Spann MD  4/17/2025 10:54 AM  Sign when Signing Visit  Cardiology   Jenny Olvera 49 y.o. female MRN: [id number]   Encounter: [id number]      Assessment:    >> Systolic murmur  >> Morbid obesity  >> Migraine  >> Coronary artery calcification      Plan:  - Check formal calcium score given that her chest CT was done with contrast  - Check lipid panel and HbA1c in 3 months  - Echocardiogram was unremarkable  -Return to office in 3 months or sooner if needed        4/17/2025: Patient presents with CT scan that shows RCA calcification.  She has no complaints.  The CT scan was done with contrast to evaluate for hiatal hernia    CC: Preoperative risk evaluation    HPI  49 y.o. female presents for preoperative risk evaluation.  She had a gastric sleeve surgery done previously, however it has left her with minimal weight loss and GERD, she is now being evaluated to have gastric bypass surgery.    She currently has no complaints, is able to perform all of her daily activities without any restrictions.  Goes to the gym 3-4 times a week and works out for over an hour each time.  Her workouts include weightlifting, walking on the treadmill, exercise bike.  She denies any chest discomfort or excessive shortness of breath during these activities.      The patient denies chest pain, shortness of breath, palpitations, orthopnea, PND, pedal edema, syncope, presyncope, diaphoresis, nausea/vomiting       SH: Smoking/Tobacco/Vaping: No; Alcohol:  "Occasional; Drugs: Previously used medical marijuana, has not used anything for a year      ECG done today showed normal sinus rhythm, normal ECG.      Physical exam  Objective  Vitals: Blood pressure 116/70, pulse 79, height 5' 2.5\" (1.588 m), weight 92.4 kg (203 lb 12.8 oz).    General:  AO x3, no acute distress  Cardiac:  S1-S2 normal, 3/6 systolic murmur best heard in the aortic area, not radiating to the carotids, S2 preserved, no rubs or gallops, JVP: Normal  Lungs:  Clear to auscultation bilaterally, no wheezing or crackles.  Abdomen:  Soft, nontender, nondistended.  Extremities:  Warm, well perfused, pulses palpable, no ulcers or rashes. Edema: Absent  Neuro: Grossly nonfocall        ======================================================  TREADMILL STRESS  Results for orders placed during the hospital encounter of 17    Stress test only, exercise    [place] [address]  [phone]    Stress Electrocardiography during Exercise    Name: MORRIS CASILLAS  MR #: [id number]  Account #: [id number]  Study date: 2017  : 1975  Age: 41 years  Gender: Female  Height: 63 in  Weight: 224 lb  BSA: 2.03 m squared    Allergies: CODEINE    Diagnosis: R06.9 - Unspecified abnormalities of breathing    Primary Physician:  CATE Astorga  Referring Physician:  CATE Astorga  Group:  [facility]  Cardiology Fellow:  Angela Nguyen MD  Technician:  Ophelia Ríos  Technician:  Jose Rafael Vyas  Interpreting Physician:  Fam Coppola MD    CLINICAL QUESTION: Detection of coronary artery disease.    HISTORY: The patient is a 41 year old  female. Chest pain status: chest pain. Coronary artery disease risk factors: family history of premature coronary artery disease and diabetes mellitus. Co-morbidity: obesity.    REST ECG: Normal sinus rhythm.    PROCEDURE: Treadmill exercise testing was performed, " using the Rhonda protocol. Stress electrocardiographic evaluation was performed.    RHONDA PROTOCOL:  HR bpm SBP mmHg DBP mmHg Symptoms  Baseline 74 120 80 chest discomfort  Stage 1 133 120 80 chest discomfort, fatigue  Stage 2 155 160 90 same as above, dizziness  Recovery 1 127 140 70 same as above  Recovery 2 105 120 78 none  Pre 02 Sat 99%and Peak 02 Sat. Peak. No medications or fluids given.    STRESS SUMMARY: Duration of exercise was 6 min and 24 sec. The patient exercised to protocol stage 2. Maximal work rate was 7 METs. Functional capacity was slightly decreased (20% to 30%). Maximal heart rate during stress was 157 bpm ( 88  % of maximal predicted heart rate). The heart rate response to stress was exaggerated consistent with physical deconditioning. There was normal resting blood pressure with an appropriate response to stress. The rate-pressure product for  the peak heart rate and blood pressure was 30920. The patient experienced chest pain during stress; pain resolved spontaneously. The stress test was terminated due to chest discomfort, dyspnea, and fatigue. The stress ECG was negative for  ischemia. Arrhythmia during stress: isolated premature ventricular beats.    SUMMARY:  -  Stress results: Duration of exercise was 6 min and 24 sec. Functional capacity was slightly decreased (20% to 30%). Target heart rate was achieved. The patient experienced chest pain during stress; pain resolved spontaneously.  -  ECG conclusions: The stress ECG was negative for ischemia.    IMPRESSIONS: Normal study after maximal exercise. Patient did complain of chest pain at the end of exercise but she says its slightly different from the chest pain she had in the past. Stress ECG showed no evidence of ischemia.    Prepared and signed by    Fam Coppola MD  Signed 06/08/2017 12:43:52     ----------------------------------------------------------------------------------------------  NUCLEAR STRESS TEST: No results found for  this or any previous visit.    No results found for this or any previous visit.      --------------------------------------------------------------------------------  CATH:  No results found for this or any previous visit.    --------------------------------------------------------------------------------  ECHO:   No results found for this or any previous visit.    No results found for this or any previous visit.    --------------------------------------------------------------------------------  HOLTER  No results found for this or any previous visit.    --------------------------------------------------------------------------------  CAROTIDS  No results found for this or any previous visit.       Diagnoses and all orders for this visit:    Coronary artery calcification  -     Cancel: CT coronary calcium score; Future  -     Cancel: Lipid Panel With Direct LDL; Future  -     Lipid Panel With Direct LDL; Future  -     CT coronary calcium score; Future  -     Hemoglobin A1C; Future    Heart murmur  -     POCT ECG    Class 2 obesity with alveolar hypoventilation, serious comorbidity, and body mass index (BMI) of 35.0 to 35.9 in adult ([facility])    Screening for diabetes mellitus  -     Hemoglobin A1C; Future       ======================================================          Review of Systems  ROS as noted above, otherwise 12 point review of systems was performed and is negative.     Historical Information  Past Medical History:   Diagnosis Date   • Anxiety    • Asthma    • Claustrophobia    • Cluster headache    • CTS (carpal tunnel syndrome)    • Depression    • Fibromyalgia     primary; last assessed 11/27/17   • GERD (gastroesophageal reflux disease)    • GI problem    • H/O gastric sleeve    • Headache, tension-type    • Irritable bowel syndrome    • Joint pain    • Kidney stone    • Lung nodule     last assessed 10/9/15   • Migraine    • Muscle pain    • Peripheral neuropathy    • PONV (postoperative nausea and  vomiting)    • Sjogren's syndrome (HCC)      Past Surgical History:   Procedure Laterality Date   • BACK SURGERY     • BARIATRIC SURGERY  8/9/2022   • CARPAL TUNNEL RELEASE Bilateral    • CHOLECYSTECTOMY     • COLONOSCOPY     • CYSTOSCOPY     • GALLBLADDER SURGERY     • GASTRECTOMY  08/09/2022    Sleeve   • GASTRIC BYPASS LAPAROSCOPIC N/A 01/30/2024    Procedure: REVISION CONVERSION TO R-N-Y GASTRIC BYPASS W/ ROBOTICS AND INTRAOPERATIVE EGD; PARAESOPHAGEAL HERNIA REPAIR WITH MESH;  Surgeon: Felix Akins MD;  [facility];  Service: Bariatrics   • HERNIA REPAIR  08/09/2022    Apparently done at time gastric sleeve. Still have major issues with it.   • NECK SURGERY      C4C5   • FL CMBND ANTERPOST COLPORRAPHY W/CYSTO N/A 2/20/2025    Procedure: COLPORRHAPHY ANTERIOR & POSTERIOR;  Surgeon: Tom Hampton MD;  [facility];  Service: Gynecology   • FL COLPOPEXY VAGINAL EXTRAPERITONEAL APPROACH N/A 2/20/2025    Procedure: COLPOPEXY VAGINAL EXTRAPERITONEAL (VEC) W/  EUA;  Surgeon: Tom Hampton MD;  [facility];  Service: Gynecology   • FL CYSTOURETHROSCOPY N/A 2/20/2025    Procedure: CYSTO;  Surgeon: Tom Hampton MD;  [facility];  Service: Gynecology   • FL INCISION EXTENSOR TENDON SHEATH WRIST Left 1/3/2025    Procedure: RELEASE DEQUERVAINS;  Surgeon: Gail Wylie MD;  [facility];  Service: Orthopedics   • FL TENDON SHEATH INCISION Right 01/16/2023    Procedure: THUMB TRIGGER FINGER RELEASE;  Surgeon: Gail Wylie MD;  [facility];  Service: Orthopedics   • SACROILIAC JOINT FUSION Left    • SLEEVE GASTROPLASTY  08/9/2022   • SPINAL FUSION      C4 and C5   • ULNAR NERVE REPAIR Bilateral    • UPPER GASTROINTESTINAL ENDOSCOPY       Social History     Substance and Sexual Activity   Alcohol Use Yes   • Alcohol/week: 2.0 standard drinks of alcohol   • Types: 2 Glasses of wine per week    Comment: Occasionally     Social History     Substance and  Sexual Activity   Drug Use Yes   • Types: Marijuana    Comment: medicinal 1 week ago  capsules (has medial card)     Social History     Tobacco Use   Smoking Status Never   Smokeless Tobacco Never   Tobacco Comments    Never smoked.     Family History   Problem Relation Age of Onset   • Diabetes Mother    • Fibromyalgia Mother    • Ovarian cancer Mother 33   • Hypertension Mother    • Thyroid disease Mother    • Migraines Mother    • Neuropathy Mother    • Cancer Mother         has been removed   • Arthritis Mother    • COPD Mother    • Depression Mother    • No Known Problems Father    • Throat cancer Maternal Grandmother    • No Known Problems Maternal Grandfather    • No Known Problems Paternal Grandmother    • No Known Problems Paternal Grandfather    • No Known Problems Daughter    • Breast cancer Maternal Aunt    • Dementia Maternal Aunt    • No Known Problems Maternal Aunt    • No Known Problems Maternal Aunt    • No Known Problems Paternal Aunt    • Colon cancer Cousin    • Heart disease Cousin    • Lupus Cousin    • Arthritis Family    • Heart disease Family         cardiac disorder   • Neuropathy Family    • Lupus Family         systemic erythematosus   • No Known Problems Half-Sister    • No Known Problems Half-Brother    • No Known Problems Half-Brother    • No Known Problems Half-Brother    • Asthma Brother    • Stroke Neg Hx        Meds/Allergies  Hospital Medications:     Current Facility-Administered Medications:   •  cyanocobalamin injection 1,000 mcg, Q30 Days  •  cyanocobalamin injection 1,000 mcg, Q30 Days    Home Medications: Not in a hospital admission.      Allergies   Allergen Reactions   • Hydrocodone-Acetaminophen GI Intolerance     gi upset -pt okay if takes  zofran     • Codeine GI Intolerance     GI issues   • Mexiletine Rash   • Oxycodone-Acetaminophen GI Intolerance     GI issues   • Penicillins Other (See Comments) and GI Intolerance     GI issues,vaginitis   • Pollen Extract-Tree  "Extract [Pollen Extract] Nasal Congestion         Portions of the record may have been created with voice recognition software.  Occasional wrong words or \"sound a like\" substitutions may have occurred due to the inherent limitations of voice recognition software.  Read the chart carefully and recognize, using context, where substitutions have occurred.          I spent > 30 mins examining and interviewing the patient, coordinating care, reviewing the chart, reviewing testing and writing the note.                 "

## 2025-04-17 NOTE — PROGRESS NOTES
"Cardiology   Jenny Olvera 49 y.o. female MRN: [id number]   Encounter: [id number]      Assessment:    >> Systolic murmur  >> Morbid obesity  >> Migraine  >> Coronary artery calcification      Plan:  - Check formal calcium score given that her chest CT was done with contrast  - Check lipid panel and HbA1c in 3 months  - Echocardiogram was unremarkable  -Return to office in 3 months or sooner if needed        4/17/2025: Patient presents with CT scan that shows RCA calcification.  She has no complaints.  The CT scan was done with contrast to evaluate for hiatal hernia    CC: Preoperative risk evaluation    HPI  49 y.o. female presents for preoperative risk evaluation.  She had a gastric sleeve surgery done previously, however it has left her with minimal weight loss and GERD, she is now being evaluated to have gastric bypass surgery.    She currently has no complaints, is able to perform all of her daily activities without any restrictions.  Goes to the gym 3-4 times a week and works out for over an hour each time.  Her workouts include weightlifting, walking on the treadmill, exercise bike.  She denies any chest discomfort or excessive shortness of breath during these activities.      The patient denies chest pain, shortness of breath, palpitations, orthopnea, PND, pedal edema, syncope, presyncope, diaphoresis, nausea/vomiting       SH: Smoking/Tobacco/Vaping: No; Alcohol: Occasional; Drugs: Previously used medical marijuana, has not used anything for a year      ECG done today showed normal sinus rhythm, normal ECG.      Physical exam  Objective   Vitals: Blood pressure 116/70, pulse 79, height 5' 2.5\" (1.588 m), weight 92.4 kg (203 lb 12.8 oz).    General:  AO x3, no acute distress  Cardiac:  S1-S2 normal, 3/6 systolic murmur best heard in the aortic area, not radiating to the carotids, S2 preserved, no rubs or gallops, JVP: Normal  Lungs:  Clear to auscultation bilaterally, no wheezing or crackles.  Abdomen:  Soft, " nontender, nondistended.  Extremities:  Warm, well perfused, pulses palpable, no ulcers or rashes. Edema: Absent  Neuro: Grossly nonfocall        ======================================================  TREADMILL STRESS  Results for orders placed during the hospital encounter of 17    Stress test only, exercise    Narrative  [address] [id number] [phone]    Stress Electrocardiography during Exercise    Name: MORRIS CASILLAS  MR #: [id number]  Account #: [id number]  Study date: 2017  : 1975  Age: 41 years  Gender: Female  Height: 63 in  Weight: 224 lb  BSA: 2.03 m squared    Allergies: CODEINE    Diagnosis: R06.9 - Unspecified abnormalities of breathing    Primary Physician:  CATE Astorga  Referring Physician:  CATE Astorga  Group:  [facility]  Cardiology Fellow:  Angela Nguyen MD  Technician:  Ophelia Ríos  Technician:  Jose Rafael Vyas  Interpreting Physician:  Fam Coppola MD    CLINICAL QUESTION: Detection of coronary artery disease.    HISTORY: The patient is a 41 year old  female. Chest pain status: chest pain. Coronary artery disease risk factors: family history of premature coronary artery disease and diabetes mellitus. Co-morbidity: obesity.    REST ECG: Normal sinus rhythm.    PROCEDURE: Treadmill exercise testing was performed, using the Stew protocol. Stress electrocardiographic evaluation was performed.    STEW PROTOCOL:  HR bpm SBP mmHg DBP mmHg Symptoms  Baseline 74 120 80 chest discomfort  Stage 1 133 120 80 chest discomfort, fatigue  Stage 2 155 160 90 same as above, dizziness  Recovery 1 127 140 70 same as above  Recovery 2 105 120 78 none  Pre 02 Sat 99%and Peak 02 Sat. Peak. No medications or fluids given.    STRESS SUMMARY: Duration of exercise was 6 min and 24 sec. The patient exercised to protocol stage 2. Maximal work rate was 7 METs. Functional capacity was  slightly decreased (20% to 30%). Maximal heart rate during stress was 157 bpm ( 88  % of maximal predicted heart rate). The heart rate response to stress was exaggerated consistent with physical deconditioning. There was normal resting blood pressure with an appropriate response to stress. The rate-pressure product for  the peak heart rate and blood pressure was 91164. The patient experienced chest pain during stress; pain resolved spontaneously. The stress test was terminated due to chest discomfort, dyspnea, and fatigue. The stress ECG was negative for  ischemia. Arrhythmia during stress: isolated premature ventricular beats.    SUMMARY:  -  Stress results: Duration of exercise was 6 min and 24 sec. Functional capacity was slightly decreased (20% to 30%). Target heart rate was achieved. The patient experienced chest pain during stress; pain resolved spontaneously.  -  ECG conclusions: The stress ECG was negative for ischemia.    IMPRESSIONS: Normal study after maximal exercise. Patient did complain of chest pain at the end of exercise but she says its slightly different from the chest pain she had in the past. Stress ECG showed no evidence of ischemia.    Prepared and signed by    Fam Coppola MD  Signed 06/08/2017 12:43:52     ----------------------------------------------------------------------------------------------  NUCLEAR STRESS TEST: No results found for this or any previous visit.    No results found for this or any previous visit.      --------------------------------------------------------------------------------  CATH:  No results found for this or any previous visit.    --------------------------------------------------------------------------------  ECHO:   No results found for this or any previous visit.    No results found for this or any previous visit.    --------------------------------------------------------------------------------  HOLTER  No results found for this or any previous  visit.    --------------------------------------------------------------------------------  CAROTIDS  No results found for this or any previous visit.       Diagnoses and all orders for this visit:    Coronary artery calcification  -     Cancel: CT coronary calcium score; Future  -     Cancel: Lipid Panel With Direct LDL; Future  -     Lipid Panel With Direct LDL; Future  -     CT coronary calcium score; Future  -     Hemoglobin A1C; Future    Heart murmur  -     POCT ECG    Class 2 obesity with alveolar hypoventilation, serious comorbidity, and body mass index (BMI) of 35.0 to 35.9 in adult ([facility])    Screening for diabetes mellitus  -     Hemoglobin A1C; Future       ======================================================          Review of Systems  ROS as noted above, otherwise 12 point review of systems was performed and is negative.     Historical Information   Past Medical History:   Diagnosis Date    Anxiety     Asthma     Claustrophobia     Cluster headache     CTS (carpal tunnel syndrome)     Depression     Fibromyalgia     primary; last assessed 11/27/17    GERD (gastroesophageal reflux disease)     GI problem     H/O gastric sleeve     Headache, tension-type     Irritable bowel syndrome     Joint pain     Kidney stone     Lung nodule     last assessed 10/9/15    Migraine     Muscle pain     Peripheral neuropathy     PONV (postoperative nausea and vomiting)     Sjogren's syndrome ([facility])      Past Surgical History:   Procedure Laterality Date    BACK SURGERY      BARIATRIC SURGERY  8/9/2022    CARPAL TUNNEL RELEASE Bilateral     CHOLECYSTECTOMY      COLONOSCOPY      CYSTOSCOPY      GALLBLADDER SURGERY      GASTRECTOMY  08/09/2022    Sleeve    GASTRIC BYPASS LAPAROSCOPIC N/A 01/30/2024    Procedure: REVISION CONVERSION TO R-N-Y GASTRIC BYPASS W/ ROBOTICS AND INTRAOPERATIVE EGD; PARAESOPHAGEAL HERNIA REPAIR WITH MESH;  Surgeon: Felix Akins MD;  [facility];  Service: Bariatrics    HERNIA REPAIR   08/09/2022    Apparently done at time gastric sleeve. Still have major issues with it.    NECK SURGERY      C4C5    UT CMBND ANTERPOST COLPORRAPHY W/CYSTO N/A 2/20/2025    Procedure: COLPORRHAPHY ANTERIOR & POSTERIOR;  Surgeon: Tom Hampton MD;  [facility];  Service: Gynecology    UT COLPOPEXY VAGINAL EXTRAPERITONEAL APPROACH N/A 2/20/2025    Procedure: COLPOPEXY VAGINAL EXTRAPERITONEAL (VEC) W/  EUA;  Surgeon: Tom Hampton MD;  [facility];  Service: Gynecology    UT CYSTOURETHROSCOPY N/A 2/20/2025    Procedure: CYSTO;  Surgeon: Tom Hampton MD;  [facility];  Service: Gynecology    UT INCISION EXTENSOR TENDON SHEATH WRIST Left 1/3/2025    Procedure: RELEASE DEQUERVAINS;  Surgeon: Gail Wylie MD;  [facility];  Service: Orthopedics    UT TENDON SHEATH INCISION Right 01/16/2023    Procedure: THUMB TRIGGER FINGER RELEASE;  Surgeon: Gail Wylie MD;  [facility];  Service: Orthopedics    SACROILIAC JOINT FUSION Left     SLEEVE GASTROPLASTY  08/9/2022    SPINAL FUSION      C4 and C5    ULNAR NERVE REPAIR Bilateral     UPPER GASTROINTESTINAL ENDOSCOPY       Social History     Substance and Sexual Activity   Alcohol Use Yes    Alcohol/week: 2.0 standard drinks of alcohol    Types: 2 Glasses of wine per week    Comment: Occasionally     Social History     Substance and Sexual Activity   Drug Use Yes    Types: Marijuana    Comment: medicinal 1 week ago  capsules (has medial card)     Social History     Tobacco Use   Smoking Status Never   Smokeless Tobacco Never   Tobacco Comments    Never smoked.     Family History   Problem Relation Age of Onset    Diabetes Mother     Fibromyalgia Mother     Ovarian cancer Mother 33    Hypertension Mother     Thyroid disease Mother     Migraines Mother     Neuropathy Mother     Cancer Mother         has been removed    Arthritis Mother     COPD Mother     Depression Mother     No Known Problems Father     Throat  "cancer Maternal Grandmother     No Known Problems Maternal Grandfather     No Known Problems Paternal Grandmother     No Known Problems Paternal Grandfather     No Known Problems Daughter     Breast cancer Maternal Aunt     Dementia Maternal Aunt     No Known Problems Maternal Aunt     No Known Problems Maternal Aunt     No Known Problems Paternal Aunt     Colon cancer Cousin     Heart disease Cousin     Lupus Cousin     Arthritis Family     Heart disease Family         cardiac disorder    Neuropathy Family     Lupus Family         systemic erythematosus    No Known Problems Half-Sister     No Known Problems Half-Brother     No Known Problems Half-Brother     No Known Problems Half-Brother     Asthma Brother     Stroke Neg Hx        Meds/Allergies   Hospital Medications:     Current Facility-Administered Medications:     cyanocobalamin injection 1,000 mcg, Q30 Days    cyanocobalamin injection 1,000 mcg, Q30 Days    Home Medications: Not in a hospital admission.      Allergies   Allergen Reactions    Hydrocodone-Acetaminophen GI Intolerance     gi upset -pt okay if takes  zofran      Codeine GI Intolerance     GI issues    Mexiletine Rash    Oxycodone-Acetaminophen GI Intolerance     GI issues    Penicillins Other (See Comments) and GI Intolerance     GI issues,vaginitis    Pollen Extract-Tree Extract [Pollen Extract] Nasal Congestion         Portions of the record may have been created with voice recognition software.  Occasional wrong words or \"sound a like\" substitutions may have occurred due to the inherent limitations of voice recognition software.  Read the chart carefully and recognize, using context, where substitutions have occurred.          I spent > 30 mins examining and interviewing the patient, coordinating care, reviewing the chart, reviewing testing and writing the note.                 "

## 2025-04-18 ENCOUNTER — TELEPHONE (OUTPATIENT)
Age: 50
End: 2025-04-18

## 2025-04-18 DIAGNOSIS — E66.812 OBESITY, CLASS II, BMI 35-39.9: Primary | ICD-10-CM

## 2025-04-18 LAB
APTT SCREEN TO CONFIRM RATIO: 0.95 RATIO (ref 0–1.34)
CONFIRM APTT/NORMAL: 40.8 SEC (ref 0–47.6)
HISTONE IGG SER IA-ACNC: 5.8 UNITS (ref 0–0.9)
LA PPP-IMP: NORMAL
SCREEN APTT: 32.8 SEC (ref 0–43.5)
SCREEN DRVVT: 37.4 SEC (ref 0–47)
THROMBIN TIME: 17.7 SEC (ref 0–23)

## 2025-04-18 RX ORDER — TIRZEPATIDE 5 MG/.5ML
5 INJECTION, SOLUTION SUBCUTANEOUS WEEKLY
Qty: 2 ML | Refills: 2 | Status: SHIPPED | OUTPATIENT
Start: 2025-04-18 | End: 2025-05-16

## 2025-04-18 NOTE — TELEPHONE ENCOUNTER
How are you tolerating the medication?   [] Nausea  [] Vomiting  [x] Diarrhea-occasional  [] Asymptomatic  [] Other:    Last visit weight:204    Current weight:203    Date of last injection 4/18/25    How many injections do you have left: 2    Pharmacy: [facility] in [place]

## 2025-04-22 LAB
ANA PAT SER IF-IMP: ABNORMAL
ANA SER QL IF: POSITIVE
ANA TITR SER HEP2 SUBST: ABNORMAL {TITER}

## 2025-04-23 ENCOUNTER — TELEPHONE (OUTPATIENT)
Dept: BARIATRICS | Facility: CLINIC | Age: 50
End: 2025-04-23

## 2025-04-23 NOTE — TELEPHONE ENCOUNTER
PA for Zepbound 5mg was SUBMITTED to [facility] [employer]    via    [x]CMM-KEY: [id number]  []Surescripts-Case ID #   []Availity-Auth ID # NDC #   []Faxed to plan   []Other website   []Phone call Case ID #     []PA sent as URGENT    All office notes, labs and other pertaining documents and studies sent. Clinical questions answered. Awaiting determination from insurance company.     Turnaround time for your insurance to make a decision on your Prior Authorization can take 7-21 business days.

## 2025-04-25 NOTE — TELEPHONE ENCOUNTER
PA for Zepbound 5mg was DENIED    Reason:NOT COVERED UNDER PRIMARY, BUT ALL STRENGTHS ARE COVERED THROUGH SECONDARY        Message sent to office clinical pool No      Denial letter scanned into Media Yes    Appeal started No (Provider will need to decide if appeal is warranted and send clinical documentation to Prior Authorization Team for initiation.)    **Please follow up with your patient regarding denial and next steps**

## 2025-05-09 ENCOUNTER — CLINICAL SUPPORT (OUTPATIENT)
Dept: INTERNAL MEDICINE CLINIC | Facility: CLINIC | Age: 50
End: 2025-05-09

## 2025-05-09 DIAGNOSIS — E53.8 VITAMIN B12 DEFICIENCY: Primary | ICD-10-CM

## 2025-05-09 RX ADMIN — CYANOCOBALAMIN 1000 MCG: 1000 INJECTION, SOLUTION INTRAMUSCULAR; SUBCUTANEOUS at 11:11

## 2025-05-09 NOTE — PROGRESS NOTES
Patient is here today 05/09/25 for her monthly B12 injection. Injected 1 into the RIGHT deltoid. Patient tolerated well and made aware to return in 30 days for her next injection.    NDC 30725-471-43

## 2025-05-13 ENCOUNTER — TELEPHONE (OUTPATIENT)
Dept: RHEUMATOLOGY | Facility: CLINIC | Age: 50
End: 2025-05-13

## 2025-05-13 ENCOUNTER — TELEMEDICINE (OUTPATIENT)
Dept: RHEUMATOLOGY | Facility: CLINIC | Age: 50
End: 2025-05-13
Payer: COMMERCIAL

## 2025-05-13 DIAGNOSIS — M79.7 FIBROMYALGIA: ICD-10-CM

## 2025-05-13 DIAGNOSIS — R76.0 ANTI-CARDIOLIPIN ANTIBODY POSITIVE: ICD-10-CM

## 2025-05-13 DIAGNOSIS — R76.8 HISTONE ANTIBODY POSITIVE: ICD-10-CM

## 2025-05-13 DIAGNOSIS — R76.8 POSITIVE ANA (ANTINUCLEAR ANTIBODY): Primary | ICD-10-CM

## 2025-05-13 PROCEDURE — 99214 OFFICE O/P EST MOD 30 MIN: CPT | Performed by: INTERNAL MEDICINE

## 2025-05-13 NOTE — ASSESSMENT & PLAN NOTE
Ms. Olvera is a 49-year-old female with history significant for fibromyalgia, vitamin D deficiency, iron deficiency and anxiety/depression since 2003 with onset after a divorce who presents for a follow up on a low titer LANE 1:40 cytoplasmic pattern and SSA antibodies of 1.4.       - Jenny presents today for a follow up of a low titer LANE and SSA antibodies that were considered to be secondary to Sjogren's syndrome, also given chronic sicca and widespread arthralgias/myalgias, by her prior rheumatologist and she was started on a hydroxychloroquine trial for approximately 10 months which may have helped to some degree with the chronic pain.  Specifically with the longstanding, diffuse pain she has been experiencing since 2009, I have low suspicion that this is related to an inflammatory condition and her presentation appears consistent with fibromyalgia.  She has been on multiple medications including gabapentin, pregabalin, duloxetine, nortriptyline and muscle relaxants without any benefit.  She is currently managing with Tylenol, tizanidine and medical marijuana as needed.  I recommend she continue cognitive behavioral therapy through her psychiatrist and also consider establishing with a functional medicine specialist or a provider to prescribe low-dose naltrexone.  Acupuncture or massage therapy is also recommended.     - Overall I have low suspicion that the low titer LANE and SSA antibodies are clinically significant and at this time would not diagnose her with Sjogren's syndrome.  To reevaluate I did complete a serological panel and while this still shows a fluctuating positive LANE, the SSA antibodies are negative, but she does have a high titer histone antibody.  She still does not meet criteria for a connective tissue disease and I recommend a monitoring approach.  Of note her scheduled medications are Zepbound, Zantac and venlafaxine, none of which have a prominent association with drug-induced  lupus/presence of a histone antibody.  I will continue to monitor for any change in her symptoms and update connective tissue disease labs prior to the follow-up appointment.  She will remain off the hydroxychloroquine.    Orders:    CBC and differential; Future    Comprehensive metabolic panel; Future    C-reactive protein; Future    Sedimentation rate, automated; Future    C4 complement; Future    C3 complement; Future    Anti-DNA antibody, double-stranded; Future    Urinalysis with microscopic; Future    Protein / creatinine ratio, urine; Future    Histone Antibody; Future    Cardiolipin antibody; Future
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
## 2025-05-21 LAB
ATRIAL RATE: 79 BPM
P AXIS: 35 DEGREES
PR INTERVAL: 164 MS
QRS AXIS: 47 DEGREES
QRSD INTERVAL: 82 MS
QT INTERVAL: 364 MS
QTC INTERVAL: 417 MS
T WAVE AXIS: 17 DEGREES
VENTRICULAR RATE: 79 BPM

## 2025-05-25 DIAGNOSIS — Z90.49 STATUS POST CHOLECYSTECTOMY: ICD-10-CM

## 2025-05-25 DIAGNOSIS — Z98.84 BARIATRIC SURGERY STATUS: ICD-10-CM

## 2025-05-27 ENCOUNTER — APPOINTMENT (EMERGENCY)
Dept: RADIOLOGY | Facility: HOSPITAL | Age: 50
End: 2025-05-27
Payer: MEDICARE

## 2025-05-27 ENCOUNTER — HOSPITAL ENCOUNTER (EMERGENCY)
Facility: HOSPITAL | Age: 50
Discharge: HOME | End: 2025-05-27
Attending: EMERGENCY MEDICINE
Payer: MEDICARE

## 2025-05-27 VITALS
RESPIRATION RATE: 18 BRPM | HEART RATE: 90 BPM | OXYGEN SATURATION: 97 % | SYSTOLIC BLOOD PRESSURE: 129 MMHG | DIASTOLIC BLOOD PRESSURE: 77 MMHG | TEMPERATURE: 97.6 F

## 2025-05-27 DIAGNOSIS — R19.7 DIARRHEA: Primary | ICD-10-CM

## 2025-05-27 DIAGNOSIS — Z98.84 HISTORY OF GASTRIC BYPASS: ICD-10-CM

## 2025-05-27 DIAGNOSIS — R11.0 NAUSEA: ICD-10-CM

## 2025-05-27 LAB
ALBUMIN SERPL BCG-MCNC: 4.3 G/DL (ref 3.5–5)
ALP SERPL-CCNC: 124 U/L (ref 34–104)
ALT SERPL W P-5'-P-CCNC: 10 U/L (ref 7–52)
ANION GAP SERPL CALCULATED.3IONS-SCNC: 11 MMOL/L (ref 4–13)
AST SERPL W P-5'-P-CCNC: 15 U/L (ref 13–39)
BACTERIA UR QL AUTO: ABNORMAL /HPF
BASOPHILS # BLD MANUAL: 0.07 THOUSAND/UL (ref 0–0.1)
BASOPHILS NFR MAR MANUAL: 1 % (ref 0–1)
BILIRUB SERPL-MCNC: 0.53 MG/DL (ref 0.2–1)
BILIRUB UR QL STRIP: NEGATIVE
BUN SERPL-MCNC: 11 MG/DL (ref 5–25)
CALCIUM SERPL-MCNC: 9.5 MG/DL (ref 8.4–10.2)
CHLORIDE SERPL-SCNC: 102 MMOL/L (ref 96–108)
CLARITY UR: CLEAR
CO2 SERPL-SCNC: 23 MMOL/L (ref 21–32)
COLOR UR: ABNORMAL
CREAT SERPL-MCNC: 0.55 MG/DL (ref 0.6–1.3)
EOSINOPHIL # BLD MANUAL: 0.2 THOUSAND/UL (ref 0–0.4)
EOSINOPHIL NFR BLD MANUAL: 3 % (ref 0–6)
ERYTHROCYTE [DISTWIDTH] IN BLOOD BY AUTOMATED COUNT: 13.2 % (ref 11.6–15.1)
EXT PREGNANCY TEST URINE: NEGATIVE
EXT. CONTROL: NORMAL
GFR SERPL CREATININE-BSD FRML MDRD: 110 ML/MIN/1.73SQ M
GLUCOSE SERPL-MCNC: 73 MG/DL (ref 65–140)
GLUCOSE UR STRIP-MCNC: NEGATIVE MG/DL
HCT VFR BLD AUTO: 44 % (ref 34.8–46.1)
HGB BLD-MCNC: 13.8 G/DL (ref 11.5–15.4)
HGB UR QL STRIP.AUTO: ABNORMAL
HYALINE CASTS #/AREA URNS LPF: ABNORMAL /LPF
KETONES UR STRIP-MCNC: ABNORMAL MG/DL
LEUKOCYTE ESTERASE UR QL STRIP: ABNORMAL
LIPASE SERPL-CCNC: 23 U/L (ref 11–82)
LYMPHOCYTES # BLD AUTO: 1.97 THOUSAND/UL (ref 0.6–4.47)
LYMPHOCYTES # BLD AUTO: 24 % (ref 14–44)
MCH RBC QN AUTO: 25.9 PG (ref 26.8–34.3)
MCHC RBC AUTO-ENTMCNC: 31.4 G/DL (ref 31.4–37.4)
MCV RBC AUTO: 83 FL (ref 82–98)
MONOCYTES # BLD AUTO: 0.39 THOUSAND/UL (ref 0–1.22)
MONOCYTES NFR BLD: 6 % (ref 4–12)
MUCOUS THREADS UR QL AUTO: ABNORMAL
NEUTROPHILS # BLD MANUAL: 3.93 THOUSAND/UL (ref 1.85–7.62)
NEUTS BAND NFR BLD MANUAL: 2 % (ref 0–8)
NEUTS SEG NFR BLD AUTO: 58 % (ref 43–75)
NITRITE UR QL STRIP: NEGATIVE
NON-SQ EPI CELLS URNS QL MICRO: ABNORMAL /HPF
PH UR STRIP.AUTO: 6 [PH]
PLATELET # BLD AUTO: 271 THOUSANDS/UL (ref 149–390)
PLATELET BLD QL SMEAR: ADEQUATE
PMV BLD AUTO: 10.1 FL (ref 8.9–12.7)
POTASSIUM SERPL-SCNC: 3.2 MMOL/L (ref 3.5–5.3)
PROT SERPL-MCNC: 7.3 G/DL (ref 6.4–8.4)
PROT UR STRIP-MCNC: NEGATIVE MG/DL
RBC # BLD AUTO: 5.33 MILLION/UL (ref 3.81–5.12)
RBC #/AREA URNS AUTO: ABNORMAL /HPF
RBC MORPH BLD: NORMAL
SODIUM SERPL-SCNC: 136 MMOL/L (ref 135–147)
SP GR UR STRIP.AUTO: 1.01 (ref 1–1.03)
UROBILINOGEN UR STRIP-ACNC: <2 MG/DL
VARIANT LYMPHS # BLD AUTO: 6 %
WBC # BLD AUTO: 6.55 THOUSAND/UL (ref 4.31–10.16)
WBC #/AREA URNS AUTO: ABNORMAL /HPF

## 2025-05-27 PROCEDURE — 81001 URINALYSIS AUTO W/SCOPE: CPT | Performed by: STUDENT IN AN ORGANIZED HEALTH CARE EDUCATION/TRAINING PROGRAM

## 2025-05-27 PROCEDURE — 99285 EMERGENCY DEPT VISIT HI MDM: CPT | Performed by: EMERGENCY MEDICINE

## 2025-05-27 PROCEDURE — 85007 BL SMEAR W/DIFF WBC COUNT: CPT | Performed by: STUDENT IN AN ORGANIZED HEALTH CARE EDUCATION/TRAINING PROGRAM

## 2025-05-27 PROCEDURE — 81025 URINE PREGNANCY TEST: CPT | Performed by: STUDENT IN AN ORGANIZED HEALTH CARE EDUCATION/TRAINING PROGRAM

## 2025-05-27 PROCEDURE — 83690 ASSAY OF LIPASE: CPT | Performed by: STUDENT IN AN ORGANIZED HEALTH CARE EDUCATION/TRAINING PROGRAM

## 2025-05-27 PROCEDURE — 99284 EMERGENCY DEPT VISIT MOD MDM: CPT

## 2025-05-27 PROCEDURE — 80053 COMPREHEN METABOLIC PANEL: CPT | Performed by: STUDENT IN AN ORGANIZED HEALTH CARE EDUCATION/TRAINING PROGRAM

## 2025-05-27 PROCEDURE — 85027 COMPLETE CBC AUTOMATED: CPT | Performed by: STUDENT IN AN ORGANIZED HEALTH CARE EDUCATION/TRAINING PROGRAM

## 2025-05-27 PROCEDURE — 96365 THER/PROPH/DIAG IV INF INIT: CPT

## 2025-05-27 PROCEDURE — 96375 TX/PRO/DX INJ NEW DRUG ADDON: CPT

## 2025-05-27 PROCEDURE — 36415 COLL VENOUS BLD VENIPUNCTURE: CPT | Performed by: STUDENT IN AN ORGANIZED HEALTH CARE EDUCATION/TRAINING PROGRAM

## 2025-05-27 PROCEDURE — 74177 CT ABD & PELVIS W/CONTRAST: CPT

## 2025-05-27 RX ORDER — ONDANSETRON 2 MG/ML
4 INJECTION INTRAMUSCULAR; INTRAVENOUS ONCE
Status: COMPLETED | OUTPATIENT
Start: 2025-05-27 | End: 2025-05-27

## 2025-05-27 RX ORDER — CHOLESTYRAMINE 4 G/9G
4 POWDER, FOR SUSPENSION ORAL 2 TIMES DAILY WITH MEALS
Qty: 378 G | Refills: 0 | Status: SHIPPED | OUTPATIENT
Start: 2025-05-27

## 2025-05-27 RX ORDER — ONDANSETRON 4 MG/1
4 TABLET, FILM COATED ORAL EVERY 6 HOURS
Qty: 12 TABLET | Refills: 0 | Status: SHIPPED | OUTPATIENT
Start: 2025-05-27 | End: 2025-06-13

## 2025-05-27 RX ORDER — SODIUM CHLORIDE, SODIUM GLUCONATE, SODIUM ACETATE, POTASSIUM CHLORIDE, MAGNESIUM CHLORIDE, SODIUM PHOSPHATE, DIBASIC, AND POTASSIUM PHOSPHATE .53; .5; .37; .037; .03; .012; .00082 G/100ML; G/100ML; G/100ML; G/100ML; G/100ML; G/100ML; G/100ML
1000 INJECTION, SOLUTION INTRAVENOUS ONCE
Status: COMPLETED | OUTPATIENT
Start: 2025-05-27 | End: 2025-05-27

## 2025-05-27 RX ADMIN — IOHEXOL 50 ML: 240 INJECTION, SOLUTION INTRATHECAL; INTRAVASCULAR; INTRAVENOUS; ORAL at 10:02

## 2025-05-27 RX ADMIN — IOHEXOL 100 ML: 350 INJECTION, SOLUTION INTRAVENOUS at 12:31

## 2025-05-27 RX ADMIN — SODIUM CHLORIDE, SODIUM GLUCONATE, SODIUM ACETATE, POTASSIUM CHLORIDE, MAGNESIUM CHLORIDE, SODIUM PHOSPHATE, DIBASIC, AND POTASSIUM PHOSPHATE 1000 ML: .53; .5; .37; .037; .03; .012; .00082 INJECTION, SOLUTION INTRAVENOUS at 09:36

## 2025-05-27 RX ADMIN — ONDANSETRON 4 MG: 2 INJECTION INTRAMUSCULAR; INTRAVENOUS at 09:36

## 2025-05-27 NOTE — Clinical Note
Jenny Olvera was seen and treated in our emergency department on 5/27/2025.                Diagnosis:     Jenny  may return to work on return date.    She may return on this date: 05/28/2025         If you have any questions or concerns, please don't hesitate to call.      Dale Mosqueda MD    ______________________________           _______________          _______________  Hospital Representative                              Date                                Time

## 2025-05-27 NOTE — ED PROVIDER NOTES
Time reflects when diagnosis was documented in both MDM as applicable and the Disposition within this note       Time User Action Codes Description Comment    5/27/2025  3:25 PM Dale Mosqueda Add [R19.7] Diarrhea     5/27/2025  3:25 PM Dale Mosqueda [Z98.84] History of gastric bypass     5/27/2025  3:25 PM Dale Mosqueda Add [R11.0] Nausea           ED Disposition       ED Disposition   Discharge    Condition   Stable    Date/Time   Tue May 27, 2025  3:25 PM    Comment   Jenny Olvera discharge to home/self care.                   Assessment & Plan       Medical Decision Making  49-year-old female with complicated past medical history presents to the emergency department today for evaluation of epigastric discomfort/pain and diarrhea.  On examination she is seated in the bed in no apparent distress.  She is hemodynamically stable with reassuring vitals.  Examination reveals tenderness palpation of the epigastrium with some vague generalized sensation of discomfort.  Cardiopulmonary examination unremarkable.  No rashes bruises or other signs of traumatic injury.  Given patient's significant history, we will plan to scan the abdomen pelvis with both IV and oral contrast to rule out complications of the sleeve and bypass.  We will also perform labs including CBC CMP lipase and UA to rule out other intra-abdominal pathology given her history.  Blood and urine testing did not reveal any significant abnormalities that would otherwise explain her symptoms.  Scan of the abdomen did confirm intussusception which appears to be transient.  I discussed the case with the bariatric physician on-call and he advised her to follow-up in the clinic.  Coincidentally, the patient tells me she has an appointment in 2 days with her bariatric specialist.  We discussed worrisome symptoms which would require to come back to the department.  Prescription for symptomatic medications including Zofran called into her pharmacy of choice.  All  of her questions were answered and she feels comfortable with discharge at this time with strict return precautions.  She remained hemodynamically stable while under my care and is appropriate for discharge home with close follow-up.    Amount and/or Complexity of Data Reviewed  Labs: ordered.  Radiology: ordered.    Risk  Prescription drug management.             Medications   ondansetron (ZOFRAN) injection 4 mg (4 mg Intravenous Given 5/27/25 0936)   multi-electrolyte (Plasmalyte-A/Isolyte-S PH 7.4/Normosol-R) IV bolus 1,000 mL (0 mL Intravenous Stopped 5/27/25 1036)   iohexol (OMNIPAQUE) 240 MG/ML solution 50 mL (50 mL Oral Given 5/27/25 1002)   iohexol (OMNIPAQUE) 350 MG/ML injection (MULTI-DOSE) 100 mL (100 mL Intravenous Given 5/27/25 1231)       ED Risk Strat Scores                    No data recorded        SBIRT 20yo+      Flowsheet Row Most Recent Value   Initial Alcohol Screen: US AUDIT-C     1. How often do you have a drink containing alcohol? 0 Filed at: 05/27/2025 0856   2. How many drinks containing alcohol do you have on a typical day you are drinking?  1 Filed at: 05/27/2025 0856   3b. FEMALE Any Age, or MALE 65+: How often do you have 4 or more drinks on one occassion? 0 Filed at: 05/27/2025 0856   Audit-C Score 1 Filed at: 05/27/2025 0856   TAVARES: How many times in the past year have you...    Used an illegal drug or used a prescription medication for non-medical reasons? Never Filed at: 05/27/2025 0856                            History of Present Illness       Chief Complaint   Patient presents with    Diarrhea     C/o of watery stools for the past few days, also having lower back pain and nausea. States she started zepbound in April and just recently upped her dose.        Past Medical History[1]   Past Surgical History[2]   Family History[3]   Social History[4]   E-Cigarette/Vaping    E-Cigarette Use Never User       E-Cigarette/Vaping Substances    Nicotine No     THC No     CBD No      Flavoring No     Other No     Unknown No       I have reviewed and agree with the history as documented.     49-year-old female with history of diabetes, obesity, cholecystectomy, multiple gynecologic surgeries, gastric bypass and gastric sleeve about 1 year ago, presents emergency department today for epigastric discomfort.  She has an that she is on set boundaries increased her dose a few weeks ago.  She is concerned that she is having pancreatitis secondary to the medication due to the pain she is experiencing her epigastrium.  She also tells me she has had watery diarrhea for the past several days.  She says it has not been bloody.  She has had a vague sensation of nausea but no vomiting.  No fevers or chills.  Denies chest pain shortness of breath or other associated symptoms.  She has has not had any postoperative complications from the bypass or sleeve.        Review of Systems   Constitutional:  Positive for appetite change. Negative for activity change, chills, fatigue and fever.   Respiratory:  Negative for chest tightness and shortness of breath.    Cardiovascular:  Negative for chest pain and palpitations.   Gastrointestinal:  Positive for abdominal pain, diarrhea and nausea. Negative for blood in stool and vomiting.   Genitourinary:  Negative for dysuria.   Musculoskeletal:  Negative for back pain.   Skin:  Negative for rash.   All other systems reviewed and are negative.          Objective       ED Triage Vitals [05/27/25 0816]   Temperature Pulse Blood Pressure Respirations SpO2 Patient Position - Orthostatic VS   97.6 °F (36.4 °C) 100 159/89 18 98 % --      Temp Source Heart Rate Source BP Location FiO2 (%) Pain Score    Temporal Monitor -- -- --      Vitals      Date and Time Temp Pulse SpO2 Resp BP Pain Score FACES Pain Rating User   05/27/25 1403 -- 90 97 % 18 129/77 -- -- KIY   05/27/25 1037 -- 81 98 % 16 126/81 -- -- KIY   05/27/25 0816 97.6 °F (36.4 °C) 100 98 % 18 159/89 -- -- CS             Physical Exam  Vitals reviewed.   Constitutional:       General: She is not in acute distress.     Appearance: Normal appearance. She is not ill-appearing.   HENT:      Head: Normocephalic and atraumatic.      Mouth/Throat:      Mouth: Mucous membranes are moist.      Pharynx: Oropharynx is clear. No oropharyngeal exudate or posterior oropharyngeal erythema.     Eyes:      Pupils: Pupils are equal, round, and reactive to light.       Cardiovascular:      Rate and Rhythm: Normal rate and regular rhythm.      Pulses: Normal pulses.      Heart sounds: Normal heart sounds. No murmur heard.     No friction rub.   Pulmonary:      Effort: Pulmonary effort is normal. No respiratory distress.      Breath sounds: Normal breath sounds.   Abdominal:      General: Abdomen is flat. There is no distension.      Palpations: Abdomen is soft. There is no mass.      Tenderness: There is abdominal tenderness. There is no right CVA tenderness, left CVA tenderness or guarding.      Comments: Tenderness to palpation most prominent in the epigastrium     Skin:     General: Skin is warm and dry.      Findings: No bruising, erythema or rash.     Neurological:      Mental Status: She is alert and oriented to person, place, and time.     Psychiatric:         Mood and Affect: Mood normal.         Behavior: Behavior normal.         Results Reviewed       Procedure Component Value Units Date/Time    Urine Microscopic [[id number]]  (Abnormal) Collected: 05/27/25 1054    Lab Status: Final result Specimen: Urine, Clean Catch Updated: 05/27/25 1145     RBC, UA 2-4 /hpf      WBC, UA 4-10 /hpf      Epithelial Cells Occasional /hpf      Bacteria, UA None Seen /hpf      MUCUS THREADS Moderate     Hyaline Casts, UA 0-3 /lpf     UA w Reflex to Microscopic w Reflex to Culture [[id number]]  (Abnormal) Collected: 05/27/25 1054    Lab Status: Final result Specimen: Urine, Clean Catch Updated: 05/27/25 1104     Color, UA Light Yellow     Clarity, UA Clear      Specific Gravity, UA 1.014     pH, UA 6.0     Leukocytes, UA Moderate     Nitrite, UA Negative     Protein, UA Negative mg/dl      Glucose, UA Negative mg/dl      Ketones, UA 60 (2+) mg/dl      Urobilinogen, UA <2.0 mg/dl      Bilirubin, UA Negative     Occult Blood, UA Small    CBC and differential [[id number]]  (Abnormal) Collected: 05/27/25 0935    Lab Status: Final result Specimen: Blood from Arm, Right Updated: 05/27/25 1031     WBC 6.55 Thousand/uL      RBC 5.33 Million/uL      Hemoglobin 13.8 g/dL      Hematocrit 44.0 %      MCV 83 fL      MCH 25.9 pg      MCHC 31.4 g/dL      RDW 13.2 %      MPV 10.1 fL      Platelets 271 Thousands/uL     Narrative:      This is an appended report.  These results have been appended to a previously verified report.    Manual Differential(PHLEBS Do Not Order) [[id number]]  (Abnormal) Collected: 05/27/25 0935    Lab Status: Final result Specimen: Blood from Arm, Right Updated: 05/27/25 1031     Segmented % 58 %      Bands % 2 %      Lymphocytes % 24 %      Monocytes % 6 %      Eosinophils % 3 %      Basophils % 1 %      Atypical Lymphocytes % 6 %      Absolute Neutrophils 3.93 Thousand/uL      Absolute Lymphocytes 1.97 Thousand/uL      Absolute Monocytes 0.39 Thousand/uL      Absolute Eosinophils 0.20 Thousand/uL      Absolute Basophils 0.07 Thousand/uL      Total Counted --     RBC Morphology Normal     Platelet Estimate Adequate    RBC Morphology Reflex Test [[id number]] Collected: 05/27/25 0935    Lab Status: Final result Specimen: Blood from Arm, Right Updated: 05/27/25 1031    Lipase [[id number]]  (Normal) Collected: 05/27/25 0935    Lab Status: Final result Specimen: Blood from Arm, Right Updated: 05/27/25 1015     Lipase 23 u/L     Comprehensive metabolic panel [[id number]]  (Abnormal) Collected: 05/27/25 0935    Lab Status: Final result Specimen: Blood from Arm, Right Updated: 05/27/25 1015     Sodium 136 mmol/L      Potassium 3.2 mmol/L      Chloride 102 mmol/L      CO2  23 mmol/L      ANION GAP 11 mmol/L      BUN 11 mg/dL      Creatinine 0.55 mg/dL      Glucose 73 mg/dL      Calcium 9.5 mg/dL      AST 15 U/L      ALT 10 U/L      Alkaline Phosphatase 124 U/L      Total Protein 7.3 g/dL      Albumin 4.3 g/dL      Total Bilirubin 0.53 mg/dL      eGFR 110 ml/min/1.73sq m     Narrative:      National Kid[employer] guidelines for Chronic Kidney Disease (CKD):     Stage 1 with normal or high GFR (GFR > 90 mL/min/1.73 square meters)    Stage 2 Mild CKD (GFR = 60-89 mL/min/1.73 square meters)    Stage 3A Moderate CKD (GFR = 45-59 mL/min/1.73 square meters)    Stage 3B Moderate CKD (GFR = 30-44 mL/min/1.73 square meters)    Stage 4 Severe CKD (GFR = 15-29 mL/min/1.73 square meters)    Stage 5 End Stage CKD (GFR <15 mL/min/1.73 square meters)  Note: GFR calculation is accurate only with a steady state creatinine    POCT pregnancy, urine [[id number]]  (Normal) Collected: 05/27/25 0958    Lab Status: Final result Updated: 05/27/25 0958     EXT Preg Test, Ur Negative     Control Valid            CT abdomen pelvis with contrast   Final Interpretation by Hernandez Camejo MD (05/27 2965)      Suspected enteroenteric intussusception within the left mid abdomen, likely transient. No bowel obstruction.      Small calcification in the region of the distal left ureter measuring 3 mm, favored to represent phlebolith, however nonobstructing ureteral calculus not excluded. No hydronephrosis. Additional nonobstructing punctate left lower pole renal calculus.      Additional findings as above.            Resident: SONAM TEJADA I, the attending radiologist, have reviewed the images and agree with the final report above.      Workstation performed: [id number]             Procedures    ED Medication and Procedure Management   Prior to Admission Medications   Prescriptions Last Dose Informant Patient Reported? Taking?   Botox 200 units SOLR  Self Yes No   Sig: Every three months for migraines    Nerve Stimulator (Nerivio) ALIX  Self No No   Sig: Start treatment within 60 minutes of migraine onset. Set a strong, yet comfortable intensity level in the first few minutes and maintain that level for 45 minutes.   Patient not taking: Reported on 3/7/2025   Trudhesa 0.725 MG/ACT AERS  Self Yes No   Sig: if needed   Wheat Dextrin (Benefiber) POWD  Self No No   Sig: Take by mouth Daily at 2am   Patient not taking: Reported on 3/7/2025   albuterol (PROVENTIL HFA,VENTOLIN HFA) 90 mcg/act inhaler  Self No No   Sig: Inhale 2 puffs every 6 (six) hours as needed for wheezing or shortness of breath   butalbital-acetaminophen-caffeine (FIORICET,ESGIC) -40 mg per tablet  Self No No   Si tab q6 hours prn migraine. No more than 2-3 per week.   cholecalciferol (VITAMIN D3) 1,000 units tablet  Self Yes No   Sig: Take 1 capsule by mouth once a week 3000 units daily   cholestyramine (QUESTRAN) 4 GM/DOSE powder   No No   Sig: Take 1 packet (4 g total) by mouth 2 (two) times a day with meals   cyanocobalamin 1,000 mcg/mL  Self No No   Sig: Inject 1 mL (1,000 mcg total) into a muscle every 30 (thirty) days   dexamethasone (DECADRON) 2 mg tablet  Self No No   Si tab qam with food prn migraine.   dicyclomine (BENTYL) 20 mg tablet  Self No No   Sig: Take 1 tablet (20 mg total) by mouth 3 (three) times a day as needed (migraine/cramps)   divalproex sodium (DEPAKOTE) 250 mg DR tablet  Self No No   Si tabs q12 h x 2 days, then 1 tab q12 h x 2 days, then stop. Repeat if needed.   ergocalciferol (VITAMIN D2) 50,000 units  Self No No   Sig: Take 1 capsule (50,000 Units total) by mouth 2 (two) times a week   famotidine (PEPCID) 20 mg tablet  Self No No   Sig: Take 1 tablet (20 mg total) by mouth 2 (two) times a day as needed for heartburn (take as needed for symptoms of heart burn as discontinue ppi)   fluticasone (FLONASE) 50 mcg/act nasal spray  Self No No   Si spray in each nostril once daily   fremanezumab-[facility]  (Ajovy) 225 MG/1.5ML auto-injector  Self No No   Sig: Inject 1.5 mL (225 mg total) under the skin every 30 (thirty) days   lasmiditan succinate (Reyvow) 100 mg tablet  Self No No   Sig: One tab qhs at migraine onset.  Caution sedation.  Max 1 tab/24 hours.   meclizine (ANTIVERT) 12.5 MG tablet  Self No No   Sig: Take 1 tablet (12.5 mg total) by mouth 3 (three) times a day as needed for dizziness or nausea   melatonin 3 mg  Self Yes No   methocarbamol (ROBAXIN) 500 mg tablet  Self No No   Sig: Take 1 tablet (500 mg total) by mouth 4 (four) times a day as needed for muscle spasms   omeprazole (PriLOSEC OTC) 20 MG tablet  Self Yes No   Sig: Take 20 mg by mouth daily   ondansetron (ZOFRAN) 4 mg tablet  Self No No   Sig: Take 1 tablet (4 mg total) by mouth every 12 (twelve) hours as needed for nausea   polyethylene glycol (GLYCOLAX) 17 GM/SCOOP powder  Self Yes No   polyethylene glycol (MIRALAX) 17 g packet  Self No No   Sig: Take 17 g by mouth daily   polyethylene glycol (MIRALAX) 17 g packet  Self No No   Sig: Take 17 g by mouth daily   prochlorperazine (COMPAZINE) 10 mg tablet  Self No No   Sig: Take 1 tablet (10 mg total) by mouth every 6 (six) hours as needed for nausea or vomiting   tiZANidine (ZANAFLEX) 2 mg tablet  Self No No   Sig: Take 1 tablet (2 mg total) by mouth every 8 (eight) hours as needed for muscle spasms   traZODone (DESYREL) 50 mg tablet  Self No No   Sig: TAKE HALF TABLETS BY MOUTH DAILY AT BEDTIME   venlafaxine (EFFEXOR-XR) 37.5 mg 24 hr capsule  Self No No   Si cap BID      Facility-Administered Medications Last Administration Doses Remaining   cyanocobalamin injection 1,000 mcg 4/10/2025  9:56 AM    cyanocobalamin injection 1,000 mcg 2025 11:11 AM 10        Discharge Medication List as of 2025  3:26 PM        START taking these medications    Details   !! ondansetron (ZOFRAN) 4 mg tablet Take 1 tablet (4 mg total) by mouth every 6 (six) hours, Starting 2025, Normal       cholestyramine (QUESTRAN) 4 GM/DOSE powder Take 1 packet (4 g total) by mouth 2 (two) times a day with meals, Starting Tue 5/27/2025, Normal       !! - Potential duplicate medications found. Please discuss with provider.        CONTINUE these medications which have NOT CHANGED    Details   albuterol (PROVENTIL HFA,VENTOLIN HFA) 90 mcg/act inhaler Inhale 2 puffs every 6 (six) hours as needed for wheezing or shortness of breath, Starting Fri 2/24/2023, Normal      Botox 200 units SOLR Every three months for migraines, Starting Fri 1/7/2022, Historical Med      butalbital-acetaminophen-caffeine (FIORICET,ESGIC) -40 mg per tablet 1 tab q6 hours prn migraine. No more than 2-3 per week., Normal      cholecalciferol (VITAMIN D3) 1,000 units tablet Take 1 capsule by mouth once a week 3000 units daily, Starting Thu 4/13/2017, Historical Med      cyanocobalamin 1,000 mcg/mL Inject 1 mL (1,000 mcg total) into a muscle every 30 (thirty) days, Starting Tue 2/11/2025, Until Wed 2/11/2026, Normal      dexamethasone (DECADRON) 2 mg tablet 1 tab qam with food prn migraine., Normal      dicyclomine (BENTYL) 20 mg tablet Take 1 tablet (20 mg total) by mouth 3 (three) times a day as needed (migraine/cramps), Starting Mon 10/14/2024, Until Mon 2/17/2025 at 2359, Normal      divalproex sodium (DEPAKOTE) 250 mg DR tablet 2 tabs q12 h x 2 days, then 1 tab q12 h x 2 days, then stop. Repeat if needed., Normal      ergocalciferol (VITAMIN D2) 50,000 units Take 1 capsule (50,000 Units total) by mouth 2 (two) times a week, Starting Thu 2/20/2025, Normal      famotidine (PEPCID) 20 mg tablet Take 1 tablet (20 mg total) by mouth 2 (two) times a day as needed for heartburn (take as needed for symptoms of heart burn as discontinue ppi), Starting Thu 10/17/2024, Until Sun 10/12/2025 at 2359, Normal      fluticasone (FLONASE) 50 mcg/act nasal spray 1 spray in each nostril once daily, Normal      fremanezumab-vfrm (Ajovy) 225 MG/1.5ML  auto-injector Inject 1.5 mL (225 mg total) under the skin every 30 (thirty) days, Starting Thu 12/5/2024, Normal      lasmiditan succinate (Reyvow) 100 mg tablet One tab qhs at migraine onset.  Caution sedation.  Max 1 tab/24 hours., Normal      meclizine (ANTIVERT) 12.5 MG tablet Take 1 tablet (12.5 mg total) by mouth 3 (three) times a day as needed for dizziness or nausea, Starting Thu 6/15/2023, Normal      melatonin 3 mg Starting Wed 6/14/2023, Historical Med      methocarbamol (ROBAXIN) 500 mg tablet Take 1 tablet (500 mg total) by mouth 4 (four) times a day as needed for muscle spasms, Starting Thu 2/20/2025, Normal      Nerve Stimulator (Nerivio) ALIX Start treatment within 60 minutes of migraine onset. Set a strong, yet comfortable intensity level in the first few minutes and maintain that level for 45 minutes., Normal      omeprazole (PriLOSEC OTC) 20 MG tablet Take 20 mg by mouth daily, Historical Med      !! ondansetron (ZOFRAN) 4 mg tablet Take 1 tablet (4 mg total) by mouth every 12 (twelve) hours as needed for nausea, Starting Fri 1/12/2024, Normal      polyethylene glycol (GLYCOLAX) 17 GM/SCOOP powder Historical Med      !! polyethylene glycol (MIRALAX) 17 g packet Take 17 g by mouth daily, Starting Wed 10/16/2024, Normal      !! polyethylene glycol (MIRALAX) 17 g packet Take 17 g by mouth daily, Starting Thu 2/20/2025, Normal      prochlorperazine (COMPAZINE) 10 mg tablet Take 1 tablet (10 mg total) by mouth every 6 (six) hours as needed for nausea or vomiting, Starting Mon 8/14/2023, Normal      tiZANidine (ZANAFLEX) 2 mg tablet Take 1 tablet (2 mg total) by mouth every 8 (eight) hours as needed for muscle spasms, Starting Mon 3/8/2021, Normal      traZODone (DESYREL) 50 mg tablet TAKE HALF TABLETS BY MOUTH DAILY AT BEDTIME, Normal      Trudhesa 0.725 MG/ACT AERS if needed, Starting Mon 1/16/2023, Historical Med      venlafaxine (EFFEXOR-XR) 37.5 mg 24 hr capsule 1 cap BID, Normal      Wheat Dextrin  (Benefiber) POWD Take by mouth Daily at 2am, Starting Wed 10/16/2024, Normal       !! - Potential duplicate medications found. Please discuss with provider.        No discharge procedures on file.  ED SEPSIS DOCUMENTATION   Time reflects when diagnosis was documented in both MDM as applicable and the Disposition within this note       Time User Action Codes Description Comment    5/27/2025  3:25 PM Dale Mosqueda [R19.7] Diarrhea     5/27/2025  3:25 PM Dale Mosqueda [Z98.84] History of gastric bypass     5/27/2025  3:25 PM Dale Mosqueda Add [R11.0] Nausea                      [1]   Past Medical History:  Diagnosis Date    Anxiety     Asthma     Claustrophobia     Cluster headache     CTS (carpal tunnel syndrome)     Depression     Fibromyalgia     primary; last assessed 11/27/17    GERD (gastroesophageal reflux disease)     GI problem     H/O gastric sleeve     Headache, tension-type     Irritable bowel syndrome     Joint pain     Kidney stone     Lung nodule     last assessed 10/9/15    Migraine     Muscle pain     Peripheral neuropathy     PONV (postoperative nausea and vomiting)     Sjogren's syndrome (HCC)    [2]   Past Surgical History:  Procedure Laterality Date    BACK SURGERY      BARIATRIC SURGERY  8/9/2022    CARPAL TUNNEL RELEASE Bilateral     CHOLECYSTECTOMY      COLONOSCOPY      CYSTOSCOPY      GALLBLADDER SURGERY      GASTRECTOMY  08/09/2022    Sleeve    GASTRIC BYPASS LAPAROSCOPIC N/A 01/30/2024    Procedure: REVISION CONVERSION TO R-N-Y GASTRIC BYPASS W/ ROBOTICS AND INTRAOPERATIVE EGD; PARAESOPHAGEAL HERNIA REPAIR WITH MESH;  Surgeon: Felix Akins MD;  Location: [facility];  Service: Bariatrics    HERNIA REPAIR  08/09/2022    Apparently done at time gastric sleeve. Still have major issues with it.    NECK SURGERY      C4C5    FL CMBND ANTERPOST COLPORRAPHY W/CYSTO N/A 2/20/2025    Procedure: COLPORRHAPHY ANTERIOR & POSTERIOR;  Surgeon: Tom Hampton MD;  Location: [facility];  Service:  Gynecology    IA COLPOPEXY VAGINAL EXTRAPERITONEAL APPROACH N/A 2/20/2025    Procedure: COLPOPEXY VAGINAL EXTRAPERITONEAL (VEC) W/  EUA;  Surgeon: Tom Hampton MD;  Location: [facility];  Service: Gynecology    IA CYSTOURETHROSCOPY N/A 2/20/2025    Procedure: CYSTO;  Surgeon: Tom Hampton MD;  Location: [facility];  Service: Gynecology    IA INCISION EXTENSOR TENDON SHEATH WRIST Left 1/3/2025    Procedure: RELEASE DEQUERVAINS;  Surgeon: Gail Wylie MD;  [facility]: [facility];  Service: Orthopedics    IA TENDON SHEATH INCISION Right 01/16/2023    Procedure: THUMB TRIGGER FINGER RELEASE;  Surgeon: Gail Wylie MD;  [facility];  Service: Orthopedics    SACROILIAC JOINT FUSION Left     SLEEVE GASTROPLASTY  08/9/2022    SPINAL FUSION      C4 and C5    ULNAR NERVE REPAIR Bilateral     UPPER GASTROINTESTINAL ENDOSCOPY     [3]   Family History  Problem Relation Name Age of Onset    Diabetes Mother mother     Fibromyalgia Mother mother     Ovarian cancer Mother mother 33    Hypertension Mother mother     Thyroid disease Mother mother     Migraines Mother mother     Neuropathy Mother mother     Cancer Mother mother         has been removed    Arthritis Mother mother     COPD Mother mother     Depression Mother mother     No Known Problems Father Ramon     Throat cancer Maternal Grandmother      No Known Problems Maternal Grandfather      No Known Problems Paternal Grandmother      No Known Problems Paternal Grandfather      No Known Problems Daughter Xiasameera     Breast cancer Maternal Aunt Shira     Dementia Maternal Aunt Shira     No Known Problems Maternal Aunt Elizabeth     No Known Problems Maternal Aunt Virgenmina     No Known Problems Paternal Aunt Hosephina     Colon cancer Cousin      Heart disease Cousin      Lupus Cousin Dari     Arthritis Family      Heart disease Family          cardiac disorder    Neuropathy Family      Lupus Family          systemic erythematosus    No Known  Problems Half-Sister Tameka Colliera     No Known Problems Half-Brother ana lilia Peterson     No Known Problems Half-Brother Ana Lilia baljeet     No Known Problems Half-Brother nolbertoalxis     Asthma Brother Nicholas     Stroke Neg Hx     [4]   Social History  Tobacco Use    Smoking status: Never    Smokeless tobacco: Never    Tobacco comments:     Never smoked.   Vaping Use    Vaping status: Never Used   Substance Use Topics    Alcohol use: Yes     Alcohol/week: 2.0 standard drinks of alcohol     Types: 2 Glasses of wine per week     Comment: Occasionally    Drug use: Yes     Types: Marijuana     Comment: medicinal 1 week ago  capsules (has medial card)        Dale Mosqueda MD  05/28/25 0922

## 2025-05-27 NOTE — ED ATTENDING ATTESTATION
5/27/2025  I, Neelima Headley DO, saw and evaluated the patient. I have discussed the patient with the resident/non-physician practitioner and agree with the resident's/non-physician practitioner's findings, Plan of Care, and MDM as documented in the resident's/non-physician practitioner's note, except where noted. All available labs and Radiology studies were reviewed.  I was present for key portions of any procedure(s) performed by the resident/non-physician practitioner and I was immediately available to provide assistance.       At this point I agree with the current assessment done in the Emergency Department.  I have conducted an independent evaluation of this patient a history and physical is as follows:    Chief Complaint   Patient presents with    Diarrhea     C/o of watery stools for the past few days, also having lower back pain and nausea. States she started zepbound in April and just recently upped her dose.      49-year-old female presents with diarrhea for the past several days.  Patient states a few weeks ago she started an increased dose of Zepbound.  Also having some upper abdominal discomfort.  No vomiting.  Denies fevers.  Has extensive history of abdominal surgeries including gastric sleeve and gastric bypass.  On exam-no acute distress, heart regular, no respiratory distress, abdomen soft with mild tenderness in the upper abdomen.  Plan-given patient's extensive abdominal surgery history we will do CT to rule out intra-abdominal pathology, check labs, IV fluids.  Consider this may be viral syndrome, medication reaction, bowel obstruction seems less likely.    ED Course         Critical Care Time  Procedures

## 2025-05-27 NOTE — DISCHARGE INSTRUCTIONS
Please follow-up with your bariatric doctor for follow-up given your symptoms.  Return to the emergency department for any worrisome symptoms which we discussed during your visit today.

## 2025-05-29 ENCOUNTER — OFFICE VISIT (OUTPATIENT)
Dept: BARIATRICS | Facility: CLINIC | Age: 50
End: 2025-05-29
Payer: MEDICARE

## 2025-05-29 ENCOUNTER — TELEPHONE (OUTPATIENT)
Dept: INTERNAL MEDICINE CLINIC | Facility: CLINIC | Age: 50
End: 2025-05-29

## 2025-05-29 VITALS
BODY MASS INDEX: 32.69 KG/M2 | HEART RATE: 95 BPM | HEIGHT: 63 IN | WEIGHT: 184.5 LBS | OXYGEN SATURATION: 95 % | DIASTOLIC BLOOD PRESSURE: 82 MMHG | TEMPERATURE: 98.4 F | SYSTOLIC BLOOD PRESSURE: 124 MMHG

## 2025-05-29 DIAGNOSIS — K44.9 HIATAL HERNIA: ICD-10-CM

## 2025-05-29 DIAGNOSIS — R10.9 ABDOMINAL PAIN: Primary | ICD-10-CM

## 2025-05-29 PROCEDURE — 99213 OFFICE O/P EST LOW 20 MIN: CPT | Performed by: STUDENT IN AN ORGANIZED HEALTH CARE EDUCATION/TRAINING PROGRAM

## 2025-05-29 RX ORDER — CYANOCOBALAMIN 1000 UG/ML
1000 INJECTION, SOLUTION INTRAMUSCULAR; SUBCUTANEOUS
Qty: 3 ML | Refills: 3 | Status: SHIPPED | OUTPATIENT
Start: 2025-05-29 | End: 2026-05-29

## 2025-05-29 NOTE — PROGRESS NOTES
OFFICE VISIT - BARIATRIC SURGERY  Jenny Olvera 49 y.o. female MRN: [id number]  Unit/Bed#:  Encounter: [id number]      HPI:  Jenny Olvera is a 49 y.o. female status post REVISION CONVERSION TO R-N-Y GASTRIC BYPASS W/ ROBOTICS AND INTRAOPERATIVE EGD  by Dr. Osmel Akins on 1/30/2024. Comes to the office today for follow-up after ED visit.    Subjective     Presented to the ED for epigastric discomfort/pain and diarrhea. She reports having to constantly run to the bathroom due to diarrhea.    Her ED work up notable for a CT scan notable for diarrheal illness and small segment intussusception, likely transient. Her GI sx have overall improved. Denies fever, chills, and nausea.    Patient started on Zepbound Apr 11 and up to 5 mg recently.     She reports cont'ed reflux and does have a sensation of feels like she some food is gettnigs suck. Taking Zantac. Rhumatologiest didn't want her on PPI.    Review of Systems   Constitutional:  Negative for chills and fever.   HENT:  Negative for sore throat.    Eyes:  Negative for visual disturbance.   Respiratory:  Negative for cough and shortness of breath.    Gastrointestinal:  Negative for abdominal pain and vomiting.        Reflux and dysphagia   Musculoskeletal:  Negative for arthralgias.   Skin:  Negative for color change and rash.   Neurological:  Negative for seizures and syncope.   All other systems reviewed and are negative.      Historical Information   Past Medical History[1]  Past Surgical History[2]  Social History   Social History     Substance and Sexual Activity   Alcohol Use Yes    Alcohol/week: 2.0 standard drinks of alcohol    Types: 2 Glasses of wine per week    Comment: Occasionally     Social History     Substance and Sexual Activity   Drug Use Yes    Types: Marijuana    Comment: medicinal 1 week ago  capsules (has medial card)     Tobacco Use History[3]    Objective       Current Vitals:   Blood Pressure: 124/82 (05/29/25 1101)  Pulse: 95 (05/29/25  "1101)  Temperature: 98.4 °F (36.9 °C) (05/29/25 1101)  Temp Source: Tympanic (05/29/25 1101)  Height: 5' 2.7\" (159.3 cm) (05/29/25 1101)  Weight - Scale: 83.7 kg (184 lb 8 oz) (05/29/25 1101)  SpO2: 95 % (05/29/25 1101)    Invasive Devices       None                   Physical Exam  Vitals reviewed.   Constitutional:       Appearance: Normal appearance.   HENT:      Head: Atraumatic.      Nose: Nose normal.     Cardiovascular:      Pulses: Normal pulses.   Pulmonary:      Effort: Pulmonary effort is normal.     Musculoskeletal:         General: Normal range of motion.     Neurological:      General: No focal deficit present.     Psychiatric:         Behavior: Behavior normal.           Pathology, and Other Studies:       Workup includes:     CT ABDOMEN AND PELVIS WITH IV CONTRAST     INDICATION: Epigastric and back pain. Diarrhea.     COMPARISON: CT abdomen pelvis 08/30/2023     TECHNIQUE: CT examination of the abdomen and pelvis was performed. Multiplanar 2D reformatted images were created from the source data.     This examination, like all CT scans performed in the [facility], was performed utilizing techniques to minimize radiation dose exposure, including the use of iterative reconstruction and automated exposure control. Radiation dose length   product (DLP) for this visit: 685.52 mGy-cm.     IV Contrast: 100 mL of iohexol (OMNIPAQUE)  Enteric Contrast: Administered.     FINDINGS:     ABDOMEN     LOWER CHEST: Small hiatal hernia.     LIVER/BILIARY TREE: Mild prominence of common bile duct and central intrahepatic biliary tree, most likely sequela of prior cholecystectomy. Liver is otherwise unremarkable.     GALLBLADDER: Post cholecystectomy.     SPLEEN: Unremarkable.     PANCREAS: Unremarkable.     ADRENAL GLANDS: Unremarkable.     KIDNEYS/URETERS: Nonobstructing 1 mm left lower pole renal calculus. Small calcification in the region of the distal left ureter measuring 3 mm (2:140), favored to " represent phlebolith, however nonobstructing ureteral calculus not excluded. No   hydronephrosis.     STOMACH AND BOWEL: Postsurgical changes of Isela-en-Y gastric bypass. Few areas of collapsed and/or fluid-filled colon consistent with patient's diarrheal illness. Suspected enteroenteric intussusception within the left mid abdomen (2:69 and 602:85),   likely transient. No bowel obstruction.     APPENDIX: No findings to suggest appendicitis.     ABDOMINOPELVIC CAVITY: No ascites. No pneumoperitoneum. No lymphadenopathy size criteria. Cluster of mildly prominent right lower quadrant mesenteric lymph nodes, nonspecific.     VESSELS: Unremarkable for patient's age.     PELVIS     REPRODUCTIVE ORGANS: IUD within the endometrial cavity, in satisfactory position.     URINARY BLADDER: Unremarkable within limits of underdistention.     ABDOMINAL WALL/INGUINAL REGIONS: Small fat-containing umbilical hernia.     BONES: No acute fracture or suspicious osseous lesion. Left-sided sacroiliac joint fusion hardware.     IMPRESSION:     Suspected enteroenteric intussusception within the left mid abdomen, likely transient. No bowel obstruction.     Small calcification in the region of the distal left ureter measuring 3 mm, favored to represent phlebolith, however nonobstructing ureteral calculus not excluded. No hydronephrosis. Additional nonobstructing punctate left lower pole renal calculus.     Additional findings as above.     UGI  UPPER GI SERIES - SINGLE CONTRAST     INDICATION: Z98.84: Bariatric surgery status.     COMPARISON: Upper GI series 1/31/2024. EGD report 1/29/2025. CT abdomen pelvis 8/30/2023.     TECHNIQUE: The patient was given barium by mouth and images of the esophagus, stomach, and small bowel were obtained.     IMAGES: 93     FLUOROSCOPY TIME: 1 minute 40 seconds     FINDINGS:     The esophagus is normal in caliber. Esophageal motility is normal and emptying of contrast from the esophagus is prompt. There is no  evidence of discrete mucosal mass, ulceration or fold thickening identified.     Expected postsurgical changes from Isela-en-Y gastric bypass are noted. Gastric pouch demonstrates an unremarkable appearance. No evidence of discrete ulcer or mass within the limitations of single contrast technique. Contrast promptly passes beyond the   gastrojejunostomy.     A small transit sliding-type hiatal hernia is evident when the patient is prone. Mild gastroesophageal reflux was also observed while the patient was prone.     IMPRESSION:     1. Small transient sliding-type hiatal hernia and mild gastroesophageal reflux, both only evident when the patient is prone.     2. Postsurgical changes in keeping with prior Isela-en-Y gastric bypass.  EGD     DATE OF SERVICE:  1/29/25     PHYSICIAN(S):  Attending:   Felix Akins MD      Fellow:   Kris Lew MD         INDICATION:  Bariatric surgery status     POST-OP DIAGNOSIS:  See the impression below.     PREPROCEDURE:  Informed consent was obtained for the procedure, including sedation.  Risks of perforation, hemorrhage, adverse drug reaction and aspiration were discussed. The patient was placed in the left lateral decubitus position.     Patient was explained about the risks and benefits of the procedure. Risks including but not limited to bleeding, infection, and perforation were explained in detail. Also explained about less than 100% sensitivity with the exam and other alternatives.     PROCEDURE: EGD     DETAILS OF PROCEDURE:   Patient was taken to the procedure room where a time out was performed to confirm correct patient and correct procedure. The patient underwent monitored anesthesia care, which was administered by an anesthesia professional. The patient's blood pressure, heart rate, level of consciousness, respirations, oxygen, ECG and ETCO2 were monitored throughout the procedure. The scope was introduced through the mouth and advanced to the second part of the  duodenum. Retroflexion was performed in the fundus. The patient experienced no blood loss. The procedure was not difficult. The patient tolerated the procedure well. There were no apparent adverse events.      ANESTHESIA INFORMATION:  ASA: II  Anesthesia Type: IV Sedation with Anesthesia     MEDICATIONS:  No administrations occurring from 1105 to 1109 on 01/29/25         FINDINGS:  Regular Z-line 35 cm from the incisors  Small sliding hiatal hernia (type I hiatal hernia) - GE junction 35 cm from the incisors, diaphragmatic impression 37 cm from the incisors  The esophagus, gastric pouch, gastric suture site and gastrojejunostomy appeared normal.  Performed single forceps biopsy in the gastric pouch to rule out H. pylori     Pathology from EGD   No H. Pylori     Esophageal manometry/24 hour pH  HPI/Indication: 49-year-old female currently following with gastroenterology as well as bariatric surgery.  Prior history of significant GERD.  In the past, patient had sleeve gastrectomy (8/2022) followed by conversion to Isela-en-Y gastric bypass (1/2024) with hiatal hernia repair.  Patient now with complaints of ongoing GERD and dysphagia. In 5/2024, patient did have EGD which revealed mild narrowing at the GJ junction at 10 mm that was dilated to 12 mm with CRE balloon. Most recent EGD 1/29/2025 which revealed a 2 cm hiatal hernia but otherwise normal-appearing examination. FL UGI completed 2/2025 showed small transient sliding-type hiatal hernia and mild gastroesophageal reflux, evident only when patient in prone position.     Manometry Findings: Out of the 10 completed liquid swallows, patient with 3 weakened and 1 failed swallow.  Additional swallows completed with intact contractile pattern and normal distal latency.  Median IRP 2 mmHg and mean  mmHg.s.cm.  3.3 cm hiatal hernia appreciated.     Impedence Findings: 60% of the liquid swallows and 28.6% of the viscous swallows completely cleared.     [place]  "Classification: Normal esophageal motility with     24 Hour pH Study (Completed off PPI x5 days):  Acid exposure time in upright position: 1.2%  Acid exposure time in recumbent position: 0.3%  DeMeester Score: 3.8  45 episodes of reflux overall   31 episodes in the upright position - 2 were acid and 29 weakly acid related  14 episodes in the recumbent position - 5 were acid and 9 weakly acid related  Symptom correlation: No significant symptom correlation  \"Heartburn/reflux\" with symptom index of 15.2% and probability of 0%  \"Belching\" with symptom index of 0% and probability of 0%  \"Air bubbles, gurgles\" with symptom index of 0% and probability of 0%  \"Some check pain with heart burn\" with symptom index of 0% and probability of 0%     Overall pH Study Impression: Findings overall no consistent with pathologic reflux    CT CHEST WITH IV CONTRAST     INDICATION: K44.9: Diaphragmatic hernia without obstruction or gangrene  K21.9: Gastro-esophageal reflux disease without esophagitis.     COMPARISON: Chest CT 10/15/2015. Upper GI 2/4/2025.     TECHNIQUE: CT examination of the chest was performed. Multiplanar 2D reformatted images were created from the source data.     This examination, like all CT scans performed in the [facility], was performed utilizing techniques to minimize radiation dose exposure, including the use of iterative reconstruction and automated exposure control. Radiation dose length   product (DLP) for this visit: 454.81 mGy-cm     IV Contrast: 50 mL of iohexol     FINDINGS:     LUNGS: Lungs are clear. No tracheal or endobronchial lesion.     PLEURA: Unremarkable.     HEART/GREAT VESSELS: Normal heart size. Mild coronary artery calcifications. No thoracic aortic aneurysm.     MEDIASTINUM AND TAMMI: Small hiatal hernia. No mediastinal or hilar lymphadenopathy.     CHEST WALL AND LOWER NECK: Unremarkable.     VISUALIZED STRUCTURES IN THE UPPER ABDOMEN: Status post Isela-en-Y gastric bypass " and cholecystectomy.     OSSEOUS STRUCTURES: No acute fracture or destructive osseous lesion.     IMPRESSION:     Small hiatal hernia.     Mild coronary artery calcifications indicative of ASCVD.    --------------------------------------------------------------------    Assessment/PLAN:    Jenny Olvera is a 49 y.o. female status post REVISION CONVERSION TO R-N-Y GASTRIC BYPASS W/ ROBOTICS AND INTRAOPERATIVE EGD  by Dr. Osmel Akins on 1/30/2024. Comes to the office today for follow-up after ED visit.    Her ED visit likely due to a viral GI illness and has already started to improve.     She is having reflux and dysphagia in the setting of Zepbound. We discussed her symptoms. She is on Zantac and cont to have sx. Won't tolerate PPI. She does have a hiatal hernia which is probably around 2-3 cm in size. 24 hr study was negative for pathologic reflux. CT chest shows a small HH with a portion of the staple line in the chest.    Will have patient follow-up with Dr. Osmel Akins to evaluate for worsening dysphagia and discuss HHR with Enform Mesh.          Kris Lew  Bariatric Surgery  5/29/2025  11:17 AM         [1]   Past Medical History:  Diagnosis Date    Anxiety     Asthma     Claustrophobia     Cluster headache     CTS (carpal tunnel syndrome)     Depression     Fibromyalgia     primary; last assessed 11/27/17    GERD (gastroesophageal reflux disease)     GI problem     H/O gastric sleeve     Headache, tension-type     Irritable bowel syndrome     Joint pain     Kidney stone     Lung nodule     last assessed 10/9/15    Migraine     Muscle pain     Peripheral neuropathy     PONV (postoperative nausea and vomiting)     Sjogren's syndrome (HCC)    [2]   Past Surgical History:  Procedure Laterality Date    BACK SURGERY      BARIATRIC SURGERY  8/9/2022    CARPAL TUNNEL RELEASE Bilateral     CHOLECYSTECTOMY      COLONOSCOPY      CYSTOSCOPY      GALLBLADDER SURGERY      GASTRECTOMY  08/09/2022    Sleeve    GASTRIC BYPASS  LAPAROSCOPIC N/A 01/30/2024    Procedure: REVISION CONVERSION TO R-N-Y GASTRIC BYPASS W/ ROBOTICS AND INTRAOPERATIVE EGD; PARAESOPHAGEAL HERNIA REPAIR WITH MESH;  Surgeon: Felix Akins MD;  Location: [facility];  Service: Bariatrics    HERNIA REPAIR  08/09/2022    Apparently done at time gastric sleeve. Still have major issues with it.    NECK SURGERY      C4C5    HI CMBND ANTERPOST COLPORRAPHY W/CYSTO N/A 2/20/2025    Procedure: COLPORRHAPHY ANTERIOR & POSTERIOR;  Surgeon: Tom Hapmton MD;  Location: [facility];  Service: Gynecology    HI COLPOPEXY VAGINAL EXTRAPERITONEAL APPROACH N/A 2/20/2025    Procedure: COLPOPEXY VAGINAL EXTRAPERITONEAL (VEC) W/  EUA;  Surgeon: Tom Hampton MD;  [facility];  Service: Gynecology    HI CYSTOURETHROSCOPY N/A 2/20/2025    Procedure: CYSTO;  Surgeon: Tom Hampton MD;  [facility];  Service: Gynecology    HI INCISION EXTENSOR TENDON SHEATH WRIST Left 1/3/2025    Procedure: RELEASE DEQUERVAINS;  Surgeon: Gail Wylie MD;  [facility];  Service: Orthopedics    HI TENDON SHEATH INCISION Right 01/16/2023    Procedure: THUMB TRIGGER FINGER RELEASE;  Surgeon: Gail Wylie MD;  [facility];  Service: Orthopedics    SACROILIAC JOINT FUSION Left     SLEEVE GASTROPLASTY  08/9/2022    SPINAL FUSION      C4 and C5    ULNAR NERVE REPAIR Bilateral     UPPER GASTROINTESTINAL ENDOSCOPY     [3]   Social History  Tobacco Use   Smoking Status Never   Smokeless Tobacco Never   Tobacco Comments    Never smoked.

## 2025-05-29 NOTE — TELEPHONE ENCOUNTER
No Show #1 patient called an hour before her appt stating she had a viral stomach infection and was not feeling well. Explained no show policy and she understood. Patient will be sent a reminder card to call for her AWV

## 2025-05-29 NOTE — PROGRESS NOTES
Date of surgery:1/30/2021  Procedure:RNY  Performing surgeon:Dr.El Akins    Initial Weight - 263.6 Ibs  Current Weight -184.5 Ibs  Carroll Weight - 184.5 Ibs  Total Body Weight Loss (EWL)- 79.1  EWL% - 64%  TWB % -30%    SB-1/8    For *NEW/TRANSFER* patients only: Who referred the patient? Please provide name and specialty (PCP, GI, etc.).

## 2025-06-09 ENCOUNTER — CLINICAL SUPPORT (OUTPATIENT)
Dept: INTERNAL MEDICINE CLINIC | Facility: CLINIC | Age: 50
End: 2025-06-09

## 2025-06-09 ENCOUNTER — TELEPHONE (OUTPATIENT)
Dept: INTERNAL MEDICINE CLINIC | Facility: CLINIC | Age: 50
End: 2025-06-09

## 2025-06-09 DIAGNOSIS — Z13.9 SCREENING DUE: Primary | ICD-10-CM

## 2025-06-09 DIAGNOSIS — E53.8 VITAMIN B12 DEFICIENCY: Primary | ICD-10-CM

## 2025-06-09 PROCEDURE — 96372 THER/PROPH/DIAG INJ SC/IM: CPT | Performed by: INTERNAL MEDICINE

## 2025-06-09 RX ADMIN — CYANOCOBALAMIN 1000 MCG: 1000 INJECTION, SOLUTION INTRAMUSCULAR; SUBCUTANEOUS at 09:00

## 2025-06-09 NOTE — PROGRESS NOTES
OFFICE VISIT - BARIATRIC SURGERY  Jenny Olvera 49 y.o. female MRN: [id number]  Unit/Bed#:  Encounter: [id number]      HPI:  Jenny Olvera is a 49 y.o. female status post REVISION CONVERSION TO R-N-Y GASTRIC BYPASS W/ ROBOTICS AND INTRAOPERATIVE EGD  by Dr. Osmel Akins on 1/30/2024. Comes to the office today for follow-up after ED visit.    Subjective     Presented to the ED for epigastric discomfort/pain and diarrhea. She reports having to constantly run to the bathroom due to diarrhea.    Her ED work up notable for a CT scan notable for diarrheal illness and small segment intussusception, likely transient. Her GI sx have overall improved. Denies fever, chills, and nausea.    Patient started on Zepbound Apr 11 and up to 5 mg recently.     She reports cont'ed reflux and does have a sensation of feels like she some food is gettnigs suck. Taking Zantac. Rhumatologiest didn't want her on PPI.    Review of Systems   Constitutional:  Negative for chills and fever.   HENT:  Negative for sore throat.    Eyes:  Negative for visual disturbance.   Respiratory:  Negative for cough and shortness of breath.    Gastrointestinal:  Negative for abdominal pain and vomiting.        Reflux and dysphagia   Musculoskeletal:  Negative for arthralgias.   Skin:  Negative for color change and rash.   Neurological:  Negative for seizures and syncope.   All other systems reviewed and are negative.      Historical Information   Past Medical History[1]  Past Surgical History[2]  Social History   Social History     Substance and Sexual Activity   Alcohol Use Yes    Alcohol/week: 2.0 standard drinks of alcohol    Types: 2 Glasses of wine per week    Comment: Occasionally     Social History     Substance and Sexual Activity   Drug Use Yes    Types: Marijuana    Comment: medicinal 1 week ago  capsules (has medial card)     Tobacco Use History[3]    Objective       Current Vitals:   Blood Pressure: 124/82 (05/29/25 1101)  Pulse: 95 (05/29/25  "1101)  Temperature: 98.4 °F (36.9 °C) (05/29/25 1101)  Temp Source: Tympanic (05/29/25 1101)  Height: 5' 2.7\" (159.3 cm) (05/29/25 1101)  Weight - Scale: 83.7 kg (184 lb 8 oz) (05/29/25 1101)  SpO2: 95 % (05/29/25 1101)    Invasive Devices       None                   Physical Exam  Vitals reviewed.   Constitutional:       Appearance: Normal appearance.   HENT:      Head: Atraumatic.      Nose: Nose normal.     Cardiovascular:      Pulses: Normal pulses.   Pulmonary:      Effort: Pulmonary effort is normal.     Musculoskeletal:         General: Normal range of motion.     Neurological:      General: No focal deficit present.     Psychiatric:         Behavior: Behavior normal.           Pathology, and Other Studies:       Workup includes:     CT ABDOMEN AND PELVIS WITH IV CONTRAST     INDICATION: Epigastric and back pain. Diarrhea.     COMPARISON: CT abdomen pelvis 08/30/2023     TECHNIQUE: CT examination of the abdomen and pelvis was performed. Multiplanar 2D reformatted images were created from the source data.     This examination, like all CT scans performed in the [facility], was performed utilizing techniques to minimize radiation dose exposure, including the use of iterative reconstruction and automated exposure control. Radiation dose length   product (DLP) for this visit: 685.52 mGy-cm.     IV Contrast: 100 mL of iohexol (OMNIPAQUE)  Enteric Contrast: Administered.     FINDINGS:     ABDOMEN     LOWER CHEST: Small hiatal hernia.     LIVER/BILIARY TREE: Mild prominence of common bile duct and central intrahepatic biliary tree, most likely sequela of prior cholecystectomy. Liver is otherwise unremarkable.     GALLBLADDER: Post cholecystectomy.     SPLEEN: Unremarkable.     PANCREAS: Unremarkable.     ADRENAL GLANDS: Unremarkable.     KIDNEYS/URETERS: Nonobstructing 1 mm left lower pole renal calculus. Small calcification in the region of the distal left ureter measuring 3 mm (2:140), favored to " represent phlebolith, however nonobstructing ureteral calculus not excluded. No   hydronephrosis.     STOMACH AND BOWEL: Postsurgical changes of Isela-en-Y gastric bypass. Few areas of collapsed and/or fluid-filled colon consistent with patient's diarrheal illness. Suspected enteroenteric intussusception within the left mid abdomen (2:69 and 602:85),   likely transient. No bowel obstruction.     APPENDIX: No findings to suggest appendicitis.     ABDOMINOPELVIC CAVITY: No ascites. No pneumoperitoneum. No lymphadenopathy size criteria. Cluster of mildly prominent right lower quadrant mesenteric lymph nodes, nonspecific.     VESSELS: Unremarkable for patient's age.     PELVIS     REPRODUCTIVE ORGANS: IUD within the endometrial cavity, in satisfactory position.     URINARY BLADDER: Unremarkable within limits of underdistention.     ABDOMINAL WALL/INGUINAL REGIONS: Small fat-containing umbilical hernia.     BONES: No acute fracture or suspicious osseous lesion. Left-sided sacroiliac joint fusion hardware.     IMPRESSION:     Suspected enteroenteric intussusception within the left mid abdomen, likely transient. No bowel obstruction.     Small calcification in the region of the distal left ureter measuring 3 mm, favored to represent phlebolith, however nonobstructing ureteral calculus not excluded. No hydronephrosis. Additional nonobstructing punctate left lower pole renal calculus.     Additional findings as above.     UGI  UPPER GI SERIES - SINGLE CONTRAST     INDICATION: Z98.84: Bariatric surgery status.     COMPARISON: Upper GI series 1/31/2024. EGD report 1/29/2025. CT abdomen pelvis 8/30/2023.     TECHNIQUE: The patient was given barium by mouth and images of the esophagus, stomach, and small bowel were obtained.     IMAGES: 93     FLUOROSCOPY TIME: 1 minute 40 seconds     FINDINGS:     The esophagus is normal in caliber. Esophageal motility is normal and emptying of contrast from the esophagus is prompt. There is no  evidence of discrete mucosal mass, ulceration or fold thickening identified.     Expected postsurgical changes from Isela-en-Y gastric bypass are noted. Gastric pouch demonstrates an unremarkable appearance. No evidence of discrete ulcer or mass within the limitations of single contrast technique. Contrast promptly passes beyond the   gastrojejunostomy.     A small transit sliding-type hiatal hernia is evident when the patient is prone. Mild gastroesophageal reflux was also observed while the patient was prone.     IMPRESSION:     1. Small transient sliding-type hiatal hernia and mild gastroesophageal reflux, both only evident when the patient is prone.     2. Postsurgical changes in keeping with prior Isela-en-Y gastric bypass.  EGD     DATE OF SERVICE:  1/29/25     PHYSICIAN(S):  Attending:   Felix Akins MD      Fellow:   Kris Lew MD         INDICATION:  Bariatric surgery status     POST-OP DIAGNOSIS:  See the impression below.     PREPROCEDURE:  Informed consent was obtained for the procedure, including sedation.  Risks of perforation, hemorrhage, adverse drug reaction and aspiration were discussed. The patient was placed in the left lateral decubitus position.     Patient was explained about the risks and benefits of the procedure. Risks including but not limited to bleeding, infection, and perforation were explained in detail. Also explained about less than 100% sensitivity with the exam and other alternatives.     PROCEDURE: EGD     DETAILS OF PROCEDURE:   Patient was taken to the procedure room where a time out was performed to confirm correct patient and correct procedure. The patient underwent monitored anesthesia care, which was administered by an anesthesia professional. The patient's blood pressure, heart rate, level of consciousness, respirations, oxygen, ECG and ETCO2 were monitored throughout the procedure. The scope was introduced through the mouth and advanced to the second part of the  duodenum. Retroflexion was performed in the fundus. The patient experienced no blood loss. The procedure was not difficult. The patient tolerated the procedure well. There were no apparent adverse events.      ANESTHESIA INFORMATION:  ASA: II  Anesthesia Type: IV Sedation with Anesthesia     MEDICATIONS:  No administrations occurring from 1105 to 1109 on 01/29/25         FINDINGS:  Regular Z-line 35 cm from the incisors  Small sliding hiatal hernia (type I hiatal hernia) - GE junction 35 cm from the incisors, diaphragmatic impression 37 cm from the incisors  The esophagus, gastric pouch, gastric suture site and gastrojejunostomy appeared normal.  Performed single forceps biopsy in the gastric pouch to rule out H. pylori     Pathology from EGD   No H. Pylori     Esophageal manometry/24 hour pH  HPI/Indication: 49-year-old female currently following with gastroenterology as well as bariatric surgery.  Prior history of significant GERD.  In the past, patient had sleeve gastrectomy (8/2022) followed by conversion to Isela-en-Y gastric bypass (1/2024) with hiatal hernia repair.  Patient now with complaints of ongoing GERD and dysphagia. In 5/2024, patient did have EGD which revealed mild narrowing at the GJ junction at 10 mm that was dilated to 12 mm with CRE balloon. Most recent EGD 1/29/2025 which revealed a 2 cm hiatal hernia but otherwise normal-appearing examination. FL UGI completed 2/2025 showed small transient sliding-type hiatal hernia and mild gastroesophageal reflux, evident only when patient in prone position.     Manometry Findings: Out of the 10 completed liquid swallows, patient with 3 weakened and 1 failed swallow.  Additional swallows completed with intact contractile pattern and normal distal latency.  Median IRP 2 mmHg and mean  mmHg.s.cm.  3.3 cm hiatal hernia appreciated.     Impedence Findings: 60% of the liquid swallows and 28.6% of the viscous swallows completely cleared.     [place]  "Classification: Normal esophageal motility with     24 Hour pH Study (Completed off PPI x5 days):  Acid exposure time in upright position: 1.2%  Acid exposure time in recumbent position: 0.3%  DeMeester Score: 3.8  45 episodes of reflux overall   31 episodes in the upright position - 2 were acid and 29 weakly acid related  14 episodes in the recumbent position - 5 were acid and 9 weakly acid related  Symptom correlation: No significant symptom correlation  \"Heartburn/reflux\" with symptom index of 15.2% and probability of 0%  \"Belching\" with symptom index of 0% and probability of 0%  \"Air bubbles, gurgles\" with symptom index of 0% and probability of 0%  \"Some check pain with heart burn\" with symptom index of 0% and probability of 0%     Overall pH Study Impression: Findings overall no consistent with pathologic reflux    CT CHEST WITH IV CONTRAST     INDICATION: K44.9: Diaphragmatic hernia without obstruction or gangrene  K21.9: Gastro-esophageal reflux disease without esophagitis.     COMPARISON: Chest CT 10/15/2015. Upper GI 2/4/2025.     TECHNIQUE: CT examination of the chest was performed. Multiplanar 2D reformatted images were created from the source data.     This examination, like all CT scans performed in the [facility], was performed utilizing techniques to minimize radiation dose exposure, including the use of iterative reconstruction and automated exposure control. Radiation dose length   product (DLP) for this visit: 454.81 mGy-cm     IV Contrast: 50 mL of iohexol     FINDINGS:     LUNGS: Lungs are clear. No tracheal or endobronchial lesion.     PLEURA: Unremarkable.     HEART/GREAT VESSELS: Normal heart size. Mild coronary artery calcifications. No thoracic aortic aneurysm.     MEDIASTINUM AND TAMMI: Small hiatal hernia. No mediastinal or hilar lymphadenopathy.     CHEST WALL AND LOWER NECK: Unremarkable.     VISUALIZED STRUCTURES IN THE UPPER ABDOMEN: Status post Isela-en-Y gastric bypass " and cholecystectomy.     OSSEOUS STRUCTURES: No acute fracture or destructive osseous lesion.     IMPRESSION:     Small hiatal hernia.     Mild coronary artery calcifications indicative of ASCVD.    --------------------------------------------------------------------    Assessment/PLAN:    Jenny Olvera is a 49 y.o. female status post REVISION CONVERSION TO R-N-Y GASTRIC BYPASS W/ ROBOTICS AND INTRAOPERATIVE EGD  by Dr. Osmel Akins on 1/30/2024. Comes to the office today for follow-up after ED visit.    Her ED visit likely due to a viral GI illness and has already started to improve.     She is having reflux and dysphagia in the setting of Zepbound. We discussed her symptoms. She is on Zantac and cont to have sx. Won't tolerate PPI. She does have a hiatal hernia which is probably around 2-3 cm in size. 24 hr study was negative for pathologic reflux. CT chest shows a small HH with a portion of the staple line in the chest.    Will have patient follow-up with Dr. Osmel Akins to evaluate for worsening dysphagia and discuss HHR with Enform Mesh.          Kris Lew  Bariatric Surgery  6/9/2025  10:07 AM         [1]   Past Medical History:  Diagnosis Date    Anxiety     Asthma     Claustrophobia     Cluster headache     CTS (carpal tunnel syndrome)     Depression     Fibromyalgia     primary; last assessed 11/27/17    GERD (gastroesophageal reflux disease)     GI problem     H/O gastric sleeve     Headache, tension-type     Irritable bowel syndrome     Joint pain     Kidney stone     Lung nodule     last assessed 10/9/15    Migraine     Muscle pain     Peripheral neuropathy     PONV (postoperative nausea and vomiting)     Sjogren's syndrome (HCC)    [2]   Past Surgical History:  Procedure Laterality Date    BACK SURGERY      BARIATRIC SURGERY  8/9/2022    CARPAL TUNNEL RELEASE Bilateral     CHOLECYSTECTOMY      COLONOSCOPY      CYSTOSCOPY      GALLBLADDER SURGERY      GASTRECTOMY  08/09/2022    Sleeve    GASTRIC BYPASS  LAPAROSCOPIC N/A 01/30/2024    Procedure: REVISION CONVERSION TO R-N-Y GASTRIC BYPASS W/ ROBOTICS AND INTRAOPERATIVE EGD; PARAESOPHAGEAL HERNIA REPAIR WITH MESH;  Surgeon: Felix Akins MD;  Location: [facility];  Service: Bariatrics    HERNIA REPAIR  08/09/2022    Apparently done at time gastric sleeve. Still have major issues with it.    NECK SURGERY      C4C5    MO CMBND ANTERPOST COLPORRAPHY W/CYSTO N/A 2/20/2025    Procedure: COLPORRHAPHY ANTERIOR & POSTERIOR;  Surgeon: Tom Hampton MD;  Location: [facility];  Service: Gynecology    MO COLPOPEXY VAGINAL EXTRAPERITONEAL APPROACH N/A 2/20/2025    Procedure: COLPOPEXY VAGINAL EXTRAPERITONEAL (VEC) W/  EUA;  Surgeon: Tom Hampton MD;  [facility];  Service: Gynecology    MO CYSTOURETHROSCOPY N/A 2/20/2025    Procedure: CYSTO;  Surgeon: Tom Hampton MD;  [facility];  Service: Gynecology    MO INCISION EXTENSOR TENDON SHEATH WRIST Left 1/3/2025    Procedure: RELEASE DEQUERVAINS;  Surgeon: Gail Wylie MD;  [facility];  Service: Orthopedics    MO TENDON SHEATH INCISION Right 01/16/2023    Procedure: THUMB TRIGGER FINGER RELEASE;  Surgeon: Gail Wylie MD;  [facility];  Service: Orthopedics    SACROILIAC JOINT FUSION Left     SLEEVE GASTROPLASTY  08/9/2022    SPINAL FUSION      C4 and C5    ULNAR NERVE REPAIR Bilateral     UPPER GASTROINTESTINAL ENDOSCOPY     [3]   Social History  Tobacco Use   Smoking Status Never   Smokeless Tobacco Never   Tobacco Comments    Never smoked.

## 2025-06-09 NOTE — PROGRESS NOTES
Patient is here today 06/09/25 for her monthly B12 injection. Injected 1mL into the LEFT deltoid. Patient tolerated well and made aware to return in 30 days for her next injection. Patient supplies her B12's and has 4 vials left in office.    NDC 76672-853-69

## 2025-06-13 ENCOUNTER — PROCEDURE VISIT (OUTPATIENT)
Dept: NEUROLOGY | Facility: CLINIC | Age: 50
End: 2025-06-13
Payer: COMMERCIAL

## 2025-06-13 VITALS — DIASTOLIC BLOOD PRESSURE: 70 MMHG | SYSTOLIC BLOOD PRESSURE: 112 MMHG | TEMPERATURE: 97.7 F

## 2025-06-13 DIAGNOSIS — R11.0 NAUSEA: ICD-10-CM

## 2025-06-13 DIAGNOSIS — G43.709 CHRONIC MIGRAINE WITHOUT AURA WITHOUT STATUS MIGRAINOSUS, NOT INTRACTABLE: Primary | ICD-10-CM

## 2025-06-13 PROCEDURE — 64615 CHEMODENERV MUSC MIGRAINE: CPT | Performed by: PHYSICIAN ASSISTANT

## 2025-06-13 RX ORDER — DIHYDROERGOTAMINE MESYLATE 4 MG/ML
SPRAY, METERED NASAL AS NEEDED
Status: CANCELLED | OUTPATIENT
Start: 2025-06-13

## 2025-06-13 RX ORDER — DIVALPROEX SODIUM 250 MG/1
TABLET, DELAYED RELEASE ORAL
Qty: 12 TABLET | Refills: 1 | Status: SHIPPED | OUTPATIENT
Start: 2025-06-13

## 2025-06-13 RX ORDER — DIHYDROERGOTAMINE MESYLATE 4 MG/ML
SPRAY, METERED NASAL
Qty: 12 ML | Refills: 1 | Status: SHIPPED | OUTPATIENT
Start: 2025-06-13

## 2025-06-13 RX ORDER — DEXAMETHASONE 2 MG/1
TABLET ORAL
Qty: 5 TABLET | Refills: 2 | Status: SHIPPED | OUTPATIENT
Start: 2025-06-13

## 2025-06-13 RX ORDER — ONDANSETRON 4 MG/1
4 TABLET, FILM COATED ORAL EVERY 12 HOURS PRN
Qty: 30 TABLET | Refills: 2 | Status: SHIPPED | OUTPATIENT
Start: 2025-06-13

## 2025-06-13 RX ORDER — BUTALBITAL, ACETAMINOPHEN AND CAFFEINE 50; 325; 40 MG/1; MG/1; MG/1
TABLET ORAL
Qty: 10 TABLET | Refills: 5 | Status: SHIPPED | OUTPATIENT
Start: 2025-06-13

## 2025-06-13 NOTE — PROGRESS NOTES
Universal Protocol   Consent: Verbal consent obtained. Written consent obtained  Risks and benefits: risks, benefits and alternatives were discussed  Consent given by: patient  Patient understanding: patient states understanding of the procedure being performed  Patient consent: the patient's understanding of the procedure matches consent given  Procedure consent: procedure consent matches procedure scheduled      Chemodenervation     Date/Time  6/13/2025 10:30 AM     Performed by  Linda Ward PA-C   Authorized by  Linda Ward PA-C     Pre-procedure details      Prepped With: Alcohol     Procedure details      Position:  Upright   Botox      Botox Type:  Type A    Brand:  Botox    mL's of Botulinum Toxin:  200    Final Concentration per CC:  100 units    Needle Gauge:  30 G 2.5 inch   Procedures      Botox Procedures: chronic headache      Indications: migraines     Injection Location      Head / Face:  L superior trapezius, R superior trapezius, L superior cervical paraspinal, R superior cervical paraspinal, L , R , procerus, L temporalis, R temporalis, R frontalis, L frontalis, R medial occipitalis and L medial occipitalis    L  injection amount:  5 unit(s)    R  injection amount:  5 unit(s)    L lateral frontalis:  5 unit(s)    R lateral frontalis:  5 unit(s)    L medial frontalis:  5 unit(s)    R medial frontalis:  5 unit(s)    L temporalis injection amount:  20 unit(s)    R temporalis injection amount:  20 unit(s)    Procerus injection amount:  5 unit(s)    L medial occipitalis injection amount:  15 unit(s)    R medial occipitalis injection amount:  15 unit(s)    L superior cervical paraspinal injection amount:  10 unit(s)    R superior cervical paraspinal injection amount:  10 unit(s)    L superior trapezius injection amount:  15 unit(s)    R superior trapezius injection amount:  15 unit(s)   Total Units      Total units used:  200    Total units discarded:  0    Post-procedure details      Chemodenervation:  Chronic migraine    Facial Nerve Location::  Bilateral facial nerve    Patient tolerance of procedure:  Tolerated well, no immediate complications   Comments       Extra units medically necessary:  - 10 between both upper traps  - 15 between both middle traps  - 10 between bilateral occipitalis muscles  - 10 between both anterior temporalis muscles       Blood pressure 112/70, temperature 97.7 °F (36.5 °C), temperature source Temporal.

## 2025-06-20 ENCOUNTER — TELEPHONE (OUTPATIENT)
Age: 50
End: 2025-06-20

## 2025-06-30 ENCOUNTER — OFFICE VISIT (OUTPATIENT)
Dept: BARIATRICS | Facility: CLINIC | Age: 50
End: 2025-06-30
Payer: COMMERCIAL

## 2025-06-30 VITALS
HEIGHT: 63 IN | SYSTOLIC BLOOD PRESSURE: 110 MMHG | WEIGHT: 177 LBS | BODY MASS INDEX: 31.36 KG/M2 | HEART RATE: 92 BPM | DIASTOLIC BLOOD PRESSURE: 68 MMHG | TEMPERATURE: 98.4 F | OXYGEN SATURATION: 95 %

## 2025-06-30 DIAGNOSIS — K91.2 POSTSURGICAL MALABSORPTION: ICD-10-CM

## 2025-06-30 DIAGNOSIS — K44.9 HIATAL HERNIA: ICD-10-CM

## 2025-06-30 DIAGNOSIS — E66.811 OBESITY, CLASS I, BMI 30-34.9: Primary | ICD-10-CM

## 2025-06-30 DIAGNOSIS — Z98.84 BARIATRIC SURGERY STATUS: ICD-10-CM

## 2025-06-30 DIAGNOSIS — G43.709 CHRONIC MIGRAINE WITHOUT AURA WITHOUT STATUS MIGRAINOSUS, NOT INTRACTABLE: ICD-10-CM

## 2025-06-30 PROCEDURE — 99214 OFFICE O/P EST MOD 30 MIN: CPT | Performed by: PHYSICIAN ASSISTANT

## 2025-06-30 RX ORDER — TIRZEPATIDE 5 MG/.5ML
5 INJECTION, SOLUTION SUBCUTANEOUS WEEKLY
COMMUNITY
Start: 2025-06-26

## 2025-06-30 NOTE — PROGRESS NOTES
Assessment/Plan:  status post robotic conversion from sleeve to RNYGB and PEHR with mesh performed by Dr. Osmel Akins on 1/30/2024 for heart burn s/p dilation of GJ with 12mm CRE balloon for dysphagia 5/15/24   - currently on zepbound 5 mg weekly, doing very well with weight loss and down about 30 lbs since starting the medication   - last month she was in the ER for abdominal pain and diarrhea - Her ED work up notable for a CT scan notable for diarrheal illness and small segment intussusception, likely transient. She was determined to have had a GI bug.  Her GI sx have overall improved   - She was recently seen by Dr Osmel Akins, workup to this point is significant for a hiatal hernia which is probably around 2-3 cm in size. 24 hr study was negative for pathologic reflux. Given her symptoms of continued reflux,  she has an appt in August to discuss hiatal hernia repair. Per Dr Osmel Akins they want patient to get down to BMI 30 prior to hernia repair     Continue healthy diet and exercise   Continue zeppbound 5 mg weekly  Follow up as schedule with Dr Osmel Akins in August to discuss HH repair      Patient denies personal and family history of MEN2 tumors and medullary thyroid/thyroid carcinoma. Patient denies personal history of pancreatitis.     Initial weight: 204 lbs  Current: 177 lbs    Follow up in approximately 3 months     Goals:  Food log (ie.) [email or web address],[email or web address]. baritastic  No sugary beverages. At least 64oz of water daily.  Increase physical activity by 10 minutes daily. Gradually increase physical activity to a goal of 5 days per week for 30 minutes of MODERATE intensity PLUS 2 days per week of FULL BODY resistance training  5-10 servings of fruits and vegetables per day and 25-35 grams of dietary fiber per day, gradually increasing       Diagnoses and all orders for this visit:    Obesity, Class I, BMI 30-34.9    Bariatric surgery status    Postsurgical  "malabsorption    Hiatal hernia    Chronic migraine without aura without status migrainosus, not intractable    Other orders  -     Zepbound 5 MG/0.5ML auto-injector; Inject 5 mg under the skin once a week  Pt is doing well on the medication , pt is not having any side effects ( last injection was on 06/27/2025)        Subjective:   Chief Complaint   Patient presents with    Follow-up     3 MO MED F/U      Patient ID: Jenny Olvera  is a 49 y.o. female with excess weight/obesity here to pursue weight management.   Colonoscopy-Completed    The following portions of the patient's history were reviewed and updated as appropriate: allergies, current medications, past family history, past medical history, past social history, past surgical history, and problem list.    Review of Systems   Constitutional: Negative.    Respiratory: Negative.     Cardiovascular: Negative.    Gastrointestinal: Negative.    Neurological: Negative.    Psychiatric/Behavioral: Negative.         Objective:    /68   Pulse 92   Temp 98.4 °F (36.9 °C)   Ht 5' 2.7\" (1.593 m)   Wt 80.3 kg (177 lb)   SpO2 95%   BMI 31.65 kg/m²      Physical Exam  Vitals and nursing note reviewed.   Constitutional:       Appearance: Normal appearance. She is obese.   HENT:      Head: Normocephalic and atraumatic.     Eyes:      Extraocular Movements: Extraocular movements intact.       Cardiovascular:      Rate and Rhythm: Normal rate.   Pulmonary:      Effort: Pulmonary effort is normal.   Abdominal:      General: Bowel sounds are normal.     Musculoskeletal:         General: Normal range of motion.      Cervical back: Normal range of motion.     Skin:     General: Skin is warm and dry.     Neurological:      General: No focal deficit present.      Mental Status: She is alert and oriented to person, place, and time.     Psychiatric:         Mood and Affect: Mood normal.         "

## 2025-06-30 NOTE — PROGRESS NOTES
Date of surgery:1/30/2024  Procedure: RNY   Performing Surgeon: Dr. Osmel Akins     Initial Weight - 263.6 lb   Current Weight - 177.0 lb   [place] Weight - 177.0 lb ( current weight)   Total Body Weight Loss (EWL)-  86.6  EWL% - 70%  TBW % -  33%

## 2025-07-09 ENCOUNTER — CLINICAL SUPPORT (OUTPATIENT)
Dept: INTERNAL MEDICINE CLINIC | Facility: CLINIC | Age: 50
End: 2025-07-09

## 2025-07-09 DIAGNOSIS — E53.8 B12 DEFICIENCY: Primary | ICD-10-CM

## 2025-07-09 PROCEDURE — 96372 THER/PROPH/DIAG INJ SC/IM: CPT | Performed by: INTERNAL MEDICINE

## 2025-07-09 RX ADMIN — CYANOCOBALAMIN 1000 MCG: 1000 INJECTION, SOLUTION INTRAMUSCULAR; SUBCUTANEOUS at 08:58

## 2025-07-09 NOTE — PROGRESS NOTES
Patient is here today 07/09/25 for her monthly B12 injection. Injected 1mL into the RIGHT deltoid. Patient tolerated well and made aware to return in 30 days for her next injection. Patient supplies her B12's and has 3 vials left in office.    NDC 75807-533-13

## 2025-07-14 ENCOUNTER — TELEPHONE (OUTPATIENT)
Dept: NEUROLOGY | Facility: CLINIC | Age: 50
End: 2025-07-14

## 2025-07-15 ENCOUNTER — TELEPHONE (OUTPATIENT)
Age: 50
End: 2025-07-15

## 2025-07-18 NOTE — TELEPHONE ENCOUNTER
Patient needs to schedule an in office visit to follow up on her migraines. Last in office visit was 12/14/2023. Once office visit has been completed a PA can be submitted for dihydroergotamine (MIGRANAL) 4 MG/ML nasal spray . The PA wont be approved and can't be run without a recent office visit note.

## 2025-07-21 DIAGNOSIS — E66.811 OBESITY, CLASS I, BMI 30-34.9: Primary | ICD-10-CM

## 2025-07-22 RX ORDER — TIRZEPATIDE 5 MG/.5ML
INJECTION, SOLUTION SUBCUTANEOUS
Qty: 2 ML | Refills: 1 | Status: SHIPPED | OUTPATIENT
Start: 2025-07-22

## 2025-07-24 NOTE — PROGRESS NOTES
"Urogynecology - Follow-up  Jenny Olvera   [id number]      Chief complaint: \"prolapse is back\"    HPI: 50 y.o. presents for follow-up for POP symptoms. She underwent VEC with Enplace, A/P colporrhaphy, cystoscopy on 2/20/25. Previously had OAB-dry symptoms, but has never taken medications and those symptoms are not bothering her as much. She states since Sunday she has felt recurrent uterovaginal prolapse and vaginal bulge. Denies vaginal bleeding. Reports rare episodes of ABDIRAHMAN. She is perimenopausal but has amenorrhea with Mirena IUD.       Medical history reviewed, no interval changes.     Vitals:    07/25/25 0957   BP: 118/82     Physical Exam  Vitals reviewed. Exam conducted with a chaperone present.   Constitutional:       General: She is not in acute distress.     Appearance: Normal appearance. She is well-developed. She is not toxic-appearing.   HENT:      Head: Normocephalic and atraumatic.     Eyes:      Conjunctiva/sclera: Conjunctivae normal.       Cardiovascular:      Rate and Rhythm: Normal rate and regular rhythm.      Heart sounds: No murmur heard.  Pulmonary:      Effort: Pulmonary effort is normal. No respiratory distress.      Breath sounds: Normal breath sounds.   Abdominal:      Palpations: Abdomen is soft.      Tenderness: There is no abdominal tenderness.   Genitourinary:     General: Normal vulva.      Urethra: No prolapse, urethral swelling or urethral lesion.      Vagina: Foreign body present.      Comments: Normal appearing vaginal mucosa. Recurrent uterine prolapse, visible prolene suture in the vaginal vault. Large parous cervix filling vagina. IUD strings present at os.  Appears as prolene suture avulsed from knot at cervix. Granulation tissue at entry site around prolene.  Suture cut at vaginal wall at original entry point for Enplace anchor. Minimal bleeding. Patient tolerated without complication    Musculoskeletal:         General: No swelling.      Cervical back: Neck supple. " "    Skin:     General: Skin is warm and dry.      Capillary Refill: Capillary refill takes less than 2 seconds.     Neurological:      General: No focal deficit present.      Mental Status: She is alert and oriented to person, place, and time.     Psychiatric:         Mood and Affect: Mood normal.         Behavior: Behavior normal.     POP-Q   Aa -2  Ba -1  C 0    Assessment:  50 y.o. with recurrent uterovaginal prolapse     Plan:  Surgery: yes Procedure: EUA, TVH, BS, USVS, pv sling, possible posterior colporrhaphy, cystoscopy    Discussed POP recurrence with patient and her daughter. Unfortunately, appears as prolene suture to right anchor avulsed. Reviewed findings of prolene suture in the vaginal vault, which was trimmed without difficulty. Counseled on management options including conservative (pessary - patient declines, this has previously not been successful in managing prolapse), repeat Enplace procedure, TVH with USVS.  Patient has previously documented \"extensive intra-abdominal adhesions\" noted in prior bariatric surgery note.  Discussed with patient woulld still recommend vaginal approach for prolapse repair in order to minimize risk of injury to intra-abdominal organs.  Patient has a very large parous cervix that fills the width of the vaginal vault, would likely have significant benefit from vaginal hysterectomy with uterosacral vault suspension.  Did discuss that durability of a uterosacral vault suspension is not as robust as a sacrocolpopexy, however, patient will likely still experience improvement in symptoms as bulk of cervix would be removed.  Reviewed risks versus benefits of repeat surgery.  Patient is highly motivated to pursue definitive surgical management and is hesitant to repeat in place procedure which has already failed.  Written informed consent signed today.  Will [place] plan of care with Dr. Carter who would recommend as attending surgeon Assistant on case.     Will coordinate " with surgery schedulers for surgery date and then coordinate preoperative appointments once date set    Richelle Del Angel MD

## 2025-07-25 ENCOUNTER — OFFICE VISIT (OUTPATIENT)
Dept: OBGYN CLINIC | Facility: CLINIC | Age: 50
End: 2025-07-25

## 2025-07-25 VITALS
HEIGHT: 63 IN | SYSTOLIC BLOOD PRESSURE: 118 MMHG | BODY MASS INDEX: 31.36 KG/M2 | DIASTOLIC BLOOD PRESSURE: 82 MMHG | WEIGHT: 177 LBS

## 2025-07-25 DIAGNOSIS — N81.2 INCOMPLETE UTEROVAGINAL PROLAPSE: Primary | ICD-10-CM

## 2025-07-25 DIAGNOSIS — N39.3 SUI (STRESS URINARY INCONTINENCE, FEMALE): ICD-10-CM

## 2025-07-25 PROCEDURE — 99214 OFFICE O/P EST MOD 30 MIN: CPT | Performed by: OBSTETRICS & GYNECOLOGY

## 2025-07-28 ENCOUNTER — OFFICE VISIT (OUTPATIENT)
Dept: NEUROLOGY | Facility: CLINIC | Age: 50
End: 2025-07-28
Payer: COMMERCIAL

## 2025-07-28 VITALS
WEIGHT: 177 LBS | SYSTOLIC BLOOD PRESSURE: 108 MMHG | DIASTOLIC BLOOD PRESSURE: 66 MMHG | TEMPERATURE: 97.9 F | OXYGEN SATURATION: 99 % | BODY MASS INDEX: 31.36 KG/M2 | HEIGHT: 63 IN | HEART RATE: 90 BPM

## 2025-07-28 DIAGNOSIS — M62.838 TRAPEZIUS MUSCLE SPASM: ICD-10-CM

## 2025-07-28 DIAGNOSIS — G43.709 CHRONIC MIGRAINE WITHOUT AURA WITHOUT STATUS MIGRAINOSUS, NOT INTRACTABLE: Primary | ICD-10-CM

## 2025-07-28 PROCEDURE — G2211 COMPLEX E/M VISIT ADD ON: HCPCS | Performed by: PHYSICIAN ASSISTANT

## 2025-07-28 PROCEDURE — 99214 OFFICE O/P EST MOD 30 MIN: CPT | Performed by: PHYSICIAN ASSISTANT

## 2025-07-28 RX ORDER — TIZANIDINE 2 MG/1
2 TABLET ORAL EVERY 8 HOURS PRN
Qty: 90 TABLET | Refills: 1 | Status: SHIPPED | OUTPATIENT
Start: 2025-07-28

## 2025-07-28 RX ORDER — DIHYDROERGOTAMINE MESYLATE 4 MG/ML
1 SPRAY NASAL AS NEEDED
Qty: 8 ML | Refills: 5 | Status: SHIPPED | OUTPATIENT
Start: 2025-07-28

## 2025-07-29 ENCOUNTER — TELEPHONE (OUTPATIENT)
Dept: OBGYN CLINIC | Facility: CLINIC | Age: 50
End: 2025-07-29

## 2025-07-29 ENCOUNTER — APPOINTMENT (OUTPATIENT)
Dept: LAB | Facility: CLINIC | Age: 50
End: 2025-07-29
Payer: COMMERCIAL

## 2025-07-29 DIAGNOSIS — Z98.84 BARIATRIC SURGERY STATUS: ICD-10-CM

## 2025-07-29 DIAGNOSIS — K91.2 POSTSURGICAL MALABSORPTION: ICD-10-CM

## 2025-07-29 DIAGNOSIS — E55.9 VITAMIN D DEFICIENCY: ICD-10-CM

## 2025-07-29 DIAGNOSIS — Z01.818 PREOP TESTING: ICD-10-CM

## 2025-07-29 LAB
25(OH)D3 SERPL-MCNC: 32.7 NG/ML (ref 30–100)
ERYTHROCYTE [DISTWIDTH] IN BLOOD BY AUTOMATED COUNT: 15 % (ref 11.6–15.1)
HCT VFR BLD AUTO: 43 % (ref 34.8–46.1)
HGB BLD-MCNC: 13.3 G/DL (ref 11.5–15.4)
MCH RBC QN AUTO: 25.7 PG (ref 26.8–34.3)
MCHC RBC AUTO-ENTMCNC: 30.9 G/DL (ref 31.4–37.4)
MCV RBC AUTO: 83 FL (ref 82–98)
PLATELET # BLD AUTO: 258 THOUSANDS/UL (ref 149–390)
PMV BLD AUTO: 10.8 FL (ref 8.9–12.7)
RBC # BLD AUTO: 5.17 MILLION/UL (ref 3.81–5.12)
WBC # BLD AUTO: 6.81 THOUSAND/UL (ref 4.31–10.16)

## 2025-07-29 PROCEDURE — 36415 COLL VENOUS BLD VENIPUNCTURE: CPT

## 2025-07-29 PROCEDURE — 85027 COMPLETE CBC AUTOMATED: CPT

## 2025-07-29 PROCEDURE — 82306 VITAMIN D 25 HYDROXY: CPT

## 2025-07-31 ENCOUNTER — TELEPHONE (OUTPATIENT)
Age: 50
End: 2025-07-31

## 2025-08-08 ENCOUNTER — HOSPITAL ENCOUNTER (OUTPATIENT)
Dept: RADIOLOGY | Age: 50
Discharge: HOME | End: 2025-08-08
Attending: STUDENT IN AN ORGANIZED HEALTH CARE EDUCATION/TRAINING PROGRAM
Payer: COMMERCIAL

## 2025-08-08 VITALS — HEIGHT: 62 IN | BODY MASS INDEX: 32.57 KG/M2 | WEIGHT: 177 LBS

## 2025-08-08 DIAGNOSIS — Z13.9 SCREENING DUE: ICD-10-CM

## 2025-08-08 PROCEDURE — 77063 BREAST TOMOSYNTHESIS BI: CPT

## 2025-08-08 PROCEDURE — 77067 SCR MAMMO BI INCL CAD: CPT

## 2025-08-11 ENCOUNTER — CLINICAL SUPPORT (OUTPATIENT)
Dept: INTERNAL MEDICINE CLINIC | Facility: CLINIC | Age: 50
End: 2025-08-11

## 2025-08-13 ENCOUNTER — OFFICE VISIT (OUTPATIENT)
Dept: OBGYN CLINIC | Facility: CLINIC | Age: 50
End: 2025-08-13
Payer: COMMERCIAL

## 2025-08-18 DIAGNOSIS — E66.811 OBESITY, CLASS I, BMI 30-34.9: ICD-10-CM

## 2025-08-19 RX ORDER — TIRZEPATIDE 5 MG/.5ML
INJECTION, SOLUTION SUBCUTANEOUS
Qty: 2 ML | Refills: 0 | OUTPATIENT
Start: 2025-08-19

## (undated) DEVICE — TUBE SET SMOKE EVAC PNEUMOCLEAR HIGH FLOW

## (undated) DEVICE — VIOLET BRAIDED (POLYGLACTIN 910), SYNTHETIC ABSORBABLE SUTURE: Brand: COATED VICRYL

## (undated) DEVICE — CATH FOLEY 18FR 5ML 2 WAY UNCOATED SILICONE

## (undated) DEVICE — MONOPOLAR CURVED SCISSORS: Brand: ENDOWRIST

## (undated) DEVICE — WET SKIN PREP TRAY: Brand: MEDLINE INDUSTRIES, INC.

## (undated) DEVICE — 2000CC GUARDIAN II: Brand: GUARDIAN

## (undated) DEVICE — RETRACTOR RING 14.1 X 14.1 CM DISP

## (undated) DEVICE — MEGA SUTURECUT ND: Brand: ENDOWRIST

## (undated) DEVICE — STRL COTTON TIP APPLCTR 6IN PK: Brand: CARDINAL HEALTH

## (undated) DEVICE — GLOVE SRG BIOGEL 7

## (undated) DEVICE — IMPERVIOUS STOCKINETTE: Brand: DEROYAL

## (undated) DEVICE — TROCAR: Brand: KII FIOS FIRST ENTRY

## (undated) DEVICE — ARM DRAPE

## (undated) DEVICE — IV FLUSH NSS 10ML POSIFLUSH

## (undated) DEVICE — STAYS ELASTIC 5MM SHARP HOOK LONE STAR

## (undated) DEVICE — STAPLER 60 RELOAD WHITE: Brand: SUREFORM

## (undated) DEVICE — INTENT TO BE USED WITH SUTURE MATERIAL FOR TISSUE CLOSURE: Brand: RICHARD-ALLAN® NEEDLE 1/2 CIRCLE TAPER

## (undated) DEVICE — ACE WRAP 3 IN UNSTERILE

## (undated) DEVICE — CUFF TOURNIQUET 18 X 4 IN QUICK CONNECT DISP 1 BLADDER

## (undated) DEVICE — URETERAL CATHETER ADAPTOR TIP

## (undated) DEVICE — SCD SEQUENTIAL COMPRESSION COMFORT SLEEVE MEDIUM KNEE LENGTH: Brand: KENDALL SCD

## (undated) DEVICE — SUT ETHIBOND 0 CT-1 30 IN X424H

## (undated) DEVICE — OCCLUSIVE GAUZE STRIP,3% BISMUTH TRIBROMOPHENATE IN PETROLATUM BLEND: Brand: XEROFORM

## (undated) DEVICE — SUT PROLENE 4-0 PS-2 18 IN 8682G

## (undated) DEVICE — DECANTER: Brand: UNBRANDED

## (undated) DEVICE — COLUMN DRAPE

## (undated) DEVICE — SEALANT FIBRIN VISTASEAL 10ML

## (undated) DEVICE — GLOVE SRG BIOGEL 7.5

## (undated) DEVICE — ABSORBABLE WOUND CLOSURE DEVICE: Brand: V-LOC 90

## (undated) DEVICE — SPLINT ORTHO-GLASS 4IN X 15FT

## (undated) DEVICE — SPONGE PVP SCRUB WING STERILE

## (undated) DEVICE — Device: Brand: DEFENDO AIR/WATER/SUCTION AND BIOPSY VALVE

## (undated) DEVICE — SUT PROLENE 4-0 PS-2 18 IN 8682H

## (undated) DEVICE — BULB SYRINGE, IRRIGATION WITH PROTECTIVE CAP, 60 CC, INDIVIDUALLY WRAPPED: Brand: DOVER

## (undated) DEVICE — GLOVE INDICATOR PI UNDERGLOVE SZ 6.5 BLUE

## (undated) DEVICE — REDUCER: Brand: ENDOWRIST

## (undated) DEVICE — NEEDLE HYPO 23G X 1-1/2 IN

## (undated) DEVICE — PACKING VAGINAL 2 IN

## (undated) DEVICE — DISPOSABLE EQUIPMENT COVER: Brand: SMALL TOWEL DRAPE

## (undated) DEVICE — UNDER BUTTOCKS DRAPE W/FLUID CONTROL POUCH: Brand: CONVERTORS

## (undated) DEVICE — SUT VICRYL 0 CT-1 36 IN J946H

## (undated) DEVICE — PBT NON ABSORBABLE WOUND CLOSURE DEVICE: Brand: V-LOC

## (undated) DEVICE — SUT PDS II 2-0 CT-2 27 IN Z333H

## (undated) DEVICE — VESSEL SEALER EXTEND: Brand: ENDOWRIST

## (undated) DEVICE — SUT MONOCRYL 4-0 PS-2 27 IN Y426H

## (undated) DEVICE — DISPOSABLE OR TOWEL: Brand: CARDINAL HEALTH

## (undated) DEVICE — GLOVE INDICATOR PI UNDERGLOVE SZ 7 BLUE

## (undated) DEVICE — IV CATH 14 G SAFETY

## (undated) DEVICE — INTENDED FOR TISSUE SEPARATION, AND OTHER PROCEDURES THAT REQUIRE A SHARP SURGICAL BLADE TO PUNCTURE OR CUT.: Brand: BARD-PARKER ® CARBON RIB-BACK BLADES

## (undated) DEVICE — BLADE MINI RND TIP ONE SIDE SHARP

## (undated) DEVICE — 10 MM BABCOCKS WITH RATCHET HANDLES: Brand: ENDOPATH

## (undated) DEVICE — CANNULA SEAL

## (undated) DEVICE — STAPLER CANNULA SEAL: Brand: ENDOWRIST

## (undated) DEVICE — TIP COVER ACCESSORY

## (undated) DEVICE — CATH URET 12FR RED RUBBER

## (undated) DEVICE — CURITY PLAIN PACKING STRIP: Brand: CURITY

## (undated) DEVICE — GLOVE SRG BIOGEL 8

## (undated) DEVICE — NEEDLE SPINAL 22G X 3.5IN  QUINCKE

## (undated) DEVICE — ALLENTOWN DR  LUCENTE S LAP PK: Brand: CARDINAL HEALTH

## (undated) DEVICE — TUBING SUCTION 5MM X 12 FT

## (undated) DEVICE — ELECTROSURGICAL DEVICE HOLSTER;FOR USE WITH MAXIMUM PEAK VOLTAGE OF 4000 V: Brand: FORCE TRIVERSE

## (undated) DEVICE — LUBRICANT JELLY SURGILUBE TUBE 2OZ FLIP TOP

## (undated) DEVICE — GLOVE SRG BIOGEL 6.5

## (undated) DEVICE — MINI BLADE ROUND TIP SHARP ON ONE SIDE

## (undated) DEVICE — PADDING CAST 4 IN  COTTON STRL

## (undated) DEVICE — CADIERE FORCEPS: Brand: ENDOWRIST

## (undated) DEVICE — STERILE BETHLEHEM PLASTIC HAND: Brand: CARDINAL HEALTH

## (undated) DEVICE — ACE WRAP 4 IN UNSTERILE

## (undated) DEVICE — PREP PAD BNS: Brand: CONVERTORS

## (undated) DEVICE — SYRINGE 3ML LL

## (undated) DEVICE — GAUZE SPONGES,16 PLY: Brand: CURITY

## (undated) DEVICE — KIT, ROBOTIC BARIATRIC: Brand: CARDINAL HEALTH

## (undated) DEVICE — SUT VICRYL 2-0 SH 27 IN UNDYED J417H

## (undated) DEVICE — CAUTERY TIP POLISHER: Brand: DEVON

## (undated) DEVICE — PREMIUM DRY TRAY LF: Brand: MEDLINE INDUSTRIES, INC.

## (undated) DEVICE — INTENDED FOR TISSUE SEPARATION, AND OTHER PROCEDURES THAT REQUIRE A SHARP SURGICAL BLADE TO PUNCTURE OR CUT.: Brand: BARD-PARKER SAFETY BLADES SIZE 11, STERILE

## (undated) DEVICE — BAG URINE DRAINAGE 2000ML ANTI RFLX LF

## (undated) DEVICE — ELECTRO LUBE IS A SINGLE PATIENT USE DEVICE THAT IS INTENDED TO BE USED ON ELECTROSURGICAL ELECTRODES TO REDUCE STICKING.: Brand: KEY SURGICAL ELECTRO LUBE

## (undated) DEVICE — WEBRIL 6 IN UNSTERILE

## (undated) DEVICE — VISIGI 3D®  CALIBRATION SYSTEM  SIZE 36FR BYPASS/SHORT: Brand: BOEHRINGER® VISIGI 3D®  CALIBRATION SYSTEM  SIZE 36FR BYPASS/SHORT

## (undated) DEVICE — MEDI-VAC YANKAUER SUCTION HANDLE W/BULBOUS AND CONTROL VENT: Brand: CARDINAL HEALTH

## (undated) DEVICE — CURITY STRETCH BANDAGE: Brand: CURITY

## (undated) DEVICE — ENDOPATH PNEUMONEEDLE INSUFFLATION NEEDLES WITH LUER LOCK CONNECTORS 120MM: Brand: ENDOPATH

## (undated) DEVICE — SMOKE EVACUATION TUBING WITH 8 IN INTEGRAL WAND AND SPONGE GUARD: Brand: BUFFALO FILTER

## (undated) DEVICE — SPRAY SET ENDO FLEX APPLICATOR 40CM

## (undated) DEVICE — STAPLER 60: Brand: SUREFORM

## (undated) DEVICE — ASTOUND STANDARD SURGICAL GOWN, XL: Brand: CONVERTORS

## (undated) DEVICE — 3M™ STERI-STRIP™ REINFORCED ADHESIVE SKIN CLOSURES, R1547, 1/2 IN X 4 IN (12 MM X 100 MM), 6 STRIPS/ENVELOPE: Brand: 3M™ STERI-STRIP™

## (undated) DEVICE — BLADELESS OBTURATOR: Brand: WECK VISTA